# Patient Record
Sex: MALE | Race: WHITE | Employment: OTHER | URBAN - METROPOLITAN AREA
[De-identification: names, ages, dates, MRNs, and addresses within clinical notes are randomized per-mention and may not be internally consistent; named-entity substitution may affect disease eponyms.]

---

## 2017-01-03 ENCOUNTER — TELEPHONE (OUTPATIENT)
Dept: FAMILY MEDICINE CLINIC | Age: 77
End: 2017-01-03

## 2017-01-04 ENCOUNTER — OFFICE VISIT (OUTPATIENT)
Dept: FAMILY MEDICINE CLINIC | Age: 77
End: 2017-01-04

## 2017-01-04 ENCOUNTER — TELEPHONE (OUTPATIENT)
Dept: FAMILY MEDICINE CLINIC | Age: 77
End: 2017-01-04

## 2017-01-04 VITALS
HEART RATE: 58 BPM | HEIGHT: 68 IN | WEIGHT: 266 LBS | SYSTOLIC BLOOD PRESSURE: 124 MMHG | RESPIRATION RATE: 20 BRPM | BODY MASS INDEX: 40.32 KG/M2 | DIASTOLIC BLOOD PRESSURE: 74 MMHG | TEMPERATURE: 97.8 F | OXYGEN SATURATION: 96 %

## 2017-01-04 DIAGNOSIS — E11.21 TYPE 2 DIABETES MELLITUS WITH DIABETIC NEPHROPATHY, WITH LONG-TERM CURRENT USE OF INSULIN (HCC): Primary | ICD-10-CM

## 2017-01-04 DIAGNOSIS — J44.1 COPD WITH EXACERBATION (HCC): ICD-10-CM

## 2017-01-04 DIAGNOSIS — J44.9 CHRONIC OBSTRUCTIVE PULMONARY DISEASE, UNSPECIFIED COPD TYPE (HCC): ICD-10-CM

## 2017-01-04 DIAGNOSIS — Z79.4 TYPE 2 DIABETES MELLITUS WITH DIABETIC NEPHROPATHY, WITH LONG-TERM CURRENT USE OF INSULIN (HCC): Primary | ICD-10-CM

## 2017-01-04 PROCEDURE — 99214 OFFICE O/P EST MOD 30 MIN: CPT | Performed by: FAMILY MEDICINE

## 2017-01-04 RX ORDER — PREDNISONE 20 MG/1
20 TABLET ORAL 2 TIMES DAILY
Qty: 10 TABLET | Refills: 0 | Status: SHIPPED | OUTPATIENT
Start: 2017-01-04 | End: 2017-01-09

## 2017-01-04 RX ORDER — DEXTROMETHORPHAN HYDROBROMIDE AND PROMETHAZINE HYDROCHLORIDE 15; 6.25 MG/5ML; MG/5ML
5 SYRUP ORAL 4 TIMES DAILY PRN
Qty: 240 ML | Refills: 0 | Status: SHIPPED | OUTPATIENT
Start: 2017-01-04 | End: 2017-01-11

## 2017-01-04 RX ORDER — CEFUROXIME AXETIL 250 MG/1
250 TABLET ORAL 2 TIMES DAILY
Qty: 20 TABLET | Refills: 0 | Status: SHIPPED | OUTPATIENT
Start: 2017-01-04 | End: 2017-01-14

## 2017-01-16 ENCOUNTER — OFFICE VISIT (OUTPATIENT)
Dept: FAMILY MEDICINE CLINIC | Age: 77
End: 2017-01-16

## 2017-01-16 VITALS
TEMPERATURE: 98 F | RESPIRATION RATE: 18 BRPM | HEIGHT: 68 IN | BODY MASS INDEX: 40.32 KG/M2 | SYSTOLIC BLOOD PRESSURE: 120 MMHG | WEIGHT: 266 LBS | DIASTOLIC BLOOD PRESSURE: 70 MMHG | HEART RATE: 64 BPM

## 2017-01-16 DIAGNOSIS — E11.21 TYPE 2 DIABETES MELLITUS WITH DIABETIC NEPHROPATHY, WITH LONG-TERM CURRENT USE OF INSULIN (HCC): ICD-10-CM

## 2017-01-16 DIAGNOSIS — I10 ESSENTIAL HYPERTENSION: ICD-10-CM

## 2017-01-16 DIAGNOSIS — Z79.4 TYPE 2 DIABETES MELLITUS WITH DIABETIC NEPHROPATHY, WITH LONG-TERM CURRENT USE OF INSULIN (HCC): ICD-10-CM

## 2017-01-16 DIAGNOSIS — I25.119 CORONARY ARTERY DISEASE INVOLVING NATIVE CORONARY ARTERY OF NATIVE HEART WITH ANGINA PECTORIS (HCC): ICD-10-CM

## 2017-01-16 DIAGNOSIS — E11.42 DIABETIC POLYNEUROPATHY ASSOCIATED WITH TYPE 2 DIABETES MELLITUS (HCC): ICD-10-CM

## 2017-01-16 DIAGNOSIS — E11.21 TYPE 2 DIABETES MELLITUS WITH DIABETIC NEPHROPATHY, WITH LONG-TERM CURRENT USE OF INSULIN (HCC): Primary | ICD-10-CM

## 2017-01-16 DIAGNOSIS — J44.9 CHRONIC OBSTRUCTIVE PULMONARY DISEASE, UNSPECIFIED COPD TYPE (HCC): ICD-10-CM

## 2017-01-16 DIAGNOSIS — Z86.79 PERSONAL HISTORY OF ATRIAL FLUTTER: ICD-10-CM

## 2017-01-16 DIAGNOSIS — E78.2 MIXED HYPERLIPIDEMIA: ICD-10-CM

## 2017-01-16 DIAGNOSIS — E66.01 OBESITY, CLASS III, BMI 40-49.9 (MORBID OBESITY) (HCC): ICD-10-CM

## 2017-01-16 DIAGNOSIS — E11.319 DIABETIC RETINOPATHY ASSOCIATED WITH TYPE 2 DIABETES MELLITUS, MACULAR EDEMA PRESENCE UNSPECIFIED, UNSPECIFIED RETINOPATHY SEVERITY: ICD-10-CM

## 2017-01-16 DIAGNOSIS — Z79.4 TYPE 2 DIABETES MELLITUS WITH DIABETIC NEPHROPATHY, WITH LONG-TERM CURRENT USE OF INSULIN (HCC): Primary | ICD-10-CM

## 2017-01-16 LAB
A/G RATIO: 1.6 (ref 1.1–2.2)
ALBUMIN SERPL-MCNC: 3.6 G/DL (ref 3.4–5)
ALP BLD-CCNC: 126 U/L (ref 40–129)
ALT SERPL-CCNC: 16 U/L (ref 10–40)
ANION GAP SERPL CALCULATED.3IONS-SCNC: 12 MMOL/L (ref 3–16)
AST SERPL-CCNC: 16 U/L (ref 15–37)
BILIRUB SERPL-MCNC: 0.9 MG/DL (ref 0–1)
BUN BLDV-MCNC: 20 MG/DL (ref 7–20)
CALCIUM SERPL-MCNC: 9.1 MG/DL (ref 8.3–10.6)
CHLORIDE BLD-SCNC: 100 MMOL/L (ref 99–110)
CHOLESTEROL, TOTAL: 157 MG/DL (ref 0–199)
CO2: 32 MMOL/L (ref 21–32)
CREAT SERPL-MCNC: 1.3 MG/DL (ref 0.8–1.3)
GFR AFRICAN AMERICAN: >60
GFR NON-AFRICAN AMERICAN: 54
GLOBULIN: 2.3 G/DL
GLUCOSE BLD-MCNC: 225 MG/DL (ref 70–99)
HDLC SERPL-MCNC: 55 MG/DL (ref 40–60)
LDL CHOLESTEROL CALCULATED: 73 MG/DL
POTASSIUM SERPL-SCNC: 4.7 MMOL/L (ref 3.5–5.1)
SODIUM BLD-SCNC: 144 MMOL/L (ref 136–145)
TOTAL PROTEIN: 5.9 G/DL (ref 6.4–8.2)
TRIGL SERPL-MCNC: 147 MG/DL (ref 0–150)
VLDLC SERPL CALC-MCNC: 29 MG/DL

## 2017-01-16 PROCEDURE — 3023F SPIROM DOC REV: CPT | Performed by: FAMILY MEDICINE

## 2017-01-16 PROCEDURE — 1036F TOBACCO NON-USER: CPT | Performed by: FAMILY MEDICINE

## 2017-01-16 PROCEDURE — G8598 ASA/ANTIPLAT THER USED: HCPCS | Performed by: FAMILY MEDICINE

## 2017-01-16 PROCEDURE — 1123F ACP DISCUSS/DSCN MKR DOCD: CPT | Performed by: FAMILY MEDICINE

## 2017-01-16 PROCEDURE — 4040F PNEUMOC VAC/ADMIN/RCVD: CPT | Performed by: FAMILY MEDICINE

## 2017-01-16 PROCEDURE — G8484 FLU IMMUNIZE NO ADMIN: HCPCS | Performed by: FAMILY MEDICINE

## 2017-01-16 PROCEDURE — G8419 CALC BMI OUT NRM PARAM NOF/U: HCPCS | Performed by: FAMILY MEDICINE

## 2017-01-16 PROCEDURE — G8926 SPIRO NO PERF OR DOC: HCPCS | Performed by: FAMILY MEDICINE

## 2017-01-16 PROCEDURE — 99214 OFFICE O/P EST MOD 30 MIN: CPT | Performed by: FAMILY MEDICINE

## 2017-01-16 PROCEDURE — G8427 DOCREV CUR MEDS BY ELIG CLIN: HCPCS | Performed by: FAMILY MEDICINE

## 2017-01-17 LAB
ESTIMATED AVERAGE GLUCOSE: 217.3 MG/DL
HBA1C MFR BLD: 9.2 %

## 2017-03-14 RX ORDER — IBUPROFEN 200 MG
TABLET ORAL
Qty: 400 EACH | Refills: 5 | Status: SHIPPED | OUTPATIENT
Start: 2017-03-14 | End: 2018-05-11 | Stop reason: SDUPTHER

## 2017-03-19 DIAGNOSIS — E78.5 HYPERLIPIDEMIA: ICD-10-CM

## 2017-03-20 RX ORDER — RANITIDINE 150 MG/1
TABLET ORAL
Qty: 180 TABLET | Refills: 3 | Status: SHIPPED | OUTPATIENT
Start: 2017-03-20 | End: 2018-04-16 | Stop reason: SDUPTHER

## 2017-03-20 RX ORDER — ATORVASTATIN CALCIUM 40 MG/1
TABLET, FILM COATED ORAL
Qty: 90 TABLET | Refills: 1 | Status: SHIPPED | OUTPATIENT
Start: 2017-03-20 | End: 2017-10-30 | Stop reason: SDUPTHER

## 2017-04-04 RX ORDER — POTASSIUM CHLORIDE 750 MG/1
TABLET, FILM COATED, EXTENDED RELEASE ORAL
Qty: 90 TABLET | Refills: 1 | Status: SHIPPED | OUTPATIENT
Start: 2017-04-04 | End: 2017-09-26 | Stop reason: SDUPTHER

## 2017-04-04 RX ORDER — AMIODARONE HYDROCHLORIDE 200 MG/1
TABLET ORAL
Qty: 90 TABLET | Refills: 1 | Status: SHIPPED | OUTPATIENT
Start: 2017-04-04 | End: 2017-09-26 | Stop reason: SDUPTHER

## 2017-04-10 RX ORDER — OMEPRAZOLE 20 MG/1
CAPSULE, DELAYED RELEASE ORAL
Qty: 180 CAPSULE | Refills: 0 | Status: SHIPPED | OUTPATIENT
Start: 2017-04-10 | End: 2017-07-08 | Stop reason: SDUPTHER

## 2017-04-21 ENCOUNTER — OFFICE VISIT (OUTPATIENT)
Dept: FAMILY MEDICINE CLINIC | Age: 77
End: 2017-04-21

## 2017-04-21 ENCOUNTER — TELEPHONE (OUTPATIENT)
Dept: FAMILY MEDICINE CLINIC | Age: 77
End: 2017-04-21

## 2017-04-21 VITALS
DIASTOLIC BLOOD PRESSURE: 80 MMHG | HEIGHT: 68 IN | WEIGHT: 263 LBS | SYSTOLIC BLOOD PRESSURE: 126 MMHG | TEMPERATURE: 98.4 F | BODY MASS INDEX: 39.86 KG/M2 | HEART RATE: 58 BPM | RESPIRATION RATE: 18 BRPM

## 2017-04-21 DIAGNOSIS — E11.21 TYPE 2 DIABETES MELLITUS WITH DIABETIC NEPHROPATHY, WITH LONG-TERM CURRENT USE OF INSULIN (HCC): ICD-10-CM

## 2017-04-21 DIAGNOSIS — Z79.4 TYPE 2 DIABETES MELLITUS WITH DIABETIC NEPHROPATHY, WITH LONG-TERM CURRENT USE OF INSULIN (HCC): ICD-10-CM

## 2017-04-21 DIAGNOSIS — I10 ESSENTIAL HYPERTENSION: ICD-10-CM

## 2017-04-21 DIAGNOSIS — Z79.4 TYPE 2 DIABETES MELLITUS WITH DIABETIC NEPHROPATHY, WITH LONG-TERM CURRENT USE OF INSULIN (HCC): Primary | ICD-10-CM

## 2017-04-21 DIAGNOSIS — E11.21 DIABETIC NEPHROPATHY ASSOCIATED WITH TYPE 2 DIABETES MELLITUS (HCC): ICD-10-CM

## 2017-04-21 DIAGNOSIS — E11.42 DIABETIC POLYNEUROPATHY ASSOCIATED WITH TYPE 2 DIABETES MELLITUS (HCC): ICD-10-CM

## 2017-04-21 DIAGNOSIS — I25.119 CORONARY ARTERY DISEASE INVOLVING NATIVE CORONARY ARTERY OF NATIVE HEART WITH ANGINA PECTORIS (HCC): ICD-10-CM

## 2017-04-21 DIAGNOSIS — J44.9 CHRONIC OBSTRUCTIVE PULMONARY DISEASE, UNSPECIFIED COPD TYPE (HCC): ICD-10-CM

## 2017-04-21 DIAGNOSIS — E11.21 TYPE 2 DIABETES MELLITUS WITH DIABETIC NEPHROPATHY, WITH LONG-TERM CURRENT USE OF INSULIN (HCC): Primary | ICD-10-CM

## 2017-04-21 LAB
ANION GAP SERPL CALCULATED.3IONS-SCNC: 13 MMOL/L (ref 3–16)
BUN BLDV-MCNC: 26 MG/DL (ref 7–20)
CALCIUM SERPL-MCNC: 8.9 MG/DL (ref 8.3–10.6)
CHLORIDE BLD-SCNC: 103 MMOL/L (ref 99–110)
CO2: 28 MMOL/L (ref 21–32)
CREAT SERPL-MCNC: 1.4 MG/DL (ref 0.8–1.3)
ESTIMATED AVERAGE GLUCOSE: 197.3 MG/DL
GFR AFRICAN AMERICAN: 60
GFR NON-AFRICAN AMERICAN: 49
GLUCOSE BLD-MCNC: 245 MG/DL (ref 70–99)
HBA1C MFR BLD: 8.5 %
POTASSIUM SERPL-SCNC: 4.4 MMOL/L (ref 3.5–5.1)
SODIUM BLD-SCNC: 144 MMOL/L (ref 136–145)

## 2017-04-21 PROCEDURE — G8926 SPIRO NO PERF OR DOC: HCPCS | Performed by: FAMILY MEDICINE

## 2017-04-21 PROCEDURE — G8417 CALC BMI ABV UP PARAM F/U: HCPCS | Performed by: FAMILY MEDICINE

## 2017-04-21 PROCEDURE — 99214 OFFICE O/P EST MOD 30 MIN: CPT | Performed by: FAMILY MEDICINE

## 2017-04-21 PROCEDURE — G8427 DOCREV CUR MEDS BY ELIG CLIN: HCPCS | Performed by: FAMILY MEDICINE

## 2017-04-21 PROCEDURE — 1123F ACP DISCUSS/DSCN MKR DOCD: CPT | Performed by: FAMILY MEDICINE

## 2017-04-21 PROCEDURE — 4040F PNEUMOC VAC/ADMIN/RCVD: CPT | Performed by: FAMILY MEDICINE

## 2017-04-21 PROCEDURE — 3023F SPIROM DOC REV: CPT | Performed by: FAMILY MEDICINE

## 2017-04-21 PROCEDURE — 1036F TOBACCO NON-USER: CPT | Performed by: FAMILY MEDICINE

## 2017-04-21 PROCEDURE — G8598 ASA/ANTIPLAT THER USED: HCPCS | Performed by: FAMILY MEDICINE

## 2017-05-03 RX ORDER — FUROSEMIDE 40 MG/1
TABLET ORAL
Qty: 180 TABLET | Refills: 0 | Status: SHIPPED | OUTPATIENT
Start: 2017-05-03 | End: 2017-05-08

## 2017-05-08 RX ORDER — FUROSEMIDE 80 MG
80 TABLET ORAL DAILY
Qty: 90 TABLET | Refills: 0 | Status: SHIPPED | OUTPATIENT
Start: 2017-05-08 | End: 2017-08-07 | Stop reason: SDUPTHER

## 2017-05-08 RX ORDER — FUROSEMIDE 80 MG
80 TABLET ORAL 2 TIMES DAILY
Qty: 90 TABLET | Refills: 0 | Status: SHIPPED | OUTPATIENT
Start: 2017-05-08 | End: 2017-05-08 | Stop reason: SDUPTHER

## 2017-06-13 ENCOUNTER — OFFICE VISIT (OUTPATIENT)
Dept: ORTHOPEDIC SURGERY | Age: 77
End: 2017-06-13

## 2017-06-13 VITALS
SYSTOLIC BLOOD PRESSURE: 143 MMHG | WEIGHT: 263 LBS | DIASTOLIC BLOOD PRESSURE: 62 MMHG | TEMPERATURE: 97.6 F | HEIGHT: 68 IN | BODY MASS INDEX: 39.86 KG/M2 | HEART RATE: 54 BPM

## 2017-06-13 DIAGNOSIS — Z96.653 HISTORY OF TOTAL BILATERAL KNEE REPLACEMENT: Primary | ICD-10-CM

## 2017-06-13 PROCEDURE — 1123F ACP DISCUSS/DSCN MKR DOCD: CPT | Performed by: PHYSICIAN ASSISTANT

## 2017-06-13 PROCEDURE — 99213 OFFICE O/P EST LOW 20 MIN: CPT | Performed by: PHYSICIAN ASSISTANT

## 2017-06-13 PROCEDURE — G8598 ASA/ANTIPLAT THER USED: HCPCS | Performed by: PHYSICIAN ASSISTANT

## 2017-06-13 PROCEDURE — 4040F PNEUMOC VAC/ADMIN/RCVD: CPT | Performed by: PHYSICIAN ASSISTANT

## 2017-06-13 PROCEDURE — G8427 DOCREV CUR MEDS BY ELIG CLIN: HCPCS | Performed by: PHYSICIAN ASSISTANT

## 2017-06-13 PROCEDURE — 1036F TOBACCO NON-USER: CPT | Performed by: PHYSICIAN ASSISTANT

## 2017-06-13 PROCEDURE — G8417 CALC BMI ABV UP PARAM F/U: HCPCS | Performed by: PHYSICIAN ASSISTANT

## 2017-07-10 RX ORDER — OMEPRAZOLE 20 MG/1
CAPSULE, DELAYED RELEASE ORAL
Qty: 180 CAPSULE | Refills: 3 | Status: SHIPPED | OUTPATIENT
Start: 2017-07-10 | End: 2018-08-21 | Stop reason: SDUPTHER

## 2017-08-08 RX ORDER — FUROSEMIDE 80 MG
TABLET ORAL
Qty: 90 TABLET | Refills: 1 | Status: SHIPPED | OUTPATIENT
Start: 2017-08-08 | End: 2018-02-01 | Stop reason: SDUPTHER

## 2017-08-10 ENCOUNTER — TELEPHONE (OUTPATIENT)
Dept: FAMILY MEDICINE CLINIC | Age: 77
End: 2017-08-10

## 2017-08-10 DIAGNOSIS — I10 ESSENTIAL HYPERTENSION: ICD-10-CM

## 2017-08-10 DIAGNOSIS — E11.21 TYPE 2 DIABETES MELLITUS WITH DIABETIC NEPHROPATHY, WITH LONG-TERM CURRENT USE OF INSULIN (HCC): Primary | ICD-10-CM

## 2017-08-10 DIAGNOSIS — Z79.4 TYPE 2 DIABETES MELLITUS WITH DIABETIC NEPHROPATHY, WITH LONG-TERM CURRENT USE OF INSULIN (HCC): Primary | ICD-10-CM

## 2017-08-11 ENCOUNTER — OFFICE VISIT (OUTPATIENT)
Dept: FAMILY MEDICINE CLINIC | Age: 77
End: 2017-08-11

## 2017-08-11 VITALS
DIASTOLIC BLOOD PRESSURE: 70 MMHG | RESPIRATION RATE: 18 BRPM | BODY MASS INDEX: 43.19 KG/M2 | HEIGHT: 68 IN | WEIGHT: 285 LBS | TEMPERATURE: 98.7 F | SYSTOLIC BLOOD PRESSURE: 116 MMHG | HEART RATE: 56 BPM

## 2017-08-11 DIAGNOSIS — E53.8 B12 DEFICIENCY: ICD-10-CM

## 2017-08-11 DIAGNOSIS — I25.119 CORONARY ARTERY DISEASE INVOLVING NATIVE CORONARY ARTERY OF NATIVE HEART WITH ANGINA PECTORIS (HCC): ICD-10-CM

## 2017-08-11 DIAGNOSIS — I10 ESSENTIAL HYPERTENSION: ICD-10-CM

## 2017-08-11 DIAGNOSIS — E11.21 TYPE 2 DIABETES MELLITUS WITH DIABETIC NEPHROPATHY, WITH LONG-TERM CURRENT USE OF INSULIN (HCC): Primary | ICD-10-CM

## 2017-08-11 DIAGNOSIS — E11.21 TYPE 2 DIABETES MELLITUS WITH DIABETIC NEPHROPATHY, WITH LONG-TERM CURRENT USE OF INSULIN (HCC): ICD-10-CM

## 2017-08-11 DIAGNOSIS — Z79.4 TYPE 2 DIABETES MELLITUS WITH DIABETIC NEPHROPATHY, WITH LONG-TERM CURRENT USE OF INSULIN (HCC): ICD-10-CM

## 2017-08-11 DIAGNOSIS — Z79.4 TYPE 2 DIABETES MELLITUS WITH DIABETIC NEPHROPATHY, WITH LONG-TERM CURRENT USE OF INSULIN (HCC): Primary | ICD-10-CM

## 2017-08-11 DIAGNOSIS — D50.9 IRON DEFICIENCY ANEMIA, UNSPECIFIED IRON DEFICIENCY ANEMIA TYPE: ICD-10-CM

## 2017-08-11 DIAGNOSIS — J44.9 CHRONIC OBSTRUCTIVE PULMONARY DISEASE, UNSPECIFIED COPD TYPE (HCC): ICD-10-CM

## 2017-08-11 DIAGNOSIS — E78.2 MIXED HYPERLIPIDEMIA: ICD-10-CM

## 2017-08-11 LAB
ANION GAP SERPL CALCULATED.3IONS-SCNC: 12 MMOL/L (ref 3–16)
BASOPHILS ABSOLUTE: 0.1 K/UL (ref 0–0.2)
BASOPHILS RELATIVE PERCENT: 1.2 %
BUN BLDV-MCNC: 20 MG/DL (ref 7–20)
CALCIUM SERPL-MCNC: 9.2 MG/DL (ref 8.3–10.6)
CHLORIDE BLD-SCNC: 104 MMOL/L (ref 99–110)
CO2: 29 MMOL/L (ref 21–32)
CREAT SERPL-MCNC: 1.2 MG/DL (ref 0.8–1.3)
EOSINOPHILS ABSOLUTE: 0.1 K/UL (ref 0–0.6)
EOSINOPHILS RELATIVE PERCENT: 2.7 %
GFR AFRICAN AMERICAN: >60
GFR NON-AFRICAN AMERICAN: 59
GLUCOSE BLD-MCNC: 173 MG/DL (ref 70–99)
HCT VFR BLD CALC: 41.4 % (ref 40.5–52.5)
HEMOGLOBIN: 13.4 G/DL (ref 13.5–17.5)
LYMPHOCYTES ABSOLUTE: 1 K/UL (ref 1–5.1)
LYMPHOCYTES RELATIVE PERCENT: 20.3 %
MCH RBC QN AUTO: 29.8 PG (ref 26–34)
MCHC RBC AUTO-ENTMCNC: 32.4 G/DL (ref 31–36)
MCV RBC AUTO: 92 FL (ref 80–100)
MONOCYTES ABSOLUTE: 0.6 K/UL (ref 0–1.3)
MONOCYTES RELATIVE PERCENT: 12.5 %
NEUTROPHILS ABSOLUTE: 3 K/UL (ref 1.7–7.7)
NEUTROPHILS RELATIVE PERCENT: 63.3 %
PDW BLD-RTO: 15.7 % (ref 12.4–15.4)
PLATELET # BLD: 121 K/UL (ref 135–450)
PMV BLD AUTO: 9.6 FL (ref 5–10.5)
POTASSIUM SERPL-SCNC: 4.9 MMOL/L (ref 3.5–5.1)
RBC # BLD: 4.5 M/UL (ref 4.2–5.9)
SODIUM BLD-SCNC: 145 MMOL/L (ref 136–145)
VITAMIN B-12: 573 PG/ML (ref 211–911)
WBC # BLD: 4.7 K/UL (ref 4–11)

## 2017-08-11 PROCEDURE — 1123F ACP DISCUSS/DSCN MKR DOCD: CPT | Performed by: FAMILY MEDICINE

## 2017-08-11 PROCEDURE — G8427 DOCREV CUR MEDS BY ELIG CLIN: HCPCS | Performed by: FAMILY MEDICINE

## 2017-08-11 PROCEDURE — 3023F SPIROM DOC REV: CPT | Performed by: FAMILY MEDICINE

## 2017-08-11 PROCEDURE — G8926 SPIRO NO PERF OR DOC: HCPCS | Performed by: FAMILY MEDICINE

## 2017-08-11 PROCEDURE — G8598 ASA/ANTIPLAT THER USED: HCPCS | Performed by: FAMILY MEDICINE

## 2017-08-11 PROCEDURE — G8417 CALC BMI ABV UP PARAM F/U: HCPCS | Performed by: FAMILY MEDICINE

## 2017-08-11 PROCEDURE — 1036F TOBACCO NON-USER: CPT | Performed by: FAMILY MEDICINE

## 2017-08-11 PROCEDURE — 4040F PNEUMOC VAC/ADMIN/RCVD: CPT | Performed by: FAMILY MEDICINE

## 2017-08-11 PROCEDURE — 99214 OFFICE O/P EST MOD 30 MIN: CPT | Performed by: FAMILY MEDICINE

## 2017-08-11 ASSESSMENT — PATIENT HEALTH QUESTIONNAIRE - PHQ9
2. FEELING DOWN, DEPRESSED OR HOPELESS: 0
SUM OF ALL RESPONSES TO PHQ QUESTIONS 1-9: 0
SUM OF ALL RESPONSES TO PHQ9 QUESTIONS 1 & 2: 0
1. LITTLE INTEREST OR PLEASURE IN DOING THINGS: 0

## 2017-08-12 LAB
ESTIMATED AVERAGE GLUCOSE: 165.7 MG/DL
HBA1C MFR BLD: 7.4 %

## 2017-09-20 ENCOUNTER — OFFICE VISIT (OUTPATIENT)
Dept: FAMILY MEDICINE CLINIC | Age: 77
End: 2017-09-20

## 2017-09-20 VITALS
BODY MASS INDEX: 42.74 KG/M2 | SYSTOLIC BLOOD PRESSURE: 122 MMHG | TEMPERATURE: 98.2 F | WEIGHT: 282 LBS | RESPIRATION RATE: 20 BRPM | HEART RATE: 58 BPM | DIASTOLIC BLOOD PRESSURE: 60 MMHG | HEIGHT: 68 IN

## 2017-09-20 DIAGNOSIS — Z23 NEEDS FLU SHOT: ICD-10-CM

## 2017-09-20 DIAGNOSIS — M70.21 OLECRANON BURSITIS OF RIGHT ELBOW: Primary | ICD-10-CM

## 2017-09-20 PROCEDURE — G8417 CALC BMI ABV UP PARAM F/U: HCPCS | Performed by: FAMILY MEDICINE

## 2017-09-20 PROCEDURE — G8427 DOCREV CUR MEDS BY ELIG CLIN: HCPCS | Performed by: FAMILY MEDICINE

## 2017-09-20 PROCEDURE — 99213 OFFICE O/P EST LOW 20 MIN: CPT | Performed by: FAMILY MEDICINE

## 2017-09-20 PROCEDURE — G8598 ASA/ANTIPLAT THER USED: HCPCS | Performed by: FAMILY MEDICINE

## 2017-09-20 PROCEDURE — 90662 IIV NO PRSV INCREASED AG IM: CPT | Performed by: FAMILY MEDICINE

## 2017-09-20 PROCEDURE — G0008 ADMIN INFLUENZA VIRUS VAC: HCPCS | Performed by: FAMILY MEDICINE

## 2017-09-20 PROCEDURE — 4040F PNEUMOC VAC/ADMIN/RCVD: CPT | Performed by: FAMILY MEDICINE

## 2017-09-20 PROCEDURE — 1036F TOBACCO NON-USER: CPT | Performed by: FAMILY MEDICINE

## 2017-09-20 PROCEDURE — 1123F ACP DISCUSS/DSCN MKR DOCD: CPT | Performed by: FAMILY MEDICINE

## 2017-09-26 RX ORDER — POTASSIUM CHLORIDE 750 MG/1
TABLET, FILM COATED, EXTENDED RELEASE ORAL
Qty: 90 TABLET | Refills: 0 | Status: SHIPPED | OUTPATIENT
Start: 2017-09-26 | End: 2017-12-27 | Stop reason: SDUPTHER

## 2017-09-26 RX ORDER — AMIODARONE HYDROCHLORIDE 200 MG/1
TABLET ORAL
Qty: 90 TABLET | Refills: 0 | Status: SHIPPED | OUTPATIENT
Start: 2017-09-26 | End: 2017-10-06 | Stop reason: SDUPTHER

## 2017-10-03 DIAGNOSIS — M17.0 PRIMARY OSTEOARTHRITIS OF BOTH KNEES: Primary | ICD-10-CM

## 2017-10-03 ASSESSMENT — PROMIS GLOBAL HEALTH SCALE
IN GENERAL, PLEASE RATE HOW WELL YOU CARRY OUT YOUR USUAL SOCIAL ACTIVITIES (INCLUDES ACTIVITIES AT HOME, AT WORK, AND IN YOUR COMMUNITY, AND RESPONSIBILITIES AS A PARENT, CHILD, SPOUSE, EMPLOYEE, FRIEND, ETC) [ON A SCALE OF 1 (POOR) TO 5 (EXCELLENT)]?: 3
IN THE PAST 7 DAYS, HOW WOULD YOU RATE YOUR FATIGUE ON AVERAGE [ON A SCALE FROM 1 (NONE) TO 5 (VERY SEVERE)]?: 3
IN GENERAL, HOW WOULD YOU RATE YOUR PHYSICAL HEALTH [ON A SCALE OF 1 (POOR) TO 5 (EXCELLENT)]?: 2
TO WHAT EXTENT ARE YOU ABLE TO CARRY OUT YOUR EVERYDAY PHYSICAL ACTIVITIES SUCH AS WALKING, CLIMBING STAIRS, CARRYING GROCERIES, OR MOVING A CHAIR [ON A SCALE OF 1 (NOT AT ALL) TO 5 (COMPLETELY)]?: 2
IN GENERAL, WOULD YOU SAY YOUR QUALITY OF LIFE IS...[ON A SCALE OF 1 (POOR) TO 5 (EXCELLENT)]: 4
IN GENERAL, HOW WOULD YOU RATE YOUR SATISFACTION WITH YOUR SOCIAL ACTIVITIES AND RELATIONSHIPS [ON A SCALE OF 1 (POOR) TO 5 (EXCELLENT)]?: 4
IN THE PAST 7 DAYS, HOW WOULD YOU RATE YOUR PAIN ON AVERAGE [ON A SCALE FROM 0 (NO PAIN) TO 10 (WORST IMAGINABLE PAIN)]?: 7
IN THE PAST 7 DAYS, HOW OFTEN HAVE YOU BEEN BOTHERED BY EMOTIONAL PROBLEMS, SUCH AS FEELING ANXIOUS, DEPRESSED, OR IRRITABLE [ON A SCALE FROM 1 (NEVER) TO 5 (ALWAYS)]?: 2
SUM OF RESPONSES TO QUESTIONS 2, 4, 5, & 10: 14
HOW IS THE PROMIS V1.1 BEING ADMINISTERED?: 0
WHO IS THE PERSON COMPLETING THE PROMIS V1.1 SURVEY?: 0
IN GENERAL, HOW WOULD YOU RATE YOUR MENTAL HEALTH, INCLUDING YOUR MOOD AND YOUR ABILITY TO THINK [ON A SCALE OF 1 (POOR) TO 5 (EXCELLENT)]?: 4
IN GENERAL, WOULD YOU SAY YOUR HEALTH IS...[ON A SCALE OF 1 (POOR) TO 5 (EXCELLENT)]: 2
SUM OF RESPONSES TO QUESTIONS 3, 6, 7, & 8: 14

## 2017-10-03 ASSESSMENT — KOOS JR
BENDING TO THE FLOOR TO PICK UP OBJECT: 3
STANDING UPRIGHT: 2
HOW SEVERE IS YOUR KNEE STIFFNESS AFTER FIRST WAKING IN MORNING: 3
STRAIGHTENING KNEE FULLY: 2
TWISING OR PIVOTING ON KNEE: 3
GOING UP OR DOWN STAIRS: 3
RISING FROM SITTING: 3

## 2017-10-07 RX ORDER — AMIODARONE HYDROCHLORIDE 200 MG/1
TABLET ORAL
Qty: 90 TABLET | Refills: 3 | Status: SHIPPED | OUTPATIENT
Start: 2017-10-07 | End: 2018-10-29 | Stop reason: SDUPTHER

## 2017-10-16 ENCOUNTER — TELEPHONE (OUTPATIENT)
Dept: FAMILY MEDICINE CLINIC | Age: 77
End: 2017-10-16

## 2017-10-16 NOTE — TELEPHONE ENCOUNTER
Pt called and said he was taking pantoprazole. He said he can't find his bottle to do the refill and he can't remember if he is to keep taking this or not. If so he needs a script sent to the pharmacy.

## 2017-10-19 DIAGNOSIS — I10 ESSENTIAL HYPERTENSION: ICD-10-CM

## 2017-10-19 DIAGNOSIS — E11.21 TYPE 2 DIABETES MELLITUS WITH DIABETIC NEPHROPATHY, WITH LONG-TERM CURRENT USE OF INSULIN (HCC): ICD-10-CM

## 2017-10-19 DIAGNOSIS — Z79.4 TYPE 2 DIABETES MELLITUS WITH DIABETIC NEPHROPATHY, WITH LONG-TERM CURRENT USE OF INSULIN (HCC): ICD-10-CM

## 2017-10-19 LAB
ANION GAP SERPL CALCULATED.3IONS-SCNC: 13 MMOL/L (ref 3–16)
BUN BLDV-MCNC: 20 MG/DL (ref 7–20)
CALCIUM SERPL-MCNC: 9.2 MG/DL (ref 8.3–10.6)
CHLORIDE BLD-SCNC: 102 MMOL/L (ref 99–110)
CO2: 27 MMOL/L (ref 21–32)
CREAT SERPL-MCNC: 1.1 MG/DL (ref 0.8–1.3)
GFR AFRICAN AMERICAN: >60
GFR NON-AFRICAN AMERICAN: >60
GLUCOSE BLD-MCNC: 211 MG/DL (ref 70–99)
POTASSIUM SERPL-SCNC: 4.4 MMOL/L (ref 3.5–5.1)
SODIUM BLD-SCNC: 142 MMOL/L (ref 136–145)

## 2017-10-20 LAB
ESTIMATED AVERAGE GLUCOSE: 185.8 MG/DL
HBA1C MFR BLD: 8.1 %

## 2017-10-24 DIAGNOSIS — E11.21 TYPE 2 DIABETES MELLITUS WITH DIABETIC NEPHROPATHY, WITH LONG-TERM CURRENT USE OF INSULIN (HCC): ICD-10-CM

## 2017-10-24 DIAGNOSIS — Z79.4 TYPE 2 DIABETES MELLITUS WITH DIABETIC NEPHROPATHY, WITH LONG-TERM CURRENT USE OF INSULIN (HCC): ICD-10-CM

## 2017-10-30 DIAGNOSIS — E78.5 HYPERLIPIDEMIA: ICD-10-CM

## 2017-10-31 RX ORDER — ATORVASTATIN CALCIUM 40 MG/1
TABLET, FILM COATED ORAL
Qty: 90 TABLET | Refills: 0 | Status: SHIPPED | OUTPATIENT
Start: 2017-10-31 | End: 2018-01-28 | Stop reason: SDUPTHER

## 2017-11-20 ENCOUNTER — OFFICE VISIT (OUTPATIENT)
Dept: FAMILY MEDICINE CLINIC | Age: 77
End: 2017-11-20

## 2017-11-20 VITALS
SYSTOLIC BLOOD PRESSURE: 130 MMHG | HEART RATE: 60 BPM | RESPIRATION RATE: 18 BRPM | TEMPERATURE: 97.5 F | DIASTOLIC BLOOD PRESSURE: 80 MMHG | BODY MASS INDEX: 42.74 KG/M2 | WEIGHT: 282 LBS | HEIGHT: 68 IN

## 2017-11-20 DIAGNOSIS — I25.119 CORONARY ARTERY DISEASE INVOLVING NATIVE CORONARY ARTERY OF NATIVE HEART WITH ANGINA PECTORIS (HCC): ICD-10-CM

## 2017-11-20 DIAGNOSIS — E11.21 TYPE 2 DIABETES MELLITUS WITH DIABETIC NEPHROPATHY, WITH LONG-TERM CURRENT USE OF INSULIN (HCC): Primary | ICD-10-CM

## 2017-11-20 DIAGNOSIS — I10 ESSENTIAL HYPERTENSION: ICD-10-CM

## 2017-11-20 DIAGNOSIS — E78.2 MIXED HYPERLIPIDEMIA: ICD-10-CM

## 2017-11-20 DIAGNOSIS — Z79.4 TYPE 2 DIABETES MELLITUS WITH DIABETIC NEPHROPATHY, WITH LONG-TERM CURRENT USE OF INSULIN (HCC): Primary | ICD-10-CM

## 2017-11-20 DIAGNOSIS — J44.9 CHRONIC OBSTRUCTIVE PULMONARY DISEASE, UNSPECIFIED COPD TYPE (HCC): ICD-10-CM

## 2017-11-20 LAB — HBA1C MFR BLD: 8.4 %

## 2017-11-20 PROCEDURE — 4040F PNEUMOC VAC/ADMIN/RCVD: CPT | Performed by: FAMILY MEDICINE

## 2017-11-20 PROCEDURE — 1123F ACP DISCUSS/DSCN MKR DOCD: CPT | Performed by: FAMILY MEDICINE

## 2017-11-20 PROCEDURE — G8417 CALC BMI ABV UP PARAM F/U: HCPCS | Performed by: FAMILY MEDICINE

## 2017-11-20 PROCEDURE — G8926 SPIRO NO PERF OR DOC: HCPCS | Performed by: FAMILY MEDICINE

## 2017-11-20 PROCEDURE — G8484 FLU IMMUNIZE NO ADMIN: HCPCS | Performed by: FAMILY MEDICINE

## 2017-11-20 PROCEDURE — G8427 DOCREV CUR MEDS BY ELIG CLIN: HCPCS | Performed by: FAMILY MEDICINE

## 2017-11-20 PROCEDURE — 99214 OFFICE O/P EST MOD 30 MIN: CPT | Performed by: FAMILY MEDICINE

## 2017-11-20 PROCEDURE — 3023F SPIROM DOC REV: CPT | Performed by: FAMILY MEDICINE

## 2017-11-20 PROCEDURE — G8598 ASA/ANTIPLAT THER USED: HCPCS | Performed by: FAMILY MEDICINE

## 2017-11-20 PROCEDURE — 1036F TOBACCO NON-USER: CPT | Performed by: FAMILY MEDICINE

## 2017-11-20 PROCEDURE — 83036 HEMOGLOBIN GLYCOSYLATED A1C: CPT | Performed by: FAMILY MEDICINE

## 2017-11-20 RX ORDER — ASPIRIN 81 MG/1
81 TABLET ORAL DAILY
COMMUNITY

## 2017-11-20 NOTE — PROGRESS NOTES
CHART REVIEW  Health Maintenance   Topic Date Due    DTaP/Tdap/Td vaccine (3 - Td) 11/28/2021    Lipid screen  01/16/2022    Zostavax vaccine  Completed    Flu vaccine  Completed    Pneumococcal low/med risk  Completed      Patient Active Problem List   Diagnosis    Tinnitus    Special screening for malignant neoplasms, colon    Leg edema    Elevated PSA- nl 8/12/11    Hyperlipidemia    Osteoarthritis    Di[facility] retinopathy ([facility]    BPH (benign prostatic hyperplasia)    COPD (chronic obstructive pulmonary disease) ([facility]    Rosacea    Erectile dysfunction    Hypertension    CAD (coronary artery disease)    Diabetes mellitus, type II ([facility]    Diabetic nephropathy- pos microalb 8/11    Diabetic neuropathy (HCC)    GERD (gastroesophageal reflux disease)    Screening for prostate cancer    Iron deficiency anemia    B12 deficiency    Impingement syndrome of left shoulder    Obesity, Class III, BMI 40-49.9 (morbid obesity) ([facility]    Personal history of atrial flutter    Peptic ulcer disease    Medicare annual wellness visit, subsequent    History of total bilateral knee replacement     Cholesterol, Total   Date Value Ref Range Status   01/16/2017 157 0 - 199 mg/dL Final     LDL Calculated   Date Value Ref Range Status   01/16/2017 73 <100 mg/dL Final     HDL   Date Value Ref Range Status   01/16/2017 55 40 - 60 mg/dL Final   03/08/2012 37 (L) 40 - 60 mg/dl Final     Triglycerides   Date Value Ref Range Status   01/16/2017 147 0 - 150 mg/dL Final     Lab Results   Component Value Date    GLUCOSE 211 (H) 10/19/2017     Lab Results   Component Value Date     10/19/2017    K 4.4 10/19/2017    CREATININE 1.1 10/19/2017     Lab Results   Component Value Date    WBC 4.7 08/11/2017    HGB 13.4 (L) 08/11/2017    HCT 41.4 08/11/2017    MCV 92.0 08/11/2017     (L) 08/11/2017     Lab Results   Component Value Date    ALT 16 01/16/2017    AST 16 01/16/2017    ALKPHOS 126 01/16/2017    BILITOT 0.9 01/16/2017     TSH (uIU/mL)   Date Value   07/17/2014 1.97     Lab Results   Component Value Date    LABA1C 8.1 10/19/2017     The 10-year ASCVD risk score (Chi Osorio., et al., 2013) is: 47.1%    Values used to calculate the score:      Age: 69 years      Sex: Male      Is Non- : No      Diabetic: Yes      Tobacco smoker: No      Systolic Blood Pressure: 362 mmHg      Is BP treated: Yes      HDL Cholesterol: 55 mg/dL      Total Cholesterol: 157 mg/dL  VISIT NOTE   Subjective:   HPI CHRONIC:   Chief Complaint   Patient presents with    Diabetes    Patient here for follow-up of multiple chronic conditions including:   Type 2 diabetes mellitus with diabetic nephropathy, with long-term current use of insulin ([facility]     Coronary artery disease involving native coronary artery of native heart with angina pectoris ([address]     Mixed hyperlipidemia     Essential hypertension     Chronic obstructive pulmonary disease, unspecified COPD type ([facility]      A1c 6 weeks ago over 8  Rationing insulin due to donut hole   Usually 60 unit novolog/d, NPH 70/d BUT currently taking only 30 of NPH and 45 of novolog    · Following appropriate diet? Yes  · Exercise: walking three times a week  · Taking medicines daily as directed? No  · Any side effects of medications? No    ROS:  General ROS: negative for - chills, fatigue or fever  Hematological and Lymphatic ROS: negative for - blood clots or weight loss  Respiratory ROS: no cough, shortness of breath, or wheezing  Cardiovascular ROS: no chest pain or dyspnea on exertion  Gastrointestinal ROS: no abdominal pain, change in bowel habits, or black or bloody stools  Genito-Urinary ROS: no dysuria, trouble voiding, or hematuria  Neurological ROS: no TIA or stroke symptoms    HISTORY:  Patient's medications, allergies, past medical, surgical, social and family histories were reviewed and updated as appropriate (See above).        Objective:   PHYSICAL EXAM   /80

## 2017-12-28 RX ORDER — POTASSIUM CHLORIDE 750 MG/1
TABLET, FILM COATED, EXTENDED RELEASE ORAL
Qty: 90 TABLET | Refills: 0 | Status: SHIPPED | OUTPATIENT
Start: 2017-12-28 | End: 2018-03-25 | Stop reason: SDUPTHER

## 2018-01-02 RX ORDER — CHOLECALCIFEROL (VITAMIN D3) 125 MCG
CAPSULE ORAL
Qty: 90 TABLET | Refills: 0 | Status: SHIPPED | OUTPATIENT
Start: 2018-01-02 | End: 2018-04-10

## 2018-01-08 RX ORDER — FERROUS SULFATE 325(65) MG
TABLET ORAL
Qty: 180 TABLET | Refills: 0 | Status: SHIPPED | OUTPATIENT
Start: 2018-01-08 | End: 2018-04-16 | Stop reason: SDUPTHER

## 2018-01-22 ENCOUNTER — HOSPITAL ENCOUNTER (OUTPATIENT)
Dept: SURGERY | Age: 78
Discharge: HOME | End: 2018-01-22
Attending: OPHTHALMOLOGY | Admitting: OPHTHALMOLOGY

## 2018-01-22 VITALS — SYSTOLIC BLOOD PRESSURE: 101 MMHG | DIASTOLIC BLOOD PRESSURE: 77 MMHG

## 2018-01-22 RX ORDER — PROPARACAINE HYDROCHLORIDE 5 MG/ML
1 SOLUTION/ DROPS OPHTHALMIC ONCE
Status: COMPLETED | OUTPATIENT
Start: 2018-01-22 | End: 2018-01-22

## 2018-01-22 RX ORDER — TROPICAMIDE 10 MG/ML
1 SOLUTION/ DROPS OPHTHALMIC ONCE
Status: COMPLETED | OUTPATIENT
Start: 2018-01-22 | End: 2018-01-22

## 2018-01-22 RX ADMIN — TROPICAMIDE 1 DROP: 10 SOLUTION/ DROPS OPHTHALMIC at 11:54

## 2018-01-22 RX ADMIN — PROPARACAINE HYDROCHLORIDE 1 DROP: 5 SOLUTION/ DROPS OPHTHALMIC at 11:54

## 2018-01-22 ASSESSMENT — PAIN SCALES - GENERAL: PAINLEVEL_OUTOF10: 0

## 2018-01-28 DIAGNOSIS — E78.5 HYPERLIPIDEMIA: ICD-10-CM

## 2018-01-29 RX ORDER — ATORVASTATIN CALCIUM 40 MG/1
TABLET, FILM COATED ORAL
Qty: 90 TABLET | Refills: 1 | Status: SHIPPED | OUTPATIENT
Start: 2018-01-29 | End: 2018-07-31 | Stop reason: SDUPTHER

## 2018-02-01 ENCOUNTER — CARE COORDINATION (OUTPATIENT)
Dept: CARE COORDINATION | Age: 78
End: 2018-02-01

## 2018-02-02 RX ORDER — FUROSEMIDE 80 MG
TABLET ORAL
Qty: 90 TABLET | Refills: 3 | Status: SHIPPED | OUTPATIENT
Start: 2018-02-02 | End: 2019-04-27 | Stop reason: SDUPTHER

## 2018-02-05 ENCOUNTER — OFFICE VISIT (OUTPATIENT)
Dept: FAMILY MEDICINE CLINIC | Age: 78
End: 2018-02-05

## 2018-02-05 ENCOUNTER — HOSPITAL ENCOUNTER (OUTPATIENT)
Dept: OTHER | Age: 78
Discharge: HOME | End: 2018-02-05
Attending: INTERNAL MEDICINE | Admitting: INTERNAL MEDICINE

## 2018-02-05 ENCOUNTER — TELEPHONE (OUTPATIENT)
Dept: FAMILY MEDICINE CLINIC | Age: 78
End: 2018-02-05

## 2018-02-05 VITALS
TEMPERATURE: 98.8 F | SYSTOLIC BLOOD PRESSURE: 130 MMHG | RESPIRATION RATE: 18 BRPM | OXYGEN SATURATION: 90 % | HEART RATE: 68 BPM | BODY MASS INDEX: 41.68 KG/M2 | HEIGHT: 68 IN | DIASTOLIC BLOOD PRESSURE: 80 MMHG | WEIGHT: 275 LBS

## 2018-02-05 DIAGNOSIS — J18.9 PNEUMONIA OF RIGHT LOWER LOBE DUE TO INFECTIOUS ORGANISM: ICD-10-CM

## 2018-02-05 DIAGNOSIS — J44.1 CHRONIC OBSTRUCTIVE PULMONARY DISEASE WITH ACUTE EXACERBATION (HCC): ICD-10-CM

## 2018-02-05 DIAGNOSIS — J43.9 PULMONARY EMPHYSEMA, UNSPECIFIED EMPHYSEMA TYPE (HCC): ICD-10-CM

## 2018-02-05 DIAGNOSIS — J18.9 PNEUMONIA OF RIGHT LOWER LOBE DUE TO INFECTIOUS ORGANISM: Primary | ICD-10-CM

## 2018-02-05 DIAGNOSIS — I25.119 CORONARY ARTERY DISEASE INVOLVING NATIVE CORONARY ARTERY OF NATIVE HEART WITH ANGINA PECTORIS (HCC): ICD-10-CM

## 2018-02-05 DIAGNOSIS — Z79.4 TYPE 2 DIABETES MELLITUS WITH DIABETIC NEPHROPATHY, WITH LONG-TERM CURRENT USE OF INSULIN (HCC): ICD-10-CM

## 2018-02-05 DIAGNOSIS — R50.9 FEBRILE ILLNESS: ICD-10-CM

## 2018-02-05 DIAGNOSIS — E11.21 TYPE 2 DIABETES MELLITUS WITH DIABETIC NEPHROPATHY, WITH LONG-TERM CURRENT USE OF INSULIN (HCC): ICD-10-CM

## 2018-02-05 LAB
INFLUENZA A ANTIBODY: NEGATIVE
INFLUENZA B ANTIBODY: NEGATIVE

## 2018-02-05 PROCEDURE — 1036F TOBACCO NON-USER: CPT | Performed by: FAMILY MEDICINE

## 2018-02-05 PROCEDURE — G8417 CALC BMI ABV UP PARAM F/U: HCPCS | Performed by: FAMILY MEDICINE

## 2018-02-05 PROCEDURE — 99214 OFFICE O/P EST MOD 30 MIN: CPT | Performed by: FAMILY MEDICINE

## 2018-02-05 PROCEDURE — G8598 ASA/ANTIPLAT THER USED: HCPCS | Performed by: FAMILY MEDICINE

## 2018-02-05 PROCEDURE — 3023F SPIROM DOC REV: CPT | Performed by: FAMILY MEDICINE

## 2018-02-05 PROCEDURE — G8484 FLU IMMUNIZE NO ADMIN: HCPCS | Performed by: FAMILY MEDICINE

## 2018-02-05 PROCEDURE — G8427 DOCREV CUR MEDS BY ELIG CLIN: HCPCS | Performed by: FAMILY MEDICINE

## 2018-02-05 PROCEDURE — G8926 SPIRO NO PERF OR DOC: HCPCS | Performed by: FAMILY MEDICINE

## 2018-02-05 PROCEDURE — 1123F ACP DISCUSS/DSCN MKR DOCD: CPT | Performed by: FAMILY MEDICINE

## 2018-02-05 PROCEDURE — 4040F PNEUMOC VAC/ADMIN/RCVD: CPT | Performed by: FAMILY MEDICINE

## 2018-02-05 PROCEDURE — 87804 INFLUENZA ASSAY W/OPTIC: CPT | Performed by: FAMILY MEDICINE

## 2018-02-05 RX ORDER — LEVOFLOXACIN 500 MG/1
500 TABLET, FILM COATED ORAL DAILY
Qty: 5 TABLET | Refills: 0 | Status: CANCELLED | OUTPATIENT
Start: 2018-02-05 | End: 2018-02-10

## 2018-02-05 RX ORDER — PREDNISONE 20 MG/1
20 TABLET ORAL DAILY
Qty: 10 TABLET | Refills: 0 | Status: SHIPPED | OUTPATIENT
Start: 2018-02-05 | End: 2018-02-15

## 2018-02-05 RX ORDER — AMOXICILLIN AND CLAVULANATE POTASSIUM 875; 125 MG/1; MG/1
1 TABLET, FILM COATED ORAL 2 TIMES DAILY
Qty: 20 TABLET | Refills: 0 | Status: SHIPPED | OUTPATIENT
Start: 2018-02-05 | End: 2018-02-15

## 2018-02-05 RX ORDER — AZITHROMYCIN 250 MG/1
TABLET, FILM COATED ORAL
Qty: 6 TABLET | Refills: 0 | Status: SHIPPED | OUTPATIENT
Start: 2018-02-05 | End: 2018-02-14 | Stop reason: ALTCHOICE

## 2018-02-05 RX ORDER — DEXTROMETHORPHAN HYDROBROMIDE AND PROMETHAZINE HYDROCHLORIDE 15; 6.25 MG/5ML; MG/5ML
5 SYRUP ORAL 4 TIMES DAILY PRN
Qty: 240 ML | Refills: 0 | Status: SHIPPED | OUTPATIENT
Start: 2018-02-05 | End: 2018-02-12

## 2018-02-05 NOTE — PROGRESS NOTES
3    apixaban (ELIQUIS) 2.5 MG TABS tablet Take 1 tablet by mouth 2 times daily 60 tablet 5    albuterol sulfate HFA (VENTOLIN HFA) 108 (90 BASE) MCG/ACT inhaler Inhale 2 puffs into the lungs every 6 hours as needed for Wheezing 1 Inhaler 3    Fluticasone Furoate-Vilanterol 200-25 MCG/INH AEPB Inhale 1 puff into the lungs daily 1 each 0    metoprolol (LOPRESSOR) 25 MG tablet Take 25 mg by mouth daily       isosorbide mononitrate (IMDUR) 30 MG CR tablet Take 1 tablet by mouth every morning Dr. Darya Houston - Cardio 30 tablet 6    nitroGLYCERIN (NITROSTAT) 0.3 MG SL tablet Take one sublingual for chest pain. May repeat twice at 5 min intervals. If not getting relief call 911. 25 tablet 3     No current facility-administered medications on file prior to visit. No Known Allergies  Past Medical History:   Diagnosis Date    Acid reflux     Atrial flutter ([facility] 2013    converted    Bleeding stomach ulcer oct 2015    BPH (benign prostatic hyperplasia)     CAD (coronary artery disease)     11/12 angio with 2 blocked bypass and 2 patent    COPD (chronic obstructive pulmonary disease) ([facility]     Diabetes mellitus type II     Edema     chronic left leg    Elevated PSA- nl 8/12/11     Hyperlipidemia     Hypertension     Ischemic cardiomyopathy     Lipoma of skin-RIGHT CHEST 5/5/2011    Obesity     Osteoarthritis      Past Surgical History:   Procedure Laterality Date    CATARACT REMOVAL  2010, 2011    bilat    COLONOSCOPY      CORONARY ARTERY BYPASS GRAFT  1993    x 4    JOINT REPLACEMENT Right total knee replacement    JOINT REPLACEMENT Left 09/14/2016     Social History     Social History    Marital status:      Spouse name: N/A    Number of children: N/A    Years of education: N/A     Occupational History    Not on file.      Social History Main Topics    Smoking status: Former Smoker     Quit date: 1/1/1985    Smokeless tobacco: Never Used      Comment: advised not to resume    Alcohol

## 2018-02-07 ENCOUNTER — CARE COORDINATION (OUTPATIENT)
Dept: CARE COORDINATION | Age: 78
End: 2018-02-07

## 2018-02-07 NOTE — CARE COORDINATION
Patient will f/u with Tristian Padilla   He declines assistance with Care Coordination    Patient Excluded from Care Coordination?  Yes     The patient will be excluded from Care Coordination for the following reason: Patient declined care coordination

## 2018-02-14 ENCOUNTER — SCHEDULED TELEPHONE ENCOUNTER (OUTPATIENT)
Dept: PHARMACY | Facility: CLINIC | Age: 78
End: 2018-02-14

## 2018-02-14 NOTE — TELEPHONE ENCOUNTER
Should be using anoro samples in place of Spiriva. Should stay on whichever one covered better.
lungs daily Meant to replace Spiriva, but PA has not gone through pharmacy yet. Called [facility] pharmacy - they did not have Anoro Ellipta on his profile, but they tried to run the Spiriva - however that required a PA  Allergies:   No Known Allergies    Pertinent Labs/Vitals:  BP Readings from Last 3 Encounters:   02/05/18 130/80   01/22/18 101/77   11/20/17 130/80     The 10-year ASCVD risk score (Ty Caputo et al., 2013) is: 49.8%    Values used to calculate the score:      Age: 69 years      Sex: Male      Is Non- : No      Diabetic: Yes      Tobacco smoker: No      Systolic Blood Pressure: 995 mmHg      Is BP treated: Yes      HDL Cholesterol: 55 mg/dL      Total Cholesterol: 157 mg/dL    CrCl: 99 mL/min based on SCr of 1.1    Tobacco History:   History   Smoking Status    Former Smoker    Quit date: 1/1/1985   Smokeless Tobacco    Never Used     Comment: advised not to resume     Immunizations:   Most Recent Immunizations   Administered Date(s) Administered    Influenza Virus Vaccine 10/04/2014    Influenza Whole 09/18/2013    Influenza, High Dose 09/20/2017    Pneumococcal 13-valent Conjugate ([id number]) 03/11/2016    Pneumococcal Conjugate 7-valent 03/30/2010    Pneumococcal Polysaccharide ([id number]) 03/30/2010    Tdap (Boostrix, Adacel) 01/01/2001    Tetanus 11/28/2011    Zoster 10/03/2012       Spirometry/PFT results (7/24/14)   Measurement Ref Pre % Ref Post % Ref % Change   FVC Liters 4 3.11 78 3.05 76 -2   FEV1 Liters 2.91 2.17 75 2.2 76 1   FEV1/FVC % 73 70  72       ASSESSMENT/PLAN:   - Hypertension: Is at BP target of < 140/90.  - Lipids: Patient has a 10-yr ASCVD risk of >7.5% with DM and is therefore a candidate for high-intensity statin therapy based on updated guidelines. Patient is prescribed high-intensity statin therapy.   Patient's most recent ALT is 16.  - Renal Dosing: No renal adjustments necessary.  - Medication Interactions: No clinically

## 2018-02-16 ENCOUNTER — TELEPHONE (OUTPATIENT)
Dept: FAMILY MEDICINE CLINIC | Age: 78
End: 2018-02-16

## 2018-02-16 DIAGNOSIS — J43.9 PULMONARY EMPHYSEMA, UNSPECIFIED EMPHYSEMA TYPE (HCC): ICD-10-CM

## 2018-02-21 ENCOUNTER — OFFICE VISIT (OUTPATIENT)
Dept: FAMILY MEDICINE CLINIC | Age: 78
End: 2018-02-21

## 2018-02-21 VITALS
BODY MASS INDEX: 42.59 KG/M2 | RESPIRATION RATE: 18 BRPM | HEART RATE: 50 BPM | DIASTOLIC BLOOD PRESSURE: 80 MMHG | HEIGHT: 68 IN | TEMPERATURE: 98 F | WEIGHT: 281 LBS | SYSTOLIC BLOOD PRESSURE: 130 MMHG

## 2018-02-21 DIAGNOSIS — E66.01 OBESITY, CLASS III, BMI 40-49.9 (MORBID OBESITY) (HCC): ICD-10-CM

## 2018-02-21 DIAGNOSIS — E11.21 TYPE 2 DIABETES MELLITUS WITH DIABETIC NEPHROPATHY, WITH LONG-TERM CURRENT USE OF INSULIN (HCC): Primary | ICD-10-CM

## 2018-02-21 DIAGNOSIS — E11.42 DIABETIC POLYNEUROPATHY ASSOCIATED WITH TYPE 2 DIABETES MELLITUS (HCC): ICD-10-CM

## 2018-02-21 DIAGNOSIS — I10 ESSENTIAL HYPERTENSION: ICD-10-CM

## 2018-02-21 DIAGNOSIS — I25.119 CORONARY ARTERY DISEASE INVOLVING NATIVE CORONARY ARTERY OF NATIVE HEART WITH ANGINA PECTORIS (HCC): ICD-10-CM

## 2018-02-21 DIAGNOSIS — E55.9 VITAMIN D DEFICIENCY: ICD-10-CM

## 2018-02-21 DIAGNOSIS — Z79.4 TYPE 2 DIABETES MELLITUS WITH DIABETIC NEPHROPATHY, WITH LONG-TERM CURRENT USE OF INSULIN (HCC): Primary | ICD-10-CM

## 2018-02-21 DIAGNOSIS — J44.1 CHRONIC OBSTRUCTIVE PULMONARY DISEASE WITH ACUTE EXACERBATION (HCC): ICD-10-CM

## 2018-02-21 DIAGNOSIS — E11.21 TYPE 2 DIABETES MELLITUS WITH DIABETIC NEPHROPATHY, WITH LONG-TERM CURRENT USE OF INSULIN (HCC): ICD-10-CM

## 2018-02-21 DIAGNOSIS — Z79.4 TYPE 2 DIABETES MELLITUS WITH DIABETIC NEPHROPATHY, WITH LONG-TERM CURRENT USE OF INSULIN (HCC): ICD-10-CM

## 2018-02-21 DIAGNOSIS — E11.319 DIABETIC RETINOPATHY OF BOTH EYES ASSOCIATED WITH TYPE 2 DIABETES MELLITUS, MACULAR EDEMA PRESENCE UNSPECIFIED, UNSPECIFIED RETINOPATHY SEVERITY (HCC): ICD-10-CM

## 2018-02-21 LAB
A/G RATIO: 1.9 (ref 1.1–2.2)
ALBUMIN SERPL-MCNC: 3.8 G/DL (ref 3.4–5)
ALP BLD-CCNC: 96 U/L (ref 40–129)
ALT SERPL-CCNC: 16 U/L (ref 10–40)
ANION GAP SERPL CALCULATED.3IONS-SCNC: 10 MMOL/L (ref 3–16)
AST SERPL-CCNC: 19 U/L (ref 15–37)
BILIRUB SERPL-MCNC: 0.9 MG/DL (ref 0–1)
BUN BLDV-MCNC: 12 MG/DL (ref 7–20)
CALCIUM SERPL-MCNC: 9 MG/DL (ref 8.3–10.6)
CHLORIDE BLD-SCNC: 104 MMOL/L (ref 99–110)
CHOLESTEROL, TOTAL: 149 MG/DL (ref 0–199)
CO2: 32 MMOL/L (ref 21–32)
CREAT SERPL-MCNC: 1.4 MG/DL (ref 0.8–1.3)
ESTIMATED AVERAGE GLUCOSE: 205.9 MG/DL
GFR AFRICAN AMERICAN: 59
GFR NON-AFRICAN AMERICAN: 49
GLOBULIN: 2 G/DL
GLUCOSE BLD-MCNC: 126 MG/DL (ref 70–99)
HBA1C MFR BLD: 8.8 %
HDLC SERPL-MCNC: 57 MG/DL (ref 40–60)
LDL CHOLESTEROL CALCULATED: 67 MG/DL
POTASSIUM SERPL-SCNC: 3.9 MMOL/L (ref 3.5–5.1)
SODIUM BLD-SCNC: 146 MMOL/L (ref 136–145)
TOTAL PROTEIN: 5.8 G/DL (ref 6.4–8.2)
TRIGL SERPL-MCNC: 124 MG/DL (ref 0–150)
TSH SERPL DL<=0.05 MIU/L-ACNC: 2.29 UIU/ML (ref 0.27–4.2)
VITAMIN D 25-HYDROXY: 28.1 NG/ML
VLDLC SERPL CALC-MCNC: 25 MG/DL

## 2018-02-21 PROCEDURE — G8427 DOCREV CUR MEDS BY ELIG CLIN: HCPCS | Performed by: FAMILY MEDICINE

## 2018-02-21 PROCEDURE — 4040F PNEUMOC VAC/ADMIN/RCVD: CPT | Performed by: FAMILY MEDICINE

## 2018-02-21 PROCEDURE — 1123F ACP DISCUSS/DSCN MKR DOCD: CPT | Performed by: FAMILY MEDICINE

## 2018-02-21 PROCEDURE — 3023F SPIROM DOC REV: CPT | Performed by: FAMILY MEDICINE

## 2018-02-21 PROCEDURE — G8926 SPIRO NO PERF OR DOC: HCPCS | Performed by: FAMILY MEDICINE

## 2018-02-21 PROCEDURE — G8484 FLU IMMUNIZE NO ADMIN: HCPCS | Performed by: FAMILY MEDICINE

## 2018-02-21 PROCEDURE — 99214 OFFICE O/P EST MOD 30 MIN: CPT | Performed by: FAMILY MEDICINE

## 2018-02-21 PROCEDURE — G8417 CALC BMI ABV UP PARAM F/U: HCPCS | Performed by: FAMILY MEDICINE

## 2018-02-21 PROCEDURE — 1036F TOBACCO NON-USER: CPT | Performed by: FAMILY MEDICINE

## 2018-02-21 PROCEDURE — G8598 ASA/ANTIPLAT THER USED: HCPCS | Performed by: FAMILY MEDICINE

## 2018-02-21 RX ORDER — GABAPENTIN 600 MG/1
TABLET ORAL
Qty: 150 TABLET | Refills: 3 | Status: SHIPPED | OUTPATIENT
Start: 2018-02-21 | End: 2018-08-23 | Stop reason: SDUPTHER

## 2018-02-21 NOTE — PATIENT INSTRUCTIONS
INSTRUCTIONS  NEXT APPOINTMENT: Please schedule annual complete physical (30 minutes) in 3 months. · PLEASE TAKE THIS FORM TO CHECK-OUT WINDOW TO SCHEDULE NEXT VISIT. · PLEASE GET BLOODWORK DRAWN TODAY ON FIRST FLOOR in 170. Take orders with you. RESULTS- most blood tests back in couple days. We will call you if any problems. If bloodwork good, you will get letter in mail or notified thru [address] (if signed up) within 2 weeks. If you do not, please call office.

## 2018-02-21 NOTE — PROGRESS NOTES
Date Value Ref Range Status   01/16/2017 157 0 - 199 mg/dL Final     LDL Calculated   Date Value Ref Range Status   01/16/2017 73 <100 mg/dL Final     HDL   Date Value Ref Range Status   01/16/2017 55 40 - 60 mg/dL Final   03/08/2012 37 (L) 40 - 60 mg/dl Final     Triglycerides   Date Value Ref Range Status   01/16/2017 147 0 - 150 mg/dL Final     Lab Results   Component Value Date    GLUCOSE 211 (H) 10/19/2017     Lab Results   Component Value Date     10/19/2017    K 4.4 10/19/2017    CREATININE 1.1 10/19/2017     Lab Results   Component Value Date    WBC 4.7 08/11/2017    HGB 13.4 (L) 08/11/2017    HCT 41.4 08/11/2017    MCV 92.0 08/11/2017     (L) 08/11/2017     Lab Results   Component Value Date    ALT 16 01/16/2017    AST 16 01/16/2017    ALKPHOS 126 01/16/2017    BILITOT 0.9 01/16/2017     TSH (uIU/mL)   Date Value   07/17/2014 1.97     Lab Results   Component Value Date    LABA1C 8.4 11/20/2017     The 10-year ASCVD risk score (Ty Caputo et al., 2013) is: 49.8%    Values used to calculate the score:      Age: 69 years      Sex: Male      Is Non- : No      Diabetic: Yes      Tobacco smoker: No      Systolic Blood Pressure: 812 mmHg      Is BP treated: Yes      HDL Cholesterol: 55 mg/dL      Total Cholesterol: 157 mg/dL    Subjective:      Chief Complaint   Patient presents with    Diabetes     3 month check up      Ramakrishna Sanches is an 69 y.o. male who presents for follow up of following chronic problems:  1. Type 2 diabetes mellitus with diabetic nephropathy, with long-term current use of insulin ([address]    2. Coronary artery disease involving native coronary artery of native heart with angina pectoris ([address]    3. Essential hypertension    4. Chronic obstructive pulmonary disease with acute exacerbation ([address]    5. Diabetic polyneuropathy associated with type 2 diabetes mellitus (HCC)    6. Obesity, Class III, BMI 40-49.9 (morbid obesity) ([address]    7.  Diabetic

## 2018-02-23 DIAGNOSIS — Z79.4 TYPE 2 DIABETES MELLITUS WITH DIABETIC NEPHROPATHY, WITH LONG-TERM CURRENT USE OF INSULIN (HCC): ICD-10-CM

## 2018-02-23 DIAGNOSIS — E11.21 TYPE 2 DIABETES MELLITUS WITH DIABETIC NEPHROPATHY, WITH LONG-TERM CURRENT USE OF INSULIN (HCC): ICD-10-CM

## 2018-03-26 RX ORDER — POTASSIUM CHLORIDE 750 MG/1
TABLET, FILM COATED, EXTENDED RELEASE ORAL
Qty: 90 TABLET | Refills: 0 | Status: SHIPPED | OUTPATIENT
Start: 2018-03-26 | End: 2018-07-23 | Stop reason: SDUPTHER

## 2018-04-10 RX ORDER — CHOLECALCIFEROL (VITAMIN D3) 125 MCG
CAPSULE ORAL
Qty: 90 TABLET | Refills: 3 | Status: SHIPPED | OUTPATIENT
Start: 2018-04-10 | End: 2019-02-28 | Stop reason: SDUPTHER

## 2018-04-17 RX ORDER — FERROUS SULFATE 325(65) MG
TABLET ORAL
Qty: 180 TABLET | Refills: 0 | Status: SHIPPED | OUTPATIENT
Start: 2018-04-17 | End: 2018-07-17 | Stop reason: SDUPTHER

## 2018-04-17 RX ORDER — RANITIDINE 150 MG/1
TABLET ORAL
Qty: 180 TABLET | Refills: 3 | Status: SHIPPED | OUTPATIENT
Start: 2018-04-17 | End: 2019-05-20 | Stop reason: SDUPTHER

## 2018-05-04 ENCOUNTER — CARE COORDINATION (OUTPATIENT)
Dept: CARE COORDINATION | Age: 78
End: 2018-05-04

## 2018-05-12 RX ORDER — IBUPROFEN 200 MG
TABLET ORAL
Qty: 400 EACH | Refills: 5 | Status: SHIPPED | OUTPATIENT
Start: 2018-05-12 | End: 2019-06-05 | Stop reason: SDUPTHER

## 2018-05-25 ENCOUNTER — OFFICE VISIT (OUTPATIENT)
Dept: FAMILY MEDICINE CLINIC | Age: 78
End: 2018-05-25

## 2018-05-25 VITALS
HEIGHT: 68 IN | RESPIRATION RATE: 16 BRPM | DIASTOLIC BLOOD PRESSURE: 70 MMHG | WEIGHT: 287 LBS | BODY MASS INDEX: 43.5 KG/M2 | TEMPERATURE: 98.7 F | SYSTOLIC BLOOD PRESSURE: 126 MMHG | HEART RATE: 54 BPM

## 2018-05-25 DIAGNOSIS — I25.119 CORONARY ARTERY DISEASE INVOLVING NATIVE CORONARY ARTERY OF NATIVE HEART WITH ANGINA PECTORIS (HCC): ICD-10-CM

## 2018-05-25 DIAGNOSIS — I10 ESSENTIAL HYPERTENSION: ICD-10-CM

## 2018-05-25 DIAGNOSIS — L98.8 POLYP OF SKIN: ICD-10-CM

## 2018-05-25 DIAGNOSIS — J44.1 CHRONIC OBSTRUCTIVE PULMONARY DISEASE WITH ACUTE EXACERBATION (HCC): ICD-10-CM

## 2018-05-25 DIAGNOSIS — E78.2 MIXED HYPERLIPIDEMIA: ICD-10-CM

## 2018-05-25 DIAGNOSIS — Z79.4 TYPE 2 DIABETES MELLITUS WITH DIABETIC NEPHROPATHY, WITH LONG-TERM CURRENT USE OF INSULIN (HCC): Primary | ICD-10-CM

## 2018-05-25 DIAGNOSIS — E11.21 TYPE 2 DIABETES MELLITUS WITH DIABETIC NEPHROPATHY, WITH LONG-TERM CURRENT USE OF INSULIN (HCC): Primary | ICD-10-CM

## 2018-05-25 LAB — HBA1C MFR BLD: 8.2 %

## 2018-05-25 PROCEDURE — G8926 SPIRO NO PERF OR DOC: HCPCS | Performed by: FAMILY MEDICINE

## 2018-05-25 PROCEDURE — 4040F PNEUMOC VAC/ADMIN/RCVD: CPT | Performed by: FAMILY MEDICINE

## 2018-05-25 PROCEDURE — G8427 DOCREV CUR MEDS BY ELIG CLIN: HCPCS | Performed by: FAMILY MEDICINE

## 2018-05-25 PROCEDURE — 1036F TOBACCO NON-USER: CPT | Performed by: FAMILY MEDICINE

## 2018-05-25 PROCEDURE — G8417 CALC BMI ABV UP PARAM F/U: HCPCS | Performed by: FAMILY MEDICINE

## 2018-05-25 PROCEDURE — G8598 ASA/ANTIPLAT THER USED: HCPCS | Performed by: FAMILY MEDICINE

## 2018-05-25 PROCEDURE — 83036 HEMOGLOBIN GLYCOSYLATED A1C: CPT | Performed by: FAMILY MEDICINE

## 2018-05-25 PROCEDURE — 3023F SPIROM DOC REV: CPT | Performed by: FAMILY MEDICINE

## 2018-05-25 PROCEDURE — 1123F ACP DISCUSS/DSCN MKR DOCD: CPT | Performed by: FAMILY MEDICINE

## 2018-05-25 PROCEDURE — 99214 OFFICE O/P EST MOD 30 MIN: CPT | Performed by: FAMILY MEDICINE

## 2018-06-05 ENCOUNTER — OFFICE VISIT (OUTPATIENT)
Dept: ORTHOPEDIC SURGERY | Age: 78
End: 2018-06-05

## 2018-06-05 VITALS
HEIGHT: 68 IN | WEIGHT: 287 LBS | HEART RATE: 59 BPM | TEMPERATURE: 98.2 F | BODY MASS INDEX: 43.5 KG/M2 | DIASTOLIC BLOOD PRESSURE: 69 MMHG | SYSTOLIC BLOOD PRESSURE: 139 MMHG

## 2018-06-05 DIAGNOSIS — Z96.652 STATUS POST TOTAL LEFT KNEE REPLACEMENT: Primary | ICD-10-CM

## 2018-06-05 DIAGNOSIS — Z96.651 STATUS POST TOTAL RIGHT KNEE REPLACEMENT: ICD-10-CM

## 2018-06-05 PROCEDURE — 99212 OFFICE O/P EST SF 10 MIN: CPT | Performed by: PHYSICIAN ASSISTANT

## 2018-06-05 PROCEDURE — G8417 CALC BMI ABV UP PARAM F/U: HCPCS | Performed by: PHYSICIAN ASSISTANT

## 2018-06-05 PROCEDURE — G8427 DOCREV CUR MEDS BY ELIG CLIN: HCPCS | Performed by: PHYSICIAN ASSISTANT

## 2018-06-05 PROCEDURE — 4040F PNEUMOC VAC/ADMIN/RCVD: CPT | Performed by: PHYSICIAN ASSISTANT

## 2018-06-05 PROCEDURE — 1123F ACP DISCUSS/DSCN MKR DOCD: CPT | Performed by: PHYSICIAN ASSISTANT

## 2018-06-05 PROCEDURE — 1036F TOBACCO NON-USER: CPT | Performed by: PHYSICIAN ASSISTANT

## 2018-06-05 PROCEDURE — G8598 ASA/ANTIPLAT THER USED: HCPCS | Performed by: PHYSICIAN ASSISTANT

## 2018-06-06 ENCOUNTER — INITIAL CONSULT (OUTPATIENT)
Dept: SURGERY | Age: 78
End: 2018-06-06

## 2018-06-06 VITALS
HEART RATE: 54 BPM | HEIGHT: 68 IN | WEIGHT: 288 LBS | BODY MASS INDEX: 43.65 KG/M2 | DIASTOLIC BLOOD PRESSURE: 65 MMHG | SYSTOLIC BLOOD PRESSURE: 148 MMHG

## 2018-06-06 DIAGNOSIS — Z79.01 ANTICOAGULATED: ICD-10-CM

## 2018-06-06 DIAGNOSIS — L91.8 CUTANEOUS SKIN TAGS: Primary | ICD-10-CM

## 2018-06-06 PROCEDURE — G8427 DOCREV CUR MEDS BY ELIG CLIN: HCPCS | Performed by: SURGERY

## 2018-06-06 PROCEDURE — G8417 CALC BMI ABV UP PARAM F/U: HCPCS | Performed by: SURGERY

## 2018-06-06 PROCEDURE — 99999 PR OFFICE/OUTPT VISIT,PROCEDURE ONLY: CPT | Performed by: SURGERY

## 2018-06-06 PROCEDURE — 11200 RMVL SKIN TAGS UP TO&INC 15: CPT | Performed by: SURGERY

## 2018-06-06 ASSESSMENT — ENCOUNTER SYMPTOMS: RESPIRATORY NEGATIVE: 1

## 2018-06-08 ENCOUNTER — TELEPHONE (OUTPATIENT)
Dept: FAMILY MEDICINE CLINIC | Age: 78
End: 2018-06-08

## 2018-06-08 DIAGNOSIS — Z79.4 TYPE 2 DIABETES MELLITUS WITH DIABETIC NEPHROPATHY, WITH LONG-TERM CURRENT USE OF INSULIN (HCC): Primary | ICD-10-CM

## 2018-06-08 DIAGNOSIS — E11.21 TYPE 2 DIABETES MELLITUS WITH DIABETIC NEPHROPATHY, WITH LONG-TERM CURRENT USE OF INSULIN (HCC): Primary | ICD-10-CM

## 2018-06-20 ENCOUNTER — OFFICE VISIT (OUTPATIENT)
Dept: SURGERY | Age: 78
End: 2018-06-20

## 2018-06-20 VITALS
DIASTOLIC BLOOD PRESSURE: 72 MMHG | HEART RATE: 61 BPM | WEIGHT: 287 LBS | SYSTOLIC BLOOD PRESSURE: 151 MMHG | BODY MASS INDEX: 43.5 KG/M2 | HEIGHT: 68 IN

## 2018-06-20 DIAGNOSIS — L91.8 CUTANEOUS SKIN TAGS: Primary | ICD-10-CM

## 2018-06-20 PROCEDURE — 99024 POSTOP FOLLOW-UP VISIT: CPT | Performed by: SURGERY

## 2018-07-06 ENCOUNTER — OFFICE VISIT (OUTPATIENT)
Dept: FAMILY MEDICINE CLINIC | Age: 78
End: 2018-07-06

## 2018-07-06 VITALS
BODY MASS INDEX: 43.65 KG/M2 | TEMPERATURE: 97.6 F | RESPIRATION RATE: 18 BRPM | WEIGHT: 288 LBS | OXYGEN SATURATION: 95 % | HEIGHT: 68 IN | SYSTOLIC BLOOD PRESSURE: 112 MMHG | HEART RATE: 56 BPM | DIASTOLIC BLOOD PRESSURE: 60 MMHG

## 2018-07-06 DIAGNOSIS — M48.062 SPINAL STENOSIS OF LUMBAR REGION WITH NEUROGENIC CLAUDICATION: ICD-10-CM

## 2018-07-06 DIAGNOSIS — Z23 NEED FOR SHINGLES VACCINE: ICD-10-CM

## 2018-07-06 DIAGNOSIS — E55.9 VITAMIN D DEFICIENCY: ICD-10-CM

## 2018-07-06 DIAGNOSIS — E11.21 TYPE 2 DIABETES MELLITUS WITH DIABETIC NEPHROPATHY, WITH LONG-TERM CURRENT USE OF INSULIN (HCC): ICD-10-CM

## 2018-07-06 DIAGNOSIS — Z79.4 TYPE 2 DIABETES MELLITUS WITH DIABETIC NEPHROPATHY, WITH LONG-TERM CURRENT USE OF INSULIN (HCC): ICD-10-CM

## 2018-07-06 DIAGNOSIS — E53.8 B12 DEFICIENCY: ICD-10-CM

## 2018-07-06 DIAGNOSIS — I25.119 CORONARY ARTERY DISEASE INVOLVING NATIVE CORONARY ARTERY OF NATIVE HEART WITH ANGINA PECTORIS (HCC): ICD-10-CM

## 2018-07-06 DIAGNOSIS — Z00.00 MEDICARE ANNUAL WELLNESS VISIT, SUBSEQUENT: Primary | ICD-10-CM

## 2018-07-06 DIAGNOSIS — E78.2 MIXED HYPERLIPIDEMIA: ICD-10-CM

## 2018-07-06 DIAGNOSIS — J44.1 CHRONIC OBSTRUCTIVE PULMONARY DISEASE WITH ACUTE EXACERBATION (HCC): ICD-10-CM

## 2018-07-06 DIAGNOSIS — I10 ESSENTIAL HYPERTENSION: ICD-10-CM

## 2018-07-06 PROBLEM — L98.8 POLYP OF SKIN: Status: RESOLVED | Noted: 2018-05-25 | Resolved: 2018-07-06

## 2018-07-06 LAB
ANION GAP SERPL CALCULATED.3IONS-SCNC: 15 MMOL/L (ref 3–16)
BASOPHILS ABSOLUTE: 0 K/UL (ref 0–0.2)
BASOPHILS RELATIVE PERCENT: 0.6 %
BUN BLDV-MCNC: 23 MG/DL (ref 7–20)
CALCIUM SERPL-MCNC: 8.8 MG/DL (ref 8.3–10.6)
CHLORIDE BLD-SCNC: 102 MMOL/L (ref 99–110)
CO2: 26 MMOL/L (ref 21–32)
CREAT SERPL-MCNC: 1.4 MG/DL (ref 0.8–1.3)
EOSINOPHILS ABSOLUTE: 0.2 K/UL (ref 0–0.6)
EOSINOPHILS RELATIVE PERCENT: 3.2 %
GFR AFRICAN AMERICAN: 59
GFR NON-AFRICAN AMERICAN: 49
GLUCOSE BLD-MCNC: 224 MG/DL (ref 70–99)
HCT VFR BLD CALC: 41.1 % (ref 40.5–52.5)
HEMOGLOBIN: 13.7 G/DL (ref 13.5–17.5)
LYMPHOCYTES ABSOLUTE: 0.9 K/UL (ref 1–5.1)
LYMPHOCYTES RELATIVE PERCENT: 18.6 %
MCH RBC QN AUTO: 30.3 PG (ref 26–34)
MCHC RBC AUTO-ENTMCNC: 33.4 G/DL (ref 31–36)
MCV RBC AUTO: 90.8 FL (ref 80–100)
MONOCYTES ABSOLUTE: 0.6 K/UL (ref 0–1.3)
MONOCYTES RELATIVE PERCENT: 13.5 %
NEUTROPHILS ABSOLUTE: 3.1 K/UL (ref 1.7–7.7)
NEUTROPHILS RELATIVE PERCENT: 64.1 %
PDW BLD-RTO: 15.4 % (ref 12.4–15.4)
PLATELET # BLD: 125 K/UL (ref 135–450)
PMV BLD AUTO: 9.6 FL (ref 5–10.5)
POTASSIUM SERPL-SCNC: 4.5 MMOL/L (ref 3.5–5.1)
RBC # BLD: 4.53 M/UL (ref 4.2–5.9)
SODIUM BLD-SCNC: 143 MMOL/L (ref 136–145)
VITAMIN B-12: 639 PG/ML (ref 211–911)
VITAMIN D 25-HYDROXY: 39 NG/ML
WBC # BLD: 4.8 K/UL (ref 4–11)

## 2018-07-06 PROCEDURE — G8598 ASA/ANTIPLAT THER USED: HCPCS | Performed by: FAMILY MEDICINE

## 2018-07-06 PROCEDURE — G8427 DOCREV CUR MEDS BY ELIG CLIN: HCPCS | Performed by: FAMILY MEDICINE

## 2018-07-06 PROCEDURE — 1036F TOBACCO NON-USER: CPT | Performed by: FAMILY MEDICINE

## 2018-07-06 PROCEDURE — G0439 PPPS, SUBSEQ VISIT: HCPCS | Performed by: FAMILY MEDICINE

## 2018-07-06 PROCEDURE — 3023F SPIROM DOC REV: CPT | Performed by: FAMILY MEDICINE

## 2018-07-06 PROCEDURE — 1123F ACP DISCUSS/DSCN MKR DOCD: CPT | Performed by: FAMILY MEDICINE

## 2018-07-06 PROCEDURE — G8926 SPIRO NO PERF OR DOC: HCPCS | Performed by: FAMILY MEDICINE

## 2018-07-06 PROCEDURE — 4040F PNEUMOC VAC/ADMIN/RCVD: CPT | Performed by: FAMILY MEDICINE

## 2018-07-06 PROCEDURE — G8417 CALC BMI ABV UP PARAM F/U: HCPCS | Performed by: FAMILY MEDICINE

## 2018-07-06 PROCEDURE — 99214 OFFICE O/P EST MOD 30 MIN: CPT | Performed by: FAMILY MEDICINE

## 2018-07-06 ASSESSMENT — LIFESTYLE VARIABLES
HOW OFTEN DURING THE LAST YEAR HAVE YOU NEEDED AN ALCOHOLIC DRINK FIRST THING IN THE MORNING TO GET YOURSELF GOING AFTER A NIGHT OF HEAVY DRINKING: 0
HOW OFTEN DO YOU HAVE A DRINK CONTAINING ALCOHOL: 1
HOW OFTEN DO YOU HAVE SIX OR MORE DRINKS ON ONE OCCASION: 0
HOW MANY STANDARD DRINKS CONTAINING ALCOHOL DO YOU HAVE ON A TYPICAL DAY: 0
HOW OFTEN DURING THE LAST YEAR HAVE YOU FAILED TO DO WHAT WAS NORMALLY EXPECTED FROM YOU BECAUSE OF DRINKING: 0
AUDIT TOTAL SCORE: 1
HOW OFTEN DURING THE LAST YEAR HAVE YOU BEEN UNABLE TO REMEMBER WHAT HAPPENED THE NIGHT BEFORE BECAUSE YOU HAD BEEN DRINKING: 0
HOW OFTEN DURING THE LAST YEAR HAVE YOU HAD A FEELING OF GUILT OR REMORSE AFTER DRINKING: 0
HAS A RELATIVE, FRIEND, DOCTOR, OR ANOTHER HEALTH PROFESSIONAL EXPRESSED CONCERN ABOUT YOUR DRINKING OR SUGGESTED YOU CUT DOWN: 0
HAVE YOU OR SOMEONE ELSE BEEN INJURED AS A RESULT OF YOUR DRINKING: 0
HOW OFTEN DURING THE LAST YEAR HAVE YOU FOUND THAT YOU WERE NOT ABLE TO STOP DRINKING ONCE YOU HAD STARTED: 0
AUDIT-C TOTAL SCORE: 1

## 2018-07-06 ASSESSMENT — ANXIETY QUESTIONNAIRES: GAD7 TOTAL SCORE: 0

## 2018-07-06 ASSESSMENT — PATIENT HEALTH QUESTIONNAIRE - PHQ9: SUM OF ALL RESPONSES TO PHQ QUESTIONS 1-9: 0

## 2018-07-06 NOTE — PROGRESS NOTES
Medicare Annual Wellness Visit  Name: Lurdes Schmidt  YOB: 1940  Age: 69 y.o. Sex: male  MRN: [id number]     Date of Service:  7/6/2018    Scribe documentation   IAmna , am scribing for Aman Houston MD. Electronically signed by Amna Mondragon  on 7/6/2018 at 8:41 AM  I, Alicia Terrazas,  personally performed the services described in this documentation, as scribed by the user listed above, and it is both accurate and complete. I agree with the Chief Complaint, ROS, and Past Histories independently gathered by the clinical support staff.      LAST LABS   Cholesterol, Total   Date Value Ref Range Status   02/21/2018 149 0 - 199 mg/dL Final     LDL Calculated   Date Value Ref Range Status   02/21/2018 67 <100 mg/dL Final     HDL   Date Value Ref Range Status   02/21/2018 57 40 - 60 mg/dL Final   03/08/2012 37 (L) 40 - 60 mg/dl Final     Triglycerides   Date Value Ref Range Status   02/21/2018 124 0 - 150 mg/dL Final     Lab Results   Component Value Date    GLUCOSE 126 (H) 02/21/2018     Lab Results   Component Value Date     (H) 02/21/2018    K 3.9 02/21/2018    CREATININE 1.4 (H) 02/21/2018     Lab Results   Component Value Date    ALT 16 02/21/2018    AST 19 02/21/2018    ALKPHOS 96 02/21/2018    BILITOT 0.9 02/21/2018      Lab Results   Component Value Date    WBC 4.7 08/11/2017    HGB 13.4 (L) 08/11/2017    HCT 41.4 08/11/2017    MCV 92.0 08/11/2017     (L) 08/11/2017     TSH (uIU/mL)   Date Value   02/21/2018 2.29     Lab Results   Component Value Date    LABA1C 8.2 05/25/2018     CHART REVIEW  Patient Active Problem List   Diagnosis    Tinnitus    Special screening for malignant neoplasms, colon    Leg edema    Elevated PSA- nl 8/12/11    Hyperlipidemia    Osteoarthritis    Di[facility] retinopathy ([facility]    BPH (benign prostatic hyperplasia)    COPD (chronic obstructive pulmonary disease) (HCC)    Rosacea    Erectile dysfunction    Hypertension    CAD (coronary artery disease)    Diabetes mellitus, type II ([facility]    Diabetic nephropathy- pos microalb 8/11    Diabetic neuropathy (HCC)    GERD (gastroesophageal reflux disease)    Screening for prostate cancer    B12 deficiency    Impingement syndrome of left shoulder    Obesity, Class III, BMI 40-49.9 (morbid obesity) [facility]    Personal history of atrial flutter    Peptic ulcer disease    Medicare annual wellness visit, subsequent    Status post total right knee replacement    Vitamin D deficiency    Spinal stenosis of lumbar region with neurogenic claudication- clinically     Health Maintenance   Topic Date Due    Shingles Vaccine (1 of 2 - 2 Dose Series) 12/03/2012    Flu vaccine (1) 09/01/2018    TSH testing  02/21/2019    Potassium monitoring  02/21/2019    Creatinine monitoring  02/21/2019    DTaP/Tdap/Td vaccine (3 - Td) 11/28/2021    Pneumococcal low/med risk  Completed     Social History   Substance Use Topics    Smoking status: Former Smoker     Quit date: 1/1/1985    Smokeless tobacco: Never Used      Comment: advised not to resume    Alcohol use No     The 10-year ASCVD risk score (Lizett Welch, et al., 2013) is: 40.2%    Values used to calculate the score:      Age: 69 years      Sex: Male      Is Non- : No      Diabetic: Yes      Tobacco smoker: No      Systolic Blood Pressure: 067 mmHg      Is BP treated: Yes      HDL Cholesterol: 57 mg/dL      Total Cholesterol: 149 mg/dL  Prior to Visit Medications    Medication Sig Taking? Authorizing Provider   zoster recombinant adjuvanted vaccine (SHINGRIX) 50 MCG SUSR injection Inject 0.5 mLs into the muscle once for 1 dose Repeat in 2-6 months. Take Rx for Zostavax to pharmacy (such as [facility] or [facility]) to get shingles vaccine.  Yes Ngoc Castro MD   insulin regular (NOVOLIN R) 100 UNIT/ML injection INJECT 15 TO 25 UNITS THREE TIMES DAILY BEFORE MEALS Yes Ngoc Castro MD   VENTOLIN  (90 Base) MCG/ACT inhaler INHALE 2 PUFFS INTO THE LUNGS EVERY 6 HOURS AS NEEDED FOR WHEEZING Yes Akbar Decker MD   insulin NPH (NOVOLIN N) 100 UNIT/ML injection vial Inject 15-25 Units into the skin 2 times daily (before meals) Yes Akbar Decker MD   INSULIN SYRINGE .5CC/29G 29G X 1/2\" 0.5 ML MISC USE TO INJECT FOUR TIMES DAILY AS NEEDED Yes Akbar Decker MD   ferrous sulfate 325 (65 Fe) MG tablet TAKE 1 TABLET BY MOUTH TWICE DAILY Yes Akbar Decker MD   ranitidine (ZANTAC) 150 MG tablet TAKE 1 TABLET BY MOUTH TWICE DAILY Yes Akbar Decker MD   vitamin B-12 (CYANOCOBALAMIN) 500 MCG tablet TAKE 1 TABLET BY MOUTH DAILY Yes Akbar Decker MD   potassium chloride (KLOR-CON) 10 MEQ extended release tablet TAKE 1 TABLET BY MOUTH DAILY Yes Akbar Decker MD   gabapentin (NEURONTIN) 600 MG tablet Take 1 HS x 3d; then 2 has x 3d; then 3 hs x 3 d; then 1 AM and 3 HS x 7 d; the 1 AM, 1 PM and 3 HS. Yes Akbar Decker MD   umeclidinium-vilanterol (ANORO ELLIPTA) 62.5-25 MCG/INH AEPB inhaler Inhale 1 puff into the lungs daily Yes Akbar Decker MD   furosemide (LASIX) 80 MG tablet TAKE 1 TABLET BY MOUTH DAILY Yes Akbar Decker MD   atorvastatin (LIPITOR) 40 MG tablet TAKE 1 TABLET BY MOUTH EVERY EVENING Yes Akbar Decker MD   aspirin 81 MG EC tablet Take 81 mg by mouth daily Yes Historical Provider, MD   amiodarone (CORDARONE) 200 MG tablet TAKE 1 TABLET BY MOUTH DAILY Yes Akbar Decker MD   omeprazole (PRILOSEC) 20 MG delayed release capsule TAKE ONE CAPSULE BY MOUTH TWICE DAILY Yes Akbar Decker MD   apixaban (ELIQUIS) 2.5 MG TABS tablet Take 1 tablet by mouth 2 times daily Yes Akbar Decker MD   metoprolol (LOPRESSOR) 25 MG tablet Take 25 mg by mouth daily  Yes Historical Provider, MD   isosorbide mononitrate (IMDUR) 30 MG CR tablet Take 1 tablet by mouth every morning Dr. Carson Palacios Yes Akbar Decker MD   nitroGLYCERIN (NITROSTAT) 0.3 MG SL tablet Take one sublingual for chest pain. May repeat twice at 5 min intervals. If not getting relief call 911.  Yes Neymar Jarvis MD      Chief Complaint:   Emma Vigil is a 69 y.o. male who presents for Medicare Annual Wellness Visit and check-up for:  1. Medicare annual wellness visit, subsequent    2. Vitamin D deficiency    3. B12 deficiency    4. Chronic obstructive pulmonary disease with acute exacerbation ([facility]    5. Type 2 diabetes mellitus with diabetic nephropathy, with long-term current use of insulin ([facility]    6. Mixed hyperlipidemia    7. Essential hypertension    8. Coronary artery disease involving native coronary artery of native heart with angina pectoris ([facility]    9. Need for shingles vaccine    10. Spinal stenosis of lumbar region with neurogenic claudication- clinically      HPI  Complaints: none  Anoro very expensive. Already into donut hole. Insulin also very expensive but [facility] Review of Systems   General ROS: fever? No,    Night sweats? No  Ophthalmic ROS:blurry vision or decreased vision? No  Endocrine ROS:malaise/lethargy? No   Unexpected weight changes? No  Hematological and Lymphatic ROS:easy bruising? Yes, symptoms are at baseline. Swollen lymph nodes? No  ENT ROS: headaches? No   Sore throat? No  Respiratory ROS: cough? No   Shortness of breath? Yes, symptoms are at baseline. Cardiovascular ROS:chest pain? No   Shortness of breath with exertion? Yes, symptoms are at baseline. Gastrointestinal ROS: abdominal pain? No   Change in stools? No  Genito-Urinary ROS: painful urination? No   Trouble voiding? No  Musculoskeletal ROS: trouble walking? Yes, hips ache some (indicates upper/lateral buttox   Joint pain? Yes, hips  Neurological ROS: TIA or stroke symptoms? No   Numbness/tingling in feet? No  Dermatological ROS: rash? No   Changes in skin spots?  No    No Known Allergies   Past Medical History:   Diagnosis Date    Acid reflux     Atrial flutter ([address] 2013    converted    Bleeding stomach ulcer oct 2015    BPH (benign prostatic hyperplasia)     CAD (coronary artery disease)     11/12 angio with 2 blocked bypass and 2 patent    COPD [facility] obstructive [facility] disease) [facility]     Diabetes mellitus type II     Edema     chronic left leg    Elevated PSA- nl 8/12/11     Hyperlipidemia     Hypertension     Ischemic cardiomyopathy     Lipoma of skin-RIGHT CHEST 5/5/2011    Obesity     Osteoarthritis     Spinal stenosis of lumbar region with neurogenic claudication- clinically 7/6/2018     Past Surgical History:   Procedure Laterality Date    CATARACT REMOVAL  2010, 2011    bilat    COLONOSCOPY      CORONARY ARTERY BYPASS GRAFT  1993    x 4    JOINT REPLACEMENT Right total knee replacement    JOINT REPLACEMENT Left 09/14/2016     Family History   Problem Relation Age of Onset    Cancer Mother         breast    Cancer Father         throat     Patient's medications, allergies, past medical, surgical, social and family histories were reviewed and updated as appropriate. CareTeam (Including outside providers/suppliers regularly involved in providing care):   Patient Care Team:  Flaquita Eller MD as PCP - General (Family Medicine)  Leena Pérez MD as PCP - Cardiology (Cardiology)  Molly Muse DPM as PCP - Orthopaedics (Podiatry)  Flaquita Eller MD as PCP - S Attributed Provider    The following problems were reviewed today and where indicated follow up appointments were made and/or referrals ordered. Positive Risk Factor Screenings with Interventions:       Health Habits/Nutrition:  Health Habits/Nutrition  Do you exercise for at least 20 minutes 2-3 times per week?: Yes  Have you lost any weight without trying in the past 3 months?: No  Do you eat fewer than 2 meals per day?: No  Have you seen a dentist within the past year?: (!) No  Body mass index is 43.79 kg/m².   Health Habits/Nutrition Interventions:  · Dental exam overdue:  dentures    Current Health Maintenance Status  Immunization History   Administered Date(s) Administered    Influenza Virus Good control. Current treatment plan is effective, continue same. Basic Metabolic Panel   8. Coronary artery disease involving native coronary artery of native heart with angina pectoris ([address]  Stable with current medications. 9. Need for shingles vaccine  zoster recombinant adjuvanted vaccine (SHINGRIX) 50 MCG SUSR injection   10. Spinal stenosis of lumbar region with neurogenic claudication- clinically  Stable, manageable for now. Diagnosis Risk: Moderate Risk    FYI: While Medicare provides you with a FREE ANNUAL PREVENTIVE PHYSICAL, this visit does NOT include management of chronic medical problems or physical examination. Dr. Mitzi Vargas usually does a combination visit if you have other medical problems so you don't have to come back for another visit. However, this means that there will be a co-pay. MA- samples of Anoro inhaler    INSTRUCTIONS  NEXT APPOINTMENT: Please schedule check-up in 3 months. · PLEASE GET BLOODWORK DRAWN TODAY ON FIRST FLOOR in 170. Take orders with you. RESULTS- most blood tests back in couple days. We will call you if any problems. If bloodwork good, you will get letter in mail or notified thru [address] (if signed up) within 2 weeks. If you do not, please call office. · Please get flu vaccine when available in fall. Can get either at this office or at stores such as [facility] and "[employer]. · Medicare part D patients:  Please take Rx for Shingrix to pharmacy (such as [facility] or [facility]) to get shingles vaccine. Need second dose in 2-6 months. · Switch short acting insulin to Novolin R at [employer] [facility]  · Albuterol should be at "[employer].

## 2018-07-06 NOTE — PATIENT INSTRUCTIONS
Pain in the legs may become so severe that walking even short distances is unbearable. Frequently, sufferers must sit or lean forward over a grocery cart, countertop or walker to temporarily ease pain. Fast facts  Spinal stenosis is usually the result of osteoarthritis, which can cause a pinching of the spinal cord or nerve roots. There is no cure for this disease but there are steps you can take to reduce pain and improve your flexibility (your ability to bend and move about). Exercise is very important in the treatment of this disease. What is spinal stenosis? Spinal stenosis is a narrowing of one or more areas of the spine. This narrowing, which occurs most often in the lower back or neck, can put pressure on the spinal cord or nerves that branch out from the squeezed areas. Typically, a person with this condition complains of severe pain in the legs, calves or lower back when standing or walking. Pain may come on more quickly when walking up a hill, a ramp or steps. Usually, it is relieved by sitting down or leaning over. However, not all patients with spinal narrowing develop symptomsand we still don't understand why. Because of this, the term \"spinal stenosis\" actually refers to the symptoms of pain and not to the narrowing itself. What causes spinal stenosis? Some people are born with a small spinal canal. This is called \"congenital stenosis\". However, spinal narrowing is most often due to age-related changes that take place over time. This is called \"acquired spinal stenosis. \"     Who gets spinal stenosis? The risk of developing spinal stenosis increases if:   You were born with a narrow spinal canal   You are female   You are 48years old or older   You've had a previous injury or surgery of the spine   Some medical conditions can cause spinal stenosis.  These include:  Osteoarthritis and bony spurs that form as we age   Inflammatory spondyloarthritis (e.g., ankylosing spondylitis) you.    Medications. Over-the-counter medications such as acetaminophen (Tylenol), or nonsteroidal anti-inflammatories (commonly called NSAIDS) such as ibuprofen (Advil, Motrin) or naproxen (Aleve, Anaprox), may also relieve pain. In addition, a rheumatologist may prescribe other medications to help with pain and/or muscle spasm. Cortisone injections. Injections directly into the area around the spinal cord may provide a great deal of temporary, sometimes permanent, relief. These injections are usually given on an outpatient basis in a hospital or clinic setting. Surgery. Some patients with severe or worsening symptoms (but who are otherwise healthy) may be candidates for a \"decompression laminectomy. \" This surgery removes the bony spurs and buildup of bone in the spinal canal, freeing space for the nerves and spinal cord. Afterwards, doctors often perform a spinal fusion to connect two or more vertebrae and better support for the spine. Several recent studies have found that surgery produces better results than non-surgical treatment in the short term. However, results vary and, like all surgeries, this one also carries risks. These risks include blood clots in the brain and/or the legs; tears in the tissue around the spinal cord; infection; and injury to the nerve root. While surgery may bring some relief, it will not cure spinal stenosis or osteoarthritis and symptoms may recur. Broader health impacts  Spinal stenosis can lead to the slow but steady loss of strength in the legs. The severe pain caused by this condition can be quite disabling, even if you have no muscle weakness, since it greatly affects your ability to work and enjoy life. Living with spinal stenosis  There is no cure for this condition but there are steps you can take to feel better. For example:  Get moving. Regular exercise is very important, so do it often  at least three times a week for about 30 minutes.  Start slowly with flexion-based (forward-bending) exercises. As you begin to feel stronger, add walking or swimming to your plan. Modify activity. Don't do anything that can trigger or worsen pain and disability such as lifting heavy objects or walking long distances. Talk to your physician about pain medications, as well as alternative therapies such as acupuncture or massage that can ease pain. Explore non-surgical options first except in rare cases when pain, weakness and numbness comes on quickly. Points to remember  Medical history is rod in making a diagnosis. Anyone over the age of 48 is at risk. The impact of this disease varies widely from patient to patient. Exercising regularly to keep muscles strong and improve flexibility boosts strength, reduces pain and improves general well-being. Your choice of treatment depends on how severely spinal stenosis affects your quality of life. Personalized Preventive Plan for Kirstin Bello - 7/6/2018  Medicare offers a range of preventive health benefits. Some of the tests and screenings are paid in full while other may be subject to a deductible, co-insurance, and/or copay. Some of these benefits include a comprehensive review of your medical history including lifestyle, illnesses that may run in your family, and various assessments and screenings as appropriate. After reviewing your medical record and screening and assessments performed today your provider may have ordered immunizations, labs, imaging, and/or referrals for you. A list of these orders (if applicable) as well as your Preventive Care list are included within your After Visit Summary for your review. Other Preventive Recommendations:    · A preventive eye exam performed by an eye specialist is recommended every 1-2 years to screen for glaucoma; cataracts, macular degeneration, and other eye disorders. · A preventive dental visit is recommended every 6 months.   · Try to get

## 2018-07-07 LAB
ESTIMATED AVERAGE GLUCOSE: 220.2 MG/DL
HBA1C MFR BLD: 9.3 %

## 2018-07-17 RX ORDER — FERROUS SULFATE 325(65) MG
TABLET ORAL
Qty: 180 TABLET | Refills: 3 | Status: SHIPPED | OUTPATIENT
Start: 2018-07-17 | End: 2018-08-27

## 2018-07-23 RX ORDER — POTASSIUM CHLORIDE 750 MG/1
TABLET, FILM COATED, EXTENDED RELEASE ORAL
Qty: 90 TABLET | Refills: 0 | Status: SHIPPED | OUTPATIENT
Start: 2018-07-23 | End: 2018-10-29 | Stop reason: SDUPTHER

## 2018-07-31 DIAGNOSIS — E78.5 HYPERLIPIDEMIA: ICD-10-CM

## 2018-08-01 RX ORDER — ATORVASTATIN CALCIUM 40 MG/1
TABLET, FILM COATED ORAL
Qty: 90 TABLET | Refills: 0 | Status: SHIPPED | OUTPATIENT
Start: 2018-08-01 | End: 2018-12-03 | Stop reason: SDUPTHER

## 2018-08-21 RX ORDER — OMEPRAZOLE 20 MG/1
CAPSULE, DELAYED RELEASE ORAL
Qty: 180 CAPSULE | Refills: 3 | Status: SHIPPED | OUTPATIENT
Start: 2018-08-21 | End: 2019-01-25 | Stop reason: SDUPTHER

## 2018-08-23 DIAGNOSIS — E11.42 DIABETIC POLYNEUROPATHY ASSOCIATED WITH TYPE 2 DIABETES MELLITUS (HCC): ICD-10-CM

## 2018-08-24 RX ORDER — GABAPENTIN 600 MG/1
TABLET ORAL
Qty: 150 TABLET | Refills: 1 | Status: SHIPPED | OUTPATIENT
Start: 2018-08-24 | End: 2018-08-27

## 2018-08-27 ENCOUNTER — TELEPHONE (OUTPATIENT)
Dept: FAMILY MEDICINE CLINIC | Age: 78
End: 2018-08-27

## 2018-08-27 RX ORDER — GABAPENTIN 600 MG/1
1800 TABLET ORAL NIGHTLY
Qty: 270 TABLET | Refills: 0 | Status: SHIPPED | OUTPATIENT
Start: 2018-08-27 | End: 2019-04-10

## 2018-08-27 RX ORDER — GABAPENTIN 600 MG/1
600 TABLET ORAL 3 TIMES DAILY
COMMUNITY
End: 2018-08-27 | Stop reason: SDUPTHER

## 2018-08-27 NOTE — TELEPHONE ENCOUNTER
Spoke with patient and he states that he takes Gabapentin 600 mg 3 pills night. Patient needs a new script for a 90 day supply. Please send updated script.

## 2018-09-12 ENCOUNTER — CARE COORDINATION (OUTPATIENT)
Dept: CARE COORDINATION | Age: 78
End: 2018-09-12

## 2018-09-12 NOTE — LETTER
9/12/2018     [address]  [facility] [id number]    Dear Elyssa Mittal recently attempted to contact you to discuss your healthcare needs. My name is Lindsey Maria and I am a registered nurse who partners with Monty Gant MD to improve patients health. As a member of your health care team, I would work with other providers involved in your care, offer education for your specific health conditions, and connect you with additional resources as needed. I will collaborate with Westley García MD to support you in following your treatment plan. The additional support I provide is no additional cost to you. My primary focus is to help you achieve specific goals and improve your health. I look forward to working with you. Please contact me at your earliest convenience at [phone].     In good health,        Lindsey Maria BSN, RN  Nurse Care Coordinator

## 2018-10-08 ENCOUNTER — OFFICE VISIT (OUTPATIENT)
Dept: FAMILY MEDICINE CLINIC | Age: 78
End: 2018-10-08
Payer: MEDICARE

## 2018-10-08 VITALS
HEIGHT: 68 IN | TEMPERATURE: 98.7 F | WEIGHT: 288 LBS | HEART RATE: 50 BPM | RESPIRATION RATE: 18 BRPM | SYSTOLIC BLOOD PRESSURE: 118 MMHG | DIASTOLIC BLOOD PRESSURE: 78 MMHG | BODY MASS INDEX: 43.65 KG/M2

## 2018-10-08 DIAGNOSIS — I25.119 CORONARY ARTERY DISEASE INVOLVING NATIVE CORONARY ARTERY OF NATIVE HEART WITH ANGINA PECTORIS (HCC): ICD-10-CM

## 2018-10-08 DIAGNOSIS — Z23 NEEDS FLU SHOT: ICD-10-CM

## 2018-10-08 DIAGNOSIS — Z23 NEED FOR VACCINATION FOR ZOSTER: ICD-10-CM

## 2018-10-08 DIAGNOSIS — J44.9 CHRONIC OBSTRUCTIVE PULMONARY DISEASE, UNSPECIFIED COPD TYPE (HCC): ICD-10-CM

## 2018-10-08 DIAGNOSIS — R55 SYNCOPE, UNSPECIFIED SYNCOPE TYPE: ICD-10-CM

## 2018-10-08 DIAGNOSIS — I10 ESSENTIAL HYPERTENSION: ICD-10-CM

## 2018-10-08 DIAGNOSIS — E11.21 TYPE 2 DIABETES MELLITUS WITH DIABETIC NEPHROPATHY, WITH LONG-TERM CURRENT USE OF INSULIN (HCC): Primary | ICD-10-CM

## 2018-10-08 DIAGNOSIS — Z79.4 TYPE 2 DIABETES MELLITUS WITH DIABETIC NEPHROPATHY, WITH LONG-TERM CURRENT USE OF INSULIN (HCC): Primary | ICD-10-CM

## 2018-10-08 LAB — HBA1C MFR BLD: 7.8 %

## 2018-10-08 PROCEDURE — 99214 OFFICE O/P EST MOD 30 MIN: CPT | Performed by: FAMILY MEDICINE

## 2018-10-08 PROCEDURE — G8482 FLU IMMUNIZE ORDER/ADMIN: HCPCS | Performed by: FAMILY MEDICINE

## 2018-10-08 PROCEDURE — 1123F ACP DISCUSS/DSCN MKR DOCD: CPT | Performed by: FAMILY MEDICINE

## 2018-10-08 PROCEDURE — 3023F SPIROM DOC REV: CPT | Performed by: FAMILY MEDICINE

## 2018-10-08 PROCEDURE — 1036F TOBACCO NON-USER: CPT | Performed by: FAMILY MEDICINE

## 2018-10-08 PROCEDURE — G8427 DOCREV CUR MEDS BY ELIG CLIN: HCPCS | Performed by: FAMILY MEDICINE

## 2018-10-08 PROCEDURE — G8598 ASA/ANTIPLAT THER USED: HCPCS | Performed by: FAMILY MEDICINE

## 2018-10-08 PROCEDURE — 4040F PNEUMOC VAC/ADMIN/RCVD: CPT | Performed by: FAMILY MEDICINE

## 2018-10-08 PROCEDURE — 83036 HEMOGLOBIN GLYCOSYLATED A1C: CPT | Performed by: FAMILY MEDICINE

## 2018-10-08 PROCEDURE — G8417 CALC BMI ABV UP PARAM F/U: HCPCS | Performed by: FAMILY MEDICINE

## 2018-10-08 PROCEDURE — 1101F PT FALLS ASSESS-DOCD LE1/YR: CPT | Performed by: FAMILY MEDICINE

## 2018-10-08 PROCEDURE — G8926 SPIRO NO PERF OR DOC: HCPCS | Performed by: FAMILY MEDICINE

## 2018-10-08 PROCEDURE — 90662 IIV NO PRSV INCREASED AG IM: CPT | Performed by: FAMILY MEDICINE

## 2018-10-08 PROCEDURE — G0008 ADMIN INFLUENZA VIRUS VAC: HCPCS | Performed by: FAMILY MEDICINE

## 2018-10-08 NOTE — PATIENT INSTRUCTIONS
fists.  The next step is to choose a way to deal with your stress. One way is to avoid the event or thing that leads to your stress--but often this is not possible. A second way is to change how you react to stress. This is often the more practical way. Tips for dealing with stress  Don't worry about things you can't control, such as the weather. Solve the little problems. This can help you gain a feeling of control. Prepare to the best of your ability for events you know may be stressful, such as a job interview. Try to look at change as a positive challenge, not as a threat. Work to resolve conflicts with other people. Talk with a trusted friend, family member or counselor. Set realistic goals at home and at work. Avoid overscheduling. Exercise on a regular basis. Eat regular, well-balanced meals and get enough sleep. Meditate. Participate in something you don't find stressful, such as sports, social events or hobbies. Why is exercise useful? Exercise is a good way to deal with stress because it's a healthy way to relieve your pent-up energy and tension. Exercise is known to release feel-good brain chemicals. It also helps you get in better shape, which makes you feel better overall. Steps to deep breathing  Lie down on a flat surface. Place a hand on your stomach, just above your navel. Place the other hand on your chest.   Breathe in slowly and try to make your stomach rise a little. Hold your breath for a second. Breathe out slowly and let your stomach go back down. What is meditation? Meditation is a form of guided thought. It can take many forms. You can do it with exercise that uses the same motions over and over, like walking or swimming. You can meditate by practicing relaxation training, by stretching or by breathing deeply. Relaxation training is simple. Start with one muscle. Hold it tight for a few seconds then relax the muscle.  Do this with each of your your blood sugar may also cause you to faint. This can happen if you have diabetes, but it may also happen if you don't eat for a long time. Some prescription medicines can cause fainting. Be sure to talk to your doctor if you think your fainting may be related to a medicine you're taking. Alcohol, cocaine and marijuana can also cause fainting. More serious causes of fainting include seizures and problems with the heart or with the blood vessels leading to the brain. Who is at risk for fainting? People who have certain medical conditions are more likely to faint. These conditions include:  Heart problems such as an irregular heartbeat or blockages in or near the heart that block the blood from getting to the brain   Diabetes   Anxiety or panic disorders   Dehydration   Low blood sugar     Diagnosis & Tests   How will the cause of my fainting be found? Your doctor will probably ask you about what was happening or what you were doing when you fainted. He or she may ask you for details about how you felt right before and right after you fainted. Your doctor will probably also want to examine you and may perform some tests to find out why you fainted. Prevention   What should I do if I think I'm going to faint? Before fainting, you may feel lightheaded, dizzy, like the room is spinning, sick to your stomach. You may also have blurry vision or a hard time hearing. If you feel like you're going to faint, lie down. If you can't lie down, sit and bend forward with your head between your knees. This helps get the blood flowing to your brain. Wait until you feel better before trying to stand up. When you stand up, do so slowly. Should I see my doctor if I faint? You probably don't need to go to your doctor if you have only fainted one time and you are in otherwise good health. Fainting is common and usually not serious.  However, if you have serious health problems, especially heart-related problems, high blood

## 2018-10-08 NOTE — PROGRESS NOTES
DIABETES MELLITUS FOLOW-UP  Scribe documentation   I, Eliza Ramírez am scribing for Danielle Koenig MD. Electronically signed by Eliza Ramírez  on 10/8/2018 at 8:32 AM  MD Attestation: Thomasenia Kocher,  personally performed the services described in this documentation, as scribed by the user listed above, and it is both accurate and complete. CHART REVIEW  Health Maintenance   Topic Date Due    Shingles Vaccine (1 of 2 - 2 Dose Series) 12/03/2012    Flu vaccine (1) 09/01/2018    Potassium monitoring  07/06/2019    Creatinine monitoring  07/06/2019    DTaP/Tdap/Td vaccine (3 - Td) 11/28/2021    Pneumococcal low/med risk  Completed     Social History     Social History    Marital status:      Spouse name: N/A    Number of children: N/A    Years of education: N/A     Social History Main Topics    Smoking status: Former Smoker     Packs/day: 3.50     Years: 32.00     Types: Cigarettes     Quit date: 1/1/1985    Smokeless tobacco: Never Used      Comment: advised not to resume    Alcohol use No    Drug use: No    Sexual activity: Not Currently     Other Topics Concern    None     Social History Narrative    Lives with spouse. Exercise: walking sometimes. Seatbelt use: Always. Living will: no, additional information provided. Self-testicular exams: Yes      The 10-year ASCVD risk score (Quang Dumont, et al., 2013) is: 43.1%    Values used to calculate the score:      Age: 69 years      Sex: Male      Is Non- : No      Diabetic: Yes      Tobacco smoker: No      Systolic Blood Pressure: 467 mmHg      Is BP treated: Yes      HDL Cholesterol: 57 mg/dL      Total Cholesterol: 149 mg/dL  Prior to Visit Medications    Medication Sig Taking?  Authorizing Provider   zoster recombinant adjuvanted vaccine (SHINGRIX) 50 MCG SUSR injection Inject 0.5 mLs into the muscle once for 1 dose Yes Tess Wong MD   gabapentin (NEURONTIN) 600 MG tablet Take 3 tablets by mouth nightly for 90 Diagnosis    Tinnitus    Special screening for malignant neoplasms, colon    Leg edema    Elevated PSA- nl 8/12/11    Hyperlipidemia    Osteoarthritis    Diabetic retinopathy ([address]    BPH (benign prostatic hyperplasia)    COPD (chronic obstructive pulmonary disease) (HCC)    Rosacea    Erectile dysfunction    Hypertension    CAD (coronary artery disease)    Diabetes mellitus, type II (HCC)    Diabetic nephropathy- pos microalb 8/11    Diabetic neuropathy (HCC)    GERD (gastroesophageal reflux disease)    B12 deficiency    Impingement syndrome of left shoulder    Obesity, Class III, BMI 40-49.9 (morbid obesity) ([address]    Personal history of atrial flutter    Peptic ulcer disease    Status post total right knee replacement    Vitamin D deficiency    Spinal stenosis of lumbar region with neurogenic claudication- clinically    Syncope     No Known Allergies   Past Medical History:   Diagnosis Date    Acid reflux     Atrial flutter ([address] 2013    converted    Bleeding stomach ulcer oct 2015    BPH (benign prostatic hyperplasia)     CAD (coronary artery disease)     11/12 angio with 2 blocked bypass and 2 patent    COPD (chronic obstructive pulmonary disease) ([address]     Diabetes mellitus type II     Edema     chronic left leg    Elevated PSA- nl 8/12/11     Hyperlipidemia     Hypertension     Ischemic cardiomyopathy     Lipoma of skin-RIGHT CHEST 5/5/2011    Obesity     Osteoarthritis     Spinal stenosis of lumbar region with neurogenic claudication- clinically 7/6/2018     Social History   Substance Use Topics    Smoking status: Former Smoker     Packs/day: 3.50     Years: 32.00     Types: Cigarettes     Quit date: 1/1/1985    Smokeless tobacco: Never Used      Comment: advised not to resume    Alcohol use No      Cholesterol, Total   Date Value Ref Range Status   02/21/2018 149 0 - 199 mg/dL Final     LDL Calculated   Date Value Ref Range Status   02/21/2018 67 <100 mg/dL Final     HDL   Date Value Ref Range Status   02/21/2018 57 40 - 60 mg/dL Final   03/08/2012 37 (L) 40 - 60 mg/dl Final     Triglycerides   Date Value Ref Range Status   02/21/2018 124 0 - 150 mg/dL Final     Lab Results   Component Value Date    GLUCOSE 224 (H) 07/06/2018     Lab Results   Component Value Date     07/06/2018    K 4.5 07/06/2018    CREATININE 1.4 (H) 07/06/2018     Lab Results   Component Value Date    WBC 4.8 07/06/2018    HGB 13.7 07/06/2018    HCT 41.1 07/06/2018    MCV 90.8 07/06/2018     (L) 07/06/2018     Lab Results   Component Value Date    ALT 16 02/21/2018    AST 19 02/21/2018    ALKPHOS 96 02/21/2018    BILITOT 0.9 02/21/2018     TSH (uIU/mL)   Date Value   02/21/2018 2.29     Lab Results   Component Value Date    LABA1C 9.3 07/06/2018     Lab Results   Component Value Date    LABA1C 9.3 07/06/2018    LABA1C 8.2 05/25/2018    LABA1C 8.8 02/21/2018     Lab Results   Component Value Date    LABMICR YES 09/06/2016     Subjective:      Chief Complaint   Patient presents with    Diabetes     3 month check up      Gilmer Garcia is an 69 y.o. male who presents for follow up of following chronic problems:  1. Type 2 diabetes mellitus with diabetic nephropathy, with long-term current use of insulin- no issues or hypoglycemia   2. Needs flu shot    3. Need for vaccination for zoster    4. Chronic obstructive pulmonary disease - uses anoro sparingly due to cost. Uses albuterol daily. 5. Essential hypertension    6. Coronary artery disease involving native coronary artery of native heart with angina pectoris- npo symptoms    7. Syncope- NEW see HPI     Complaints: some stress, wife having surgery  Past 6 months had 5-6 episodes of syncope, can feel it coming on by roaring I ears and sits down. Unconscious < 1 min. Not clustered together. Wore heart monitor for a week with no issues per cardiology just recently. No incontinence or confusion.  No related to sit/stand or head position. · Patient checks sugars 4  time(s) daily. Average: 160. Range: . · Patent follows diabetic diet? Sometimes  · Exercise: walking intermittently  · Taking medicines daily as directed? Yes  · Is the patient reporting any side effects of medications? No  · Patient checks feet daily? Yes  · Is aspirin on med list? Yes  · Tobacco history updated:  reports that he quit smoking about 33 years ago. His smoking use included Cigarettes. He has a 112.00 pack-year smoking history. He has never used smokeless tobacco.    ROS:  General ROS: fever? No   night sweats? No  Ophthalmic ROS:blurry vision or decreased vision? No  Endocrine ROS: fatigue? No   unexpected weight changes? No  Respiratory ROS: cough? No   shortness of breath? No  Cardiovascular ROS:chest pain? No   shortness of breath with exertion? No  Gastrointestinal ROS: abdominal pain? No   change in stools? No  Genito-Urinary ROS: painful urination? No   trouble voiding? No  Neurological ROS: TIA or stroke symptoms? No   numbness/tingling in feet? No  Dermatological ROS: rash or sores on feet? No     HISTORY:  Patient's medications, allergies, past medical, and social histories were reviewed and updated as appropriate (See above). Objective:   PHYSICAL EXAM   /78 (Site: Right Upper Arm, Position: Sitting, Cuff Size: Large Adult)   Pulse 50   Temp 98.7 °F (37.1 °C) (Oral)   Resp 18   Ht 5' 8\" (1.727 m)   Wt 288 lb (130.6 kg)   BMI 43.79 kg/m²   Blood pressure is good. BP Readings from Last 5 Encounters:   10/08/18 118/78   07/06/18 112/60   06/20/18 (!) 151/72   06/06/18 (!) 148/65   06/05/18 139/69     Weight is unchanged. Wt Readings from Last 5 Encounters:   10/08/18 288 lb (130.6 kg)   07/06/18 288 lb (130.6 kg)   06/20/18 287 lb (130.2 kg)   06/06/18 288 lb (130.6 kg)   06/05/18 287 lb (130.2 kg)      GENERAL:   · well-developed, well-nourished, alert, no distress.      EYES:   · External findings: lids and lashes normal

## 2018-10-10 ENCOUNTER — CARE COORDINATION (OUTPATIENT)
Dept: CARE COORDINATION | Age: 78
End: 2018-10-10

## 2018-10-30 RX ORDER — AMIODARONE HYDROCHLORIDE 200 MG/1
TABLET ORAL
Qty: 90 TABLET | Refills: 3 | Status: SHIPPED | OUTPATIENT
Start: 2018-10-30 | End: 2019-11-14 | Stop reason: SDUPTHER

## 2018-10-30 RX ORDER — POTASSIUM CHLORIDE 750 MG/1
TABLET, FILM COATED, EXTENDED RELEASE ORAL
Qty: 90 TABLET | Refills: 0 | Status: SHIPPED | OUTPATIENT
Start: 2018-10-30 | End: 2019-02-17 | Stop reason: SDUPTHER

## 2018-12-03 DIAGNOSIS — E78.5 HYPERLIPIDEMIA: ICD-10-CM

## 2018-12-04 RX ORDER — ATORVASTATIN CALCIUM 40 MG/1
TABLET, FILM COATED ORAL
Qty: 90 TABLET | Refills: 0 | Status: SHIPPED | OUTPATIENT
Start: 2018-12-04 | End: 2019-03-12 | Stop reason: SDUPTHER

## 2019-01-15 ENCOUNTER — OFFICE VISIT (OUTPATIENT)
Dept: FAMILY MEDICINE CLINIC | Age: 79
End: 2019-01-15
Payer: MEDICARE

## 2019-01-15 VITALS
DIASTOLIC BLOOD PRESSURE: 72 MMHG | HEART RATE: 52 BPM | BODY MASS INDEX: 41.07 KG/M2 | SYSTOLIC BLOOD PRESSURE: 124 MMHG | WEIGHT: 271 LBS | RESPIRATION RATE: 18 BRPM | HEIGHT: 68 IN | TEMPERATURE: 97.5 F

## 2019-01-15 DIAGNOSIS — E11.21 TYPE 2 DIABETES MELLITUS WITH DIABETIC NEPHROPATHY, WITH LONG-TERM CURRENT USE OF INSULIN (HCC): Primary | ICD-10-CM

## 2019-01-15 DIAGNOSIS — I10 ESSENTIAL HYPERTENSION: ICD-10-CM

## 2019-01-15 DIAGNOSIS — Z23 NEED FOR ZOSTER VACCINATION: ICD-10-CM

## 2019-01-15 DIAGNOSIS — E78.2 MIXED HYPERLIPIDEMIA: ICD-10-CM

## 2019-01-15 DIAGNOSIS — Z23 NEED FOR PNEUMOCOCCAL VACCINATION: ICD-10-CM

## 2019-01-15 DIAGNOSIS — J44.9 CHRONIC OBSTRUCTIVE PULMONARY DISEASE, UNSPECIFIED COPD TYPE (HCC): ICD-10-CM

## 2019-01-15 DIAGNOSIS — Z79.4 TYPE 2 DIABETES MELLITUS WITH DIABETIC NEPHROPATHY, WITH LONG-TERM CURRENT USE OF INSULIN (HCC): Primary | ICD-10-CM

## 2019-01-15 DIAGNOSIS — I25.119 CORONARY ARTERY DISEASE INVOLVING NATIVE CORONARY ARTERY OF NATIVE HEART WITH ANGINA PECTORIS (HCC): ICD-10-CM

## 2019-01-15 DIAGNOSIS — Z79.4 TYPE 2 DIABETES MELLITUS WITH DIABETIC NEPHROPATHY, WITH LONG-TERM CURRENT USE OF INSULIN (HCC): ICD-10-CM

## 2019-01-15 DIAGNOSIS — E11.21 TYPE 2 DIABETES MELLITUS WITH DIABETIC NEPHROPATHY, WITH LONG-TERM CURRENT USE OF INSULIN (HCC): ICD-10-CM

## 2019-01-15 PROBLEM — R55 SYNCOPE: Status: RESOLVED | Noted: 2018-10-08 | Resolved: 2019-01-15

## 2019-01-15 LAB
A/G RATIO: 1.5 (ref 1.1–2.2)
ALBUMIN SERPL-MCNC: 3.8 G/DL (ref 3.4–5)
ALP BLD-CCNC: 91 U/L (ref 40–129)
ALT SERPL-CCNC: 15 U/L (ref 10–40)
ANION GAP SERPL CALCULATED.3IONS-SCNC: 15 MMOL/L (ref 3–16)
AST SERPL-CCNC: 18 U/L (ref 15–37)
BILIRUB SERPL-MCNC: 0.8 MG/DL (ref 0–1)
BUN BLDV-MCNC: 36 MG/DL (ref 7–20)
CALCIUM SERPL-MCNC: 9.3 MG/DL (ref 8.3–10.6)
CHLORIDE BLD-SCNC: 103 MMOL/L (ref 99–110)
CHOLESTEROL, TOTAL: 134 MG/DL (ref 0–199)
CO2: 28 MMOL/L (ref 21–32)
CREAT SERPL-MCNC: 1.7 MG/DL (ref 0.8–1.3)
GFR AFRICAN AMERICAN: 47
GFR NON-AFRICAN AMERICAN: 39
GLOBULIN: 2.6 G/DL
GLUCOSE BLD-MCNC: 117 MG/DL (ref 70–99)
HBA1C MFR BLD: 8.8 %
HDLC SERPL-MCNC: 50 MG/DL (ref 40–60)
LDL CHOLESTEROL CALCULATED: 58 MG/DL
POTASSIUM SERPL-SCNC: 4.3 MMOL/L (ref 3.5–5.1)
SODIUM BLD-SCNC: 146 MMOL/L (ref 136–145)
TOTAL PROTEIN: 6.4 G/DL (ref 6.4–8.2)
TRIGL SERPL-MCNC: 129 MG/DL (ref 0–150)
TSH SERPL DL<=0.05 MIU/L-ACNC: 3.14 UIU/ML (ref 0.27–4.2)
VLDLC SERPL CALC-MCNC: 26 MG/DL

## 2019-01-15 PROCEDURE — 1101F PT FALLS ASSESS-DOCD LE1/YR: CPT | Performed by: FAMILY MEDICINE

## 2019-01-15 PROCEDURE — 99214 OFFICE O/P EST MOD 30 MIN: CPT | Performed by: FAMILY MEDICINE

## 2019-01-15 PROCEDURE — G8926 SPIRO NO PERF OR DOC: HCPCS | Performed by: FAMILY MEDICINE

## 2019-01-15 PROCEDURE — 1123F ACP DISCUSS/DSCN MKR DOCD: CPT | Performed by: FAMILY MEDICINE

## 2019-01-15 PROCEDURE — G8598 ASA/ANTIPLAT THER USED: HCPCS | Performed by: FAMILY MEDICINE

## 2019-01-15 PROCEDURE — G8482 FLU IMMUNIZE ORDER/ADMIN: HCPCS | Performed by: FAMILY MEDICINE

## 2019-01-15 PROCEDURE — 3023F SPIROM DOC REV: CPT | Performed by: FAMILY MEDICINE

## 2019-01-15 PROCEDURE — 4040F PNEUMOC VAC/ADMIN/RCVD: CPT | Performed by: FAMILY MEDICINE

## 2019-01-15 PROCEDURE — 83036 HEMOGLOBIN GLYCOSYLATED A1C: CPT | Performed by: FAMILY MEDICINE

## 2019-01-15 PROCEDURE — G8417 CALC BMI ABV UP PARAM F/U: HCPCS | Performed by: FAMILY MEDICINE

## 2019-01-15 PROCEDURE — G8427 DOCREV CUR MEDS BY ELIG CLIN: HCPCS | Performed by: FAMILY MEDICINE

## 2019-01-15 PROCEDURE — 1036F TOBACCO NON-USER: CPT | Performed by: FAMILY MEDICINE

## 2019-01-17 DIAGNOSIS — E11.21 DIABETIC NEPHROPATHY ASSOCIATED WITH TYPE 2 DIABETES MELLITUS (HCC): Primary | ICD-10-CM

## 2019-01-25 DIAGNOSIS — E11.21 DIABETIC NEPHROPATHY ASSOCIATED WITH TYPE 2 DIABETES MELLITUS (HCC): ICD-10-CM

## 2019-01-25 LAB
ANION GAP SERPL CALCULATED.3IONS-SCNC: 14 MMOL/L (ref 3–16)
BUN BLDV-MCNC: 25 MG/DL (ref 7–20)
CALCIUM SERPL-MCNC: 9.6 MG/DL (ref 8.3–10.6)
CHLORIDE BLD-SCNC: 101 MMOL/L (ref 99–110)
CO2: 29 MMOL/L (ref 21–32)
CREAT SERPL-MCNC: 1.5 MG/DL (ref 0.8–1.3)
GFR AFRICAN AMERICAN: 55
GFR NON-AFRICAN AMERICAN: 45
GLUCOSE BLD-MCNC: 194 MG/DL (ref 70–99)
POTASSIUM SERPL-SCNC: 5 MMOL/L (ref 3.5–5.1)
SODIUM BLD-SCNC: 144 MMOL/L (ref 136–145)

## 2019-01-28 RX ORDER — OMEPRAZOLE 20 MG/1
CAPSULE, DELAYED RELEASE ORAL
Qty: 180 CAPSULE | Refills: 3 | Status: SHIPPED | OUTPATIENT
Start: 2019-01-28 | End: 2019-08-14

## 2019-02-18 DIAGNOSIS — J44.9 CHRONIC OBSTRUCTIVE PULMONARY DISEASE, UNSPECIFIED COPD TYPE (HCC): Primary | ICD-10-CM

## 2019-02-18 RX ORDER — POTASSIUM CHLORIDE 750 MG/1
TABLET, FILM COATED, EXTENDED RELEASE ORAL
Qty: 90 TABLET | Refills: 3 | Status: SHIPPED | OUTPATIENT
Start: 2019-02-18 | End: 2019-12-11 | Stop reason: SDUPTHER

## 2019-02-18 RX ORDER — LEVALBUTEROL TARTRATE 45 UG/1
1-2 AEROSOL, METERED ORAL EVERY 4 HOURS PRN
Qty: 1 INHALER | Refills: 3 | Status: SHIPPED | OUTPATIENT
Start: 2019-02-18

## 2019-02-28 RX ORDER — CHOLECALCIFEROL (VITAMIN D3) 125 MCG
CAPSULE ORAL
Qty: 90 TABLET | Refills: 3 | Status: SHIPPED | OUTPATIENT
Start: 2019-02-28 | End: 2020-03-23

## 2019-03-12 DIAGNOSIS — E78.5 HYPERLIPIDEMIA: ICD-10-CM

## 2019-03-13 RX ORDER — ATORVASTATIN CALCIUM 40 MG/1
TABLET, FILM COATED ORAL
Qty: 90 TABLET | Refills: 0 | Status: SHIPPED | OUTPATIENT
Start: 2019-03-13 | End: 2019-07-16 | Stop reason: SDUPTHER

## 2019-03-25 DIAGNOSIS — J43.9 PULMONARY EMPHYSEMA, UNSPECIFIED EMPHYSEMA TYPE (HCC): ICD-10-CM

## 2019-03-26 RX ORDER — UMECLIDINIUM BROMIDE AND VILANTEROL TRIFENATATE 62.5; 25 UG/1; UG/1
POWDER RESPIRATORY (INHALATION)
Qty: 1 EACH | Refills: 0 | Status: SHIPPED | OUTPATIENT
Start: 2019-03-26 | End: 2020-01-27 | Stop reason: SDUPTHER

## 2019-04-09 DIAGNOSIS — E11.42 DIABETIC POLYNEUROPATHY ASSOCIATED WITH TYPE 2 DIABETES MELLITUS (HCC): ICD-10-CM

## 2019-04-10 RX ORDER — GABAPENTIN 600 MG/1
TABLET ORAL
Qty: 150 TABLET | Refills: 0 | Status: SHIPPED | OUTPATIENT
Start: 2019-04-10 | End: 2019-07-06 | Stop reason: SDUPTHER

## 2019-04-15 ENCOUNTER — OFFICE VISIT (OUTPATIENT)
Dept: FAMILY MEDICINE CLINIC | Age: 79
End: 2019-04-15
Payer: MEDICARE

## 2019-04-15 VITALS
BODY MASS INDEX: 41.68 KG/M2 | DIASTOLIC BLOOD PRESSURE: 74 MMHG | HEIGHT: 68 IN | RESPIRATION RATE: 16 BRPM | HEART RATE: 52 BPM | SYSTOLIC BLOOD PRESSURE: 134 MMHG | TEMPERATURE: 98.4 F | WEIGHT: 275 LBS

## 2019-04-15 DIAGNOSIS — E78.5 HYPERLIPIDEMIA LDL GOAL <70: ICD-10-CM

## 2019-04-15 DIAGNOSIS — J43.9 PULMONARY EMPHYSEMA, UNSPECIFIED EMPHYSEMA TYPE (HCC): ICD-10-CM

## 2019-04-15 DIAGNOSIS — E66.01 OBESITY, CLASS III, BMI 40-49.9 (MORBID OBESITY) (HCC): ICD-10-CM

## 2019-04-15 DIAGNOSIS — Z79.4 TYPE 2 DIABETES MELLITUS WITH DIABETIC NEPHROPATHY, WITH LONG-TERM CURRENT USE OF INSULIN (HCC): Primary | ICD-10-CM

## 2019-04-15 DIAGNOSIS — I25.10 CORONARY ARTERY DISEASE INVOLVING NATIVE CORONARY ARTERY OF NATIVE HEART WITHOUT ANGINA PECTORIS: ICD-10-CM

## 2019-04-15 DIAGNOSIS — I10 ESSENTIAL HYPERTENSION: ICD-10-CM

## 2019-04-15 DIAGNOSIS — E11.21 TYPE 2 DIABETES MELLITUS WITH DIABETIC NEPHROPATHY, WITH LONG-TERM CURRENT USE OF INSULIN (HCC): Primary | ICD-10-CM

## 2019-04-15 LAB — HBA1C MFR BLD: 7.9 %

## 2019-04-15 PROCEDURE — G8598 ASA/ANTIPLAT THER USED: HCPCS | Performed by: FAMILY MEDICINE

## 2019-04-15 PROCEDURE — 4040F PNEUMOC VAC/ADMIN/RCVD: CPT | Performed by: FAMILY MEDICINE

## 2019-04-15 PROCEDURE — G8417 CALC BMI ABV UP PARAM F/U: HCPCS | Performed by: FAMILY MEDICINE

## 2019-04-15 PROCEDURE — 99214 OFFICE O/P EST MOD 30 MIN: CPT | Performed by: FAMILY MEDICINE

## 2019-04-15 PROCEDURE — G8427 DOCREV CUR MEDS BY ELIG CLIN: HCPCS | Performed by: FAMILY MEDICINE

## 2019-04-15 PROCEDURE — 3023F SPIROM DOC REV: CPT | Performed by: FAMILY MEDICINE

## 2019-04-15 PROCEDURE — 1123F ACP DISCUSS/DSCN MKR DOCD: CPT | Performed by: FAMILY MEDICINE

## 2019-04-15 PROCEDURE — 1036F TOBACCO NON-USER: CPT | Performed by: FAMILY MEDICINE

## 2019-04-15 PROCEDURE — 83036 HEMOGLOBIN GLYCOSYLATED A1C: CPT | Performed by: FAMILY MEDICINE

## 2019-04-15 PROCEDURE — G8926 SPIRO NO PERF OR DOC: HCPCS | Performed by: FAMILY MEDICINE

## 2019-04-15 ASSESSMENT — PATIENT HEALTH QUESTIONNAIRE - PHQ9
2. FEELING DOWN, DEPRESSED OR HOPELESS: 0
SUM OF ALL RESPONSES TO PHQ QUESTIONS 1-9: 0
SUM OF ALL RESPONSES TO PHQ9 QUESTIONS 1 & 2: 0
SUM OF ALL RESPONSES TO PHQ QUESTIONS 1-9: 0
1. LITTLE INTEREST OR PLEASURE IN DOING THINGS: 0

## 2019-04-15 NOTE — PATIENT INSTRUCTIONS
MA- sample Anoro inhaler if we have  INSTRUCTIONS  NEXT APPOINTMENT: Please schedule annual complete physical (30 minutes) in 3 months. · PLEASE TAKE THIS FORM TO CHECK-OUT WINDOW TO SCHEDULE NEXT VISIT.    · Watch out for the weight gain

## 2019-04-29 ENCOUNTER — TELEPHONE (OUTPATIENT)
Dept: FAMILY MEDICINE CLINIC | Age: 79
End: 2019-04-29

## 2019-04-29 RX ORDER — FUROSEMIDE 80 MG
TABLET ORAL
Qty: 90 TABLET | Refills: 3 | Status: SHIPPED | OUTPATIENT
Start: 2019-04-29 | End: 2019-04-29 | Stop reason: SDUPTHER

## 2019-04-29 RX ORDER — FUROSEMIDE 80 MG
TABLET ORAL
Qty: 90 TABLET | Refills: 3 | Status: SHIPPED | OUTPATIENT
Start: 2019-04-29 | End: 2020-06-05

## 2019-05-21 RX ORDER — RANITIDINE 150 MG/1
TABLET ORAL
Qty: 180 TABLET | Refills: 0 | Status: SHIPPED | OUTPATIENT
Start: 2019-05-21 | End: 2019-07-16 | Stop reason: SDUPTHER

## 2019-06-06 RX ORDER — IBUPROFEN 200 MG
TABLET ORAL
Qty: 400 EACH | Refills: 5 | Status: SHIPPED | OUTPATIENT
Start: 2019-06-06 | End: 2020-07-20

## 2019-07-01 ENCOUNTER — OFFICE VISIT (OUTPATIENT)
Dept: FAMILY MEDICINE CLINIC | Age: 79
End: 2019-07-01
Payer: MEDICARE

## 2019-07-01 VITALS
BODY MASS INDEX: 41.83 KG/M2 | HEIGHT: 68 IN | RESPIRATION RATE: 18 BRPM | WEIGHT: 276 LBS | TEMPERATURE: 97.7 F | DIASTOLIC BLOOD PRESSURE: 70 MMHG | HEART RATE: 68 BPM | SYSTOLIC BLOOD PRESSURE: 130 MMHG

## 2019-07-01 DIAGNOSIS — R60.0 LOCALIZED EDEMA: ICD-10-CM

## 2019-07-01 DIAGNOSIS — L03.116 CELLULITIS OF LEFT LOWER EXTREMITY: Primary | ICD-10-CM

## 2019-07-01 PROCEDURE — 1036F TOBACCO NON-USER: CPT | Performed by: FAMILY MEDICINE

## 2019-07-01 PROCEDURE — 4040F PNEUMOC VAC/ADMIN/RCVD: CPT | Performed by: FAMILY MEDICINE

## 2019-07-01 PROCEDURE — G8427 DOCREV CUR MEDS BY ELIG CLIN: HCPCS | Performed by: FAMILY MEDICINE

## 2019-07-01 PROCEDURE — G8417 CALC BMI ABV UP PARAM F/U: HCPCS | Performed by: FAMILY MEDICINE

## 2019-07-01 PROCEDURE — 99213 OFFICE O/P EST LOW 20 MIN: CPT | Performed by: FAMILY MEDICINE

## 2019-07-01 PROCEDURE — G8598 ASA/ANTIPLAT THER USED: HCPCS | Performed by: FAMILY MEDICINE

## 2019-07-01 PROCEDURE — 1123F ACP DISCUSS/DSCN MKR DOCD: CPT | Performed by: FAMILY MEDICINE

## 2019-07-01 RX ORDER — SULFAMETHOXAZOLE AND TRIMETHOPRIM 800; 160 MG/1; MG/1
1 TABLET ORAL 2 TIMES DAILY
Qty: 20 TABLET | Refills: 0 | Status: SHIPPED | OUTPATIENT
Start: 2019-07-01 | End: 2019-07-11

## 2019-07-01 NOTE — PROGRESS NOTES
is: 52%    Values used to calculate the score:      Age: 67 years      Sex: Male      Is Non- : No      Diabetic: Yes      Tobacco smoker: No      Systolic Blood Pressure: 483 mmHg      Is BP treated: Yes      HDL Cholesterol: 50 mg/dL      Total Cholesterol: 134 mg/dL  Prior to Visit Medications    Medication Sig Taking?  Authorizing Provider   sulfamethoxazole-trimethoprim (BACTRIM DS) 800-160 MG per tablet Take 1 tablet by mouth 2 times daily for 10 days Yes Alicja Posey MD   mupirocin (BACTROBAN) 2 % ointment Apply 3 times daily as needed Yes Alicja Posey MD   INSULIN SYRINGE .5CC/29G 29G X 1/2\" 0.5 ML MISC USE TO INJECT FOUR TIMES DAILY AS NEEDED Yes Alicja Posey MD   ranitidine (ZANTAC) 150 MG tablet TAKE 1 TABLET BY MOUTH TWICE DAILY Yes Alicja Posey MD   furosemide (LASIX) 80 MG tablet TAKE 1 TABLET BY MOUTH DAILY Yes Alicja Posey MD   gabapentin (NEURONTIN) 600 MG tablet TAKE UP TO 5 TABLETS BY MOUTH SPREAD OVER 24 HOURS AS DIRECTED Yes Alicja Posey MD   ANORO ELLIPTA 62.5-25 MCG/INH AEPB inhaler INHALE 1 PUFF INTO THE LUNGS DAILY Yes Alicja Posey MD   atorvastatin (LIPITOR) 40 MG tablet TAKE 1 TABLET BY MOUTH EVERY EVENING Yes Alicja Posey MD   vitamin B-12 (CYANOCOBALAMIN) 500 MCG tablet TAKE 1 TABLET BY MOUTH DAILY Yes Alicja Posey MD   potassium chloride (KLOR-CON) 10 MEQ extended release tablet TAKE 1 TABLET BY MOUTH DAILY Yes Alicja Posey MD   levalbuterol (XOPENEX HFA) 45 MCG/ACT inhaler Inhale 1-2 puffs into the lungs every 4 hours as needed for Wheezing Yes Alicja Posey MD   omeprazole (PRILOSEC) 20 MG delayed release capsule TAKE ONE CAPSULE BY MOUTH TWICE DAILY Yes Alicja Posey MD   insulin NPH (NOVOLIN N) 100 UNIT/ML injection vial Inject 25-35 Units into the skin 2 times daily (before meals) Yes Alicja Posey MD   insulin regular (NOVOLIN R) 100 UNIT/ML injection INJECT 25 TO 35 UNITS THREE TIMES DAILY BEFORE MEALS Yes Alicja Posey MD   amiodarone (CORDARONE)

## 2019-07-01 NOTE — PATIENT INSTRUCTIONS
INSTRUCTIONS  · MA- dress with bacitracin, adaptic and kerlex gauze  · Please finish taking ALL of the antibiotics. · At home, wash with mild soap and water daily. Apply bactroban and non-stick dressing. · Elevate leg. · Take extra half tab furosemide around lunch for about week.

## 2019-07-06 DIAGNOSIS — E11.42 DIABETIC POLYNEUROPATHY ASSOCIATED WITH TYPE 2 DIABETES MELLITUS (HCC): ICD-10-CM

## 2019-07-08 ENCOUNTER — HOSPITAL ENCOUNTER (OUTPATIENT)
Dept: SURGERY | Age: 79
Setting detail: OUTPATIENT SURGERY
Discharge: HOME | End: 2019-07-08
Payer: MEDICARE

## 2019-07-08 VITALS — DIASTOLIC BLOOD PRESSURE: 70 MMHG | SYSTOLIC BLOOD PRESSURE: 164 MMHG

## 2019-07-08 PROCEDURE — 66821 AFTER CATARACT LASER SURGERY: CPT

## 2019-07-08 PROCEDURE — 6370000000 HC RX 637 (ALT 250 FOR IP): Performed by: OPHTHALMOLOGY

## 2019-07-08 RX ORDER — PHENYLEPHRINE HCL 2.5 %
1 DROPS OPHTHALMIC (EYE) ONCE
Status: COMPLETED | OUTPATIENT
Start: 2019-07-08 | End: 2019-07-08

## 2019-07-08 RX ORDER — SODIUM CHLORIDE 0.9 % (FLUSH) 0.9 %
10 SYRINGE (ML) INJECTION PRN
Status: DISCONTINUED | OUTPATIENT
Start: 2019-07-08 | End: 2019-07-09 | Stop reason: HOSPADM

## 2019-07-08 RX ORDER — SODIUM CHLORIDE 0.9 % (FLUSH) 0.9 %
10 SYRINGE (ML) INJECTION EVERY 12 HOURS SCHEDULED
Status: DISCONTINUED | OUTPATIENT
Start: 2019-07-08 | End: 2019-07-09 | Stop reason: HOSPADM

## 2019-07-08 RX ORDER — TROPICAMIDE 10 MG/ML
1 SOLUTION/ DROPS OPHTHALMIC ONCE
Status: COMPLETED | OUTPATIENT
Start: 2019-07-08 | End: 2019-07-08

## 2019-07-08 RX ORDER — GABAPENTIN 600 MG/1
TABLET ORAL
Qty: 150 TABLET | Refills: 1 | Status: SHIPPED | OUTPATIENT
Start: 2019-07-08 | End: 2019-12-17 | Stop reason: SDUPTHER

## 2019-07-08 RX ADMIN — TROPICAMIDE 1 DROP: 10 SOLUTION/ DROPS OPHTHALMIC at 09:57

## 2019-07-08 RX ADMIN — PHENYLEPHRINE HYDROCHLORIDE 1 DROP: 25 SOLUTION/ DROPS OPHTHALMIC at 09:57

## 2019-07-08 NOTE — OP NOTE
[facility]  [address] [facility] [address] [address]  [phone]      7/8/2019    Patient name: Porsche Garcias  YOB: 1940  MRN: [id number]    [place]ig[place]: No Known Allergies    Pre-operative diagnosis:  After cataract obscuring vision of the left eye. Post-operative diagnosis:  Same    Procedure Performed:  YAG Capsulotomy of the left eye    Anesthesia:  None    Estimated Blood Loss: None    Specimens removed: None    Complications:  None    Description of Procedure: A Yag Capsulotomy was performed today on the left eye. Pt appears to be oriented to time, place, and person. Pre-op medications instilled in the left eye: Proparacaine 1 drop, Cas-Synephrine 2.5% 1 drop topical and Mydriacyl 0.5% 1 drop topical. Patient is comfortable and will be released to self.      Electronically signed by: Andry Garza MD,7/8/2019,10:54 AM

## 2019-07-16 ENCOUNTER — OFFICE VISIT (OUTPATIENT)
Dept: FAMILY MEDICINE CLINIC | Age: 79
End: 2019-07-16
Payer: MEDICARE

## 2019-07-16 ENCOUNTER — TELEPHONE (OUTPATIENT)
Dept: FAMILY MEDICINE CLINIC | Age: 79
End: 2019-07-16

## 2019-07-16 VITALS
DIASTOLIC BLOOD PRESSURE: 80 MMHG | RESPIRATION RATE: 16 BRPM | HEART RATE: 52 BPM | HEIGHT: 68 IN | WEIGHT: 280 LBS | BODY MASS INDEX: 42.44 KG/M2 | SYSTOLIC BLOOD PRESSURE: 132 MMHG | TEMPERATURE: 97.7 F

## 2019-07-16 DIAGNOSIS — I10 ESSENTIAL HYPERTENSION: ICD-10-CM

## 2019-07-16 DIAGNOSIS — E11.21 DIABETIC NEPHROPATHY ASSOCIATED WITH TYPE 2 DIABETES MELLITUS (HCC): ICD-10-CM

## 2019-07-16 DIAGNOSIS — K27.9 PEPTIC ULCER DISEASE: ICD-10-CM

## 2019-07-16 DIAGNOSIS — E11.65 UNCONTROLLED TYPE 2 DIABETES MELLITUS WITH HYPERGLYCEMIA (HCC): ICD-10-CM

## 2019-07-16 DIAGNOSIS — Z00.00 ROUTINE GENERAL MEDICAL EXAMINATION AT A HEALTH CARE FACILITY: Primary | ICD-10-CM

## 2019-07-16 DIAGNOSIS — E11.319 DIABETIC RETINOPATHY OF BOTH EYES ASSOCIATED WITH TYPE 2 DIABETES MELLITUS, MACULAR EDEMA PRESENCE UNSPECIFIED, UNSPECIFIED RETINOPATHY SEVERITY (HCC): ICD-10-CM

## 2019-07-16 DIAGNOSIS — J43.9 PULMONARY EMPHYSEMA, UNSPECIFIED EMPHYSEMA TYPE (HCC): ICD-10-CM

## 2019-07-16 DIAGNOSIS — E78.2 MIXED HYPERLIPIDEMIA: ICD-10-CM

## 2019-07-16 DIAGNOSIS — E11.42 DIABETIC POLYNEUROPATHY ASSOCIATED WITH TYPE 2 DIABETES MELLITUS (HCC): ICD-10-CM

## 2019-07-16 DIAGNOSIS — I25.10 CORONARY ARTERY DISEASE INVOLVING NATIVE CORONARY ARTERY OF NATIVE HEART WITHOUT ANGINA PECTORIS: ICD-10-CM

## 2019-07-16 LAB — HBA1C MFR BLD: 8.7 %

## 2019-07-16 PROCEDURE — 1123F ACP DISCUSS/DSCN MKR DOCD: CPT | Performed by: FAMILY MEDICINE

## 2019-07-16 PROCEDURE — 99214 OFFICE O/P EST MOD 30 MIN: CPT | Performed by: FAMILY MEDICINE

## 2019-07-16 PROCEDURE — G8417 CALC BMI ABV UP PARAM F/U: HCPCS | Performed by: FAMILY MEDICINE

## 2019-07-16 PROCEDURE — G8598 ASA/ANTIPLAT THER USED: HCPCS | Performed by: FAMILY MEDICINE

## 2019-07-16 PROCEDURE — G0439 PPPS, SUBSEQ VISIT: HCPCS | Performed by: FAMILY MEDICINE

## 2019-07-16 PROCEDURE — 3023F SPIROM DOC REV: CPT | Performed by: FAMILY MEDICINE

## 2019-07-16 PROCEDURE — G8926 SPIRO NO PERF OR DOC: HCPCS | Performed by: FAMILY MEDICINE

## 2019-07-16 PROCEDURE — 4040F PNEUMOC VAC/ADMIN/RCVD: CPT | Performed by: FAMILY MEDICINE

## 2019-07-16 PROCEDURE — 1036F TOBACCO NON-USER: CPT | Performed by: FAMILY MEDICINE

## 2019-07-16 PROCEDURE — 83036 HEMOGLOBIN GLYCOSYLATED A1C: CPT | Performed by: FAMILY MEDICINE

## 2019-07-16 PROCEDURE — G8427 DOCREV CUR MEDS BY ELIG CLIN: HCPCS | Performed by: FAMILY MEDICINE

## 2019-07-16 RX ORDER — RANITIDINE 150 MG/1
TABLET ORAL
Qty: 180 TABLET | Refills: 3 | Status: SHIPPED | OUTPATIENT
Start: 2019-07-16 | End: 2021-02-25 | Stop reason: ALTCHOICE

## 2019-07-16 RX ORDER — ATORVASTATIN CALCIUM 40 MG/1
TABLET, FILM COATED ORAL
Qty: 90 TABLET | Refills: 1 | Status: SHIPPED | OUTPATIENT
Start: 2019-07-16 | End: 2019-07-30

## 2019-07-16 ASSESSMENT — LIFESTYLE VARIABLES
HOW OFTEN DO YOU HAVE SIX OR MORE DRINKS ON ONE OCCASION: 0
HOW OFTEN DURING THE LAST YEAR HAVE YOU BEEN UNABLE TO REMEMBER WHAT HAPPENED THE NIGHT BEFORE BECAUSE YOU HAD BEEN DRINKING: 0
HOW MANY STANDARD DRINKS CONTAINING ALCOHOL DO YOU HAVE ON A TYPICAL DAY: 0
HAS A RELATIVE, FRIEND, DOCTOR, OR ANOTHER HEALTH PROFESSIONAL EXPRESSED CONCERN ABOUT YOUR DRINKING OR SUGGESTED YOU CUT DOWN: 0
AUDIT TOTAL SCORE: 2
HOW OFTEN DURING THE LAST YEAR HAVE YOU HAD A FEELING OF GUILT OR REMORSE AFTER DRINKING: 0
HOW OFTEN DURING THE LAST YEAR HAVE YOU NEEDED AN ALCOHOLIC DRINK FIRST THING IN THE MORNING TO GET YOURSELF GOING AFTER A NIGHT OF HEAVY DRINKING: 0
AUDIT-C TOTAL SCORE: 2
HOW OFTEN DO YOU HAVE A DRINK CONTAINING ALCOHOL: 2
HAVE YOU OR SOMEONE ELSE BEEN INJURED AS A RESULT OF YOUR DRINKING: 0
HOW OFTEN DURING THE LAST YEAR HAVE YOU FOUND THAT YOU WERE NOT ABLE TO STOP DRINKING ONCE YOU HAD STARTED: 0
HOW OFTEN DURING THE LAST YEAR HAVE YOU FAILED TO DO WHAT WAS NORMALLY EXPECTED FROM YOU BECAUSE OF DRINKING: 0

## 2019-07-16 ASSESSMENT — PATIENT HEALTH QUESTIONNAIRE - PHQ9
SUM OF ALL RESPONSES TO PHQ QUESTIONS 1-9: 0
SUM OF ALL RESPONSES TO PHQ QUESTIONS 1-9: 0

## 2019-07-16 NOTE — PATIENT INSTRUCTIONS
MA- sample Anoro if we have  FYI: While Medicare provides you with a FREE ANNUAL PREVENTIVE PHYSICAL, this visit does NOT include management of chronic medical problems or physical examination. Dr. Otilia Kelly usually does a combination visit if you have other medical problems so you don't have to come back for another visit. However, this means that there will be a co-pay. INSTRUCTIONS  NEXT APPOINTMENT: Please schedule check-up in 3 months. · PLEASE TAKE THIS FORM TO CHECK-OUT WINDOW TO SCHEDULE NEXT VISIT. · Please get flu vaccine when available in fall. Can get either at this office or at stores such as [employer] and [address]. · Eat less, move more! You can do it! · Smaller portions! Better to throiw food away than to have extra calories. · OK to use Eucerin on skin. Patient Education   TIPS ON WEIGHT LOSS    Weight loss maintenance is considered successful if you lose at least 10 percent of your body weight and keep that weight off for at least one year. Ideas for better weight control. Try implementing one a week. · Drink only sugar free beverages. · Drink at least 8 cups per day. · Do not eat after 7 PM.  · Snack every 2 hours during the day on 100 calorie snacks (apple, strawberry, almonds, pistachio, walnuts, cheese cubes, raw veggies like bell peppers, tomato, celery, zucchini, snow peas, broccoli). · At meals, limit portion sizes to what you could hold in your hand of a meat. · Eat all of the raw vegetables and salads that you want with vinegar or low diane dressing. · Sleep 8 hours at night. · Minimize white starches- bread, pasta, rice potatoes. Try high fiber cereal, breads, granola. · Move more- try walking 15 minutes per day. · Use small plates and bowls to make serving look bigger. · Keeping track of calories and fat grams. Try cell phone jenelle called \"Lose-it\"  · Planning your meals ahead of time  · Eating breakfast every day  · Keeping your diet steady.   Eating the same on weekends,vacations and special occasions. · Watching less than 10 hours of television per week    Should I take weight loss medicines? Taking medicines may work better than diet and exercise alone for some people. OTC orlistat (brand: Roebrto Hoffman) can help weight loss. A multivitamin must be taken every day to prevent vitamin deficiencies. Many people regain weight after they stop taking these medicines. Should I have weight loss surgery? Surgery can help with long-term weight loss maintenance, BUT people who make major lifestyle changes can get the same results. FALLS:  HOW TO LOWER YOUR RISK     Who is at high risk of falling? Anyone can fall, although the risk is higher in older people. This increased risk of falling may be the result of changes that come with aging, and certain medical conditions, such as arthritis, cataracts or hip problems. What can I do to lower my risk of falling? Most falls happen in the home. Consider the following tips to make your home safe: Make sure that you have good lighting in your home. A well lit home will help you avoid tripping over objects that are not easy to see. Put night lights in your bedroom, hallways, stairs and bathrooms. Rugs should be firmly fastened to the floor or have nonskid backing. Loose ends should be tacked down. Electrical cords should not be lying on the floor in walking areas. Put hand rails in your bathroom for bath, shower and toilet use. Have rails on both sides of your stairs for support. In the kitchen, make sure items are within easy reach. Don't store things too high or too low. Then you won't have to use a stepladder or a stool to reach them. It's also a good idea to avoid storing things too low, so you won't have to bend down to get them. Wear shoes with firm nonskid soles. Avoid wearing loose-fitting slippers that could cause you to trip. What else can I do? Take good care of your body.  Try to stay healthy by [place]    Is there always space to park? Is it convenient to the entrance? Does the garage door open automatically? Windows & Doors [place]    Are windows and doors easy to open and close? Are locks sturdy and easy to operate? Do doorways accommodate a walker or wheelchair? Can you walk through the doorways easily? Is there space to maneuver while opening and closing doors? Does the front door have a view panel or peephole at the right height? Appliances/Kitchen/Bath [place]    Is the room arranged safely and conveniently? Do the oven and fridge open easily? Are stove controls clearly marked and easy to use? Is the counter the right height and depth? Can you work sitting down? Are cabinet doorknobs easy to use? Are faucets easy to use? Do you have a hand-held shower head? Are the items you use often on high shelves? Do you have a step stool with handles? Can you easily get in and out of the tub or shower? Do you have a bath or shower seat? Are there grab bars where needed? Is the hot water heater regulated to prevent scalding or burning? Lighting/Ventilation YesNo    Are there enough lights, and are they bright enough? Do you have night lights where needed? Is area well ventilated? Electrical Outlets/Switches/Alarms YesNo    Can you turn switches easily on and off? Are outlets properly grounded to prevent a shock? Are extension cords in good shape? Do you have smoke detectors in all key areas? Do you have an alarm system? Is the telephone readily available for emergencies? Does the telephone have volume control? Can you hear the doorbell ring all throughout the house? ADVANCE DIRECTIVES AND DO NOT RESUSCITATE ORDERS     What is an advance directive?   An advance directive tells your doctor what kind of care you would like to have if you become unable to make medical decisions (if you are in a coma, for example). If you are admitted to the hospital, the hospital staff will probably talk to you about advance directives. A good advance directive describes the kind of treatment you would want depending on how sick you are. For example, the directives would describe what kind of care you want if you have an illness that you are unlikely to recover from, or if you are permanently unconscious. Advance directives usually tell your doctor that you don't want certain kinds of treatment. However, they can also say that you want a certain treatment no matter how ill you are. Advance directives can take many forms. Laws about advance directives are different in each state. You should be aware of the laws in your state. What is a living will? A living will is one type of advance directive. It is a written, legal document that describes the kind of medical treatments or life-sustaining treatments you would want if you were seriously or terminally ill. A living will doesn't let you select someone to make decisions for you. What is a durable power of  for health care? A durable power of  (DPA) for health care is another kind of advance directive. A DPA states whom you have chosen to make health care decisions for you. It becomes active any time you are unconscious or unable to make medical decisions. A DPA is generally more useful than a living will. But a DPA may not be a good choice if you don't have another person you trust to make these decisions for you. Living castaneda and DPAs are legal in most states. Even if they aren't officially recognized by the law in your state, they can still guide your loved ones and doctor if you are unable to make decisions about your medical care. Ask your doctor,  or state representative about the law in your state. What is a do not resuscitate order? A do not resuscitate (DNR) order is another kind of advance directive.  A DNR is a request not to have

## 2019-07-29 DIAGNOSIS — E78.5 HYPERLIPIDEMIA: ICD-10-CM

## 2019-07-30 RX ORDER — ATORVASTATIN CALCIUM 40 MG/1
TABLET, FILM COATED ORAL
Qty: 90 TABLET | Refills: 1 | Status: SHIPPED | OUTPATIENT
Start: 2019-07-30 | End: 2020-02-24

## 2019-08-14 ENCOUNTER — OFFICE VISIT (OUTPATIENT)
Dept: ENT CLINIC | Age: 79
End: 2019-08-14
Payer: MEDICARE

## 2019-08-14 VITALS — DIASTOLIC BLOOD PRESSURE: 78 MMHG | OXYGEN SATURATION: 96 % | HEART RATE: 87 BPM | SYSTOLIC BLOOD PRESSURE: 134 MMHG

## 2019-08-14 DIAGNOSIS — H61.23 BILATERAL IMPACTED CERUMEN: Primary | ICD-10-CM

## 2019-08-14 PROCEDURE — 69210 REMOVE IMPACTED EAR WAX UNI: CPT | Performed by: OTOLARYNGOLOGY

## 2019-08-14 PROCEDURE — 1036F TOBACCO NON-USER: CPT | Performed by: OTOLARYNGOLOGY

## 2019-08-14 PROCEDURE — 1123F ACP DISCUSS/DSCN MKR DOCD: CPT | Performed by: OTOLARYNGOLOGY

## 2019-08-14 PROCEDURE — 99202 OFFICE O/P NEW SF 15 MIN: CPT | Performed by: OTOLARYNGOLOGY

## 2019-08-14 PROCEDURE — G8598 ASA/ANTIPLAT THER USED: HCPCS | Performed by: OTOLARYNGOLOGY

## 2019-08-14 PROCEDURE — G8417 CALC BMI ABV UP PARAM F/U: HCPCS | Performed by: OTOLARYNGOLOGY

## 2019-08-14 PROCEDURE — G8428 CUR MEDS NOT DOCUMENT: HCPCS | Performed by: OTOLARYNGOLOGY

## 2019-08-14 PROCEDURE — 4040F PNEUMOC VAC/ADMIN/RCVD: CPT | Performed by: OTOLARYNGOLOGY

## 2019-08-14 RX ORDER — ERGOCALCIFEROL 1.25 MG/1
CAPSULE ORAL
Refills: 2 | COMMUNITY
Start: 2019-07-29

## 2019-08-14 ASSESSMENT — ENCOUNTER SYMPTOMS
ALLERGIC/IMMUNOLOGIC NEGATIVE: 1
VOICE CHANGE: 0
SINUS PAIN: 0
RESPIRATORY NEGATIVE: 1
FACIAL SWELLING: 0
TROUBLE SWALLOWING: 0
SINUS PRESSURE: 0
SORE THROAT: 0
EYES NEGATIVE: 1
RHINORRHEA: 0

## 2019-08-16 ENCOUNTER — TELEPHONE (OUTPATIENT)
Dept: ENT CLINIC | Age: 79
End: 2019-08-16

## 2019-08-16 DIAGNOSIS — H60.8X1 ACTINIC OTITIS EXTERNA OF RIGHT EAR, UNSPECIFIED CHRONICITY: Primary | ICD-10-CM

## 2019-08-16 RX ORDER — CIPROFLOXACIN 500 MG/1
500 TABLET, FILM COATED ORAL 2 TIMES DAILY
Qty: 14 TABLET | Refills: 0 | Status: SHIPPED | OUTPATIENT
Start: 2019-08-16 | End: 2019-10-16

## 2019-08-31 RX ORDER — FERROUS SULFATE 325(65) MG
TABLET ORAL
Qty: 180 TABLET | Refills: 3 | Status: SHIPPED | OUTPATIENT
Start: 2019-08-31 | End: 2020-10-01

## 2019-10-16 ENCOUNTER — OFFICE VISIT (OUTPATIENT)
Dept: FAMILY MEDICINE CLINIC | Age: 79
End: 2019-10-16
Payer: MEDICARE

## 2019-10-16 VITALS
HEART RATE: 53 BPM | DIASTOLIC BLOOD PRESSURE: 78 MMHG | RESPIRATION RATE: 16 BRPM | WEIGHT: 289 LBS | HEIGHT: 68 IN | BODY MASS INDEX: 43.8 KG/M2 | SYSTOLIC BLOOD PRESSURE: 132 MMHG

## 2019-10-16 DIAGNOSIS — I25.10 CORONARY ARTERY DISEASE INVOLVING NATIVE CORONARY ARTERY OF NATIVE HEART WITHOUT ANGINA PECTORIS: ICD-10-CM

## 2019-10-16 DIAGNOSIS — I44.4 LEFT ANTERIOR FASCICULAR HEMIBLOCK: ICD-10-CM

## 2019-10-16 DIAGNOSIS — E11.21 TYPE 2 DIABETES MELLITUS WITH DIABETIC NEPHROPATHY, WITH LONG-TERM CURRENT USE OF INSULIN (HCC): Primary | ICD-10-CM

## 2019-10-16 DIAGNOSIS — E11.42 DIABETIC POLYNEUROPATHY ASSOCIATED WITH TYPE 2 DIABETES MELLITUS (HCC): ICD-10-CM

## 2019-10-16 DIAGNOSIS — J43.9 PULMONARY EMPHYSEMA, UNSPECIFIED EMPHYSEMA TYPE (HCC): ICD-10-CM

## 2019-10-16 DIAGNOSIS — I10 ESSENTIAL HYPERTENSION: ICD-10-CM

## 2019-10-16 DIAGNOSIS — Z79.4 TYPE 2 DIABETES MELLITUS WITH DIABETIC NEPHROPATHY, WITH LONG-TERM CURRENT USE OF INSULIN (HCC): Primary | ICD-10-CM

## 2019-10-16 DIAGNOSIS — I45.10 RBBB: ICD-10-CM

## 2019-10-16 DIAGNOSIS — Z23 NEEDS FLU SHOT: ICD-10-CM

## 2019-10-16 LAB — HBA1C MFR BLD: 8.1 %

## 2019-10-16 PROCEDURE — 1036F TOBACCO NON-USER: CPT | Performed by: FAMILY MEDICINE

## 2019-10-16 PROCEDURE — 90653 IIV ADJUVANT VACCINE IM: CPT | Performed by: FAMILY MEDICINE

## 2019-10-16 PROCEDURE — G8482 FLU IMMUNIZE ORDER/ADMIN: HCPCS | Performed by: FAMILY MEDICINE

## 2019-10-16 PROCEDURE — G0008 ADMIN INFLUENZA VIRUS VAC: HCPCS | Performed by: FAMILY MEDICINE

## 2019-10-16 PROCEDURE — G8427 DOCREV CUR MEDS BY ELIG CLIN: HCPCS | Performed by: FAMILY MEDICINE

## 2019-10-16 PROCEDURE — G8926 SPIRO NO PERF OR DOC: HCPCS | Performed by: FAMILY MEDICINE

## 2019-10-16 PROCEDURE — 1123F ACP DISCUSS/DSCN MKR DOCD: CPT | Performed by: FAMILY MEDICINE

## 2019-10-16 PROCEDURE — 99214 OFFICE O/P EST MOD 30 MIN: CPT | Performed by: FAMILY MEDICINE

## 2019-10-16 PROCEDURE — 83036 HEMOGLOBIN GLYCOSYLATED A1C: CPT | Performed by: FAMILY MEDICINE

## 2019-10-16 PROCEDURE — G8417 CALC BMI ABV UP PARAM F/U: HCPCS | Performed by: FAMILY MEDICINE

## 2019-10-16 PROCEDURE — 4040F PNEUMOC VAC/ADMIN/RCVD: CPT | Performed by: FAMILY MEDICINE

## 2019-10-16 PROCEDURE — G8598 ASA/ANTIPLAT THER USED: HCPCS | Performed by: FAMILY MEDICINE

## 2019-10-16 PROCEDURE — 3023F SPIROM DOC REV: CPT | Performed by: FAMILY MEDICINE

## 2019-10-18 RX ORDER — OMEPRAZOLE 20 MG/1
CAPSULE, DELAYED RELEASE ORAL
Qty: 180 CAPSULE | Refills: 0 | Status: SHIPPED | OUTPATIENT
Start: 2019-10-18 | End: 2020-01-07

## 2019-11-14 RX ORDER — AMIODARONE HYDROCHLORIDE 200 MG/1
TABLET ORAL
Qty: 90 TABLET | Refills: 0 | Status: SHIPPED | OUTPATIENT
Start: 2019-11-14 | End: 2020-02-14

## 2019-12-18 ENCOUNTER — OFFICE VISIT (OUTPATIENT)
Dept: ORTHOPEDIC SURGERY | Age: 79
End: 2019-12-18
Payer: MEDICARE

## 2019-12-18 VITALS
WEIGHT: 289 LBS | HEIGHT: 68 IN | RESPIRATION RATE: 16 BRPM | BODY MASS INDEX: 43.8 KG/M2 | HEART RATE: 69 BPM | DIASTOLIC BLOOD PRESSURE: 78 MMHG | SYSTOLIC BLOOD PRESSURE: 151 MMHG | TEMPERATURE: 98.2 F

## 2019-12-18 DIAGNOSIS — Z96.652 S/P TOTAL KNEE ARTHROPLASTY, LEFT: ICD-10-CM

## 2019-12-18 DIAGNOSIS — Z96.652 S/P TOTAL KNEE ARTHROPLASTY, LEFT: Primary | ICD-10-CM

## 2019-12-18 DIAGNOSIS — Z96.651 S/P TOTAL KNEE ARTHROPLASTY, RIGHT: ICD-10-CM

## 2019-12-18 PROCEDURE — G8427 DOCREV CUR MEDS BY ELIG CLIN: HCPCS | Performed by: PHYSICIAN ASSISTANT

## 2019-12-18 PROCEDURE — G8417 CALC BMI ABV UP PARAM F/U: HCPCS | Performed by: PHYSICIAN ASSISTANT

## 2019-12-18 PROCEDURE — G8598 ASA/ANTIPLAT THER USED: HCPCS | Performed by: PHYSICIAN ASSISTANT

## 2019-12-18 PROCEDURE — G8482 FLU IMMUNIZE ORDER/ADMIN: HCPCS | Performed by: PHYSICIAN ASSISTANT

## 2019-12-18 PROCEDURE — 1036F TOBACCO NON-USER: CPT | Performed by: PHYSICIAN ASSISTANT

## 2019-12-18 PROCEDURE — 1123F ACP DISCUSS/DSCN MKR DOCD: CPT | Performed by: PHYSICIAN ASSISTANT

## 2019-12-18 PROCEDURE — 4040F PNEUMOC VAC/ADMIN/RCVD: CPT | Performed by: PHYSICIAN ASSISTANT

## 2019-12-18 PROCEDURE — 99212 OFFICE O/P EST SF 10 MIN: CPT | Performed by: PHYSICIAN ASSISTANT

## 2020-01-07 RX ORDER — OMEPRAZOLE 20 MG/1
CAPSULE, DELAYED RELEASE ORAL
Qty: 180 CAPSULE | Refills: 3 | Status: SHIPPED | OUTPATIENT
Start: 2020-01-07 | End: 2021-03-01

## 2020-01-27 ENCOUNTER — OFFICE VISIT (OUTPATIENT)
Dept: FAMILY MEDICINE CLINIC | Age: 80
End: 2020-01-27
Payer: MEDICARE

## 2020-01-27 VITALS
HEIGHT: 68 IN | BODY MASS INDEX: 41.37 KG/M2 | SYSTOLIC BLOOD PRESSURE: 120 MMHG | WEIGHT: 273 LBS | DIASTOLIC BLOOD PRESSURE: 82 MMHG | OXYGEN SATURATION: 98 % | RESPIRATION RATE: 16 BRPM | HEART RATE: 82 BPM

## 2020-01-27 LAB — HBA1C MFR BLD: 8.8 %

## 2020-01-27 PROCEDURE — 99214 OFFICE O/P EST MOD 30 MIN: CPT | Performed by: FAMILY MEDICINE

## 2020-01-27 PROCEDURE — G8427 DOCREV CUR MEDS BY ELIG CLIN: HCPCS | Performed by: FAMILY MEDICINE

## 2020-01-27 PROCEDURE — 4040F PNEUMOC VAC/ADMIN/RCVD: CPT | Performed by: FAMILY MEDICINE

## 2020-01-27 PROCEDURE — 1036F TOBACCO NON-USER: CPT | Performed by: FAMILY MEDICINE

## 2020-01-27 PROCEDURE — 3023F SPIROM DOC REV: CPT | Performed by: FAMILY MEDICINE

## 2020-01-27 PROCEDURE — 1123F ACP DISCUSS/DSCN MKR DOCD: CPT | Performed by: FAMILY MEDICINE

## 2020-01-27 PROCEDURE — 83036 HEMOGLOBIN GLYCOSYLATED A1C: CPT | Performed by: FAMILY MEDICINE

## 2020-01-27 PROCEDURE — G8482 FLU IMMUNIZE ORDER/ADMIN: HCPCS | Performed by: FAMILY MEDICINE

## 2020-01-27 PROCEDURE — G8926 SPIRO NO PERF OR DOC: HCPCS | Performed by: FAMILY MEDICINE

## 2020-01-27 PROCEDURE — G8417 CALC BMI ABV UP PARAM F/U: HCPCS | Performed by: FAMILY MEDICINE

## 2020-01-27 ASSESSMENT — PATIENT HEALTH QUESTIONNAIRE - PHQ9
SUM OF ALL RESPONSES TO PHQ QUESTIONS 1-9: 0
2. FEELING DOWN, DEPRESSED OR HOPELESS: 0
SUM OF ALL RESPONSES TO PHQ QUESTIONS 1-9: 0
1. LITTLE INTEREST OR PLEASURE IN DOING THINGS: 0
SUM OF ALL RESPONSES TO PHQ9 QUESTIONS 1 & 2: 0

## 2020-01-27 NOTE — PROGRESS NOTES
01/15/2019 58 <100 mg/dL Final     Lab Results   Component Value Date    HDL 50 01/15/2019     Lab Results   Component Value Date    TRIG 129 01/15/2019     Lab Results   Component Value Date    GLUCOSE 273 (H) 11/25/2019     Lab Results   Component Value Date     11/25/2019    K 4.4 11/25/2019    CREATININE 1.4 (H) 11/25/2019     Lab Results   Component Value Date    WBC 3.6 (L) 11/25/2019    HGB 13.3 (L) 11/25/2019     (L) 11/25/2019     Lab Results   Component Value Date    ALT 15 01/15/2019    AST 18 01/15/2019    ALKPHOS 91 01/15/2019    BILITOT 0.8 01/15/2019     TSH (uIU/mL)   Date Value   01/15/2019 3.14     Lab Results   Component Value Date    LABA1C 8.1 10/16/2019    LABA1C 8.7 07/16/2019    LABA1C 7.9 04/15/2019     *Chief complaint, HPI, History and ROS provided by the medical assistant has been reviewed and verified by provider- Rosendo Chris MD    LAST LABS  LDL Calculated   Date Value Ref Range Status   01/15/2019 58 <100 mg/dL Final     Lab Results   Component Value Date    HDL 50 01/15/2019     Lab Results   Component Value Date    TRIG 129 01/15/2019     Lab Results   Component Value Date    GLUCOSE 273 (H) 11/25/2019     Lab Results   Component Value Date     11/25/2019    K 4.4 11/25/2019    CREATININE 1.4 (H) 11/25/2019     Lab Results   Component Value Date    WBC 3.6 (L) 11/25/2019    HGB 13.3 (L) 11/25/2019     (L) 11/25/2019     Lab Results   Component Value Date    ALT 15 01/15/2019    AST 18 01/15/2019    ALKPHOS 91 01/15/2019    BILITOT 0.8 01/15/2019     TSH (uIU/mL)   Date Value   01/15/2019 3.14     Lab Results   Component Value Date    LABA1C 8.1 10/16/2019    LABA1C 8.7 07/16/2019    LABA1C 7.9 04/15/2019     HISTORY:  Patient's medications, allergies, past medical, and social histories were reviewed and updated as appropriate.      CHART REVIEW  Health Maintenance Due   Topic Date Due    TSH testing  01/15/2020    Lipid screen  01/15/2020     Social History     Tobacco Use    Smoking status: Former Smoker     Packs/day: 3.50     Years: 32.00     Pack years: 112.00     Types: Cigarettes     Last attempt to quit: 1985     Years since quittin.0    Smokeless tobacco: Never Used    Tobacco comment: advised not to resume   Substance Use Topics    Alcohol use: No     Alcohol/week: 0.0 standard drinks    Drug use: No      The 10-year ASCVD risk score (Kamilah Mack, et al., 2013) is: 49.8%    Values used to calculate the score:      Age: 79 years      Sex: Male      Is Non- : No      Diabetic: Yes      Tobacco smoker: No      Systolic Blood Pressure: 069 mmHg      Is BP treated: Yes      HDL Cholesterol: 50 mg/dL      Total Cholesterol: 134 mg/dL  Prior to Visit Medications    Medication Sig Taking?  Authorizing Provider   insulin NPH (NOVOLIN N) 100 UNIT/ML injection vial Inject 35-37 Units into the skin 2 times daily (before meals) Yes Markus Sanchez MD   umeclidinium-vilanterol (ANORO ELLIPTA) 62.5-25 MCG/INH AEPB inhaler INHALE 1 PUFF INTO THE LUNGS DAILY Yes Markus Sanchez MD   omeprazole (PRILOSEC) 20 MG delayed release capsule TAKE ONE CAPSULE BY MOUTH TWICE DAILY Yes Markus Sanchez MD   gabapentin (NEURONTIN) 600 MG tablet TAKE UP TO 5 TABLETS BY MOUTH SPREAD OVER 24 HOURS AS DIRECTED Yes Markus Sanchez MD   potassium chloride (KLOR-CON) 10 MEQ extended release tablet TAKE 1 TABLET BY MOUTH DAILY Yes Markus Sanchez MD   amiodarone (CORDARONE) 200 MG tablet TAKE 1 TABLET BY MOUTH DAILY Yes Markus Sanchez MD   FEROSUL 325 (65 Fe) MG tablet TAKE 1 TABLET BY MOUTH TWICE DAILY Yes Markus Sanchez MD   vitamin D (ERGOCALCIFEROL) 55980 units CAPS capsule TK 1 C PO WEEKLY Yes Historical Provider, MD   atorvastatin (LIPITOR) 40 MG tablet TAKE 1 TABLET BY MOUTH EVERY EVENING Yes Markus Sanchez MD   ranitidine (ZANTAC) 150 MG tablet TAKE 1 TABLET BY MOUTH TWICE DAILY Yes Markus Sanchez MD   mupirocin (BACTROBAN) 2 % ointment Apply 3 times daily as needed Yes Carol Nick MD   INSULIN SYRINGE .5CC/29G 29G X 1/2\" 0.5 ML MISC USE TO INJECT FOUR TIMES DAILY AS NEEDED Yes Carol Nick MD   furosemide (LASIX) 80 MG tablet TAKE 1 TABLET BY MOUTH DAILY Yes Carol Nick MD   vitamin B-12 (CYANOCOBALAMIN) 500 MCG tablet TAKE 1 TABLET BY MOUTH DAILY Yes Carol Nick MD   levalbuterol (XOPENEX HFA) 45 MCG/ACT inhaler Inhale 1-2 puffs into the lungs every 4 hours as needed for Wheezing Yes Carol Nick MD   insulin regular (NOVOLIN R) 100 UNIT/ML injection INJECT 25 TO 35 UNITS THREE TIMES DAILY BEFORE MEALS Yes Carol Nick MD   aspirin 81 MG EC tablet Take 81 mg by mouth daily Yes Historical Provider, MD   apixaban (ELIQUIS) 2.5 MG TABS tablet Take 1 tablet by mouth 2 times daily Yes Carol Nick MD   metoprolol (LOPRESSOR) 25 MG tablet Take 25 mg by mouth daily  Yes Historical Provider, MD   isosorbide mononitrate (IMDUR) 30 MG CR tablet Take 1 tablet by mouth every morning Dr. Elisabeth Evans Yes Carol Nick MD   nitroGLYCERIN (NITROSTAT) 0.3 MG SL tablet Take one sublingual for chest pain. May repeat twice at 5 min intervals. If not getting relief call 911. Yes Carol Nick MD      Family History   Problem Relation Age of Onset    Cancer Mother         breast    Cancer Father         throat     Objective:   PHYSICAL EXAM   /82 (Site: Left Upper Arm, Position: Sitting, Cuff Size: Large Adult)   Pulse 82   Resp 16   Ht 5' 8\" (1.727 m)   Wt 273 lb (123.8 kg)   SpO2 98%   BMI 41.51 kg/m²   BP Readings from Last 5 Encounters:   01/27/20 120/82   12/18/19 (!) 151/78   10/16/19 132/78   08/14/19 134/78   07/16/19 132/80     Wt Readings from Last 5 Encounters:   01/27/20 273 lb (123.8 kg)   12/18/19 289 lb (131.1 kg)   10/16/19 289 lb (131.1 kg)   07/16/19 280 lb (127 kg)   07/01/19 276 lb (125.2 kg)      GENERAL:   · well-developed, well-nourished, alert, no distress.      EYES:   · External findings: lids and lashes normal and conjunctivae and sclerae normal  LUNGS:    · Breathing unlabored  · clear to auscultation bilaterally and good air movement  CARDIOVASC:   · regular rate and rhythm  · LEGS:  Lower extremity edema: none    SKIN: warm and dry  PSYCH:    · Alert and oriented  · Normal reasoning, insight good  · Facial expressions full, mood appropriate  · No memory disturbance noted  MUSCULOSKEL:    · No significant finger or nail findings  NEURO:   · CN 2-12 intact     Assessment and Plan:      Diagnosis Orders   1. Type 2 diabetes mellitus with diabetic nephropathy, with long-term current use of insulin ([facility])  POCT glycosylated hemoglobin (Hb A1C)    insulin NPH (NOVOLIN N) 100 UNIT/ML injection vial    DISCONTINUED: insulin NPH (NOVOLIN N) 100 UNIT/ML injection vial   2. Essential hypertension  TSH without Reflex    Comprehensive Metabolic Panel   3. Hyperlipidemia LDL goal <70  LIPID PANEL    Comprehensive Metabolic Panel   4. Pulmonary emphysema, unspecified emphysema type ([facility] [address]  umeclidinium-vilanterol (ANORO ELLIPTA) 62.5-25 MCG/INH AEPB inhaler   5. 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 Continue current Tx plan. Any changes marked below. Sample Anora  INSTRUCTIONS  NEXT APPOINTMENT: Please schedule check-up in 3 months. · PLEASE TAKE THIS FORM TO CHECK-OUT WINDOW TO SCHEDULE NEXT VISIT. · PLEASE GET FASTING BLOODWORK DRAWN SOON. Lab is on first floor in [address]. Hours Monday to Friday 7 AM to 5 PM.  Take orders with you. RESULTS- most blood tests back in couple days. We will call you if any problems. If bloodwork good, you will get letter in mail or notified thru [address] (if signed up) within 2 weeks. If you do not, please call office. · Average blood sugar has been 206. · Add 2 units to AM NPH. · Add 2 units to breakfast.   · Find out who Medicare covers for test strips. Should be testing four times daily.

## 2020-01-27 NOTE — PATIENT INSTRUCTIONS
INSTRUCTIONS  NEXT APPOINTMENT: Please schedule check-up in 3 months. · PLEASE TAKE THIS FORM TO CHECK-OUT WINDOW TO SCHEDULE NEXT VISIT. · PLEASE GET FASTING BLOODWORK DRAWN SOON. Lab is on first floor in [address]. Hours Monday to Friday 7 AM to 5 PM.  Take orders with you. RESULTS- most blood tests back in couple days. We will call you if any problems. If bloodwork good, you will get letter in mail or notified thru [address] (if signed up) within 2 weeks. If you do not, please call office. · Average blood sugar has been 206. · Add 2 units to AM NPH. · Add 2 units to breakfast.   Find out who Medicare covers for test strips. Should be testing four times daily.    Patient Education

## 2020-01-30 DIAGNOSIS — I10 ESSENTIAL HYPERTENSION: ICD-10-CM

## 2020-01-30 DIAGNOSIS — E78.5 HYPERLIPIDEMIA LDL GOAL <70: ICD-10-CM

## 2020-01-30 LAB
A/G RATIO: 1.6 (ref 1.1–2.2)
ALBUMIN SERPL-MCNC: 3.8 G/DL (ref 3.4–5)
ALP BLD-CCNC: 115 U/L (ref 40–129)
ALT SERPL-CCNC: 13 U/L (ref 10–40)
ANION GAP SERPL CALCULATED.3IONS-SCNC: 13 MMOL/L (ref 3–16)
AST SERPL-CCNC: 16 U/L (ref 15–37)
BILIRUB SERPL-MCNC: 0.9 MG/DL (ref 0–1)
BUN BLDV-MCNC: 18 MG/DL (ref 7–20)
CALCIUM SERPL-MCNC: 9.1 MG/DL (ref 8.3–10.6)
CHLORIDE BLD-SCNC: 101 MMOL/L (ref 99–110)
CHOLESTEROL, TOTAL: 109 MG/DL (ref 0–199)
CO2: 29 MMOL/L (ref 21–32)
CREAT SERPL-MCNC: 1.4 MG/DL (ref 0.8–1.3)
GFR AFRICAN AMERICAN: 59
GFR NON-AFRICAN AMERICAN: 49
GLOBULIN: 2.4 G/DL
GLUCOSE BLD-MCNC: 223 MG/DL (ref 70–99)
HDLC SERPL-MCNC: 51 MG/DL (ref 40–60)
LDL CHOLESTEROL CALCULATED: 39 MG/DL
POTASSIUM SERPL-SCNC: 3.9 MMOL/L (ref 3.5–5.1)
SODIUM BLD-SCNC: 143 MMOL/L (ref 136–145)
TOTAL PROTEIN: 6.2 G/DL (ref 6.4–8.2)
TRIGL SERPL-MCNC: 94 MG/DL (ref 0–150)
TSH SERPL DL<=0.05 MIU/L-ACNC: 2.51 UIU/ML (ref 0.27–4.2)
VLDLC SERPL CALC-MCNC: 19 MG/DL

## 2020-02-14 RX ORDER — AMIODARONE HYDROCHLORIDE 200 MG/1
TABLET ORAL
Qty: 90 TABLET | Refills: 0 | Status: SHIPPED | OUTPATIENT
Start: 2020-02-14 | End: 2020-05-22

## 2020-02-24 RX ORDER — ATORVASTATIN CALCIUM 40 MG/1
TABLET, FILM COATED ORAL
Qty: 90 TABLET | Refills: 1 | Status: SHIPPED | OUTPATIENT
Start: 2020-02-24 | End: 2020-09-15

## 2020-02-27 RX ORDER — HUMAN INSULIN 100 [IU]/ML
INJECTION, SOLUTION SUBCUTANEOUS
Qty: 30 ML | Refills: 0 | Status: SHIPPED | OUTPATIENT
Start: 2020-02-27 | End: 2020-05-26

## 2020-03-05 ENCOUNTER — APPOINTMENT (OUTPATIENT)
Dept: GENERAL RADIOLOGY | Age: 80
End: 2020-03-05
Payer: MEDICARE

## 2020-03-05 ENCOUNTER — HOSPITAL ENCOUNTER (EMERGENCY)
Age: 80
Discharge: HOME | End: 2020-03-05
Payer: MEDICARE

## 2020-03-05 ENCOUNTER — APPOINTMENT (OUTPATIENT)
Dept: CT IMAGING | Age: 80
End: 2020-03-05
Payer: MEDICARE

## 2020-03-05 VITALS
RESPIRATION RATE: 17 BRPM | OXYGEN SATURATION: 93 % | HEART RATE: 81 BPM | TEMPERATURE: 97.6 F | DIASTOLIC BLOOD PRESSURE: 90 MMHG | SYSTOLIC BLOOD PRESSURE: 187 MMHG

## 2020-03-05 PROCEDURE — 73060 X-RAY EXAM OF HUMERUS: CPT

## 2020-03-05 PROCEDURE — 72125 CT NECK SPINE W/O DYE: CPT

## 2020-03-05 PROCEDURE — 70450 CT HEAD/BRAIN W/O DYE: CPT

## 2020-03-05 PROCEDURE — 73130 X-RAY EXAM OF HAND: CPT

## 2020-03-05 PROCEDURE — 73030 X-RAY EXAM OF SHOULDER: CPT

## 2020-03-05 PROCEDURE — 73080 X-RAY EXAM OF ELBOW: CPT

## 2020-03-05 PROCEDURE — 73090 X-RAY EXAM OF FOREARM: CPT

## 2020-03-05 PROCEDURE — 99283 EMERGENCY DEPT VISIT LOW MDM: CPT

## 2020-03-05 RX ORDER — CEPHALEXIN 500 MG/1
500 CAPSULE ORAL 3 TIMES DAILY
Qty: 21 CAPSULE | Refills: 0 | Status: SHIPPED | OUTPATIENT
Start: 2020-03-05 | End: 2020-03-12

## 2020-03-05 ASSESSMENT — PAIN SCALES - GENERAL: PAINLEVEL_OUTOF10: 4

## 2020-03-05 ASSESSMENT — ENCOUNTER SYMPTOMS
ABDOMINAL DISTENTION: 0
NAUSEA: 0
STRIDOR: 0
BACK PAIN: 0
SHORTNESS OF BREATH: 0
WHEEZING: 0
COUGH: 0
CONSTIPATION: 0
ABDOMINAL PAIN: 0
DIARRHEA: 0
COLOR CHANGE: 0
VOMITING: 0

## 2020-03-05 NOTE — ED NOTES
Wound care done at this time. Appropriate bandages applied. Pt tolerated well.         Meliton Avila RN  03/05/20 [id number]

## 2020-03-05 NOTE — ED PROVIDER NOTES
**EVALUATED BY ADVANCED PRACTICE PROVIDER**        [address] ENCOUNTER      Pt Name: Tim RUIZ:[id number]  Jeanmarie 1940  Date of evaluation: 3/5/2020  Provider: Bria Knowles PA-C      Chief Complaint:    Chief Complaint   Patient presents with    Laceration     pt states he was backing out of door and tripped and fell backward. pt with pain to left shoulder and lacerations to left and right arm. denies loc.  Shoulder Injury       Nursing Notes, Past Medical Hx, Past Surgical Hx, Social Hx, Allergies, and Family Hx were all reviewed and agreed with or any disagreements were addressed in the HPI.    HPI:  (Location, Duration, Timing, Severity, Quality, Assoc Sx, Context, Modifying factors)  This is a  79 y.o. male with history of atrial flutter and coronary artery disease who is anticoagulated on Eliquis who presents to the emergency department with a large skin tear to the left forearm and multiple skin tears to the right hand and wrist status post mechanical fall which occurred this morning. The patient states that he was backing out of his front door at home and tripped and fell backwards, primarily landing on his left side. He is complaining of left shoulder pain and these skin tears ever since. He did mildly hit his head but denies loss of consciousness or headache. Denies neck or back pain. Denies chest pain or shortness of breath or abdominal discomfort. His tetanus is up-to-date within the past 5 years according to the patient. He denies numbness, tingling or weakness. Is limiting range of motion of his left arm due to left shoulder pain.     PastMedical/Surgical History:      Diagnosis Date    Acid reflux     Atrial flutter ([address] 2013    converted    Bleeding stomach ulcer oct 2015    BPH (benign prostatic hyperplasia)     CAD (coronary artery disease)     11/12 angio with 2 blocked bypass and 2 patent    relief call 911. NOVOLIN R 100 UNIT/ML INJECTION    INJECT 25 TO 35 UNITS SUBCUTANEOUSLY THREE TIMES DAILY BEFORE MEAL(S)    OMEPRAZOLE (PRILOSEC) 20 MG DELAYED RELEASE CAPSULE    TAKE ONE CAPSULE BY MOUTH TWICE DAILY    POTASSIUM CHLORIDE (KLOR-CON) 10 MEQ EXTENDED RELEASE TABLET    TAKE 1 TABLET BY MOUTH DAILY    RANITIDINE (ZANTAC) 150 MG TABLET    TAKE 1 TABLET BY MOUTH TWICE DAILY    UMECLIDINIUM-VILANTEROL (ANORO ELLIPTA) 62.5-25 MCG/INH AEPB INHALER    INHALE 1 PUFF INTO THE LUNGS DAILY    VITAMIN B-12 (CYANOCOBALAMIN) 500 MCG TABLET    TAKE 1 TABLET BY MOUTH DAILY    VITAMIN D (ERGOCALCIFEROL) 39335 UNITS CAPS CAPSULE    TK 1 C PO WEEKLY         Review of Systems:  Review of Systems   Constitutional: Negative for chills and fever. Eyes: Negative for visual disturbance. Respiratory: Negative for cough, shortness of breath, wheezing and stridor. Cardiovascular: Negative for chest pain, palpitations and leg swelling. Gastrointestinal: Negative for abdominal distention, abdominal pain, constipation, diarrhea, nausea and vomiting. Musculoskeletal: Positive for myalgias. Negative for back pain, neck pain and neck stiffness. Skin: Positive for wound. Negative for color change, pallor and rash. Neurological: Negative for dizziness, tremors, seizures, syncope, facial asymmetry, speech difficulty, weakness, light-headedness, numbness and headaches. Psychiatric/Behavioral: Negative for confusion. All other systems reviewed and are negative. Positives and Pertinent negatives as per HPI. Except as noted above in the ROS, problem specific ROS was completed and is negative. Physical Exam:  Physical Exam  Vitals signs and nursing note reviewed. Constitutional:       Appearance: Normal appearance. He is well-developed. He is not toxic-appearing or diaphoretic. HENT:      Head: Normocephalic and atraumatic. Jaw: There is normal jaw occlusion.       Salivary Glands: Right salivary gland is intact). Sensory: No sensory deficit. Motor: No weakness. Gait: Gait normal.   Psychiatric:         Mood and Affect: Mood normal.         Behavior: Behavior normal.         MEDICAL DECISION MAKING    Vitals:    Vitals:    03/05/20 1035 03/05/20 1037   BP:  (!) 187/90   Pulse: 81    Resp: 17    Temp: 97.6 °F (36.4 °C)    TempSrc: Temporal    SpO2: 93%        LABS:Labs Reviewed - No data to display     Remainder of labs reviewed and werenegative at this time or not returned at the time of this note. RADIOLOGY:   Non-plain film images such as CT, Ultrasound and MRI are read by the radiologist. Yousif Connor PA-C have directly visualized the radiologic plain film image(s) with the below findings:        Interpretation per the Radiologist below, if available at the time of this note:    XR SHOULDER LEFT (MIN 2 VIEWS)   Final Result   Left shoulder:      No acute fracture or dislocation. Left humerus:      No acute osseous abnormality. Left elbow:      1. Posttraumatic soft tissue gas and probable soft tissue debris of the left   arm. 2. Degenerative changes. No acute fracture evident. Left forearm:      1. No acute osseous abnormality. 2. Posttraumatic soft tissue gas and soft tissue debris along the volar and   radial aspect of the forearm. 3. CPPD. Right forearm:      1. Diffuse osteopenia and degenerative changes as above. Diffuse osteopenia. 2. No acute osseous abnormality. Right hand:      1. Osteoarthrosis. Diffuse osteopenia. 2. No acute fracture or dislocation. XR HUMERUS LEFT (MIN 2 VIEWS)   Final Result   Left shoulder:      No acute fracture or dislocation. Left humerus:      No acute osseous abnormality. Left elbow:      1. Posttraumatic soft tissue gas and probable soft tissue debris of the left   arm. 2. Degenerative changes. No acute fracture evident. Left forearm:      1. No acute osseous abnormality.    2. Posttraumatic soft tissue gas and soft tissue debris along the volar and   radial aspect of the forearm. 3. CPPD. Right forearm:      1. Diffuse osteopenia and degenerative changes as above. Diffuse osteopenia. 2. No acute osseous abnormality. Right hand:      1. Osteoarthrosis. Diffuse osteopenia. 2. No acute fracture or dislocation. XR ELBOW LEFT (MIN 3 VIEWS)   Final Result   Left shoulder:      No acute fracture or dislocation. Left humerus:      No acute osseous abnormality. Left elbow:      1. Posttraumatic soft tissue gas and probable soft tissue debris of the left   arm. 2. Degenerative changes. No acute fracture evident. Left forearm:      1. No acute osseous abnormality. 2. Posttraumatic soft tissue gas and soft tissue debris along the volar and   radial aspect of the forearm. 3. CPPD. Right forearm:      1. Diffuse osteopenia and degenerative changes as above. Diffuse osteopenia. 2. No acute osseous abnormality. Right hand:      1. Osteoarthrosis. Diffuse osteopenia. 2. No acute fracture or dislocation. XR RADIUS ULNA LEFT (2 VIEWS)   Final Result   Left shoulder:      No acute fracture or dislocation. Left humerus:      No acute osseous abnormality. Left elbow:      1. Posttraumatic soft tissue gas and probable soft tissue debris of the left   arm. 2. Degenerative changes. No acute fracture evident. Left forearm:      1. No acute osseous abnormality. 2. Posttraumatic soft tissue gas and soft tissue debris along the volar and   radial aspect of the forearm. 3. CPPD. Right forearm:      1. Diffuse osteopenia and degenerative changes as above. Diffuse osteopenia. 2. No acute osseous abnormality. Right hand:      1. Osteoarthrosis. Diffuse osteopenia. 2. No acute fracture or dislocation. XR RADIUS ULNA RIGHT (2 VIEWS)   Final Result   Left shoulder:      No acute fracture or dislocation.       Left humerus:      No acute osseous applied. Xray still shows some superficial debris on the wound despite the extensively cleaning. Patient is therefore advised to soak the wound further at home to help get the extra debris off. Advised this patient to follow-up with wound care regarding the left forearm skin tear to make sure it is healing properly. Will be sent home with antibiotics. He did hit his head but denies loss of consciousness and has no visible signs of trauma to the scalp. CT head and cervical spine are stable. X-rays are negative for any acute osseous abnormality. Motor and sensory function are preserved.   My suspicion is low for skull or facial fracture, postconcussive syndrome, cauda equina, central cord syndrome, pneumonia, pneumothorax, acute intrathoracic/retroperitoneal/intra-abdominal injury/hematoma/laceration,ACS, PE, myocarditis, pericarditis, endocarditis, acute pulmonary edema, pleural effusion, pericardial effusion, cardiac tamponade, , CHF exacerbation, thoracic aortic dissection, esophageal rupture, other life-threatening arrhythmia, hypertensive urgency or emergency, hemothorax, pulmonary contusion, subcutaneous emphysema, flail chest, pneumo mediastinum, rib fracture, carotid dissection, sinus abscess, acute fracture, acute CVA, ICH, SAH, TIA, meningitis, encephalitis, pseudotumor cerebri, temporal arteritis, sentinel bleed from ruptured aneurysm, hypertensive urgency or emergency, subdural hematoma, epidural hematoma, acute spine fracture or dislocation, epidural abscess or hematoma, discitis, meningitis, encephalitis, transverse myelitis, pyelonephritis, perinephric abscess, urosepsis, kidney stone, AAA, dissection, shingles,subungual hematoma, paronychia, eponychia, felon, flexor tenosynovitis,  thoracic outlet obstruction, SVC syndrome,tendon rupture, compartment syndrome, acute fracture, dislocation, DVT, arterial compromise or occlusion, limb ischemia, gout, septic joint, abscess, cellulitis, osteomyelitis,

## 2020-03-06 ENCOUNTER — HOSPITAL ENCOUNTER (EMERGENCY)
Age: 80
Discharge: HOME | End: 2020-03-06
Attending: EMERGENCY MEDICINE
Payer: MEDICARE

## 2020-03-06 VITALS
WEIGHT: 290 LBS | RESPIRATION RATE: 18 BRPM | TEMPERATURE: 97.6 F | HEART RATE: 62 BPM | DIASTOLIC BLOOD PRESSURE: 59 MMHG | SYSTOLIC BLOOD PRESSURE: 154 MMHG | OXYGEN SATURATION: 94 % | HEIGHT: 69 IN | BODY MASS INDEX: 42.95 KG/M2

## 2020-03-06 PROCEDURE — 99282 EMERGENCY DEPT VISIT SF MDM: CPT

## 2020-03-06 RX ORDER — BACITRACIN ZINC AND POLYMYXIN B SULFATE 500; 1000 [USP'U]/G; [USP'U]/G
OINTMENT TOPICAL
Status: DISCONTINUED
Start: 2020-03-06 | End: 2020-03-06 | Stop reason: HOSPADM

## 2020-03-06 ASSESSMENT — ENCOUNTER SYMPTOMS
NAUSEA: 0
VOMITING: 0

## 2020-03-06 ASSESSMENT — PAIN DESCRIPTION - FREQUENCY: FREQUENCY: CONTINUOUS

## 2020-03-06 ASSESSMENT — PAIN SCALES - GENERAL: PAINLEVEL_OUTOF10: 8

## 2020-03-06 ASSESSMENT — PAIN DESCRIPTION - ORIENTATION: ORIENTATION: RIGHT;LEFT

## 2020-03-06 ASSESSMENT — PAIN DESCRIPTION - PROGRESSION: CLINICAL_PROGRESSION: NOT CHANGED

## 2020-03-06 ASSESSMENT — PAIN DESCRIPTION - ONSET: ONSET: GRADUAL

## 2020-03-06 ASSESSMENT — PAIN DESCRIPTION - DESCRIPTORS: DESCRIPTORS: BURNING;ACHING

## 2020-03-06 ASSESSMENT — PAIN DESCRIPTION - LOCATION: LOCATION: ARM

## 2020-03-06 NOTE — ED PROVIDER NOTES
I independently performed a history and physical on Jimena Holt. All diagnostic, treatment, and disposition decisions were made by myself in conjunction with the advanced practice provider. Briefly, this is a 79 y.o. male here for bleeding through his dressings. The patient was seen yesterday for treatment due to skin tears in his bilateral forearms. He was placed on antibiotics and given dressings. He is supposed to see wound care, but they cannot see him today. Because the dressings have worsened, he was brought to the emergency department for evaluation. He is on Eliquis. .    On exam, the patient has skin tears to his bilateral forearms, bleeding through his dressings. The dressings were removed, the active bleeding was slow oozing. He had no arterial pulsatile bleeding. The patient is otherwise well-appearing, nontoxic, and in no acute distress. He has no pallor. FINAL IMPRESSION  1. Skin tear of left upper extremity    2. Skin tear of right upper extremity    3. Bleeding from wound        Blood pressure (!) 154/59, pulse 62, temperature 97.6 °F (36.4 °C), temperature source Tympanic, resp. rate 18, height 5' 9\" (1.753 m), weight 290 lb (131.5 kg), SpO2 94 %.      For further details of [id number] [facility] emergency department encounter, please see documentation by advanced practice provider, MARYLU Shields.        Naeem Werner MD  03/27/20 [id number]

## 2020-03-06 NOTE — ED PROVIDER NOTES
mouth 3 times daily for 7 days    FEROSUL 325 (65 FE) MG TABLET    TAKE 1 TABLET BY MOUTH TWICE DAILY    FUROSEMIDE (LASIX) 80 MG TABLET    TAKE 1 TABLET BY MOUTH DAILY    GABAPENTIN (NEURONTIN) 600 MG TABLET    TAKE UP TO 5 TABLETS BY MOUTH SPREAD OVER 24 HOURS AS DIRECTED    INSULIN NPH (NOVOLIN N) 100 UNIT/ML INJECTION VIAL    Inject 35-37 Units into the skin 2 times daily (before meals)    INSULIN SYRINGE .5CC/29G 29G X 1/2\" 0.5 ML MISC    USE TO INJECT FOUR TIMES DAILY AS NEEDED    ISOSORBIDE MONONITRATE (IMDUR) 30 MG CR TABLET    Take 1 tablet by mouth every morning Dr. Elier Olivarez - Cardio    [facility]) 45 MCG/ACT INHALER    Inhale 1-2 puffs into the lungs every 4 hours as needed for Wheezing    METOPROLOL (LOPRESSOR) 25 MG TABLET    Take 25 mg by mouth daily     MUPIROCIN (BACTROBAN) 2 % OINTMENT    Apply 3 times daily as needed    NITROGLYCERIN (NITROSTAT) 0.3 MG SL TABLET    Take one sublingual for chest pain. May repeat twice at 5 min intervals. If not getting relief call 911. NOVOLIN R 100 UNIT/ML INJECTION    INJECT 25 TO 35 UNITS SUBCUTANEOUSLY THREE TIMES DAILY BEFORE MEAL(S)    OMEPRAZOLE (PRILOSEC) 20 MG DELAYED RELEASE CAPSULE    TAKE ONE CAPSULE BY MOUTH TWICE DAILY    POTASSIUM CHLORIDE (KLOR-CON) 10 MEQ EXTENDED RELEASE TABLET    TAKE 1 TABLET BY MOUTH DAILY    RANITIDINE (ZANTAC) 150 MG TABLET    TAKE 1 TABLET BY MOUTH TWICE DAILY    UMECLIDINIUM-VILANTEROL (ANORO ELLIPTA) 62.5-25 MCG/INH AEPB INHALER    INHALE 1 PUFF INTO THE LUNGS DAILY    VITAMIN B-12 (CYANOCOBALAMIN) 500 MCG TABLET    TAKE 1 TABLET BY MOUTH DAILY    VITAMIN D (ERGOCALCIFEROL) 51389 UNITS CAPS CAPSULE    TK 1 C PO WEEKLY         ALLERGIES     Patient has no known allergies.     FAMILYHISTORY       Family History   Problem Relation Age of Onset    Cancer Mother         breast    Cancer Father         throat          SOCIAL HISTORY       Social History     Tobacco Use    Smoking status: Former Smoker Packs/day: 3.50     Years: 32.00     Pack years: 112.00     Types: Cigarettes     Last attempt to quit: 1985     Years since quittin.2    Smokeless tobacco: Never Used    Tobacco comment: advised not to resume   Substance Use Topics    Alcohol use: No     Alcohol/week: 0.0 standard drinks    Drug use: No       SCREENINGS             PHYSICAL EXAM    (up to 7 for level 4, 8 or more for level 5)     ED Triage Vitals [20 0928]   BP Temp Temp Source Pulse Resp SpO2 Height Weight   (!) 154/59 97.6 °F (36.4 °C) Tympanic 62 18 94 % 5' 9\" (1.753 m) 290 lb (131.5 kg)       Physical Exam  Vitals signs and nursing note reviewed. Constitutional:       Appearance: He is well-developed. He is not diaphoretic. HENT:      Head: Normocephalic and atraumatic. Right Ear: External ear normal.      Left Ear: External ear normal.      Nose: Nose normal.   Eyes:      General:         Right eye: No discharge. Left eye: No discharge. Neck:      Musculoskeletal: Normal range of motion and neck supple. Trachea: No tracheal deviation. Pulmonary:      Effort: Pulmonary effort is normal. No respiratory distress. Musculoskeletal: Normal range of motion. Skin:     General: Skin is warm and dry. Comments: To the left forearm, there is a 5 cm x 4 cm skin tear, with minimal active bleeding. There is no surrounding erythema, warmth or edema. Forage motion of all joints. Radial pulses 2+. Normal sensation light touch for neurovascularly intact    To the right hand, in between the first and second digits, there is a small skin tear, there is no active bleeding, skin overlying appears to be healing well. No sign of any secondary infection./All joints. Radial pulses 2+. Normal sensation light touch. Neurovascularly intact   Neurological:      Mental Status: He is alert and oriented to person, place, and time.    Psychiatric:         Behavior: Behavior normal.         DIAGNOSTIC RESULTS LABS:    Labs Reviewed - No data to display    All other labs were within normal range or not returned as of this dictation. EKG: All EKG's are interpreted by the Emergency Department Physician in the absence of a cardiologist.  Please see their note for interpretation of EKG. RADIOLOGY:   Non-plain film images such as CT, Ultrasound and MRI are read by the radiologist. Plain radiographic images are visualized and preliminarily interpreted by the ED Provider with the below findings:        Interpretation per the Radiologist below, if available at the time of this note:    No orders to display     Xr Humerus Left (min 2 Views)    Result Date: 3/5/2020  EXAMINATION: THREE XRAY VIEWS OF THE LEFT SHOULDER; TWO XRAY VIEWS OF THE LEFT HUMERUS; THREE XRAY VIEWS OF THE LEFT ELBOW; TWO XRAY VIEWS OF THE LEFT FOREARM; THREE XRAY VIEWS OF THE RIGHT HAND; TWO XRAY VIEWS OF THE RIGHT FOREARM 3/5/2020 12:19 pm COMPARISON: None. HISTORY: ORDERING SYSTEM PROVIDED HISTORY: Injury TECHNOLOGIST PROVIDED HISTORY: Reason for exam:->Injury Reason for Exam: Laceration (pt states he was backing out of door and tripped and fell backward. pt with pain to left shoulder and lacerations to left and right arm. denies loc Acuity: Acute Type of Exam: Initial 67-year-old male with left shoulder pain and laceration to both arms after a fall FINDINGS: Left shoulder: Left AC and glenohumeral joints grossly unremarkable. Visualized left-sided ribs appear intact. No acute fracture or dislocation. Left-sided subclavian approach cardiac pacemaker device. Surgical clips projecting over the mediastinum. Left humerus: Left AC and glenohumeral joints grossly unremarkable. Left humerus appears intact. Chondrocalcinosis of the radiohumeral joint. Mild to moderate degenerative changes of the radiohumeral and ulnohumeral joints. Left-sided subclavian approach cardiac pacemaker device is present. No acute fracture evident.  Left elbow: Multifocal soft tissue gas and hyperdensities along the radial aspect of the left arm and elbow likely representing soft tissue debris and lacerations/posttraumatic soft tissue gas. Mild to moderate degenerative changes of the radiohumeral and ulnohumeral joints. Mild distal triceps enthesopathy. No obvious joint effusion or posterior sail sign evident. No acute fracture evident. Osseous alignment is normal. Left forearm: Soft tissue debris and soft tissue gas which is posttraumatic along the dorsal and aspect of the left forearm. Radius and ulna appear intact. Degenerative changes at the wrist, radiohumeral and ulnohumeral joints. Mild distal triceps enthesopathy. No obvious sail sign or joint effusion at the left elbow. Chondrocalcinosis of the radiohumeral joint. Right forearm: Diffuse osteopenia. Degenerative changes at the wrist, ulnohumeral and radiohumeral joints. Radius and ulna appear intact. No acute fracture or dislocation. Degenerative changes at the MCP joints. No marginal erosions. No sail sign or joint effusion at the right elbow. Right hand: Moderate to severe degenerative changes of the 3rd MCP joint. Mild degenerative changes of the remaining MCP joints. Moderate degenerative change of the 1st MCP joint. Mild degenerative change of the triscaphe joint and IP joints. No marginal erosions. No chondrocalcinosis. Scaphoid appears intact. Atherosclerotic calcification of the vasculature. Diffuse osteopenia. No acute fracture or dislocation. Left shoulder: No acute fracture or dislocation. Left humerus: No acute osseous abnormality. Left elbow: 1. Posttraumatic soft tissue gas and probable soft tissue debris of the left arm. 2. Degenerative changes. No acute fracture evident. Left forearm: 1. No acute osseous abnormality. 2. Posttraumatic soft tissue gas and soft tissue debris along the volar and radial aspect of the forearm. 3. CPPD. Right forearm: 1.  Diffuse osteopenia and degenerative changes as above. Diffuse osteopenia. 2. No acute osseous abnormality. Right hand: 1. Osteoarthrosis. Diffuse osteopenia. 2. No acute fracture or dislocation. Xr Elbow Left (min 3 Views)    Result Date: 3/5/2020  EXAMINATION: THREE XRAY VIEWS OF THE LEFT SHOULDER; TWO XRAY VIEWS OF THE LEFT HUMERUS; THREE XRAY VIEWS OF THE LEFT ELBOW; TWO XRAY VIEWS OF THE LEFT FOREARM; THREE XRAY VIEWS OF THE RIGHT HAND; TWO XRAY VIEWS OF THE RIGHT FOREARM 3/5/2020 12:19 pm COMPARISON: None. HISTORY: ORDERING SYSTEM PROVIDED HISTORY: Injury TECHNOLOGIST PROVIDED HISTORY: Reason for exam:->Injury Reason for Exam: Laceration (pt states he was backing out of door and tripped and fell backward. pt with pain to left shoulder and lacerations to left and right arm. denies loc Acuity: Acute Type of Exam: Initial 64-year-old male with left shoulder pain and laceration to both arms after a fall FINDINGS: Left shoulder: Left AC and glenohumeral joints grossly unremarkable. Visualized left-sided ribs appear intact. No acute fracture or dislocation. Left-sided subclavian approach cardiac pacemaker device. Surgical clips projecting over the mediastinum. Left humerus: Left AC and glenohumeral joints grossly unremarkable. Left humerus appears intact. Chondrocalcinosis of the radiohumeral joint. Mild to moderate degenerative changes of the radiohumeral and ulnohumeral joints. Left-sided subclavian approach cardiac pacemaker device is present. No acute fracture evident. Left elbow: Multifocal soft tissue gas and hyperdensities along the radial aspect of the left arm and elbow likely representing soft tissue debris and lacerations/posttraumatic soft tissue gas. Mild to moderate degenerative changes of the radiohumeral and ulnohumeral joints. Mild distal triceps enthesopathy. No obvious joint effusion or posterior sail sign evident. No acute fracture evident.   Osseous alignment is normal. Left forearm: Soft tissue debris and soft FOREARM 3/5/2020 12:19 pm COMPARISON: None. HISTORY: ORDERING SYSTEM PROVIDED HISTORY: Injury TECHNOLOGIST PROVIDED HISTORY: Reason for exam:->Injury Reason for Exam: Laceration (pt states he was backing out of door and tripped and fell backward. pt with pain to left shoulder and lacerations to left and right arm. denies loc Acuity: Acute Type of Exam: Initial 58-year-old male with left shoulder pain and laceration to both arms after a fall FINDINGS: Left shoulder: Left AC and glenohumeral joints grossly unremarkable. Visualized left-sided ribs appear intact. No acute fracture or dislocation. Left-sided subclavian approach cardiac pacemaker device. Surgical clips projecting over the mediastinum. Left humerus: Left AC and glenohumeral joints grossly unremarkable. Left humerus appears intact. Chondrocalcinosis of the radiohumeral joint. Mild to moderate degenerative changes of the radiohumeral and ulnohumeral joints. Left-sided subclavian approach cardiac pacemaker device is present. No acute fracture evident. Left elbow: Multifocal soft tissue gas and hyperdensities along the radial aspect of the left arm and elbow likely representing soft tissue debris and lacerations/posttraumatic soft tissue gas. Mild to moderate degenerative changes of the radiohumeral and ulnohumeral joints. Mild distal triceps enthesopathy. No obvious joint effusion or posterior sail sign evident. No acute fracture evident. Osseous alignment is normal. Left forearm: Soft tissue debris and soft tissue gas which is posttraumatic along the dorsal and aspect of the left forearm. Radius and ulna appear intact. Degenerative changes at the wrist, radiohumeral and ulnohumeral joints. Mild distal triceps enthesopathy. No obvious sail sign or joint effusion at the left elbow. Chondrocalcinosis of the radiohumeral joint. Right forearm: Diffuse osteopenia. Degenerative changes at the wrist, ulnohumeral and radiohumeral joints.   Radius and FINDINGS: Left shoulder: Left AC and glenohumeral joints grossly unremarkable. Visualized left-sided ribs appear intact. No acute fracture or dislocation. Left-sided subclavian approach cardiac pacemaker device. Surgical clips projecting over the mediastinum. Left humerus: Left AC and glenohumeral joints grossly unremarkable. Left humerus appears intact. Chondrocalcinosis of the radiohumeral joint. Mild to moderate degenerative changes of the radiohumeral and ulnohumeral joints. Left-sided subclavian approach cardiac pacemaker device is present. No acute fracture evident. Left elbow: Multifocal soft tissue gas and hyperdensities along the radial aspect of the left arm and elbow likely representing soft tissue debris and lacerations/posttraumatic soft tissue gas. Mild to moderate degenerative changes of the radiohumeral and ulnohumeral joints. Mild distal triceps enthesopathy. No obvious joint effusion or posterior sail sign evident. No acute fracture evident. Osseous alignment is normal. Left forearm: Soft tissue debris and soft tissue gas which is posttraumatic along the dorsal and aspect of the left forearm. Radius and ulna appear intact. Degenerative changes at the wrist, radiohumeral and ulnohumeral joints. Mild distal triceps enthesopathy. No obvious sail sign or joint effusion at the left elbow. Chondrocalcinosis of the radiohumeral joint. Right forearm: Diffuse osteopenia. Degenerative changes at the wrist, ulnohumeral and radiohumeral joints. Radius and ulna appear intact. No acute fracture or dislocation. Degenerative changes at the MCP joints. No marginal erosions. No sail sign or joint effusion at the right elbow. Right hand: Moderate to severe degenerative changes of the 3rd MCP joint. Mild degenerative changes of the remaining MCP joints. Moderate degenerative change of the 1st MCP joint. Mild degenerative change of the triscaphe joint and IP joints. No marginal erosions.   No chondrocalcinosis. Scaphoid appears intact. Atherosclerotic calcification of the vasculature. Diffuse osteopenia. No acute fracture or dislocation. Left shoulder: No acute fracture or dislocation. Left humerus: No acute osseous abnormality. Left elbow: 1. Posttraumatic soft tissue gas and probable soft tissue debris of the left arm. 2. Degenerative changes. No acute fracture evident. Left forearm: 1. No acute osseous abnormality. 2. Posttraumatic soft tissue gas and soft tissue debris along the volar and radial aspect of the forearm. 3. CPPD. Right forearm: 1. Diffuse osteopenia and degenerative changes as above. Diffuse osteopenia. 2. No acute osseous abnormality. Right hand: 1. Osteoarthrosis. Diffuse osteopenia. 2. No acute fracture or dislocation. Xr Hand Right (min 3 Views)    Result Date: 3/5/2020  EXAMINATION: THREE XRAY VIEWS OF THE LEFT SHOULDER; TWO XRAY VIEWS OF THE LEFT HUMERUS; THREE XRAY VIEWS OF THE LEFT ELBOW; TWO XRAY VIEWS OF THE LEFT FOREARM; THREE XRAY VIEWS OF THE RIGHT HAND; TWO XRAY VIEWS OF THE RIGHT FOREARM 3/5/2020 12:19 pm COMPARISON: None. HISTORY: ORDERING SYSTEM PROVIDED HISTORY: Injury TECHNOLOGIST PROVIDED HISTORY: Reason for exam:->Injury Reason for Exam: Laceration (pt states he was backing out of door and tripped and fell backward. pt with pain to left shoulder and lacerations to left and right arm. denies loc Acuity: Acute Type of Exam: Initial 62-year-old male with left shoulder pain and laceration to both arms after a fall FINDINGS: Left shoulder: Left AC and glenohumeral joints grossly unremarkable. Visualized left-sided ribs appear intact. No acute fracture or dislocation. Left-sided subclavian approach cardiac pacemaker device. Surgical clips projecting over the mediastinum. Left humerus: Left AC and glenohumeral joints grossly unremarkable. Left humerus appears intact. Chondrocalcinosis of the radiohumeral joint.   Mild to moderate degenerative changes of the radiohumeral and ulnohumeral joints. Left-sided subclavian approach cardiac pacemaker device is present. No acute fracture evident. Left elbow: Multifocal soft tissue gas and hyperdensities along the radial aspect of the left arm and elbow likely representing soft tissue debris and lacerations/posttraumatic soft tissue gas. Mild to moderate degenerative changes of the radiohumeral and ulnohumeral joints. Mild distal triceps enthesopathy. No obvious joint effusion or posterior sail sign evident. No acute fracture evident. Osseous alignment is normal. Left forearm: Soft tissue debris and soft tissue gas which is posttraumatic along the dorsal and aspect of the left forearm. Radius and ulna appear intact. Degenerative changes at the wrist, radiohumeral and ulnohumeral joints. Mild distal triceps enthesopathy. No obvious sail sign or joint effusion at the left elbow. Chondrocalcinosis of the radiohumeral joint. Right forearm: Diffuse osteopenia. Degenerative changes at the wrist, ulnohumeral and radiohumeral joints. Radius and ulna appear intact. No acute fracture or dislocation. Degenerative changes at the MCP joints. No marginal erosions. No sail sign or joint effusion at the right elbow. Right hand: Moderate to severe degenerative changes of the 3rd MCP joint. Mild degenerative changes of the remaining MCP joints. Moderate degenerative change of the 1st MCP joint. Mild degenerative change of the triscaphe joint and IP joints. No marginal erosions. No chondrocalcinosis. Scaphoid appears intact. Atherosclerotic calcification of the vasculature. Diffuse osteopenia. No acute fracture or dislocation. Left shoulder: No acute fracture or dislocation. Left humerus: No acute osseous abnormality. Left elbow: 1. Posttraumatic soft tissue gas and probable soft tissue debris of the left arm. 2. Degenerative changes. No acute fracture evident. Left forearm: 1. No acute osseous abnormality.  2. Posttraumatic soft tissue gas and soft tissue debris along the volar and radial aspect of the forearm. 3. CPPD. Right forearm: 1. Diffuse osteopenia and degenerative changes as above. Diffuse osteopenia. 2. No acute osseous abnormality. Right hand: 1. Osteoarthrosis. Diffuse osteopenia. 2. No acute fracture or dislocation. Ct Head Wo Contrast    Result Date: 3/5/2020  EXAMINATION: CT OF THE HEAD WITHOUT CONTRAST  3/5/2020 11:40 am TECHNIQUE: CT of the head was performed without the administration of intravenous contrast. Dose modulation, iterative reconstruction, and/or weight based adjustment of the mA/kV was utilized to reduce the radiation dose to as low as reasonably achievable. COMPARISON: None. HISTORY: ORDERING SYSTEM PROVIDED HISTORY: head trauma TECHNOLOGIST PROVIDED HISTORY: Reason for exam:->head trauma Has a \"code stroke\" or \"stroke alert\" been called? ->No Reason for Exam: head trauma Acuity: Acute Type of Exam: Initial FINDINGS: BRAIN/VENTRICLES: There is no acute intracranial hemorrhage, mass effect or midline shift. No abnormal extra-axial fluid collection. The gray-white differentiation is maintained without evidence of an acute infarct. There is no evidence of hydrocephalus. There is moderate generalized volume loss. Mild degree of low-attenuation is noted in the periventricular white matter. This is nonspecific, possibly due to small vessel disease. ORBITS: The visualized portion of the orbits demonstrate no acute abnormality. SINUSES: The visualized paranasal sinuses and mastoid air cells demonstrate no acute abnormality. SOFT TISSUES/SKULL:  No acute abnormality of the visualized skull or soft tissues. No acute intracranial abnormality.      Ct Cervical Spine Wo Contrast    Result Date: 3/5/2020  EXAMINATION: CT OF THE CERVICAL SPINE WITHOUT CONTRAST 3/5/2020 11:40 am TECHNIQUE: CT of the cervical spine was performed without the administration of intravenous contrast. Multiplanar reformatted images are provided for review. Dose modulation, iterative reconstruction, and/or weight based adjustment of the mA/kV was utilized to reduce the radiation dose to as low as reasonably achievable. COMPARISON: None. HISTORY: ORDERING SYSTEM PROVIDED HISTORY: fall TECHNOLOGIST PROVIDED HISTORY: Reason for exam:->fall Reason for Exam: head trauma Acuity: Acute Type of Exam: Initial FINDINGS: BONES/ALIGNMENT: There is slight straightening of the cervical spine. No subluxation or fracture is identified. DEGENERATIVE CHANGES: At the anterior atlantodental joint, the articulation is narrowed with osseous hypertrophy. At C3-4 the disc space is diffusely narrowed with anterior and posterior osteophyte formation. This results and mild right foraminal narrowing with components of uncinate hypertrophy. At C5-6 and C6-7, there are small posterior disc protrusions resulting in mild narrowing of the central canal. There is multilevel facet arthropathy, disproportionally more severe on the left at C2-3 and C3-4. SOFT TISSUES: There is no prevertebral soft tissue swelling. No acute fracture or subluxation. Multilevel spondylosis and facet hypertrophy. Xr Shoulder Left (min 2 Views)    Result Date: 3/5/2020  EXAMINATION: THREE XRAY VIEWS OF THE LEFT SHOULDER; TWO XRAY VIEWS OF THE LEFT HUMERUS; THREE XRAY VIEWS OF THE LEFT ELBOW; TWO XRAY VIEWS OF THE LEFT FOREARM; THREE XRAY VIEWS OF THE RIGHT HAND; TWO XRAY VIEWS OF THE RIGHT FOREARM 3/5/2020 12:19 pm COMPARISON: None. HISTORY: ORDERING SYSTEM PROVIDED HISTORY: Injury TECHNOLOGIST PROVIDED HISTORY: Reason for exam:->Injury Reason for Exam: Laceration (pt states he was backing out of door and tripped and fell backward.  pt with pain to left shoulder and lacerations to left and right arm. denies loc Acuity: Acute Type of Exam: Initial 71-year-old male with left shoulder pain and laceration to both arms after a fall FINDINGS: Left shoulder: Left AC and since yesterday. He states that he has been bleeding since last night. He is on Eliquis due to history of coronary artery disease. The patient states that he is having some pain, but attributes this to the fall. He is no numbness, Giovanni or weakness. No fever chills per no nausea or vomiting. He has no dizziness or lightheadedness. Pb Duran has no complaints at this time      He does have a large skin tear noted to the left forearm, this was dressed in the ED with Mepilex dressing. Tj on antibiotics, and he tells me that he has an appointment with wound care on Monday. He is routine follow-up at that time. He is to return to the ED for any new or worsening symptoms. Patient voiced understanding is agreeable with plan. He is stable for discharge. My suspicion is low at this time for cellulitis, abscess, necrotizing fasciitis, fracture or other emergent etiology. FINAL IMPRESSION      1.  Skin tear of left upper extremity          DISPOSITION/PLAN   DISPOSITION Discharge - Pending Orders Complete 03/06/2020 11:26:01 AM      PATIENT REFERREDTO:  [facility]  [address]  [phone]  Schedule an appointment as soon as possible for a visit in 3 days  as scheduled    [facility] Emergency Department  [address]  [phone]    As needed, If symptoms worsen      DISCHARGE MEDICATIONS:  New Prescriptions    No medications on file       DISCONTINUED MEDICATIONS:  Discontinued Medications    No medications on file              (Please note that portions of this note were completed with a voice recognition program.  Efforts were made to edit the dictations but occasionally words are mis-transcribed.)    Nimo Saleh PA-C (electronically signed)            Nimo Saleh PA-C  03/06/20 [id number]

## 2020-03-09 ENCOUNTER — HOSPITAL ENCOUNTER (OUTPATIENT)
Dept: WOUND CARE | Age: 80
Discharge: HOME | End: 2020-03-09
Payer: MEDICARE

## 2020-03-09 VITALS — RESPIRATION RATE: 16 BRPM | HEART RATE: 81 BPM | DIASTOLIC BLOOD PRESSURE: 77 MMHG | SYSTOLIC BLOOD PRESSURE: 121 MMHG

## 2020-03-09 PROCEDURE — 11042 DBRDMT SUBQ TIS 1ST 20SQCM/<: CPT | Performed by: NURSE PRACTITIONER

## 2020-03-09 PROCEDURE — 11045 DBRDMT SUBQ TISS EACH ADDL: CPT | Performed by: NURSE PRACTITIONER

## 2020-03-09 PROCEDURE — 99202 OFFICE O/P NEW SF 15 MIN: CPT | Performed by: NURSE PRACTITIONER

## 2020-03-09 RX ORDER — LIDOCAINE HYDROCHLORIDE 40 MG/ML
SOLUTION TOPICAL ONCE
Status: DISCONTINUED | OUTPATIENT
Start: 2020-03-09 | End: 2020-03-10 | Stop reason: HOSPADM

## 2020-03-09 NOTE — PLAN OF CARE
Discharge instructions given. Patient verbalized understanding. Return to [facility] in 1 week.   Called/faxed orders to  [facility]

## 2020-03-10 PROBLEM — S51.812A SKIN TEAR OF LEFT FOREARM WITHOUT COMPLICATION: Status: ACTIVE | Noted: 2020-03-10

## 2020-03-10 PROBLEM — S61.411A: Status: ACTIVE | Noted: 2020-03-10

## 2020-03-10 PROBLEM — S51.811A SKIN TEAR OF FOREARM WITHOUT COMPLICATION, RIGHT, INITIAL ENCOUNTER: Status: ACTIVE | Noted: 2020-03-10

## 2020-03-10 NOTE — PROGRESS NOTES
B-12 (CYANOCOBALAMIN) 500 MCG tablet TAKE 1 TABLET BY MOUTH DAILY 90 tablet 3    levalbuterol (XOPENEX HFA) 45 MCG/ACT inhaler Inhale 1-2 puffs into the lungs every 4 hours as needed for Wheezing 1 Inhaler 3    aspirin 81 MG EC tablet Take 81 mg by mouth daily      apixaban (ELIQUIS) 2.5 MG TABS tablet Take 1 tablet by mouth 2 times daily 60 tablet 5    metoprolol (LOPRESSOR) 25 MG tablet Take 25 mg by mouth daily       isosorbide mononitrate (IMDUR) 30 MG CR tablet Take 1 tablet by mouth every morning Dr. Saray Gallardo - Cardio 30 tablet 6    nitroGLYCERIN (NITROSTAT) 0.3 MG SL tablet Take one sublingual for chest pain. May repeat twice at 5 min intervals. If not getting relief call 911. 25 tablet 3     No current facility-administered medications on file prior to encounter. REVIEW OF SYSTEMS    Pertinent items are noted in HPI. Objective:      /77   Pulse 81   Resp 16     PHYSICAL EXAM    General Appearance: alert and oriented to person, place and time, well-developed and well-nourished, in no acute distress and obese  Skin: warm and dry  Head: normocephalic and atraumatic  Eyes: pupils equal, round, and reactive to light  Pulmonary/Chest: clear to auscultation bilaterally- no wheezes, rales or rhonchi, normal air movement, no respiratory distress  Cardiovascular: normal rate, normal S1 and S2, no gallops and no carotid bruits  Extremities: no cyanosis, no clubbing and left lower and upper arm with significant bruising and full skin tears + edema-  Right hand and arm much less severe with smaller skin tears.         Assessment:     Patient Active Problem List   Diagnosis    Diabetes mellitus type II, uncontrolled ([address]    Tinnitus    Leg edema    Elevated PSA- nl 8/12/11    Hyperlipidemia LDL goal <70    Osteoarthritis    Diabetic retinopathy ([address]    BPH (benign prostatic hyperplasia)    COPD (chronic obstructive pulmonary disease) (HCC)    Rosacea    Erectile dysfunction    Essential hypertension    Coronary artery disease involving native coronary artery of native heart without angina pectoris    Diabetes mellitus, type II ([facility]    Diabetic nephropathy- pos microalb 8/11    Diabetic neuropathy (HCC)    GERD (gastroesophageal reflux disease)    B12 deficiency    Impingement syndrome of left shoulder    Obesity, Class III, BMI 40-49.9 (morbid obesity) ([facility]    Personal history of atrial flutter    Peptic ulcer disease    S/P total knee arthroplasty, left    Vitamin D deficiency    Spinal stenosis of lumbar region with neurogenic claudication- clinically    BMI 40.0-44.9, adult ([facility]    RBBB    Left anterior fascicular hemiblock    Skin tear of left forearm without complication    Skin tear of hand without complication, right, initial encounter       Procedure Note    Performed by: Lorenso Councilman, APRN - CNP    Consent obtained: Yes    Time out taken:  Yes    Pain Control: Anesthetic  Anesthetic: 4% Lidocaine Cream     Debridement:Excisional Debridement    Using curette, #15 blade scalpel, scissors and forceps the wound was sharply debrided    down through and including the removal of epidermis, dermis and subcutaneous tissue. Devitalized Tissue Debrided:  fibrin, biofilm and slough    Pre Debridement Measurements:  Are located in the Wound Documentation Flow Sheet    Wound #: 1, 2, 3 and 4     Post  Debridement Measurements:  Wound 03/09/20 Arm Right; Anterior #1 (Active)   Wound Image   3/9/2020 10:08 AM   Wound Traumatic 3/9/2020 10:08 AM   Wound Cleansed Rinsed/Irrigated with saline 3/9/2020 10:08 AM   Wound Length (cm) 0.4 cm 3/9/2020 10:08 AM   Wound Width (cm) 0.8 cm 3/9/2020 10:08 AM   Wound Depth (cm) 0.1 cm 3/9/2020 10:08 AM   Wound Surface Area (cm^2) 0.32 cm^2 3/9/2020 10:08 AM   Wound Volume (cm^3) 0.03 cm^3 3/9/2020 10:08 AM   Post-Procedure Length (cm) 0.5 cm 3/9/2020 11:17 AM   Post-Procedure Width (cm) 0.9 cm 3/9/2020 11:17 AM   Post-Procedure Depth (cm) 0.1 cm 3/9/2020 11:17 AM   Post-Procedure Surface Area (cm^2) 0.45 cm^2 3/9/2020 11:17 AM   Post-Procedure Volume (cm^3) 0.04 cm^3 3/9/2020 11:17 AM   Wound Assessment Bleeding 3/9/2020 11:17 AM   Drainage Amount Moderate 3/9/2020 11:17 AM   Drainage Description Serosanguinous 3/9/2020 11:17 AM   Odor None 3/9/2020 10:08 AM   Jaylyn-wound Assessment Fragile;Ecchymosis 3/9/2020 10:08 AM   [place]%Wound Bed 100 3/9/2020 10:08 AM   Red%Wound Bed 0 3/9/2020 10:08 AM   Yellow%Wound Bed 0 3/9/2020 10:08 AM   Black%Wound Bed 0 3/9/2020 10:08 AM   Purple%Wound Bed 0 3/9/2020 10:08 AM   Number of days: 1       Wound 03/09/20 Arm Left #2 (Active)   Wound Image   3/9/2020 10:00 AM   Wound Traumatic 3/9/2020 10:00 AM   Wound Cleansed Rinsed/Irrigated with saline 3/9/2020 10:00 AM   Wound Length (cm) 16 cm 3/9/2020 10:00 AM   Wound Width (cm) 10.5 cm 3/9/2020 10:00 AM   Wound Depth (cm) 0.1 cm 3/9/2020 10:00 AM   Wound Surface Area (cm^2) 168 cm^2 3/9/2020 10:00 AM   Wound Volume (cm^3) 16.8 cm^3 3/9/2020 10:00 AM   Post-Procedure Length (cm) 16.1 cm 3/9/2020 10:08 AM   Post-Procedure Width (cm) 10.6 cm 3/9/2020 10:08 AM   Post-Procedure Depth (cm) 0.1 cm 3/9/2020 10:08 AM   Post-Procedure Surface Area (cm^2) 170.66 cm^2 3/9/2020 10:08 AM   Post-Procedure Volume (cm^3) 17.07 cm^3 3/9/2020 10:08 AM   Wound Assessment Bleeding;Red;Granulation tissue 3/9/2020 10:00 AM   Drainage Amount Large 3/9/2020 10:00 AM   Drainage Description Serosanguinous 3/9/2020 10:00 AM   Odor None 3/9/2020 10:00 AM   Jaylyn-wound Assessment Edema;Ecchymosis;Fragile 3/9/2020 10:00 AM   [place]%Wound Bed 0 3/9/2020 10:00 AM   Red%Wound Bed 100 3/9/2020 10:00 AM   Yellow%Wound Bed 0 3/9/2020 10:00 AM   Black%Wound Bed 0 3/9/2020 10:00 AM   Purple%Wound Bed 0 3/9/2020 10:00 AM   Number of days: 1       Wound 03/09/20 Wrist Right;Medial #3 (Active)   Wound Image   3/9/2020 10:08 AM   Wound Traumatic 3/9/2020 10:08 AM   Wound Cleansed Rinsed/Irrigated with saline 3/9/2020 10:08 AM   Wound Length (cm) 1.5 cm 3/9/2020 10:08 AM   Wound Width (cm) 1 cm 3/9/2020 10:08 AM   Wound Depth (cm) 0.1 cm 3/9/2020 10:08 AM   Wound Surface Area (cm^2) 1.5 cm^2 3/9/2020 10:08 AM   Wound Volume (cm^3) 0.15 cm^3 3/9/2020 10:08 AM   Post-Procedure Length (cm) 1.6 cm 3/9/2020 11:17 AM   Post-Procedure Width (cm) 1.1 cm 3/9/2020 11:17 AM   Post-Procedure Depth (cm) 0.1 cm 3/9/2020 11:17 AM   Post-Procedure Surface Area (cm^2) 1.76 cm^2 3/9/2020 11:17 AM   Post-Procedure Volume (cm^3) 0.18 cm^3 3/9/2020 11:17 AM   Wound Assessment Bleeding 3/9/2020 11:17 AM   Drainage Amount Moderate 3/9/2020 11:17 AM   Drainage Description Serosanguinous 3/9/2020 11:17 AM   Jaylyn-wound Assessment Ecchymosis;Fragile 3/9/2020 10:08 AM   [place]%Wound Bed 0 3/9/2020 10:08 AM   Red%Wound Bed 100 3/9/2020 10:08 AM   Yellow%Wound Bed 0 3/9/2020 10:08 AM   Black%Wound Bed 0 3/9/2020 10:08 AM   Purple%Wound Bed 0 3/9/2020 10:08 AM   Number of days: 1       Wound 03/09/20 Hand Right #4 (Active)   Wound Image   3/9/2020 10:08 AM   Wound Cleansed Rinsed/Irrigated with saline 3/9/2020 10:08 AM   Wound Length (cm) 5 cm 3/9/2020 10:08 AM   Wound Width (cm) 0.8 cm 3/9/2020 10:08 AM   Wound Depth (cm) 0.1 cm 3/9/2020 10:08 AM   Wound Surface Area (cm^2) 4 cm^2 3/9/2020 10:08 AM   Wound Volume (cm^3) 0.4 cm^3 3/9/2020 10:08 AM   Post-Procedure Length (cm) 5.1 cm 3/9/2020 11:17 AM   Post-Procedure Width (cm) 0.9 cm 3/9/2020 11:17 AM   Post-Procedure Depth (cm) 0.1 cm 3/9/2020 11:17 AM   Post-Procedure Surface Area (cm^2) 4.59 cm^2 3/9/2020 11:17 AM   Post-Procedure Volume (cm^3) 0.46 cm^3 3/9/2020 11:17 AM   Wound Assessment Bleeding 3/9/2020 11:17 AM   Drainage Amount Moderate 3/9/2020 11:17 AM   Drainage Description Serosanguinous 3/9/2020 11:17 AM   Odor None 3/9/2020 10:08 AM   [place]%Wound Bed 20 3/9/2020 10:08 AM   Red%Wound Bed 0 3/9/2020 10:08 AM   Yellow%Wound Bed 80 3/9/2020 10:08 AM   Black%Wound Bed 0 3/9/2020 10:08 AM   Purple%Wound Bed 0 3/9/2020 10:08 AM   Number of days: 1           Total Surface Area Debrided:  44.36sq cm     Percentage of wound debrided 25%    Bleeding:  Estimated amount of blood loss is 1 ml. Hemostasis Achieved:  by pressure    Procedural Pain:  4  / 10     Post Procedural Pain:  3 / 10     Response to treatment:  Well tolerated by patient., With complaints of pain. Plan:     The nature of the patient's condition was explained in depth. The patient was informed that their compliance to the treatment plan is paramount to successful healing and prevention of further ulceration and/or infection     Discharge Treatment   Dressing care:Left Arm and right arm- Adaptic and Hydrogel (or Safe Gel), dry dressing and 1 Tubi  ( if can tolerate). Change daily. I f  don't have Hydrogel here use polysporin. Do Not apply cover dressing above Left elbow.       Written Patient Discharge Instructions Given            Electronically signed by RAYMUNDO Carson CNP on 3/10/2020 at 3:07 PM

## 2020-03-16 ENCOUNTER — HOSPITAL ENCOUNTER (OUTPATIENT)
Dept: WOUND CARE | Age: 80
Discharge: HOME | End: 2020-03-16
Payer: MEDICARE

## 2020-03-16 VITALS
SYSTOLIC BLOOD PRESSURE: 116 MMHG | RESPIRATION RATE: 16 BRPM | TEMPERATURE: 97.3 F | HEART RATE: 84 BPM | DIASTOLIC BLOOD PRESSURE: 73 MMHG

## 2020-03-16 PROCEDURE — 11042 DBRDMT SUBQ TIS 1ST 20SQCM/<: CPT | Performed by: NURSE PRACTITIONER

## 2020-03-16 PROCEDURE — 11045 DBRDMT SUBQ TISS EACH ADDL: CPT | Performed by: NURSE PRACTITIONER

## 2020-03-16 PROCEDURE — 11042 DBRDMT SUBQ TIS 1ST 20SQCM/<: CPT

## 2020-03-16 PROCEDURE — 11045 DBRDMT SUBQ TISS EACH ADDL: CPT

## 2020-03-16 RX ORDER — LIDOCAINE HYDROCHLORIDE 40 MG/ML
SOLUTION TOPICAL ONCE
Status: DISCONTINUED | OUTPATIENT
Start: 2020-03-16 | End: 2020-03-17 | Stop reason: HOSPADM

## 2020-03-16 NOTE — PROGRESS NOTES
SURGERY Left 2019    cataract coming back    JOINT REPLACEMENT Right total knee replacement    JOINT REPLACEMENT Left 2016       FAMILY HISTORY    Family History   Problem Relation Age of Onset    Cancer Mother         breast    Cancer Father         throat       SOCIAL HISTORY    Social History     Tobacco Use    Smoking status: Former Smoker     Packs/day: 3.50     Years: 32.00     Pack years: 112.00     Types: Cigarettes     Last attempt to quit: 1985     Years since quittin.2    Smokeless tobacco: Never Used    Tobacco comment: advised not to resume   Substance Use Topics    Alcohol use: No     Alcohol/week: 0.0 standard drinks    Drug use: No       ALLERGIES    No Known Allergies    MEDICATIONS    Current Outpatient Medications on File Prior to Encounter   Medication Sig Dispense Refill    NOVOLIN R 100 UNIT/ML injection INJECT 25 TO 35 UNITS SUBCUTANEOUSLY THREE TIMES DAILY BEFORE MEAL(S) 30 mL 0    atorvastatin (LIPITOR) 40 MG tablet TAKE 1 TABLET BY MOUTH EVERY EVENING 90 tablet 1    amiodarone (CORDARONE) 200 MG tablet TAKE 1 TABLET BY MOUTH DAILY 90 tablet 0    insulin NPH (NOVOLIN N) 100 UNIT/ML injection vial Inject 35-37 Units into the skin 2 times daily (before meals) 30 mL 3    umeclidinium-vilanterol (ANORO ELLIPTA) 62.5-25 MCG/INH AEPB inhaler INHALE 1 PUFF INTO THE LUNGS DAILY 1 each 5    omeprazole (PRILOSEC) 20 MG delayed release capsule TAKE ONE CAPSULE BY MOUTH TWICE DAILY 180 capsule 3    gabapentin (NEURONTIN) 600 MG tablet TAKE UP TO 5 TABLETS BY MOUTH SPREAD OVER 24 HOURS AS DIRECTED 150 tablet 5    potassium chloride (KLOR-CON) 10 MEQ extended release tablet TAKE 1 TABLET BY MOUTH DAILY 90 tablet 1    FEROSUL 325 (65 Fe) MG tablet TAKE 1 TABLET BY MOUTH TWICE DAILY 180 tablet 3    vitamin D (ERGOCALCIFEROL) 98273 units CAPS capsule TK 1 C PO WEEKLY  2    ranitidine (ZANTAC) 150 MG tablet TAKE 1 TABLET BY MOUTH TWICE DAILY 180 tablet 3    mupirocin (BACTROBAN) 2 % ointment Apply 3 times daily as needed 22 g 1    INSULIN SYRINGE .5CC/29G 29G X 1/2\" 0.5 ML MISC USE TO INJECT FOUR TIMES DAILY AS NEEDED 400 each 5    furosemide (LASIX) 80 MG tablet TAKE 1 TABLET BY MOUTH DAILY 90 tablet 3    vitamin B-12 (CYANOCOBALAMIN) 500 MCG tablet TAKE 1 TABLET BY MOUTH DAILY 90 tablet 3    levalbuterol (XOPENEX HFA) 45 MCG/ACT inhaler Inhale 1-2 puffs into the lungs every 4 hours as needed for Wheezing 1 Inhaler 3    aspirin 81 MG EC tablet Take 81 mg by mouth daily      apixaban (ELIQUIS) 2.5 MG TABS tablet Take 1 tablet by mouth 2 times daily 60 tablet 5    metoprolol (LOPRESSOR) 25 MG tablet Take 25 mg by mouth daily       isosorbide mononitrate (IMDUR) 30 MG CR tablet Take 1 tablet by mouth every morning Dr. Aj Buenrostro - Cardio 30 tablet 6    nitroGLYCERIN (NITROSTAT) 0.3 MG SL tablet Take one sublingual for chest pain. May repeat twice at 5 min intervals. If not getting relief call 911. 25 tablet 3     No current facility-administered medications on file prior to encounter. REVIEW OF SYSTEMS    Pertinent items are noted in HPI. Objective:      /73   Pulse 84   Temp 97.3 °F (36.3 °C) (Oral)   Resp 16     PHYSICAL EXAM    General Appearance: alert and oriented to person, place and time, well-developed and well-nourished, in no acute distress and obese  Skin: warm and dry  Head: normocephalic and atraumatic  Eyes: pupils equal, round, and reactive to light  Pulmonary/Chest: clear to auscultation bilaterally- no wheezes, rales or rhonchi, normal air movement, no respiratory distress  Cardiovascular: normal rate and regular rhythm  Extremities: no cyanosis and no clubbing  Wounds right arm and hand covered with scabs. Left arm has more slough in wound bed this week. Adaptic sticking to wounds.         Assessment:     Patient Active Problem List   Diagnosis    Diabetes mellitus type II, uncontrolled (HCC)    Tinnitus    Leg edema    Elevated PSA- Size % (l*w) 100 3/16/2020  8:31 AM   Wound Volume (cm^3) 0 cm^3 3/16/2020  8:31 AM   Wound Healing % 100 3/16/2020  8:31 AM   Post-Procedure Length (cm) 0.5 cm 3/9/2020 11:17 AM   Post-Procedure Width (cm) 0.9 cm 3/9/2020 11:17 AM   Post-Procedure Depth (cm) 0.1 cm 3/9/2020 11:17 AM   Post-Procedure Surface Area (cm^2) 0.45 cm^2 3/9/2020 11:17 AM   Post-Procedure Volume (cm^3) 0.04 cm^3 3/9/2020 11:17 AM   Wound Assessment Dry 3/16/2020  8:31 AM   Drainage Amount Moderate 3/9/2020 11:17 AM   Drainage Description Serosanguinous 3/9/2020 11:17 AM   Odor None 3/9/2020 10:08 AM   Jaylyn-wound Assessment Fragile;Ecchymosis 3/9/2020 10:08 AM   [place]%Wound Bed 100 3/9/2020 10:08 AM   Red%Wound Bed 0 3/9/2020 10:08 AM   Yellow%Wound Bed 0 3/9/2020 10:08 AM   Black%Wound Bed 0 3/9/2020 10:08 AM   Purple%Wound Bed 0 3/9/2020 10:08 AM   Number of days: 7       Wound 03/09/20 Arm Left #2 (Active)   Wound Image   3/9/2020 10:00 AM   Wound Skin Tear 3/16/2020  8:31 AM   Wound Cleansed Rinsed/Irrigated with saline 3/16/2020  8:31 AM   Wound Length (cm) 15 cm 3/16/2020  8:31 AM   Wound Width (cm) 8 cm 3/16/2020  8:31 AM   Wound Depth (cm) 0.1 cm 3/16/2020  8:31 AM   Wound Surface Area (cm^2) 120 cm^2 3/16/2020  8:31 AM   Change in Wound Size % (l*w) 28.57 3/16/2020  8:31 AM   Wound Volume (cm^3) 12 cm^3 3/16/2020  8:31 AM   Wound Healing % 29 3/16/2020  8:31 AM   Post-Procedure Length (cm) 15.1 cm 3/16/2020  8:42 AM   Post-Procedure Width (cm) 8.1 cm 3/16/2020  8:42 AM   Post-Procedure Depth (cm) 0.1 cm 3/16/2020  8:42 AM   Post-Procedure Surface Area (cm^2) 122.31 cm^2 3/16/2020  8:42 AM   Post-Procedure Volume (cm^3) 12.23 cm^3 3/16/2020  8:42 AM   Wound Assessment Bleeding 3/16/2020  8:42 AM   Drainage Amount Moderate 3/16/2020  8:42 AM   Drainage Description Serosanguinous 3/16/2020  8:42 AM   Odor None 3/9/2020 10:00 AM   Jaylyn-wound Assessment Dry 3/16/2020  8:31 AM   [place]%Wound Bed 10 3/16/2020  8:31 AM   [place]%Wound Bed 0

## 2020-03-23 RX ORDER — CYANOCOBALAMIN (VITAMIN B-12) 500 MCG
TABLET ORAL
Qty: 90 TABLET | Refills: 3 | Status: SHIPPED | OUTPATIENT
Start: 2020-03-23 | End: 2021-04-16

## 2020-03-24 ENCOUNTER — HOSPITAL ENCOUNTER (OUTPATIENT)
Dept: WOUND CARE | Age: 80
Discharge: HOME | End: 2020-03-24
Payer: MEDICARE

## 2020-03-24 VITALS
SYSTOLIC BLOOD PRESSURE: 132 MMHG | DIASTOLIC BLOOD PRESSURE: 84 MMHG | HEART RATE: 86 BPM | RESPIRATION RATE: 16 BRPM | TEMPERATURE: 96.8 F

## 2020-03-24 PROCEDURE — 11042 DBRDMT SUBQ TIS 1ST 20SQCM/<: CPT

## 2020-03-24 PROCEDURE — 11042 DBRDMT SUBQ TIS 1ST 20SQCM/<: CPT | Performed by: SURGERY

## 2020-03-24 RX ORDER — LIDOCAINE 40 MG/G
CREAM TOPICAL ONCE
Status: DISCONTINUED | OUTPATIENT
Start: 2020-03-24 | End: 2020-03-25 | Stop reason: HOSPADM

## 2020-03-24 NOTE — PLAN OF CARE
Discharge instructions given. Patient verbalized understanding. Return to [address] [place] [address],[address] in 2 weeks.

## 2020-03-24 NOTE — PROGRESS NOTES
Yomaira Sharp  AGE: 79 y.o. GENDER: male  : 1940  TODAY'S DATE:  3/24/2020    Chief Complaint   Patient presents with    Wound Check     left arm        HISTORY of PRESENT ILLNESS HPI     Yomaira Sharp is a 79 y.o. male who presents today for wound evaluation. History of Wound: Left forearm wounds  Wound Pain:  mild  Severity:  2 / 10   Wound Type:  traumatic  Modifying Factors:  none  Associated Signs/Symptoms:  none    Procedure Note    Performed by: Freedom Lopez MD    Consent obtained: Yes    Time out taken:  Yes    Pain Control: Anesthetic  Anesthetic: 4% Lidocaine Cream     Debridement:Excisional Debridement    Using curette the wound was sharply debrided    down through and including the removal of subcutaneous tissue.         Devitalized Tissue Debrided:  necrotic/eschar    Pre Debridement Measurements:  Are located in the Wound Documentation Flow Sheet    Wound #: 2     Post  Debridement Measurements:  Wound 20 Arm Left #2 (Active)   Wound Image   3/9/2020 10:00 AM   Wound Skin Tear 3/24/2020  9:16 AM   Wound Cleansed Rinsed/Irrigated with saline 3/24/2020  9:36 AM   Wound Length (cm) 6.3 cm 3/24/2020  9:16 AM   Wound Width (cm) 3 cm 3/24/2020  9:16 AM   Wound Depth (cm) 0.1 cm 3/24/2020  9:16 AM   Wound Surface Area (cm^2) 18.9 cm^2 3/24/2020  9:16 AM   Change in Wound Size % (l*w) 88.75 3/24/2020  9:16 AM   Wound Volume (cm^3) 1.89 cm^3 3/24/2020  9:16 AM   Wound Healing % 89 3/24/2020  9:16 AM   Post-Procedure Length (cm) 6.3 cm 3/24/2020  9:36 AM   Post-Procedure Width (cm) 3 cm 3/24/2020  9:36 AM   Post-Procedure Depth (cm) 0.1 cm 3/24/2020  9:36 AM   Post-Procedure Surface Area (cm^2) 18.9 cm^2 3/24/2020  9:36 AM   Post-Procedure Volume (cm^3) 1.89 cm^3 3/24/2020  9:36 AM   Wound Assessment Bleeding 3/24/2020  9:36 AM   Drainage Amount Moderate 3/24/2020  9:36 AM   Drainage Description Serosanguinous 3/24/2020  9:16 AM   Odor None 3/9/2020 10:00 AM   Jaylyn-wound Assessment Dry 3/24/2020  9:16 AM   [place]%Wound Bed 100 3/24/2020  9:16 AM   Red%Wound Bed 0 3/24/2020  9:16 AM   [place]ellow%Wound Bed 0 3/24/2020  9:16 AM   Black%Wound Bed 0 3/24/2020  9:16 AM   Purple%Wound Bed 0 3/24/2020  9:16 AM   Other%Wound Bed 0 3/24/2020  9:16 AM   Number of days: 14           Total Surface Area Debrided:  18.9 sq cm     Bleeding:  Minimal    Hemostasis Achieved:  by pressure    Procedural Pain:  2  / 10     Post Procedural Pain:  0 / 10     Response to treatment:  Well tolerated by patient. The nature of the patient's condition was explained in depth. The patient was informed that their compliance to the treatment plan is paramount to successful healing and prevention of further ulceration and/or infection     Treatment Plan:   67-year-old male with traumatic wounds to the dorsum of the left forearm. The proximal wounds have all formed dry eschars. There are 3 open wounds more distally. These wounds were debrided. He will apply antibiotic ointment and Adaptic to the open areas. The dry eschars will be left open. Follow-up in 2 weeks.     Jessica Dinero M.D.  3/24/2020

## 2020-04-02 ENCOUNTER — TELEPHONE (OUTPATIENT)
Dept: FAMILY MEDICINE CLINIC | Age: 80
End: 2020-04-02

## 2020-04-02 RX ORDER — ALBUTEROL SULFATE 90 UG/1
2 AEROSOL, METERED RESPIRATORY (INHALATION) EVERY 6 HOURS PRN
Qty: 1 INHALER | Refills: 11 | Status: CANCELLED | OUTPATIENT
Start: 2020-04-02 | End: 2021-04-02

## 2020-04-03 RX ORDER — ALBUTEROL SULFATE 2.5 MG/3ML
2.5 SOLUTION RESPIRATORY (INHALATION) EVERY 6 HOURS PRN
Qty: 50 VIAL | Refills: 3 | Status: SHIPPED | OUTPATIENT
Start: 2020-04-03 | End: 2022-05-05

## 2020-04-06 ENCOUNTER — HOSPITAL ENCOUNTER (OUTPATIENT)
Dept: WOUND CARE | Age: 80
Discharge: HOME | End: 2020-04-06
Payer: MEDICARE

## 2020-04-06 VITALS
HEART RATE: 68 BPM | TEMPERATURE: 97.3 F | DIASTOLIC BLOOD PRESSURE: 73 MMHG | SYSTOLIC BLOOD PRESSURE: 141 MMHG | RESPIRATION RATE: 17 BRPM

## 2020-04-06 PROCEDURE — 11042 DBRDMT SUBQ TIS 1ST 20SQCM/<: CPT | Performed by: NURSE PRACTITIONER

## 2020-04-06 PROCEDURE — 11042 DBRDMT SUBQ TIS 1ST 20SQCM/<: CPT

## 2020-04-06 RX ORDER — LIDOCAINE HYDROCHLORIDE 40 MG/ML
SOLUTION TOPICAL ONCE
Status: DISCONTINUED | OUTPATIENT
Start: 2020-04-06 | End: 2020-04-07 | Stop reason: HOSPADM

## 2020-04-06 NOTE — PROGRESS NOTES
Spoke to patient to clarify dressing order. Order said to change 1 x through week but next week he is not coming here to [address] [place] [address],[address] so he needs to change wound twice through week.
MOUTH TWICE DAILY 180 tablet 3    vitamin D (ERGOCALCIFEROL) 23887 units CAPS capsule TK 1 C PO WEEKLY  2    ranitidine (ZANTAC) 150 MG tablet TAKE 1 TABLET BY MOUTH TWICE DAILY 180 tablet 3    mupirocin (BACTROBAN) 2 % ointment Apply 3 times daily as needed 22 g 1    INSULIN SYRINGE .5CC/29G 29G X 1/2\" 0.5 ML MISC USE TO INJECT FOUR TIMES DAILY AS NEEDED 400 each 5    furosemide (LASIX) 80 MG tablet TAKE 1 TABLET BY MOUTH DAILY 90 tablet 3    levalbuterol (XOPENEX HFA) 45 MCG/ACT inhaler Inhale 1-2 puffs into the lungs every 4 hours as needed for Wheezing 1 Inhaler 3    aspirin 81 MG EC tablet Take 81 mg by mouth daily      apixaban (ELIQUIS) 2.5 MG TABS tablet Take 1 tablet by mouth 2 times daily 60 tablet 5    metoprolol (LOPRESSOR) 25 MG tablet Take 25 mg by mouth daily       isosorbide mononitrate (IMDUR) 30 MG CR tablet Take 1 tablet by mouth every morning Dr. Peewee Moya - Cardio 30 tablet 6    nitroGLYCERIN (NITROSTAT) 0.3 MG SL tablet Take one sublingual for chest pain. May repeat twice at 5 min intervals. If not getting relief call 911. 25 tablet 3     No current facility-administered medications on file prior to encounter. REVIEW OF SYSTEMS    Pertinent items are noted in HPI. Objective:      BP (!) 141/73   Pulse 68   Temp 97.3 °F (36.3 °C) (Oral)   Resp 17     PHYSICAL EXAM    General Appearance: alert and oriented to person, place and time  Skin: warm and dry  Head: normocephalic and atraumatic  Eyes: pupils equal, round, and reactive to light  Pulmonary/Chest: normal air movement, no respiratory distress  Cardiovascular: normal rate and regular rhythm  Wound:  3 open areas still open with some depth noted. Areas debrided to granular base. No overt signs of infection.        Assessment:     Patient Active Problem List   Diagnosis    Diabetes mellitus type II, uncontrolled ([address]    Tinnitus    Leg edema    Elevated PSA- nl 8/12/11    Hyperlipidemia LDL goal <70   

## 2020-04-06 NOTE — PLAN OF CARE
Discharge instructions given. Patient verbalized understanding. Return to [facility] in 2 week.   Called/faxed to [employer]

## 2020-04-07 ENCOUNTER — TELEPHONE (OUTPATIENT)
Dept: FAMILY MEDICINE CLINIC | Age: 80
End: 2020-04-07

## 2020-04-07 RX ORDER — ALBUTEROL SULFATE 90 UG/1
AEROSOL, METERED RESPIRATORY (INHALATION)
Qty: 54 G | OUTPATIENT
Start: 2020-04-07

## 2020-04-07 RX ORDER — ALBUTEROL SULFATE 90 UG/1
AEROSOL, METERED RESPIRATORY (INHALATION)
Qty: 18 G | Refills: 2 | Status: ON HOLD | OUTPATIENT
Start: 2020-04-07 | End: 2021-07-01 | Stop reason: HOSPADM

## 2020-04-07 NOTE — TELEPHONE ENCOUNTER
Pt needs the machine that dispenses the albuterol sent to [facility]  Pt has to pay out of pocket for this     Phone is [phone]

## 2020-04-08 RX ORDER — NEBULIZER ACCESSORIES
1 KIT MISCELLANEOUS EVERY 6 HOURS PRN
Qty: 1 KIT | Refills: 1 | Status: SHIPPED | OUTPATIENT
Start: 2020-04-08

## 2020-04-20 ENCOUNTER — HOSPITAL ENCOUNTER (OUTPATIENT)
Dept: WOUND CARE | Age: 80
Discharge: HOME | End: 2020-04-20
Payer: MEDICARE

## 2020-04-20 VITALS
HEART RATE: 73 BPM | DIASTOLIC BLOOD PRESSURE: 72 MMHG | SYSTOLIC BLOOD PRESSURE: 136 MMHG | TEMPERATURE: 97.2 F | RESPIRATION RATE: 16 BRPM

## 2020-04-20 PROCEDURE — 99213 OFFICE O/P EST LOW 20 MIN: CPT

## 2020-04-20 PROCEDURE — 99212 OFFICE O/P EST SF 10 MIN: CPT | Performed by: NURSE PRACTITIONER

## 2020-04-20 RX ORDER — LIDOCAINE HYDROCHLORIDE 40 MG/ML
SOLUTION TOPICAL ONCE
Status: DISCONTINUED | OUTPATIENT
Start: 2020-04-20 | End: 2020-04-21 | Stop reason: HOSPADM

## 2020-04-20 NOTE — PROGRESS NOTES
REPLACEMENT Left 2016       FAMILY HISTORY    Family History   Problem Relation Age of Onset    Cancer Mother         breast    Cancer Father         throat       SOCIAL HISTORY    Social History     Tobacco Use    Smoking status: Former Smoker     Packs/day: 3.50     Years: 32.00     Pack years: 112.00     Types: Cigarettes     Last attempt to quit: 1985     Years since quittin.3    Smokeless tobacco: Never Used    Tobacco comment: advised not to resume   Substance Use Topics    Alcohol use: No     Alcohol/week: 0.0 standard drinks    Drug use: No       ALLERGIES    No Known Allergies    MEDICATIONS    Current Outpatient Medications on File Prior to Encounter   Medication Sig Dispense Refill    Nebulizers (AIRIAL COMPACT MINI NEBULIZER) MISC 1 each by Does not apply route every 6 hours as needed (wheezing or shortness of breath) 1 each 0    Respiratory Therapy Supplies (NEBULIZER/TUBING/MOUTHPIECE) KIT 1 kit by Does not apply route every 6 hours as needed (for wheezing or shortness of breath) 1 kit 1    albuterol sulfate  (90 Base) MCG/ACT inhaler INHALE 2 PUFFS INTO THE LUNGS EVERY 6 HOURS AS NEEDED FOR WHEEZING 18 g 2    albuterol (PROVENTIL) (2.5 MG/3ML) 0.083% nebulizer solution Take 3 mLs by nebulization every 6 hours as needed for Wheezing 50 vial 3    Cyanocobalamin (B-12) 500 MCG TABS TAKE 1 TABLET BY MOUTH DAILY 90 tablet 3    NOVOLIN R 100 UNIT/ML injection INJECT 25 TO 35 UNITS SUBCUTANEOUSLY THREE TIMES DAILY BEFORE MEAL(S) 30 mL 0    atorvastatin (LIPITOR) 40 MG tablet TAKE 1 TABLET BY MOUTH EVERY EVENING 90 tablet 1    amiodarone (CORDARONE) 200 MG tablet TAKE 1 TABLET BY MOUTH DAILY 90 tablet 0    insulin NPH (NOVOLIN N) 100 UNIT/ML injection vial Inject 35-37 Units into the skin 2 times daily (before meals) 30 mL 3    umeclidinium-vilanterol (ANORO ELLIPTA) 62.5-25 MCG/INH AEPB inhaler INHALE 1 PUFF INTO THE LUNGS DAILY 1 each 5    omeprazole (PRILOSEC) 20 MG

## 2020-04-27 ENCOUNTER — TELEPHONE (OUTPATIENT)
Dept: PRIMARY CARE CLINIC | Age: 80
End: 2020-04-27

## 2020-04-27 ENCOUNTER — VIRTUAL VISIT (OUTPATIENT)
Dept: FAMILY MEDICINE CLINIC | Age: 80
End: 2020-04-27
Payer: MEDICARE

## 2020-04-27 PROCEDURE — 99442 PR PHYS/QHP TELEPHONE EVALUATION 11-20 MIN: CPT | Performed by: FAMILY MEDICINE

## 2020-04-27 RX ORDER — SACUBITRIL AND VALSARTAN 24; 26 MG/1; MG/1
1 TABLET, FILM COATED ORAL 2 TIMES DAILY
Qty: 60 TABLET | Refills: 0 | COMMUNITY
Start: 2020-04-27 | End: 2021-02-25 | Stop reason: ALTCHOICE

## 2020-05-04 ENCOUNTER — HOSPITAL ENCOUNTER (OUTPATIENT)
Dept: WOUND CARE | Age: 80
Discharge: HOME | End: 2020-05-04

## 2020-05-22 RX ORDER — AMIODARONE HYDROCHLORIDE 200 MG/1
TABLET ORAL
Qty: 90 TABLET | Refills: 0 | Status: SHIPPED | OUTPATIENT
Start: 2020-05-22 | End: 2020-08-25

## 2020-06-05 RX ORDER — FUROSEMIDE 80 MG
TABLET ORAL
Qty: 90 TABLET | Refills: 1 | Status: SHIPPED | OUTPATIENT
Start: 2020-06-05 | End: 2020-12-29

## 2020-06-22 ENCOUNTER — TELEPHONE (OUTPATIENT)
Dept: FAMILY MEDICINE CLINIC | Age: 80
End: 2020-06-22

## 2020-06-24 RX ORDER — FAMOTIDINE 20 MG/1
20 TABLET, FILM COATED ORAL 2 TIMES DAILY
Qty: 60 TABLET | Refills: 3 | Status: SHIPPED | OUTPATIENT
Start: 2020-06-24 | End: 2020-06-24

## 2020-06-24 RX ORDER — FAMOTIDINE 20 MG/1
TABLET, FILM COATED ORAL
Qty: 180 TABLET | Refills: 0 | Status: SHIPPED | OUTPATIENT
Start: 2020-06-24 | End: 2020-10-01

## 2020-06-24 RX ORDER — POTASSIUM CHLORIDE 750 MG/1
TABLET, FILM COATED, EXTENDED RELEASE ORAL
Qty: 90 TABLET | Refills: 1 | Status: SHIPPED | OUTPATIENT
Start: 2020-06-24 | End: 2020-12-29

## 2020-06-24 NOTE — TELEPHONE ENCOUNTER
Lvm informing pt Mom reports that patient swallowed a pair of earrings, possibly on Thursday and another pair on today. No complaints of drooling or difficulty breathing. No complaints of pain.

## 2020-07-20 RX ORDER — IBUPROFEN 200 MG
TABLET ORAL
Qty: 400 EACH | Refills: 3 | Status: SHIPPED | OUTPATIENT
Start: 2020-07-20 | End: 2021-10-21

## 2020-08-10 ENCOUNTER — OFFICE VISIT (OUTPATIENT)
Dept: FAMILY MEDICINE CLINIC | Age: 80
End: 2020-08-10
Payer: MEDICARE

## 2020-08-10 VITALS
DIASTOLIC BLOOD PRESSURE: 76 MMHG | HEIGHT: 69 IN | HEART RATE: 69 BPM | RESPIRATION RATE: 16 BRPM | BODY MASS INDEX: 41.62 KG/M2 | SYSTOLIC BLOOD PRESSURE: 130 MMHG | WEIGHT: 281 LBS | OXYGEN SATURATION: 97 % | TEMPERATURE: 97.5 F

## 2020-08-10 PROBLEM — S61.411A: Status: RESOLVED | Noted: 2020-03-10 | Resolved: 2020-08-10

## 2020-08-10 PROBLEM — I48.0 PAROXYSMAL ATRIAL FIBRILLATION (HCC): Status: ACTIVE | Noted: 2020-08-10

## 2020-08-10 PROBLEM — S51.812A SKIN TEAR OF LEFT FOREARM WITHOUT COMPLICATION: Status: RESOLVED | Noted: 2020-03-10 | Resolved: 2020-08-10

## 2020-08-10 LAB — HBA1C MFR BLD: 8.6 %

## 2020-08-10 PROCEDURE — 1123F ACP DISCUSS/DSCN MKR DOCD: CPT | Performed by: FAMILY MEDICINE

## 2020-08-10 PROCEDURE — G8427 DOCREV CUR MEDS BY ELIG CLIN: HCPCS | Performed by: FAMILY MEDICINE

## 2020-08-10 PROCEDURE — G8926 SPIRO NO PERF OR DOC: HCPCS | Performed by: FAMILY MEDICINE

## 2020-08-10 PROCEDURE — 3023F SPIROM DOC REV: CPT | Performed by: FAMILY MEDICINE

## 2020-08-10 PROCEDURE — 1036F TOBACCO NON-USER: CPT | Performed by: FAMILY MEDICINE

## 2020-08-10 PROCEDURE — G8417 CALC BMI ABV UP PARAM F/U: HCPCS | Performed by: FAMILY MEDICINE

## 2020-08-10 PROCEDURE — 4040F PNEUMOC VAC/ADMIN/RCVD: CPT | Performed by: FAMILY MEDICINE

## 2020-08-10 PROCEDURE — 99214 OFFICE O/P EST MOD 30 MIN: CPT | Performed by: FAMILY MEDICINE

## 2020-08-10 PROCEDURE — 3052F HG A1C>EQUAL 8.0%<EQUAL 9.0%: CPT | Performed by: FAMILY MEDICINE

## 2020-08-10 PROCEDURE — 83036 HEMOGLOBIN GLYCOSYLATED A1C: CPT | Performed by: FAMILY MEDICINE

## 2020-08-10 RX ORDER — TIOTROPIUM BROMIDE 18 UG/1
18 CAPSULE ORAL; RESPIRATORY (INHALATION) DAILY
Qty: 30 CAPSULE | Refills: 5 | Status: SHIPPED | OUTPATIENT
Start: 2020-08-10 | End: 2022-05-05

## 2020-08-10 NOTE — PATIENT INSTRUCTIONS
INSTRUCTIONS  NEXT APPOINTMENT: Please schedule annual complete physical (30 minutes) in 3 months. · PLEASE TAKE THIS FORM TO CHECK-OUT WINDOW TO SCHEDULE NEXT VISIT. · Please get flu vaccine when available in fall. Can get either at this office or at stores such as [employer] and [employer]. · A1c is 8.6  · INCREASE NPH to 40 AM and 30 PM. Continue regular. Patient Education     FALLS:  HOW TO LOWER YOUR RISK     Who is at high risk of falling? Anyone can fall, although the risk is higher in older people. This increased risk of falling may be the result of changes that come with aging, and certain medical conditions, such as arthritis, cataracts or hip problems. What can I do to lower my risk of falling? Most falls happen in the home. Consider the following tips to make your home safe: Make sure that you have good lighting in your home. A well lit home will help you avoid tripping over objects that are not easy to see. Put night lights in your bedroom, hallways, stairs and bathrooms. Rugs should be firmly fastened to the floor or have nonskid backing. Loose ends should be tacked down. Electrical cords should not be lying on the floor in walking areas. Put hand rails in your bathroom for bath, shower and toilet use. Have rails on both sides of your stairs for support. In the kitchen, make sure items are within easy reach. Don't store things too high or too low. Then you won't have to use a stepladder or a stool to reach them. It's also a good idea to avoid storing things too low, so you won't have to bend down to get them. Wear shoes with firm nonskid soles. Avoid wearing loose-fitting slippers that could cause you to trip. What else can I do? Take good care of your body. Try to stay healthy by following these tips:  See your eye doctor once a year. Cataracts and other eye diseases that cause you not to see well, can lead to falls.    Get regular physical activity to keep your bones and muscles strong. Take good care of your feet. If you have pain in your feet or if you have large, thick nails and corns, have your doctor look at your feet. Talk to your doctor about any side effects you may have from your medicines. Problems caused by side effects from medicine are a common cause of falls. The more medicines you take, the greater your risk of falling. Talk to your doctor if you have dizzy spells. If your doctor suggests that you use a cane or a walker to help you walk, be sure to use it. This will give you extra stability when walking and will help you avoid falls. Don't smoke. Limit alcohol to no more than 2 drinks per day. When you get out of bed in the morning or at night to use the bathroom, sit on the side of the bed for a few minutes before standing up. Your blood pressure takes some time to adjust when you sit up. It may be too low if you get up quickly. This can make you dizzy, and you might lose your balance and fall. Home Safety: How Well Does Your Home Meet Your Needs? Steps/Stairways/Walkways YesNo    Are they in good shape? Do they have a smooth, safe surface? Are there handrails on both sides of the stairway? How about light switches at the top and bottom of the stairs? Is there grasping space for both knuckles and fingers on railings? Are the stair treads deep enough for your whole foot? Would a ramp be feasible in any of these areas if it became necessary? Floor Surfaces YesNo    Is the surface safe? [place]? Any throw rugs or doormats that might slip underfoot? Is carpeting loose or torn? Are there changes in floor levels? If so, are they obvious or well marked? Do you have to step over any electric, telephone, or extension cords? Driveway and [employer] Agent    Is there always space to park? Is it convenient to the entrance? Does the garage door open automatically?    [employer] and doors easy to open and close? Are locks sturdy and easy to operate? Do doorways accommodate a walker or wheelchair? Can you walk through the doorways easily? Is there space to maneuver while opening and closing doors? Does the front door have a view panel or peephole at the right height? Appliances/Kitchen/Bath Sharolyn Modest    Is the room arranged safely and conveniently? Do the oven and fridge open easily? Are stove controls clearly marked and easy to use? Is the counter the right height and depth? Can you work sitting down? Are cabinet doorknobs easy to use? Are faucets easy to use? Do you have a hand-held shower head? Are the items you use often on high shelves? Do you have a step stool with handles? Can you easily get in and out of the tub or shower? Do you have a bath or shower seat? Are there grab bars where needed? Is the hot water heater regulated to prevent scalding or burning? Lighting/Ventilation YesNo    Are there enough lights, and are they bright enough? Do you have night lights where needed? Is area well ventilated? Electrical Outlets/Switches/Alarms YesNo    Can you turn switches easily on and off? Are outlets properly grounded to prevent a shock? Are extension cords in good shape? Do you have smoke detectors in all key areas? Do you have an alarm system? Is the telephone readily available for emergencies? Does the telephone have volume control? Can you hear the doorbell ring all throughout the house?

## 2020-08-10 NOTE — PROGRESS NOTES
DIABETES MELLITUS FOLOW-UP  Subjective:      Chief Complaint   Patient presents with    Diabetes     Genevieve Mello is an 79 y.o. male who presents for follow up of following chronic problems:  1. Type 2 diabetes mellitus with diabetic nephropathy, with long-term current use of insulin ([facility]    2. Hyperlipidemia LDL goal <70    3. Paroxysmal atrial fibrillation (HCC)    4. Coronary artery disease involving native coronary artery of native heart without angina pectoris    5. Pulmonary emphysema, unspecified emphysema type ([facility]    6. Obesity, Class III, BMI 40-49.9 (morbid obesity) ([facility]      Complaints: pt has had a couple of falls didn't really hurt himself other than that he is doing ok. Trips over things. · Patient checks sugars 4  time(s) daily. Average: up and down . · Any low sugars? No  · Patent follows diabetic diet? Sometimes  · Exercise: not very often  rarely  · Taking medicines daily as directed? Yes  · Is the patient reporting any side effects of medications? No  · Patient checks bottom of feet daily? Yes  · Tobacco history updated:  reports that he quit smoking about 35 years ago. His smoking use included cigarettes. He has a 112.00 pack-year smoking history. He has never used smokeless tobacco.    A1c is 8.6 today. Review of Systems   General ROS: fever? No,   Night sweats? No  Ophthalmic ROS: change in vision? No  Endocrine ROS: fatigue? No  Unexpected weight changes? No  Respiratory ROS: Shortness of breath? No  Cardiovascular ROS: chest pain? No  Gastrointestinal ROS: abdominal pain? No  Change in stools? No  Genito-Urinary ROS: painful urination? No  Trouble urinating? No  Neurological ROS: TIA or stroke symptoms? No  Numbness/tingling? No  Dermatological ROS: rash or sores on feet? No  Changes in skin spots?     No    Health Maintenance Due   Topic Date Due    Shingles Vaccine (2 of 3) 11/28/2012    Annual Wellness Visit (AWV)  05/29/2019 LAST LABS  LDL Calculated   Date Value Ref Range Status   01/30/2020 39 <100 mg/dL Final     Lab Results   Component Value Date    HDL 51 01/30/2020     Lab Results   Component Value Date    TRIG 94 01/30/2020     Lab Results   Component Value Date    GLUCOSE 231 (H) 05/29/2020     Lab Results   Component Value Date     05/29/2020    K 4.5 05/29/2020    CREATININE 1.5 (H) 05/29/2020     Lab Results   Component Value Date    WBC 3.9 (L) 05/29/2020    HGB 13.1 (L) 05/29/2020     (L) 05/29/2020     Lab Results   Component Value Date    ALT 13 01/30/2020    AST 16 01/30/2020    ALKPHOS 115 01/30/2020    BILITOT 0.9 01/30/2020     TSH (uIU/mL)   Date Value   01/30/2020 2.51     Lab Results   Component Value Date    LABA1C 8.8 01/27/2020    LABA1C 8.1 10/16/2019    LABA1C 8.7 07/16/2019     *Chief complaint, HPI, History and ROS provided by the medical assistant has been reviewed and verified by provider- Yonny Cardenas MD    HISTORY:  Patient's medications, allergies, past medical, and social histories were reviewed and updated as appropriate. CHART REVIEW  Health Maintenance   Topic Date Due    Shingles Vaccine (2 of 3) 11/28/2012    Annual Wellness Visit (AWV)  05/29/2019    Flu vaccine (1) 09/01/2020    Lipid screen  01/30/2021    TSH testing  01/30/2021    Potassium monitoring  05/29/2021    Creatinine monitoring  05/29/2021    DTaP/Tdap/Td vaccine (3 - Td) 11/28/2021    Pneumococcal 65+ years Vaccine  Completed    Hepatitis A vaccine  Aged Out    Hib vaccine  Aged Out    Meningococcal (ACWY) vaccine  Aged Out     The ASCVD Risk score (Van Trivedi., et al., 2013) failed to calculate for the following reasons: The valid total cholesterol range is 130 to 320 mg/dL  Prior to Visit Medications    Medication Sig Taking?  Authorizing Provider   insulin regular (NOVOLIN R RELION) 100 UNIT/ML injection INJECT 25 TO 35 UNITS SUBCUTANEOUSLY THREE TIMES DAILY BEFORE MEAL(S) Yes Yonny Cardenas MD INSULIN SYRINGE .5CC/29G 29G X 1/2\" 0.5 ML MISC INJECT 4 TIMES DAILY AS NEEDED Yes Uriah Martines MD   potassium chloride (KLOR-CON) 10 MEQ extended release tablet TAKE 1 TABLET BY MOUTH DAILY Yes Uriah Martines MD   famotidine (PEPCID) 20 MG tablet TAKE 1 TABLET BY MOUTH TWICE DAILY Yes Uriah Martines MD   furosemide (LASIX) 80 MG tablet TAKE 1 TABLET BY MOUTH DAILY Yes Uriah Martines MD   amiodarone (CORDARONE) 200 MG tablet TAKE 1 TABLET BY MOUTH DAILY Yes Uriah Martines MD   sacubitril-valsartan (ENTRESTO) 24-26 MG per tablet Take 1 tablet by mouth 2 times daily Yes Uriah Martines MD   Nebulizers ([employer] COMPACT MINI NEBULIZER) MISC 1 each by Does not apply route every 6 hours as needed (wheezing or shortness of breath) Yes Uriah Martines MD   Respiratory Therapy Supplies (NEBULIZER/TUBING/MOUTHPIECE) KIT 1 kit by Does not apply route every 6 hours as needed (for wheezing or shortness of breath) Yes Uriah Martines MD   albuterol sulfate  (90 Base) MCG/ACT inhaler INHALE 2 PUFFS INTO THE LUNGS EVERY 6 HOURS AS NEEDED FOR WHEEZING Yes Uriah Martines MD   albuterol (PROVENTIL) (2.5 MG/3ML) 0.083% nebulizer solution Take 3 mLs by nebulization every 6 hours as needed for Wheezing Yes Uriah Martines MD   Cyanocobalamin (B-12) 500 MCG TABS TAKE 1 TABLET BY MOUTH DAILY Yes Uriah Martines MD   atorvastatin (LIPITOR) 40 MG tablet TAKE 1 TABLET BY MOUTH EVERY EVENING Yes Uriah Martines MD   insulin NPH (NOVOLIN N) 100 UNIT/ML injection vial Inject 35-37 Units into the skin 2 times daily (before meals) Yes Uriah Martines MD   umeclidinium-vilanterol (ANORO ELLIPTA) 62.5-25 MCG/INH AEPB inhaler INHALE 1 PUFF INTO THE LUNGS DAILY Yes Uriah Martines MD   omeprazole (PRILOSEC) 20 MG delayed release capsule TAKE ONE CAPSULE BY MOUTH TWICE DAILY Yes Uriah Martines MD   gabapentin (NEURONTIN) 600 MG tablet TAKE UP TO 5 TABLETS BY MOUTH SPREAD OVER 24 HOURS AS DIRECTED Yes Uriah Martines MD   FEROSUL 325 (65 Fe) MG tablet TAKE 1 TABLET BY MOUTH TWICE DAILY Yes Kevin Houston MD   vitamin D (ERGOCALCIFEROL) 25751 units CAPS capsule TK 1 C PO WEEKLY Yes Historical Provider, MD   ranitidine (ZANTAC) 150 MG tablet TAKE 1 TABLET BY MOUTH TWICE DAILY Yes Kevin Houston MD   mupirocin (BACTROBAN) 2 % ointment Apply 3 times daily as needed Yes Kevin Houston MD   levalbuterol (XOPENEX HFA) 45 MCG/ACT inhaler Inhale 1-2 puffs into the lungs every 4 hours as needed for Wheezing Yes Kevin Houston MD   aspirin 81 MG EC tablet Take 81 mg by mouth daily Yes Historical Provider, MD   apixaban (ELIQUIS) 2.5 MG TABS tablet Take 1 tablet by mouth 2 times daily Yes Kevin Houston MD   metoprolol (LOPRESSOR) 25 MG tablet Take 25 mg by mouth daily  Yes Historical Provider, MD   isosorbide mononitrate (IMDUR) 30 MG CR tablet Take 1 tablet by mouth every morning Dr. Bronson Buys Yes Kevin Houston MD   nitroGLYCERIN (NITROSTAT) 0.3 MG SL tablet Take one sublingual for chest pain. May repeat twice at 5 min intervals. If not getting relief call 911.  Yes Kevin Houston MD      Family History   Problem Relation Age of Onset    Cancer Mother         breast    Cancer Father         throat     Social History     Tobacco Use    Smoking status: Former Smoker     Packs/day: 3.50     Years: 32.00     Pack years: 112.00     Types: Cigarettes     Last attempt to quit: 1985     Years since quittin.6    Smokeless tobacco: Never Used    Tobacco comment: advised not to resume   Substance Use Topics    Alcohol use: No     Alcohol/week: 0.0 standard drinks    Drug use: No      LAST LABS  Cholesterol, Total   Date Value Ref Range Status   2020 109 0 - 199 mg/dL Final     LDL Calculated   Date Value Ref Range Status   2020 39 <100 mg/dL Final     HDL   Date Value Ref Range Status   2020 51 40 - 60 mg/dL Final   2012 37 (L) 40 - 60 mg/dl Final     Triglycerides   Date Value Ref Range Status   2020 94 0 - 150 mg/dL Final     Lab Results   Component Coronary artery disease involving native coronary artery of native heart without angina pectoris     5. Pulmonary emphysema, unspecified emphysema type (HCC)     6. Obesity, Class III, BMI 40-49.9 (morbid obesity) ([facility]     Stable. Continue current Tx plan except for changes marked below. Sample [employer]    INSTRUCTIONS  NEXT APPOINTMENT: Please schedule annual complete physical (30 minutes) in 3 months. · PLEASE TAKE THIS FORM TO CHECK-OUT WINDOW TO SCHEDULE NEXT VISIT. · Please get flu vaccine when available in fall. Can get either at this office or at stores such as [employer]. · A1c is 8.6  · INCREASE NPH to 40 AM and 30 PM. Continue regular.

## 2020-08-25 RX ORDER — AMIODARONE HYDROCHLORIDE 200 MG/1
TABLET ORAL
Qty: 90 TABLET | Refills: 0 | Status: SHIPPED | OUTPATIENT
Start: 2020-08-25 | End: 2020-12-01

## 2020-09-15 RX ORDER — ATORVASTATIN CALCIUM 40 MG/1
TABLET, FILM COATED ORAL
Qty: 90 TABLET | Refills: 1 | Status: SHIPPED | OUTPATIENT
Start: 2020-09-15 | End: 2021-03-29

## 2020-10-01 RX ORDER — FERROUS SULFATE 325(65) MG
TABLET ORAL
Qty: 180 TABLET | Refills: 3 | Status: SHIPPED | OUTPATIENT
Start: 2020-10-01 | End: 2021-12-10

## 2020-10-01 RX ORDER — FAMOTIDINE 20 MG/1
TABLET, FILM COATED ORAL
Qty: 180 TABLET | Refills: 0 | Status: SHIPPED | OUTPATIENT
Start: 2020-10-01 | End: 2021-01-15 | Stop reason: SDUPTHER

## 2020-10-29 ENCOUNTER — NURSE ONLY (OUTPATIENT)
Dept: FAMILY MEDICINE CLINIC | Age: 80
End: 2020-10-29
Payer: MEDICARE

## 2020-10-29 PROCEDURE — G0008 ADMIN INFLUENZA VIRUS VAC: HCPCS | Performed by: FAMILY MEDICINE

## 2020-10-29 PROCEDURE — 90694 VACC AIIV4 NO PRSRV 0.5ML IM: CPT | Performed by: FAMILY MEDICINE

## 2020-11-09 ENCOUNTER — VIRTUAL VISIT (OUTPATIENT)
Dept: FAMILY MEDICINE CLINIC | Age: 80
End: 2020-11-09
Payer: MEDICARE

## 2020-11-09 PROCEDURE — G8484 FLU IMMUNIZE NO ADMIN: HCPCS | Performed by: FAMILY MEDICINE

## 2020-11-09 PROCEDURE — 1036F TOBACCO NON-USER: CPT | Performed by: FAMILY MEDICINE

## 2020-11-09 PROCEDURE — 99442 PR PHYS/QHP TELEPHONE EVALUATION 11-20 MIN: CPT | Performed by: FAMILY MEDICINE

## 2020-11-09 PROCEDURE — 4040F PNEUMOC VAC/ADMIN/RCVD: CPT | Performed by: FAMILY MEDICINE

## 2020-11-09 PROCEDURE — G8417 CALC BMI ABV UP PARAM F/U: HCPCS | Performed by: FAMILY MEDICINE

## 2020-11-09 PROCEDURE — 3052F HG A1C>EQUAL 8.0%<EQUAL 9.0%: CPT | Performed by: FAMILY MEDICINE

## 2020-11-09 PROCEDURE — 1123F ACP DISCUSS/DSCN MKR DOCD: CPT | Performed by: FAMILY MEDICINE

## 2020-11-09 PROCEDURE — G8427 DOCREV CUR MEDS BY ELIG CLIN: HCPCS | Performed by: FAMILY MEDICINE

## 2020-11-09 NOTE — PROGRESS NOTES
PHONE VISIT    Christina Davis is a 79 y.o. male evaluated via telephone on 11/9/2020. Consent:  He and/or health care decision maker is aware that that he may receive a bill for this telephone service, depending on his insurance coverage, and has provided verbal consent to proceed: Yes    I affirm this is a Patient Initiated Episode with an Established Patient who has not had a related appointment within my department in the past 7 days or scheduled within the next 24 hours. DIABETES MELLITUS FOLOW-UP  Subjective:      Chief Complaint   Patient presents with    Diabetes     Christina Davis is an 79 y.o. male who presents for follow up of following chronic problems:  1. Type 2 diabetes mellitus with diabetic nephropathy, with long-term current use of insulin ([facility]    2. Coronary artery disease involving native coronary artery of native heart without angina pectoris    3. Essential hypertension    4. Elevated lipoprotein(a)      Complaints: pt is doing well no new issues     Occasional high sugars, couple low in 70's overnight if at all  Mostly 20 U before meals, occasional 25    · Patient checks sugars 4  time(s) daily. Average: 160. · Any low sugars? Yes and No  · Patent follows diabetic diet? Sometimes  · Exercise: walking rarely  · Taking medicines daily as directed? Yes  · Is the patient reporting any side effects of medications? No  · Patient checks bottom of feet daily? Yes  · Tobacco history updated:  reports that he quit smoking about 35 years ago. His smoking use included cigarettes. He has a 112.00 pack-year smoking history. He has never used smokeless tobacco.    Review of Systems   General ROS: fever? No,   Night sweats? No  Ophthalmic ROS: change in vision? No  Endocrine ROS: fatigue? No  Unexpected weight changes? No  Respiratory ROS: Shortness of breath? No  Cardiovascular ROS: chest pain? No  Gastrointestinal ROS: abdominal pain? No  Change in stools? No  Genito-Urinary ROS: painful urination? No  Trouble urinating? No  Neurological ROS: TIA or stroke symptoms? No  Numbness/tingling? No  Dermatological ROS: rash or sores on feet? No  Changes in skin spots? No    Health Maintenance Due   Topic Date Due    Shingles Vaccine (2 of 3) 11/28/2012    Annual Wellness Visit (AWV)  05/29/2019     LAST LABS  LDL Calculated   Date Value Ref Range Status   01/30/2020 39 <100 mg/dL Final     Lab Results   Component Value Date    HDL 51 01/30/2020     Lab Results   Component Value Date    TRIG 94 01/30/2020     Lab Results   Component Value Date    GLUCOSE 231 (H) 05/29/2020     Lab Results   Component Value Date     05/29/2020    K 4.5 05/29/2020    CREATININE 1.5 (H) 05/29/2020     Lab Results   Component Value Date    WBC 3.9 (L) 05/29/2020    HGB 13.1 (L) 05/29/2020     (L) 05/29/2020     Lab Results   Component Value Date    ALT 13 01/30/2020    AST 16 01/30/2020    ALKPHOS 115 01/30/2020    BILITOT 0.9 01/30/2020     TSH (uIU/mL)   Date Value   01/30/2020 2.51     Lab Results   Component Value Date    LABA1C 8.6 08/10/2020    LABA1C 8.8 01/27/2020    LABA1C 8.1 10/16/2019     Documentation provided by medical assistant reviewed and updated by provider. HISTORY:  Patient's medications, allergies, past medical, and social histories were reviewed and updated as appropriate.      CHART REVIEW  Health Maintenance   Topic Date Due    Shingles Vaccine (2 of 3) 11/28/2012    Annual Wellness Visit (AWV)  05/29/2019    Lipid screen  01/30/2021    TSH testing  01/30/2021    Potassium monitoring  05/29/2021    Creatinine monitoring  05/29/2021    DTaP/Tdap/Td vaccine (3 - Td) 11/28/2021    Flu vaccine  Completed    Pneumococcal 65+ years Vaccine  Completed    Hepatitis A vaccine  Aged Out    Hib vaccine  Aged Out    Meningococcal (ACWY) vaccine  Aged Out     The ASCVD Risk score (Lobo Ortiz., et al., 2013) failed to calculate for the following reasons: The valid total cholesterol range is 130 to 320 mg/dL  Prior to Visit Medications    Medication Sig Taking? Authorizing Provider   insulin regular (NOVOLIN R RELION) 100 UNIT/ML injection INJECT 20 TO 30 UNITS SUBCUTANEOUSLY THREE TIMES DAILY BEFORE MEAL(S) Yes Itzel Camp MD   famotidine (PEPCID) 20 MG tablet TAKE 1 TABLET BY MOUTH TWICE DAILY Yes Itzel Camp MD   FEROSUL 325 (65 Fe) MG tablet TAKE 1 TABLET BY MOUTH TWICE DAILY Yes Itzel Camp MD   atorvastatin (LIPITOR) 40 MG tablet TAKE 1 TABLET BY MOUTH EVERY EVENING Yes Itzel Camp MD   amiodarone (CORDARONE) 200 MG tablet TAKE 1 TABLET BY MOUTH DAILY Yes Itzel Camp MD   insulin NPH (NOVOLIN N) 100 UNIT/ML injection vial Take 40 units with breakfast and 30 units with dinner.  Yes Itzel Camp MD   tiotropium (Roe Braswell) 18 MCG inhalation capsule Inhale 1 capsule into the lungs daily INCLUDE inhaler device Yes Itzel Camp MD   INSULIN SYRINGE .5CC/29G 29G X 1/2\" 0.5 ML MISC INJECT 4 TIMES DAILY AS NEEDED Yes Itzel Camp MD   potassium chloride (KLOR-CON) 10 MEQ extended release tablet TAKE 1 TABLET BY MOUTH DAILY Yes Itzel Camp MD   furosemide (LASIX) 80 MG tablet TAKE 1 TABLET BY MOUTH DAILY Yes Itzel Camp MD   sacubitril-valsartan (ENTRESTO) 24-26 MG per tablet Take 1 tablet by mouth 2 times daily Yes Itzel Camp MD   Nebulizers ([employer] COMPACT MINI NEBULIZER) MISC 1 each by Does not apply route every 6 hours as needed (wheezing or shortness of breath) Yes Itzel Camp MD   Respiratory Therapy Supplies (NEBULIZER/TUBING/MOUTHPIECE) KIT 1 kit by Does not apply route every 6 hours as needed (for wheezing or shortness of breath) Yes Itzel Camp MD   albuterol sulfate  (90 Base) MCG/ACT inhaler INHALE 2 PUFFS INTO THE LUNGS EVERY 6 HOURS AS NEEDED FOR WHEEZING Yes Itzel Camp MD   albuterol (PROVENTIL) (2.5 MG/3ML) 0.083% nebulizer solution Take 3 mLs by nebulization every 6 hours as needed for Wheezing Yes Itzel Camp MD   Cyanocobalamin (B-12) 500 MCG TABS TAKE 1 TABLET BY MOUTH DAILY Yes Itzel Camp MD   umeclidinium-vilanterol (ANORO ELLIPTA) 62.5-25 MCG/INH AEPB inhaler INHALE 1 PUFF INTO THE LUNGS DAILY Yes Itzel Camp MD   omeprazole (PRILOSEC) 20 MG delayed release capsule TAKE ONE CAPSULE BY MOUTH TWICE DAILY Yes Itzel Camp MD   vitamin D (ERGOCALCIFEROL) 15557 units CAPS capsule TK 1 C PO WEEKLY Yes Historical Provider, MD   ranitidine (ZANTAC) 150 MG tablet TAKE 1 TABLET BY MOUTH TWICE DAILY Yes Itzel Camp MD   mupirocin (BACTROBAN) 2 % ointment Apply 3 times daily as needed Yes Itzel Camp MD   levalbuterol (XOPENEX HFA) 45 MCG/ACT inhaler Inhale 1-2 puffs into the lungs every 4 hours as needed for Wheezing Yes Itzel Camp MD   aspirin 81 MG EC tablet Take 81 mg by mouth daily Yes Historical Provider, MD   apixaban (ELIQUIS) 2.5 MG TABS tablet Take 1 tablet by mouth 2 times daily Yes Itzel Camp MD   metoprolol (LOPRESSOR) 25 MG tablet Take 25 mg by mouth daily  Yes Historical Provider, MD   isosorbide mononitrate (IMDUR) 30 MG CR tablet Take 1 tablet by mouth every morning Dr. Arabella Garg Yes Itzel Camp MD   nitroGLYCERIN (NITROSTAT) 0.3 MG SL tablet Take one sublingual for chest pain. May repeat twice at 5 min intervals. If not getting relief call 911.  Yes Itzel Camp MD   gabapentin (NEURONTIN) 600 MG tablet TAKE UP TO 5 TABLETS BY MOUTH SPREAD OVER 24 HOURS AS DIRECTED  Itzel Camp MD      Family History   Problem Relation Age of Onset    Cancer Mother         breast    Cancer Father         throat     Social History     Tobacco Use    Smoking status: Former Smoker     Packs/day: 3.50     Years: 32.00     Pack years: 112.00     Types: Cigarettes     Last attempt to quit: 1985     Years since quittin.8    Smokeless tobacco: Never Used    Tobacco comment: advised not to resume   Substance Use Topics    Alcohol use: No     Alcohol/week: 0.0 standard drinks    Drug use: No      LAST LABS  Cholesterol, Total   Date Value Ref Range Status   01/30/2020 109 0 - 199 mg/dL Final     LDL Calculated   Date Value Ref Range Status   01/30/2020 39 <100 mg/dL Final     HDL   Date Value Ref Range Status   01/30/2020 51 40 - 60 mg/dL Final   03/08/2012 37 (L) 40 - 60 mg/dl Final     Triglycerides   Date Value Ref Range Status   01/30/2020 94 0 - 150 mg/dL Final     Lab Results   Component Value Date    GLUCOSE 231 (H) 05/29/2020     Lab Results   Component Value Date     05/29/2020    K 4.5 05/29/2020    CREATININE 1.5 (H) 05/29/2020     Lab Results   Component Value Date    WBC 3.9 (L) 05/29/2020    HGB 13.1 (L) 05/29/2020    HCT 40.7 05/29/2020    MCV 95.1 05/29/2020     (L) 05/29/2020     Lab Results   Component Value Date    ALT 13 01/30/2020    AST 16 01/30/2020    ALKPHOS 115 01/30/2020    BILITOT 0.9 01/30/2020     TSH (uIU/mL)   Date Value   01/30/2020 2.51     Lab Results   Component Value Date    LABA1C 8.6 08/10/2020      Assessment and Plan:      Diagnosis Orders   1. Type 2 diabetes mellitus with diabetic nephropathy, with long-term current use of insulin (HCC)  Hemoglobin A1C    insulin regular (NOVOLIN R RELION) 100 UNIT/ML injection   2. Coronary artery disease involving native coronary artery of native heart without angina pectoris     3. Essential hypertension     4. Elevated lipoprotein(a)       INSTRUCTIONS  NEXT APPOINTMENT: Please schedule fasting annual physical (30 minutes) in 3 months. OK to have water and medications (except for diabetes medicines). I affirm this is a Patient Initiated Episode with an Established Patient who has not had a related appointment within my department in the past 7 days or scheduled within the next 24 hours.     Total Time: minutes: 11-20 minutes    Note: not billable if this call serves to triage the patient into an appointment for the relevant concern    Pat Crimes

## 2020-11-10 ENCOUNTER — HOSPITAL ENCOUNTER (OUTPATIENT)
Age: 80
Discharge: HOME | End: 2020-11-10
Payer: MEDICARE

## 2020-11-10 LAB
ESTIMATED AVERAGE GLUCOSE: 182.9 MG/DL
HBA1C MFR BLD: 8 %

## 2020-11-10 PROCEDURE — 83036 HEMOGLOBIN GLYCOSYLATED A1C: CPT

## 2020-11-10 PROCEDURE — 36415 COLL VENOUS BLD VENIPUNCTURE: CPT

## 2020-11-18 ENCOUNTER — TELEPHONE (OUTPATIENT)
Dept: FAMILY MEDICINE CLINIC | Age: 80
End: 2020-11-18

## 2020-11-18 DIAGNOSIS — E11.21 TYPE 2 DIABETES MELLITUS WITH DIABETIC NEPHROPATHY, WITH LONG-TERM CURRENT USE OF INSULIN (HCC): ICD-10-CM

## 2020-11-18 DIAGNOSIS — Z79.4 TYPE 2 DIABETES MELLITUS WITH DIABETIC NEPHROPATHY, WITH LONG-TERM CURRENT USE OF INSULIN (HCC): ICD-10-CM

## 2020-11-18 NOTE — TELEPHONE ENCOUNTER
A1c was 8.0. Goal < 7.5. Better. Was 8.6 last time. Increase the NPH to 44 AM and 32 PM.   Let me know if low sugars.

## 2020-12-01 RX ORDER — AMIODARONE HYDROCHLORIDE 200 MG/1
TABLET ORAL
Qty: 90 TABLET | Refills: 0 | Status: SHIPPED | OUTPATIENT
Start: 2020-12-01 | End: 2021-03-15

## 2020-12-07 RX ORDER — AMIODARONE HYDROCHLORIDE 200 MG/1
TABLET ORAL
Qty: 90 TABLET | Refills: 0 | OUTPATIENT
Start: 2020-12-07

## 2020-12-14 ENCOUNTER — OFFICE VISIT (OUTPATIENT)
Dept: ORTHOPEDIC SURGERY | Age: 80
End: 2020-12-14
Payer: MEDICARE

## 2020-12-14 VITALS — WEIGHT: 281 LBS | BODY MASS INDEX: 41.62 KG/M2 | HEIGHT: 69 IN | TEMPERATURE: 97.2 F

## 2020-12-14 PROCEDURE — G8427 DOCREV CUR MEDS BY ELIG CLIN: HCPCS | Performed by: PHYSICIAN ASSISTANT

## 2020-12-14 PROCEDURE — G8484 FLU IMMUNIZE NO ADMIN: HCPCS | Performed by: PHYSICIAN ASSISTANT

## 2020-12-14 PROCEDURE — G8417 CALC BMI ABV UP PARAM F/U: HCPCS | Performed by: PHYSICIAN ASSISTANT

## 2020-12-14 PROCEDURE — 4040F PNEUMOC VAC/ADMIN/RCVD: CPT | Performed by: PHYSICIAN ASSISTANT

## 2020-12-14 PROCEDURE — 99213 OFFICE O/P EST LOW 20 MIN: CPT | Performed by: PHYSICIAN ASSISTANT

## 2020-12-14 PROCEDURE — 1123F ACP DISCUSS/DSCN MKR DOCD: CPT | Performed by: PHYSICIAN ASSISTANT

## 2020-12-14 PROCEDURE — 1036F TOBACCO NON-USER: CPT | Performed by: PHYSICIAN ASSISTANT

## 2020-12-14 NOTE — PROGRESS NOTES
Subjective:      Patient ID: James Mccabe is a 79 y.o. male. Chief Complaint   Patient presents with    Follow-up     bilateral total knee arthroplasty Leftt 9/14/2016, Right 3/21/2016        HPI: He is here for annual follow up on bilateral knee arthroplasty. The date of procedure(s) Left TKA 9/2016. Right TKA 3/2016. No new complaints or issues. There is minimal to no discomfort with ambulation. There is minimal to no discomfort at rest.   Steps can be performed in reciprocal fashion. Review of Systems:   A full list of the ROS have been reviewed. These are recorded and signed in the chart. Past Medical History:   Diagnosis Date    Acid reflux     Atrial flutter ([facility] 2013    converted    Bleeding stomach ulcer oct 2015    BPH (benign prostatic hyperplasia)     CAD (coronary artery disease)     11/12 angio with 2 blocked bypass and 2 patent    COPD (chronic obstructive pulmonary disease) ([facility] [address] [facility]     Diabetes mellitus type II     Edema     chronic left leg    Elevated PSA- nl 8/12/11     Hyperlipidemia     Hypertension     Ischemic cardiomyopathy     Lipoma of skin-RIGHT CHEST 5/5/2011    Obesity     Osteoarthritis     Skin tear of left forearm without complication 4/41/6847    Significant amount of epidermal loss with bleeding.     Spinal stenosis of lumbar region with neurogenic claudication- clinically 7/6/2018       Family History   Problem Relation Age of Onset    Cancer Mother         breast    Cancer Father         throat       Past Surgical History:   Procedure Laterality Date    CATARACT REMOVAL  2010, 2011    bilat    COLONOSCOPY      CORONARY ARTERY BYPASS GRAFT  1993    x 4    EYE SURGERY Left 2019    cataract coming back    JOINT REPLACEMENT Right total knee replacement    JOINT REPLACEMENT Left 09/14/2016       Social History     Occupational History    Not on file   Tobacco Use    Smoking status: Former Smoker     Packs/day: 3.50     Years: 32.00 Right 0                        Flexion-         Left 120   Right 120  There is no pain associated with ROM testing. Medial joint line is not tender to palpation. Lateral joint line is not tender to palpation. There is not crepitus along the joint line with ROM testing. There is no significant instability with varus or valgus stress testing. Anterior Drawer test is negative for instability. Extensor Mechanism is  intact. Examination of the lower extremities are intact with sensation to light touch, motor testing 4+ to 5/5 in all major motor groups including hip abductors, hip adductors, Quadriceps, hamstring, dorsi flexors and EHL testing. Normal heel to toe gait. Examination of the upper and lower extremities shows intact perfusion to all extremities, no cyanosis, digits are warm to touch, capillary refill is less than 2 seconds. No significant edema noted. Examination of the skin reveals the skin to be intact without lacerations, abrasions, significant erythema, rashes or skin lesions. X Rays: performed in the office today:   AP, Lateral and Sunrise of the left and right: Bilateral cemented total knee arthroplasties present. The alignment is satisfactory. There are no signs of significant failure or loosening. Assessment:       ICD-10-CM    1. S/P total knee arthroplasty, left 9.14.16  Z96.652 XR KNEE BILATERAL STANDING     XR KNEE LEFT (1-2 VIEWS)   2. S/P total knee arthroplasty, right 3.21.16  Z96.651 XR KNEE BILATERAL STANDING     XR KNEE RIGHT (1-2 VIEWS)        Plan:     Clinically and radiographically stable bilateral knee arthroplasties. The natural history of the patient's diagnosis as well as the treatment options were discussed in full and questions were answered. Risks and benefits of the treatment options also reviewed in detail. Continue with a HEP-  A home exercise program was re-instructed today including ROM exercises and strengthening exercises.  The patient verbalized understanding of these exercises as well as the importance of the exercise program to promote return of normal function. If pain intensifies or other problems arise you are to notify the office. Prophylactic antibiotics for any surgical or dental procedures. This is recommended for lifetime. Follow up yearly with x ray and clinical evaluations. Call or return to clinic prn if these symptoms worsen or fail to improve as anticipated.

## 2020-12-17 ENCOUNTER — HOSPITAL ENCOUNTER (OUTPATIENT)
Age: 80
Discharge: HOME | End: 2020-12-17
Payer: MEDICARE

## 2020-12-17 LAB
ALBUMIN SERPL-MCNC: 3.7 G/DL (ref 3.4–5)
ANION GAP SERPL CALCULATED.3IONS-SCNC: 10 MMOL/L (ref 3–16)
BUN BLDV-MCNC: 30 MG/DL (ref 7–20)
CALCIUM SERPL-MCNC: 9.3 MG/DL (ref 8.3–10.6)
CHLORIDE BLD-SCNC: 102 MMOL/L (ref 99–110)
CO2: 31 MMOL/L (ref 21–32)
CREAT SERPL-MCNC: 2.3 MG/DL (ref 0.8–1.3)
GFR AFRICAN AMERICAN: 33
GFR NON-AFRICAN AMERICAN: 28
GLUCOSE BLD-MCNC: 91 MG/DL (ref 70–99)
PHOSPHORUS: 4 MG/DL (ref 2.5–4.9)
POTASSIUM SERPL-SCNC: 4.7 MMOL/L (ref 3.5–5.1)
SODIUM BLD-SCNC: 143 MMOL/L (ref 136–145)

## 2020-12-17 PROCEDURE — 80069 RENAL FUNCTION PANEL: CPT

## 2020-12-17 PROCEDURE — 36415 COLL VENOUS BLD VENIPUNCTURE: CPT

## 2020-12-29 ENCOUNTER — HOSPITAL ENCOUNTER (OUTPATIENT)
Age: 80
Discharge: HOME | End: 2020-12-29
Payer: MEDICARE

## 2020-12-29 LAB
ALBUMIN SERPL-MCNC: 3.8 G/DL (ref 3.4–5)
ANION GAP SERPL CALCULATED.3IONS-SCNC: 12 MMOL/L (ref 3–16)
BUN BLDV-MCNC: 25 MG/DL (ref 7–20)
CALCIUM SERPL-MCNC: 9.5 MG/DL (ref 8.3–10.6)
CHLORIDE BLD-SCNC: 100 MMOL/L (ref 99–110)
CO2: 30 MMOL/L (ref 21–32)
CREAT SERPL-MCNC: 1.9 MG/DL (ref 0.8–1.3)
GFR AFRICAN AMERICAN: 41
GFR NON-AFRICAN AMERICAN: 34
GLUCOSE BLD-MCNC: 155 MG/DL (ref 70–99)
PHOSPHORUS: 4 MG/DL (ref 2.5–4.9)
POTASSIUM SERPL-SCNC: 4.3 MMOL/L (ref 3.5–5.1)
SODIUM BLD-SCNC: 142 MMOL/L (ref 136–145)

## 2020-12-29 PROCEDURE — 80069 RENAL FUNCTION PANEL: CPT

## 2020-12-29 PROCEDURE — 36415 COLL VENOUS BLD VENIPUNCTURE: CPT

## 2020-12-29 RX ORDER — FUROSEMIDE 80 MG
TABLET ORAL
Qty: 90 TABLET | Refills: 1 | Status: SHIPPED | OUTPATIENT
Start: 2020-12-29 | End: 2021-07-02

## 2020-12-29 RX ORDER — POTASSIUM CHLORIDE 750 MG/1
TABLET, FILM COATED, EXTENDED RELEASE ORAL
Qty: 90 TABLET | Refills: 1 | Status: SHIPPED | OUTPATIENT
Start: 2020-12-29 | End: 2021-07-02

## 2021-01-15 DIAGNOSIS — K27.9 PEPTIC ULCER DISEASE: ICD-10-CM

## 2021-01-15 RX ORDER — FAMOTIDINE 20 MG/1
TABLET, FILM COATED ORAL
Qty: 180 TABLET | Refills: 0 | Status: SHIPPED | OUTPATIENT
Start: 2021-01-15 | End: 2021-04-16

## 2021-01-22 ENCOUNTER — IMMUNIZATION (OUTPATIENT)
Dept: PRIMARY CARE CLINIC | Age: 81
End: 2021-01-22
Payer: MEDICARE

## 2021-01-22 PROCEDURE — 91300 COVID-19, PFIZER VACCINE 30MCG/0.3ML DOSE: CPT | Performed by: FAMILY MEDICINE

## 2021-01-22 PROCEDURE — 0001A PR IMM ADMN SARSCOV2 30MCG/0.3ML DIL RECON 1ST DOSE: CPT | Performed by: FAMILY MEDICINE

## 2021-01-26 ENCOUNTER — HOSPITAL ENCOUNTER (OUTPATIENT)
Age: 81
Discharge: HOME | End: 2021-01-26
Payer: MEDICARE

## 2021-01-26 LAB
ALBUMIN SERPL-MCNC: 4 G/DL (ref 3.4–5)
ANION GAP SERPL CALCULATED.3IONS-SCNC: 11 MMOL/L (ref 3–16)
BUN BLDV-MCNC: 28 MG/DL (ref 7–20)
CALCIUM SERPL-MCNC: 9.7 MG/DL (ref 8.3–10.6)
CHLORIDE BLD-SCNC: 102 MMOL/L (ref 99–110)
CO2: 30 MMOL/L (ref 21–32)
CREAT SERPL-MCNC: 2.1 MG/DL (ref 0.8–1.3)
GFR AFRICAN AMERICAN: 37
GFR NON-AFRICAN AMERICAN: 31
GLUCOSE BLD-MCNC: 257 MG/DL (ref 70–99)
PHOSPHORUS: 2.7 MG/DL (ref 2.5–4.9)
POTASSIUM SERPL-SCNC: 4.7 MMOL/L (ref 3.5–5.1)
SODIUM BLD-SCNC: 143 MMOL/L (ref 136–145)

## 2021-01-26 PROCEDURE — 80069 RENAL FUNCTION PANEL: CPT

## 2021-01-26 PROCEDURE — 36415 COLL VENOUS BLD VENIPUNCTURE: CPT

## 2021-02-11 DIAGNOSIS — E11.42 DIABETIC POLYNEUROPATHY ASSOCIATED WITH TYPE 2 DIABETES MELLITUS (HCC): ICD-10-CM

## 2021-02-12 ENCOUNTER — HOSPITAL ENCOUNTER (OUTPATIENT)
Age: 81
Discharge: HOME | End: 2021-02-12
Payer: MEDICARE

## 2021-02-12 ENCOUNTER — IMMUNIZATION (OUTPATIENT)
Dept: PRIMARY CARE CLINIC | Age: 81
End: 2021-02-12
Payer: MEDICARE

## 2021-02-12 LAB
ALBUMIN SERPL-MCNC: 3.6 G/DL (ref 3.4–5)
ANION GAP SERPL CALCULATED.3IONS-SCNC: 11 MMOL/L (ref 3–16)
BUN BLDV-MCNC: 24 MG/DL (ref 7–20)
CALCIUM SERPL-MCNC: 9.4 MG/DL (ref 8.3–10.6)
CHLORIDE BLD-SCNC: 99 MMOL/L (ref 99–110)
CO2: 30 MMOL/L (ref 21–32)
CREAT SERPL-MCNC: 1.9 MG/DL (ref 0.8–1.3)
GFR AFRICAN AMERICAN: 41
GFR NON-AFRICAN AMERICAN: 34
GLUCOSE BLD-MCNC: 144 MG/DL (ref 70–99)
PHOSPHORUS: 4.1 MG/DL (ref 2.5–4.9)
POTASSIUM SERPL-SCNC: 4.5 MMOL/L (ref 3.5–5.1)
SODIUM BLD-SCNC: 140 MMOL/L (ref 136–145)

## 2021-02-12 PROCEDURE — 0002A COVID-19, PFIZER VACCINE 30MCG/0.3ML DOSE: CPT | Performed by: FAMILY MEDICINE

## 2021-02-12 PROCEDURE — 36415 COLL VENOUS BLD VENIPUNCTURE: CPT

## 2021-02-12 PROCEDURE — 80069 RENAL FUNCTION PANEL: CPT

## 2021-02-12 PROCEDURE — 91300 COVID-19, PFIZER VACCINE 30MCG/0.3ML DOSE: CPT | Performed by: FAMILY MEDICINE

## 2021-02-15 RX ORDER — GABAPENTIN 600 MG/1
TABLET ORAL
Qty: 150 TABLET | Refills: 5 | Status: SHIPPED | OUTPATIENT
Start: 2021-02-15 | End: 2022-05-04

## 2021-02-25 ENCOUNTER — OFFICE VISIT (OUTPATIENT)
Dept: FAMILY MEDICINE CLINIC | Age: 81
End: 2021-02-25
Payer: MEDICARE

## 2021-02-25 VITALS
HEIGHT: 69 IN | DIASTOLIC BLOOD PRESSURE: 66 MMHG | WEIGHT: 285 LBS | TEMPERATURE: 97.5 F | BODY MASS INDEX: 42.21 KG/M2 | HEART RATE: 60 BPM | OXYGEN SATURATION: 94 % | SYSTOLIC BLOOD PRESSURE: 132 MMHG

## 2021-02-25 DIAGNOSIS — E11.641 UNCONTROLLED TYPE 2 DIABETES MELLITUS WITH HYPOGLYCEMIA AND COMA (HCC): Primary | ICD-10-CM

## 2021-02-25 DIAGNOSIS — I10 ESSENTIAL HYPERTENSION: ICD-10-CM

## 2021-02-25 DIAGNOSIS — E78.5 HYPERLIPIDEMIA LDL GOAL <70: ICD-10-CM

## 2021-02-25 DIAGNOSIS — Z91.81 AT HIGH RISK FOR FALLS: ICD-10-CM

## 2021-02-25 LAB
ALBUMIN SERPL-MCNC: 3.9 G/DL (ref 3.4–5)
ALP BLD-CCNC: 93 U/L (ref 40–129)
ALT SERPL-CCNC: 30 U/L (ref 10–40)
AST SERPL-CCNC: 37 U/L (ref 15–37)
BILIRUB SERPL-MCNC: 0.9 MG/DL (ref 0–1)
BILIRUBIN DIRECT: 0.3 MG/DL (ref 0–0.3)
BILIRUBIN, INDIRECT: 0.6 MG/DL (ref 0–1)
CHOLESTEROL, TOTAL: 168 MG/DL (ref 0–199)
HBA1C MFR BLD: 7.6 %
HDLC SERPL-MCNC: 52 MG/DL (ref 40–60)
LDL CHOLESTEROL CALCULATED: 92 MG/DL
TOTAL PROTEIN: 6.3 G/DL (ref 6.4–8.2)
TRIGL SERPL-MCNC: 121 MG/DL (ref 0–150)
TSH SERPL DL<=0.05 MIU/L-ACNC: 2.44 UIU/ML (ref 0.27–4.2)
VLDLC SERPL CALC-MCNC: 24 MG/DL

## 2021-02-25 PROCEDURE — G8484 FLU IMMUNIZE NO ADMIN: HCPCS | Performed by: FAMILY MEDICINE

## 2021-02-25 PROCEDURE — G8427 DOCREV CUR MEDS BY ELIG CLIN: HCPCS | Performed by: FAMILY MEDICINE

## 2021-02-25 PROCEDURE — G8417 CALC BMI ABV UP PARAM F/U: HCPCS | Performed by: FAMILY MEDICINE

## 2021-02-25 PROCEDURE — 1123F ACP DISCUSS/DSCN MKR DOCD: CPT | Performed by: FAMILY MEDICINE

## 2021-02-25 PROCEDURE — 83036 HEMOGLOBIN GLYCOSYLATED A1C: CPT | Performed by: FAMILY MEDICINE

## 2021-02-25 PROCEDURE — 3051F HG A1C>EQUAL 7.0%<8.0%: CPT | Performed by: FAMILY MEDICINE

## 2021-02-25 PROCEDURE — 99214 OFFICE O/P EST MOD 30 MIN: CPT | Performed by: FAMILY MEDICINE

## 2021-02-25 PROCEDURE — 4040F PNEUMOC VAC/ADMIN/RCVD: CPT | Performed by: FAMILY MEDICINE

## 2021-02-25 PROCEDURE — 1036F TOBACCO NON-USER: CPT | Performed by: FAMILY MEDICINE

## 2021-02-25 ASSESSMENT — PATIENT HEALTH QUESTIONNAIRE - PHQ9
SUM OF ALL RESPONSES TO PHQ QUESTIONS 1-9: 0

## 2021-02-25 NOTE — PATIENT INSTRUCTIONS
INSTRUCTIONS  NEXT APPOINTMENT: Has appt in June  · PLEASE TAKE THIS FORM TO CHECK-OUT WINDOW TO SCHEDULE NEXT VISIT. · PLEASE GET BLOODWORK DRAWN TODAY ON FIRST FLOOR in 170. Take orders with you. RESULTS- most blood tests back in couple days. We will call you if any problems. If bloodwork good, you will get letter in mail or notified thru [address] (if signed up) within 2 weeks. If you do not, please call office.

## 2021-02-25 NOTE — PROGRESS NOTES
DIABETES MELLITUS FOLOW-UP  Subjective:      Chief Complaint   Patient presents with    Diabetes     f/u dm check     Karuna Bragg is an [de-identified] y.o. male who presents for follow up of following chronic problems:  1. Uncontrolled type 2 diabetes mellitus with hypoglycemia and coma ([address]    2. Hyperlipidemia LDL goal <70    3. Essential hypertension    4. At high risk for falls      Complaints: saw nephro, renal failure improved off entresto  Scraped left hand on door 2 weeks ago. Scabbed now and healing. · Patient checks sugars- usually under 100  · Patent follows diabetic diet? Yes  · Exercise:  rarely  · Taking medicines daily as directed? Yes  · Is the patient reporting any side effects of medications? No  · Patient checks bottom of feet daily? Yes  · Tobacco history updated:  reports that he quit smoking about 36 years ago. His smoking use included cigarettes. He has a 112.00 pack-year smoking history. He has never used smokeless tobacco.    A1c is 7.6 today. Review of Systems   General ROS: fever? No,   Night sweats? No  Ophthalmic ROS: change in vision? No  Endocrine ROS: fatigue? No  Unexpected weight changes? No  Respiratory ROS: Shortness of breath? No  Cardiovascular ROS: chest pain? No  Gastrointestinal ROS: abdominal pain? No  Change in stools? No  Genito-Urinary ROS: painful urination? No  Trouble urinating? No  Neurological ROS: TIA or stroke symptoms? No  Numbness/tingling? No  Dermatological ROS: rash or sores on feet? No  Changes in skin spots?     No     LAST LABS  Lab Results   Component Value Date    LABA1C 8.0 11/10/2020    LABA1C 8.6 08/10/2020    LABA1C 8.8 01/27/2020     LDL Calculated   Date Value Ref Range Status   01/30/2020 39 <100 mg/dL Final     Lab Results   Component Value Date    HDL 51 01/30/2020     Lab Results   Component Value Date    TRIG 94 01/30/2020     Lab Results   Component Value Date    GLUCOSE 144 (H) 02/12/2021     Lab Results Component Value Date     2021    K 4.5 2021    CREATININE 1.9 (H) 2021     Lab Results   Component Value Date    WBC 3.5 (L) 2020    HGB 12.5 (L) 2020     (L) 2020     Lab Results   Component Value Date    ALT 13 2020    AST 16 2020    ALKPHOS 115 2020    BILITOT 0.9 2020     TSH (uIU/mL)   Date Value   2020 2.51     HISTORY:  Patient's medications, allergies, past medical, and social histories were reviewed and updated as appropriate. CHART REVIEW  Health Maintenance Due   Topic Date Due    Shingles Vaccine (2 of 3) 2012    Annual Wellness Visit (AWV)  2019    TSH testing  2021    Lipid screen  2021     Social History     Tobacco Use    Smoking status: Former Smoker     Packs/day: 3.50     Years: 32.00     Pack years: 112.00     Types: Cigarettes     Quit date: 1985     Years since quittin.1    Smokeless tobacco: Never Used    Tobacco comment: advised not to resume   Substance Use Topics    Alcohol use: No     Alcohol/week: 0.0 standard drinks    Drug use: No      The ASCVD Risk score (Mayte Gonzales et al., 2013) failed to calculate for the following reasons: The 2013 ASCVD risk score is only valid for ages 36 to 78  Prior to Visit Medications    Medication Sig Taking? Authorizing Provider   gabapentin (NEURONTIN) 600 MG tablet TAKE UP TO 5 TABLETS BY MOUTH OVER 24 HOURS AS DIRECTED Yes Lanny Gupta MD   famotidine (PEPCID) 20 MG tablet TAKE 1 TABLET BY MOUTH TWICE DAILY Yes Lanny Gupta MD   furosemide (LASIX) 80 MG tablet TAKE 1 TABLET BY MOUTH DAILY Yes Lanny Gupta MD   potassium chloride (KLOR-CON) 10 MEQ extended release tablet TAKE 1 TABLET BY MOUTH DAILY Yes Lanny Gupta MD   amiodarone (CORDARONE) 200 MG tablet TAKE 1 TABLET BY MOUTH DAILY Yes Lanny Gupta MD   insulin NPH (NOVOLIN N) 100 UNIT/ML injection vial Take 44 units with breakfast and 32 units with dinner.  Yes Jg Bañuelos MD   insulin regular (NOVOLIN R RELION) 100 UNIT/ML injection INJECT 20 TO 30 UNITS SUBCUTANEOUSLY THREE TIMES DAILY BEFORE MEAL(S) Yes Jg Bañuelos MD   FEROSUL 325 (65 Fe) MG tablet TAKE 1 TABLET BY MOUTH TWICE DAILY Yes Jg Bañuelos MD   atorvastatin (LIPITOR) 40 MG tablet TAKE 1 TABLET BY MOUTH EVERY EVENING Yes Jg Bañuelos MD   INSULIN SYRINGE .5CC/29G 29G X 1/2\" 0.5 ML MISC INJECT 4 TIMES DAILY AS NEEDED Yes Jg Bañuelos MD   Nebulizers (AIRIAL COMPACT MINI NEBULIZER) MISC 1 each by Does not apply route every 6 hours as needed (wheezing or shortness of breath) Yes Jg Bañuelos MD   Respiratory Therapy Supplies (NEBULIZER/TUBING/MOUTHPIECE) KIT 1 kit by Does not apply route every 6 hours as needed (for wheezing or shortness of breath) Yes Jg Bañuelos MD   albuterol sulfate  (90 Base) MCG/ACT inhaler INHALE 2 PUFFS INTO THE LUNGS EVERY 6 HOURS AS NEEDED FOR WHEEZING Yes Jg Bañuelos MD   albuterol (PROVENTIL) (2.5 MG/3ML) 0.083% nebulizer solution Take 3 mLs by nebulization every 6 hours as needed for Wheezing Yes Jg Bañuelos MD   Cyanocobalamin (B-12) 500 MCG TABS TAKE 1 TABLET BY MOUTH DAILY Yes Jg Bañuelos MD   umeclidinium-vilanterol (ANORO ELLIPTA) 62.5-25 MCG/INH AEPB inhaler INHALE 1 PUFF INTO THE LUNGS DAILY Yes Jg Bañuelos MD   omeprazole (PRILOSEC) 20 MG delayed release capsule TAKE ONE CAPSULE BY MOUTH TWICE DAILY Yes Jg Bañuelos MD   vitamin D (ERGOCALCIFEROL) 14639 units CAPS capsule TK 1 C PO WEEKLY Yes Historical Provider, MD   levalbuterol (XOPENEX HFA) 45 MCG/ACT inhaler Inhale 1-2 puffs into the lungs every 4 hours as needed for Wheezing Yes Jg Bañuelos MD   aspirin 81 MG EC tablet Take 81 mg by mouth daily Yes Historical Provider, MD   apixaban (ELIQUIS) 2.5 MG TABS tablet Take 1 tablet by mouth 2 times daily Yes Jg Bañuelos MD   metoprolol (LOPRESSOR) 25 MG tablet Take 25 mg by mouth daily  Yes Historical Provider, MD   isosorbide mononitrate (IMDUR) 30 MG CR tablet Take 1 tablet by mouth every morning Dr. Guerline Arzate Yes Jt Webster MD   nitroGLYCERIN (NITROSTAT) 0.3 MG SL tablet Take one sublingual for chest pain. May repeat twice at 5 min intervals. If not getting relief call 911. Yes Jt Webster MD   tiotropium (May Torres) 18 MCG inhalation capsule Inhale 1 capsule into the lungs daily INCLUDE inhaler device  Patient not taking: Reported on 2/25/2021  Jt Webster MD   mupirocin Ilona Dany) 2 % ointment Apply 3 times daily as needed  Patient not taking: Reported on 2/25/2021  Jt Webster MD      Family History   Problem Relation Age of Onset    Cancer Mother         breast    Cancer Father         throat     Objective:   PHYSICAL EXAM   /66 (Site: Left Upper Arm, Position: Sitting, Cuff Size: Large Adult)   Pulse 60   Temp 97.5 °F (36.4 °C) (Temporal)   Ht 5' 9\" (1.753 m)   Wt 285 lb (129.3 kg)   SpO2 94%   BMI 42.09 kg/m²   BP Readings from Last 5 Encounters:   02/25/21 132/66   08/10/20 130/76   04/20/20 136/72   04/06/20 (!) 141/73   03/24/20 132/84     Wt Readings from Last 5 Encounters:   02/25/21 285 lb (129.3 kg)   12/14/20 281 lb (127.5 kg)   08/10/20 281 lb (127.5 kg)   03/06/20 290 lb (131.5 kg)   01/27/20 273 lb (123.8 kg)      GENERAL:   · well-developed, well-nourished, alert, no distress. EYES:   · External findings: lids and lashes normal and conjunctivae and sclerae normal  LUNGS:    · Breathing unlabored  · clear to auscultation bilaterally and good air movement  CARDIOVASC:   · regular rate and rhythm  · LEGS:  Lower extremity edema: none    SKIN: warm and dry  PSYCH:    · Alert and oriented  · Normal reasoning, insight good  · Facial expressions full, mood appropriate  · No memory disturbance noted  MUSCULOSKEL:    · No significant finger or nail findings  NEURO:   · CN 2-12 intact     Assessment and Plan:      Diagnosis Orders   1.  Uncontrolled type 2 diabetes mellitus with hypoglycemia and coma ([facility] POCT glycosylated hemoglobin (Hb A1C)   2. Hyperlipidemia LDL goal <70  LIPID PANEL    Hepatic Function Panel   3. Essential hypertension  TSH without Reflex   4. At high risk for falls     Stable. Continue current Tx plan. Any changes marked below. INSTRUCTIONS  NEXT APPOINTMENT: Has appt in June  · PLEASE TAKE THIS FORM TO CHECK-OUT WINDOW TO SCHEDULE NEXT VISIT. · PLEASE GET BLOODWORK DRAWN TODAY ON FIRST FLOOR in 170. Take orders with you. RESULTS- most blood tests back in couple days. We will call you if any problems. If bloodwork good, you will get letter in mail or notified thru [address] (if signed up) within 2 weeks. If you do not, please call office. On the basis of positive falls risk screening, assessment and plan is as follows: patient declines any further evaluation/treatment for increased falls risk.

## 2021-03-11 DIAGNOSIS — J43.9 PULMONARY EMPHYSEMA, UNSPECIFIED EMPHYSEMA TYPE (HCC): ICD-10-CM

## 2021-03-11 RX ORDER — UMECLIDINIUM BROMIDE AND VILANTEROL TRIFENATATE 62.5; 25 UG/1; UG/1
POWDER RESPIRATORY (INHALATION)
Qty: 1 EACH | Refills: 5 | Status: SHIPPED | OUTPATIENT
Start: 2021-03-11

## 2021-03-15 RX ORDER — AMIODARONE HYDROCHLORIDE 200 MG/1
TABLET ORAL
Qty: 90 TABLET | Refills: 0 | Status: SHIPPED | OUTPATIENT
Start: 2021-03-15 | End: 2021-06-04

## 2021-04-14 RX ORDER — ALBUTEROL SULFATE 90 UG/1
2 AEROSOL, METERED RESPIRATORY (INHALATION) EVERY 6 HOURS PRN
Qty: 1 INHALER | Refills: 3 | Status: SHIPPED | OUTPATIENT
Start: 2021-04-14 | End: 2022-04-19

## 2021-05-24 ENCOUNTER — OFFICE VISIT (OUTPATIENT)
Dept: FAMILY MEDICINE CLINIC | Age: 81
End: 2021-05-24
Payer: MEDICARE

## 2021-05-24 VITALS
RESPIRATION RATE: 16 BRPM | OXYGEN SATURATION: 94 % | DIASTOLIC BLOOD PRESSURE: 74 MMHG | SYSTOLIC BLOOD PRESSURE: 118 MMHG | WEIGHT: 280 LBS | HEIGHT: 69 IN | HEART RATE: 61 BPM | BODY MASS INDEX: 41.47 KG/M2

## 2021-05-24 DIAGNOSIS — D64.9 ANEMIA, UNSPECIFIED TYPE: ICD-10-CM

## 2021-05-24 DIAGNOSIS — I48.0 PAROXYSMAL ATRIAL FIBRILLATION (HCC): ICD-10-CM

## 2021-05-24 DIAGNOSIS — I10 ESSENTIAL HYPERTENSION: ICD-10-CM

## 2021-05-24 DIAGNOSIS — E66.01 OBESITY, CLASS III, BMI 40-49.9 (MORBID OBESITY) (HCC): ICD-10-CM

## 2021-05-24 DIAGNOSIS — E78.5 HYPERLIPIDEMIA LDL GOAL <70: ICD-10-CM

## 2021-05-24 DIAGNOSIS — I25.10 CORONARY ARTERY DISEASE INVOLVING NATIVE CORONARY ARTERY OF NATIVE HEART WITHOUT ANGINA PECTORIS: ICD-10-CM

## 2021-05-24 DIAGNOSIS — E11.21 TYPE 2 DIABETES MELLITUS WITH DIABETIC NEPHROPATHY, WITH LONG-TERM CURRENT USE OF INSULIN (HCC): ICD-10-CM

## 2021-05-24 DIAGNOSIS — J43.9 PULMONARY EMPHYSEMA, UNSPECIFIED EMPHYSEMA TYPE (HCC): ICD-10-CM

## 2021-05-24 DIAGNOSIS — Z00.00 ROUTINE GENERAL MEDICAL EXAMINATION AT A HEALTH CARE FACILITY: Primary | ICD-10-CM

## 2021-05-24 DIAGNOSIS — Z79.4 TYPE 2 DIABETES MELLITUS WITH DIABETIC NEPHROPATHY, WITH LONG-TERM CURRENT USE OF INSULIN (HCC): ICD-10-CM

## 2021-05-24 PROCEDURE — G8427 DOCREV CUR MEDS BY ELIG CLIN: HCPCS | Performed by: FAMILY MEDICINE

## 2021-05-24 PROCEDURE — 1036F TOBACCO NON-USER: CPT | Performed by: FAMILY MEDICINE

## 2021-05-24 PROCEDURE — 3023F SPIROM DOC REV: CPT | Performed by: FAMILY MEDICINE

## 2021-05-24 PROCEDURE — G8417 CALC BMI ABV UP PARAM F/U: HCPCS | Performed by: FAMILY MEDICINE

## 2021-05-24 PROCEDURE — G8926 SPIRO NO PERF OR DOC: HCPCS | Performed by: FAMILY MEDICINE

## 2021-05-24 PROCEDURE — 1123F ACP DISCUSS/DSCN MKR DOCD: CPT | Performed by: FAMILY MEDICINE

## 2021-05-24 PROCEDURE — 4040F PNEUMOC VAC/ADMIN/RCVD: CPT | Performed by: FAMILY MEDICINE

## 2021-05-24 PROCEDURE — G0439 PPPS, SUBSEQ VISIT: HCPCS | Performed by: FAMILY MEDICINE

## 2021-05-24 PROCEDURE — 99214 OFFICE O/P EST MOD 30 MIN: CPT | Performed by: FAMILY MEDICINE

## 2021-05-24 RX ORDER — DAPAGLIFLOZIN 10 MG/1
TABLET, FILM COATED ORAL
COMMUNITY
Start: 2021-05-06 | End: 2021-12-10

## 2021-05-24 RX ORDER — HYDRALAZINE HYDROCHLORIDE 25 MG/1
TABLET, FILM COATED ORAL
Status: ON HOLD | COMMUNITY
Start: 2021-04-12 | End: 2022-09-05 | Stop reason: HOSPADM

## 2021-05-24 ASSESSMENT — PATIENT HEALTH QUESTIONNAIRE - PHQ9
2. FEELING DOWN, DEPRESSED OR HOPELESS: 0
SUM OF ALL RESPONSES TO PHQ9 QUESTIONS 1 & 2: 0
SUM OF ALL RESPONSES TO PHQ QUESTIONS 1-9: 0

## 2021-05-24 ASSESSMENT — LIFESTYLE VARIABLES
HOW OFTEN DURING THE LAST YEAR HAVE YOU HAD A FEELING OF GUILT OR REMORSE AFTER DRINKING: 0
AUDIT-C TOTAL SCORE: 2
HOW MANY STANDARD DRINKS CONTAINING ALCOHOL DO YOU HAVE ON A TYPICAL DAY: 0
HOW OFTEN DO YOU HAVE SIX OR MORE DRINKS ON ONE OCCASION: 0
HOW OFTEN DURING THE LAST YEAR HAVE YOU NEEDED AN ALCOHOLIC DRINK FIRST THING IN THE MORNING TO GET YOURSELF GOING AFTER A NIGHT OF HEAVY DRINKING: 0
HOW OFTEN DO YOU HAVE A DRINK CONTAINING ALCOHOL: 2

## 2021-05-24 NOTE — PROGRESS NOTES
Medicare Annual Wellness Visit  Name: Kyle Knott  YOB: 1940  Age: [de-identified] y.o. Sex: male  MRN: [id number]     Date of Service:  5/24/2021    Chief Complaint:   Kyle Knott is a [de-identified] y.o. male who presents for Medicare Annual Wellness Visit and check-up for:  1. Routine general medical examination at a health care facility    2. Coronary artery disease involving native coronary artery of native heart without angina pectoris    3. Pulmonary emphysema, unspecified emphysema type (HCC)    4. Paroxysmal atrial fibrillation (HCC)    5. Obesity, Class III, BMI 40-49.9 (morbid obesity) ([facility] [address]    6. Type 2 diabetes mellitus with diabetic nephropathy, with long-term current use of insulin ([facility] [address]    7. Essential hypertension    8. Hyperlipidemia LDL goal <70    9. Anemia, unspecified type      HPI    Chief Complaint   Patient presents with    Medicare AWV   Complaints: pt is doing well he has been getting lower blood sugar readings 120-150, rare over 200. Review of Systems   General ROS: fever? No,    Night sweats? No  Ophthalmic ROS: change in vision? No  Endocrine ROS: fatigue? No   Unexpected weight changes? No  Hematologic/Lymphatic: easy bruising? No   Swollen lymph nodes? No  ENT ROS: headaches? No   Sore throat? No  Respiratory ROS: cough? No   Wheezing? No  Cardiovascular ROS: chest pain? No   Shortness of breath? No  Gastrointestinal ROS: abdominal pain? No   Change in stools? No  Genito-Urinary ROS: painful urination? No   Trouble urinating? No  Musculoskeletal ROS: trouble walking? No   Joint pain? No  Neurological ROS: TIA or stroke symptoms? No   Numbness/tingling? No  Dermatological ROS: rash? No   Changes in skin spots?   No    Health Maintenance Due   Topic Date Due    Shingles Vaccine (2 of 3) 11/28/2012    Annual Wellness Visit (AWV)  Never done     *Chief complaint, HPI, History and ROS provided by the medical assistant has been reviewed and verified by provider- Batool Guido MD    HISTORY:  Patient's medications, allergies, past medical, and social histories were reviewed and updated as appropriate. CHART REVIEW  Health Maintenance   Topic Date Due    Shingles Vaccine (2 of 3) 11/28/2012    Annual Wellness Visit (AWV)  Never done    DTaP/Tdap/Td vaccine (3 - Td) 11/28/2021    Potassium monitoring  02/12/2022    Creatinine monitoring  02/12/2022    Lipid screen  02/25/2022    TSH testing  02/25/2022    Flu vaccine  Completed    Pneumococcal 65+ years Vaccine  Completed    COVID-19 Vaccine  Completed    Hepatitis A vaccine  Aged Out    Hib vaccine  Aged Out    Meningococcal (ACWY) vaccine  Aged Out     The ASCVD Risk score (Elkin Castrejon et al., 2013) failed to calculate for the following reasons: The 2013 ASCVD risk score is only valid for ages 36 to 78  Prior to Visit Medications    Medication Sig Taking?  Authorizing Provider   FARXIGA 10 MG tablet TAKE 1 TABLET BY MOUTH DAILY Yes Historical Provider, MD   hydrALAZINE (APRESOLINE) 25 MG tablet TAKE 1 TABLET BY MOUTH THREE TIMES DAILY Yes Historical Provider, MD   Cyanocobalamin (B-12) 500 MCG TABS TAKE 1 TABLET BY MOUTH DAILY Yes Batool Guido MD   famotidine (PEPCID) 20 MG tablet TAKE 1 TABLET BY MOUTH TWICE DAILY Yes Batool Guido MD   albuterol sulfate HFA (VENTOLIN HFA) 108 (90 Base) MCG/ACT inhaler Inhale 2 puffs into the lungs every 6 hours as needed for Wheezing Yes Batool Guido MD   atorvastatin (LIPITOR) 40 MG tablet TAKE 1 TABLET BY MOUTH EVERY EVENING Yes Batool Guiod MD   amiodarone (CORDARONE) 200 MG tablet TAKE 1 TABLET BY MOUTH DAILY Yes Batool Guido MD   umeclidinium-vilanterol (ANORO ELLIPTA) 62.5-25 MCG/INH AEPB inhaler INHALE 1 PUFF INTO THE LUNGS DAILY Yes Batool Guido MD   omeprazole (PRILOSEC) 20 MG delayed release capsule TAKE 1 CAPSULE BY MOUTH TWICE DAILY Yes Batool Guido MD   gabapentin (NEURONTIN) 600 MG tablet TAKE UP TO 5 TABLETS BY MOUTH OVER 24 HOURS AS DIRECTED Yes Mya Campbell MD   furosemide (LASIX) 80 MG tablet TAKE 1 TABLET BY MOUTH DAILY Yes Mya Campbell MD   potassium chloride (KLOR-CON) 10 MEQ extended release tablet TAKE 1 TABLET BY MOUTH DAILY Yes Mya Campbell MD   insulin NPH (NOVOLIN N) 100 UNIT/ML injection vial Take 44 units with breakfast and 32 units with dinner.  Yes Mya Campbell MD   insulin regular (NOVOLIN R RELION) 100 UNIT/ML injection INJECT 20 TO 30 UNITS SUBCUTANEOUSLY THREE TIMES DAILY BEFORE MEAL(S) Yes Mya Campbell MD   FEROSUL 325 (65 Fe) MG tablet TAKE 1 TABLET BY MOUTH TWICE DAILY Yes Mya Campbell MD   tiotropium (Deward Bibber) 18 MCG inhalation capsule Inhale 1 capsule into the lungs daily INCLUDE inhaler device Yes Mya Campbell MD   INSULIN SYRINGE .5CC/29G 29G X 1/2\" 0.5 ML MISC INJECT 4 TIMES DAILY AS NEEDED Yes Mya Campbell MD   Nebulizers (AIRIAL COMPACT MINI NEBULIZER) MISC 1 each by Does not apply route every 6 hours as needed (wheezing or shortness of breath) Yes Mya Campbell MD   Respiratory Therapy Supplies (NEBULIZER/TUBING/MOUTHPIECE) KIT 1 kit by Does not apply route every 6 hours as needed (for wheezing or shortness of breath) Yes Mya Campbell MD   albuterol sulfate  (90 Base) MCG/ACT inhaler INHALE 2 PUFFS INTO THE LUNGS EVERY 6 HOURS AS NEEDED FOR WHEEZING Yes Mya Campbell MD   vitamin D (ERGOCALCIFEROL) 53099 units CAPS capsule TK 1 C PO WEEKLY Yes Historical Provider, MD   mupirocin (BACTROBAN) 2 % ointment Apply 3 times daily as needed Yes Mya Campbell MD   levalbuterol (XOPENEX HFA) 45 MCG/ACT inhaler Inhale 1-2 puffs into the lungs every 4 hours as needed for Wheezing Yes Mya Campbell MD   aspirin 81 MG EC tablet Take 81 mg by mouth daily Yes Historical Provider, MD   apixaban (ELIQUIS) 2.5 MG TABS tablet Take 1 tablet by mouth 2 times daily Yes Mya Campbell MD   metoprolol (LOPRESSOR) 25 MG tablet Take 25 mg by mouth daily  Yes Historical Provider, MD   isosorbide mononitrate (IMDUR) 30 MG CR tablet Take 1 tablet by mouth every morning Dr. Mitul Mcghee Yes Alison Dan MD   nitroGLYCERIN (NITROSTAT) 0.3 MG SL tablet Take one sublingual for chest pain. May repeat twice at 5 min intervals. If not getting relief call 911.  Yes Alison Dan MD   albuterol (PROVENTIL) (2.5 MG/3ML) 0.083% nebulizer solution Take 3 mLs by nebulization every 6 hours as needed for Wheezing  Alison Dan MD      Family History   Problem Relation Age of Onset    Cancer Mother         breast    Cancer Father         throat     Social History     Tobacco Use    Smoking status: Former Smoker     Packs/day: 3.50     Years: 32.00     Pack years: 112.00     Types: Cigarettes     Quit date: 1985     Years since quittin.4    Smokeless tobacco: Never Used    Tobacco comment: advised not to resume   Substance Use Topics    Alcohol use: No     Alcohol/week: 0.0 standard drinks    Drug use: No      Immunization History   Administered Date(s) Administered    COVID-19, Pfizer, PF, 30mcg/0.3mL 2021, 2021    Influenza 10/04/2014    Influenza Virus Vaccine 10/09/2010, 2011, 10/10/2012    Influenza Whole 2013    Influenza, High Dose (Fluzone 65 yrs and older) 2012, 2015, 10/12/2016, 2017, 10/08/2018    Influenza, Quadv, adjuvanted, 65 yrs +, IM, PF (Fluad) 10/29/2020    Influenza, Triv, inactivated, subunit, adjuvanted, IM (Fluad 65 yrs and older) 10/16/2019    Pneumococcal Conjugate 13-valent (Sfxxrzz13) 2016    Pneumococcal Polysaccharide ([id number]) 06/15/2005, 2010    Tdap (Boostrix, Adacel) 2001    Tetanus 2011    Zoster Live (Zostavax) 10/03/2012     LAST LABS  Cholesterol, Total   Date Value Ref Range Status   2021 168 0 - 199 mg/dL Final     LDL Calculated   Date Value Ref Range Status   2021 92 <100 mg/dL Final     HDL   Date Value Ref Range Status   2021 52 40 - 60 mg/dL Final   2012 37 (L) 40 - 60 mg/dl Final weight without trying in the past 3 months?: No  Do you eat only one meal per day?: No  Have you seen the dentist within the past year?: N/A - wear dentures  Body mass index: (!) 41.34  Health Habits/Nutrition Interventions:  · Nutritional issues:  already losing weight     Current Health Maintenance Status  Recommendations for Preventive Services Due: see orders. Recommended screening schedule for the next 5-10 years is provided to the patient in written form: see Patient Instructions/AVS.    PHYSICAL EXAM:  VITALS:  /74 (Site: Right Upper Arm, Position: Sitting, Cuff Size: Large Adult)   Pulse 61   Resp 16   Ht 5' 9\" (1.753 m)   Wt 280 lb (127 kg)   SpO2 94%   BMI 41.35 kg/m²   BP Readings from Last 5 Encounters:   05/24/21 118/74   02/25/21 132/66   08/10/20 130/76   04/20/20 136/72   04/06/20 (!) 141/73     Wt Readings from Last 5 Encounters:   05/24/21 280 lb (127 kg)   02/25/21 285 lb (129.3 kg)   12/14/20 281 lb (127.5 kg)   08/10/20 281 lb (127.5 kg)   03/06/20 290 lb (131.5 kg)   Body mass index is 41.35 kg/m². GENERAL: well-developed, well-nourished, alert, no distress, calm   EYES: negative findings: lids and lashes normal and conjunctivae and sclerae normal  ENT: normal TM's and external ear canals both ears  · External nose and ears appear normal  · Pharynx: normal. Exudates: None  · Lips, mucosa, and tongue normal  · Hearing grossly decreased. NECK: No adenopathy, supple, symmetrical, trachea midline  · Thyroid not enlarged, symmetric, no tenderness/mass/nodules  · no cervical nodes, no supraclavicular nodes  LUNGS:  Breathing unlabored  · clear to auscultation bilaterally and good air movement  CARDIOVASC: regular rate and rhythm, S1, S2 normal   LEGS:  Lower extremity edema: none     No carotid bruits  ABDOMEN: Soft, non-tender, no masses  · No hepatosplenomegaly  · No hernias noted.   Exam limited by body habitus  SKIN: warm and dry  · No rashes or suspicious lesions  PSYCH: Alert and oriented  · Normal reasoning, insight good  · Facial expressions full, mood appropriate  · No memory disturbance noted  MUSCULOSKEL:  No significant finger or nail findings  · Spine symmetric, no deformities, no kyphosis   GAIT: UP and Go test: <30 seconds with gait: stiff. Speed Normal.  No significant balance checks. No extra steps on turn around. Assistive device: none        Assessment and Plan:      Diagnosis Orders   1. Routine general medical examination at a health care facility  Ambulatory Referral to [address] [place] [address] Specialist   2. Coronary artery disease involving native coronary artery of native heart without angina pectoris  Stable with current medications. No adjustments needed. Will continue to monitor. 3. Pulmonary emphysema, unspecified emphysema type (HCC)  Poor control, unable to afford inhaler. Giving samples  today   4. Paroxysmal atrial fibrillation (HCC)  Stable with current medications. No adjustments needed. Will continue to monitor. 5. Obesity, Class III, BMI 40-49.9 (morbid obesity) ([facility] [address]  Starting to lose   6. Type 2 diabetes mellitus with diabetic nephropathy, with long-term current use of insulin (HCC)  Stable with current medications. No adjustments needed. Will continue to monitor. Hemoglobin A1C   7. Essential hypertension  Stable with current medications. No adjustments needed. Will continue to monitor. 8. Hyperlipidemia LDL goal <70  Stable with current medications. No adjustments needed. Will continue to monitor. 9. Anemia, unspecified type  Iron recently OK but MCV increasing. Check:  CBC Auto Differential    Vitamin B12    Folate   Plan as above and below. FYI: While Medicare provides you with a FREE ANNUAL PREVENTIVE PHYSICAL, this visit does NOT include management of chronic medical problems or physical examination. Dr. Cristiano Chu usually does a combination visit if you have other medical problems so you don't have to come back for another visit. However, this means that there will be a co-pay. INSTRUCTIONS  NEXT APPOINTMENT: Please schedule check-up in 3 months PLUS 1 week  · PLEASE TAKE THIS FORM TO CHECK-OUT WINDOW TO SCHEDULE NEXT VISIT. · Get blood work drawn in 2-3 days. · Medicare part D patients:  Get Shingrix shingles vaccine at pharmacy (such as [facility] or [facility]). Need second dose in 2-6 months.

## 2021-05-24 NOTE — PATIENT INSTRUCTIONS
FYI: While Medicare provides you with a FREE ANNUAL PREVENTIVE PHYSICAL, this visit does NOT include management of chronic medical problems or physical examination. Dr. Sheri Harvey usually does a combination visit if you have other medical problems so you don't have to come back for another visit. However, this means that there will be a co-pay. INSTRUCTIONS  NEXT APPOINTMENT: Please schedule check-up in 3 months PLUS 1 week  · PLEASE TAKE THIS FORM TO CHECK-OUT WINDOW TO SCHEDULE NEXT VISIT. · Get blood work drawn in 2-3 days. · Medicare part D patients:  Get Shingrix shingles vaccine at pharmacy (such as [facility] or [facility]). Need second dose in 2-6 months. Patient Education         Personalized Preventive Plan for Chris Regalado - 5/24/2021  Medicare offers a range of preventive health benefits. Some of the tests and screenings are paid in full while other may be subject to a deductible, co-insurance, and/or copay. Some of these benefits include a comprehensive review of your medical history including lifestyle, illnesses that may run in your family, and various assessments and screenings as appropriate. After reviewing your medical record and screening and assessments performed today your provider may have ordered immunizations, labs, imaging, and/or referrals for you. A list of these orders (if applicable) as well as your Preventive Care list are included within your After Visit Summary for your review. Other Preventive Recommendations:    · A preventive eye exam performed by an eye specialist is recommended every 1-2 years to screen for glaucoma; cataracts, macular degeneration, and other eye disorders. · A preventive dental visit is recommended every 6 months. · Try to get at least 150 minutes of exercise per week or 10,000 steps per day on a pedometer . · Order or download the FREE \"Exercise & Physical Activity: Your Everyday Guide\" from [employer].  Call 1-659.797.4881 or search [employer] on [address] [place] [address]. · You need 5229-6628 mg of calcium and 7982-1390 IU of vitamin D per day. It is possible to meet your calcium requirement with diet alone, but a vitamin D supplement is usually necessary to meet this goal.  · When exposed to the sun, use a sunscreen that protects against both UVA and UVB radiation with an SPF of 30 or greater. Reapply every 2 to 3 hours or after sweating, drying off with a towel, or swimming. · Always wear a seat belt when traveling in a car. Always wear a helmet when riding a bicycle or motorcycle.

## 2021-05-27 ENCOUNTER — TELEPHONE (OUTPATIENT)
Dept: FAMILY MEDICINE CLINIC | Age: 81
End: 2021-05-27

## 2021-05-27 ENCOUNTER — HOSPITAL ENCOUNTER (OUTPATIENT)
Age: 81
Discharge: HOME | End: 2021-05-27
Payer: MEDICARE

## 2021-05-27 DIAGNOSIS — Z79.4 TYPE 2 DIABETES MELLITUS WITH DIABETIC NEPHROPATHY, WITH LONG-TERM CURRENT USE OF INSULIN (HCC): ICD-10-CM

## 2021-05-27 DIAGNOSIS — E11.21 TYPE 2 DIABETES MELLITUS WITH DIABETIC NEPHROPATHY, WITH LONG-TERM CURRENT USE OF INSULIN (HCC): ICD-10-CM

## 2021-05-27 DIAGNOSIS — D64.9 ANEMIA, UNSPECIFIED TYPE: ICD-10-CM

## 2021-05-27 LAB
BASOPHILS ABSOLUTE: 0 K/UL (ref 0–0.2)
BASOPHILS RELATIVE PERCENT: 1.1 %
EOSINOPHILS ABSOLUTE: 0.1 K/UL (ref 0–0.6)
EOSINOPHILS RELATIVE PERCENT: 3.5 %
ESTIMATED AVERAGE GLUCOSE: 185.8 MG/DL
FOLATE: 9.59 NG/ML (ref 4.78–24.2)
HBA1C MFR BLD: 8.1 %
HCT VFR BLD CALC: 43 % (ref 40.5–52.5)
HEMOGLOBIN: 14.3 G/DL (ref 13.5–17.5)
LYMPHOCYTES ABSOLUTE: 0.7 K/UL (ref 1–5.1)
LYMPHOCYTES RELATIVE PERCENT: 16.7 %
MCH RBC QN AUTO: 32.9 PG (ref 26–34)
MCHC RBC AUTO-ENTMCNC: 33.4 G/DL (ref 31–36)
MCV RBC AUTO: 98.6 FL (ref 80–100)
MONOCYTES ABSOLUTE: 0.5 K/UL (ref 0–1.3)
MONOCYTES RELATIVE PERCENT: 12.5 %
NEUTROPHILS ABSOLUTE: 2.8 K/UL (ref 1.7–7.7)
NEUTROPHILS RELATIVE PERCENT: 66.2 %
PDW BLD-RTO: 15.8 % (ref 12.4–15.4)
PLATELET # BLD: 127 K/UL (ref 135–450)
PMV BLD AUTO: 9.5 FL (ref 5–10.5)
RBC # BLD: 4.36 M/UL (ref 4.2–5.9)
VITAMIN B-12: 681 PG/ML (ref 211–911)
WBC # BLD: 4.2 K/UL (ref 4–11)

## 2021-05-27 PROCEDURE — 82746 ASSAY OF FOLIC ACID SERUM: CPT

## 2021-05-27 PROCEDURE — 85025 COMPLETE CBC W/AUTO DIFF WBC: CPT

## 2021-05-27 PROCEDURE — 83036 HEMOGLOBIN GLYCOSYLATED A1C: CPT

## 2021-05-27 PROCEDURE — 36415 COLL VENOUS BLD VENIPUNCTURE: CPT

## 2021-05-27 PROCEDURE — 82607 VITAMIN B-12: CPT

## 2021-05-27 NOTE — TELEPHONE ENCOUNTER
We do not have a number for them he would have to wait for them to call back or see if he has the missed called number in his cell phone

## 2021-05-27 NOTE — TELEPHONE ENCOUNTER
Pt called in to ask for a phone number to the clinical specialist that Dr. Chelsea Ramirez referred him to for his wife's living will because he thinks they called him and gave him a phone number but he was driving and couldn't write it down. He wants to know if they can reach out to him again.     Please Advise    He also wanted to let Bela Johns know that he got his blood work done    [employer]

## 2021-06-04 RX ORDER — AMIODARONE HYDROCHLORIDE 200 MG/1
TABLET ORAL
Qty: 90 TABLET | Refills: 0 | Status: SHIPPED | OUTPATIENT
Start: 2021-06-04 | End: 2021-09-01

## 2021-06-10 ENCOUNTER — HOSPITAL ENCOUNTER (OUTPATIENT)
Age: 81
Discharge: HOME | End: 2021-06-10
Payer: MEDICARE

## 2021-06-10 LAB
ALBUMIN SERPL-MCNC: 3.8 G/DL (ref 3.4–5)
ANION GAP SERPL CALCULATED.3IONS-SCNC: 19 MMOL/L (ref 3–16)
BUN BLDV-MCNC: 21 MG/DL (ref 7–20)
CALCIUM SERPL-MCNC: 9.2 MG/DL (ref 8.3–10.6)
CHLORIDE BLD-SCNC: 101 MMOL/L (ref 99–110)
CO2: 26 MMOL/L (ref 21–32)
CREAT SERPL-MCNC: 1.9 MG/DL (ref 0.8–1.3)
CREATININE URINE: 90.4 MG/DL (ref 39–259)
GFR AFRICAN AMERICAN: 41
GFR NON-AFRICAN AMERICAN: 34
GLUCOSE BLD-MCNC: 143 MG/DL (ref 70–99)
HCT VFR BLD CALC: 44.2 % (ref 40.5–52.5)
HEMOGLOBIN: 14.6 G/DL (ref 13.5–17.5)
IRON SATURATION: 28 % (ref 20–50)
IRON: 79 UG/DL (ref 59–158)
MCH RBC QN AUTO: 33.2 PG (ref 26–34)
MCHC RBC AUTO-ENTMCNC: 33.1 G/DL (ref 31–36)
MCV RBC AUTO: 100.3 FL (ref 80–100)
MICROALBUMIN UR-MCNC: 1.3 MG/DL
MICROALBUMIN/CREAT UR-RTO: 14.4 MG/G (ref 0–30)
PARATHYROID HORMONE INTACT: 102.2 PG/ML (ref 14–72)
PDW BLD-RTO: 15.8 % (ref 12.4–15.4)
PHOSPHORUS: 3.9 MG/DL (ref 2.5–4.9)
PLATELET # BLD: 118 K/UL (ref 135–450)
PMV BLD AUTO: 9.1 FL (ref 5–10.5)
POTASSIUM SERPL-SCNC: 3.9 MMOL/L (ref 3.5–5.1)
RBC # BLD: 4.41 M/UL (ref 4.2–5.9)
SODIUM BLD-SCNC: 146 MMOL/L (ref 136–145)
TOTAL IRON BINDING CAPACITY: 287 UG/DL (ref 260–445)
TRANSFERRIN: 243 MG/DL (ref 200–360)
VITAMIN D 25-HYDROXY: 49.1 NG/ML
WBC # BLD: 3.8 K/UL (ref 4–11)

## 2021-06-10 PROCEDURE — 83540 ASSAY OF IRON: CPT

## 2021-06-10 PROCEDURE — 82306 VITAMIN D 25 HYDROXY: CPT

## 2021-06-10 PROCEDURE — 82570 ASSAY OF URINE CREATININE: CPT

## 2021-06-10 PROCEDURE — 82043 UR ALBUMIN QUANTITATIVE: CPT

## 2021-06-10 PROCEDURE — 84466 ASSAY OF TRANSFERRIN: CPT

## 2021-06-10 PROCEDURE — 80069 RENAL FUNCTION PANEL: CPT

## 2021-06-10 PROCEDURE — 85027 COMPLETE CBC AUTOMATED: CPT

## 2021-06-10 PROCEDURE — 83970 ASSAY OF PARATHORMONE: CPT

## 2021-06-10 PROCEDURE — 36415 COLL VENOUS BLD VENIPUNCTURE: CPT

## 2021-06-29 ENCOUNTER — APPOINTMENT (OUTPATIENT)
Dept: GENERAL RADIOLOGY | Age: 81
DRG: 391 | End: 2021-06-29
Payer: MEDICARE

## 2021-06-29 ENCOUNTER — APPOINTMENT (OUTPATIENT)
Dept: CT IMAGING | Age: 81
DRG: 391 | End: 2021-06-29
Payer: MEDICARE

## 2021-06-29 ENCOUNTER — HOSPITAL ENCOUNTER (INPATIENT)
Age: 81
LOS: 2 days | Discharge: HOME | DRG: 391 | End: 2021-07-01
Attending: EMERGENCY MEDICINE | Admitting: INTERNAL MEDICINE
Payer: MEDICARE

## 2021-06-29 DIAGNOSIS — R10.10 UPPER ABDOMINAL PAIN: Primary | ICD-10-CM

## 2021-06-29 DIAGNOSIS — I50.9 ACUTE ON CHRONIC CONGESTIVE HEART FAILURE, UNSPECIFIED HEART FAILURE TYPE (HCC): ICD-10-CM

## 2021-06-29 PROBLEM — I50.43 CHF (CONGESTIVE HEART FAILURE), NYHA CLASS I, ACUTE ON CHRONIC, COMBINED (HCC): Status: ACTIVE | Noted: 2021-06-29

## 2021-06-29 LAB
A/G RATIO: 1.2 (ref 1.1–2.2)
ALBUMIN SERPL-MCNC: 3.5 G/DL (ref 3.4–5)
ALP BLD-CCNC: 109 U/L (ref 40–129)
ALT SERPL-CCNC: 61 U/L (ref 10–40)
ANION GAP SERPL CALCULATED.3IONS-SCNC: 11 MMOL/L (ref 3–16)
AST SERPL-CCNC: 79 U/L (ref 15–37)
BASOPHILS ABSOLUTE: 0 K/UL (ref 0–0.2)
BASOPHILS RELATIVE PERCENT: 0.6 %
BILIRUB SERPL-MCNC: 1.1 MG/DL (ref 0–1)
BILIRUBIN URINE: ABNORMAL
BLOOD, URINE: ABNORMAL
BUN BLDV-MCNC: 22 MG/DL (ref 7–20)
C DIFF TOXIN/ANTIGEN: NORMAL
CALCIUM SERPL-MCNC: 9.6 MG/DL (ref 8.3–10.6)
CHLORIDE BLD-SCNC: 97 MMOL/L (ref 99–110)
CLARITY: CLEAR
CO2: 30 MMOL/L (ref 21–32)
COLOR: ABNORMAL
CREAT SERPL-MCNC: 1.4 MG/DL (ref 0.8–1.3)
EKG ATRIAL RATE: 85 BPM
EKG DIAGNOSIS: NORMAL
EKG P AXIS: 87 DEGREES
EKG P-R INTERVAL: 224 MS
EKG Q-T INTERVAL: 456 MS
EKG QRS DURATION: 178 MS
EKG QTC CALCULATION (BAZETT): 542 MS
EKG R AXIS: -74 DEGREES
EKG T AXIS: 97 DEGREES
EKG VENTRICULAR RATE: 85 BPM
EOSINOPHILS ABSOLUTE: 0 K/UL (ref 0–0.6)
EOSINOPHILS RELATIVE PERCENT: 0.3 %
EPITHELIAL CELLS, UA: 1 /HPF (ref 0–5)
GFR AFRICAN AMERICAN: 59
GFR NON-AFRICAN AMERICAN: 49
GLOBULIN: 3 G/DL
GLUCOSE BLD-MCNC: 182 MG/DL (ref 70–99)
GLUCOSE BLD-MCNC: 232 MG/DL (ref 70–99)
GLUCOSE BLD-MCNC: 348 MG/DL (ref 70–99)
GLUCOSE URINE: 500 MG/DL
HCT VFR BLD CALC: 49.8 % (ref 40.5–52.5)
HEMOGLOBIN: 16.2 G/DL (ref 13.5–17.5)
HYALINE CASTS: 4 /LPF (ref 0–8)
KETONES, URINE: 15 MG/DL
LACTIC ACID, SEPSIS: 1.6 MMOL/L (ref 0.4–1.9)
LACTIC ACID, SEPSIS: 2 MMOL/L (ref 0.4–1.9)
LEUKOCYTE ESTERASE, URINE: NEGATIVE
LIPASE: 49 U/L (ref 13–60)
LYMPHOCYTES ABSOLUTE: 0.4 K/UL (ref 1–5.1)
LYMPHOCYTES RELATIVE PERCENT: 9.2 %
MCH RBC QN AUTO: 32.7 PG (ref 26–34)
MCHC RBC AUTO-ENTMCNC: 32.5 G/DL (ref 31–36)
MCV RBC AUTO: 100.5 FL (ref 80–100)
MICROSCOPIC EXAMINATION: YES
MONOCYTES ABSOLUTE: 0.5 K/UL (ref 0–1.3)
MONOCYTES RELATIVE PERCENT: 13.7 %
NEUTROPHILS ABSOLUTE: 3 K/UL (ref 1.7–7.7)
NEUTROPHILS RELATIVE PERCENT: 76.2 %
NITRITE, URINE: NEGATIVE
PDW BLD-RTO: 16 % (ref 12.4–15.4)
PERFORMED ON: ABNORMAL
PERFORMED ON: ABNORMAL
PH UA: 5.5 (ref 5–8)
PLATELET # BLD: 120 K/UL (ref 135–450)
PMV BLD AUTO: 9.1 FL (ref 5–10.5)
POTASSIUM SERPL-SCNC: 4.2 MMOL/L (ref 3.5–5.1)
PRO-BNP: ABNORMAL PG/ML (ref 0–449)
PROTEIN UA: 30 MG/DL
RBC # BLD: 4.95 M/UL (ref 4.2–5.9)
RBC UA: 10 /HPF (ref 0–4)
SODIUM BLD-SCNC: 138 MMOL/L (ref 136–145)
SPECIFIC GRAVITY UA: >1.03 (ref 1–1.03)
TOTAL PROTEIN: 6.5 G/DL (ref 6.4–8.2)
TROPONIN: 0.01 NG/ML
URINE REFLEX TO CULTURE: ABNORMAL
URINE TYPE: ABNORMAL
UROBILINOGEN, URINE: 0.2 E.U./DL
WBC # BLD: 3.9 K/UL (ref 4–11)
WBC UA: 2 /HPF (ref 0–5)

## 2021-06-29 PROCEDURE — APPNB30 APP NON BILLABLE TIME 0-30 MINS: Performed by: NURSE PRACTITIONER

## 2021-06-29 PROCEDURE — 85025 COMPLETE CBC W/AUTO DIFF WBC: CPT

## 2021-06-29 PROCEDURE — 99222 1ST HOSP IP/OBS MODERATE 55: CPT | Performed by: SURGERY

## 2021-06-29 PROCEDURE — 6370000000 HC RX 637 (ALT 250 FOR IP): Performed by: INTERNAL MEDICINE

## 2021-06-29 PROCEDURE — 83880 ASSAY OF NATRIURETIC PEPTIDE: CPT

## 2021-06-29 PROCEDURE — 36415 COLL VENOUS BLD VENIPUNCTURE: CPT

## 2021-06-29 PROCEDURE — 80053 COMPREHEN METABOLIC PANEL: CPT

## 2021-06-29 PROCEDURE — 2060000000 HC ICU INTERMEDIATE R&B

## 2021-06-29 PROCEDURE — 93005 ELECTROCARDIOGRAM TRACING: CPT | Performed by: PHYSICIAN ASSISTANT

## 2021-06-29 PROCEDURE — 81001 URINALYSIS AUTO W/SCOPE: CPT

## 2021-06-29 PROCEDURE — C9113 INJ PANTOPRAZOLE SODIUM, VIA: HCPCS | Performed by: INTERNAL MEDICINE

## 2021-06-29 PROCEDURE — 96375 TX/PRO/DX INJ NEW DRUG ADDON: CPT

## 2021-06-29 PROCEDURE — APPSS60 APP SPLIT SHARED TIME 46-60 MINUTES: Performed by: NURSE PRACTITIONER

## 2021-06-29 PROCEDURE — 74177 CT ABD & PELVIS W/CONTRAST: CPT

## 2021-06-29 PROCEDURE — 83690 ASSAY OF LIPASE: CPT

## 2021-06-29 PROCEDURE — 6360000002 HC RX W HCPCS: Performed by: INTERNAL MEDICINE

## 2021-06-29 PROCEDURE — 6360000002 HC RX W HCPCS: Performed by: PHYSICIAN ASSISTANT

## 2021-06-29 PROCEDURE — 87493 C DIFF AMPLIFIED PROBE: CPT

## 2021-06-29 PROCEDURE — 2580000003 HC RX 258: Performed by: INTERNAL MEDICINE

## 2021-06-29 PROCEDURE — 96374 THER/PROPH/DIAG INJ IV PUSH: CPT

## 2021-06-29 PROCEDURE — 93010 ELECTROCARDIOGRAM REPORT: CPT | Performed by: INTERNAL MEDICINE

## 2021-06-29 PROCEDURE — 87449 NOS EACH ORGANISM AG IA: CPT

## 2021-06-29 PROCEDURE — 71045 X-RAY EXAM CHEST 1 VIEW: CPT

## 2021-06-29 PROCEDURE — 6360000004 HC RX CONTRAST MEDICATION: Performed by: PHYSICIAN ASSISTANT

## 2021-06-29 PROCEDURE — 83605 ASSAY OF LACTIC ACID: CPT

## 2021-06-29 PROCEDURE — 99283 EMERGENCY DEPT VISIT LOW MDM: CPT

## 2021-06-29 PROCEDURE — 87505 NFCT AGENT DETECTION GI: CPT

## 2021-06-29 PROCEDURE — 87324 CLOSTRIDIUM AG IA: CPT

## 2021-06-29 PROCEDURE — 84484 ASSAY OF TROPONIN QUANT: CPT

## 2021-06-29 RX ORDER — NICOTINE POLACRILEX 4 MG
15 LOZENGE BUCCAL PRN
Status: DISCONTINUED | OUTPATIENT
Start: 2021-06-29 | End: 2021-07-01 | Stop reason: HOSPADM

## 2021-06-29 RX ORDER — ONDANSETRON 2 MG/ML
4 INJECTION INTRAMUSCULAR; INTRAVENOUS ONCE
Status: COMPLETED | OUTPATIENT
Start: 2021-06-29 | End: 2021-06-29

## 2021-06-29 RX ORDER — POLYETHYLENE GLYCOL 3350 17 G/17G
17 POWDER, FOR SOLUTION ORAL DAILY PRN
Status: DISCONTINUED | OUTPATIENT
Start: 2021-06-29 | End: 2021-07-01 | Stop reason: HOSPADM

## 2021-06-29 RX ORDER — DEXTROSE MONOHYDRATE 50 MG/ML
100 INJECTION, SOLUTION INTRAVENOUS PRN
Status: DISCONTINUED | OUTPATIENT
Start: 2021-06-29 | End: 2021-07-01 | Stop reason: HOSPADM

## 2021-06-29 RX ORDER — SODIUM CHLORIDE 9 MG/ML
25 INJECTION, SOLUTION INTRAVENOUS PRN
Status: DISCONTINUED | OUTPATIENT
Start: 2021-06-29 | End: 2021-07-01 | Stop reason: HOSPADM

## 2021-06-29 RX ORDER — ASPIRIN 81 MG/1
81 TABLET ORAL DAILY
Status: DISCONTINUED | OUTPATIENT
Start: 2021-06-30 | End: 2021-07-01 | Stop reason: HOSPADM

## 2021-06-29 RX ORDER — DEXTROSE MONOHYDRATE 25 G/50ML
12.5 INJECTION, SOLUTION INTRAVENOUS PRN
Status: DISCONTINUED | OUTPATIENT
Start: 2021-06-29 | End: 2021-07-01 | Stop reason: HOSPADM

## 2021-06-29 RX ORDER — ONDANSETRON 4 MG/1
4 TABLET, ORALLY DISINTEGRATING ORAL EVERY 8 HOURS PRN
Status: DISCONTINUED | OUTPATIENT
Start: 2021-06-29 | End: 2021-07-01 | Stop reason: HOSPADM

## 2021-06-29 RX ORDER — FUROSEMIDE 10 MG/ML
40 INJECTION INTRAMUSCULAR; INTRAVENOUS ONCE
Status: COMPLETED | OUTPATIENT
Start: 2021-06-29 | End: 2021-06-29

## 2021-06-29 RX ORDER — AMIODARONE HYDROCHLORIDE 200 MG/1
200 TABLET ORAL DAILY
Status: DISCONTINUED | OUTPATIENT
Start: 2021-06-30 | End: 2021-07-01 | Stop reason: HOSPADM

## 2021-06-29 RX ORDER — ONDANSETRON 2 MG/ML
4 INJECTION INTRAMUSCULAR; INTRAVENOUS EVERY 6 HOURS PRN
Status: DISCONTINUED | OUTPATIENT
Start: 2021-06-29 | End: 2021-07-01 | Stop reason: HOSPADM

## 2021-06-29 RX ORDER — SODIUM CHLORIDE 0.9 % (FLUSH) 0.9 %
5-40 SYRINGE (ML) INJECTION PRN
Status: DISCONTINUED | OUTPATIENT
Start: 2021-06-29 | End: 2021-07-01 | Stop reason: HOSPADM

## 2021-06-29 RX ORDER — ACETAMINOPHEN 325 MG/1
650 TABLET ORAL EVERY 6 HOURS PRN
Status: DISCONTINUED | OUTPATIENT
Start: 2021-06-29 | End: 2021-07-01 | Stop reason: HOSPADM

## 2021-06-29 RX ORDER — METOPROLOL SUCCINATE 25 MG/1
25 TABLET, EXTENDED RELEASE ORAL DAILY
Status: DISCONTINUED | OUTPATIENT
Start: 2021-06-30 | End: 2021-07-01 | Stop reason: HOSPADM

## 2021-06-29 RX ORDER — ISOSORBIDE MONONITRATE 30 MG/1
30 TABLET, EXTENDED RELEASE ORAL EVERY MORNING
Status: DISCONTINUED | OUTPATIENT
Start: 2021-06-30 | End: 2021-07-01 | Stop reason: HOSPADM

## 2021-06-29 RX ORDER — LEVALBUTEROL TARTRATE 45 UG/1
2 AEROSOL, METERED ORAL EVERY 4 HOURS PRN
Status: DISCONTINUED | OUTPATIENT
Start: 2021-06-29 | End: 2021-06-29

## 2021-06-29 RX ORDER — ALBUTEROL SULFATE 2.5 MG/3ML
2.5 SOLUTION RESPIRATORY (INHALATION) EVERY 6 HOURS PRN
Status: DISCONTINUED | OUTPATIENT
Start: 2021-06-29 | End: 2021-07-01 | Stop reason: HOSPADM

## 2021-06-29 RX ORDER — SODIUM CHLORIDE 0.9 % (FLUSH) 0.9 %
5-40 SYRINGE (ML) INJECTION EVERY 12 HOURS SCHEDULED
Status: DISCONTINUED | OUTPATIENT
Start: 2021-06-29 | End: 2021-07-01 | Stop reason: HOSPADM

## 2021-06-29 RX ORDER — INSULIN LISPRO 100 [IU]/ML
0-6 INJECTION, SOLUTION INTRAVENOUS; SUBCUTANEOUS
Status: DISCONTINUED | OUTPATIENT
Start: 2021-06-29 | End: 2021-07-01 | Stop reason: HOSPADM

## 2021-06-29 RX ORDER — SODIUM CHLORIDE, SODIUM LACTATE, POTASSIUM CHLORIDE, CALCIUM CHLORIDE 600; 310; 30; 20 MG/100ML; MG/100ML; MG/100ML; MG/100ML
1000 INJECTION, SOLUTION INTRAVENOUS ONCE
Status: DISCONTINUED | OUTPATIENT
Start: 2021-06-29 | End: 2021-06-29

## 2021-06-29 RX ORDER — ACETAMINOPHEN 650 MG/1
650 SUPPOSITORY RECTAL EVERY 6 HOURS PRN
Status: DISCONTINUED | OUTPATIENT
Start: 2021-06-29 | End: 2021-07-01 | Stop reason: HOSPADM

## 2021-06-29 RX ORDER — MORPHINE SULFATE 4 MG/ML
4 INJECTION, SOLUTION INTRAMUSCULAR; INTRAVENOUS EVERY 30 MIN PRN
Status: DISCONTINUED | OUTPATIENT
Start: 2021-06-29 | End: 2021-07-01 | Stop reason: HOSPADM

## 2021-06-29 RX ORDER — ALBUTEROL SULFATE 90 UG/1
2 AEROSOL, METERED RESPIRATORY (INHALATION) EVERY 6 HOURS PRN
Status: DISCONTINUED | OUTPATIENT
Start: 2021-06-29 | End: 2021-06-29 | Stop reason: CLARIF

## 2021-06-29 RX ORDER — ALBUTEROL SULFATE 2.5 MG/3ML
2.5 SOLUTION RESPIRATORY (INHALATION) EVERY 6 HOURS PRN
Status: DISCONTINUED | OUTPATIENT
Start: 2021-06-29 | End: 2021-06-29

## 2021-06-29 RX ORDER — PANTOPRAZOLE SODIUM 40 MG/10ML
40 INJECTION, POWDER, LYOPHILIZED, FOR SOLUTION INTRAVENOUS DAILY
Status: DISCONTINUED | OUTPATIENT
Start: 2021-06-29 | End: 2021-07-01 | Stop reason: HOSPADM

## 2021-06-29 RX ORDER — INSULIN LISPRO 100 [IU]/ML
0-3 INJECTION, SOLUTION INTRAVENOUS; SUBCUTANEOUS NIGHTLY
Status: DISCONTINUED | OUTPATIENT
Start: 2021-06-29 | End: 2021-07-01 | Stop reason: HOSPADM

## 2021-06-29 RX ADMIN — Medication 10 ML: at 21:18

## 2021-06-29 RX ADMIN — INSULIN LISPRO 1 UNITS: 100 INJECTION, SOLUTION INTRAVENOUS; SUBCUTANEOUS at 18:02

## 2021-06-29 RX ADMIN — PANTOPRAZOLE SODIUM 40 MG: 40 INJECTION, POWDER, FOR SOLUTION INTRAVENOUS at 18:04

## 2021-06-29 RX ADMIN — MORPHINE SULFATE 4 MG: 4 INJECTION, SOLUTION INTRAMUSCULAR; INTRAVENOUS at 09:54

## 2021-06-29 RX ADMIN — INSULIN LISPRO 2 UNITS: 100 INJECTION, SOLUTION INTRAVENOUS; SUBCUTANEOUS at 21:14

## 2021-06-29 RX ADMIN — ONDANSETRON 4 MG: 2 INJECTION INTRAMUSCULAR; INTRAVENOUS at 09:54

## 2021-06-29 RX ADMIN — FUROSEMIDE 40 MG: 10 INJECTION, SOLUTION INTRAMUSCULAR; INTRAVENOUS at 11:51

## 2021-06-29 RX ADMIN — IOPAMIDOL 75 ML: 755 INJECTION, SOLUTION INTRAVENOUS at 10:46

## 2021-06-29 RX ADMIN — INSULIN GLARGINE 20 UNITS: 100 INJECTION, SOLUTION SUBCUTANEOUS at 21:14

## 2021-06-29 RX ADMIN — ENOXAPARIN SODIUM 40 MG: 40 INJECTION SUBCUTANEOUS at 21:14

## 2021-06-29 ASSESSMENT — PAIN SCALES - GENERAL
PAINLEVEL_OUTOF10: 4
PAINLEVEL_OUTOF10: 0

## 2021-06-29 ASSESSMENT — ENCOUNTER SYMPTOMS
SHORTNESS OF BREATH: 0
NAUSEA: 1
COUGH: 0
ABDOMINAL PAIN: 0
DIARRHEA: 0
VOMITING: 1
RHINORRHEA: 0

## 2021-06-29 NOTE — ED PROVIDER NOTES
[address] [facility]        Pt Name: Cristino Najera  MRN: [id number]  [place] 1940  Date of evaluation: 6/29/2021  Provider: Poornima Hurley PA-C  PCP: Kobi Lynne MD  Note Started: 9:38 AM EDT        I have seen and evaluated this patient with my supervising physician Mesfin Saunders MD.    [address] [place] [address]       Chief Complaint   Patient presents with    Emesis     Pt reports vomiting since going out to eat since Friday. States that the vomit is now green in color, also having some diarrhea. Only thing he can keep down is water. HISTORY OF PRESENT ILLNESS   (Location, Timing/Onset, Context/Setting, Quality, Duration, Modifying Factors, Severity, Associated Signs and Symptoms)  Note limiting factors. Chief Complaint: Nausea and vomiting    Cristino Najera is a [de-identified] y.o. male who presents to the emergency department today for evaluation for nausea and vomiting. The patient states that he went out to eat on Friday, and he states that several after returning home, he started with multiple episodes of vomiting. The patient states that on Saturday and into Sunday morning he did notice that his emesis has turned bilious. The patient denies any coffee-ground emesis, hematemesis. Patient states that he is also having diarrhea for the past several days as well he states that his diarrhea is \"green in [employer]". The patient denies any blood in the stool or black tarry appearance of the stool. The patient does not have any abdominal pain. He denies any chest pain or shortness of breath. He has no fever chills. No cough or congestion. No urinary symptoms. The patient tells me that he had a similar episode 3 to 4 years ago and he states that Josefa Kayser thought that I had a blockage in one of my ducts of my gallbladder, but it got better on its own\".   The patient has a past medical history significant for atrial fibrillation, coronary artery disease, COPD, diabetes, hypertension, hyperlipidemia, ischemic cardiomyopathy. Patient states that he is not been able to keep down any of his medications because of the vomiting. Nursing Notes were all reviewed and agreed with or any disagreements were addressed in the HPI. REVIEW OF SYSTEMS    (2-9 systems for level 4, 10 or more for level 5)     Review of Systems   Constitutional: Negative for activity change, appetite change, chills and fever. HENT: Negative for congestion and rhinorrhea. Respiratory: Negative for cough and shortness of breath. Cardiovascular: Negative for chest pain. Gastrointestinal: Positive for nausea and vomiting. Negative for abdominal pain and diarrhea. Genitourinary: Negative for difficulty urinating, dysuria and hematuria. Positives and Pertinent negatives as per HPI. Except as noted above in the ROS, all other systems were reviewed and negative. PAST MEDICAL HISTORY     Past Medical History:   Diagnosis Date    Acid reflux     Atrial flutter ([facility] [facility] 2013    converted    Bleeding stomach ulcer oct 2015    BPH (benign prostatic hyperplasia)     CAD (coronary artery disease)     11/12 angio with 2 blocked bypass and 2 patent    COPD (chronic obstructive pulmonary disease) ([facility] [address] [facility]     Diabetes mellitus type II     Edema     chronic left leg    Elevated PSA- nl 8/12/11     Hyperlipidemia     Hypertension     Ischemic cardiomyopathy     Lipoma of skin-RIGHT CHEST 5/5/2011    Obesity     Osteoarthritis     Skin tear of left forearm without complication 5/11/4557    Significant amount of epidermal loss with bleeding.     Spinal stenosis of lumbar region with neurogenic claudication- clinically 7/6/2018         SURGICAL HISTORY     Past Surgical History:   Procedure Laterality Date    CATARACT REMOVAL  2010, 2011    bilat    COLONOSCOPY      CORONARY ARTERY BYPASS GRAFT  1993    x 4    EYE SURGERY Left 2019    cataract coming back    JOINT REPLACEMENT Right total knee replacement    JOINT REPLACEMENT Left 09/14/2016         CURRENTMEDICATIONS       Previous Medications    ALBUTEROL (PROVENTIL) (2.5 MG/3ML) 0.083% NEBULIZER SOLUTION    Take 3 mLs by nebulization every 6 hours as needed for Wheezing    ALBUTEROL SULFATE HFA (VENTOLIN HFA) 108 (90 BASE) MCG/ACT INHALER    Inhale 2 puffs into the lungs every 6 hours as needed for Wheezing    ALBUTEROL SULFATE  (90 BASE) MCG/ACT INHALER    INHALE 2 PUFFS INTO THE LUNGS EVERY 6 HOURS AS NEEDED FOR WHEEZING    AMIODARONE (CORDARONE) 200 MG TABLET    TAKE 1 TABLET BY MOUTH DAILY    APIXABAN (ELIQUIS) 2.5 MG TABS TABLET    Take 1 tablet by mouth 2 times daily    ASPIRIN 81 MG EC TABLET    Take 81 mg by mouth daily    ATORVASTATIN (LIPITOR) 40 MG TABLET    TAKE 1 TABLET BY MOUTH EVERY EVENING    CYANOCOBALAMIN (B-12) 500 MCG TABS    TAKE 1 TABLET BY MOUTH DAILY    FAMOTIDINE (PEPCID) 20 MG TABLET    TAKE 1 TABLET BY MOUTH TWICE DAILY    FARXIGA 10 MG TABLET    TAKE 1 TABLET BY MOUTH DAILY    FEROSUL 325 (65 FE) MG TABLET    TAKE 1 TABLET BY MOUTH TWICE DAILY    FUROSEMIDE (LASIX) 80 MG TABLET    TAKE 1 TABLET BY MOUTH DAILY    GABAPENTIN (NEURONTIN) 600 MG TABLET    TAKE UP TO 5 TABLETS BY MOUTH OVER 24 HOURS AS DIRECTED    HYDRALAZINE (APRESOLINE) 25 MG TABLET    TAKE 1 TABLET BY MOUTH THREE TIMES DAILY    INSULIN NPH (NOVOLIN N) 100 UNIT/ML INJECTION VIAL    Take 44 units with breakfast and 32 units with dinner.     INSULIN REGULAR (NOVOLIN R RELION) 100 UNIT/ML INJECTION    INJECT 20 TO 30 UNITS SUBCUTANEOUSLY THREE TIMES DAILY BEFORE MEAL(S)    INSULIN SYRINGE .5CC/29G 29G X 1/2\" 0.5 ML MISC    INJECT 4 TIMES DAILY AS NEEDED    ISOSORBIDE MONONITRATE (IMDUR) 30 MG CR TABLET    Take 1 tablet by mouth every morning Dr. Miki Lesches - Cardio    [facility]OL [facility]) 45 MCG/ACT INHALER    Inhale 1-2 puffs into the lungs every 4 hours as needed for Wheezing    METOPROLOL (LOPRESSOR) 25 MG TABLET    Take 25 mg by mouth daily     MUPIROCIN (BACTROBAN) 2 % OINTMENT    Apply 3 times daily as needed    NEBULIZERS (AIRIAL COMPACT MINI NEBULIZER) MISC    1 each by Does not apply route every 6 hours as needed (wheezing or shortness of breath)    NITROGLYCERIN (NITROSTAT) 0.3 MG SL TABLET    Take one sublingual for chest pain. May repeat twice at 5 min intervals. If not getting relief call 911.     OMEPRAZOLE (PRILOSEC) 20 MG DELAYED RELEASE CAPSULE    TAKE 1 CAPSULE BY MOUTH TWICE DAILY    POTASSIUM CHLORIDE (KLOR-CON) 10 MEQ EXTENDED RELEASE TABLET    TAKE 1 TABLET BY MOUTH DAILY    RESPIRATORY THERAPY SUPPLIES (NEBULIZER/TUBING/MOUTHPIECE) KIT    1 kit by Does not apply route every 6 hours as needed (for wheezing or shortness of breath)    TIOTROPIUM (SPIRIVA HANDIHALER) 18 MCG INHALATION CAPSULE    Inhale 1 capsule into the lungs daily INCLUDE inhaler device    UMECLIDINIUM-VILANTEROL (ANORO ELLIPTA) 62.5-25 MCG/INH AEPB INHALER    INHALE 1 PUFF INTO THE LUNGS DAILY    VITAMIN D (ERGOCALCIFEROL) 84281 UNITS CAPS CAPSULE    TK 1 C PO WEEKLY         ALLERGIES     Entresto [sacubitril-valsartan]    FAMILYHISTORY       Family History   Problem Relation Age of Onset    Cancer Mother         breast    Cancer Father         throat          SOCIAL HISTORY       Social History     Tobacco Use    Smoking status: Former Smoker     Packs/day: 3.50     Years: 32.00     Pack years: 112.00     Types: Cigarettes     Quit date: 1985     Years since quittin.5    Smokeless tobacco: Never Used    Tobacco comment: advised not to resume   Substance Use Topics    Alcohol use: No     Alcohol/week: 0.0 standard drinks    Drug use: No       SCREENINGS             PHYSICAL EXAM    (up to 7 for level 4, 8 or more for level 5)     ED Triage Vitals [21 0852]   BP Temp Temp Source Pulse Resp SpO2 Height Weight   (!) 190/72 98 °F (36.7 °C) Oral 83 15 96 % 5' 9\" (1.753 m) 290 lb (131.5 kg)       Physical Exam  Vitals and nursing note reviewed. Constitutional:       Appearance: He is well-developed. He is not diaphoretic. HENT:      Head: Normocephalic and atraumatic. Right Ear: External ear normal.      Left Ear: External ear normal.      Nose: Nose normal.   Eyes:      General:         Right eye: No discharge. Left eye: No discharge. Neck:      Trachea: No tracheal deviation. Cardiovascular:      Rate and Rhythm: Normal rate and regular rhythm. Pulmonary:      Effort: Pulmonary effort is normal. No respiratory distress. Comments: Diminished aeration to bilateral bases  Abdominal:      General: Bowel sounds are normal. There is no distension. Palpations: Abdomen is soft. Tenderness: There is abdominal tenderness. There is no guarding or rebound. Comments: Mild diffuse tenderness across the upper abdomen. There is no rebound or guarding. No peritoneal signs. Musculoskeletal:         General: Normal range of motion. Cervical back: Normal range of motion and neck supple. Skin:     General: Skin is warm and dry. Neurological:      Mental Status: He is alert and oriented to person, place, and time.    Psychiatric:         Behavior: Behavior normal.         DIAGNOSTIC RESULTS   LABS:    Labs Reviewed   CBC WITH AUTO DIFFERENTIAL - Abnormal; Notable for the following components:       Result Value    WBC 3.9 (*)     .5 (*)     RDW 16.0 (*)     Platelets 214 (*)     Lymphocytes Absolute 0.4 (*)     All other components within normal limits    Narrative:     Performed at:  [facility] [address], [place], [address] [place] [address] Drive   Phone [phone]   COMPREHENSIVE METABOLIC PANEL - Abnormal; Notable for the following components:    Chloride 97 (*)     Glucose 232 (*)     BUN 22 (*)     CREATININE 1.4 (*)     GFR Non- 49 (*)     GFR  59 (*)     Total Bilirubin 1.1 (*)     ALT 61 (*)     AST 79 (*)     All other components within normal limits    Narrative:     Performed at:  [facility] [address]   Phone [phone]   URINE RT REFLEX TO CULTURE - Abnormal; Notable for the following components:    Glucose, Ur 500 (*)     Bilirubin Urine SMALL (*)     Ketones, Urine 15 (*)     Blood, Urine LARGE (*)     Protein, UA 30 (*)     All other components within normal limits    Narrative:     Performed at:  [facility] [address]   Phone [phone]   BRAIN NATRIURETIC PEPTIDE - Abnormal; Notable for the following components:    Pro-BNP 21,696 (*)     All other components within normal limits    Narrative:     Performed at:  [facility] [address]   Phone [phone]   LACTATE, SEPSIS - Abnormal; Notable for the following components:    Lactic Acid, Sepsis 2.0 (*)     All other components within normal limits    Narrative:     Performed at:  [facility] [address]   Phone [phone]   MICROSCOPIC URINALYSIS - Abnormal; Notable for the following components:    RBC, UA 10 (*)     All other components within normal limits    Narrative:     Performed at:  [facility] [address]   Phone [phone]   C DIFF TOXIN/ANTIGEN   GASTROINTESTINAL PANEL, [address] [place] [address]    Narrative:     Performed at:  [facility] [address]   Phone [phone]   TROPONIN    Narrative:     Performed at:  [facility] [address]   Phone [phone]   LACTATE, SEPSIS    Narrative:     Performed at:  [facility] [address]   Phone [phone]       All other labs were within normal range or not returned as of this dictation. EKG: All EKG's are interpreted by the Emergency Department Physician in the absence of a cardiologist.  Please see their note for interpretation of EKG. RADIOLOGY:   Non-plain film images such as CT, Ultrasound and MRI are read by the radiologist. Plain radiographic images are visualized and preliminarily interpreted by the ED Provider with the below findings:        Interpretation per the Radiologist below, if available at the time of this note:    CT ABDOMEN PELVIS W IV CONTRAST Additional Contrast? None   Final Result   1. No acute abdominopelvic process demonstrated   2. Stones or sludge in the gallbladder   3. 1 cm right adrenal myelolipoma         XR CHEST PORTABLE   Final Result   Bibasilar peripheral pulmonary opacities are noted which could be secondary   to atelectasis versus pneumonia in the proper clinical setting. XR CHEST PORTABLE    Result Date: 6/29/2021  EXAMINATION: ONE XRAY VIEW OF THE CHEST 6/29/2021 9:14 am COMPARISON: Chest x-ray 04/08/2016 HISTORY: ORDERING SYSTEM PROVIDED HISTORY: SOB TECHNOLOGIST PROVIDED HISTORY: Reason for exam:->SOB Acuity: Unknown FINDINGS: Lung volumes are low. There are bibasilar peripheral parenchymal opacities. No pneumothorax. No definite pleural fluid. Cardiomediastinal silhouette has not changed significantly. Sternotomy wires mediastinal clips are unchanged. A dual lead pacer is present. Bibasilar peripheral pulmonary opacities are noted which could be secondary to atelectasis versus pneumonia in the proper clinical setting. PROCEDURES   Unless otherwise noted below, none     Procedures    CRITICAL CARE TIME   Critical Care  There was a high probability of life-threatening clinical deterioration in the patient's condition requiring my urgent intervention. Total critical care time with the patient was 40 minutes excluding separately reportable procedures.   Critical care required due to patients acute respiratory failure with hypoxemia, requiring supplemental oxygen, extensive work-up, and admission to hospitalist service      CONSULTS:  [address] and DIFFERENTIAL DIAGNOSIS/MDM:   Vitals:    Vitals:    06/29/21 0852 06/29/21 1130 06/29/21 1145 06/29/21 1200   BP: (!) 190/72 136/81 137/67 (!) 155/77   Pulse: 83 81 72 74   Resp: 15 14  14   Temp: 98 °F (36.7 °C)      TempSrc: Oral      SpO2: 96% 95% (!) 87% 99%   Weight: 290 lb (131.5 kg)      Height: 5' 9\" (1.753 m)          Patient was given the following medications:  Medications   morphine injection 4 mg (4 mg Intravenous Given 6/29/21 0954)   ondansetron (ZOFRAN) injection 4 mg (4 mg Intravenous Given 6/29/21 0954)   iopamidol (ISOVUE-370) 76 % injection 75 mL (75 mLs Intravenous Given 6/29/21 1046)   furosemide (LASIX) injection 40 mg (40 mg Intravenous Given 6/29/21 1151)           Briefly, this is an 71-year-old male who presents to the emergency department today for evaluation for nausea and vomiting. Patient states that his symptoms have been ongoing since Friday. Is not reporting bilious emesis. Has not been able to keep down any of his medications. On examination he has mild diffuse tenderness across his upper abdomen. There is no rebound or guarding. No peritoneal signs       EKG seconded by my attending, please see his note for further details. CBC shows no evidence of leukocytosis or anemia. CMP does show chronic kidney disease, does have a mild transaminitis with an elevated bilirubin, his lipase is normal.    Patient's troponin is normal however BNP is over 21,000 and x-ray does show bilateral pulmonary opacities. I went in to reassess the patient he was 87% on room air, 2 L of oxygen applied.   Patient will be given Lasix for possible CHF exacerbation as he has not been able to keep his medicine down since Friday    CT of the abdomen and pelvis obtained there are stones and sludge in the gallbladder I did discuss this with general surgery they will come to evaluate the patient did not feel that he needs antibiotics at this time. I feel that he will need to be admitted for his hypoxemia as well as a CHF exacerbation, hospitalist has agreed for admission, patient is updated and agreeable with plan. Stable for admission    FINAL IMPRESSION      1. Upper abdominal pain    2. Acute on chronic congestive heart failure, unspecified heart failure type [facility])          DISPOSITION/PLAN   DISPOSITION Admitted 06/29/2021 12:21:20 PM      PATIENT REFERRED TO:  No follow-up provider specified.     DISCHARGE MEDICATIONS:  New Prescriptions    No medications on file       DISCONTINUED MEDICATIONS:  Discontinued Medications    No medications on file              (Please note that portions of this note were completed with a voice recognition program.  Efforts were made to edit the dictations but occasionally words are mis-transcribed.)    Mindi Barnhart PA-C (electronically signed)            Mindi Barnhart PA-C  06/29/21 0296

## 2021-06-29 NOTE — CONSULTS
[facility] GENERAL AND LAPAROSCOPIC SURGERY                       PATIENT NAME: Keyla Marie     ADMISSION DATE: 6/29/2021  8:45 AM      TODAY'S DATE: 6/29/2021    Reason for Consult:  Abd concerns    Requesting Physician:  MARYLU Murrieta    HISTORY OF PRESENT ILLNESS:              The patient is a [de-identified] y.o. male who presents with abd pain, n/v/d. Pt with 4 days sx's. Has had sx's after large fatty meal. Not able to take / keep meds down. Had similar sx's years ago. Feels less pain now, no emesis after admit. No F/C. Similar meal, but not exact to wife who did not get ill. Prior CABG, COPD, HTN, CHF, CKD. Past Medical History:        Diagnosis Date    Acid reflux     Atrial flutter ([facility] 2013    converted    Bleeding stomach ulcer oct 2015    BPH (benign prostatic hyperplasia)     CAD (coronary artery disease)     11/12 angio with 2 blocked bypass and 2 patent    COPD (chronic obstructive pulmonary disease) ([address]     Diabetes mellitus type II     Edema     chronic left leg    Elevated PSA- nl 8/12/11     Hyperlipidemia     Hypertension     Ischemic cardiomyopathy     Lipoma of skin-RIGHT CHEST 5/5/2011    Obesity     Osteoarthritis     Skin tear of left forearm without complication 2/09/5829    Significant amount of epidermal loss with bleeding.     Spinal stenosis of lumbar region with neurogenic claudication- clinically 7/6/2018       Past Surgical History:        Procedure Laterality Date    CATARACT REMOVAL  2010, 2011    bilat    COLONOSCOPY      CORONARY ARTERY BYPASS GRAFT  1993    x 4    EYE SURGERY Left 2019    cataract coming back    JOINT REPLACEMENT Right total knee replacement    JOINT REPLACEMENT Left 09/14/2016       Current Medications:   Current Facility-Administered Medications: morphine injection 4 mg, 4 mg, Intravenous, Q30 Min PRN  [START ON 6/30/2021] isosorbide mononitrate (IMDUR) extended release tablet 30 mg, 30 mg, Oral, QAM  aspirin EC tablet 81 mg, 81 mg, Oral, Daily  levalbuterol (XOPENEX HFA) inhaler 2 puff, 2 puff, Inhalation, Q4H PRN  albuterol (PROVENTIL) nebulizer solution 2.5 mg, 2.5 mg, Nebulization, Q6H PRN  glycopyrrolate-formoterol (BEVESPI) 9-4.8 MCG/ACT inhaler 2 puff, 2 puff, Inhalation, BID  albuterol sulfate  (90 Base) MCG/ACT inhaler 2 puff, 2 puff, Inhalation, Q6H PRN  amiodarone (CORDARONE) tablet 200 mg, 1 tablet, Oral, Daily  pantoprazole (PROTONIX) injection 40 mg, 40 mg, Intravenous, Daily  insulin lispro (1 Unit Dial) 0-6 Units, 0-6 Units, Subcutaneous, TID WC  insulin lispro (1 Unit Dial) 0-3 Units, 0-3 Units, Subcutaneous, Nightly  insulin glargine (LANTUS;BASAGLAR) injection pen 20 Units, 20 Units, Subcutaneous, Nightly  sodium chloride flush 0.9 % injection 5-40 mL, 5-40 mL, Intravenous, 2 times per day  sodium chloride flush 0.9 % injection 5-40 mL, 5-40 mL, Intravenous, PRN  0.9 % sodium chloride infusion, 25 mL, Intravenous, PRN  enoxaparin (LOVENOX) injection 40 mg, 40 mg, Subcutaneous, Nightly  ondansetron (ZOFRAN-ODT) disintegrating tablet 4 mg, 4 mg, Oral, Q8H PRN **OR** ondansetron (ZOFRAN) injection 4 mg, 4 mg, Intravenous, Q6H PRN  polyethylene glycol (GLYCOLAX) packet 17 g, 17 g, Oral, Daily PRN  acetaminophen (TYLENOL) tablet 650 mg, 650 mg, Oral, Q6H PRN **OR** acetaminophen (TYLENOL) suppository 650 mg, 650 mg, Rectal, Q6H PRN  Prior to Admission medications    Medication Sig Start Date End Date Taking?  Authorizing Provider   amiodarone (CORDARONE) 200 MG tablet TAKE 1 TABLET BY MOUTH DAILY 6/4/21  Yes Lotus Juares MD   FARXIGA 10 MG tablet TAKE 1 TABLET BY MOUTH DAILY 5/6/21  Yes Historical Provider, MD   hydrALAZINE (APRESOLINE) 25 MG tablet TAKE 1 TABLET BY MOUTH THREE TIMES DAILY 4/12/21  Yes Historical Provider, MD   Cyanocobalamin (B-12) 500 MCG TABS TAKE 1 TABLET BY MOUTH DAILY 4/16/21  Yes Lotus Juares MD   albuterol sulfate HFA (VENTOLIN HFA) 108 (90 Base) MCG/ACT inhaler Inhale 2 puffs into the lungs every 6 hours as needed for Wheezing 4/14/21  Yes Zohaib Crawford MD   atorvastatin (LIPITOR) 40 MG tablet TAKE 1 TABLET BY MOUTH EVERY EVENING 3/29/21  Yes Zohaib Crawford MD   umeclidinium-vilanterol [facility] ELLIPTA) 62.5-25 MCG/INH AEPB inhaler INHALE 1 PUFF INTO THE LUNGS DAILY 3/11/21  Yes Zohaib Crawford MD   omeprazole (PRILOSEC) 20 MG delayed release capsule TAKE 1 CAPSULE BY MOUTH TWICE DAILY 3/1/21  Yes Zohaib Crawford MD   gabapentin (NEURONTIN) 600 MG tablet TAKE UP TO 5 TABLETS BY MOUTH OVER 24 HOURS AS DIRECTED 2/15/21 8/14/21 Yes Zohaib Crawford MD   furosemide (LASIX) 80 MG tablet TAKE 1 TABLET BY MOUTH DAILY 12/29/20  Yes Zohaib Crawford MD   potassium chloride (KLOR-CON) 10 MEQ extended release tablet TAKE 1 TABLET BY MOUTH DAILY 12/29/20  Yes Zohaib Crawford MD   insulin NPH (NOVOLIN N) 100 UNIT/ML injection vial Take 44 units with breakfast and 32 units with dinner. Patient taking differently: Take 20 units with breakfast and 20 units with dinner.  11/18/20  Yes Zohaib Crawford MD   insulin regular (NOVOLIN R RELION) 100 UNIT/ML injection INJECT 20 TO 30 UNITS SUBCUTANEOUSLY THREE TIMES DAILY BEFORE MEAL(S) 11/9/20  Yes Zohaib Crawford MD   FEROSUL 325 (65 Fe) MG tablet TAKE 1 TABLET BY MOUTH TWICE DAILY 10/1/20  Yes Zohaib Crawford MD   albuterol sulfate  (90 Base) MCG/ACT inhaler INHALE 2 PUFFS INTO THE LUNGS EVERY 6 HOURS AS NEEDED FOR WHEEZING 4/7/20  Yes Zohaib Crawford MD   vitamin D (ERGOCALCIFEROL) 71081 units CAPS capsule TK 1 C PO WEEKLY 7/29/19  Yes Historical Provider, MD   aspirin 81 MG EC tablet Take 81 mg by mouth daily   Yes Historical Provider, MD   apixaban (ELIQUIS) 2.5 MG TABS tablet Take 1 tablet by mouth 2 times daily 1/16/17  Yes Zohaib Crawford MD   metoprolol (LOPRESSOR) 25 MG tablet Take 25 mg by mouth daily    Yes Historical Provider, MD   isosorbide mononitrate (IMDUR) 30 MG CR tablet Take 1 tablet by mouth every morning Dr. Amish Hunt 4/23/15  Yes Zohaib Crawford MD   nitroGLYCERIN (NITROSTAT) 0.3 MG SL tablet Take one sublingual for chest pain. May repeat twice at 5 min intervals. If not getting relief call 911. 10/22/14  Yes Ellie Elizabeth MD   famotidine (PEPCID) 20 MG tablet TAKE 1 TABLET BY MOUTH TWICE DAILY 4/16/21   Ellie Elizabeth MD   tiotropium ([place] [employer]) 18 MCG inhalation capsule Inhale 1 capsule into the lungs daily INCLUDE inhaler device 8/10/20 8/10/21  Ellie Elizabeth MD   INSULIN SYRINGE .5CC/29G 29G X 1/2\" 0.5 ML MISC INJECT 4 TIMES DAILY AS NEEDED 7/20/20   Ellie Elizabeth MD   Nebulizers (AIRIAL COMPACT MINI NEBULIZER) MISC 1 each by Does not apply route every 6 hours as needed (wheezing or shortness of breath) 4/8/20   Ellie Elizabeth MD   Respiratory Therapy Supplies (NEBULIZER/TUBING/MOUTHPIECE) KIT 1 kit by Does not apply route every 6 hours as needed (for wheezing or shortness of breath) 4/8/20   Ellie Elizabeth MD   albuterol (PROVENTIL) (2.5 MG/3ML) 0.083% nebulizer solution Take 3 mLs by nebulization every 6 hours as needed for Wheezing 4/3/20 4/3/21  Ellie Elizabeth MD   mupirocin Jimi Grady) 2 % ointment Apply 3 times daily as needed 7/1/19   Ellie Elizabeth MD   levalbuterol [facility]) 45 MCG/ACT inhaler Inhale 1-2 puffs into the lungs every 4 hours as needed for Wheezing 2/18/19   Ellie Elizabeth MD        Allergies:  Carmen Mejia [sacubitril-valsartan]    Social History:    reports that he quit smoking about 36 years ago. His smoking use included cigarettes. He has a 112.00 pack-year smoking history. He has never used smokeless tobacco. He reports that he does not drink alcohol and does not use drugs.     Family History:        Problem Relation Age of Onset    Cancer Mother         breast    Cancer Father         throat       REVIEW OF SYSTEMS:  CONSTITUTIONAL:  positive for  fatigue  HEENT:  negative  RESPIRATORY:  negative  CARDIOVASCULAR:  negative  GASTROINTESTINAL:  negative except for nausea, vomiting, change in bowel habits and diarrhea  GENITOURINARY: negative  HEMATOLOGIC/LYMPHATIC:  negative  NEUROLOGICAL:  negative  SKIN: negative    PHYSICAL EXAM:  VITALS:  /61   Pulse 65   Temp 97.8 °F (36.6 °C) (Temporal)   Resp 12   Ht 5' 8\" (1.727 m)   Wt 252 lb 6.8 oz (114.5 kg)   SpO2 100%   BMI 38.38 kg/m²   24HR INTAKE/OUTPUT:      Intake/Output Summary (Last 24 hours) at 6/29/2021 1726  Last data filed at 6/29/2021 1523  Gross per 24 hour   Intake --   Output 1000 ml   Net -1000 ml     DRAIN/TUBE OUTPUT:     CONSTITUTIONAL:  alert, no apparent distress and morbidly obese  EYES:  sclera clear  ENT:  normocepalic, without obvious abnormality  NECK:  supple, symmetrical, trachea midline and no carotid bruits  LUNGS:  clear to auscultation  CARDIOVASCULAR:  regular rate and rhythm and no murmur noted  ABDOMEN:  healed scars, normal bowel sounds, soft, non-distended, non-tender, voluntary guarding absent, no masses palpated, no hepatosplenomegally and hernia absent  MUSCULOSKELETAL:  0+ pitting edema lower extremities  NEUROLOGIC:  Mental Status Exam:  Level of Alertness:   awake  Orientation:   person, place, time  SKIN:  no bruising or bleeding, normal skin color, texture, turgor and no redness, warmth, or swelling    DATA:    CBC:   Recent Labs     06/29/21  0934   WBC 3.9*   HGB 16.2   HCT 49.8   *     BMP:    Recent Labs     06/29/21  0934      K 4.2   CL 97*   CO2 30   BUN 22*   CREATININE 1.4*   GLUCOSE 232*     Hepatic:   Recent Labs     06/29/21  0934   AST 79*   ALT 61*   BILITOT 1.1*   ALKPHOS 109     Mag:    No results for input(s): MG in the last 72 hours. Phos:   No results for input(s): PHOS in the last 72 hours. INR: No results for input(s): INR in the last 72 hours. LIPASE:   Recent Labs     06/29/21  0934   LIPASE 49.0      AMYLASE: No results for input(s): AMYLASE in the last 72 hours.      Radiology Review:      CT ABDOMEN PELVIS W IV CONTRAST Additional Contrast? None    Result Date: 6/29/2021  EXAMINATION: CT OF THE ABDOMEN AND PELVIS WITH CONTRAST 6/29/2021 10:39 am TECHNIQUE: CT of the abdomen and pelvis was performed with the administration of intravenous contrast. Multiplanar reformatted images are provided for review. Dose modulation, iterative reconstruction, and/or weight based adjustment of the mA/kV was utilized to reduce the radiation dose to as low as reasonably achievable. COMPARISON: None. HISTORY: ORDERING SYSTEM PROVIDED HISTORY: RUQ pain n/v TECHNOLOGIST PROVIDED HISTORY: Reason for exam:->RUQ pain n/v Additional Contrast?->None Decision Support Exception - unselect if not a suspected or confirmed emergency medical condition->Emergency Medical Condition (MA) Reason for Exam: Emesis (Pt reports vomiting since going out to eat since Friday. States that the vomit is now green in color, also having some diarrhea. Only thing he can keep down is water. ) Acuity: Unknown Type of Exam: Unknown FINDINGS: Lower Chest: Cardiomegaly. Trace left pleural effusion Organs: High density material in the gallbladder lumen with no pericholecystic stranding. No hydronephrosis. 1.5 cm left renal cyst.  1.0 cm fat density right adrenal mass. Remaining solid organs unremarkable GI/Bowel: No gastrointestinal abnormality demonstrated. Normal appendix Pelvis: Urinary bladder unremarkable. No pelvic fluid Peritoneum/Retroperitoneum: No ascites or pneumoperitoneum. Normal caliber aorta Bones/Soft Tissues: No acute bony abnormality. Diffuse lumbar degenerative disease     1. No acute abdominopelvic process demonstrated 2. Stones or sludge in the gallbladder 3. 1 cm right adrenal myelolipoma     XR CHEST PORTABLE    Result Date: 6/29/2021  EXAMINATION: ONE XRAY VIEW OF THE CHEST 6/29/2021 9:14 am COMPARISON: Chest x-ray 04/08/2016 HISTORY: ORDERING SYSTEM PROVIDED HISTORY: SOB TECHNOLOGIST PROVIDED HISTORY: Reason for exam:->SOB Acuity: Unknown FINDINGS: Lung volumes are low. There are bibasilar peripheral parenchymal opacities.  No pneumothorax. No definite pleural fluid. Cardiomediastinal silhouette has not changed significantly. Sternotomy wires mediastinal clips are unchanged. A dual lead pacer is present. Bibasilar peripheral pulmonary opacities are noted which could be secondary to atelectasis versus pneumonia in the proper clinical setting. IMPRESSION/RECOMMENDATIONS:    Gastroenteritis with N/V/D possible  Subacute cholecystitis possible    Do supportive care, IV, rehydration, nausea meds, pain meds as needed  FU exam and labs in am, check stool studies  Follow cardiac status - resume meds and stabilize.  Consider GB surgery at some point thereafter    Thank you,        Rosa Elise MD

## 2021-06-29 NOTE — H&P
Hospital Medicine History & Physical      PCP: Lucero Greer MD    Date of Admission: 6/29/2021    Date of Service: Pt seen/examined on 6/29/2021. And Admitted to Inpatient with expected LOS greater than two midnights due to medical therapy. Chief Complaint: Nausea/vomiting/diarrhea      History Of Present Illness:       [de-identified] y.o. male   with a history of systolic cardiomyopathy, GERD, atrial fibrillation , coronary artery disease status post stents, COPD, hypertension, hyperlipidemia, chronic kidney disease stage III went to restaurant for dinner on Friday and soon after coming home, he started having nausea, vomiting, diarrhea. He has been throwing up greenish bile and he is having small, 5-6 bowel movements which are greenish in color as well. No fever. No recent use of antibiotics. Wife had the same food but she denies any symptoms. Had Covid vaccinations few months ago. Patient is not able to keep down anything since Friday. He is not able to take any medications including his diuretics . Denies any shortness of breath or chest pain. But apparently hypoxic at 87% on room air upon arrival.  Given 1 dose of IV Lasix in the emergency room. General surgery consulted. Patient now feeling better after receiving pain medications and antiemetics. On minimal oxygen.       Past Medical History:          Diagnosis Date    Acid reflux     Atrial flutter ([address] 2013    converted    Bleeding stomach ulcer oct 2015    BPH (benign prostatic hyperplasia)     CAD (coronary artery disease)     11/12 angio with 2 blocked bypass and 2 patent    COPD (chronic obstructive pulmonary disease) ([address]     Diabetes mellitus type II     Edema     chronic left leg    Elevated PSA- nl 8/12/11     Hyperlipidemia     Hypertension     Ischemic cardiomyopathy     Lipoma of skin-RIGHT CHEST 5/5/2011    Obesity     Osteoarthritis     Skin tear of left forearm without complication 2/51/9718    Significant amount dinner. Patient taking differently: Take 20 units with breakfast and 20 units with dinner. 11/18/20  Yes Mavis Yeboah MD   insulin regular (NOVOLIN R RELION) 100 UNIT/ML injection INJECT 20 TO 30 UNITS SUBCUTANEOUSLY THREE TIMES DAILY BEFORE MEAL(S) 11/9/20  Yes Mavis Yeboah MD   FEROSUL 325 (65 Fe) MG tablet TAKE 1 TABLET BY MOUTH TWICE DAILY 10/1/20  Yes Mavis Yeboah MD   albuterol sulfate  (90 Base) MCG/ACT inhaler INHALE 2 PUFFS INTO THE LUNGS EVERY 6 HOURS AS NEEDED FOR WHEEZING 4/7/20  Yes Mavis Yeboah MD   vitamin D (ERGOCALCIFEROL) 89132 units CAPS capsule TK 1 C PO WEEKLY 7/29/19  Yes Historical Provider, MD   aspirin 81 MG EC tablet Take 81 mg by mouth daily   Yes Historical Provider, MD   apixaban (ELIQUIS) 2.5 MG TABS tablet Take 1 tablet by mouth 2 times daily 1/16/17  Yes Mavis Yeboah MD   metoprolol (LOPRESSOR) 25 MG tablet Take 25 mg by mouth daily    Yes Historical Provider, MD   isosorbide mononitrate (IMDUR) 30 MG CR tablet Take 1 tablet by mouth every morning Dr. Claudeen Jack - Lockie Burke 4/23/15  Yes Mavis Yeboah MD   nitroGLYCERIN (NITROSTAT) 0.3 MG SL tablet Take one sublingual for chest pain. May repeat twice at 5 min intervals.  If not getting relief call 911. 10/22/14  Yes Mavis Yeboah MD   famotidine (PEPCID) 20 MG tablet TAKE 1 TABLET BY MOUTH TWICE DAILY 4/16/21   Mavis Yeboah MD   tiotropium ([place] [employer]) 18 MCG inhalation capsule Inhale 1 capsule into the lungs daily INCLUDE inhaler device 8/10/20 8/10/21  Mavis Yeboah MD   INSULIN SYRINGE .5CC/29G 29G X 1/2\" 0.5 ML MISC INJECT 4 TIMES DAILY AS NEEDED 7/20/20   Mavis Yeboah MD   Nebulizers (AIRIAL COMPACT MINI NEBULIZER) MISC 1 each by Does not apply route every 6 hours as needed (wheezing or shortness of breath) 4/8/20   Mavis Yeboah MD   Respiratory Therapy Supplies (NEBULIZER/TUBING/MOUTHPIECE) KIT 1 kit by Does not apply route every 6 hours as needed (for wheezing or shortness of breath) 4/8/20   Mavis Yeboah MD albuterol (PROVENTIL) (2.5 MG/3ML) 0.083% nebulizer solution Take 3 mLs by nebulization every 6 hours as needed for Wheezing 4/3/20 4/3/21  Mars Mac MD   mupirocdharmesh Carreno) 2 % ointment Apply 3 times daily as needed 7/1/19   Mars Mac MD   [facility]) 45 MCG/ACT inhaler Inhale 1-2 puffs into the lungs every 4 hours as needed for Wheezing 2/18/19   Mars Mac MD       Allergies:  Verlinda Verónica [sacubitril-valsartan]    Social History:      The patient currently lives at home with his wife    TOBACCO:   reports that he quit smoking about 36 years ago. His smoking use included cigarettes. He has a 112.00 pack-year smoking history. He has never used smokeless tobacco.  ETOH:   reports no history of alcohol use. E-Cigarettes/Vaping Use     Questions Responses    E-Cigarette/Vaping Use     Start Date     Passive Exposure     Quit Date     Counseling Given     Comments             Family History:        Reviewed in detail and negative for DM, CAD, Cancer, CVA. Positive as follows:        Problem Relation Age of Onset   Giovany Maloney Cancer Mother         breast    Cancer Father         throat       REVIEW OF SYSTEMS COMPLETED:   Pertinent positives as noted in the HPI. All other systems reviewed and negative. PHYSICAL EXAM PERFORMED:    BP (!) 155/77   Pulse 74   Temp 98 °F (36.7 °C) (Oral)   Resp 14   Ht 5' 9\" (1.753 m)   Wt 290 lb (131.5 kg)   SpO2 99%   BMI 42.83 kg/m²     General appearance:  No apparent distress, appears stated age and cooperative. HEENT:  Normal cephalic, atraumatic without obvious deformity. Pupils equal, round, and reactive to light. Extra ocular muscles intact. Conjunctivae/corneas clear. Neck: Supple, with full range of motion. No jugular venous distention. Trachea midline. Respiratory:  Normal respiratory effort. Clear to auscultation bilaterally.   Diminished breath sounds at bases  Cardiovascular:  Regular rate and rhythm with normal S1/S2 without murmurs, rubs or gallops. Abdomen: Soft, non-tender, non-distended with normal bowel sounds. Musculoskeletal:  No clubbing, cyanosis or edema bilaterally. Full range of motion without deformity. Skin: Bilateral mild pitting edema, left lower extremity greater than right lower extremity-chronic. Chronic dermatitis noted. Neurologic:  Neurovascularly intact without any focal sensory/motor deficits. Cranial nerves: II-XII intact, grossly non-focal.  Psychiatric:  Alert and oriented, thought content appropriate, normal insight  Capillary Refill: Brisk,3 seconds, normal  Peripheral Pulses: +2 palpable, equal bilaterally       Labs:     Recent Labs     06/29/21  0934   WBC 3.9*   HGB 16.2   HCT 49.8   *     Recent Labs     06/29/21  0934      K 4.2   CL 97*   CO2 30   BUN 22*   CREATININE 1.4*   CALCIUM 9.6     Recent Labs     06/29/21  0934   AST 79*   ALT 61*   BILITOT 1.1*   ALKPHOS 109     No results for input(s): INR in the last 72 hours. Recent Labs     06/29/21  0934   TROPONINI 0.01       Urinalysis:      Lab Results   Component Value Date    NITRU Negative 06/29/2021    WBCUA 2 06/29/2021    RBCUA 10 06/29/2021    BLOODU LARGE 06/29/2021    SPECGRAV >1.030 06/29/2021    GLUCOSEU 500 06/29/2021       Radiology:        EKG:  I have reviewed the EKG with the following interpretation: V paced rhythm    CT ABDOMEN PELVIS W IV CONTRAST Additional Contrast? None   Final Result   1. No acute abdominopelvic process demonstrated   2. Stones or sludge in the gallbladder   3. 1 cm right adrenal myelolipoma         XR CHEST PORTABLE   Final Result   Bibasilar peripheral pulmonary opacities are noted which could be secondary   to atelectasis versus pneumonia in the proper clinical setting.              ASSESSMENT:    Active Hospital Problems    Diagnosis Date Noted    CHF (congestive heart failure), NYHA class I, acute on chronic, combined [facility] [I50.43] 06/29/2021         PLAN:    Nausea/vomiting/diarrhea: Could be gastroenteritis. Transaminitis noted with minimal bilirubin elevation and normal lipase. CT abdomen showed stones or sludge in the gallbladder. No fever or leukocytosis. Stool cultures and C. difficile sent. Start on antiemetics. Clear liquids by mouth. PPI IV. Would not start any antibiotics at this time. Acute on chronic systolic heart failure : EF 30 to 35%. Secondary to not taking medications in light of ongoing nausea/vomiting.  given IV Lasix in emergency room given his hypoxia and chest x-ray evidence of vascular congestion with BNP elevation. Hold any further dose of Lasix as he received contrast with a CT abdomen as well and the findings of CKD. Continue to monitor closely. Oxygen as needed. CKD stage III: Creatinine at baseline. Monitor in the light of receiving IV Lasix and contrasted CT scan. Coronary artery disease status post CABG: Continue aspirin. Holding statin. history of atrial fibrillation, flutter, complete AV block: Pacemaker in situ. Currently in paced rhythm. Hold anticoagulation. Lovenox prophylaxis. Continue beta-blocker. COPD: Stable. Continue home inhalers. Hyperlipidemia: Hold statin. Hypertension: Continue beta-blocker. Hold other medications. GERD: PPI    Obesity: BMI 42.83. Advised weight loss. DVT Prophylaxis: Lovenox  Diet: Clear liquid  Code Status: Full code    PT/OT Eval Status: Not needed    Dispo -medical floor/telemetry       Azar Peterson MD    Thank you Shantel Lei MD for the opportunity to be involved in this patient's care. If you have any questions or concerns please feel free to contact me at [phone].

## 2021-06-29 NOTE — PLAN OF CARE
[place] [id number] and Laparoscopic Surgery        Assessment & Plan of Care    History of Present Illness: Mr. Shahida Dela Cruz is a [de-identified] y.o. male who presents with a 4-day history of nausea, bilious emesis, and diarrhea. His symptoms began after eating spare ribs, [employer] fries, and broccoli from [employer]. His wife consumed the same minus the broccoli and feels well. He has only been able to keep down small amounts of water over the last several days, and has not been taking his medications. His only other associated symptoms is a metallic taste in his mouth. He denies abdominal pain, bloating, jaundice, postprandial pain, fevers, chills, hematemesis, bloody stools, urinary symptoms, chest pain, or SOB. No recent antibiotic use. He does reports a couple of similar episodes with bilious emesis 5-10 years ago; at that time he reports being seen in urgent care and was told that he had a partially blocked bile duct but does not recall having any imaging or labs at that time and reports no gallbladder workup. No prior abdominal surgeries. Reports bleeding gastric ulcer in 2015 at which time he underwent both EGD and colonoscopy (normal). Medical history includes CAD status-post CABG, CHF (follows with cardiologist through [address] [place] [address], EF 30-35% on 4/12/2021 echocardiogram), chronic kidney disease, COPD, diabetes, hypertension, hyperlipidemia, and morbid obesity (BMI 42.83). Takes Eliquis--last dose was on 6/25/2021. Former smoker.     Physical Exam:  CONSTITUTIONAL:  alert, no apparent distress and morbidly obese  NEUROLOGIC:  Mental Status Exam:  Level of Alertness:   awake  Orientation:   person, place, time  EYES:  sclera clear  ENT:  normocepalic, without obvious abnormality  NECK:  supple, symmetrical, trachea midline  LUNGS:  clear to auscultation  CARDIOVASCULAR:  regular rate and rhythm and no murmur noted  ABDOMEN: soft, non-distended, non-tender, voluntary guarding absent, no masses palpated, normal

## 2021-06-29 NOTE — ED PROVIDER NOTES
I independently performed a history and physical on Negar Barragan. All diagnostic, treatment, and disposition decisions were made by myself in conjunction with the advanced practice provider. Briefly, this is a [de-identified] y.o. male here for a few days of nausea, vomiting, and po intolerance. Hx of vasculopathy with CAD,DM, HTN, HLD. Unable to keep his home medications down,     On exam, WDWN M, NAD, Heart RRR, Lungs cTAB. PPM to left chest wall. Median Sternotomy scar. Lymphedematous extremities. EKG  EKG is reviewed by myself. Dated today 09 42. Rate 85. Ventricular pacemaker. Prior from 2016 is sinus rhythm with a first-degree AV blockade versus a pacemaker spike. Screenings               MDM  20-year-old male with nausea and vomiting for the past several days now unable keep his medications down. We will check some diagnostics to see why he is vomiting if is any signs of bowel obstruction. The larger concern is that he cannot keep his medications down including amiodarone, apixaban, he is a type I diabetic on insulin as well. Patient Referrals:  No follow-up provider specified. Discharge Medications:  New Prescriptions    No medications on file       FINAL IMPRESSION  1. Upper abdominal pain    2. Acute on chronic congestive heart failure, unspecified heart failure type (HCC)        Blood pressure (!) 155/77, pulse 74, temperature 98 °F (36.7 °C), temperature source Oral, resp. rate 14, height 5' 9\" (1.753 m), weight 290 lb (131.5 kg), SpO2 99 %. For further details of [facility] emergency department encounter, please see documentation by advanced practice provider, Kathy Calvo.        Elias Barnett MD  06/29/21 [id number]

## 2021-06-29 NOTE — ED NOTES
Report given at this time. No questions comments or concerns voiced. Transported per inpatient nurse.      Brady Tamez RN  06/29/21 [id number]

## 2021-06-29 NOTE — ED NOTES
Bed: 20  Expected date:   Expected time:   Means of arrival:   Comments:  Papo Norman, JAVIER  06/29/21 2003

## 2021-06-30 LAB
A/G RATIO: 1.5 (ref 1.1–2.2)
ALBUMIN SERPL-MCNC: 3.2 G/DL (ref 3.4–5)
ALP BLD-CCNC: 95 U/L (ref 40–129)
ALT SERPL-CCNC: 70 U/L (ref 10–40)
ANION GAP SERPL CALCULATED.3IONS-SCNC: 9 MMOL/L (ref 3–16)
AST SERPL-CCNC: 87 U/L (ref 15–37)
BILIRUB SERPL-MCNC: 0.7 MG/DL (ref 0–1)
BUN BLDV-MCNC: 19 MG/DL (ref 7–20)
CALCIUM SERPL-MCNC: 8.8 MG/DL (ref 8.3–10.6)
CHLORIDE BLD-SCNC: 100 MMOL/L (ref 99–110)
CO2: 30 MMOL/L (ref 21–32)
CREAT SERPL-MCNC: 1.3 MG/DL (ref 0.8–1.3)
GFR AFRICAN AMERICAN: >60
GFR NON-AFRICAN AMERICAN: 53
GIARDIA ANTIGEN STOOL: NORMAL
GLOBULIN: 2.2 G/DL
GLUCOSE BLD-MCNC: 159 MG/DL (ref 70–99)
GLUCOSE BLD-MCNC: 206 MG/DL (ref 70–99)
GLUCOSE BLD-MCNC: 214 MG/DL (ref 70–99)
GLUCOSE BLD-MCNC: 218 MG/DL (ref 70–99)
GLUCOSE BLD-MCNC: 270 MG/DL (ref 70–99)
HCT VFR BLD CALC: 46.5 % (ref 40.5–52.5)
HEMOGLOBIN: 15.4 G/DL (ref 13.5–17.5)
MAGNESIUM: 2.2 MG/DL (ref 1.8–2.4)
MCH RBC QN AUTO: 33.1 PG (ref 26–34)
MCHC RBC AUTO-ENTMCNC: 33.1 G/DL (ref 31–36)
MCV RBC AUTO: 99.9 FL (ref 80–100)
PDW BLD-RTO: 16.1 % (ref 12.4–15.4)
PERFORMED ON: ABNORMAL
PLATELET # BLD: 100 K/UL (ref 135–450)
PMV BLD AUTO: 8.9 FL (ref 5–10.5)
POTASSIUM REFLEX MAGNESIUM: 3.3 MMOL/L (ref 3.5–5.1)
RBC # BLD: 4.65 M/UL (ref 4.2–5.9)
SODIUM BLD-SCNC: 139 MMOL/L (ref 136–145)
TOTAL PROTEIN: 5.4 G/DL (ref 6.4–8.2)
WBC # BLD: 2.9 K/UL (ref 4–11)

## 2021-06-30 PROCEDURE — 99232 SBSQ HOSP IP/OBS MODERATE 35: CPT | Performed by: SURGERY

## 2021-06-30 PROCEDURE — 2700000000 HC OXYGEN THERAPY PER DAY

## 2021-06-30 PROCEDURE — 36415 COLL VENOUS BLD VENIPUNCTURE: CPT

## 2021-06-30 PROCEDURE — 94761 N-INVAS EAR/PLS OXIMETRY MLT: CPT

## 2021-06-30 PROCEDURE — APPNB30 APP NON BILLABLE TIME 0-30 MINS: Performed by: NURSE PRACTITIONER

## 2021-06-30 PROCEDURE — 6370000000 HC RX 637 (ALT 250 FOR IP): Performed by: INTERNAL MEDICINE

## 2021-06-30 PROCEDURE — 2060000000 HC ICU INTERMEDIATE R&B

## 2021-06-30 PROCEDURE — 83735 ASSAY OF MAGNESIUM: CPT

## 2021-06-30 PROCEDURE — 6360000002 HC RX W HCPCS: Performed by: INTERNAL MEDICINE

## 2021-06-30 PROCEDURE — APPSS15 APP SPLIT SHARED TIME 0-15 MINUTES: Performed by: NURSE PRACTITIONER

## 2021-06-30 PROCEDURE — C9113 INJ PANTOPRAZOLE SODIUM, VIA: HCPCS | Performed by: INTERNAL MEDICINE

## 2021-06-30 PROCEDURE — 85027 COMPLETE CBC AUTOMATED: CPT

## 2021-06-30 PROCEDURE — 80053 COMPREHEN METABOLIC PANEL: CPT

## 2021-06-30 PROCEDURE — 94640 AIRWAY INHALATION TREATMENT: CPT

## 2021-06-30 PROCEDURE — 2580000003 HC RX 258: Performed by: INTERNAL MEDICINE

## 2021-06-30 RX ORDER — POTASSIUM CHLORIDE 7.45 MG/ML
10 INJECTION INTRAVENOUS
Status: COMPLETED | OUTPATIENT
Start: 2021-06-30 | End: 2021-06-30

## 2021-06-30 RX ADMIN — PANTOPRAZOLE SODIUM 40 MG: 40 INJECTION, POWDER, FOR SOLUTION INTRAVENOUS at 08:56

## 2021-06-30 RX ADMIN — AMIODARONE HYDROCHLORIDE 200 MG: 200 TABLET ORAL at 08:55

## 2021-06-30 RX ADMIN — METOPROLOL SUCCINATE 25 MG: 25 TABLET, EXTENDED RELEASE ORAL at 08:55

## 2021-06-30 RX ADMIN — ISOSORBIDE MONONITRATE 30 MG: 30 TABLET, EXTENDED RELEASE ORAL at 08:55

## 2021-06-30 RX ADMIN — INSULIN LISPRO 2 UNITS: 100 INJECTION, SOLUTION INTRAVENOUS; SUBCUTANEOUS at 20:33

## 2021-06-30 RX ADMIN — INSULIN LISPRO 2 UNITS: 100 INJECTION, SOLUTION INTRAVENOUS; SUBCUTANEOUS at 11:25

## 2021-06-30 RX ADMIN — Medication 10 ML: at 08:56

## 2021-06-30 RX ADMIN — POTASSIUM CHLORIDE 10 MEQ: 7.46 INJECTION, SOLUTION INTRAVENOUS at 12:57

## 2021-06-30 RX ADMIN — GLYCOPYRROLATE AND FORMOTEROL FUMARATE 2 PUFF: 9; 4.8 AEROSOL, METERED RESPIRATORY (INHALATION) at 08:20

## 2021-06-30 RX ADMIN — INSULIN LISPRO 2 UNITS: 100 INJECTION, SOLUTION INTRAVENOUS; SUBCUTANEOUS at 17:15

## 2021-06-30 RX ADMIN — ASPIRIN 81 MG: 81 TABLET, COATED ORAL at 08:55

## 2021-06-30 RX ADMIN — ENOXAPARIN SODIUM 40 MG: 40 INJECTION SUBCUTANEOUS at 20:41

## 2021-06-30 RX ADMIN — Medication 10 ML: at 20:41

## 2021-06-30 RX ADMIN — POTASSIUM CHLORIDE 10 MEQ: 7.46 INJECTION, SOLUTION INTRAVENOUS at 11:54

## 2021-06-30 RX ADMIN — INSULIN GLARGINE 20 UNITS: 100 INJECTION, SOLUTION SUBCUTANEOUS at 20:34

## 2021-06-30 RX ADMIN — GLYCOPYRROLATE AND FORMOTEROL FUMARATE 2 PUFF: 9; 4.8 AEROSOL, METERED RESPIRATORY (INHALATION) at 19:50

## 2021-06-30 RX ADMIN — INSULIN LISPRO 1 UNITS: 100 INJECTION, SOLUTION INTRAVENOUS; SUBCUTANEOUS at 07:35

## 2021-06-30 ASSESSMENT — PAIN SCALES - GENERAL
PAINLEVEL_OUTOF10: 0

## 2021-06-30 NOTE — PROGRESS NOTES
Hospitalist Progress Note      PCP: Pat Morales MD    Date of Admission: 6/29/2021    Chief Complaint: Nausea/vomiting/diarrhea. Hospital Course: Admitted with gastroenteritis versus gallbladder disease. Also pulmonary edema on admission. Subjective: Doing better. Able to tolerate clear liquids okay. No abdominal pain or fever. On 3 L oxygen. Denies any shortness of breath or chest pain. Medications:  Reviewed    Infusion Medications    sodium chloride      dextrose       Scheduled Medications    isosorbide mononitrate  30 mg Oral QAM    aspirin  81 mg Oral Daily    glycopyrrolate-formoterol  2 puff Inhalation BID    amiodarone  200 mg Oral Daily    pantoprazole  40 mg Intravenous Daily    insulin lispro  0-6 Units Subcutaneous TID WC    insulin lispro  0-3 Units Subcutaneous Nightly    insulin glargine  20 Units Subcutaneous Nightly    sodium chloride flush  5-40 mL Intravenous 2 times per day    enoxaparin  40 mg Subcutaneous Nightly    metoprolol succinate  25 mg Oral Daily     PRN Meds: morphine, sodium chloride flush, sodium chloride, ondansetron **OR** ondansetron, polyethylene glycol, acetaminophen **OR** acetaminophen, albuterol, glucose, dextrose, glucagon (rDNA), dextrose      Intake/Output Summary (Last 24 hours) at 6/30/2021 1700  Last data filed at 6/30/2021 1400  Gross per 24 hour   Intake 1251.78 ml   Output 1575 ml   Net -323.22 ml       Physical Exam Performed:    BP (!) 131/58   Pulse 72   Temp 97 °F (36.1 °C) (Temporal)   Resp 14   Ht 5' 8\" (1.727 m)   Wt 255 lb 11.7 oz (116 kg)   SpO2 95%   BMI 38.88 kg/m²     General appearance: No apparent distress, appears stated age and cooperative. HEENT: Pupils equal, round, and reactive to light. Conjunctivae/corneas clear. Neck: Supple, with full range of motion. No jugular venous distention. Trachea midline. Respiratory:  Normal respiratory effort.  Clear to auscultation, bilaterally without Rales/Wheezes/Rhonchi. Cardiovascular: Regular rate and rhythm with normal S1/S2 without murmurs, rubs or gallops. Abdomen: Soft, non-tender, non-distended with normal bowel sounds. Musculoskeletal: No clubbing, cyanosis ; chronic bilateral 1+ edema with dermatitis. Left greater than right leg-chronic. .  Full range of motion without deformity. Skin: Skin color, texture, turgor normal.  No rashes or lesions. Neurologic:  Neurovascularly intact without any focal sensory/motor deficits. Cranial nerves: II-XII intact, grossly non-focal.  Psychiatric: Alert and oriented, thought content appropriate, normal insight  Capillary Refill: Brisk,3 seconds, normal   Peripheral Pulses: +2 palpable, equal bilaterally       Labs:   Recent Labs     06/29/21  0934 06/30/21  0504   WBC 3.9* 2.9*   HGB 16.2 15.4   HCT 49.8 46.5   * 100*     Recent Labs     06/29/21  0934 06/30/21  0504    139   K 4.2 3.3*   CL 97* 100   CO2 30 30   BUN 22* 19   CREATININE 1.4* 1.3   CALCIUM 9.6 8.8     Recent Labs     06/29/21  0934 06/30/21  0504   AST 79* 87*   ALT 61* 70*   BILITOT 1.1* 0.7   ALKPHOS 109 95     No results for input(s): INR in the last 72 hours. Recent Labs     06/29/21  0934   TROPONINI 0.01       Urinalysis:      Lab Results   Component Value Date    NITRU Negative 06/29/2021    WBCUA 2 06/29/2021    RBCUA 10 06/29/2021    BLOODU LARGE 06/29/2021    SPECGRAV >1.030 06/29/2021    GLUCOSEU 500 06/29/2021       Radiology:  CT ABDOMEN PELVIS W IV CONTRAST Additional Contrast? None   Final Result   1. No acute abdominopelvic process demonstrated   2. Stones or sludge in the gallbladder   3. 1 cm right adrenal myelolipoma         XR CHEST PORTABLE   Final Result   Bibasilar peripheral pulmonary opacities are noted which could be secondary   to atelectasis versus pneumonia in the proper clinical setting.                  Assessment/Plan:    Active Hospital Problems    Diagnosis     CHF (congestive heart failure), NYHA class I, acute on chronic, combined ([facility] [address] [I50.43]      Nausea/vomiting/diarrhea: Could be gastroenteritis. Transaminitis noted with minimal bilirubin elevation and normal lipase. CT abdomen showed stones or sludge in the gallbladder. No fever or leukocytosis. GI PCR negative. C. difficile indeterminate. Await PCR. On PPI. Overall improving. Diet advanced this morning.       Acute on chronic systolic heart failure : EF 30 to 35%. Secondary to not taking medications in light of ongoing nausea/vomiting.  given IV Lasix in emergency room given his hypoxia and chest x-ray evidence of vascular congestion with BNP elevation. Holding  any further dose of Lasix as he received contrast with a CT abdomen as well and the findings of CKD. Creatinine stable. Will resume Lasix tomorrow if creatinine remains the same. Continue   Oxygen as needed.     CKD stage III: Creatinine at baseline. Monitor closely due to above-mentioned reason.     Coronary artery disease status post CABG: Continue aspirin. Holding statin.         history of atrial fibrillation, flutter, complete AV block: Pacemaker in situ. Currently in paced rhythm. Holding anticoagulation. Lovenox prophylaxis. Continue beta-blocker.     COPD: Stable. Continue home inhalers.     Hyperlipidemia: Holding statin.     Hypertension: Continue beta-blocker. Hold other medications.     GERD: PPI     Obesity: BMI 42.83. Advised weight loss. DVT Prophylaxis: lovenox  Diet: ADULT DIET;  Regular; 4 carb choices (60 gm/meal)  Code Status: Full Code    PT/OT Eval Status: will order    Dispo - inpt 1-2 more days    Claire Hooper MD

## 2021-06-30 NOTE — PROGRESS NOTES
Pt. Resting quietly in bed. BP elevated 160's. Meds as ordered for hypertension. Pt. Refuses chair. Chair set up for pt. When he is ready to get into chair. Pt. In bed eating breakfast now, clear liquid diet. Pt. Placed on 3L/O2 per NC through the night.

## 2021-06-30 NOTE — PROGRESS NOTES
[facility]        Progress Note    Patient Name: Monique Greenfield  MRN: [id number]  YOB: 1940  Date of Evaluation: 2021    Chief Complaint: Nausea, vomiting, diarrhea    Subjective:  No acute events overnight  Continues to deny any abdominal pain  No further nausea or vomiting, tolerating clear liquid diet  Continues to have diarrhea  Resting in bed at this time      Vital Signs:  Patient Vitals for the past 24 hrs:   BP Temp Temp src Pulse Resp SpO2 Height Weight   21 1121 (!) 131/58 97 °F (36.1 °C) Temporal 72 14 95 % -- --   21 0820 -- -- -- -- 18 97 % -- --   21 0736 (!) 163/67 97 °F (36.1 °C) Temporal 64 13 97 % -- --   21 0700 -- -- -- 60 16 96 % -- --   21 0400 (!) 143/68 97.2 °F (36.2 °C) Temporal 60 16 98 % -- 255 lb 11.7 oz (116 kg)   21 0200 -- -- -- 60 19 -- -- --   21 0100 -- -- -- 69 15 100 % -- --   21 0000 (!) 140/64 97.4 °F (36.3 °C) Temporal 66 13 96 % -- --   21 2300 -- -- -- 72 20 95 % -- --   21 2200 -- -- -- 75 20 95 % -- --   21 2100 -- -- -- 90 24 97 % -- --   21 2000 (!) 151/85 -- -- 79 17 97 % -- --   21 1800 137/81 -- -- 61 17 98 % -- --   21 1703 138/61 -- -- 65 -- -- -- --   21 1600 138/61 -- -- 64 16 100 % -- --   21 1500 (!) 155/70 97.8 °F (36.6 °C) Temporal 67 12 100 % 5' 8\" (1.727 m) 252 lb 6.8 oz (114.5 kg)      TEMPERATURE HISTORY 24H: Temp (24hrs), Av.3 °F (36.3 °C), Min:97 °F (36.1 °C), Max:97.8 °F (36.6 °C)    BLOOD PRESSURE HISTORY: Systolic (15ALG), ZWI:979 , Min:131 , JNF:391    Diastolic (64FZO), HELEN:32, Min:58, Max:85      Intake/Output:  I/O last 3 completed shifts:   In: 10 [I.V.:10]  Out: 2200 [Urine:1900; Stool:300]  I/O this shift:  In: 1241.8 [P.O.:1040; IV Piggyback:201.8]  Out: 375 [Urine:375]  Drain/tube Output:       Physical Exam:  General: awake, alert, oriented to  person, place, time  Cardiovascular: regular rate and rhythm and no murmur noted  Lungs: clear to auscultation  Abdomen: soft, nontender, nondistended, bowel sounds normal     Labs:  CBC:    Recent Labs     06/29/21  0934 06/30/21  0504   WBC 3.9* 2.9*   HGB 16.2 15.4   HCT 49.8 46.5   * 100*     BMP:    Recent Labs     06/29/21  0934 06/30/21  0504    139   K 4.2 3.3*   CL 97* 100   CO2 30 30   BUN 22* 19   CREATININE 1.4* 1.3   GLUCOSE 232* 218*     Hepatic:    Recent Labs     06/29/21  0934 06/30/21  0504   AST 79* 87*   ALT 61* 70*   BILITOT 1.1* 0.7   ALKPHOS 109 95     Amylase:  No results found for: AMYLASE  Lipase:    Lab Results   Component Value Date    LIPASE 49.0 06/29/2021      Mag:    Lab Results   Component Value Date    MG 2.20 06/30/2021     Phos:     Lab Results   Component Value Date    PHOS 3.9 06/10/2021    PHOS 4.1 02/12/2021      Coags:   Lab Results   Component Value Date    PROTIME 11.4 09/06/2016    INR 1.00 09/06/2016    APTT 33.1 09/06/2016       Cultures:  Anaerobic culture  No results found for: LABANAE  Fungus stain  No results found for requested labs within last 30 days. Gram stain  No results found for requested labs within last 30 days. Organism  No results found for: [facility]  Surgical culture  No results found for: CXSURG  Blood culture 1  No results found for requested labs within last 30 days. Blood culture 2  No results found for requested labs within last 30 days. Fecal occult  No results found for requested labs within last 30 days. GI bacterial pathogens by PCR  No results found for requested labs within last 30 days. C. difficile  No results found for requested labs within last 30 days.      Urine culture  No results found for: LABURIN    Pathology:  No relevant pathology     Imaging:  I have personally reviewed the following films:    CT ABDOMEN PELVIS W IV CONTRAST Additional Contrast? None    Result Date: 6/29/2021  EXAMINATION: CT OF THE ABDOMEN AND PELVIS WITH CONTRAST fluid. Cardiomediastinal silhouette has not changed significantly. Sternotomy wires mediastinal clips are unchanged. A dual lead pacer is present. Bibasilar peripheral pulmonary opacities are noted which could be secondary to atelectasis versus pneumonia in the proper clinical setting. Scheduled Meds:   isosorbide mononitrate  30 mg Oral QAM    aspirin  81 mg Oral Daily    glycopyrrolate-formoterol  2 puff Inhalation BID    amiodarone  200 mg Oral Daily    pantoprazole  40 mg Intravenous Daily    insulin lispro  0-6 Units Subcutaneous TID WC    insulin lispro  0-3 Units Subcutaneous Nightly    insulin glargine  20 Units Subcutaneous Nightly    sodium chloride flush  5-40 mL Intravenous 2 times per day    enoxaparin  40 mg Subcutaneous Nightly    metoprolol succinate  25 mg Oral Daily     Continuous Infusions:   sodium chloride      dextrose       PRN Meds:.morphine, sodium chloride flush, sodium chloride, ondansetron **OR** ondansetron, polyethylene glycol, acetaminophen **OR** acetaminophen, albuterol, glucose, dextrose, glucagon (rDNA), dextrose      Assessment:  [de-identified] y.o. male admitted with   1. Upper abdominal pain    2. Acute on chronic congestive heart failure, unspecified heart failure type (HCC)        Nausea, vomiting, diarrhea--probable gastroenteritis  Cholelithiasis  CHF exacerbation  Hypertension  CAD, status-post CABG  Chronic kidney disease  Morbid obesity, BMI 42.83       Plan:  1. No plans for surgical intervention at this time, continue supportive care--C. Diff indeterminate, GI pathogens pending; may benefit from cholecystectomy electively at some point   2. Advance to general diet as tolerated; monitor stools  3. IV hydration; monitor and correct electrolytes  4. Activity as tolerated  5. PRN analgesics and antiemetics--minimizing narcotics as tolerated  6. DVT prophylaxis with Lovenox  7.  Management of medical comorbid etiologies per primary team and consulting services    EDUCATION:  Educated patient on plan of care and disease process--all questions answered. Plans discussed with patient and nursing. Reviewed and discussed with Dr. Miguel A Evans. Signed:  RAYMUNDO Johnson - CNP  6/30/2021 2:53 PM     Surg Staff:     Pt seen and examined with NP  See full note above  Pt has had no further N/V.  Continues to have loose BM  C diff indeterminate, addn studies pending  Adv diet, continue supportive care  LC electively in the future    Simran Anton MD

## 2021-06-30 NOTE — PROGRESS NOTES
Pt. Dougie Farris in contact isolation for indeterminate C-Diff result, PCR pending. Potassium replacement started now.

## 2021-06-30 NOTE — PROGRESS NOTES
Oxygen placed on pt last PM for dropping oxygen saturation. This am pt. On 3L/O2 per NC with an O2 saturation of 97%. Left posterior lung base diminished. Pt. palced on room air for 15 minutes pt. Oxygen saturation dropped to 79%, pt. Placed back on 3L/O2 and oxygen saturation back up to 95%.

## 2021-07-01 VITALS
DIASTOLIC BLOOD PRESSURE: 67 MMHG | BODY MASS INDEX: 38.65 KG/M2 | OXYGEN SATURATION: 94 % | HEART RATE: 66 BPM | WEIGHT: 255 LBS | TEMPERATURE: 97.2 F | HEIGHT: 68 IN | SYSTOLIC BLOOD PRESSURE: 146 MMHG | RESPIRATION RATE: 20 BRPM

## 2021-07-01 DIAGNOSIS — I10 ESSENTIAL HYPERTENSION: ICD-10-CM

## 2021-07-01 LAB
A/G RATIO: 1.2 (ref 1.1–2.2)
ALBUMIN SERPL-MCNC: 2.9 G/DL (ref 3.4–5)
ALP BLD-CCNC: 94 U/L (ref 40–129)
ALT SERPL-CCNC: 65 U/L (ref 10–40)
ANION GAP SERPL CALCULATED.3IONS-SCNC: 7 MMOL/L (ref 3–16)
AST SERPL-CCNC: 65 U/L (ref 15–37)
BILIRUB SERPL-MCNC: 0.7 MG/DL (ref 0–1)
BUN BLDV-MCNC: 14 MG/DL (ref 7–20)
C. DIFFICILE TOXIN MOLECULAR: NORMAL
CALCIUM SERPL-MCNC: 8.7 MG/DL (ref 8.3–10.6)
CHLORIDE BLD-SCNC: 101 MMOL/L (ref 99–110)
CO2: 31 MMOL/L (ref 21–32)
CREAT SERPL-MCNC: 1.2 MG/DL (ref 0.8–1.3)
GFR AFRICAN AMERICAN: >60
GFR NON-AFRICAN AMERICAN: 58
GI BACTERIAL PATHOGENS BY PCR: NORMAL
GLOBULIN: 2.4 G/DL
GLUCOSE BLD-MCNC: 139 MG/DL (ref 70–99)
GLUCOSE BLD-MCNC: 201 MG/DL (ref 70–99)
HCT VFR BLD CALC: 42.9 % (ref 40.5–52.5)
HEMOGLOBIN: 14.2 G/DL (ref 13.5–17.5)
MAGNESIUM: 2 MG/DL (ref 1.8–2.4)
MCH RBC QN AUTO: 33.2 PG (ref 26–34)
MCHC RBC AUTO-ENTMCNC: 33.1 G/DL (ref 31–36)
MCV RBC AUTO: 100.1 FL (ref 80–100)
PDW BLD-RTO: 15.8 % (ref 12.4–15.4)
PERFORMED ON: ABNORMAL
PLATELET # BLD: 97 K/UL (ref 135–450)
PMV BLD AUTO: 8.8 FL (ref 5–10.5)
POTASSIUM REFLEX MAGNESIUM: 2.9 MMOL/L (ref 3.5–5.1)
RBC # BLD: 4.29 M/UL (ref 4.2–5.9)
SODIUM BLD-SCNC: 139 MMOL/L (ref 136–145)
TOTAL PROTEIN: 5.3 G/DL (ref 6.4–8.2)
WBC # BLD: 2.9 K/UL (ref 4–11)

## 2021-07-01 PROCEDURE — 97530 THERAPEUTIC ACTIVITIES: CPT

## 2021-07-01 PROCEDURE — 6370000000 HC RX 637 (ALT 250 FOR IP): Performed by: INTERNAL MEDICINE

## 2021-07-01 PROCEDURE — 2580000003 HC RX 258: Performed by: INTERNAL MEDICINE

## 2021-07-01 PROCEDURE — 94761 N-INVAS EAR/PLS OXIMETRY MLT: CPT

## 2021-07-01 PROCEDURE — 83735 ASSAY OF MAGNESIUM: CPT

## 2021-07-01 PROCEDURE — 80053 COMPREHEN METABOLIC PANEL: CPT

## 2021-07-01 PROCEDURE — 99232 SBSQ HOSP IP/OBS MODERATE 35: CPT | Performed by: SURGERY

## 2021-07-01 PROCEDURE — 97116 GAIT TRAINING THERAPY: CPT

## 2021-07-01 PROCEDURE — APPNB30 APP NON BILLABLE TIME 0-30 MINS: Performed by: NURSE PRACTITIONER

## 2021-07-01 PROCEDURE — 6360000002 HC RX W HCPCS: Performed by: INTERNAL MEDICINE

## 2021-07-01 PROCEDURE — 85027 COMPLETE CBC AUTOMATED: CPT

## 2021-07-01 PROCEDURE — 94640 AIRWAY INHALATION TREATMENT: CPT

## 2021-07-01 PROCEDURE — C9113 INJ PANTOPRAZOLE SODIUM, VIA: HCPCS | Performed by: INTERNAL MEDICINE

## 2021-07-01 PROCEDURE — APPSS15 APP SPLIT SHARED TIME 0-15 MINUTES: Performed by: NURSE PRACTITIONER

## 2021-07-01 PROCEDURE — 97165 OT EVAL LOW COMPLEX 30 MIN: CPT

## 2021-07-01 PROCEDURE — 97161 PT EVAL LOW COMPLEX 20 MIN: CPT

## 2021-07-01 RX ORDER — POTASSIUM CHLORIDE 7.45 MG/ML
10 INJECTION INTRAVENOUS ONCE
Status: COMPLETED | OUTPATIENT
Start: 2021-07-01 | End: 2021-07-01

## 2021-07-01 RX ORDER — POTASSIUM CHLORIDE 20 MEQ/1
40 TABLET, EXTENDED RELEASE ORAL ONCE
Status: COMPLETED | OUTPATIENT
Start: 2021-07-01 | End: 2021-07-01

## 2021-07-01 RX ORDER — FUROSEMIDE 40 MG/1
80 TABLET ORAL DAILY
Status: DISCONTINUED | OUTPATIENT
Start: 2021-07-01 | End: 2021-07-01 | Stop reason: HOSPADM

## 2021-07-01 RX ADMIN — GLYCOPYRROLATE AND FORMOTEROL FUMARATE 2 PUFF: 9; 4.8 AEROSOL, METERED RESPIRATORY (INHALATION) at 09:41

## 2021-07-01 RX ADMIN — METOPROLOL SUCCINATE 25 MG: 25 TABLET, EXTENDED RELEASE ORAL at 08:19

## 2021-07-01 RX ADMIN — ISOSORBIDE MONONITRATE 30 MG: 30 TABLET, EXTENDED RELEASE ORAL at 08:19

## 2021-07-01 RX ADMIN — Medication 10 ML: at 08:19

## 2021-07-01 RX ADMIN — PANTOPRAZOLE SODIUM 40 MG: 40 INJECTION, POWDER, FOR SOLUTION INTRAVENOUS at 08:19

## 2021-07-01 RX ADMIN — ASPIRIN 81 MG: 81 TABLET, COATED ORAL at 08:19

## 2021-07-01 RX ADMIN — FUROSEMIDE 80 MG: 40 TABLET ORAL at 11:11

## 2021-07-01 RX ADMIN — AMIODARONE HYDROCHLORIDE 200 MG: 200 TABLET ORAL at 08:19

## 2021-07-01 RX ADMIN — POTASSIUM CHLORIDE 10 MEQ: 7.46 INJECTION, SOLUTION INTRAVENOUS at 09:39

## 2021-07-01 RX ADMIN — POTASSIUM CHLORIDE 40 MEQ: 1500 TABLET, EXTENDED RELEASE ORAL at 06:27

## 2021-07-01 ASSESSMENT — PAIN SCALES - GENERAL
PAINLEVEL_OUTOF10: 0

## 2021-07-01 NOTE — PROGRESS NOTES
[facility]        Progress Note    Patient Name: Brayan Engle  MRN: [id number]  YOB: 1940  Date of Evaluation: 2021    Chief Complaint: Nausea, vomiting, diarrhea    Subjective:  No acute events overnight  Continues to deny any abdominal pain  No nausea or vomiting, tolerating general diet  No diarrhea since yesterday  Up to chair at this time; eager to discharge home today      Vital Signs:  Patient Vitals for the past 24 hrs:   BP Temp Temp src Pulse Resp SpO2 Weight   21 0941 -- -- -- -- 20 94 % --   21 0849 (!) 146/67 97.2 °F (36.2 °C) Temporal 66 20 93 % --   21 0847 -- -- -- -- -- 98 % --   21 0400 -- -- -- -- -- -- 255 lb (115.7 kg)   21 2300 -- -- -- 66 22 -- --   21 2200 -- -- -- 62 16 -- --   21 2100 -- -- -- 65 14 -- --   21 (!) 144/54 97.6 °F (36.4 °C) Temporal 67 16 98 % --   21 -- -- -- -- -- 99 % --   21 1719 134/61 97.8 °F (36.6 °C) Temporal 64 13 99 % --   21 1121 (!) 131/58 97 °F (36.1 °C) Temporal 72 14 95 % --      TEMPERATURE HISTORY 24H: Temp (24hrs), Av.4 °F (36.3 °C), Min:97 °F (36.1 °C), Max:97.8 °F (36.6 °C)    BLOOD PRESSURE HISTORY: Systolic (22RHE), AGF:294 , Min:131 , KOFI:119    Diastolic (29SVR), VCU:00, Min:54, Max:68      Intake/Output:  I/O last 3 completed shifts: In: 1251.8 [P.O.:1040; I.V.:10; IV Piggyback:201.8]  Out: 375 [Urine:375]  No intake/output data recorded.   Drain/tube Output:       Physical Exam:  General: awake, alert, oriented to  person, place, time  Cardiovascular:  regular rate and rhythm and no murmur noted  Lungs: clear to auscultation  Abdomen: soft, nontender, nondistended, bowel sounds normal     Labs:  CBC:    Recent Labs     21  0934 21  0504 21  0453   WBC 3.9* 2.9* 2.9*   HGB 16.2 15.4 14.2   HCT 49.8 46.5 42.9   * 100* 97*     BMP:    Recent Labs     21  0934 21  0504 07/01/21  0453    139 139   K 4.2 3.3* 2.9*   CL 97* 100 101   CO2 30 30 31   BUN 22* 19 14   CREATININE 1.4* 1.3 1.2   GLUCOSE 232* 218* 201*     Hepatic:    Recent Labs     06/29/21  0934 06/30/21  0504 07/01/21  0453   AST 79* 87* 65*   ALT 61* 70* 65*   BILITOT 1.1* 0.7 0.7   ALKPHOS 109 95 94     Amylase:  No results found for: AMYLASE  Lipase:    Lab Results   Component Value Date    LIPASE 49.0 06/29/2021      Mag:    Lab Results   Component Value Date    MG 2.00 07/01/2021    MG 2.20 06/30/2021     Phos:     Lab Results   Component Value Date    PHOS 3.9 06/10/2021    PHOS 4.1 02/12/2021      Coags:   Lab Results   Component Value Date    PROTIME 11.4 09/06/2016    INR 1.00 09/06/2016    APTT 33.1 09/06/2016       Cultures:  Anaerobic culture  No results found for: LABANAE  Fungus stain  No results found for requested labs within last 30 days. Gram stain  No results found for requested labs within last 30 days. Organism  No results found for: [facility]  Surgical culture  No results found for: CXSURG  Blood culture 1  No results found for requested labs within last 30 days. Blood culture 2  No results found for requested labs within last 30 days. Fecal occult  No results found for requested labs within last 30 days. GI bacterial pathogens by PCR  Results in Past 30 Days  Result Component Current Result Ref Range Previous Result Ref Range   GI Bacterial Pathogens By PCR No Shigella spp/EIEC DNA detected  No Shiga toxin-producing gene(s) detected  No Campylobacter spp. (jejuni and coli)DNA detected  No Salmonella spp. DNA detected  Normal Range:  None detected   (6/29/2021)  Not in Time Range      C. difficile  No results found for requested labs within last 30 days.      Urine culture  No results found for: LABURIN    Pathology:  No relevant pathology     Imaging:  I have personally reviewed the following films:    CT ABDOMEN PELVIS W IV CONTRAST Additional Contrast? None    Result Date: 6/29/2021  EXAMINATION: CT OF THE ABDOMEN AND PELVIS WITH CONTRAST 6/29/2021 10:39 am TECHNIQUE: CT of the abdomen and pelvis was performed with the administration of intravenous contrast. Multiplanar reformatted images are provided for review. Dose modulation, iterative reconstruction, and/or weight based adjustment of the mA/kV was utilized to reduce the radiation dose to as low as reasonably achievable. COMPARISON: None. HISTORY: ORDERING SYSTEM PROVIDED HISTORY: RUQ pain n/v TECHNOLOGIST PROVIDED HISTORY: Reason for exam:->RUQ pain n/v Additional Contrast?->None Decision Support Exception - unselect if not a suspected or confirmed emergency medical condition->Emergency Medical Condition (MA) Reason for Exam: Emesis (Pt reports vomiting since going out to eat since Friday. States that the vomit is now green in color, also having some diarrhea. Only thing he can keep down is water. ) Acuity: Unknown Type of Exam: Unknown FINDINGS: Lower Chest: Cardiomegaly. Trace left pleural effusion Organs: High density material in the gallbladder lumen with no pericholecystic stranding. No hydronephrosis. 1.5 cm left renal cyst.  1.0 cm fat density right adrenal mass. Remaining solid organs unremarkable GI/Bowel: No gastrointestinal abnormality demonstrated. Normal appendix Pelvis: Urinary bladder unremarkable. No pelvic fluid Peritoneum/Retroperitoneum: No ascites or pneumoperitoneum. Normal caliber aorta Bones/Soft Tissues: No acute bony abnormality. Diffuse lumbar degenerative disease     1. No acute abdominopelvic process demonstrated 2.  Stones or sludge in the gallbladder 3. 1 cm right adrenal myelolipoma     Scheduled Meds:   furosemide  80 mg Oral Daily    isosorbide mononitrate  30 mg Oral QAM    aspirin  81 mg Oral Daily    glycopyrrolate-formoterol  2 puff Inhalation BID    amiodarone  200 mg Oral Daily    pantoprazole  40 mg Intravenous Daily    insulin lispro  0-6 Units Subcutaneous TID WC    insulin lispro  0-3 Units Subcutaneous Nightly    insulin glargine  20 Units Subcutaneous Nightly    sodium chloride flush  5-40 mL Intravenous 2 times per day    enoxaparin  40 mg Subcutaneous Nightly    metoprolol succinate  25 mg Oral Daily     Continuous Infusions:   sodium chloride      dextrose       PRN Meds:.morphine, sodium chloride flush, sodium chloride, ondansetron **OR** ondansetron, polyethylene glycol, acetaminophen **OR** acetaminophen, albuterol, glucose, dextrose, glucagon (rDNA), dextrose      Assessment:  [de-identified] y.o. male admitted with   1. Upper abdominal pain    2. Acute on chronic congestive heart failure, unspecified heart failure type (HCC)        Nausea, vomiting, diarrhea--probable gastroenteritis  Cholelithiasis  CHF exacerbation  Hypertension  CAD, status-post CABG  Chronic kidney disease  Morbid obesity, BMI 42.83       Plan:  1. No plans for surgical intervention at this time, continue supportive care--C. Diff and GI pathogens negative; may benefit from cholecystectomy electively at some point  2. General diet as tolerated; monitor stools  3. IV hydration until PO intake is adequate; monitor and correct electrolytes  4. Activity as tolerated  5. PRN analgesics and antiemetics--minimizing narcotics as tolerated  6. DVT prophylaxis with Lovenox  7. Management of medical comorbid etiologies per primary team and consulting services  8. Disposition: Okay for discharge home from a surgical perspective; follow up with Dr. Gabriela Babin in about 4 weeks/when acute issues resolved to discuss elective cholecystectomy    EDUCATION:  Educated patient on plan of care and disease process--all questions answered. Plans discussed with patient and nursing. Reviewed and discussed with Dr. Gabriela Babin. Signed:  RAYMUNDO Kohler CNP  7/1/2021 10:41 AM     Surg Staff:     Pt seen and examined with NP  See full note above  Pt has had resolution of N/V/D.   Has no pain persisting  OK for DC home from our standpoint  Can see in office in a few weeks and plan outpt Jaylin Smith MD

## 2021-07-01 NOTE — PROGRESS NOTES
Patient given and reviewed discharge instructions with patient. Instructed on CHF and ways to help prevent readmission. Weighing daily, limiting fluid intake to 64 ounces per day and low sodium diet. Patient verbalizes understanding. Patient taken via wheelchair to front door for  by family for discharge.

## 2021-07-01 NOTE — PROGRESS NOTES
Nutrition Education    Provided heart failure diet education. Education included low sodium diet guidelines (3-4 gm Na+/day) and fluid restriction (64 oz/day). Reviewed foods to choose and foods to avoid, along with label reading and ways to add flavor to food. Pt does not currently follow a low sodium diet at home. Pt states understanding of education. Time spent with patient: 10 minutes.        Electronically signed by Varun Gan RD, MANUEL on 7/1/21 at 11:14 AM EDT    Contact: [phone]

## 2021-07-01 NOTE — PROGRESS NOTES
Physical Therapy    Facility/Department: [facility] ICU  Initial Assessment/Discharge Summary    NAME: Nikia Nance  : 1940  MRN: [id number]    Date of Service: 2021    Discharge Recommendations:  Nikia Nance scored a 18/24 on the AM-PAC short mobility form. At this time, no further PT is recommended upon discharge due to pt performing functional mobility at baseline functional level. Pt expressed that prior to admission he had no need for PT services and felt that following discharge he would continue to not require PT services or needs. Recommend patient returns to prior setting with prior services. Home independently   PT Equipment Recommendations  Equipment Needed: No    Assessment   Assessment: Pt presents with dx of CHF, NYHA class 1, acute on chronic combined and is currently performing functional mobility at baseline functional level so pt is appropriate for discharge from inpatient physical therapy services. Pt expressed that he had no PT needs prior to hospitalization and following discharge pt expressed he felt safe and comfortable to return home without physical therapy services or needs. Prognosis: Good  Decision Making: Low Complexity  PT Education: PT Role;Goals;Plan of Care;General Safety  Patient Education: d/c recommendations - pt verbalized understanding  No Skilled PT: At baseline function  REQUIRES PT FOLLOW UP: No  Activity Tolerance  Activity Tolerance: Patient Tolerated treatment well       Patient Diagnosis(es): The primary encounter diagnosis was Upper abdominal pain. A diagnosis of Acute on chronic congestive heart failure, unspecified heart failure type [facility]) was also pertinent to this visit.      has a past medical history of Acid reflux, Atrial flutter ([facility] [address] Bleeding stomach ulcer, BPH (benign prostatic hyperplasia), CAD (coronary artery disease), COPD (chronic obstructive pulmonary disease) ([facility] [address] Diabetes mellitus type II, Edema, Elevated PSA- nl 11, Hyperlipidemia, Hypertension, Ischemic cardiomyopathy, Lipoma of skin-RIGHT CHEST, Obesity, Osteoarthritis, Skin tear of left forearm without complication, and Spinal stenosis of lumbar region with neurogenic claudication- clinically. has a past surgical history that includes Coronary artery bypass graft (1993); Cataract removal (2010, 2011); Colonoscopy; joint replacement (Right, total knee replacement); joint replacement (Left, 09/14/2016); and Eye surgery (Left, 2019). Restrictions  Restrictions/Precautions  Restrictions/Precautions: Fall Risk, Modified Diet (moderate fall risk, adult diet 4 carb choices (60 g/ml))  Required Braces or Orthoses?: No  Position Activity Restriction  Other position/activity restrictions: [de-identified] y.o. male   with a history of systolic cardiomyopathy, GERD, atrial fibrillation , coronary artery disease status post stents, COPD, hypertension, hyperlipidemia, chronic kidney disease stage III went to restaurant for dinner on Friday and soon after coming home, he started having nausea, vomiting, diarrhea. He has been throwing up greenish bile and he is having small, 5-6 bowel movements which are greenish in color as well. No fever. No recent use of antibiotics. Wife had the same food but she denies any symptoms. Had Covid vaccinations few months ago. Patient is not able to keep down anything since Friday. He is not able to take any medications including his diuretics . Denies any shortness of breath or chest pain. But apparently hypoxic at 87% on room air upon arrival.  Given 1 dose of IV Lasix in the emergency room. General surgery consulted. Patient now feeling better after receiving pain medications and antiemetics. On minimal oxygen.   Vision/Hearing        Subjective  General  Chart Reviewed: Yes  Patient assessed for rehabilitation services?: Yes  Family / Caregiver Present: No  Diagnosis: CHF (congestive heart failure), NYHA class 1, acute on chronic  General Comment  Comments: Pt supine in bed with HOB elevated. Subjective  Subjective: Pt denies pain. Pt agreeable to PT/OT session. Pain Screening  Patient Currently in Pain: Denies  Vital Signs  Patient Currently in Pain: Denies       Orientation  Orientation  Overall Orientation Status: Within Normal Limits  Social/Functional History  Social/Functional History  Lives With: Spouse  Type of Home: House  Home Layout: One level  Home Access: Stairs to enter without rails  Entrance Stairs - Number of Steps: 1 step  Bathroom Shower/Tub: Tub/Shower unit  Bathroom Toilet: Handicap height  Bathroom Equipment: Grab bars in shower  Bathroom Accessibility: Walker accessible  Home Equipment: Rolling walker, Cane, Reacher  ADL Assistance: Independent  Homemaking Assistance: Independent  Homemaking Responsibilities: Yes  Ambulation Assistance: Independent  Transfer Assistance: Independent  Active : Yes  Mode of Transportation: Car  Occupation: Retired  Leisure & Hobbies: shooting guns  Additional Comments: pt is caregiver to wife 24/7  Cognition   Cognition  Overall Cognitive Status: WNL    Objective     Observation/Palpation  Posture: Fair  Observation: Pt observed to have L LE swelling and redness. Pt expressed swelling has been chronic since \"they took a vein out for my heart surgery\". Pt on 3 L O2 via NC upon arrival. SPO2 taken and pt read 98%. Pt then transitioned to room air and SPO2 levels read 94%. Pt conducted functional mobility without O2 and pt SPO2 read 93% after activity. Pt left on room air. RN notified. PROM RLE (degrees)  RLE PROM: Exceptions  RLE General PROM: Pt R LE plantarflexion 25% less than L LE plantarflexion. AROM RLE (degrees)  RLE AROM: Exceptions  RLE General AROM: Pt R LE dorsiflexion AROM 25% less than  L LE dorsiflexion range.   PROM LLE (degrees)  LLE PROM: WFL  AROM LLE (degrees)  LLE AROM : WFL  Strength RLE  Strength RLE: WFL  Comment: hip flexors 4+/5, knee extensors 5/5, ankle dorsiflexors 4/5, ankle plantarflexors 4/5  Strength LLE  Strength LLE: WFL  Comment: hip flexors 4+/5, knee extensors 5/5, ankle dorsiflexors 4/5, ankle plantarflexors 4/5     Sensation  Overall Sensation Status: Impaired (neuropathy in B hands and feet)  Bed mobility  Bridging: Supervision   Sit to Supine: Supervision  (HOB elevated)  Scooting:Supervision   Comment: pt seated in recliner at beginning of session. pt supine in bed with HOB elevated at end of session. Transfers  Sit to Stand: Supervision (sit to stand from seated in recliner to standing x 1 w SBA)  Stand to sit: Supervision (stand to sit from standing to seated at EOB x 1 with SBA, pt able to utilize rails in order to lower)  Ambulation  Ambulation?: Yes  More Ambulation?: No  Ambulation 1  Surface: level tile  Device: No Device  Assistance: Contact guard assistance (CGA to ensure safety due to pt expressing he may be initially \"unsteady\" due to not ambulating long distances since in hospital) progressing to Supervision  Gait Deviations: Decreased step length; Slow Rita;Decreased step height;Staggers  Distance: 270' x 1  Comments: pt ambulated 150 without AD and CGA and then required a standing rest break due to feeling SOB. Pt expressed this is his baseline and he  was SOB due to \"being in bed so much\". Pt then ambulated 120' back to room to be seated in recliner. Pt SPO2 read 93% following ambulation. Stairs/Curb  Stairs?: No     Balance  Posture: Fair  Sitting - Static: Good  Sitting - Dynamic: Good  Standing - Static: Fair (supervision to ensure safety)  Standing - Dynamic: Fair (supervision to ensure safety)  Exercises  Comments: Pt instructed to perform ankle pumps as tolerated. Plan   Plan  Times per week: no plan since pt appropriate for discharge due to performing functional mobility at baseline functional level  Safety Devices  Type of devices:  All fall risk precautions in place, Bed alarm in place, Patient at risk for falls, Left in bed, Call light within reach, Nurse notified  Restraints  Initially in place: No      AM-PAC Score  AM-PAC Inpatient Mobility Raw Score : 18 (07/01/21 1207)  AM-PAC Inpatient T-Scale Score : 43.63 (07/01/21 1207)  Mobility Inpatient CMS 0-100% Score: 46.58 (07/01/21 1207)  Mobility Inpatient CMS G-Code Modifier : CK (07/01/21 1207)          Goals  Patient Goals   Patient goals : no goals since pt is appropriate for discharge due to performing functional mobility at baseline functional level. Therapy Time   Individual Concurrent Group Co-treatment   Time In 0827         Time Out 4667         Minutes 54         Timed Code Treatment Minutes: 501 [address]   Willistine Heimlich, PT Therapist observed and directed the above evaluation.  Thanks, Willistine Heimlich, [address] [facility], DPT [id number]

## 2021-07-01 NOTE — PROGRESS NOTES
Occupational Therapy   Occupational Therapy Initial Assessment  Date: 2021   Patient Name: Zev Werner  MRN: [id number]     : 1940    Date of Service: 2021    Discharge Recommendations:  Zev Werner scored a 19/24 on the AM-PAC ADL Inpatient form. At this time, no further OT is recommended upon discharge due to pt anticipated to be close to baseline at d/c. Recommend patient returns to prior setting with prior services. OT Equipment Recommendations  Equipment Needed: Yes  Mobility Devices: ADL Assistive Devices  ADL Assistive Devices: Shower Chair with back (d/t decreased endurance and balance during eval)    Assessment   Performance deficits / Impairments: Decreased ADL status; Decreased high-level IADLs;Decreased endurance;Decreased balance  Assessment: pt is close to baseline level of functioning but would benefit from continued skilled OT services in order to maximize independent functioning and return to PLOF  Treatment Diagnosis: CHF, gallbladder disease  Prognosis: Good  Decision Making: Low Complexity  History: pt lives with spouse, independent at baseline  Patient Education: eval, POC, discharge, DME needs-pt independent with education  REQUIRES OT FOLLOW UP: Yes  Activity Tolerance  Activity Tolerance: Patient Tolerated treatment well  Safety Devices  Safety Devices in place: Yes  Type of devices: All fall risk precautions in place;Nurse notified;Call light within reach; Bed alarm in place; Left in bed;Patient at risk for falls           Patient Diagnosis(es): The primary encounter diagnosis was Upper abdominal pain. A diagnosis of Acute on chronic congestive heart failure, unspecified heart failure type [facility]) was also pertinent to this visit.      has a past medical history of Acid reflux, Atrial flutter ([facility] [address] Bleeding stomach ulcer, BPH (benign prostatic hyperplasia), CAD (coronary artery disease), COPD (chronic obstructive pulmonary disease) ([facility] [address] Diabetes mellitus step  Bathroom Shower/Tub: Tub/Shower unit  Bathroom Toilet: Handicap height  Bathroom Equipment: Grab bars in shower  Bathroom Accessibility: Walker accessible  Home Equipment: Rolling walker, Cane, Reacher  ADL Assistance: Independent  Homemaking Assistance: Independent  Homemaking Responsibilities: Yes  Ambulation Assistance: Independent  Transfer Assistance: Independent  Active : Yes  Mode of Transportation: Car  Occupation: Retired  Leisure & Hobbies: shooting guns  Additional Comments: pt is caregiver to wife 24/7       Objective   Vision: Impaired  Vision Exceptions: Wears glasses for reading  Hearing: Exceptions to [employer]  Hearing Exceptions: Hard of hearing/hearing concerns    Orientation  Overall Orientation Status: Within Normal Limits  Observation/Palpation  Posture: Fair  Balance  Sitting Balance: Independent  Standing Balance: Stand by assistance  Standing Balance  Time: ~10 minutes total  Activity: one standing rest break ~270 ft without device  Comment: rest break d/t SOB. SPo2 at 94%. pt able to continue, reports SOB is chronic  Functional Mobility  Functional - Mobility Device: No device  Activity: Other  Assist Level: Contact guard assistance  ADL  Feeding: Independent  Additional Comments: pt declined ADLs, offered grooming, tolieting, bathing/dressing.  pt declined  Tone RUE  RUE Tone: Normotonic  Tone LUE  LUE Tone: Normotonic  Coordination  Coordination and Movement description: Right UE;Left UE (FM deficits secondary to neuropathy)     Bed mobility  Bridging: Stand by assistance  Sit to Supine: Stand by assistance  Scooting: Stand by assistance  Comment: pt returned to bed after mobility, sitting in recliner  upon arrival  Transfers  Sit to stand: Stand by assistance  Stand to sit: Stand by assistance     Cognition  Overall Cognitive Status: WFL  Perception  Overall Perceptual Status: WFL     Sensation  Overall Sensation Status: Impaired (neuropathy in B hands and feet)        LUE AROM (degrees)  LUE AROM : WNL  RUE AROM (degrees)  RUE AROM : WNL  LUE Strength  Gross LUE Strength: WFL  RUE Strength  Gross RUE Strength: WFL                   Plan   Plan  Times per week: 1-2  Current Treatment Recommendations: Balance Training, Self-Care / ADL, Home Management Training      AM-PAC Score        AM-PAC Inpatient Daily Activity Raw Score: 19 (07/01/21 0940)  AM-PAC Inpatient ADL T-Scale Score : 40.22 (07/01/21 0940)  ADL Inpatient CMS 0-100% Score: 42.8 (07/01/21 0940)  ADL Inpatient CMS G-Code Modifier : CK (07/01/21 0940)    Goals  Short term goals  Time Frame for Short term goals: discharge  Short term goal 1: bed mobility independent  Short term goal 2: functional ADL transfer independent  Short term goal 3: functional mobility for ADL/IADL tasks mod I (rest breaks as needed)  Short term goal 4: UB/LB ADLs mod I  Patient Goals   Patient goals : return home today       Therapy Time   Individual Concurrent Group Co-treatment   Time In 0827         Time Out 0921         Minutes 54           Timed Code Treatment Minutes:   39 minutes    Total Treatment Minutes:  54 minutes      Lynn Martinez OTR/L [id number]      Lynn Martinez OT

## 2021-07-01 NOTE — DISCHARGE SUMMARY
Hospital Medicine Discharge Summary    Patient ID: Monique Greenfield      Patient's PCP: Myke Michael MD    Admit Date: 6/29/2021     Discharge Date:   7/1/2021    Admitting Physician: Scott Barlow MD     Discharge Physician: Scott Barlow MD     Discharge Diagnoses: Active Hospital Problems    Diagnosis     CHF (congestive heart failure), NYHA class I, acute on chronic, combined ([facility] [I50.43]        The patient was seen and examined on day of discharge and this discharge summary is in conjunction with any daily progress note from day of discharge. Hospital Course:      81-year-old male admitted with nausea vomiting and diarrhea. Treated as follows:    Nausea/vomiting/diarrhea: Secondary to gastroenteritis.  Transaminitis noted with minimal bilirubin elevation and normal lipase.  CT abdomen showed stones or sludge in the gallbladder. General surgery consulted. No interventions planned at this time given no fever or leukocytosis.    GI PCR negative as well as c diff. Overall improved and able to tolerate diet okay prior to discharge.      Acute on chronic systolic heart failure : EF 30 to 35%.  Secondary to not taking medications secondary to ongoing nausea/vomiting.  given IV Lasix in emergency room given his hypoxia and chest x-ray evidence of vascular congestion with BNP elevation.  Home dose Lasix continued afterwards. CKD stage III: Creatinine at baseline.         Coronary artery disease status post CABG:  aspirin.  Holding statin.         history of atrial fibrillation, flutter, complete AV block: Pacemaker in situ.   in paced rhythm. Beta-blocker continued. Eliquis held initially but resumed after no surgical plans.     COPD: Stable.  Continued home inhalers.     Hyperlipidemia:  statin.     Hypertension: Controlled. .     GERD: PPI     Obesity: BMI 42.83.  Advised weight loss.         Physical Exam Performed:     BP (!) 146/67   Pulse 66   Temp 97.2 °F (36.2 °C) (Temporal)   Resp 20   Ht 5' 8\" (1.727 m)   Wt 255 lb (115.7 kg)   SpO2 94%   BMI 38.77 kg/m²       General appearance:  No apparent distress, appears stated age and cooperative. HEENT:  Normal cephalic, atraumatic without obvious deformity. Pupils equal, round, and reactive to light. Extra ocular muscles intact. Conjunctivae/corneas clear. Neck: Supple, with full range of motion. No jugular venous distention. Trachea midline. Respiratory:  Normal respiratory effort. Clear to auscultation, bilaterally without Rales/Wheezes/Rhonchi. Cardiovascular:  Regular rate and rhythm with normal S1/S2 without murmurs, rubs or gallops. Abdomen: Soft, non-tender, non-distended with normal bowel sounds. Musculoskeletal:  No clubbing, cyanosis or edema bilaterally. Full range of motion without deformity. Skin: Skin color, texture, turgor normal.  No rashes or lesions. Neurologic:  Neurovascularly intact without any focal sensory/motor deficits. Cranial nerves: II-XII intact, grossly non-focal.  Psychiatric:  Alert and oriented, thought content appropriate, normal insight  Capillary Refill: Brisk,< 3 seconds   Peripheral Pulses: +2 palpable, equal bilaterally       Labs: For convenience and continuity at follow-up the following most recent labs are provided:      CBC:    Lab Results   Component Value Date    WBC 2.9 07/01/2021    HGB 14.2 07/01/2021    HCT 42.9 07/01/2021    PLT 97 07/01/2021       Renal:    Lab Results   Component Value Date     07/01/2021    K 2.9 07/01/2021     07/01/2021    CO2 31 07/01/2021    BUN 14 07/01/2021    CREATININE 1.2 07/01/2021    CALCIUM 8.7 07/01/2021    PHOS 3.9 06/10/2021         Significant Diagnostic Studies    Radiology:   CT ABDOMEN PELVIS W IV CONTRAST Additional Contrast? None   Final Result   1. No acute abdominopelvic process demonstrated   2.  Stones or sludge in the gallbladder   3. 1 cm right adrenal myelolipoma         XR CHEST PORTABLE   Final Result Essential hypertension      insulin NPH (NOVOLIN N) 100 UNIT/ML injection vial Take 44 units with breakfast and 32 units with dinner. Qty: 30 mL, Refills: 3    Associated Diagnoses: Type 2 diabetes mellitus with diabetic nephropathy, with long-term current use of insulin ([facility])      insulin regular (NOVOLIN R RELION) 100 UNIT/ML injection INJECT 20 TO 30 UNITS SUBCUTANEOUSLY THREE TIMES DAILY BEFORE MEAL(S)  Qty: 30 mL, Refills: 0    Associated Diagnoses: Type 2 diabetes mellitus with diabetic nephropathy, with long-term current use of insulin ([facility])      FEROSUL 325 (65 Fe) MG tablet TAKE 1 TABLET BY MOUTH TWICE DAILY  Qty: 180 tablet, Refills: 3      vitamin D (ERGOCALCIFEROL) 21464 units CAPS capsule TK 1 C PO WEEKLY  Refills: 2      aspirin 81 MG EC tablet Take 81 mg by mouth daily      apixaban (ELIQUIS) 2.5 MG TABS tablet Take 1 tablet by mouth 2 times daily  Qty: 60 tablet, Refills: 5    Associated Diagnoses: Personal history of atrial flutter      metoprolol (LOPRESSOR) 25 MG tablet Take 25 mg by mouth daily       isosorbide mononitrate (IMDUR) 30 MG CR tablet Take 1 tablet by mouth every morning Dr. Lucía Johnson - Cardio  Qty: 30 tablet, Refills: 6    Associated Diagnoses: CAD (coronary artery disease)      nitroGLYCERIN (NITROSTAT) 0.3 MG SL tablet Take one sublingual for chest pain. May repeat twice at 5 min intervals. If not getting relief call 911.   Qty: 25 tablet, Refills: 3    Associated Diagnoses: CAD (coronary artery disease)      tiotropium (SPIRIVA HANDIHALER) 18 MCG inhalation capsule Inhale 1 capsule into the lungs daily INCLUDE inhaler device  Qty: 30 capsule, Refills: 5    Associated Diagnoses: Pulmonary emphysema, unspecified emphysema type ([facility])      INSULIN SYRINGE .5CC/29G 29G X 1/2\" 0.5 ML MISC INJECT 4 TIMES DAILY AS NEEDED  Qty: 400 each, Refills: 3      Nebulizers (AIRIAL COMPACT MINI NEBULIZER) MISC 1 each by Does not apply route every 6 hours as needed (wheezing or shortness of breath)  Qty: 1 each, Refills: 0      Respiratory Therapy Supplies (NEBULIZER/TUBING/MOUTHPIECE) KIT 1 kit by Does not apply route every 6 hours as needed (for wheezing or shortness of breath)  Qty: 1 kit, Refills: 1      albuterol (PROVENTIL) (2.5 MG/3ML) 0.083% nebulizer solution Take 3 mLs by nebulization every 6 hours as needed for Wheezing  Qty: 50 vial, Refills: 3      mupirocin (BACTROBAN) 2 % ointment Apply 3 times daily as needed  Qty: 22 g, Refills: 1    Associated Diagnoses: Cellulitis of left lower extremity      levalbuterol (XOPENEX HFA) 45 MCG/ACT inhaler Inhale 1-2 puffs into the lungs every 4 hours as needed for Wheezing  Qty: 1 Inhaler, Refills: 3             Time Spent on discharge is more than 30 minutes in the examination, evaluation, counseling and review of medications and discharge plan. Signed:    Alicia Monahan MD   7/1/2021      Thank you Liliana Christianson MD for the opportunity to be involved in this patient's care. If you have any questions or concerns please feel free to contact me at [phone].

## 2021-07-02 ENCOUNTER — TELEPHONE (OUTPATIENT)
Dept: OTHER | Age: 81
End: 2021-07-02

## 2021-07-02 ENCOUNTER — CARE COORDINATION (OUTPATIENT)
Dept: CASE MANAGEMENT | Age: 81
End: 2021-07-02

## 2021-07-02 RX ORDER — POTASSIUM CHLORIDE 750 MG/1
TABLET, FILM COATED, EXTENDED RELEASE ORAL
Qty: 90 TABLET | Refills: 1 | Status: SHIPPED | OUTPATIENT
Start: 2021-07-02 | End: 2022-01-18

## 2021-07-02 RX ORDER — FUROSEMIDE 80 MG
TABLET ORAL
Qty: 90 TABLET | Refills: 0 | Status: SHIPPED | OUTPATIENT
Start: 2021-07-02 | End: 2021-10-01

## 2021-07-02 NOTE — TELEPHONE ENCOUNTER
[address] FAILURE PROGRAM  TELEPHONE ENCOUNTER FORM    Brayan Engle 1940    Attempted to call patient for HF follow-up. No answer at this time.       Next MD/ Clinic appointment: 8/3 with Dr. Janet Fontanez, RN 7/2/2021 11:33 AM

## 2021-07-02 NOTE — PROGRESS NOTES
[facility] MARY Garcia 1940    History:  Past Medical History:   Diagnosis Date    Acid reflux     Atrial flutter ([facility] 2013    converted    Bleeding stomach ulcer oct 2015    BPH (benign prostatic hyperplasia)     CAD (coronary artery disease)     11/12 angio with 2 blocked bypass and 2 patent    COPD (chronic obstructive pulmonary disease) ([facility]     Diabetes mellitus type II     Edema     chronic left leg    Elevated PSA- nl 8/12/11     Hyperlipidemia     Hypertension     Ischemic cardiomyopathy     Lipoma of skin-RIGHT CHEST 5/5/2011    Obesity     Osteoarthritis     Skin tear of left forearm without complication 5/54/5656    Significant amount of epidermal loss with bleeding.  Spinal stenosis of lumbar region with neurogenic claudication- clinically 7/6/2018       ECHO:    4/21 at [facility]    Summary:   Overall left ventricular ejection fraction is estimated to be 30-35%. The left ventricular function is moderately reduced. There is mild concentric left ventricular hypertrophy. The left ventricle is moderately dilated. There is moderate to severe global hypokinesis of the left ventricle. There is a pacemaker lead in the right ventricle. The left atrium is mildly dilated. There is mild mitral annular calcification present. There is mild to moderate mitral regurgitation. Mild to moderate valvular aortic stenosis. Right ventricular systolic pressure is severely elevated     ACE/ARB: No ace/arb   BB: Metoprolol 25 mg daily  Aldosterone Antagonist: No aldactone antagonist     DM History: Yes  DM medications: Farxiga 10 mg tablet   Consult for DM educator: No      Code Status: Full   Discharge plans: Patient to return home. Lives independently with wife    Family Present: No    Mr. Claudia Garcia was seen for heart failure which is not new for him he follows with Dr. Schmid Meals of [address] [place] [address].  He was admitted for a nausea and vomiting that lasted a few days in duration after eating out. His wife ate the same meal. He weighs daily, tries to follow a low sodium diet and was unaware of a fluid restriction. He was fluid up from missing his medications from nausea and vomiting. He is being seen by GI for gallbladder. Possible need for a cholecystectomy in the future. Patient provided with both written and verbal education on CHF signs/ symptoms, causes, discharge medications, daily weights, low sodium diet, activity, and follow-up. Pt to call if gains 3 pounds in one day or 5 pounds in one week. Mutually agreed upon goals were discussed such as calling the MD as soon as they recognize symptoms and weight gain, maintaining proper diet, taking medications as prescribed, joining cardiac rehab when able. Also reviewed importance of risk factor reduction. Patient provided with CHF Zone Management tool and CHF symptoms magnet. Discussed importance of lifestyle changes: daily weights, low sodium and fluid restriction diet     PATIENT/CAREGIVER TEACHING:    Level of patient/caregiver understanding able to:   [ x] Verbalize understanding [ ] Demonstrate understanding [ ] Teach back   [ ] Needs reinforcement [ ] Other:       Time spent teachin minutes     1. WEIGHT: Admit Weight: 290 lb (131.5 kg)      Today  Weight: 255 lb (115.7 kg)   2. I/O No intake or output data in the 24 hours ending 21 [id number]    Recommendations:   1. Patient will need a follow up appointment at time of discharge  2. Educate further on fluid restriction 48 oz- 64 oz during inpatient stay so he can understand how to measure intake at home. 3. Continue to educate on S/S.   4. Emphasize daily weights, diet, and knowing when and who to call  5. Provided patient with CHF Resource Line for questions and concerns.        Emile Ryan RN 2021 2:34 PM

## 2021-07-02 NOTE — CARE COORDINATION
Venkatesh 45 Transitions Initial Follow Up Call    Call within 2 business days of discharge: Yes    Patient: Sushma Gan Patient : 1940   MRN: [id number]  Reason for Admission:   Discharge Date: 21 RARS: Readmission Risk Score: 13      Last Discharge [facility]       Complaint Diagnosis Description Type Department Provider    21 Emesis Upper abdominal pain . .. ED to Hosp-Admission (Discharged) (ADMITTED) F ICU Jericho Dubois MD; Anjelica Helms . ..            Initial 24 hr call attempted, contact info left on vm    Follow Up  Future Appointments   Date Time Provider Bailey Razo   2021  8:00 AM Umair Hicks MD [facility]   2021  8:15 AM MARYLU Davis ORTHO NICOLE Nix RN

## 2021-07-06 ENCOUNTER — CARE COORDINATION (OUTPATIENT)
Dept: CASE MANAGEMENT | Age: 81
End: 2021-07-06

## 2021-07-06 DIAGNOSIS — R10.10 UPPER ABDOMINAL PAIN: Primary | ICD-10-CM

## 2021-07-06 PROCEDURE — 1111F DSCHRG MED/CURRENT MED MERGE: CPT | Performed by: FAMILY MEDICINE

## 2021-07-06 NOTE — CARE COORDINATION
Venkatesh 45 Transitions Initial Follow Up Call    Call within 2 business days of discharge: Yes    Patient: Zev Werner Patient : 1940   MRN: [id number]  Reason for Admission:   Discharge Date: 21 RARS: Readmission Risk Score: 13      Last Discharge [facility]       Complaint Diagnosis Description Type Department Provider    21 Emesis Upper abdominal pain . .. ED to Hosp-Admission (Discharged) (ADMITTED) [facility] ICU Shivani Barrett MD; Mj Hol[place] . [place] [facility] Non-face-to-face services provided:  Obtained and reviewed discharge summary and/or continuity of care documents  1111F completed   Transitions of Care Initial Call    Was this an external facility discharge? No Discharge Facility:     Challenges to be reviewed by the provider   Additional needs identified to be addressed with provider: No  none             Method of communication with provider : none      Advance Care Planning:   Does patient have an Advance Directive:  not on file; education provided. Was this a readmission? No  Patient stated reason for admission: abd pain  Patients top risk factors for readmission: medical condition-abd pain, CHF    Care Transition Nurse (CTN) contacted the family by telephone to perform post hospital discharge assessment. Verified name and  with family as identifiers. Provided introduction to self, and explanation of the CTN role. CTN reviewed discharge instructions, medical action plan and red flags with family who verbalized understanding. Family given an opportunity to ask questions and does not have any further questions or concerns at this time. Were discharge instructions available to patient? Yes. Reviewed appropriate site of care based on symptoms and resources available to patient including: When to call 911. The family agrees to contact the PCP office for questions related to their healthcare.      Medication reconciliation was performed with family, who verbalizes understanding of administration of home medications. Advised obtaining a 90-day supply of all daily and as-needed medications. Reviewed and educated family on any new and changed medications related to discharge diagnosis. Was patient discharged with a pulse oximeter? No Discussed and confirmed pulse oximeter discharge instructions and when to notify provider or seek emergency care. Pt's wife states pt is doing well, no issues or concerns. Denies SOB, CP, abd pain. He has not weighed as yet today. Encouraged wife  to make f/u appt with PCP, voiced understanding. Agreed to more CTC f/u calls. CTN provided contact information. Plan for follow-up call in 5-7 days based on severity of symptoms and risk factors.     Care Transitions 24 Hour Call    Do you have any ongoing symptoms?: No  Do you have a copy of your discharge instructions?: Yes  Do you have all of your prescriptions and are they filled?: Yes  Have you been contacted by a [address]?: No  Have you scheduled your follow up appointment?: No  Were you discharged with any Home Care or Post Acute Services: No  Do you feel like you have everything you need to keep you well at home?: Yes  Care Transitions Interventions  No Identified Needs         Follow Up  Future Appointments   Date Time Provider Bailey Razo   9/17/2021  8:00 AM MD Artem Owens   12/14/2021  8:15 AM MARYLU Pink RN

## 2021-07-13 ENCOUNTER — CARE COORDINATION (OUTPATIENT)
Dept: CASE MANAGEMENT | Age: 81
End: 2021-07-13

## 2021-07-13 ENCOUNTER — TELEPHONE (OUTPATIENT)
Dept: FAMILY MEDICINE CLINIC | Age: 81
End: 2021-07-13

## 2021-07-13 NOTE — CARE COORDINATION
Venkatesh 45 Transitions Follow Up Call    2021    Patient: King Arzate  Patient : 1940   MRN: [id number]  Reason for Admission:   Discharge Date: 21 RARS: Readmission Risk Score: 15         Spoke with: [address] Transitions Subsequent and Final Call    Subsequent and Final Calls  Do you have any ongoing symptoms?: No  Have your medications changed?: No  Do you have any questions related to your medications?: No  Do you currently have any active services?: No  Do you have any needs or concerns that I can assist you with?: No  Identified Barriers: None  Care Transitions Interventions  No Identified Needs  Other Interventions:         Pt states doing well, no issues or concerns. Denies any abd issues,states after he gets back from vacation he will get scheduled to have his gallbladder taken out. Denies SOB, CP. States weight stays steady at 275 lbs. He states that the weight of 255 lbs that is the last weight listed in his chart is not an accurate weight.  Agreed to more CTC f/u calls      Follow Up  Future Appointments   Date Time Provider Bailey Razo   2021 10:30 AM Rosa Rizo MD [facility] [place] [facility]   2021  8:00 AM Rosa Rizo MD [facility] [place] [facility]   2021  8:15 AM MARYLU Mora RN

## 2021-07-16 ENCOUNTER — OFFICE VISIT (OUTPATIENT)
Dept: FAMILY MEDICINE CLINIC | Age: 81
End: 2021-07-16
Payer: MEDICARE

## 2021-07-16 VITALS
SYSTOLIC BLOOD PRESSURE: 110 MMHG | OXYGEN SATURATION: 94 % | RESPIRATION RATE: 14 BRPM | DIASTOLIC BLOOD PRESSURE: 64 MMHG | BODY MASS INDEX: 40.68 KG/M2 | HEART RATE: 86 BPM | HEIGHT: 68 IN | WEIGHT: 268.4 LBS

## 2021-07-16 DIAGNOSIS — I25.10 CORONARY ARTERY DISEASE INVOLVING NATIVE CORONARY ARTERY OF NATIVE HEART WITHOUT ANGINA PECTORIS: ICD-10-CM

## 2021-07-16 DIAGNOSIS — K80.20 CALCULUS OF GALLBLADDER WITHOUT CHOLECYSTITIS WITHOUT OBSTRUCTION: ICD-10-CM

## 2021-07-16 DIAGNOSIS — Z79.4 TYPE 2 DIABETES MELLITUS WITH DIABETIC NEPHROPATHY, WITH LONG-TERM CURRENT USE OF INSULIN (HCC): ICD-10-CM

## 2021-07-16 DIAGNOSIS — I50.43 CHF (CONGESTIVE HEART FAILURE), NYHA CLASS I, ACUTE ON CHRONIC, COMBINED (HCC): ICD-10-CM

## 2021-07-16 DIAGNOSIS — I10 ESSENTIAL HYPERTENSION: ICD-10-CM

## 2021-07-16 DIAGNOSIS — E11.21 TYPE 2 DIABETES MELLITUS WITH DIABETIC NEPHROPATHY, WITH LONG-TERM CURRENT USE OF INSULIN (HCC): ICD-10-CM

## 2021-07-16 LAB
A/G RATIO: 1.6 (ref 1.1–2.2)
ALBUMIN SERPL-MCNC: 3.6 G/DL (ref 3.4–5)
ALP BLD-CCNC: 119 U/L (ref 40–129)
ALT SERPL-CCNC: 24 U/L (ref 10–40)
ANION GAP SERPL CALCULATED.3IONS-SCNC: 13 MMOL/L (ref 3–16)
AST SERPL-CCNC: 40 U/L (ref 15–37)
BASOPHILS ABSOLUTE: 0.1 K/UL (ref 0–0.2)
BASOPHILS RELATIVE PERCENT: 1.8 %
BILIRUB SERPL-MCNC: 0.6 MG/DL (ref 0–1)
BUN BLDV-MCNC: 25 MG/DL (ref 7–20)
CALCIUM SERPL-MCNC: 8.6 MG/DL (ref 8.3–10.6)
CHLORIDE BLD-SCNC: 101 MMOL/L (ref 99–110)
CO2: 29 MMOL/L (ref 21–32)
CREAT SERPL-MCNC: 1.8 MG/DL (ref 0.8–1.3)
EOSINOPHILS ABSOLUTE: 0.1 K/UL (ref 0–0.6)
EOSINOPHILS RELATIVE PERCENT: 2.4 %
GFR AFRICAN AMERICAN: 44
GFR NON-AFRICAN AMERICAN: 36
GLOBULIN: 2.3 G/DL
GLUCOSE BLD-MCNC: 327 MG/DL (ref 70–99)
HCT VFR BLD CALC: 42.6 % (ref 40.5–52.5)
HEMOGLOBIN: 14.2 G/DL (ref 13.5–17.5)
LYMPHOCYTES ABSOLUTE: 0.5 K/UL (ref 1–5.1)
LYMPHOCYTES RELATIVE PERCENT: 15 %
MCH RBC QN AUTO: 33 PG (ref 26–34)
MCHC RBC AUTO-ENTMCNC: 33.2 G/DL (ref 31–36)
MCV RBC AUTO: 99.5 FL (ref 80–100)
MONOCYTES ABSOLUTE: 0.5 K/UL (ref 0–1.3)
MONOCYTES RELATIVE PERCENT: 13.2 %
NEUTROPHILS ABSOLUTE: 2.5 K/UL (ref 1.7–7.7)
NEUTROPHILS RELATIVE PERCENT: 67.6 %
PDW BLD-RTO: 14.9 % (ref 12.4–15.4)
PLATELET # BLD: 109 K/UL (ref 135–450)
PMV BLD AUTO: 10.7 FL (ref 5–10.5)
POTASSIUM SERPL-SCNC: 4.2 MMOL/L (ref 3.5–5.1)
RBC # BLD: 4.29 M/UL (ref 4.2–5.9)
SODIUM BLD-SCNC: 143 MMOL/L (ref 136–145)
TOTAL PROTEIN: 5.9 G/DL (ref 6.4–8.2)
WBC # BLD: 3.7 K/UL (ref 4–11)

## 2021-07-16 PROCEDURE — G8417 CALC BMI ABV UP PARAM F/U: HCPCS | Performed by: NURSE PRACTITIONER

## 2021-07-16 PROCEDURE — 1036F TOBACCO NON-USER: CPT | Performed by: NURSE PRACTITIONER

## 2021-07-16 PROCEDURE — G8427 DOCREV CUR MEDS BY ELIG CLIN: HCPCS | Performed by: NURSE PRACTITIONER

## 2021-07-16 PROCEDURE — 1123F ACP DISCUSS/DSCN MKR DOCD: CPT | Performed by: NURSE PRACTITIONER

## 2021-07-16 PROCEDURE — 4040F PNEUMOC VAC/ADMIN/RCVD: CPT | Performed by: NURSE PRACTITIONER

## 2021-07-16 PROCEDURE — 1111F DSCHRG MED/CURRENT MED MERGE: CPT | Performed by: NURSE PRACTITIONER

## 2021-07-16 PROCEDURE — 99214 OFFICE O/P EST MOD 30 MIN: CPT | Performed by: NURSE PRACTITIONER

## 2021-07-16 PROCEDURE — 3052F HG A1C>EQUAL 8.0%<EQUAL 9.0%: CPT | Performed by: NURSE PRACTITIONER

## 2021-07-16 SDOH — ECONOMIC STABILITY: FOOD INSECURITY: WITHIN THE PAST 12 MONTHS, THE FOOD YOU BOUGHT JUST DIDN'T LAST AND YOU DIDN'T HAVE MONEY TO GET MORE.: NEVER TRUE

## 2021-07-16 SDOH — ECONOMIC STABILITY: FOOD INSECURITY: WITHIN THE PAST 12 MONTHS, YOU WORRIED THAT YOUR FOOD WOULD RUN OUT BEFORE YOU GOT MONEY TO BUY MORE.: NEVER TRUE

## 2021-07-16 ASSESSMENT — ENCOUNTER SYMPTOMS
DIARRHEA: 0
VOMITING: 0
CONSTIPATION: 0
NAUSEA: 0
SHORTNESS OF BREATH: 0
COUGH: 0
ABDOMINAL PAIN: 0

## 2021-07-16 ASSESSMENT — SOCIAL DETERMINANTS OF HEALTH (SDOH): HOW HARD IS IT FOR YOU TO PAY FOR THE VERY BASICS LIKE FOOD, HOUSING, MEDICAL CARE, AND HEATING?: NOT HARD AT ALL

## 2021-07-16 NOTE — PROGRESS NOTES
Post-Discharge Transitional Care Management Services or Hospital Follow Up      Claudeen Fuse   YOB: 1940    Date of Office Visit:  7/16/2021  Date of Hospital Admission: 6/29/21  Date of Hospital Discharge: 7/1/21  Readmission Risk Score(high >=14%.  Medium >=10%):Readmission Risk Score: 13      Care management risk score Rising risk (score 2-5) and Complex Care (Scores >=6): 7     Non face to face  following discharge, date last encounter closed (first attempt may have been earlier): 7/6/2021  9:47 AM 7/6/2021  9:47 AM    Call initiated 2 business days of discharge: Yes     Patient Active Problem List   Diagnosis    Diabetes mellitus type II, uncontrolled ([address]    Tinnitus    Leg edema    Elevated PSA- nl 8/12/11    Hyperlipidemia LDL goal <70    Osteoarthritis    Diabetic retinopathy ([address]    BPH (benign prostatic hyperplasia)    COPD (chronic obstructive pulmonary disease) ([address]    Rosacea    Erectile dysfunction    Essential hypertension    Coronary artery disease involving native coronary artery of native heart without angina pectoris    Diabetes mellitus, type II ([address]    Diabetic nephropathy- pos microalb 8/11    Diabetic neuropathy ([address]    GERD (gastroesophageal reflux disease)    B12 deficiency    Impingement syndrome of left shoulder    Obesity, Class III, BMI 40-49.9 (morbid obesity) ([address]    Personal history of atrial flutter    Peptic ulcer disease    S/P total knee arthroplasty, right    Vitamin D deficiency    Spinal stenosis of lumbar region with neurogenic claudication- clinically    BMI 40.0-44.9, adult ([address]    RBBB    Left anterior fascicular hemiblock    Paroxysmal atrial fibrillation (HCC)    CHF (congestive heart failure), NYHA class I, acute on chronic, combined ([address]    Upper abdominal pain       Allergies   Allergen Reactions    Entresto [Sacubitril-Valsartan] Other (See Comments)     Renal failure       Medications listed as ordered at the time of discharge from hospital   Desiree HERNANDEZ   Home Medication Instructions GERMÁN:    Printed on:07/16/21 1500   Medication Information                      albuterol (PROVENTIL) (2.5 MG/3ML) 0.083% nebulizer solution  Take 3 mLs by nebulization every 6 hours as needed for Wheezing             albuterol sulfate HFA (VENTOLIN HFA) 108 (90 Base) MCG/ACT inhaler  Inhale 2 puffs into the lungs every 6 hours as needed for Wheezing             amiodarone (CORDARONE) 200 MG tablet  TAKE 1 TABLET BY MOUTH DAILY             apixaban (ELIQUIS) 2.5 MG TABS tablet  Take 1 tablet by mouth 2 times daily             aspirin 81 MG EC tablet  Take 81 mg by mouth daily             atorvastatin (LIPITOR) 40 MG tablet  TAKE 1 TABLET BY MOUTH EVERY EVENING             Cyanocobalamin (B-12) 500 MCG TABS  TAKE 1 TABLET BY MOUTH DAILY             FARXIGA 10 MG tablet  TAKE 1 TABLET BY MOUTH DAILY             FEROSUL 325 (65 Fe) MG tablet  TAKE 1 TABLET BY MOUTH TWICE DAILY             furosemide (LASIX) 80 MG tablet  TAKE 1 TABLET BY MOUTH DAILY             gabapentin (NEURONTIN) 600 MG tablet  TAKE UP TO 5 TABLETS BY MOUTH OVER 24 HOURS AS DIRECTED             hydrALAZINE (APRESOLINE) 25 MG tablet  TAKE 1 TABLET BY MOUTH THREE TIMES DAILY             insulin NPH (NOVOLIN N) 100 UNIT/ML injection vial  Take 44 units with breakfast and 32 units with dinner.              insulin regular (NOVOLIN R RELION) 100 UNIT/ML injection  INJECT 20 TO 30 UNITS SUBCUTANEOUSLY THREE TIMES DAILY BEFORE MEAL(S)             INSULIN SYRINGE .5CC/29G 29G X 1/2\" 0.5 ML MISC  INJECT 4 TIMES DAILY AS NEEDED             isosorbide mononitrate (IMDUR) 30 MG CR tablet  Take 1 tablet by mouth every morning Dr. Bassett Getting - Cardio             levalbuterol [facility] HFA) 45 MCG/ACT inhaler  Inhale 1-2 puffs into the lungs every 4 hours as needed for Wheezing             metoprolol (LOPRESSOR) 25 MG tablet  Take 25 mg by mouth daily              mupirocin (BACTROBAN) 2 % ointment  Apply 3 times daily as needed             Nebulizers (AIRIAL COMPACT MINI NEBULIZER) MISC  1 each by Does not apply route every 6 hours as needed (wheezing or shortness of breath)             nitroGLYCERIN (NITROSTAT) 0.3 MG SL tablet  Take one sublingual for chest pain. May repeat twice at 5 min intervals. If not getting relief call 911.              omeprazole (PRILOSEC) 20 MG delayed release capsule  TAKE 1 CAPSULE BY MOUTH TWICE DAILY             potassium chloride (KLOR-CON) 10 MEQ extended release tablet  TAKE 1 TABLET BY MOUTH DAILY             Respiratory Therapy Supplies (NEBULIZER/TUBING/MOUTHPIECE) KIT  1 kit by Does not apply route every 6 hours as needed (for wheezing or shortness of breath)             tiotropium (SPIRIVA HANDIHALER) 18 MCG inhalation capsule  Inhale 1 capsule into the lungs daily INCLUDE inhaler device             umeclidinium-vilanterol (ANORO ELLIPTA) 62.5-25 MCG/INH AEPB inhaler  INHALE 1 PUFF INTO THE LUNGS DAILY             vitamin D (ERGOCALCIFEROL) 54179 units CAPS capsule  TK 1 C PO WEEKLY                   Medications marked \"taking\" at this time  Outpatient Medications Marked as Taking for the 7/16/21 encounter (Office Visit) with RAYMUNDO Shea - CNP   Medication Sig Dispense Refill    furosemide (LASIX) 80 MG tablet TAKE 1 TABLET BY MOUTH DAILY 90 tablet 0    potassium chloride (KLOR-CON) 10 MEQ extended release tablet TAKE 1 TABLET BY MOUTH DAILY 90 tablet 1    amiodarone (CORDARONE) 200 MG tablet TAKE 1 TABLET BY MOUTH DAILY 90 tablet 0    FARXIGA 10 MG tablet TAKE 1 TABLET BY MOUTH DAILY      hydrALAZINE (APRESOLINE) 25 MG tablet TAKE 1 TABLET BY MOUTH THREE TIMES DAILY      Cyanocobalamin (B-12) 500 MCG TABS TAKE 1 TABLET BY MOUTH DAILY 90 tablet 1    albuterol sulfate HFA (VENTOLIN HFA) 108 (90 Base) MCG/ACT inhaler Inhale 2 puffs into the lungs every 6 hours as needed for Wheezing 1 Inhaler 3    atorvastatin (LIPITOR) 40 MG tablet TAKE 1 TABLET BY MOUTH EVERY EVENING 90 tablet 1    umeclidinium-vilanterol (ANORO ELLIPTA) 62.5-25 MCG/INH AEPB inhaler INHALE 1 PUFF INTO THE LUNGS DAILY 1 each 5    omeprazole (PRILOSEC) 20 MG delayed release capsule TAKE 1 CAPSULE BY MOUTH TWICE DAILY 180 capsule 1    gabapentin (NEURONTIN) 600 MG tablet TAKE UP TO 5 TABLETS BY MOUTH OVER 24 HOURS AS DIRECTED 150 tablet 5    insulin NPH (NOVOLIN N) 100 UNIT/ML injection vial Take 44 units with breakfast and 32 units with dinner.  (Patient taking differently: Take 20 units with breakfast and 20 units with dinner.) 30 mL 3    insulin regular (NOVOLIN R RELION) 100 UNIT/ML injection INJECT 20 TO 30 UNITS SUBCUTANEOUSLY THREE TIMES DAILY BEFORE MEAL(S) 30 mL 0    FEROSUL 325 (65 Fe) MG tablet TAKE 1 TABLET BY MOUTH TWICE DAILY 180 tablet 3    tiotropium (SPIRIVA HANDIHALER) 18 MCG inhalation capsule Inhale 1 capsule into the lungs daily INCLUDE inhaler device 30 capsule 5    INSULIN SYRINGE .5CC/29G 29G X 1/2\" 0.5 ML MISC INJECT 4 TIMES DAILY AS NEEDED 400 each 3    Nebulizers (AIRAliveshoes COMPACT MINI NEBULIZER) MISC 1 each by Does not apply route every 6 hours as needed (wheezing or shortness of breath) 1 each 0    Respiratory Therapy Supplies (NEBULIZER/TUBING/MOUTHPIECE) KIT 1 kit by Does not apply route every 6 hours as needed (for wheezing or shortness of breath) 1 kit 1    albuterol (PROVENTIL) (2.5 MG/3ML) 0.083% nebulizer solution Take 3 mLs by nebulization every 6 hours as needed for Wheezing 50 vial 3    vitamin D (ERGOCALCIFEROL) 56913 units CAPS capsule TK 1 C PO WEEKLY  2    mupirocin (BACTROBAN) 2 % ointment Apply 3 times daily as needed 22 g 1    levalbuterol (XOPENEX HFA) 45 MCG/ACT inhaler Inhale 1-2 puffs into the lungs every 4 hours as needed for Wheezing 1 Inhaler 3    aspirin 81 MG EC tablet Take 81 mg by mouth daily      apixaban (ELIQUIS) 2.5 MG TABS tablet Take 1 tablet by mouth 2 times daily 60 tablet 5    metoprolol (LOPRESSOR) 25 MG tablet Take 25 mg by mouth daily       isosorbide mononitrate (IMDUR) 30 MG CR tablet Take 1 tablet by mouth every morning Dr. Janice Angela - Cardio 30 tablet 6    nitroGLYCERIN (NITROSTAT) 0.3 MG SL tablet Take one sublingual for chest pain. May repeat twice at 5 min intervals. If not getting relief call 911. 25 tablet 3        Medications patient taking as of now reconciled against medications ordered at time of hospital discharge: Yes    Chief Complaint   Patient presents with    Follow-up     6/29-7/1 @ [facility]. gallbladder problems. pt says he feels much better now. HPI  Patient is here for hospital f/u from 6/29-7/1. Patient reports that he went out to eat with his wife and had spare ribs, french fries and veggies  and then a couple of hours later started with nausea, vomiting, and diarrhea. Nausea/vomiting/diarrhea- transaminitis noted with minimal bilirubin elevation and normal lipase. CT abd showed stones or sludge in the gallbladder. GI PCR negative. C-diff negative. Seen Dr. Miguel A Evnas for the cholelithiasis  Lab Results   Component Value Date    ALKPHOS 94 07/01/2021    ALT 65 07/01/2021    AST 65 07/01/2021    PROT 5.3 07/01/2021    PROT 6.0 03/12/2013    BILITOT 0.7 07/01/2021    BILIDIR 0.3 02/25/2021    LABALBU 2.9 07/01/2021     Since being discharged has not had recurrent episodes of nausea, vomiting, or diarrhea. Acute on chronic systolic heart failure- Secondary to not taking medication in light of nausea and vomiting. Treated with IV lasix. Needed oxygen in hospital. Has not needed since discharge. In 1992 had CABG x4  Has pacemaker. Follows with Dr. Janice Angela. Denies current chest pain or increased shortness of breath    Reports that weight prior to hospitalization was 280. Since being discharged from hospital weight has been in upper 260s. CKD stage 3- stable during hospitalization.    Lab Results   Component Value Date    CREATININE 1.2 07/01/2021 BUN 14 07/01/2021     07/01/2021    K 2.9 (LL) 07/01/2021     07/01/2021    CO2 31 07/01/2021       DM- checks BS QID. Running typically around 150. Reports that she does not always take his meal time insulin. If he goes out to eat he will not take insulin with him. Lab Results   Component Value Date    LABA1C 8.1 05/27/2021     Lab Results   Component Value Date    .8 05/27/2021       Inpatient course: Discharge summary reviewed- see chart. Review of Systems   Constitutional: Negative for activity change. Respiratory: Negative for cough and shortness of breath. Cardiovascular: Negative for chest pain and palpitations. Gastrointestinal: Negative for abdominal pain, constipation, diarrhea, nausea and vomiting. Neurological: Negative for dizziness and headaches. Vitals:    07/16/21 1452   BP: 110/64   Pulse: 86   Resp: 14   SpO2: 94%   Weight: 268 lb 6.4 oz (121.7 kg)   Height: 5' 8\" (1.727 m)     Body mass index is 40.81 kg/m². Wt Readings from Last 3 Encounters:   07/16/21 268 lb 6.4 oz (121.7 kg)   07/01/21 255 lb (115.7 kg)   05/24/21 280 lb (127 kg)     BP Readings from Last 3 Encounters:   07/16/21 110/64   07/01/21 (!) 146/67   05/24/21 118/74       Physical Exam  Vitals and nursing note reviewed. Constitutional:       General: He is not in acute distress. Appearance: He is well-developed. He is obese. HENT:      Head: Normocephalic and atraumatic. Cardiovascular:      Rate and Rhythm: Normal rate and regular rhythm. Heart sounds: Normal heart sounds. No murmur heard. No friction rub. No gallop. Pulmonary:      Effort: Pulmonary effort is normal. No respiratory distress. Breath sounds: Normal breath sounds. Abdominal:      General: Bowel sounds are normal.      Palpations: Abdomen is soft. Tenderness: There is no abdominal tenderness. Musculoskeletal:      Cervical back: Neck supple. Right lower leg: Edema present.       Left lower leg: Edema present. Comments: +1 hyun ankle edema    Skin:     General: Skin is warm and dry. Neurological:      Mental Status: He is alert and oriented to person, place, and time. Psychiatric:         Behavior: Behavior normal.         Thought Content: Thought content normal.         Judgment: Judgment normal.             Assessment/Plan:  1. Calculus of gallbladder without cholecystitis without obstruction  - Comprehensive Metabolic Panel; Future  - CBC Auto Differential; Future  Reports that he is feeling better. Planning on having f/u with general surgery Dr. Maurisio Wiley. Advised low fat diet. To call with recurrent symptoms. 2. CHF (congestive heart failure), NYHA class I, acute on chronic, combined ([facility]  - Comprehensive Metabolic Panel; Future  - CBC Auto Differential; Future  Symptoms have improved. Continue current medications. Continue to monitor daily weights and call if increase. Continue f/u with cardiologist Dr. Miki Lesches. 3. Coronary artery disease involving native coronary artery of native heart without angina pectoris  Stable. Continue current medications. Continue f/u with Dr. Miki Lesches     4. Essential hypertension  Controlled. Continue current medications. 5. Type 2 diabetes mellitus with diabetic nephropathy, with long-term current use of insulin ([facility])  On NPH insulin BID and Novolin R meal time insulin. He is missing his meal time insulin frequently throughout the week when he goes out to eat. Advised to take with him and can administer insulin at restaurant. He agrees. Advised to work on low carb diet and weight loss.

## 2021-07-19 DIAGNOSIS — N18.32 STAGE 3B CHRONIC KIDNEY DISEASE (HCC): Primary | ICD-10-CM

## 2021-07-20 ENCOUNTER — CARE COORDINATION (OUTPATIENT)
Dept: CASE MANAGEMENT | Age: 81
End: 2021-07-20

## 2021-07-20 NOTE — CARE COORDINATION
[facility] Transitions Follow Up Call    2021    Patient: Graham Montoya  Patient : 1940   MRN: <[id number]>  Reason for Admission: cholelithiasis, AoC CHF  Discharge Date: 21 RARS: Readmission Risk Score: 15         Spoke with: Morgan Pakarez [id number] Transitions Follow Up Call    Needs to be reviewed by the provider   Additional needs identified to be addressed with provider: No  none             Method of communication with provider : phone      Care Transition Nurse (CTN) contacted the patient by telephone to follow up after admission. Verified name and  with patient as identifiers. Addressed changes since last contact: none  Discussed follow-up appointments. If no appointment was previously scheduled, appointment scheduling offered: Yes. Is follow up appointment scheduled within 7 days of discharge? Yes. Advance Care Planning:   Does patient have an Advance Directive: decision maker updated. Advance Care Planning   Healthcare Decision Maker:    Primary Decision Maker: Gillian Lawrence [facility] - [phone]    Secondary Decision Maker: Sisi Lee - [phone]    CTN reviewed discharge instructions, medical action plan and red flags with patient and discussed any barriers to care and/or understanding of plan of care after discharge. Discussed appropriate site of care based on symptoms and resources available to patient including: PCP and Specialist. The patient agrees to contact the PCP office for questions related to their healthcare. Patients top risk factors for readmission: medical condition-cholelithiasis    Patient verified  and was pleasant and agreeable to transition calls. States things are going well. Denies N/V. Appetite good. LPN CC discussed a low-fat diet to help prevent gallbladder attacks, patient verbalized understanding. Patient going on vacation -.  Requests f/u to occur when he returns from vacation, does not wish to be called while on vacation. PCP visit 7/16 went well. Denies medication changes. Patient states he plans to f/u gallbladder removal when he returns from vacation. Weight 265 today. Denies any acute needs at present time. Agreeable to f/u calls. Educated on the use of urgent care or physicians 24 hr access line if assistance is needed after hours. CTN provided contact information for future needs. Plan for follow-up call in 10-14 days based on severity of symptoms and risk factors. Domo Jameson LPN [address] [place] [address]  Care Transitions  [phone]            Care Transitions Subsequent and Final Call    Subsequent and Final Calls  Do you currently have any active services?: No  Care Transitions Interventions  Other Interventions:            Follow Up  Future Appointments   Date Time Provider Bailey Razo   9/17/2021  8:00 AM Melanie Mustafa MD [facility]   12/14/2021  8:15 AM MARYLU Toro [facility]       Domo Jameson LPN

## 2021-07-21 ENCOUNTER — CLINICAL DOCUMENTATION (OUTPATIENT)
Dept: SPIRITUAL SERVICES | Age: 81
End: 2021-07-21

## 2021-08-03 ENCOUNTER — TELEPHONE (OUTPATIENT)
Dept: FAMILY MEDICINE CLINIC | Age: 81
End: 2021-08-03

## 2021-08-03 ENCOUNTER — CARE COORDINATION (OUTPATIENT)
Dept: CASE MANAGEMENT | Age: 81
End: 2021-08-03

## 2021-08-03 DIAGNOSIS — K27.9 PEPTIC ULCER DISEASE: ICD-10-CM

## 2021-08-03 NOTE — CARE COORDINATION
"  History     Chief Complaint   Patient presents with     Nausea, Vomiting, & Diarrhea     The history is provided by the patient.     Shaneka Alvarez is a 58 year old female with a history of breast cancer with metastasis to the bones who presents to the emergency department with nausea, vomiting, and diarrhea. The patient is currently in chemotherapy and she had a treatment 8 days ago. She began experiencing nasea, vomiting, and diarrhea approximately 6 days ago. She states she \"is unable to keep anything in.\" She is dizzy. She denies any hematochezia, hematemesis, fever, or abdominal pain. Compazine has been effective for nausea in the past.     Allergies:  Allergies   Allergen Reactions     Bactrim [Sulfamethoxazole W/Trimethoprim]      Internal bleeding     Copaxone [Glatiramer] Hives       Problem List:    Patient Active Problem List    Diagnosis Date Noted     Bone metastases (H) 10/22/2019     Priority: Medium     Metastatic breast cancer (H) 09/18/2019     Priority: Medium     Metastatic disease (H) 08/31/2019     Priority: Medium     S/P breast reconstruction, bilateral 07/31/2018     Priority: Medium     Anxiety and depression 10/25/2017     Priority: Medium     Hx of breast cancer 10/25/2017     Priority: Medium     Major depressive disorder, recurrent episode, moderate (H) 04/07/2016     Priority: Medium     Anxiety 03/12/2016     Priority: Medium     Migraine without aura and without status migrainosus, not intractable 10/23/2015     Priority: Medium     Obesity, Class II, BMI 35-39.9 10/23/2015     Priority: Medium     Multiple sclerosis (H) 08/11/2015     Priority: Medium     CARDIOVASCULAR SCREENING; LDL GOAL LESS THAN 160 08/11/2015     Priority: Medium     Raynaud's syndrome 08/11/2015     Priority: Medium     Breast cancer (H) 08/11/2015     Priority: Medium     Age 43       Complication of anesthesia 08/11/2015     Priority: Medium     Arthritis 08/11/2015     Priority: Medium     Autoimmune " [facility] 45 Transitions Follow Up Call    8/3/2021    Patient: Christal Bhakta  Patient : 1940   MRN: [id number]  Reason for Admission:   Discharge Date: 21 RARS: Readmission Risk Score: 13         Final transition call made, contact info left on .         Follow Up  Future Appointments   Date Time Provider Bailey Razo   2021  8:00 AM Markos Hdz MD [facility]   2021  8:15 AM MARYLU Pink RN disorder (H) 2015     Priority: Medium     Other insomnia 2015     Priority: Medium     Medication management contract agreement 2015     Priority: Medium     Ambien 12.5 mg, dispense 30 per month, follow up every 6 months        Neurogenic bladder 2014     Priority: Medium     Vision changes 2014     Priority: Medium     Demyelinating disorder (H) 2014     Priority: Medium     Weakness 2014     Priority: Medium     GERD (gastroesophageal reflux disease) 10/22/2014     Priority: Medium     History of breast cancer 10/22/2014     Priority: Medium     Overview:   stage 3, diagnosed in  s/p double mastectomy, chemo and radiation.        Migraine 10/22/2014     Priority: Medium        Past Medical History:    Past Medical History:   Diagnosis Date     Breast cancer (H)      Common migraine      H/O bilateral mastectomy      Mild major depression (H)      Multiple sclerosis (H)        Past Surgical History:    Past Surgical History:   Procedure Laterality Date     ENDOBRONCHIAL ULTRASOUND FLEXIBLE N/A 2019    Procedure: Flexible Bronchoscopy, Endobronchial Ultrasound, Radial Probe;  Surgeon: Sree Sanchez MD;  Location: [facility] OR     HC REMOVAL OF OVARY/TUBE(S)       HERNIA REPAIR, UMBILICAL       mastectomy  Bilateral 2006     MASTECTOMY, BILATERAL         Family History:    Family History   Problem Relation Age of Onset     Breast Cancer Maternal Aunt 50         at 85     Breast Cancer Cousin 40     Breast Cancer Mother 49        2nd primary, contralateral breast at 69;  at 86     Melanoma Mother      Breast Cancer Maternal Grandmother 40     Ovarian Cancer Maternal Grandmother      Breast Cancer Paternal Grandmother 50         at 60     Brain Cancer Cousin 20     Breast Cancer Paternal Aunt 50         at 60     Breast Cancer Cousin 45        paternal cousin       Social History:  Marital Status:   [2]  Social History     Tobacco Use      "Smoking status: Former Smoker     Types: Cigarettes     Last attempt to quit: 2005     Years since quittin.0     Smokeless tobacco: Never Used   Substance Use Topics     Alcohol use: Yes     Alcohol/week: 2.0 standard drinks     Types: 1 Glasses of wine, 1 Cans of beer per week     Comment: TWICE A WEEK     Drug use: No        Medications:    acetaminophen (TYLENOL) 500 MG tablet  busPIRone (BUSPAR) 15 MG tablet  calcium citrate-vitamin D (CITRACAL) 315-250 MG-UNIT TABS  Cholecalciferol (VITAMIN D3 PO)  diclofenac (VOLTAREN) 1 % topical gel  erenumab-aooe (AIMOVIG 140 DOSE) 70 MG/ML injection  Lidocaine (LIDOCARE) 4 % Patch  LORazepam (ATIVAN) 0.5 MG tablet  magnesium 250 MG tablet  naloxone (NARCAN) 1 mg/mL for intranasal kit (2 syringes with 2 mucosal atomizer device)  ondansetron (ZOFRAN-ODT) 8 MG ODT tab  oxyCODONE (ROXICODONE) 5 MG tablet  polyethylene glycol (MIRALAX/GLYCOLAX) packet  prochlorperazine (COMPAZINE) 10 MG tablet  propranolol (INDERAL LA) 60 MG 24 hr capsule  ranitidine (ZANTAC) 150 MG tablet  senna-docusate (SENOKOT-S/PERICOLACE) 8.6-50 MG tablet  syringe/needle, disp, (B-D [employer] SYRINGE) 23G X 1\" 3 ML MISC  tolterodine ER (DETROL LA) 4 MG 24 hr capsule  traZODone (DESYREL) 50 MG tablet  venlafaxine (EFFEXOR-ER) 225 MG TB24 24 hr tablet          Review of Systems   All other systems reviewed and are negative.      Physical Exam   BP: (!) 128/109  Heart Rate: 127  Temp: 98.7  F (37.1  C)  Resp: 16  Weight: 62 kg (136 lb 9.6 oz)  SpO2: 97 %      Physical Exam  Vitals signs and nursing note reviewed.   Constitutional:       General: She is not in acute distress.     Appearance: She is not diaphoretic.   HENT:      Head: Normocephalic and atraumatic.      Mouth/Throat:      Mouth: Mucous membranes are moist.      Pharynx: Oropharynx is clear. No oropharyngeal exudate.   Eyes:      General: No scleral icterus.  Neck:      Musculoskeletal: Normal range of motion.   Cardiovascular:      Rate " and Rhythm: Regular rhythm. Tachycardia present.      Heart sounds: Normal heart sounds.   Pulmonary:      Effort: Pulmonary effort is normal. No respiratory distress.      Breath sounds: Normal breath sounds. No stridor. No wheezing, rhonchi or rales.   Abdominal:      Palpations: Abdomen is soft.      Tenderness: There is no abdominal tenderness.   Musculoskeletal:         General: No tenderness.   Skin:     General: Skin is warm.      Findings: No rash.   Neurological:      General: No focal deficit present.      Mental Status: She is alert.      Cranial Nerves: No cranial nerve deficit.   Psychiatric:         Mood and Affect: Mood normal.         Behavior: Behavior normal.         Thought Content: Thought content normal.         Judgment: Judgment normal.         ED Course        Procedures               Critical Care time:  none               Results for orders placed or performed during the hospital encounter of 12/24/19 (from the past 24 hour(s))   CBC with platelets differential   Result Value Ref Range    WBC 4.8 4.0 - 11.0 10e9/L    RBC Count 4.68 3.8 - 5.2 10e12/L    Hemoglobin 13.9 11.7 - 15.7 g/dL    Hematocrit 43.3 35.0 - 47.0 %    MCV 93 78 - 100 fl    MCH 29.7 26.5 - 33.0 pg    MCHC 32.1 31.5 - 36.5 g/dL    RDW 14.3 10.0 - 15.0 %    Platelet Count 256 150 - 450 10e9/L    Diff Method Automated Method     % Neutrophils 70.1 %    % Lymphocytes 10.3 %    % Monocytes 15.9 %    % Eosinophils 0.2 %    % Basophils 1.0 %    % Immature Granulocytes 2.5 %    Nucleated RBCs 0 0 /100    Absolute Neutrophil 3.3 1.6 - 8.3 10e9/L    Absolute Lymphocytes 0.5 (L) 0.8 - 5.3 10e9/L    Absolute Monocytes 0.8 0.0 - 1.3 10e9/L    Absolute Basophils 0.1 0.0 - 0.2 10e9/L    Abs Immature Granulocytes 0.1 0 - 0.4 10e9/L    Absolute Nucleated RBC 0.0    Basic metabolic panel   Result Value Ref Range    Sodium 137 133 - 144 mmol/L    Potassium 3.3 (L) 3.4 - 5.3 mmol/L    Chloride 103 94 - 109 mmol/L    Carbon Dioxide 24 20 - 32  mmol/L    Anion Gap 10 3 - 14 mmol/L    Glucose 99 70 - 99 mg/dL    Urea Nitrogen 8 7 - 30 mg/dL    Creatinine 0.57 0.52 - 1.04 mg/dL    GFR Estimate >90 >60 mL/min/[1.73_m2]    GFR Estimate If Black >90 >60 mL/min/[1.73_m2]    Calcium 8.9 8.5 - 10.1 mg/dL       Medications   0.9% sodium chloride BOLUS (0 mLs Intravenous Stopped 12/24/19 1716)     Followed by   0.9% sodium chloride BOLUS (0 mLs Intravenous Stopped 12/24/19 1816)   prochlorperazine (COMPAZINE) injection 10 mg (10 mg Intravenous Given 12/24/19 1615)       Assessments & Plan (with Medical Decision Making)  58-year-old female with metastatic breast cancer and recent chemotherapy with some vomiting and diarrhea with dehydration.  Improved here after IV Compazine and fluids.  Discharged home in improved condition.     I have reviewed the nursing notes.    I have reviewed the findings, diagnosis, plan and need for follow up with the patient.       New Prescriptions    No medications on file       Final diagnoses:   Dehydration   This document serves as a record of services personally performed by Bony Lr MD. It was created on their behalf by Dominique Gordon, a trained medical scribe. The creation of this record is based on the provider's personal observations and the statements of the patient. This document has been checked and approved by the attending provider.  Note: Chart documentation done in part with Dragon Voice Recognition software. Although reviewed after completion, some word and grammatical errors may remain.    12/24/2019   [facility] EMERGENCY DEPARTMENT     Bony Lr MD  12/24/19 1451

## 2021-08-04 NOTE — TELEPHONE ENCOUNTER
Hospital discharge notes say to stop taking but patient states he does still take this and needs it.  Please advise

## 2021-08-05 NOTE — TELEPHONE ENCOUNTER
Does he need it for heartburn? Is he also taking omeprazole which usually is stronger? Probably does not need both.

## 2021-08-06 RX ORDER — FAMOTIDINE 20 MG/1
TABLET, FILM COATED ORAL
Qty: 180 TABLET | Refills: 0 | OUTPATIENT
Start: 2021-08-06

## 2021-08-23 ENCOUNTER — TELEPHONE (OUTPATIENT)
Dept: FAMILY MEDICINE CLINIC | Age: 81
End: 2021-08-23

## 2021-08-25 DIAGNOSIS — K27.9 PEPTIC ULCER DISEASE: ICD-10-CM

## 2021-08-25 RX ORDER — FAMOTIDINE 20 MG/1
TABLET, FILM COATED ORAL
Qty: 180 TABLET | Refills: 0 | OUTPATIENT
Start: 2021-08-25

## 2021-08-29 ENCOUNTER — CLINICAL DOCUMENTATION (OUTPATIENT)
Dept: SPIRITUAL SERVICES | Age: 81
End: 2021-08-29

## 2021-09-01 RX ORDER — AMIODARONE HYDROCHLORIDE 200 MG/1
TABLET ORAL
Qty: 90 TABLET | Refills: 0 | Status: SHIPPED | OUTPATIENT
Start: 2021-09-01 | End: 2021-12-29

## 2021-09-13 ENCOUNTER — CLINICAL DOCUMENTATION (OUTPATIENT)
Dept: SPIRITUAL SERVICES | Age: 81
End: 2021-09-13

## 2021-09-17 ENCOUNTER — CLINICAL DOCUMENTATION (OUTPATIENT)
Dept: SPIRITUAL SERVICES | Age: 81
End: 2021-09-17

## 2021-09-17 ENCOUNTER — OFFICE VISIT (OUTPATIENT)
Dept: FAMILY MEDICINE CLINIC | Age: 81
End: 2021-09-17
Payer: MEDICARE

## 2021-09-17 VITALS
BODY MASS INDEX: 40.92 KG/M2 | RESPIRATION RATE: 16 BRPM | HEART RATE: 65 BPM | OXYGEN SATURATION: 98 % | SYSTOLIC BLOOD PRESSURE: 124 MMHG | HEIGHT: 68 IN | WEIGHT: 270 LBS | DIASTOLIC BLOOD PRESSURE: 78 MMHG

## 2021-09-17 DIAGNOSIS — N18.30 STAGE 3 CHRONIC KIDNEY DISEASE, UNSPECIFIED WHETHER STAGE 3A OR 3B CKD (HCC): ICD-10-CM

## 2021-09-17 DIAGNOSIS — J43.9 PULMONARY EMPHYSEMA, UNSPECIFIED EMPHYSEMA TYPE (HCC): ICD-10-CM

## 2021-09-17 DIAGNOSIS — E11.621 DIABETIC ULCER OF TOE OF RIGHT FOOT ASSOCIATED WITH TYPE 2 DIABETES MELLITUS, WITH FAT LAYER EXPOSED (HCC): ICD-10-CM

## 2021-09-17 DIAGNOSIS — I25.10 CORONARY ARTERY DISEASE INVOLVING NATIVE CORONARY ARTERY OF NATIVE HEART WITHOUT ANGINA PECTORIS: ICD-10-CM

## 2021-09-17 DIAGNOSIS — I10 ESSENTIAL HYPERTENSION: ICD-10-CM

## 2021-09-17 DIAGNOSIS — I50.43 CHF (CONGESTIVE HEART FAILURE), NYHA CLASS I, ACUTE ON CHRONIC, COMBINED (HCC): ICD-10-CM

## 2021-09-17 DIAGNOSIS — Z23 NEEDS FLU SHOT: ICD-10-CM

## 2021-09-17 DIAGNOSIS — E11.65 UNCONTROLLED TYPE 2 DIABETES MELLITUS WITH HYPERGLYCEMIA (HCC): Primary | ICD-10-CM

## 2021-09-17 DIAGNOSIS — L97.512 DIABETIC ULCER OF TOE OF RIGHT FOOT ASSOCIATED WITH TYPE 2 DIABETES MELLITUS, WITH FAT LAYER EXPOSED (HCC): ICD-10-CM

## 2021-09-17 LAB — HBA1C MFR BLD: 7 %

## 2021-09-17 PROCEDURE — 99214 OFFICE O/P EST MOD 30 MIN: CPT | Performed by: FAMILY MEDICINE

## 2021-09-17 PROCEDURE — 1123F ACP DISCUSS/DSCN MKR DOCD: CPT | Performed by: FAMILY MEDICINE

## 2021-09-17 PROCEDURE — 83036 HEMOGLOBIN GLYCOSYLATED A1C: CPT | Performed by: FAMILY MEDICINE

## 2021-09-17 PROCEDURE — G0008 ADMIN INFLUENZA VIRUS VAC: HCPCS | Performed by: FAMILY MEDICINE

## 2021-09-17 PROCEDURE — 4040F PNEUMOC VAC/ADMIN/RCVD: CPT | Performed by: FAMILY MEDICINE

## 2021-09-17 PROCEDURE — 90694 VACC AIIV4 NO PRSRV 0.5ML IM: CPT | Performed by: FAMILY MEDICINE

## 2021-09-17 PROCEDURE — 3051F HG A1C>EQUAL 7.0%<8.0%: CPT | Performed by: FAMILY MEDICINE

## 2021-09-17 PROCEDURE — G8417 CALC BMI ABV UP PARAM F/U: HCPCS | Performed by: FAMILY MEDICINE

## 2021-09-17 PROCEDURE — G8427 DOCREV CUR MEDS BY ELIG CLIN: HCPCS | Performed by: FAMILY MEDICINE

## 2021-09-17 PROCEDURE — G8926 SPIRO NO PERF OR DOC: HCPCS | Performed by: FAMILY MEDICINE

## 2021-09-17 PROCEDURE — 3023F SPIROM DOC REV: CPT | Performed by: FAMILY MEDICINE

## 2021-09-17 PROCEDURE — 1036F TOBACCO NON-USER: CPT | Performed by: FAMILY MEDICINE

## 2021-09-17 NOTE — PATIENT INSTRUCTIONS
MA- any samples of Farxiga  INSTRUCTIONS  NEXT APPOINTMENT: Please schedule check-up in 3 months. · PLEASE TAKE THIS FORM TO CHECK-OUT WINDOW TO SCHEDULE NEXT VISIT. · Be seen in wound center next week. Patient Education     Balance Exercises for Seniors    Everyone from the youngest exercisers to the oldest can benefit from balance training exercises. All functional movements require a working balance system, from getting up out of your chair to walking to dancing to sprinting and everything in between. Good balance prevents injury, improves athletic performance, and eases activities of daily life as you go about your day. Balance exercises are especially important for senior citizens, who sadly experience a high rate of injury and fatality due to declining physical systems, including the balance system. If youre not yet a senior yourself but you have stumbled upon this article, think of your loved ones who may benefit from this information and email this article to them or share on Facebook. Keep reading to learn why balance exercises are especially important for seniors, as well as how the physiology of balance works in seniors, the biological, behavioral, and environmental aspects of balance in seniors, and effective exercises seniors can do to improve their balance. Before beginning this or any exercise program, be sure to clear it with your doctor. The Importance of Balance Exercises for Seniors  Every year, one in three seniors ages 72 and over experience a fall. Twenty to thirty percent of these falls result in serious injuries, such as lacerations, fractures, and traumatic brain injuries. Even worse, falls are the leading cause of injury-related death in senior citizens. The good news is that many of these falls can be prevented through physical activity, including strength training, cardiovascular exercise, and especially balance training.  Although declining balance in seniors is partially based in unavoidable biological changes, behavioral factors and environmental factors are just as important, and this is where balance training exercises and mindfulness of balance issues come in to make a big difference. The decline of balance in seniors can be looked at as a downward spiral. The first fall or just the decline of stability can lead to a fear of falling. We begin to limit our activities due to this new fear. Decreased physical activity then leads to decreased fitness levels in the areas of strength, endurance, flexibility, and balance, compounding the hare against time and gravity. Thus, the risk of falling is actually increased when we begin to limit our activity level due to our fear of falling. Dont fall prey to the downward spiral of declining balance ability; staying active to maintain your physical fitness is the best way to prevent falls. Everyday Activities Affected by Balance  The following challenges to balance become more difficult to navigate with aging- and inactivity- related changes:  Stopping and starting   Standing up and sitting down   Surface changes, such as pavement to grass or wood floor to carpet   Uneven surfaces, such as sidewalks or park trails   Negotiating obstacles, such as the corner of a rug or coffee table   Changing speed   Changing direction   Level changes, such as going up or down curbs  When you consider how many of the above scenarios you encounter every day, perhaps it will give you a new appreciation for the importance of balance and a renewed desire to maintain and improve your balance system! The Physiology of Balance in Seniors  Three sensory systems must work together to anticipate and respond to challenges to balance. These are the visual system, the vestibular system, and the somatosensory system. If any of these three systems are limited, reaction time and movement quality will be affected. The visual system refers to your sense of sight. Age-related changes in vision may decrease inputs from this sensory system. The vestibular system refers to the inner ear, which gives tells us whether its us thats moving, or the world around us that is moving. The somatosensory system refers to our sense of touch, vibration, pain, etc., resulting from mechanical pressure of some sort on the body. The somatosensory system gives us spatial information about where in space our body parts are located at the current moment. Joint receptors inside the joints themselves also give us information about our body position. The somatosensory system can be trained to become more sensitive to inputs, as well as more effective at communicating outputs that in turn translate to appropriate movements. The speed and accuracy of movements generated by the movement system depend to a large degree on the quality of inputs from the somatosensory system. This is where balance training exercises make a big difference. Three Factors Affecting Balance in Seniors: Biology, Behavior, and Environment  Aging-related changes present very real health and safety hazards. Some of these factors can be controlled, others can be minimized, and some are inevitable. You have more control than you may think you have over factors affecting your balance. Biological Factors  Vision may decline with age, affecting clarity, contrast, depth perception, and peripheral vision. Hearing may decline, possible affecting the vestibular system in the inner ear. Arthritis affects the quality of sensory input from the somatosensory system. Some seniors unfortunately experience chronic conditions such as Parkinsons and MS which place further limitations on the balance systems. All of this makes it especially important to focus on fitness, which optimizes input from the somatosensory system, helping to compensate for the more inevitable aspects of aging.  While strength, power, and endurance decline as part of the aging process, this can be counteracted through physical activity. Key activities include strength training, cardiovascular training, flexibility exercises, and balance exercises. Make exercise more fun by adding group fitness classes such as Cecile Gold to your exercise schedule. Bill Sommersnevaeh have a great time while getting fit and healthy and meeting new people. Behavioral Factors  Fortunately, many behavioral factors affecting balance in seniors can also be controlled. Behavioral factors include: Activity versus inactivity: Stay active to maintain and improve your fitness. As mentioned above, use an integrated fitness program including components strength training, cardio conditioning, flexibility training, and balance exercises. Proper nutrition: Learn about nutrition and eat a healthy diet. There is some evidence to support the use of certain supplements, such as Vitamin D3, for helping to maintain the balance system. Footwear: Wear shoes that support your feet without being cumbersome and restrictive. Some experts advocate wearing minimalist footwear that mimic the experience of being barefoot while still protecting the foot. Bare feet are in better contact with the ground, allowing for higher-quality somatosensory input. Walk around barefoot as much as possible. Avoid excessive wearing of slippers and flip flops, and discard high heels altogether, as they are the culprit for some serious posture issues. Medications: Medication side effects can vary from person to person. Be mindful of the effects various medications may have on your balance. Ask your doctor about possible side effects of your meds. Environmental Factors  The outside world is not necessarily well set up for seniors experiencing balance issues. Make sure to control the environmental factors in your home, and be mindful of potential hazards when you are out and about.  Environmental factors to be aware of include, but are not limited to:  Slippery floors and loose rugs   Poor lighting   Stairs, escalators, and moving sidewalks   Cracked, uneven sidewalks   Icy or wet patches in cold weather   Buildings designed with too-narrow or too-wide hallways    Balance Exercises for Seniors  Remember to check with your doctor before beginning this or any exercise program!  Add these balance exercises for seniors to your daily routine to maintain and improve your balance. These exercises are great for seniors, but are also beneficial for folks of any age looking to improve balance, posture, and alignment! Perform these exercises barefoot for maximum sensory input through your feet and ankles. Keep a sturdy chair or something else stable next to you if you have known balance issues. If need be, you can hang onto the sturdy object for balance until your balance improves. Other than a sturdy object to hang on to, these exercises require no equipment, making them easy to perform every day at home. Just make sure you have a clear area with no obstacles and no changes in surface (such as wood floor to carpet) in which to perform your exercises. These balance exercises for seniors include suggestions for progressing the exercise in difficulty toward the end of the description. The rule is to never progress until you have perfected the basic movement. Once the basic movement becomes easy to perform with optimal posture, try the suggestions for progressing the exercises difficulty. The exercises begin with posture. Posture is the basis for all movements, and poor posture can be the hidden cause of pain and injury. Make sure to focus on your posture and bodily alignment throughout the day. 8 Effective Balance Training Exercises for Seniors    1. Align at the [employer] with your back against a wall and take stock of your body position.  Common postural misalignments include head forward off the wall, shoulders and upper back excessively rounded, tilted pelvis, and overly bent knees. Try to straighten out so that your points of contact with the wall are the back of your head, your shoulders, your rear end, and the backs of your feet. Lengthen through the spine and stand straight. From the side, you should have an invisible racing stripe running down your body: ear, shoulder, hip, knee, and ankle should all be in vertical alignment. Breathing deeply, hold this position with closed eyes for at least 30 seconds, sensing and feeling your body position. As you open your eyes and step away from the wall, maintain this ideal posture. Check in at the wall at various points throughout the day. With time, your posture should naturally improve as your ability to sense and feel your body position becomes more refined. 2. Standing Weight Shifts       Start with optimal posture and alignment established during the Align at the Wall exercise. Lean slightly forward at the ankles, keeping the rest of the body aligned. Keep the feet grounded and active. Lean slightly back at the ankles to challenge your balance in the opposite direction. Lean in this way from side to side as well without letting your alignment change. Then, go in a [place] through all four directions several times. Stance options for this move in order of their difficulty include feet hip width apart, a split stance with one foot staggered in front of the other, and finally standing on one leg at a time. Watch the following video to see a demonstration of the single leg version of this movement. You will see that the lean is a pretty subtle shift; it doesnt need to be big. Remember to master the basic movement with both feet on the floor before progressing in difficulty! 3. Marching in Place  This exercise is quite simple. Starting with ideal posture, raise one knee at a time to a 90 degree angle. Turn your head from side to side and/or add upper body movements as you march to increase the challenge. pattern without touching them. Difficulty can be progressed by decreasing the distance between the obstacles. 8. Toe and Heel Walking  This is also a great exercise for strengthening the feet and lower legs. Simply walk on tiptoes for several steps before switching to walking on your heels for several steps. Then switch from toe walking to heel walking every step or so. Increase the difficulty by closing your eyes or turning your head from side to side as you walk. Once you have mastered all of the exercises above, you can increase the difficulty of the sequence by singing or reading aloud while you do your exercises. You can also toss a ball back and forth with a partner as you go through the sequence. These activities increase the sensory inputs as well as the cognitive and motor demands of the exercises, mimicking the distractions of day-to-day life. [employer]  I love the following quote about aging by Evin Gilman: Emilia Pickering dont stop playing because we grow old. We grow old because we stop playing.   Remember that while some aging-related changes are inevitable, you have more control over your body and your life than you might think. Staying active is one of the very best things you can do for yourself at any phase of your life, and especially as you age. You can also find a ton of videos showing balance exercise sequences for seniors by visiting [email or web address] and typing in balance exercises for seniors.

## 2021-09-17 NOTE — ACP (ADVANCE CARE PLANNING)
Advance Care Planning   Advance Care Planning Clinical Specialist  Conversation Note      Date of ACP Conversation: 9/17/2021    Conversation Conducted with:   Patient with Decision Making Capacity    ACP Clinical Specialist: Evens Blevins makes decisions on behalf of the incapacitated patient: Decision Maker is asked to consider and make decisions based on patient values, known preferences, or best interests. Health Care Decision Maker:     Current Designated Health Care Decision Maker:    Primary Decision Maker: Elvia Connor Spouse - [phone]    Secondary Decision Maker: Jesusita Edgardo - [phone]  (If there is a valid Health Care Decision Maker named in the [address] Makers\" box in the ACP activity, but it is not visible above, be sure to open that field and then select the health care decision maker relationship (ie \"primary\") in the blank space to the right of the name.) Validate  this information as still accurate & up-to-date; edit [address] field as needed. Note: Assess and validate information in current ACP documents, as indicated. If no Decision Maker listed above or available through scanned documents, then:        Note: If the relationship of these Decision-Makers to the patient does NOT follow your state's Next of Kin hierarchy, recommend that patient complete ACP document that meets state-specific requirements to allow them to act on the patient's behalf when appropriate. Care Preferences    Hospitalization: \"If your health worsens and it becomes clear that your chance of recovery is unlikely, what would your preference be regarding hospitalization? \"    Choice:  [x]  The patient wants hospitalization  []  The patient prefers comfort-focused treatment without hospitalization. Ventilation:   \"If you were in your present state of health and suddenly became very ill and were unable to breathe on your own, what would your preference be about the use of a ventilator (breathing machine) if it were available to you? \"      If patient would desire the use of a ventilator (breathing machine), answer \"yes\", if not \"no\":no    \"If your health worsens and it becomes clear that your chance of recovery is unlikely, what would your preference be about the use of a ventilator (breathing machine) if it were available to you? \"     If patient would desire the use of a ventilator (breathing machine), answer \"yes\", if not \"no\" No    Resuscitation  \"CPR works best to restart the heart when there is a sudden event, like a heart attack, in someone who is otherwise healthy. Unfortunately, CPR does not typically restart the heart for people who have serious health conditions or who are very sick. \"    \"In the event your heart stopped as a result of an underlying serious health condition, would you want attempts to be made to restart your heart (answer \"yes\" for attempt to resuscitate) or would you prefer a natural death (answer \"no\" for do not attempt to resuscitate)? \" no      NOTE: If the patient has a valid advance directive AND now provides care preference(s) that are inconsistent with that prior directive, advise the patient to consider either: creating a new advance directive that complies with state-specific requirements; or, if that is not possible, orally revoking that prior directive in accordance with state-specific requirements, which must be documented in the EHR. [] Yes  [] No   Educated Patient / Rebbecca Branch regarding differences between Advance Directives and portable DNR orders.       Length of ACP Conversation in minutes:  76  Conversation Outcomes:  [x] ACP discussion completed  [] Existing advance directive reviewed with patient; no changes to patient's previously recorded wishes   [x] New Advance Directive completed   [] Portable Do Not Rescitate prepared for Provider review and signature  [] POLST/POST/MOLST/MOST prepared for Provider review and signature      Follow-up plan:    [] Schedule follow-up conversation to continue planning  [] Referred individual to Provider for additional questions/concerns   [] Advised patient/agent/surrogate to review completed ACP document and update if needed with changes in condition, patient preferences or care setting   [x] This note routed to one or more involved healthcare providers

## 2021-09-17 NOTE — PROGRESS NOTES
112.00 pack-year smoking history. He has never used smokeless tobacco.  Health Maintenance Due   Topic Date Due    Shingles Vaccine (2 of 3) 11/28/2012    Flu vaccine (1) 09/01/2021     Current Outpatient Medications   Medication Instructions    albuterol (PROVENTIL) 2.5 mg, Nebulization, EVERY 6 HOURS PRN    albuterol sulfate HFA (VENTOLIN HFA) 108 (90 Base) MCG/ACT inhaler 2 puffs, Inhalation, EVERY 6 HOURS PRN    amiodarone (CORDARONE) 200 MG tablet TAKE 1 TABLET BY MOUTH DAILY    apixaban (ELIQUIS) 2.5 mg, Oral, 2 TIMES DAILY    aspirin 81 mg, Oral, DAILY    atorvastatin (LIPITOR) 40 MG tablet TAKE 1 TABLET BY MOUTH EVERY EVENING    Cyanocobalamin (B-12) 500 MCG TABS TAKE 1 TABLET BY MOUTH DAILY    dapagliflozin (FARXIGA) 10 mg, Oral, DAILY    FARXIGA 10 MG tablet TAKE 1 TABLET BY MOUTH DAILY    FEROSUL 325 (65 Fe) MG tablet TAKE 1 TABLET BY MOUTH TWICE DAILY    furosemide (LASIX) 80 MG tablet TAKE 1 TABLET BY MOUTH DAILY    gabapentin (NEURONTIN) 600 MG tablet TAKE UP TO 5 TABLETS BY MOUTH OVER 24 HOURS AS DIRECTED    hydrALAZINE (APRESOLINE) 25 MG tablet TAKE 1 TABLET BY MOUTH THREE TIMES DAILY    insulin NPH (NOVOLIN N) 100 UNIT/ML injection vial Take 44 units with breakfast and 32 units with dinner.  insulin regular (NOVOLIN R RELION) 100 UNIT/ML injection INJECT 20 TO 30 UNITS SUBCUTANEOUSLY THREE TIMES DAILY BEFORE MEAL(S)    INSULIN SYRINGE .5CC/29G 29G X 1/2\" 0.5 ML MISC INJECT 4 TIMES DAILY AS NEEDED    isosorbide mononitrate (IMDUR) 30 mg, Oral, EVERY MORNING, Dr. Indio Tineo - Cardio    levalbuterol [facility] HFA) 45 MCG/ACT inhaler 1-2 puffs, Inhalation, EVERY 4 HOURS PRN    metoprolol tartrate (LOPRESSOR) 25 mg, Oral, DAILY    mupirocin (BACTROBAN) 2 % ointment Apply 3 times daily as needed    Nebulizers (AIRIAL COMPACT MINI NEBULIZER) MISC 1 each, Does not apply, EVERY 6 HOURS PRN    nitroGLYCERIN (NITROSTAT) 0.3 MG SL tablet Take one sublingual for chest pain.   May repeat twice at 5 min intervals. If not getting relief call 911.     omeprazole (PRILOSEC) 20 MG delayed release capsule TAKE 1 CAPSULE BY MOUTH TWICE DAILY    potassium chloride (KLOR-CON) 10 MEQ extended release tablet TAKE 1 TABLET BY MOUTH DAILY    Respiratory Therapy Supplies (NEBULIZER/TUBING/MOUTHPIECE) KIT 1 kit, Does not apply, EVERY 6 HOURS PRN    Spiriva HandiHaler 18 mcg, Inhalation, DAILY, INCLUDE inhaler device    umeclidinium-vilanterol (ANORO ELLIPTA) 62.5-25 MCG/INH AEPB inhaler INHALE 1 PUFF INTO THE LUNGS DAILY    vitamin D (ERGOCALCIFEROL) 69574 units CAPS capsule TK 1 C PO WEEKLY     LAST LABS  LDL Calculated   Date Value Ref Range Status   02/25/2021 92 <100 mg/dL Final     Lab Results   Component Value Date    HDL 52 02/25/2021     Lab Results   Component Value Date    TRIG 121 02/25/2021     Lab Results   Component Value Date     07/16/2021    K 4.2 07/16/2021    CREATININE 1.8 (H) 07/16/2021     Lab Results   Component Value Date    WBC 3.7 (L) 07/16/2021    HGB 14.2 07/16/2021     (L) 07/16/2021     Lab Results   Component Value Date    ALT 24 07/16/2021    AST 40 (H) 07/16/2021    ALKPHOS 119 07/16/2021    BILITOT 0.6 07/16/2021     TSH (uIU/mL)   Date Value   02/25/2021 2.44     Lab Results   Component Value Date    GLUCOSE 327 (H) 07/16/2021     Lab Results   Component Value Date    LABA1C 8.1 05/27/2021    LABA1C 7.6 02/25/2021    LABA1C 8.0 11/10/2020     Objective:   PHYSICAL EXAM   /78 (Site: Left Upper Arm, Position: Sitting, Cuff Size: Large Adult)   Pulse 65   Resp 16   Ht 5' 8\" (1.727 m)   Wt 270 lb (122.5 kg)   SpO2 98%   BMI 41.05 kg/m²   BP Readings from Last 5 Encounters:   09/17/21 124/78   07/16/21 110/64   07/01/21 (!) 146/67   05/24/21 118/74   02/25/21 132/66     Wt Readings from Last 5 Encounters:   09/17/21 270 lb (122.5 kg)   07/16/21 268 lb 6.4 oz (121.7 kg)   07/01/21 255 lb (115.7 kg)   05/24/21 280 lb (127 kg)   02/25/21 285 lb (129.3 kg) GENERAL:   · well-developed, well-nourished, alert, no distress. LUNGS:    · Breathing unlabored  · clear to auscultation bilaterally and good air movement  CARDIOVASC:   · regular rate and rhythm  · LEGS:  Lower extremity edema: none    SKIN: warm and dry  · R plantar grt toe 2-3 cm ulcer, visible fat layer     Assessment and Plan:      Diagnosis Orders   1. Uncontrolled type 2 diabetes mellitus with hyperglycemia (HCC)  POCT glycosylated hemoglobin (Hb A1C)   2. Needs flu shot  INFLUENZA, QUADV, ADJUVANTED, 65 YRS =, IM, PF, PREFILL SYR, 0.5ML (FLUAD)   3. CHF (congestive heart failure), NYHA class I, acute on chronic, combined ([address]     4. Pulmonary emphysema, unspecified emphysema type ([address]     5. Coronary artery disease involving native coronary artery of native heart without angina pectoris     6. Essential hypertension     7. Stage 3 chronic kidney disease, unspecified whether stage 3a or 3b CKD ([address]     8. Diabetic ulcer of toe of right foot associated with type 2 diabetes mellitus, with fat layer exposed ([address]  [address]   Stable     Continue current Tx plan. Any changes marked below. MA- any samples of Farxiga  INSTRUCTIONS  NEXT APPOINTMENT: Please schedule check-up in 3 months. · PLEASE TAKE THIS FORM TO CHECK-OUT WINDOW TO SCHEDULE NEXT VISIT. · Be seen in wound center next week.

## 2021-09-17 NOTE — PROGRESS NOTES
General Advance Care Planning (ACP) Conversation    Date of Conversation: 9/17/2021  Conducted with: Patient with Decision Making Capacity    Healthcare Decision Maker:    Primary Decision Maker: Roseann Grajeda - Spouse - [phone]    Secondary Decision Maker: Iwona Alston - [phone]  Click here to complete Healthcare Decision Makers including selection of the Healthcare Decision Maker Relationship (ie \"Primary\"). Today we documented Decision Maker(s) consistent with ACP documents on file. Content/Action Overview:   Has ACP document(s) on file - reflects the patient's care preferences  Reviewed DNR/DNI and patient elects DNR order - referred to ACP Clinical Specialist & placed order        Length of Voluntary ACP Conversation in minutes:  1 hour    [facility]

## 2021-09-30 ENCOUNTER — HOSPITAL ENCOUNTER (OUTPATIENT)
Dept: WOUND CARE | Age: 81
Discharge: HOME | End: 2021-09-30
Payer: MEDICARE

## 2021-09-30 VITALS
SYSTOLIC BLOOD PRESSURE: 133 MMHG | TEMPERATURE: 96.8 F | RESPIRATION RATE: 16 BRPM | HEART RATE: 64 BPM | DIASTOLIC BLOOD PRESSURE: 73 MMHG | HEIGHT: 68 IN | WEIGHT: 265 LBS | BODY MASS INDEX: 40.16 KG/M2

## 2021-09-30 DIAGNOSIS — I87.2 PERIPHERAL VENOUS INSUFFICIENCY: ICD-10-CM

## 2021-09-30 DIAGNOSIS — L97.512 ULCER OF TOE OF RIGHT FOOT, WITH FAT LAYER EXPOSED (HCC): ICD-10-CM

## 2021-09-30 DIAGNOSIS — Z74.09 IMPAIRED MOBILITY: ICD-10-CM

## 2021-09-30 DIAGNOSIS — I70.235 ATHEROSCLEROSIS OF NATIVE ARTERIES OF RIGHT LEG WITH ULCERATION OF OTHER PART OF FOOT (HCC): ICD-10-CM

## 2021-09-30 DIAGNOSIS — E11.42 DIABETIC POLYNEUROPATHY ASSOCIATED WITH TYPE 2 DIABETES MELLITUS (HCC): ICD-10-CM

## 2021-09-30 DIAGNOSIS — R09.89 DECREASED PULSES IN FEET: Primary | ICD-10-CM

## 2021-09-30 PROBLEM — E11.621 DIABETIC ULCER OF TOE OF RIGHT FOOT ASSOCIATED WITH TYPE 2 DIABETES MELLITUS, WITH FAT LAYER EXPOSED (HCC): Status: ACTIVE | Noted: 2021-09-30

## 2021-09-30 PROCEDURE — 99204 OFFICE O/P NEW MOD 45 MIN: CPT | Performed by: EMERGENCY MEDICINE

## 2021-09-30 PROCEDURE — 11042 DBRDMT SUBQ TIS 1ST 20SQCM/<: CPT | Performed by: EMERGENCY MEDICINE

## 2021-09-30 PROCEDURE — 11042 DBRDMT SUBQ TIS 1ST 20SQCM/<: CPT

## 2021-09-30 PROCEDURE — 99213 OFFICE O/P EST LOW 20 MIN: CPT

## 2021-09-30 NOTE — H&P
[address] [facility]   History and Physical Note   Referring provider: Dr. Jose G Valverde  Reason for referral: [facility]     Cyndi RECORD NUMBER:  [id number]  AGE: [de-identified] y.o. GENDER: male  : 1940  EPISODE DATE:  2021    Subjective:     Chief Complaint   Patient presents with    Wound Check     Right Great toe         HISTORY of PRESENT ILLNESS HPI     Darlyn Guerra is a [de-identified] y.o. male who presents today for an initial evaluation of a wound/ulcer. Patient is new to the wound center. Wound duration: mid 2021. History of Wound Context: 67-year-old male diabetic who struck his toe against furniture mid 2021. The patient states that the rug slipped under him next to his bed and he accidentally struck his toe. Now with an ulcer to the plantar right great toe. Patient has chronic venous stasis as well. Has had trouble with diabetic control but his last hemoglobin A1c on 2021 was noted to be 7.0 which is considered to be an improvement for him. He does admit to intermittently having a poor diet with significant sodium intake as well. Denies any associated systemic complaints. Patient presented to his primary care doctor recently for evaluation of diabetes. The patient was noted to have the ulcer and referred here for ongoing wound care recommendations. He denies any pain at the site of the wound. Does have diabetic neuropathy. Previous smoker.      Pertinent associated symptoms: drainage , redness, swelling and impaired mobility    PAST MEDICAL HISTORY        Diagnosis Date    Acid reflux     Atrial flutter ([facility]     converted    Bleeding stomach ulcer oct 2015    BPH (benign prostatic hyperplasia)     CAD (coronary artery disease)      angio with 2 blocked bypass and 2 patent    COPD (chronic obstructive pulmonary disease) ([facility]     Diabetes mellitus type II     Edema     chronic left leg    Elevated PSA- nl 11  Hyperlipidemia     Hypertension     Ischemic cardiomyopathy     Lipoma of skin-RIGHT CHEST 2011    Obesity     Osteoarthritis     Skin tear of left forearm without complication     Significant amount of epidermal loss with bleeding.     Spinal stenosis of lumbar region with neurogenic claudication- clinically 2018       PAST SURGICAL HISTORY    Past Surgical History:   Procedure Laterality Date    CATARACT REMOVAL  ,     bilat    COLONOSCOPY      CORONARY ARTERY BYPASS GRAFT  1993    x 4    EYE SURGERY Left 2019    cataract coming back    JOINT REPLACEMENT Right total knee replacement    JOINT REPLACEMENT Left 2016       FAMILY HISTORY    Family History   Problem Relation Age of Onset    Cancer Mother         breast    Cancer Father         throat       SOCIAL HISTORY    Social History     Tobacco Use    Smoking status: Former Smoker     Packs/day: 3.50     Years: 32.00     Pack years: 112.00     Types: Cigarettes     Quit date: 1985     Years since quittin.7    Smokeless tobacco: Never Used    Tobacco comment: advised not to resume   Substance Use Topics    Alcohol use: No     Alcohol/week: 0.0 standard drinks    Drug use: No       ALLERGIES    Allergies   Allergen Reactions    Entresto [Sacubitril-Valsartan] Other (See Comments)     Renal failure       MEDICATIONS    Current Outpatient Medications on File Prior to Encounter   Medication Sig Dispense Refill    dapagliflozin (FARXIGA) 5 MG tablet Take 1 tablet by mouth daily LOT [id number] EXP  4 BOXES   LOT [id number] EXP  2 BOXES 30 tablet 0    omeprazole (PRILOSEC) 20 MG delayed release capsule TAKE 1 CAPSULE BY MOUTH TWICE DAILY 180 capsule 1    amiodarone (CORDARONE) 200 MG tablet TAKE 1 TABLET BY MOUTH DAILY 90 tablet 0    furosemide (LASIX) 80 MG tablet TAKE 1 TABLET BY MOUTH DAILY 90 tablet 0    potassium chloride (KLOR-CON) 10 MEQ extended release tablet TAKE 1 TABLET BY MOUTH DAILY 90 tablet 1    FARXIGA 10 MG tablet TAKE 1 TABLET BY MOUTH DAILY      hydrALAZINE (APRESOLINE) 25 MG tablet TAKE 1 TABLET BY MOUTH THREE TIMES DAILY      Cyanocobalamin (B-12) 500 MCG TABS TAKE 1 TABLET BY MOUTH DAILY 90 tablet 1    albuterol sulfate HFA (VENTOLIN HFA) 108 (90 Base) MCG/ACT inhaler Inhale 2 puffs into the lungs every 6 hours as needed for Wheezing 1 Inhaler 3    atorvastatin (LIPITOR) 40 MG tablet TAKE 1 TABLET BY MOUTH EVERY EVENING 90 tablet 1    umeclidinium-vilanterol (ANORO ELLIPTA) 62.5-25 MCG/INH AEPB inhaler INHALE 1 PUFF INTO THE LUNGS DAILY 1 each 5    gabapentin (NEURONTIN) 600 MG tablet TAKE UP TO 5 TABLETS BY MOUTH OVER 24 HOURS AS DIRECTED 150 tablet 5    insulin NPH (NOVOLIN N) 100 UNIT/ML injection vial Take 44 units with breakfast and 32 units with dinner.  (Patient taking differently: Take 20 units with breakfast and 20 units with dinner.) 30 mL 3    insulin regular (NOVOLIN R RELION) 100 UNIT/ML injection INJECT 20 TO 30 UNITS SUBCUTANEOUSLY THREE TIMES DAILY BEFORE MEAL(S) 30 mL 0    FEROSUL 325 (65 Fe) MG tablet TAKE 1 TABLET BY MOUTH TWICE DAILY 180 tablet 3    tiotropium (SPIRIVA HANDIHALER) 18 MCG inhalation capsule Inhale 1 capsule into the lungs daily INCLUDE inhaler device 30 capsule 5    INSULIN SYRINGE .5CC/29G 29G X 1/2\" 0.5 ML MISC INJECT 4 TIMES DAILY AS NEEDED 400 each 3    Nebulizers (AIRIAL COMPACT MINI NEBULIZER) MISC 1 each by Does not apply route every 6 hours as needed (wheezing or shortness of breath) 1 each 0    Respiratory Therapy Supplies (NEBULIZER/TUBING/MOUTHPIECE) KIT 1 kit by Does not apply route every 6 hours as needed (for wheezing or shortness of breath) 1 kit 1    albuterol (PROVENTIL) (2.5 MG/3ML) 0.083% nebulizer solution Take 3 mLs by nebulization every 6 hours as needed for Wheezing 50 vial 3    vitamin D (ERGOCALCIFEROL) 31284 units CAPS capsule TK 1 C PO WEEKLY  2    mupirocin (BACTROBAN) 2 % ointment Apply 3 times daily as needed 22 g 1    levalbuterol (XOPENEX HFA) 45 MCG/ACT inhaler Inhale 1-2 puffs into the lungs every 4 hours as needed for Wheezing 1 Inhaler 3    aspirin 81 MG EC tablet Take 81 mg by mouth daily      apixaban (ELIQUIS) 2.5 MG TABS tablet Take 1 tablet by mouth 2 times daily 60 tablet 5    metoprolol (LOPRESSOR) 25 MG tablet Take 25 mg by mouth daily       isosorbide mononitrate (IMDUR) 30 MG CR tablet Take 1 tablet by mouth every morning Dr. Brown Oh - Cardio 30 tablet 6    nitroGLYCERIN (NITROSTAT) 0.3 MG SL tablet Take one sublingual for chest pain. May repeat twice at 5 min intervals. If not getting relief call 911. 25 tablet 3     No current facility-administered medications on file prior to encounter. REVIEW OF SYSTEMS  A comprehensive review of systems was negative except for: Pertinent items are noted in HPI. Objective:      /73   Pulse 64   Temp 96.8 °F (36 °C) (Infrared)   Resp 16   Ht 5' 8\" (1.727 m)   Wt 265 lb (120.2 kg)   BMI 40.29 kg/m²     Wt Readings from Last 3 Encounters:   09/30/21 265 lb (120.2 kg)   09/17/21 270 lb (122.5 kg)   07/16/21 268 lb 6.4 oz (121.7 kg)       PHYSICAL EXAM  General Appearance/Constitutional: alert and oriented to person, place and time, well-developed and well nourished, and in no acute distress. Nontoxic. Skin: warm and dry, no rash, positive wound per LDA documentation. Head: normocephalic and atraumatic. Eyes: extraocular eye movements intact, conjunctivae normal, and sclera anicteric. ENT: hearing grossly normal bilaterally. Normal appearance. Pulmonary/Chest: no chest wall tenderness and clear anteriorly. No respiratory distress. Cardiovascular: normal rate and regular rhythm. GI: abdomen soft, non-tender and non-distended. Musculoskeletal: normal range of motion of joints. Nontender calves. No cyanosis. Nontender calves. Edema 2+  Neurologic: no gross cranial nerve deficit and speech normal. No focal deficits.  Mental status normal.      Medical Decision Makin-year-old male with a diabetic foot ulcer to plantar surface of right great toe. Severity of fat layers exposed. Guzman grade 1. Wound evaluation: The patient has an ulcer over the plantar surface of his right great toe with overlying necrotic and nonviable tissue. Dry exudates noted as well. Toe is a little bulbous with acute inflammatory changes. The entire right lower extremity with obvious stasis dermatitis and some lymphedema lipodermatosclerosis skin changes    Problem List Items Addressed This Visit     Diabetic neuropathy ([facility]    Relevant Orders    VL DUP LOWER EXTREMITY ARTERIES BILATERAL      Other Visit Diagnoses     Decreased pulses in feet    -  Primary    Relevant Orders    VL DUP LOWER EXTREMITY ARTERIES BILATERAL        Comorbid conditions affecting wound healing: DM type 2, CHF, COPD, CAD, HTN, chronic kidney disease, chronic anticoagulation. Previous smoker, quit 1985    Problems addressed during this encounter:  1. Diabetic foot ulcer, fat layers exposed. Debridement done. Offloading felt placed. 2. Diabetes Mellitus type 2, uncontrolled. Last hemoglobin A1c was 7.0 on 2021. Education and counseling provided here. 3. Impaired mobility, counseling and education provided to assist with flexibility as well as movement but at the same time help with pressure relief. Offloading felt placed. Postop shoe provided. 4. Venous stasis/leg edema. Elevation. Education counseling provided. Gentle compression initiated. 5. Suspected atherosclerosis of native arteries of right lower extremity. Screening MICKY unable to be done in the office due to noncompressible state. Arterial Dopplers ordered. Data reviewed and analyzed:  1. Assessment required other independent historian(s): No patient . 2. Review of prior external note from other providers done today: Yes  3. New imaging or lab ordered today: Yes  4.  Review of test results done today: Yes  5. Independent interpretation of test(s): No  6. Discussion of management or test interpretation with another provider, other qualified health care professional and other external source. Risk of complications and/or mortality of patient management:  1. This patient has a moderate risk of morbidity and mortality from additional diagnostic testing or treatment. This is due to the above conditions affecting wound healing as well as patient and procedure risk factors. Education and discussion held with patient regarding these disease processes pertinent to wound(s). 2. Other pertinent decisions include: minor surgery or procedures as below. 3. The patient's diagnosis or treatment is not significantly limited by social determinants of health as noted by: N/A .  4. Prescription drug management: N/A     Procedures done this visit:     Procedure Note  Indications:  Based on my examination of this patient's wound(s)/ulcer(s) today, debridement is required to promote healing and evaluate the wound base. Performed by: Nayeli Rosario MD  Consent obtained:  Yes  Time out taken:  Yes  Pain Control: Anesthetic  Anesthetic: 4% Lidocaine Cream     Debridement: Excisional Debridement  Using curette the wound(s)/ulcer(s) was/were debrided down through and including the removal of subcutaneous tissue.       Devitalized Tissue Debrided:  fibrin, biofilm, slough, necrotic/eschar and exudate  Pre Debridement Measurements:  Are located in the [place]  Documentation Flow Sheet  Diabetic/Pressure/Non Pressure Ulcers only:  Ulcer: Diabetic ulcer, fat layer exposed   Wound/Ulcer #: 1  Post Debridement Measurements:  Wound/Ulcer Descriptions are Pre Debridement except measurements:    Wound 09/30/21 Toe (Comment  which one) Right;Plantar Great #1 (Active)   Wound Image   09/30/21 0813   Wound Etiology Diabetic 09/30/21 0813   Wound Cleansed Cleansed with saline 09/30/21 0813   Offloading for Diabetic Foot Ulcers No 09/30/21 0813   Wound Length (cm) 1.3 cm 09/30/21 0813   Wound Width (cm) 1.5 cm 09/30/21 0813   Wound Depth (cm) 0.1 cm 09/30/21 0813   Wound Surface Area (cm^2) 1.95 cm^2 09/30/21 0813   Wound Volume (cm^3) 0.195 cm^3 09/30/21 0813   Post-Procedure Length (cm) 1.4 cm 09/30/21 0849   Post-Procedure Width (cm) 1.6 cm 09/30/21 0849   Post-Procedure Depth (cm) 0.15 cm 09/30/21 0849   Post-Procedure Surface Area (cm^2) 2.24 cm^2 09/30/21 0849   Post-Procedure Volume (cm^3) 0.336 cm^3 09/30/21 0849   Wound Assessment Bleeding 09/30/21 0849   Drainage Amount Moderate 09/30/21 0849   Drainage Description Serosanguinous 09/30/21 0849   Odor None 09/30/21 0813   Jaylyn-wound Assessment Hyperkeratosis (callous) 09/30/21 0813   Margins Attached edges 09/30/21 0813   Number of days: 0          Total Surface Area Debrided:  1.95 sq cm   Estimated Blood Loss:  Minimal  Hemostasis Achieved:  by pressure  Procedural Pain:  0  / 10   Post Procedural Pain:  0 / 10   Response to treatment:  Well tolerated by patient. Written patient dismissal instructions given to patient and signed by patient or POA. Discharge Instructions         [address], [address] [place] [address] Road  Telephone: [phone]    Discharge Instructions    Important reminders:    1. Increase Protein intake for optimal wound healing  2. No added salt to reduce any swelling  3. If diabetic, maintain good glucose control  4. If you smoke, smoking prohibits wound healing, we ask that you refrain from smoking. 5. When taking antibiotics take the entire prescription as ordered. Do not stop taking until medication is all gone unless otherwise instructed. 6. Exercise as tolerated. 7. Keep weight off wounds and reposition every 2 hours if applicable. 8. If wound(s) is on your lower extremity, elevate legs to the level of the heart or above for 30 minutes 4-5 times a day and/or when sitting.  Avoid standing for long periods of time. 9. Do not get wounds wet in bath or shower unless otherwise instructed by your physician. If your wound is on your foot or leg, you may purchase a cast bag. Please ask at the pharmacy. If Vascular testing is ordered, please call [facility] ([phone]) to schedule. Vascular tests ordered by Wound Care Physicians may take up to 2 hours to complete. Please keep that in mind when scheduling. If Vascular testing is scheduled, please bring supplies to replace your dressing after testing is done. The vascular department does not stock supplies. Wound:  Right Greater Toe     With each dressing change, rinse wounds with 0.9% Saline. (May use wound wash or soft contact solution. Both can be purchased at a local drug store). If unable to obtain saline, may use a gentle soap and water. Dressing care: Keep felt on all week, Give Post op she, Silver Alginate, dry dressing, Conforming Roll Gauze. Changing Daily . Wash with Vashe when cleaning other than that keep dry. Call and schedule Arterial test [facility]. Home Care Agency:     Your wound-care supplies will be provided by:   Please note, depending on your insurance coverage, you may have out-of-pocket expenses for these supplies. Someone from the company should call you to confirm your order and discuss those potential costs before they ship your products -- please anticipate that call. If your out-of-pocket cost could be substantial, Many companies have financial hardship programs for patients who qualify, so please ask about that if you might need a hand. If you have any questions about your supplies or your potential out-of-pocket costs, or if you need to place an order for a refill of supplies (typically monthly), please call the company directly. Your  is Lord Aiken    Follow up with Dr Damian Thomas In 1 week(s) in the wound care center.        Wound Care Center Information: Should you experience any significant changes in your wound(s) or have questions about your wound care, please contact the [facility] at [phone] Monday  - Thursday 8:00 am - 4:00 pm and Friday 8:00 am - 1:00pm. If you need help with your wound outside these hours and cannot wait until we are again available, contact your PCP or go to the hospital emergency room. PLEASE NOTE: IF YOU ARE UNABLE TO OBTAIN WOUND SUPPLIES, CONTINUE TO USE THE SUPPLIES YOU HAVE AVAILABLE UNTIL YOU ARE ABLE TO REACH US. IT IS MOST IMPORTANT TO KEEP THE WOUND COVERED AT ALL TIMES.                 Electronically signed by Lisset Bingham MD on 9/30/2021 at 8:58 AM

## 2021-09-30 NOTE — PLAN OF CARE
Discharge instructions given. Patient verbalized understanding. Return to [facility] in 1 week(s).   Called/faxed orders to [facility]

## 2021-09-30 NOTE — PROGRESS NOTES
[address] [facility]:      [address] f: [phone] [phone] p: [phone] [email or web address]     Ordering Center: [address]  [facility] [address]  [phone]  Dept: [phone]   Fax# [phone]    Patient Information:      Tommy Shelby  [address] [place] [address]   [phone]   : 1940  AGE: [de-identified] y.o. GENDER: male   TODAYS DATE:  2021    Insurance:      PRIMARY INSURANCE:  Plan: MEDICARE PART A AND B  Coverage: MEDICARE  Effective Date: 2015  Group Number: [unfilled]  Subscriber Number: [id number] - (Medicare)    Payor/Plan Subscr  Sex Relation Sub. Ins. ID Effective Group Num   1. [address] A 1940 Male Self [id number] 1/1/15                                    [address] [address]   2.  MEDICAL [address] A 1940 Male Self [id number]                                   [address]         Patient Wound Information:     Additional ICD-10 Codes: DFU    Patient Active Problem List   Diagnosis Code    Diabetes mellitus type II, uncontrolled ([facility] E11.65    Tinnitus H93.19    Leg edema R60.0    Elevated PSA- nl 11 R97.20    Hyperlipidemia LDL goal <70 E78.5    Osteoarthritis M19.90    Diabetic retinopathy ([facility] E11.319    BPH (benign prostatic hyperplasia) N40.0    COPD (chronic obstructive pulmonary disease) ([facility]) J44.9    Rosacea L71.9    Erectile dysfunction N52.9    Essential hypertension I10    Coronary artery disease involving native coronary artery of native heart without angina pectoris I25.10    Diabetes mellitus, type II ([facility] E11.9    Diabetic nephropathy- pos microalb  E11.21    Diabetic neuropathy ([facility]) E11.40    GERD (gastroesophageal reflux disease) K21.9    B12 deficiency E53.8    Impingement syndrome of left shoulder M75.42    Obesity, Class III, BMI 40-49.9 (morbid obesity) ([facility] E66.01    Personal history of atrial flutter Z86.79    Peptic ulcer disease K27.9    S/P total knee arthroplasty, right Z96.651    Vitamin D deficiency E55.9    Spinal stenosis of lumbar region with neurogenic claudication- clinically M48.062    BMI 40.0-44.9, adult ([facility]) Z68.41    RBBB I45.10    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    Paroxysmal atrial fibrillation ([facility]) I48.0    CHF (congestive heart failure), NYHA class I, acute on chronic, combined ([facility]) I50.43    Upper abdominal pain R10.10    Stage 3 chronic kidney disease ([facility]) N18.30       WOUNDS REQUIRING DRESSING SUPPLIES:     Wound 09/30/21 Toe (Comment  which one) Right;Plantar Great #1 (Active)   Wound Image   09/30/21 0813   Wound Etiology Diabetic 09/30/21 0813   Wound Cleansed Cleansed with saline 09/30/21 0813   Offloading for Diabetic Foot Ulcers No 09/30/21 0813   Wound Length (cm) 1.3 cm 09/30/21 0813   Wound Width (cm) 1.5 cm 09/30/21 0813   Wound Depth (cm) 0.1 cm 09/30/21 0813   Wound Surface Area (cm^2) 1.95 cm^2 09/30/21 0813   Wound Volume (cm^3) 0.195 cm^3 09/30/21 0813   Post-Procedure Length (cm) 1.4 cm 09/30/21 0849   Post-Procedure Width (cm) 1.6 cm 09/30/21 0849   Post-Procedure Depth (cm) 0.15 cm 09/30/21 0849   Post-Procedure Surface Area (cm^2) 2.24 cm^2 09/30/21 0849   Post-Procedure Volume (cm^3) 0.336 cm^3 09/30/21 0849   Wound Assessment Bleeding 09/30/21 0849   Drainage Amount Moderate 09/30/21 0849   Drainage Description Serosanguinous 09/30/21 0849   Odor None 09/30/21 0813   Jaylyn-wound Assessment Hyperkeratosis (callous) 09/30/21 0813   Margins Attached edges 09/30/21 0813   Number of days: 0          Supplies Requested :      WOUND #: 1   PRIMARY DRESSING:    Alginate with silver pad   Cover and Secure with: 2X2 gauze pad  Conforming roll gauze     FREQUENCY OF DRESSING CHANGES:  Daily    Wound Thickness [x] Full   []Partial                                     Patient Wound(s) Debrided: [x] Yes   [] No    Debridement Date: 9/30/2021    Debribement Type: Excisional/Sharp    ADDITIONAL ITEMS:  [] Gloves Small  [x] Gloves Medium [] Gloves Large [] Gloves Lindsey Campa  [] Paper Tape 1\" [] Paper Tape 2\" [] Paper Tape 3\"  [x] Medipore Tape 3\"  [x] Saline  [] Skin Prep   [] Adhesive Remover   [] Cotton Tip Applicators  [] Tubular Stocking   [] Size E  [] Size G  [] Other:    Patient currently being seen by Home Health: [] Yes   [x] No    Duration for needed supplies:  [x]15  []30  []60  []90 Days    Provider Information:      PROVIDER'S NAME/NPI  Dr Sherman Bhatti [id number]    I give permission to coordinate the care for this patient

## 2021-10-01 DIAGNOSIS — E78.5 HYPERLIPIDEMIA: ICD-10-CM

## 2021-10-01 RX ORDER — FUROSEMIDE 80 MG
TABLET ORAL
Qty: 90 TABLET | Refills: 0 | Status: SHIPPED | OUTPATIENT
Start: 2021-10-01 | End: 2022-01-10

## 2021-10-01 RX ORDER — ATORVASTATIN CALCIUM 40 MG/1
TABLET, FILM COATED ORAL
Qty: 90 TABLET | Refills: 1 | Status: SHIPPED | OUTPATIENT
Start: 2021-10-01 | End: 2022-06-22

## 2021-10-06 ENCOUNTER — TELEPHONE (OUTPATIENT)
Dept: FAMILY MEDICINE CLINIC | Age: 81
End: 2021-10-06

## 2021-10-06 RX ORDER — OMEPRAZOLE 20 MG/1
CAPSULE, DELAYED RELEASE ORAL
Qty: 180 CAPSULE | Refills: 1 | OUTPATIENT
Start: 2021-10-06

## 2021-10-06 RX ORDER — OMEPRAZOLE 20 MG/1
CAPSULE, DELAYED RELEASE ORAL
Qty: 180 CAPSULE | Refills: 1 | Status: SHIPPED | OUTPATIENT
Start: 2021-10-06 | End: 2022-05-02

## 2021-10-06 NOTE — TELEPHONE ENCOUNTER
Patient calling to check status of medication   Patient is completely out of this medication   Omeprazole   Please advise

## 2021-10-06 NOTE — TELEPHONE ENCOUNTER
Patient called back advised pharmacy did not get this prescription  Contacted [facility] on [address]  Janeth Kurtz from pharmacy stating there was an issue back in September with some other offices prescriptions were not coming through. This prescription is not at [facility]. Janeth Kurtz stating he can have it filled just needs authorization over the phone.    Patient is completely out of medication,     Please advise

## 2021-10-07 ENCOUNTER — HOSPITAL ENCOUNTER (OUTPATIENT)
Dept: WOUND CARE | Age: 81
Discharge: HOME | End: 2021-10-07
Payer: MEDICARE

## 2021-10-07 VITALS
SYSTOLIC BLOOD PRESSURE: 137 MMHG | TEMPERATURE: 97.5 F | HEART RATE: 84 BPM | RESPIRATION RATE: 16 BRPM | DIASTOLIC BLOOD PRESSURE: 77 MMHG

## 2021-10-07 DIAGNOSIS — R09.89 DECREASED PULSES IN FEET: ICD-10-CM

## 2021-10-07 DIAGNOSIS — I70.235 ATHEROSCLEROSIS OF NATIVE ARTERIES OF RIGHT LEG WITH ULCERATION OF OTHER PART OF FOOT (HCC): ICD-10-CM

## 2021-10-07 DIAGNOSIS — I87.2 PERIPHERAL VENOUS INSUFFICIENCY: ICD-10-CM

## 2021-10-07 DIAGNOSIS — L97.512 ULCER OF TOE OF RIGHT FOOT, WITH FAT LAYER EXPOSED (HCC): Primary | ICD-10-CM

## 2021-10-07 PROCEDURE — 11042 DBRDMT SUBQ TIS 1ST 20SQCM/<: CPT | Performed by: EMERGENCY MEDICINE

## 2021-10-07 PROCEDURE — 11042 DBRDMT SUBQ TIS 1ST 20SQCM/<: CPT

## 2021-10-07 NOTE — H&P
[address] [facility] and Physical Note   Referring provider: Dr. Kaia La  Reason for referral: [facility]     Cyndi RECORD NUMBER:  [id number]  AGE: [de-identified] y.o. GENDER: male  : 1940  EPISODE DATE:  10/7/2021    Subjective:     Chief Complaint   Patient presents with    Wound Check     Right great toe         HISTORY of PRESENT ILLNESS HPI     Katerine Calabrese is a [de-identified] y.o. male who presents today for an initial evaluation of a wound/ulcer. Patient is new to the wound center. Wound duration: mid 2021. History of Wound Context: 81-year-old male diabetic who struck his toe against furniture mid 2021. The patient states that the rug slipped under him next to his bed and he accidentally struck his toe. Now with an ulcer to the plantar right great toe. Patient has chronic venous stasis as well. Has had trouble with diabetic control but his last hemoglobin A1c on 2021 was noted to be 7.0 which is considered to be an improvement for him. He does admit to intermittently having a poor diet with significant sodium intake as well. Denies any associated systemic complaints. Patient presented to his primary care doctor recently for evaluation of diabetes. The patient was noted to have the ulcer and referred here for ongoing wound care recommendations. He denies any pain at the site of the wound. Does have diabetic neuropathy. Previous smoker.      Pertinent associated symptoms: drainage , redness, swelling and impaired mobility    PAST MEDICAL HISTORY        Diagnosis Date    Acid reflux     Atrial flutter ([address]     converted    Bleeding stomach ulcer oct 2015    BPH (benign prostatic hyperplasia)     CAD (coronary artery disease)      angio with 2 blocked bypass and 2 patent    COPD (chronic obstructive pulmonary disease) ([address]     Diabetes mellitus type II     Edema     chronic left leg    Elevated PSA- nl 11 potassium chloride (KLOR-CON) 10 MEQ extended release tablet TAKE 1 TABLET BY MOUTH DAILY 90 tablet 1    FARXIGA 10 MG tablet TAKE 1 TABLET BY MOUTH DAILY      hydrALAZINE (APRESOLINE) 25 MG tablet TAKE 1 TABLET BY MOUTH THREE TIMES DAILY      Cyanocobalamin (B-12) 500 MCG TABS TAKE 1 TABLET BY MOUTH DAILY 90 tablet 1    albuterol sulfate HFA (VENTOLIN HFA) 108 (90 Base) MCG/ACT inhaler Inhale 2 puffs into the lungs every 6 hours as needed for Wheezing 1 Inhaler 3    umeclidinium-vilanterol (ANORO ELLIPTA) 62.5-25 MCG/INH AEPB inhaler INHALE 1 PUFF INTO THE LUNGS DAILY 1 each 5    gabapentin (NEURONTIN) 600 MG tablet TAKE UP TO 5 TABLETS BY MOUTH OVER 24 HOURS AS DIRECTED 150 tablet 5    insulin NPH (NOVOLIN N) 100 UNIT/ML injection vial Take 44 units with breakfast and 32 units with dinner.  (Patient taking differently: Take 20 units with breakfast and 20 units with dinner.) 30 mL 3    insulin regular (NOVOLIN R RELION) 100 UNIT/ML injection INJECT 20 TO 30 UNITS SUBCUTANEOUSLY THREE TIMES DAILY BEFORE MEAL(S) 30 mL 0    FEROSUL 325 (65 Fe) MG tablet TAKE 1 TABLET BY MOUTH TWICE DAILY 180 tablet 3    tiotropium (SPIRIVA HANDIHALER) 18 MCG inhalation capsule Inhale 1 capsule into the lungs daily INCLUDE inhaler device 30 capsule 5    INSULIN SYRINGE .5CC/29G 29G X 1/2\" 0.5 ML MISC INJECT 4 TIMES DAILY AS NEEDED 400 each 3    Nebulizers (AIRIAL COMPACT MINI NEBULIZER) MISC 1 each by Does not apply route every 6 hours as needed (wheezing or shortness of breath) 1 each 0    Respiratory Therapy Supplies (NEBULIZER/TUBING/MOUTHPIECE) KIT 1 kit by Does not apply route every 6 hours as needed (for wheezing or shortness of breath) 1 kit 1    albuterol (PROVENTIL) (2.5 MG/3ML) 0.083% nebulizer solution Take 3 mLs by nebulization every 6 hours as needed for Wheezing 50 vial 3    vitamin D (ERGOCALCIFEROL) 25155 units CAPS capsule TK 1 C PO WEEKLY  2    mupirocin (BACTROBAN) 2 % ointment Apply 3 times daily as needed 22 g 1    levalbuterol (XOPENEX HFA) 45 MCG/ACT inhaler Inhale 1-2 puffs into the lungs every 4 hours as needed for Wheezing 1 Inhaler 3    aspirin 81 MG EC tablet Take 81 mg by mouth daily      apixaban (ELIQUIS) 2.5 MG TABS tablet Take 1 tablet by mouth 2 times daily 60 tablet 5    metoprolol (LOPRESSOR) 25 MG tablet Take 25 mg by mouth daily       isosorbide mononitrate (IMDUR) 30 MG CR tablet Take 1 tablet by mouth every morning Dr. Daljit Ca - Cardio 30 tablet 6    nitroGLYCERIN (NITROSTAT) 0.3 MG SL tablet Take one sublingual for chest pain. May repeat twice at 5 min intervals. If not getting relief call 911. 25 tablet 3     No current facility-administered medications on file prior to encounter. REVIEW OF SYSTEMS  A comprehensive review of systems was negative except for: Pertinent items are noted in HPI. Objective:      /77   Pulse 84   Temp 97.5 °F (36.4 °C) (Infrared)   Resp 16     Wt Readings from Last 3 Encounters:   21 265 lb (120.2 kg)   21 270 lb (122.5 kg)   21 268 lb 6.4 oz (121.7 kg)       PHYSICAL EXAM  General Appearance/Constitutional: alert and oriented to person, place and time, well-developed and well nourished, and in no acute distress. Nontoxic. Skin: warm and dry, no rash, positive wound per LDA documentation. Head: normocephalic and atraumatic. Eyes: extraocular eye movements intact, conjunctivae normal, and sclera anicteric. ENT: hearing grossly normal bilaterally. Normal appearance. Pulmonary/Chest: no chest wall tenderness and clear anteriorly. No respiratory distress. Cardiovascular: normal rate and regular rhythm. GI: abdomen soft, non-tender and non-distended. Musculoskeletal: normal range of motion of joints. Nontender calves. No cyanosis. Nontender calves. Edema 2+  Neurologic: no gross cranial nerve deficit and speech normal. No focal deficits.  Mental status normal.      Medical Decision Makin-year-old male with a month. Data reviewed and analyzed:  1. Assessment required other independent historian(s): No patient . 2. Review of prior external note from other providers done today: No  3. New imaging or lab ordered today: No  4. Review of test results done today: Yes  5. Independent interpretation of test(s): No  6. Discussion of management or test interpretation with another provider, other qualified health care professional and other external source. Risk of complications and/or mortality of patient management:  1. This patient has a moderate risk of morbidity and mortality from additional diagnostic testing or treatment. This is due to the above conditions affecting wound healing as well as patient and procedure risk factors. Education and discussion held with patient regarding these disease processes pertinent to wound(s). 2. Other pertinent decisions include: minor surgery or procedures as below. 3. The patient's diagnosis or treatment is not significantly limited by social determinants of health as noted by: N/A .  4. Prescription drug management: N/A     Procedures done this visit:     Procedure Note  Indications:  Based on my examination of this patient's wound(s)/ulcer(s) today, debridement is required to promote healing and evaluate the wound base. Performed by: Deisy Vázquez MD  Consent obtained:  Yes  Time out taken:  Yes  Pain Control: Anesthetic  Anesthetic: 4% Lidocaine Cream     Debridement: Excisional Debridement  Using curette the wound(s)/ulcer(s) was/were debrided down through and including the removal of subcutaneous tissue.       Devitalized Tissue Debrided:  fibrin, biofilm, slough, necrotic/eschar and exudate  Pre Debridement Measurements:  Are located in the [place]  Documentation Flow Sheet  Diabetic/Pressure/Non Pressure Ulcers only:  Ulcer: Diabetic ulcer, fat layer exposed   Wound/Ulcer #: 1  Post Debridement Measurements:  Wound/Ulcer Descriptions are Pre Debridement except measurements:    Wound 09/30/21 Toe (Comment  which one) Right;Plantar Great #1 (Active)   Wound Image   09/30/21 0813   Wound Etiology Diabetic 10/07/21 0851   Wound Cleansed Cleansed with saline 10/07/21 0851   Offloading for Diabetic Foot Ulcers No offloading required 10/07/21 0851   Wound Length (cm) 2 cm 10/07/21 0851   Wound Width (cm) 1.1 cm 10/07/21 0851   Wound Depth (cm) 0.1 cm 10/07/21 0851   Wound Surface Area (cm^2) 2.2 cm^2 10/07/21 0851   Change in Wound Size % (l*w) -12.82 10/07/21 0851   Wound Volume (cm^3) 0.22 cm^3 10/07/21 0851   Wound Healing % -13 10/07/21 0851   Post-Procedure Length (cm) 1.4 cm 09/30/21 0849   Post-Procedure Width (cm) 1.6 cm 09/30/21 0849   Post-Procedure Depth (cm) 0.15 cm 09/30/21 0849   Post-Procedure Surface Area (cm^2) 2.24 cm^2 09/30/21 0849   Post-Procedure Volume (cm^3) 0.336 cm^3 09/30/21 0849   Wound Assessment Slough;Pink/red 10/07/21 0851   Drainage Amount Small 10/07/21 0851   Drainage Description Serosanguinous 10/07/21 0851   Odor None 10/07/21 0851   Jaylyn-wound Assessment Hyperkeratosis (callous) 10/07/21 0851   Margins Attached edges 10/07/21 0851   Number of days: 7          Total Surface Area Debrided: 2.2 sq cm   Estimated Blood Loss:  Minimal  Hemostasis Achieved:  by pressure  Procedural Pain:  0  / 10   Post Procedural Pain:  0 / 10   Response to treatment:  Well tolerated by patient. Written patient dismissal instructions given to patient and signed by patient or POA. Discharge Instructions       [address], [address] [place] [address] Road  Telephone: [phone]    Discharge Instructions    Important reminders:    1. Increase Protein intake for optimal wound healing  2. No added salt to reduce any swelling  3. If diabetic, maintain good glucose control  4. If you smoke, smoking prohibits wound healing, we ask that you refrain from smoking.   5. When taking antibiotics take the entire prescription as ordered. Do not stop taking until medication is all gone unless otherwise instructed. 6. Exercise as tolerated. 7. Keep weight off wounds and reposition every 2 hours if applicable. 8. If wound(s) is on your lower extremity, elevate legs to the level of the heart or above for 30 minutes 4-5 times a day and/or when sitting. Avoid standing for long periods of time. 9. Do not get wounds wet in bath or shower unless otherwise instructed by your physician. If your wound is on your foot or leg, you may purchase a cast bag. Please ask at the pharmacy. If Vascular testing is ordered, [phone] to schedule. Vascular tests ordered by Wound Care Physicians may take up to 2 hours to complete. Please keep that in mind when scheduling. If Vascular testing is scheduled, please bring supplies to replace your dressing after testing is done. The vascular department does not stock supplies. Wound:  Right Greater Toe     With each dressing change, rinse wounds with 0.9% Saline. (May use wound wash or soft contact solution. Both can be purchased at a local drug store). If unable to obtain saline, may use a gentle soap and water. Dressing care: Keep felt on all week, Give Post op shoe, Silver Alginate, dry dressing, Conforming Roll Gauze. Changing Daily . Wash with Vashe when cleaning other than that keep dry. Call and schedule Arterial test [address] [place] [address]. Home Care Agency:     Your wound-care supplies will be provided by:   Please note, depending on your insurance coverage, you may have out-of-pocket expenses for these supplies. Someone from the company should call you to confirm your order and discuss those potential costs before they ship your products -- please anticipate that call. If your out-of-pocket cost could be substantial, Many companies have financial hardship programs for patients who qualify, so please ask about that if you might need a hand.  If you have any questions about your supplies or your potential out-of-pocket costs, or if you need to place an order for a refill of supplies (typically monthly), please call the company directly. Your  is Aisha Medina    Follow up with Dr Tereso Severance In 1 week(s) in the wound care center. Wound Care Center Information: Should you experience any significant changes in your wound(s) or have questions about your wound care, please contact the [place] [address] at [phone] Monday  - Thursday 8:00 am - 4:00 pm and Friday 8:00 am - 1:00pm. If you need help with your wound outside these hours and cannot wait until we are again available, contact your PCP or go to the hospital emergency room. PLEASE NOTE: IF YOU ARE UNABLE TO OBTAIN WOUND SUPPLIES, CONTINUE TO USE THE SUPPLIES YOU HAVE AVAILABLE UNTIL YOU ARE ABLE TO REACH US. IT IS MOST IMPORTANT TO KEEP THE WOUND COVERED AT ALL TIMES.             Electronically signed by Medina Hill MD on 10/7/2021 at 9:32 AM

## 2021-10-14 ENCOUNTER — HOSPITAL ENCOUNTER (OUTPATIENT)
Dept: WOUND CARE | Age: 81
Discharge: HOME | End: 2021-10-14
Payer: MEDICARE

## 2021-10-14 VITALS
TEMPERATURE: 97.1 F | DIASTOLIC BLOOD PRESSURE: 75 MMHG | SYSTOLIC BLOOD PRESSURE: 130 MMHG | RESPIRATION RATE: 16 BRPM | HEART RATE: 89 BPM

## 2021-10-14 DIAGNOSIS — I70.235 ATHEROSCLEROSIS OF NATIVE ARTERIES OF RIGHT LEG WITH ULCERATION OF OTHER PART OF FOOT (HCC): ICD-10-CM

## 2021-10-14 DIAGNOSIS — E11.649 UNCONTROLLED TYPE 2 DIABETES MELLITUS WITH HYPOGLYCEMIA, UNSPECIFIED HYPOGLYCEMIA COMA STATUS (HCC): ICD-10-CM

## 2021-10-14 DIAGNOSIS — R09.89 DECREASED PULSES IN FEET: ICD-10-CM

## 2021-10-14 DIAGNOSIS — I87.2 PERIPHERAL VENOUS INSUFFICIENCY: ICD-10-CM

## 2021-10-14 DIAGNOSIS — L97.512 ULCER OF TOE OF RIGHT FOOT, WITH FAT LAYER EXPOSED (HCC): Primary | ICD-10-CM

## 2021-10-14 DIAGNOSIS — Z74.09 IMPAIRED MOBILITY: ICD-10-CM

## 2021-10-14 PROCEDURE — 11042 DBRDMT SUBQ TIS 1ST 20SQCM/<: CPT

## 2021-10-14 PROCEDURE — 11042 DBRDMT SUBQ TIS 1ST 20SQCM/<: CPT | Performed by: EMERGENCY MEDICINE

## 2021-10-14 RX ORDER — CLOBETASOL PROPIONATE 0.5 MG/G
OINTMENT TOPICAL ONCE
Status: CANCELLED | OUTPATIENT
Start: 2021-10-14 | End: 2021-10-14

## 2021-10-14 RX ORDER — BACITRACIN, NEOMYCIN, POLYMYXIN B 400; 3.5; 5 [USP'U]/G; MG/G; [USP'U]/G
OINTMENT TOPICAL ONCE
Status: CANCELLED | OUTPATIENT
Start: 2021-10-14 | End: 2021-10-14

## 2021-10-14 RX ORDER — LIDOCAINE 40 MG/G
CREAM TOPICAL ONCE
Status: CANCELLED | OUTPATIENT
Start: 2021-10-14 | End: 2021-10-14

## 2021-10-14 RX ORDER — GENTAMICIN SULFATE 1 MG/G
OINTMENT TOPICAL ONCE
Status: CANCELLED | OUTPATIENT
Start: 2021-10-14 | End: 2021-10-14

## 2021-10-14 RX ORDER — LIDOCAINE HYDROCHLORIDE 20 MG/ML
JELLY TOPICAL ONCE
Status: CANCELLED | OUTPATIENT
Start: 2021-10-14 | End: 2021-10-14

## 2021-10-14 RX ORDER — BETAMETHASONE DIPROPIONATE 0.05 %
OINTMENT (GRAM) TOPICAL ONCE
Status: CANCELLED | OUTPATIENT
Start: 2021-10-14 | End: 2021-10-14

## 2021-10-14 RX ORDER — LIDOCAINE 50 MG/G
OINTMENT TOPICAL ONCE
Status: CANCELLED | OUTPATIENT
Start: 2021-10-14 | End: 2021-10-14

## 2021-10-14 RX ORDER — LIDOCAINE HYDROCHLORIDE 40 MG/ML
SOLUTION TOPICAL ONCE
Status: CANCELLED | OUTPATIENT
Start: 2021-10-14 | End: 2021-10-14

## 2021-10-14 RX ORDER — BACITRACIN ZINC AND POLYMYXIN B SULFATE 500; 1000 [USP'U]/G; [USP'U]/G
OINTMENT TOPICAL ONCE
Status: CANCELLED | OUTPATIENT
Start: 2021-10-14 | End: 2021-10-14

## 2021-10-14 RX ORDER — GINSENG 100 MG
CAPSULE ORAL ONCE
Status: CANCELLED | OUTPATIENT
Start: 2021-10-14 | End: 2021-10-14

## 2021-10-14 RX ORDER — LIDOCAINE 40 MG/G
CREAM TOPICAL ONCE
Status: DISCONTINUED | OUTPATIENT
Start: 2021-10-14 | End: 2021-10-15 | Stop reason: HOSPADM

## 2021-10-14 NOTE — PLAN OF CARE
Discharge instructions given. Patient verbalized understanding. Return to [address] [place] [address],[address] in 1 week(s).   Continue Silver Alginate and Felt

## 2021-10-14 NOTE — H&P
[address] [facility]   History and Physical Note   Referring provider: Dr. Shantell Gerard  Reason for referral: [facility]     Cyndi RECORD NUMBER:  [id number]  AGE: [de-identified] y.o. GENDER: male  : 1940  EPISODE DATE:  10/14/2021    Subjective:     Chief Complaint   Patient presents with    Wound Check     Right great toe         HISTORY of PRESENT ILLNESS HPI     Patricia Pope is a [de-identified] y.o. male who presents today for an initial evaluation of a wound/ulcer. Patient is new to the wound center. Wound duration: mid 2021. History of Wound Context: 23-year-old male diabetic who struck his toe against furniture mid 2021. The patient states that the rug slipped under him next to his bed and he accidentally struck his toe. Now with an ulcer to the plantar right great toe. Patient has chronic venous stasis as well. Has had trouble with diabetic control but his last hemoglobin A1c on 2021 was noted to be 7.0 which is considered to be an improvement for him. He does admit to intermittently having a poor diet with significant sodium intake as well. Denies any associated systemic complaints. Patient presented to his primary care doctor recently for evaluation of diabetes. The patient was noted to have the ulcer and referred here for ongoing wound care recommendations. He denies any pain at the site of the wound. Does have diabetic neuropathy. Previous smoker.      Pertinent associated symptoms: drainage , redness, swelling and impaired mobility    PAST MEDICAL HISTORY        Diagnosis Date    Acid reflux     Atrial flutter ([address]     converted    Bleeding stomach ulcer oct 2015    BPH (benign prostatic hyperplasia)     CAD (coronary artery disease)      angio with 2 blocked bypass and 2 patent    COPD (chronic obstructive pulmonary disease) ([address]     Diabetes mellitus type II     Edema     chronic left leg    Elevated PSA- nl 11  Hyperlipidemia     Hypertension     Ischemic cardiomyopathy     Lipoma of skin-RIGHT CHEST 2011    Obesity     Osteoarthritis     Skin tear of left forearm without complication     Significant amount of epidermal loss with bleeding.     Spinal stenosis of lumbar region with neurogenic claudication- clinically 2018       PAST SURGICAL HISTORY    Past Surgical History:   Procedure Laterality Date    CATARACT REMOVAL  ,     bilat    COLONOSCOPY      CORONARY ARTERY BYPASS GRAFT  1993    x 4    EYE SURGERY Left 2019    cataract coming back    JOINT REPLACEMENT Right total knee replacement    JOINT REPLACEMENT Left 2016       FAMILY HISTORY    Family History   Problem Relation Age of Onset    Cancer Mother         breast    Cancer Father         throat       SOCIAL HISTORY    Social History     Tobacco Use    Smoking status: Former Smoker     Packs/day: 3.50     Years: 32.00     Pack years: 112.00     Types: Cigarettes     Quit date: 1985     Years since quittin.8    Smokeless tobacco: Never Used    Tobacco comment: advised not to resume   Substance Use Topics    Alcohol use: No     Alcohol/week: 0.0 standard drinks    Drug use: No       ALLERGIES    Allergies   Allergen Reactions    Entresto [Sacubitril-Valsartan] Other (See Comments)     Renal failure       MEDICATIONS    Current Outpatient Medications on File Prior to Encounter   Medication Sig Dispense Refill    omeprazole (PRILOSEC) 20 MG delayed release capsule TAKE 1 CAPSULE BY MOUTH TWICE DAILY 180 capsule 1    furosemide (LASIX) 80 MG tablet TAKE 1 TABLET BY MOUTH DAILY 90 tablet 0    atorvastatin (LIPITOR) 40 MG tablet TAKE 1 TABLET BY MOUTH EVERY EVENING 90 tablet 1    dapagliflozin (FARXIGA) 5 MG tablet Take 1 tablet by mouth daily LOT [id number] EXP  4 BOXES   LOT [id number] EXP 02/24 2 BOXES 30 tablet 0    amiodarone (CORDARONE) 200 MG tablet TAKE 1 TABLET BY MOUTH DAILY 90 tablet 0    potassium chloride (KLOR-CON) 10 MEQ extended release tablet TAKE 1 TABLET BY MOUTH DAILY 90 tablet 1    FARXIGA 10 MG tablet TAKE 1 TABLET BY MOUTH DAILY      hydrALAZINE (APRESOLINE) 25 MG tablet TAKE 1 TABLET BY MOUTH THREE TIMES DAILY      Cyanocobalamin (B-12) 500 MCG TABS TAKE 1 TABLET BY MOUTH DAILY 90 tablet 1    albuterol sulfate HFA (VENTOLIN HFA) 108 (90 Base) MCG/ACT inhaler Inhale 2 puffs into the lungs every 6 hours as needed for Wheezing 1 Inhaler 3    umeclidinium-vilanterol (ANORO ELLIPTA) 62.5-25 MCG/INH AEPB inhaler INHALE 1 PUFF INTO THE LUNGS DAILY 1 each 5    gabapentin (NEURONTIN) 600 MG tablet TAKE UP TO 5 TABLETS BY MOUTH OVER 24 HOURS AS DIRECTED 150 tablet 5    insulin NPH (NOVOLIN N) 100 UNIT/ML injection vial Take 44 units with breakfast and 32 units with dinner.  (Patient taking differently: Take 20 units with breakfast and 20 units with dinner.) 30 mL 3    insulin regular (NOVOLIN R RELION) 100 UNIT/ML injection INJECT 20 TO 30 UNITS SUBCUTANEOUSLY THREE TIMES DAILY BEFORE MEAL(S) 30 mL 0    FEROSUL 325 (65 Fe) MG tablet TAKE 1 TABLET BY MOUTH TWICE DAILY 180 tablet 3    tiotropium (SPIRIVA HANDIHALER) 18 MCG inhalation capsule Inhale 1 capsule into the lungs daily INCLUDE inhaler device 30 capsule 5    INSULIN SYRINGE .5CC/29G 29G X 1/2\" 0.5 ML MISC INJECT 4 TIMES DAILY AS NEEDED 400 each 3    Nebulizers (AIRIAL COMPACT MINI NEBULIZER) MISC 1 each by Does not apply route every 6 hours as needed (wheezing or shortness of breath) 1 each 0    Respiratory Therapy Supplies (NEBULIZER/TUBING/MOUTHPIECE) KIT 1 kit by Does not apply route every 6 hours as needed (for wheezing or shortness of breath) 1 kit 1    albuterol (PROVENTIL) (2.5 MG/3ML) 0.083% nebulizer solution Take 3 mLs by nebulization every 6 hours as needed for Wheezing 50 vial 3    vitamin D (ERGOCALCIFEROL) 09988 units CAPS capsule TK 1 C PO WEEKLY  2    mupirocin (BACTROBAN) 2 % ointment Apply 3 times daily as needed 22 g 1    levalbuterol (XOPENEX HFA) 45 MCG/ACT inhaler Inhale 1-2 puffs into the lungs every 4 hours as needed for Wheezing 1 Inhaler 3    aspirin 81 MG EC tablet Take 81 mg by mouth daily      apixaban (ELIQUIS) 2.5 MG TABS tablet Take 1 tablet by mouth 2 times daily 60 tablet 5    metoprolol (LOPRESSOR) 25 MG tablet Take 25 mg by mouth daily       isosorbide mononitrate (IMDUR) 30 MG CR tablet Take 1 tablet by mouth every morning Dr. Anna Soto - Cardio 30 tablet 6    nitroGLYCERIN (NITROSTAT) 0.3 MG SL tablet Take one sublingual for chest pain. May repeat twice at 5 min intervals. If not getting relief call 911. 25 tablet 3     No current facility-administered medications on file prior to encounter. REVIEW OF SYSTEMS  A comprehensive review of systems was negative except for: Pertinent items are noted in HPI. Objective:      /75   Pulse 89   Temp 97.1 °F (36.2 °C) (Infrared)   Resp 16     Wt Readings from Last 3 Encounters:   21 265 lb (120.2 kg)   21 270 lb (122.5 kg)   21 268 lb 6.4 oz (121.7 kg)       PHYSICAL EXAM  General Appearance/Constitutional: alert and oriented to person, place and time, well-developed and well nourished, and in no acute distress. Nontoxic. Skin: warm and dry, no rash, positive wound per LDA documentation. Head: normocephalic and atraumatic. Eyes: extraocular eye movements intact, conjunctivae normal, and sclera anicteric. ENT: hearing grossly normal bilaterally. Normal appearance. Pulmonary/Chest: no chest wall tenderness and clear anteriorly. No respiratory distress. Cardiovascular: normal rate and regular rhythm. GI: abdomen soft, non-tender and non-distended. Musculoskeletal: normal range of motion of joints. Nontender calves. No cyanosis. Nontender calves. Edema 2+  Neurologic: no gross cranial nerve deficit and speech normal. No focal deficits.  Mental status normal.      Medical Decision Makin-year-old male with a diabetic foot ulcer to plantar surface of right great toe. Severity of fat layers exposed. Guzman grade 1. Wound evaluation: The patient has an ulcer over the plantar surface of his right great toe with overlying necrotic and nonviable tissue. Dry exudates noted as well. Toe is a little bulbous with acute inflammatory changes. The entire right lower extremity with obvious stasis dermatitis and some lymphedema lipodermatosclerosis skin changes    Problem List Items Addressed This Visit     Ulcer of toe of right foot, with fat layer exposed ([address] - Primary    Impaired mobility    Peripheral venous insufficiency    Atherosclerosis of native arteries of right leg with ulceration of other part of foot (HCC)    Decreased pulses in feet        Comorbid conditions affecting wound healing: DM type 2, CHF, COPD, CAD, HTN, chronic kidney disease, chronic anticoagulation. Previous smoker, quit Jan 1985    Wound evaluation: Again definitely more epithelization throughout. Size is deceiving as there is a lot of epithelial bridging. But it is measuring smaller. Mild fibrous nonviable tissue requiring debridement. Problems addressed during this encounter:  1. Diabetic foot ulcer, fat layers exposed. Debridement done. Offloading felt placed. 2. Diabetes Mellitus type 2, uncontrolled. Last hemoglobin A1c was 7.0 on 9/17/2021. Education and counseling provided here. 3. Impaired mobility, counseling and education provided to assist with flexibility as well as movement but at the same time help with pressure relief. Offloading felt placed. Postop shoe provided. 4. Venous stasis/leg edema. Elevation. Education counseling provided. Gentle compression initiated. 5. Suspected atherosclerosis of native arteries of right lower extremity. Screening MICKY unable to be done in the office due to noncompressible state. Arterial Dopplers still pending.  Unfortunately he is not able to get them done until the end of October    Data reviewed and analyzed:  1. Assessment required other independent historian(s): No patient . 2. Review of prior external note from other providers done today: No  3. New imaging or lab ordered today: No  4. Review of test results done today: Yes  5. Independent interpretation of test(s): No  6. Discussion of management or test interpretation with another provider, other qualified health care professional and other external source. Risk of complications and/or mortality of patient management:  1. This patient has a moderate risk of morbidity and mortality from additional diagnostic testing or treatment. This is due to the above conditions affecting wound healing as well as patient and procedure risk factors. Education and discussion held with patient regarding these disease processes pertinent to wound(s). 2. Other pertinent decisions include: minor surgery or procedures as below. 3. The patient's diagnosis or treatment is not significantly limited by social determinants of health as noted by: N/A .  4. Prescription drug management: N/A     Procedures done this visit:     Procedure Note  Indications:  Based on my examination of this patient's wound(s)/ulcer(s) today, debridement is required to promote healing and evaluate the wound base. Performed by: Charlie Tineo MD  Consent obtained:  Yes  Time out taken:  Yes  Pain Control: Anesthetic  Anesthetic: 4% Lidocaine Cream     Debridement: Excisional Debridement  Using curette the wound(s)/ulcer(s) was/were debrided down through and including the removal of subcutaneous tissue.       Devitalized Tissue Debrided:  fibrin, biofilm, slough, necrotic/eschar and exudate  Pre Debridement Measurements:  Are located in the [place]  Documentation Flow Sheet  Diabetic/Pressure/Non Pressure Ulcers only:  Ulcer: Diabetic ulcer, fat layer exposed   Wound/Ulcer #: 1  Post Debridement Measurements:  Wound/Ulcer Descriptions are Pre Debridement except measurements:    Wound 09/30/21 Toe (Comment  which one) Right;Plantar Great #1 (Active)   Wound Image   09/30/21 0813   Wound Etiology Diabetic 10/14/21 0931   Wound Cleansed Cleansed with saline 10/14/21 0931   Offloading for Diabetic Foot Ulcers Post op shoe 10/14/21 0931   Wound Length (cm) 1.8 cm 10/14/21 0931   Wound Width (cm) 1.1 cm 10/14/21 0931   Wound Depth (cm) 0.1 cm 10/14/21 0931   Wound Surface Area (cm^2) 1.98 cm^2 10/14/21 0931   Change in Wound Size % (l*w) -1.54 10/14/21 0931   Wound Volume (cm^3) 0.198 cm^3 10/14/21 0931   Wound Healing % -2 10/14/21 0931   Post-Procedure Length (cm) 1.9 cm 10/14/21 0947   Post-Procedure Width (cm) 1.2 cm 10/14/21 0947   Post-Procedure Depth (cm) 0.15 cm 10/14/21 0947   Post-Procedure Surface Area (cm^2) 2.28 cm^2 10/14/21 0947   Post-Procedure Volume (cm^3) 0.342 cm^3 10/14/21 0947   Wound Assessment Bleeding 10/14/21 0947   Drainage Amount Moderate 10/14/21 0947   Drainage Description Serosanguinous 10/14/21 0947   Odor None 10/14/21 0931   Jaylyn-wound Assessment Hyperkeratosis (callous); Ecchymosis 10/14/21 0931   Margins Attached edges 10/14/21 0931   Number of days: 14          Total Surface Area Debrided: 1.98 sq cm   Estimated Blood Loss:  Minimal  Hemostasis Achieved:  by pressure  Procedural Pain:  0  / 10   Post Procedural Pain:  0 / 10   Response to treatment:  Well tolerated by patient. Written patient dismissal instructions given to patient and signed by patient or POA. Discharge Instructions       [address], [address] [place] [address]  Telephone: [phone]    Discharge Instructions    Important reminders:    1. Increase Protein intake for optimal wound healing  2. No added salt to reduce any swelling  3. If diabetic, maintain good glucose control  4. If you smoke, smoking prohibits wound healing, we ask that you refrain from smoking.   5. When taking antibiotics take the entire prescription as ordered. Do not stop taking until medication is all gone unless otherwise instructed. 6. Exercise as tolerated. 7. Keep weight off wounds and reposition every 2 hours if applicable. 8. If wound(s) is on your lower extremity, elevate legs to the level of the heart or above for 30 minutes 4-5 times a day and/or when sitting. Avoid standing for long periods of time. 9. Do not get wounds wet in bath or shower unless otherwise instructed by your physician. If your wound is on your foot or leg, you may purchase a cast bag. Please ask at the pharmacy. If Vascular testing is ordered, please call [address] [place] [address] [phone]) to schedule. Vascular tests ordered by Wound Care Physicians may take up to 2 hours to complete. Please keep that in mind when scheduling. If Vascular testing is scheduled, please bring supplies to replace your dressing after testing is done. The vascular department does not stock supplies. Wound:  Right Greater Toe     With each dressing change, rinse wounds with 0.9% Saline. (May use wound wash or soft contact solution. Both can be purchased at a local drug store). If unable to obtain saline, may use a gentle soap and water. Dressing care: Keep felt on all week,  Post op shoe, Smith to kimberly wound (stays on all week), Silver Alginate, dry dressing, Conforming Roll Gauze. Changing Daily . Wash with Vashe when cleaning other than that keep dry. Arterial test scheduled 10/27/21    Home Care Agency:     Your wound-care supplies will be provided by:   Please note, depending on your insurance coverage, you may have out-of-pocket expenses for these supplies. Someone from the company should call you to confirm your order and discuss those potential costs before they ship your products -- please anticipate that call.  If your out-of-pocket cost could be substantial, Many companies have financial hardship programs for patients who qualify, so please ask about that if you might need a hand. If you have any questions about your supplies or your potential out-of-pocket costs, or if you need to place an order for a refill of supplies (typically monthly), please call the company directly. Your  is Eamon Busing    Follow up with Dr Denise Pena In 1 week(s) in the wound care center. Wound Care Center Information: Should you experience any significant changes in your wound(s) or have questions about your wound care, please contact the [place] [address] at [phone] Monday  - Thursday 8:00 am - 4:00 pm and Friday 8:00 am - 1:00pm. If you need help with your wound outside these hours and cannot wait until we are again available, contact your PCP or go to the hospital emergency room. PLEASE NOTE: IF YOU ARE UNABLE TO OBTAIN WOUND SUPPLIES, CONTINUE TO USE THE SUPPLIES YOU HAVE AVAILABLE UNTIL YOU ARE ABLE TO REACH US. IT IS MOST IMPORTANT TO KEEP THE WOUND COVERED AT ALL TIMES.           Electronically signed by Tam Clark MD on 10/14/2021 at 9:59 AM

## 2021-10-21 ENCOUNTER — HOSPITAL ENCOUNTER (OUTPATIENT)
Dept: WOUND CARE | Age: 81
Discharge: HOME | End: 2021-10-21
Payer: MEDICARE

## 2021-10-21 VITALS
TEMPERATURE: 96.8 F | DIASTOLIC BLOOD PRESSURE: 69 MMHG | HEART RATE: 93 BPM | SYSTOLIC BLOOD PRESSURE: 116 MMHG | RESPIRATION RATE: 16 BRPM

## 2021-10-21 DIAGNOSIS — L97.512 ULCER OF TOE OF RIGHT FOOT, WITH FAT LAYER EXPOSED (HCC): Primary | ICD-10-CM

## 2021-10-21 DIAGNOSIS — I70.235 ATHEROSCLEROSIS OF NATIVE ARTERIES OF RIGHT LEG WITH ULCERATION OF OTHER PART OF FOOT (HCC): ICD-10-CM

## 2021-10-21 DIAGNOSIS — Z74.09 IMPAIRED MOBILITY: ICD-10-CM

## 2021-10-21 DIAGNOSIS — E11.649 UNCONTROLLED TYPE 2 DIABETES MELLITUS WITH HYPOGLYCEMIA, UNSPECIFIED HYPOGLYCEMIA COMA STATUS (HCC): ICD-10-CM

## 2021-10-21 DIAGNOSIS — I87.2 PERIPHERAL VENOUS INSUFFICIENCY: ICD-10-CM

## 2021-10-21 DIAGNOSIS — R09.89 DECREASED PULSES IN FEET: ICD-10-CM

## 2021-10-21 PROCEDURE — 99213 OFFICE O/P EST LOW 20 MIN: CPT | Performed by: EMERGENCY MEDICINE

## 2021-10-21 PROCEDURE — 99213 OFFICE O/P EST LOW 20 MIN: CPT

## 2021-10-21 RX ORDER — CLOBETASOL PROPIONATE 0.5 MG/G
OINTMENT TOPICAL ONCE
Status: CANCELLED | OUTPATIENT
Start: 2021-10-21 | End: 2021-10-21

## 2021-10-21 RX ORDER — LIDOCAINE 40 MG/G
CREAM TOPICAL ONCE
Status: DISCONTINUED | OUTPATIENT
Start: 2021-10-21 | End: 2021-10-22 | Stop reason: HOSPADM

## 2021-10-21 RX ORDER — BETAMETHASONE DIPROPIONATE 0.05 %
OINTMENT (GRAM) TOPICAL ONCE
Status: CANCELLED | OUTPATIENT
Start: 2021-10-21 | End: 2021-10-21

## 2021-10-21 RX ORDER — BACITRACIN, NEOMYCIN, POLYMYXIN B 400; 3.5; 5 [USP'U]/G; MG/G; [USP'U]/G
OINTMENT TOPICAL ONCE
Status: CANCELLED | OUTPATIENT
Start: 2021-10-21 | End: 2021-10-21

## 2021-10-21 RX ORDER — BACITRACIN ZINC AND POLYMYXIN B SULFATE 500; 1000 [USP'U]/G; [USP'U]/G
OINTMENT TOPICAL ONCE
Status: CANCELLED | OUTPATIENT
Start: 2021-10-21 | End: 2021-10-21

## 2021-10-21 RX ORDER — LIDOCAINE HYDROCHLORIDE 20 MG/ML
JELLY TOPICAL ONCE
Status: CANCELLED | OUTPATIENT
Start: 2021-10-21 | End: 2021-10-21

## 2021-10-21 RX ORDER — LIDOCAINE HYDROCHLORIDE 40 MG/ML
SOLUTION TOPICAL ONCE
Status: CANCELLED | OUTPATIENT
Start: 2021-10-21 | End: 2021-10-21

## 2021-10-21 RX ORDER — GINSENG 100 MG
CAPSULE ORAL ONCE
Status: CANCELLED | OUTPATIENT
Start: 2021-10-21 | End: 2021-10-21

## 2021-10-21 RX ORDER — LIDOCAINE 50 MG/G
OINTMENT TOPICAL ONCE
Status: CANCELLED | OUTPATIENT
Start: 2021-10-21 | End: 2021-10-21

## 2021-10-21 RX ORDER — LIDOCAINE 40 MG/G
CREAM TOPICAL ONCE
Status: CANCELLED | OUTPATIENT
Start: 2021-10-21 | End: 2021-10-21

## 2021-10-21 RX ORDER — GENTAMICIN SULFATE 1 MG/G
OINTMENT TOPICAL ONCE
Status: CANCELLED | OUTPATIENT
Start: 2021-10-21 | End: 2021-10-21

## 2021-10-21 NOTE — PLAN OF CARE
Discharge instructions given. Patient verbalized understanding. Return to [address] [place] [address],[address] in 1 week(s).   Continue Silver Alg

## 2021-10-21 NOTE — H&P
[address] [facility]   History and Physical Note   Referring provider: Dr. Natalia Cifuentes  Reason for referral: [facility]     Cyndi RECORD NUMBER:  [id number]  AGE: [de-identified] y.o. GENDER: male  : 1940  EPISODE DATE:  10/21/2021    Subjective:     Chief Complaint   Patient presents with    Wound Check     Right foot wound follow up         HISTORY of PRESENT ILLNESS LAURI Ralph is a [de-identified] y.o. male who presents today for an initial evaluation of a wound/ulcer. Patient is new to the wound center. Wound duration: mid 2021. History of Wound Context: 45-year-old male diabetic who struck his toe against furniture mid 2021. The patient states that the rug slipped under him next to his bed and he accidentally struck his toe. Now with an ulcer to the plantar right great toe. Patient has chronic venous stasis as well. Has had trouble with diabetic control but his last hemoglobin A1c on 2021 was noted to be 7.0 which is considered to be an improvement for him. He does admit to intermittently having a poor diet with significant sodium intake as well. Denies any associated systemic complaints. Patient presented to his primary care doctor recently for evaluation of diabetes. The patient was noted to have the ulcer and referred here for ongoing wound care recommendations. He denies any pain at the site of the wound. Does have diabetic neuropathy. Previous smoker.      Pertinent associated symptoms: drainage , redness, swelling and impaired mobility    PAST MEDICAL HISTORY        Diagnosis Date    Acid reflux     Atrial flutter ([facility]     converted    Bleeding stomach ulcer oct 2015    BPH (benign prostatic hyperplasia)     CAD (coronary artery disease)      angio with 2 blocked bypass and 2 patent    COPD (chronic obstructive pulmonary disease) ([facility]     Diabetes mellitus type II     Edema     chronic left leg    Elevated PSA- nl 11     Hyperlipidemia     Hypertension     Ischemic cardiomyopathy     Lipoma of skin-RIGHT CHEST 2011    Obesity     Osteoarthritis     Skin tear of left forearm without complication     Significant amount of epidermal loss with bleeding.     Spinal stenosis of lumbar region with neurogenic claudication- clinically 2018       PAST SURGICAL HISTORY    Past Surgical History:   Procedure Laterality Date    CATARACT REMOVAL  ,     bilat    COLONOSCOPY      CORONARY ARTERY BYPASS GRAFT  1993    x 4    EYE SURGERY Left 2019    cataract coming back    JOINT REPLACEMENT Right total knee replacement    JOINT REPLACEMENT Left 2016       FAMILY HISTORY    Family History   Problem Relation Age of Onset    Cancer Mother         breast    Cancer Father         throat       SOCIAL HISTORY    Social History     Tobacco Use    Smoking status: Former Smoker     Packs/day: 3.50     Years: 32.00     Pack years: 112.00     Types: Cigarettes     Quit date: 1985     Years since quittin.8    Smokeless tobacco: Never Used    Tobacco comment: advised not to resume   Substance Use Topics    Alcohol use: No     Alcohol/week: 0.0 standard drinks    Drug use: No       ALLERGIES    Allergies   Allergen Reactions    Entresto [Sacubitril-Valsartan] Other (See Comments)     Renal failure       MEDICATIONS    Current Outpatient Medications on File Prior to Encounter   Medication Sig Dispense Refill    omeprazole (PRILOSEC) 20 MG delayed release capsule TAKE 1 CAPSULE BY MOUTH TWICE DAILY 180 capsule 1    furosemide (LASIX) 80 MG tablet TAKE 1 TABLET BY MOUTH DAILY 90 tablet 0    atorvastatin (LIPITOR) 40 MG tablet TAKE 1 TABLET BY MOUTH EVERY EVENING 90 tablet 1    dapagliflozin (FARXIGA) 5 MG tablet Take 1 tablet by mouth daily LOT [id number] EXP  4 BOXES   LOT [id number] EXP 02/24 2 BOXES 30 tablet 0    amiodarone (CORDARONE) 200 MG tablet TAKE 1 TABLET BY MOUTH DAILY 90 times daily as needed 22 g 1    levalbuterol (XOPENEX HFA) 45 MCG/ACT inhaler Inhale 1-2 puffs into the lungs every 4 hours as needed for Wheezing 1 Inhaler 3    aspirin 81 MG EC tablet Take 81 mg by mouth daily      apixaban (ELIQUIS) 2.5 MG TABS tablet Take 1 tablet by mouth 2 times daily 60 tablet 5    metoprolol (LOPRESSOR) 25 MG tablet Take 25 mg by mouth daily       isosorbide mononitrate (IMDUR) 30 MG CR tablet Take 1 tablet by mouth every morning Dr. Carlito Haile - Cardio 30 tablet 6    nitroGLYCERIN (NITROSTAT) 0.3 MG SL tablet Take one sublingual for chest pain. May repeat twice at 5 min intervals. If not getting relief call 911. 25 tablet 3     No current facility-administered medications on file prior to encounter. REVIEW OF SYSTEMS  A comprehensive review of systems was negative except for: Pertinent items are noted in HPI. Objective:      /69   Pulse 93   Temp 96.8 °F (36 °C) (Infrared)   Resp 16     Wt Readings from Last 3 Encounters:   09/30/21 265 lb (120.2 kg)   09/17/21 270 lb (122.5 kg)   07/16/21 268 lb 6.4 oz (121.7 kg)       PHYSICAL EXAM  General Appearance/Constitutional: alert and oriented to person, place and time, well-developed and well nourished, and in no acute distress. Nontoxic. Skin: warm and dry, no rash, positive wound per LDA documentation. Head: normocephalic and atraumatic. Eyes: extraocular eye movements intact, conjunctivae normal, and sclera anicteric. ENT: hearing grossly normal bilaterally. Normal appearance. Pulmonary/Chest: no chest wall tenderness and clear anteriorly. No respiratory distress. Cardiovascular: normal rate and regular rhythm. GI: abdomen soft, non-tender and non-distended. Musculoskeletal: normal range of motion of joints. Nontender calves. No cyanosis. Nontender calves. Edema 2+  Neurologic: no gross cranial nerve deficit and speech normal. No focal deficits.  Mental status normal.      Medical Decision Making:     [de-identified]year-old able to get them done until the end of October    Data reviewed and analyzed:  1. Assessment required other independent historian(s): No patient . 2. Review of prior external note from other providers done today: No  3. New imaging or lab ordered today: No  4. Review of test results done today: No  5. Independent interpretation of test(s): No  6. Discussion of management or test interpretation with other qualified health care professional and other external source. Risk of complications and/or mortality of patient management:  1. This patient has a moderate risk of morbidity and mortality from additional diagnostic testing or treatment. This is due to the above conditions affecting wound healing as well as patient and procedure risk factors. Education and discussion held with patient regarding these disease processes pertinent to wound(s). 2. Other pertinent decisions include: minor surgery or procedures as below. 3. The patient's diagnosis or treatment is not significantly limited by social determinants of health as noted by: N/A .  4.  Prescription drug management: N/A     20 min sent with patient and pt care issues     Wound 09/30/21 Toe (Comment  which one) Right;Plantar Great #1 (Active)   Wound Image   09/30/21 0813   Wound Etiology Diabetic 10/21/21 0831   Wound Cleansed Cleansed with saline 10/21/21 0831   Offloading for Diabetic Foot Ulcers Post op shoe 10/21/21 0831   Wound Length (cm) 1.2 cm 10/21/21 0831   Wound Width (cm) 0.8 cm 10/21/21 0831   Wound Depth (cm) 0.1 cm 10/21/21 0831   Wound Surface Area (cm^2) 0.96 cm^2 10/21/21 0831   Change in Wound Size % (l*w) 50.77 10/21/21 0831   Wound Volume (cm^3) 0.096 cm^3 10/21/21 0831   Wound Healing % 51 10/21/21 0831   Post-Procedure Length (cm) 1.9 cm 10/14/21 0947   Post-Procedure Width (cm) 1.2 cm 10/14/21 0947   Post-Procedure Depth (cm) 0.15 cm 10/14/21 0947   Post-Procedure Surface Area (cm^2) 2.28 cm^2 10/14/21 0947   Post-Procedure Volume (cm^3) 0.342 cm^3 10/14/21 0947   Wound Assessment Granulation tissue;Slough 10/21/21 0831   Drainage Amount Small 10/21/21 0831   Drainage Description Serosanguinous 10/21/21 0831   Odor None 10/21/21 0831   Jaylyn-wound Assessment Hyperkeratosis (callous) 10/21/21 0831   Margins Attached edges; Defined edges 10/21/21 0831   Number of days: 21       Written patient dismissal instructions given to patient and signed by patient or POA. Discharge Instructions       [place]  [address], [address]  Telephone: [phone]    Discharge Instructions    Important reminders:    1. Increase Protein intake for optimal wound healing  2. No added salt to reduce any swelling  3. If diabetic, maintain good glucose control  4. If you smoke, smoking prohibits wound healing, we ask that you refrain from smoking. 5. When taking antibiotics take the entire prescription as ordered. Do not stop taking until medication is all gone unless otherwise instructed. 6. Exercise as tolerated. 7. Keep weight off wounds and reposition every 2 hours if applicable. 8. If wound(s) is on your lower extremity, elevate legs to the level of the heart or above for 30 minutes 4-5 times a day and/or when sitting. Avoid standing for long periods of time. 9. Do not get wounds wet in bath or shower unless otherwise instructed by your physician. If your wound is on your foot or leg, you may purchase a cast bag. Please ask at the pharmacy. If Vascular testing is ordered, please call [address] [place] [address] [phone]) to schedule. Vascular tests ordered by Wound Care Physicians may take up to 2 hours to complete. Please keep that in mind when scheduling. If Vascular testing is scheduled, please bring supplies to replace your dressing after testing is done. The vascular department does not stock supplies.      Wound:  Right Greater Toe     With each dressing change, rinse wounds with 0.9% Saline. (May use wound wash or soft contact solution. Both can be purchased at a local drug store). If unable to obtain saline, may use a gentle soap and water. Dressing care: Keep felt on all week,  Post op shoe, [place] to kimberly wound (stays on all week), Silver Alginate, dry dressing, Conforming Roll Gauze. Changing Daily . Wash with Vashe when cleaning other than that keep dry. Arterial test scheduled 10/27/21    Home Care Agency:     Your wound-care supplies will be provided by:   Please note, depending on your insurance coverage, you may have out-of-pocket expenses for these supplies. Someone from the company should call you to confirm your order and discuss those potential costs before they ship your products -- please anticipate that call. If your out-of-pocket cost could be substantial, Many companies have financial hardship programs for patients who qualify, so please ask about that if you might need a hand. If you have any questions about your supplies or your potential out-of-pocket costs, or if you need to place an order for a refill of supplies (typically monthly), please call the company directly. Your  is Baldo Mack    Follow up with Dr Caitlin Cook In 1 week(s) in the wound care center. Wound Care Center Information: Should you experience any significant changes in your wound(s) or have questions about your wound care, please contact the [facility] at [phone] Monday  - Thursday 8:00 am - 4:00 pm and Friday 8:00 am - 1:00pm. If you need help with your wound outside these hours and cannot wait until we are again available, contact your PCP or go to the hospital emergency room. PLEASE NOTE: IF YOU ARE UNABLE TO OBTAIN WOUND SUPPLIES, CONTINUE TO USE THE SUPPLIES YOU HAVE AVAILABLE UNTIL YOU ARE ABLE TO REACH US. IT IS MOST IMPORTANT TO KEEP THE WOUND COVERED AT ALL TIMES.             Electronically signed by Sherita Claudio MD on 10/21/2021 at 9:21 AM

## 2021-10-25 ENCOUNTER — TELEPHONE (OUTPATIENT)
Dept: FAMILY MEDICINE CLINIC | Age: 81
End: 2021-10-25

## 2021-10-25 NOTE — TELEPHONE ENCOUNTER
Called patient denied [facility] does not know where it is located. Offered to give directions denied. Patient cannot do a vv on his cellphone. Are we able to do a phone call visit due to patient cannot go do vv?   Please advise

## 2021-10-26 ENCOUNTER — VIRTUAL VISIT (OUTPATIENT)
Dept: FAMILY MEDICINE CLINIC | Age: 81
End: 2021-10-26
Payer: MEDICARE

## 2021-10-26 ENCOUNTER — TELEPHONE (OUTPATIENT)
Dept: FAMILY MEDICINE CLINIC | Age: 81
End: 2021-10-26

## 2021-10-26 ENCOUNTER — HOSPITAL ENCOUNTER (OUTPATIENT)
Dept: WOUND CARE | Age: 81
Discharge: HOME | End: 2021-10-26
Payer: MEDICARE

## 2021-10-26 VITALS
SYSTOLIC BLOOD PRESSURE: 138 MMHG | RESPIRATION RATE: 16 BRPM | HEART RATE: 83 BPM | TEMPERATURE: 96.9 F | DIASTOLIC BLOOD PRESSURE: 75 MMHG

## 2021-10-26 DIAGNOSIS — J44.1 COPD EXACERBATION (HCC): Primary | ICD-10-CM

## 2021-10-26 DIAGNOSIS — E11.21 TYPE 2 DIABETES MELLITUS WITH DIABETIC NEPHROPATHY, WITH LONG-TERM CURRENT USE OF INSULIN (HCC): Primary | ICD-10-CM

## 2021-10-26 DIAGNOSIS — E11.65 UNCONTROLLED TYPE 2 DIABETES MELLITUS WITH HYPERGLYCEMIA (HCC): ICD-10-CM

## 2021-10-26 DIAGNOSIS — I70.235 ATHEROSCLEROSIS OF NATIVE ARTERIES OF RIGHT LEG WITH ULCERATION OF OTHER PART OF FOOT (HCC): ICD-10-CM

## 2021-10-26 DIAGNOSIS — I87.2 PERIPHERAL VENOUS INSUFFICIENCY: ICD-10-CM

## 2021-10-26 DIAGNOSIS — L97.512 ULCER OF TOE OF RIGHT FOOT, WITH FAT LAYER EXPOSED (HCC): Primary | ICD-10-CM

## 2021-10-26 DIAGNOSIS — E11.649 UNCONTROLLED TYPE 2 DIABETES MELLITUS WITH HYPOGLYCEMIA, UNSPECIFIED HYPOGLYCEMIA COMA STATUS (HCC): ICD-10-CM

## 2021-10-26 DIAGNOSIS — Z74.09 IMPAIRED MOBILITY: ICD-10-CM

## 2021-10-26 DIAGNOSIS — Z79.4 TYPE 2 DIABETES MELLITUS WITH DIABETIC NEPHROPATHY, WITH LONG-TERM CURRENT USE OF INSULIN (HCC): Primary | ICD-10-CM

## 2021-10-26 DIAGNOSIS — R09.89 DECREASED PULSES IN FEET: ICD-10-CM

## 2021-10-26 PROCEDURE — 99442 PR PHYS/QHP TELEPHONE EVALUATION 11-20 MIN: CPT | Performed by: FAMILY MEDICINE

## 2021-10-26 PROCEDURE — 11042 DBRDMT SUBQ TIS 1ST 20SQCM/<: CPT | Performed by: EMERGENCY MEDICINE

## 2021-10-26 PROCEDURE — 11042 DBRDMT SUBQ TIS 1ST 20SQCM/<: CPT

## 2021-10-26 RX ORDER — LIDOCAINE 40 MG/G
CREAM TOPICAL ONCE
Status: CANCELLED | OUTPATIENT
Start: 2021-10-26 | End: 2021-10-26

## 2021-10-26 RX ORDER — LIDOCAINE HYDROCHLORIDE 40 MG/ML
SOLUTION TOPICAL ONCE
Status: CANCELLED | OUTPATIENT
Start: 2021-10-26 | End: 2021-10-26

## 2021-10-26 RX ORDER — BACITRACIN, NEOMYCIN, POLYMYXIN B 400; 3.5; 5 [USP'U]/G; MG/G; [USP'U]/G
OINTMENT TOPICAL ONCE
Status: CANCELLED | OUTPATIENT
Start: 2021-10-26 | End: 2021-10-26

## 2021-10-26 RX ORDER — LIDOCAINE HYDROCHLORIDE 20 MG/ML
JELLY TOPICAL ONCE
Status: CANCELLED | OUTPATIENT
Start: 2021-10-26 | End: 2021-10-26

## 2021-10-26 RX ORDER — LIDOCAINE 50 MG/G
OINTMENT TOPICAL ONCE
Status: CANCELLED | OUTPATIENT
Start: 2021-10-26 | End: 2021-10-26

## 2021-10-26 RX ORDER — CLOBETASOL PROPIONATE 0.5 MG/G
OINTMENT TOPICAL ONCE
Status: CANCELLED | OUTPATIENT
Start: 2021-10-26 | End: 2021-10-26

## 2021-10-26 RX ORDER — BACITRACIN ZINC AND POLYMYXIN B SULFATE 500; 1000 [USP'U]/G; [USP'U]/G
OINTMENT TOPICAL ONCE
Status: CANCELLED | OUTPATIENT
Start: 2021-10-26 | End: 2021-10-26

## 2021-10-26 RX ORDER — LIDOCAINE 40 MG/G
CREAM TOPICAL ONCE
Status: DISCONTINUED | OUTPATIENT
Start: 2021-10-26 | End: 2021-10-27 | Stop reason: HOSPADM

## 2021-10-26 RX ORDER — PREDNISONE 10 MG/1
10 TABLET ORAL 2 TIMES DAILY
Qty: 10 TABLET | Refills: 0 | Status: SHIPPED | OUTPATIENT
Start: 2021-10-26 | End: 2021-10-31

## 2021-10-26 RX ORDER — BLOOD-GLUCOSE METER
1 EACH MISCELLANEOUS DAILY
Qty: 1 KIT | Refills: 0 | Status: CANCELLED | OUTPATIENT
Start: 2021-10-26

## 2021-10-26 RX ORDER — BETAMETHASONE DIPROPIONATE 0.05 %
OINTMENT (GRAM) TOPICAL ONCE
Status: CANCELLED | OUTPATIENT
Start: 2021-10-26 | End: 2021-10-26

## 2021-10-26 RX ORDER — GENTAMICIN SULFATE 1 MG/G
OINTMENT TOPICAL ONCE
Status: CANCELLED | OUTPATIENT
Start: 2021-10-26 | End: 2021-10-26

## 2021-10-26 RX ORDER — GINSENG 100 MG
CAPSULE ORAL ONCE
Status: CANCELLED | OUTPATIENT
Start: 2021-10-26 | End: 2021-10-26

## 2021-10-26 NOTE — TELEPHONE ENCOUNTER
To get thru until January, we can do a Duoneb nebulizer with similar meds as Anoro but less long acting. The nebulizer can be used up to four times daily every day for prevention. #120 doses for $66.36 on [employer] coupon at [place].     Would he like that sent in?

## 2021-10-26 NOTE — PROGRESS NOTES
PHONE VISIT    Katina Stevenson is a [de-identified] y.o. male evaluated via telephone on 10/26/2021. Consent:  He and/or health care decision maker is aware that that he may receive a bill for this telephone service, depending on his insurance coverage, and has provided verbal consent to proceed: Yes    I affirm this is a Patient Initiated Episode with an Established Patient who has not had a related appointment within my department in the past 7 days or scheduled within the next 24 hours. UPPER RESPIRATORY VISIT  Subjective:      Chief Complaint   Patient presents with    URI      Katina Stevenson is a [de-identified] y.o. male who presents for evaluation of symptoms of URI. Onset of symptoms was 6 days ago. Symptoms include congestion, productive cough with  white colored sputum and shortness of breath and are unchanged since that time. Denies: achiness, low grade fever, sneezing and wheezing. Tried OTC: cough suppressant of choice with no relief of symptoms     Other issues: pt has been coughing and coughing having issues sleeping, the anorro inhaler helps, and he has a rattle in his chest.  Productive grey to white. It is getting better but slowly getting better. In the past dr Nikhil Avila would give pt a cough syrup, antibitotic and a steroid. Smell and taste OK. Started 6 days ago. Getting little better today. No fever. Has been vaccinated for flu and COVID. In donut hole so trying not to use Anoro every days. Little hoarse, little wheeze but speaks in long sentences      Review of Systems   General ROS: fever? No,    Night sweats? No  Ophthalmic ROS: change in vision? No  Endocrine ROS: fatigue? No   Unexpected weight changes? No  Hematological and Lymphatic ROS: easy bruising? No   Swollen lymph nodes? No  ENT ROS: headaches? No   Sore throat? No  Respiratory ROS: cough? Yes   Wheezing? Yes  Cardiovascular ROS: chest pain? No   Shortness of breath? No  Gastrointestinal ROS: abdominal pain? No   Change in stools? No    Documentation provided by medical assistant reviewed and updated by provider. HISTORY:  Patient's medications, allergies, past medical, and social histories were reviewed and updated as appropriate. CHART REVIEW  Health Maintenance   Topic Date Due    Shingles Vaccine (2 of 3) 11/28/2012    DTaP/Tdap/Td vaccine (3 - Td or Tdap) 11/28/2021    Lipid screen  02/25/2022    TSH testing  02/25/2022    Annual Wellness Visit (AWV)  05/25/2022    Potassium monitoring  07/16/2022    Creatinine monitoring  07/16/2022    Flu vaccine  Completed    Pneumococcal 65+ years Vaccine  Completed    COVID-19 Vaccine  Completed    Hepatitis A vaccine  Aged Out    Hib vaccine  Aged Out    Meningococcal (ACWY) vaccine  Aged Out     The ASCVD Risk score (Flako Guardado., et al., 2013) failed to calculate for the following reasons: The 2013 ASCVD risk score is only valid for ages 36 to 78  Prior to Visit Medications    Medication Sig Taking?  Authorizing Provider   blood glucose test strips (ASCENSIA AUTODISC VI;ONE TOUCH ULTRA TEST VI) strip four times daily Yes Trena Whalen MD   predniSONE (DELTASONE) 10 MG tablet Take 1 tablet by mouth 2 times daily for 5 days Yes Trena Whalen MD   INSULIN SYRINGE .5CC/29G 29G X 1/2\" 0.5 ML MISC INJECT 4 TIMES DAILY AS NEEDED Yes Trena Whalen MD   omeprazole (PRILOSEC) 20 MG delayed release capsule TAKE 1 CAPSULE BY MOUTH TWICE DAILY Yes Trena Whalen MD   furosemide (LASIX) 80 MG tablet TAKE 1 TABLET BY MOUTH DAILY Yes Trena Whalen MD   atorvastatin (LIPITOR) 40 MG tablet TAKE 1 TABLET BY MOUTH EVERY EVENING Yes Trena Whalen MD   dapagliflozin (FARXIGA) 5 MG tablet Take 1 tablet by mouth daily LOT [id number] EXP  4 BOXES   LOT [id number] EXP 02/24 2 BOXES Yes Trena Whalen MD   amiodarone (CORDARONE) 200 MG tablet TAKE 1 TABLET BY MOUTH DAILY Yes Trena Whalen MD   potassium chloride (KLOR-CON) 10 MEQ extended release tablet TAKE 1 TABLET BY MOUTH DAILY Yes Trena Whalen MD FARXIGA 10 MG tablet TAKE 1 TABLET BY MOUTH DAILY Yes Historical Provider, MD   hydrALAZINE (APRESOLINE) 25 MG tablet TAKE 1 TABLET BY MOUTH THREE TIMES DAILY Yes Historical Provider, MD   Cyanocobalamin (B-12) 500 MCG TABS TAKE 1 TABLET BY MOUTH DAILY Yes Valentine Garcia MD   albuterol sulfate HFA (VENTOLIN HFA) 108 (90 Base) MCG/ACT inhaler Inhale 2 puffs into the lungs every 6 hours as needed for Wheezing Yes Valentine Garcia MD   umeclidinium-vilanterol (ANORO ELLIPTA) 62.5-25 MCG/INH AEPB inhaler INHALE 1 PUFF INTO THE LUNGS DAILY Yes Valentine Garcia MD   insulin NPH (NOVOLIN N) 100 UNIT/ML injection vial Take 44 units with breakfast and 32 units with dinner. Patient taking differently: Take 20 units with breakfast and 20 units with dinner.  Yes Valentine Garcia MD   insulin regular (NOVOLIN R RELION) 100 UNIT/ML injection INJECT 20 TO 30 UNITS SUBCUTANEOUSLY THREE TIMES DAILY BEFORE MEAL(S) Yes Valentine Garcia MD   FEROSUL 325 (65 Fe) MG tablet TAKE 1 TABLET BY MOUTH TWICE DAILY Yes Valentine Garcia MD   Nebulizers (AIRIAL COMPACT MINI NEBULIZER) MISC 1 each by Does not apply route every 6 hours as needed (wheezing or shortness of breath) Yes Valentine Garcia MD   Respiratory Therapy Supplies (NEBULIZER/TUBING/MOUTHPIECE) KIT 1 kit by Does not apply route every 6 hours as needed (for wheezing or shortness of breath) Yes Valentine Garcia MD   albuterol (PROVENTIL) (2.5 MG/3ML) 0.083% nebulizer solution Take 3 mLs by nebulization every 6 hours as needed for Wheezing Yes Valentine Garcia MD   vitamin D (ERGOCALCIFEROL) 71988 units CAPS capsule TK 1 C PO WEEKLY Yes Historical Provider, MD   mupirocin (BACTROBAN) 2 % ointment Apply 3 times daily as needed Yes Valentine Garcia MD   levalbuterol (XOPENEX HFA) 45 MCG/ACT inhaler Inhale 1-2 puffs into the lungs every 4 hours as needed for Wheezing Yes Valentine Garcia MD   aspirin 81 MG EC tablet Take 81 mg by mouth daily Yes Historical Provider, MD   apixaban (ELIQUIS) 2.5 MG TABS tablet Take 1 tablet by mouth 2 times daily Yes Dwight Samuels MD   metoprolol (LOPRESSOR) 25 MG tablet Take 25 mg by mouth daily  Yes August Michaels MD   isosorbide mononitrate (IMDUR) 30 MG CR tablet Take 1 tablet by mouth every morning Dr. Yen Willis Yes Dwight Samuels MD   nitroGLYCERIN (NITROSTAT) 0.3 MG SL tablet Take one sublingual for chest pain. May repeat twice at 5 min intervals. If not getting relief call 911.  Yes Dwight Samuels MD   gabapentin (NEURONTIN) 600 MG tablet TAKE UP TO 5 TABLETS BY MOUTH OVER 24 HOURS AS DIRECTED  Dwight Samuels MD   tiotropium (Crystal Noun) 18 MCG inhalation capsule Inhale 1 capsule into the lungs daily INCLUDE inhaler device  Dwight Samuels MD      Family History   Problem Relation Age of Onset    Cancer Mother         breast    Cancer Father         throat     Social History     Tobacco Use    Smoking status: Former Smoker     Packs/day: 3.50     Years: 32.00     Pack years: 112.00     Types: Cigarettes     Quit date: 1985     Years since quittin.8    Smokeless tobacco: Never Used    Tobacco comment: advised not to resume   Substance Use Topics    Alcohol use: No     Alcohol/week: 0.0 standard drinks    Drug use: No      LAST LABS  Cholesterol, Total   Date Value Ref Range Status   2021 168 0 - 199 mg/dL Final     LDL Calculated   Date Value Ref Range Status   2021 92 <100 mg/dL Final     HDL   Date Value Ref Range Status   2021 52 40 - 60 mg/dL Final   2012 37 (L) 40 - 60 mg/dl Final     Triglycerides   Date Value Ref Range Status   2021 121 0 - 150 mg/dL Final     Lab Results   Component Value Date    GLUCOSE 327 (H) 2021     Lab Results   Component Value Date     2021    K 4.2 2021    CREATININE 1.8 (H) 2021     Lab Results   Component Value Date    WBC 3.7 (L) 2021    HGB 14.2 2021    HCT 42.6 2021    MCV 99.5 2021     (L) 2021     Lab Results   Component Value Date    ALT 24 07/16/2021    AST 40 (H) 07/16/2021    ALKPHOS 119 07/16/2021    BILITOT 0.6 07/16/2021     TSH (uIU/mL)   Date Value   02/25/2021 2.44     Lab Results   Component Value Date    LABA1C 7.0 09/17/2021      Assessment and Plan:      Diagnosis Orders   1. COPD exacerbation (HCC)  predniSONE (DELTASONE) 10 MG tablet   2. Uncontrolled type 2 diabetes mellitus with hyperglycemia (HCC)  blood glucose test strips (ASCENSIA AUTODISC VI;ONE TOUCH ULTRA TEST VI) strip     INSTRUCTIONS  · OK to use albuterol every 4 hours as needed. · Please finish taking ALL of the antibiotics and prednisone. · May take Mucinex (guaifenisen) and Robitussin DM (guafenisen, dextromethorphan). I affirm this is a Patient Initiated Episode with an Established Patient who has not had a related appointment within my department in the past 7 days or scheduled within the next 24 hours.     Total Time: minutes: 11-20 minutes    Note: not billable if this call serves to triage the patient into an appointment for the relevant concern    Ivory Feng MD

## 2021-10-26 NOTE — TELEPHONE ENCOUNTER
Pt calling stated that he needs to get a new meter. Stated that he would like to know if it is possible to pick out his own meter or see them in person after rx is put in. Stated that the meter would need to be compatable with One Touch Ultra Strips.     Please Advise  Pt can be reached at [phone]

## 2021-10-26 NOTE — PLAN OF CARE
Discharge instructions given. Patient verbalized understanding. Return to [address] [place] [address],[address] in 1 week(s). Continue silver Ag.  And Felt

## 2021-10-26 NOTE — PROGRESS NOTES
[address] [facility]   Progress Note   Referring provider: Dr. Francesca Montejo  Reason for referral: Pending sale to [facility]     yCndi RECORD NUMBER:  [id number]  AGE: [de-identified] y.o. GENDER: male  : 1940  EPISODE DATE:  10/26/2021    Subjective:     Chief Complaint   Patient presents with    Wound Check     Right great toe      10/26/21: doing well. No new problems    HISTORY of PRESENT ILLNESS HPI on initial visit to wound center[de-identified]      Louis Rendon is a [de-identified] y.o. male who presents today for an initial evaluation of a wound/ulcer. Patient is new to the wound center. Wound duration: mid 2021. History of Wound Context: 42-year-old male diabetic who struck his toe against furniture mid 2021. The patient states that the rug slipped under him next to his bed and he accidentally struck his toe. Now with an ulcer to the plantar right great toe. Patient has chronic venous stasis as well. Has had trouble with diabetic control but his last hemoglobin A1c on 2021 was noted to be 7.0 which is considered to be an improvement for him. He does admit to intermittently having a poor diet with significant sodium intake as well. Denies any associated systemic complaints. Patient presented to his primary care doctor recently for evaluation of diabetes. The patient was noted to have the ulcer and referred here for ongoing wound care recommendations. He denies any pain at the site of the wound. Does have diabetic neuropathy. Previous smoker.      Pertinent associated symptoms: drainage , redness, swelling and impaired mobility    PAST MEDICAL HISTORY        Diagnosis Date    Acid reflux     Atrial flutter ([facility]     converted    Bleeding stomach ulcer oct 2015    BPH (benign prostatic hyperplasia)     CAD (coronary artery disease)      angio with 2 blocked bypass and 2 patent    COPD (chronic obstructive pulmonary disease) ([facility]     Diabetes mellitus type II     Edema     chronic left leg    Elevated PSA- nl 11     Hyperlipidemia     Hypertension     Ischemic cardiomyopathy     Lipoma of skin-RIGHT CHEST 2011    Obesity     Osteoarthritis     Skin tear of left forearm without complication 9269    Significant amount of epidermal loss with bleeding.     Spinal stenosis of lumbar region with neurogenic claudication- clinically 2018       PAST SURGICAL HISTORY    Past Surgical History:   Procedure Laterality Date    CATARACT REMOVAL  ,     bilat    COLONOSCOPY      CORONARY ARTERY BYPASS GRAFT  1993    x 4    EYE SURGERY Left 2019    cataract coming back    JOINT REPLACEMENT Right total knee replacement    JOINT REPLACEMENT Left 2016       FAMILY HISTORY    Family History   Problem Relation Age of Onset    Cancer Mother         breast    Cancer Father         throat       SOCIAL HISTORY    Social History     Tobacco Use    Smoking status: Former Smoker     Packs/day: 3.50     Years: 32.00     Pack years: 112.00     Types: Cigarettes     Quit date: 1985     Years since quittin.8    Smokeless tobacco: Never Used    Tobacco comment: advised not to resume   Substance Use Topics    Alcohol use: No     Alcohol/week: 0.0 standard drinks    Drug use: No       ALLERGIES    Allergies   Allergen Reactions    Entresto [Sacubitril-Valsartan] Other (See Comments)     Renal failure       MEDICATIONS    Current Outpatient Medications on File Prior to Encounter   Medication Sig Dispense Refill    INSULIN SYRINGE .5CC/29G 29G X 1/2\" 0.5 ML MISC INJECT 4 TIMES DAILY AS NEEDED 400 each 1    omeprazole (PRILOSEC) 20 MG delayed release capsule TAKE 1 CAPSULE BY MOUTH TWICE DAILY 180 capsule 1    furosemide (LASIX) 80 MG tablet TAKE 1 TABLET BY MOUTH DAILY 90 tablet 0    atorvastatin (LIPITOR) 40 MG tablet TAKE 1 TABLET BY MOUTH EVERY EVENING 90 tablet 1    dapagliflozin (FARXIGA) 5 MG tablet Take 1 tablet by mouth daily LOT [id number] EXP  4 BOXES   LOT [id number] EXP 02/24 2 BOXES 30 tablet 0    amiodarone (CORDARONE) 200 MG tablet TAKE 1 TABLET BY MOUTH DAILY 90 tablet 0    potassium chloride (KLOR-CON) 10 MEQ extended release tablet TAKE 1 TABLET BY MOUTH DAILY 90 tablet 1    FARXIGA 10 MG tablet TAKE 1 TABLET BY MOUTH DAILY      hydrALAZINE (APRESOLINE) 25 MG tablet TAKE 1 TABLET BY MOUTH THREE TIMES DAILY      Cyanocobalamin (B-12) 500 MCG TABS TAKE 1 TABLET BY MOUTH DAILY 90 tablet 1    albuterol sulfate HFA (VENTOLIN HFA) 108 (90 Base) MCG/ACT inhaler Inhale 2 puffs into the lungs every 6 hours as needed for Wheezing 1 Inhaler 3    umeclidinium-vilanterol (ANORO ELLIPTA) 62.5-25 MCG/INH AEPB inhaler INHALE 1 PUFF INTO THE LUNGS DAILY 1 each 5    gabapentin (NEURONTIN) 600 MG tablet TAKE UP TO 5 TABLETS BY MOUTH OVER 24 HOURS AS DIRECTED 150 tablet 5    insulin NPH (NOVOLIN N) 100 UNIT/ML injection vial Take 44 units with breakfast and 32 units with dinner.  (Patient taking differently: Take 20 units with breakfast and 20 units with dinner.) 30 mL 3    insulin regular (NOVOLIN R RELION) 100 UNIT/ML injection INJECT 20 TO 30 UNITS SUBCUTANEOUSLY THREE TIMES DAILY BEFORE MEAL(S) 30 mL 0    FEROSUL 325 (65 Fe) MG tablet TAKE 1 TABLET BY MOUTH TWICE DAILY 180 tablet 3    tiotropium (SPIRIVA HANDIHALER) 18 MCG inhalation capsule Inhale 1 capsule into the lungs daily INCLUDE inhaler device 30 capsule 5    Nebulizers (AIRIAL COMPACT MINI NEBULIZER) MISC 1 each by Does not apply route every 6 hours as needed (wheezing or shortness of breath) 1 each 0    Respiratory Therapy Supplies (NEBULIZER/TUBING/MOUTHPIECE) KIT 1 kit by Does not apply route every 6 hours as needed (for wheezing or shortness of breath) 1 kit 1    albuterol (PROVENTIL) (2.5 MG/3ML) 0.083% nebulizer solution Take 3 mLs by nebulization every 6 hours as needed for Wheezing 50 vial 3    vitamin D (ERGOCALCIFEROL) 42618 units CAPS capsule TK 1 C PO WEEKLY  2 normal.      Medical Decision Makin-year-old male with a diabetic foot ulcer to plantar surface of right great toe. Severity of fat layers exposed. Guzman grade 1. Wound evaluation: continues to heal. More epithelization, mild fibrous nonviable tissue with callus    Problem List Items Addressed This Visit     Diabetes mellitus type II, uncontrolled ([facility] [address] [facility]    Relevant Medications    lidocaine (LMX) 4 % cream    Other Relevant Orders    Initiate Outpatient Wound Care Protocol    Ulcer of toe of right foot, with fat layer exposed ([facility] [address] - Primary    Relevant Medications    lidocaine (LMX) 4 % cream    Other Relevant Orders    Initiate Outpatient Wound Care Protocol    Impaired mobility    Peripheral venous insufficiency    Relevant Medications    lidocaine (LMX) 4 % cream    Other Relevant Orders    Initiate Outpatient Wound Care Protocol    Atherosclerosis of native arteries of right leg with ulceration of other part of foot (HCC)    Relevant Medications    lidocaine (LMX) 4 % cream    Other Relevant Orders    Initiate Outpatient Wound Care Protocol    Decreased pulses in feet        Comorbid conditions affecting wound healing: DM type 2, CHF, COPD, CAD, HTN, chronic kidney disease, chronic anticoagulation. Previous smoker, quit 1985    Problems addressed during this encounter:  1. Diabetic foot ulcer, fat layers exposed. Debridement. Offloading felt placed. 2. Diabetes Mellitus type 2, uncontrolled. Last hemoglobin A1c was 7.0 on 2021. Education and counseling provided here. 3. Impaired mobility, counseling and education provided to assist with flexibility as well as movement but at the same time help with pressure relief. Offloading felt placed. Postop shoe provided. 4. Venous stasis/leg edema. Elevation. Education counseling provided. Gentle compression initiated. 5. Suspected atherosclerosis of native arteries of right lower extremity.   Screening MICKY unable to be done in the office due to noncompressible state. Arterial Dopplers still pending. Unfortunately he is not able to get them done until the end of October, scheduled 10/27/21. Data reviewed and analyzed:  1. Assessment required other independent historian(s): No patient . 2. Review of prior external note from other providers done today: No  3. New imaging or lab ordered today: No  4. Review of test results done today: No  5. Independent interpretation of test(s): No  6. Discussion of management or test interpretation with other qualified health care professional and other external source. Risk of complications and/or mortality of patient management:  1. This patient has a moderate risk of morbidity and mortality from additional diagnostic testing or treatment. This is due to the above conditions affecting wound healing as well as patient and procedure risk factors. Education and discussion held with patient regarding these disease processes pertinent to wound(s). 2. Other pertinent decisions include: minor surgery or procedures as below. 3. The patient's diagnosis or treatment is not significantly limited by social determinants of health as noted by: N/A .  4. Prescription drug management: N/A       Procedure Note  Indications:  Based on my examination of this patient's wound(s)/ulcer(s) today, debridement is required to promote healing and evaluate the wound base. Performed by: Noemi Meckel, MD    Consent obtained:  Yes    Time out taken:  Yes    Pain Control: Anesthetic  Anesthetic: 4% Lidocaine Cream       Debridement: Excisional Debridement    Using #15 blade scalpel and forceps the wound(s)/ulcer(s) was/were debrided down through and including the removal of subcutaneous tissue.         Devitalized Tissue Debrided:  fibrin, biofilm, slough and callus    Pre Debridement Measurements:  Are located in the Wound/Ulcer Documentation Flow Sheet    Diabetic/Pressure/Non Pressure Ulcers only:  Ulcer: Diabetic ulcer, fat layer exposed     Wound/Ulcer #: 1    Post Debridement Measurements:  Wound/Ulcer Descriptions are Pre Debridement except measurements:    Wound 09/30/21 Toe (Comment  which one) Right;Plantar Great #1 (Active)   Wound Image   10/26/21 1308   Wound Etiology Diabetic 10/26/21 1308   Wound Cleansed Cleansed with saline 10/26/21 1308   Offloading for Diabetic Foot Ulcers Post op shoe;Felt or foam 10/26/21 1308   Wound Length (cm) 0.4 cm 10/26/21 1308   Wound Width (cm) 0.3 cm 10/26/21 1308   Wound Depth (cm) 0.1 cm 10/26/21 1308   Wound Surface Area (cm^2) 0.12 cm^2 10/26/21 1308   Change in Wound Size % (l*w) 93.85 10/26/21 1308   Wound Volume (cm^3) 0.012 cm^3 10/26/21 1308   Wound Healing % 94 10/26/21 1308   Post-Procedure Length (cm) 0.5 cm 10/26/21 1318   Post-Procedure Width (cm) 0.4 cm 10/26/21 1318   Post-Procedure Depth (cm) 0.15 cm 10/26/21 1318   Post-Procedure Surface Area (cm^2) 0.2 cm^2 10/26/21 1318   Post-Procedure Volume (cm^3) 0.03 cm^3 10/26/21 1318   Wound Assessment Bleeding 10/26/21 1318   Drainage Amount Moderate 10/26/21 1318   Drainage Description Serosanguinous 10/26/21 1318   Odor None 10/26/21 1308   Jaylyn-wound Assessment Hyperkeratosis (callous) 10/26/21 1308   Margins Attached edges 10/26/21 1308   Number of days: 26          Total Surface Area Debrided:  0.12 sq cm     Estimated Blood Loss:  Minimal    Hemostasis Achieved:  by pressure    Procedural Pain:  0  / 10     Post Procedural Pain:  0 / 10     Response to treatment:  Well tolerated by patient. Written patient dismissal instructions given to patient and signed by patient or POA. Discharge Instructions       [address]  Telephone: [phone]    Discharge Instructions    Important reminders:    1. Increase Protein intake for optimal wound healing  2. No added salt to reduce any swelling  3. If diabetic, maintain good glucose control  4.  If you smoke, smoking prohibits wound healing, we ask that you refrain from smoking. 5. When taking antibiotics take the entire prescription as ordered. Do not stop taking until medication is all gone unless otherwise instructed. 6. Exercise as tolerated. 7. Keep weight off wounds and reposition every 2 hours if applicable. 8. If wound(s) is on your lower extremity, elevate legs to the level of the heart or above for 30 minutes 4-5 times a day and/or when sitting. Avoid standing for long periods of time. 9. Do not get wounds wet in bath or shower unless otherwise instructed by your physician. If your wound is on your foot or leg, you may purchase a cast bag. Please ask at the pharmacy. If Vascular testing is ordered, please call [address] [place] [address] ([phone]) to schedule. Vascular tests ordered by Wound Care Physicians may take up to 2 hours to complete. Please keep that in mind when scheduling. If Vascular testing is scheduled, please bring supplies to replace your dressing after testing is done. The vascular department does not stock supplies. Wound:  Right Greater Toe     With each dressing change, rinse wounds with 0.9% Saline. (May use wound wash or soft contact solution. Both can be purchased at a local drug store). If unable to obtain saline, may use a gentle soap and water. Dressing care: Keep felt on all week,  Post op shoe, [place] to kimberly wound (stays on all week), Silver Alginate, dry dressing, Conforming Roll Gauze. Changing Daily . Wash with Vashe when cleaning other than that keep dry. Arterial test scheduled 10/27/21    Home Care Agency:     Your wound-care supplies will be provided by:   Please note, depending on your insurance coverage, you may have out-of-pocket expenses for these supplies. Someone from the company should call you to confirm your order and discuss those potential costs before they ship your products -- please anticipate that call.  If your out-of-pocket cost could be substantial, Many companies have financial hardship programs for patients who qualify, so please ask about that if you might need a hand. If you have any questions about your supplies or your potential out-of-pocket costs, or if you need to place an order for a refill of supplies (typically monthly), please call the company directly. Your  is Alfredo    Follow up with Dr Albert Hernández In 1 week(s) in the wound care center. Wound Care Center Information: Should you experience any significant changes in your wound(s) or have questions about your wound care, please contact the [place] [address] at [phone] Monday  - Thursday 8:00 am - 4:00 pm and Friday 8:00 am - 1:00pm. If you need help with your wound outside these hours and cannot wait until we are again available, contact your PCP or go to the hospital emergency room. PLEASE NOTE: IF YOU ARE UNABLE TO OBTAIN WOUND SUPPLIES, CONTINUE TO USE THE SUPPLIES YOU HAVE AVAILABLE UNTIL YOU ARE ABLE TO REACH US. IT IS MOST IMPORTANT TO KEEP THE WOUND COVERED AT ALL TIMES.               Electronically signed by Tanya Schmidt MD on 10/26/2021 at 1:16 PM

## 2021-10-27 ENCOUNTER — HOSPITAL ENCOUNTER (OUTPATIENT)
Dept: VASCULAR LAB | Age: 81
Discharge: HOME | End: 2021-10-27
Payer: MEDICARE

## 2021-10-27 DIAGNOSIS — R09.89 DECREASED PULSES IN FEET: ICD-10-CM

## 2021-10-27 DIAGNOSIS — E11.21 TYPE 2 DIABETES MELLITUS WITH DIABETIC NEPHROPATHY, WITH LONG-TERM CURRENT USE OF INSULIN (HCC): Primary | ICD-10-CM

## 2021-10-27 DIAGNOSIS — E11.42 DIABETIC POLYNEUROPATHY ASSOCIATED WITH TYPE 2 DIABETES MELLITUS (HCC): ICD-10-CM

## 2021-10-27 DIAGNOSIS — Z79.4 TYPE 2 DIABETES MELLITUS WITH DIABETIC NEPHROPATHY, WITH LONG-TERM CURRENT USE OF INSULIN (HCC): Primary | ICD-10-CM

## 2021-10-27 PROCEDURE — 93925 LOWER EXTREMITY STUDY: CPT

## 2021-10-27 RX ORDER — GLUCOSAMINE HCL/CHONDROITIN SU 500-400 MG
CAPSULE ORAL
Qty: 200 STRIP | Refills: 3 | Status: SHIPPED | OUTPATIENT
Start: 2021-10-27

## 2021-10-27 NOTE — TELEPHONE ENCOUNTER
[address] called stating that they got the order for this pts test strips but he also needs the one touch lancets as well    Please Advise

## 2021-10-28 RX ORDER — LANCETS 30 GAUGE
1 EACH MISCELLANEOUS DAILY
Qty: 100 EACH | Refills: 3 | Status: SHIPPED | OUTPATIENT
Start: 2021-10-28

## 2021-11-01 ENCOUNTER — TELEPHONE (OUTPATIENT)
Dept: FAMILY MEDICINE CLINIC | Age: 81
End: 2021-11-01

## 2021-11-01 DIAGNOSIS — R05.9 COUGH: Primary | ICD-10-CM

## 2021-11-01 NOTE — TELEPHONE ENCOUNTER
Pt finished steroids today   pt still has congestion and some wheezing wanting to know what to do?    Pt also stated he is trying to get a meter sent for his diabetes      Please call to advise

## 2021-11-02 ENCOUNTER — HOSPITAL ENCOUNTER (OUTPATIENT)
Dept: WOUND CARE | Age: 81
Discharge: HOME | End: 2021-11-02
Payer: MEDICARE

## 2021-11-02 ENCOUNTER — HOSPITAL ENCOUNTER (OUTPATIENT)
Age: 81
Discharge: HOME | End: 2021-11-02
Payer: MEDICARE

## 2021-11-02 ENCOUNTER — HOSPITAL ENCOUNTER (OUTPATIENT)
Dept: GENERAL RADIOLOGY | Age: 81
Discharge: HOME | End: 2021-11-02
Payer: MEDICARE

## 2021-11-02 VITALS
TEMPERATURE: 96.8 F | DIASTOLIC BLOOD PRESSURE: 68 MMHG | RESPIRATION RATE: 16 BRPM | SYSTOLIC BLOOD PRESSURE: 125 MMHG | HEART RATE: 84 BPM

## 2021-11-02 DIAGNOSIS — R05.9 COUGH: ICD-10-CM

## 2021-11-02 DIAGNOSIS — I70.235 ATHEROSCLEROSIS OF NATIVE ARTERIES OF RIGHT LEG WITH ULCERATION OF OTHER PART OF FOOT (HCC): ICD-10-CM

## 2021-11-02 DIAGNOSIS — R09.89 DECREASED PULSES IN FEET: ICD-10-CM

## 2021-11-02 DIAGNOSIS — I87.2 PERIPHERAL VENOUS INSUFFICIENCY: ICD-10-CM

## 2021-11-02 DIAGNOSIS — L97.512 ULCER OF TOE OF RIGHT FOOT, WITH FAT LAYER EXPOSED (HCC): Primary | ICD-10-CM

## 2021-11-02 DIAGNOSIS — E11.649 UNCONTROLLED TYPE 2 DIABETES MELLITUS WITH HYPOGLYCEMIA, UNSPECIFIED HYPOGLYCEMIA COMA STATUS (HCC): ICD-10-CM

## 2021-11-02 DIAGNOSIS — Z74.09 IMPAIRED MOBILITY: ICD-10-CM

## 2021-11-02 PROCEDURE — 11042 DBRDMT SUBQ TIS 1ST 20SQCM/<: CPT | Performed by: EMERGENCY MEDICINE

## 2021-11-02 PROCEDURE — 11042 DBRDMT SUBQ TIS 1ST 20SQCM/<: CPT

## 2021-11-02 PROCEDURE — 71046 X-RAY EXAM CHEST 2 VIEWS: CPT

## 2021-11-02 RX ORDER — BACITRACIN ZINC AND POLYMYXIN B SULFATE 500; 1000 [USP'U]/G; [USP'U]/G
OINTMENT TOPICAL ONCE
Status: CANCELLED | OUTPATIENT
Start: 2021-11-02 | End: 2021-11-02

## 2021-11-02 RX ORDER — BACITRACIN, NEOMYCIN, POLYMYXIN B 400; 3.5; 5 [USP'U]/G; MG/G; [USP'U]/G
OINTMENT TOPICAL ONCE
Status: CANCELLED | OUTPATIENT
Start: 2021-11-02 | End: 2021-11-02

## 2021-11-02 RX ORDER — BETAMETHASONE DIPROPIONATE 0.05 %
OINTMENT (GRAM) TOPICAL ONCE
Status: CANCELLED | OUTPATIENT
Start: 2021-11-02 | End: 2021-11-02

## 2021-11-02 RX ORDER — GINSENG 100 MG
CAPSULE ORAL ONCE
Status: CANCELLED | OUTPATIENT
Start: 2021-11-02 | End: 2021-11-02

## 2021-11-02 RX ORDER — LIDOCAINE 50 MG/G
OINTMENT TOPICAL ONCE
Status: CANCELLED | OUTPATIENT
Start: 2021-11-02 | End: 2021-11-02

## 2021-11-02 RX ORDER — GENTAMICIN SULFATE 1 MG/G
OINTMENT TOPICAL ONCE
Status: CANCELLED | OUTPATIENT
Start: 2021-11-02 | End: 2021-11-02

## 2021-11-02 RX ORDER — CLOBETASOL PROPIONATE 0.5 MG/G
OINTMENT TOPICAL ONCE
Status: CANCELLED | OUTPATIENT
Start: 2021-11-02 | End: 2021-11-02

## 2021-11-02 RX ORDER — LIDOCAINE HYDROCHLORIDE 20 MG/ML
JELLY TOPICAL ONCE
Status: CANCELLED | OUTPATIENT
Start: 2021-11-02 | End: 2021-11-02

## 2021-11-02 RX ORDER — LIDOCAINE HYDROCHLORIDE 40 MG/ML
SOLUTION TOPICAL ONCE
Status: CANCELLED | OUTPATIENT
Start: 2021-11-02 | End: 2021-11-02

## 2021-11-02 RX ORDER — LIDOCAINE 40 MG/G
CREAM TOPICAL ONCE
Status: CANCELLED | OUTPATIENT
Start: 2021-11-02 | End: 2021-11-02

## 2021-11-02 RX ORDER — LIDOCAINE 40 MG/G
CREAM TOPICAL ONCE
Status: DISCONTINUED | OUTPATIENT
Start: 2021-11-02 | End: 2021-11-03 | Stop reason: HOSPADM

## 2021-11-02 NOTE — PLAN OF CARE
Discharge instructions given. Patient verbalized understanding. Return to [address] [place] [address],[address] in 1 week(s).   Continue felt and Silver Ag

## 2021-11-02 NOTE — PROGRESS NOTES
[address]   Progress Note   Referring provider: Dr. Alcides Davis  Reason for referral: [facility]     Cyndi RECORD NUMBER:  [id number]  AGE: [de-identified] y.o. GENDER: male  : 1940  EPISODE DATE:  2021    Subjective:     Chief Complaint   Patient presents with    Wound Check     Right great toe      10/26/21: doing well. No new problems    21: had dopplers done. No new wound care issues    HISTORY of PRESENT ILLNESS HPI on initial visit to wound center[de-identified]      Óscar Emery is a [de-identified] y.o. male who presents today for an initial evaluation of a wound/ulcer. Patient is new to the wound center. Wound duration: mid 2021. History of Wound Context: 43-year-old male diabetic who struck his toe against furniture mid 2021. The patient states that the rug slipped under him next to his bed and he accidentally struck his toe. Now with an ulcer to the plantar right great toe. Patient has chronic venous stasis as well. Has had trouble with diabetic control but his last hemoglobin A1c on 2021 was noted to be 7.0 which is considered to be an improvement for him. He does admit to intermittently having a poor diet with significant sodium intake as well. Denies any associated systemic complaints. Patient presented to his primary care doctor recently for evaluation of diabetes. The patient was noted to have the ulcer and referred here for ongoing wound care recommendations. He denies any pain at the site of the wound. Does have diabetic neuropathy. Previous smoker.      Pertinent associated symptoms: drainage , redness, swelling and impaired mobility    PAST MEDICAL HISTORY        Diagnosis Date    Acid reflux     Atrial flutter ([address]     converted    Bleeding stomach ulcer oct 2015    BPH (benign prostatic hyperplasia)     CAD (coronary artery disease)      angio with 2 blocked bypass and 2 patent    COPD (chronic obstructive  Nebulizers (AIRIAL COMPACT MINI NEBULIZER) MISC 1 each by Does not apply route every 6 hours as needed (wheezing or shortness of breath) 1 each 0    Respiratory Therapy Supplies (NEBULIZER/TUBING/MOUTHPIECE) KIT 1 kit by Does not apply route every 6 hours as needed (for wheezing or shortness of breath) 1 kit 1    albuterol (PROVENTIL) (2.5 MG/3ML) 0.083% nebulizer solution Take 3 mLs by nebulization every 6 hours as needed for Wheezing 50 vial 3    vitamin D (ERGOCALCIFEROL) 40763 units CAPS capsule TK 1 C PO WEEKLY  2    mupirocin (BACTROBAN) 2 % ointment Apply 3 times daily as needed 22 g 1    levalbuterol (XOPENEX HFA) 45 MCG/ACT inhaler Inhale 1-2 puffs into the lungs every 4 hours as needed for Wheezing 1 Inhaler 3    aspirin 81 MG EC tablet Take 81 mg by mouth daily      apixaban (ELIQUIS) 2.5 MG TABS tablet Take 1 tablet by mouth 2 times daily 60 tablet 5    metoprolol (LOPRESSOR) 25 MG tablet Take 25 mg by mouth daily       isosorbide mononitrate (IMDUR) 30 MG CR tablet Take 1 tablet by mouth every morning Dr. Mima Barbosa - Cardio 30 tablet 6    nitroGLYCERIN (NITROSTAT) 0.3 MG SL tablet Take one sublingual for chest pain. May repeat twice at 5 min intervals. If not getting relief call 911. 25 tablet 3     No current facility-administered medications on file prior to encounter. REVIEW OF SYSTEMS  A comprehensive review of systems was negative except for: Pertinent items are noted in HPI. Objective:      /68   Pulse 84   Temp 96.8 °F (36 °C) (Infrared)   Resp 16     Wt Readings from Last 3 Encounters:   09/30/21 265 lb (120.2 kg)   09/17/21 270 lb (122.5 kg)   07/16/21 268 lb 6.4 oz (121.7 kg)       PHYSICAL EXAM  General Appearance/Constitutional: alert and oriented to person, place and time, well-developed and well nourished, and in no acute distress. Nontoxic. Skin: warm and dry, no rash, positive wound per LDA documentation. Head: normocephalic and atraumatic.   Eyes: extraocular eye movements intact, conjunctivae normal, and sclera anicteric. ENT: hearing grossly normal bilaterally. Normal appearance. Pulmonary/Chest: no chest wall tenderness and clear anteriorly. No respiratory distress. Cardiovascular: normal rate and regular rhythm. GI: abdomen soft, non-tender and non-distended. Musculoskeletal: normal range of motion of joints. Nontender calves. No cyanosis. Nontender calves. Edema 2+  Neurologic: no gross cranial nerve deficit and speech normal. No focal deficits. Mental status normal.      Medical Decision Makin-year-old male with a diabetic foot ulcer to plantar surface of right great toe. Severity of fat layers exposed. Guzman grade 1.     Wound evaluation: continues to heal. More epithelization, mild fibrous nonviable tissue with callus    Problem List Items Addressed This Visit     Diabetes mellitus type II, uncontrolled ([address]    Relevant Medications    lidocaine (LMX) 4 % cream    Other Relevant Orders    Initiate Outpatient Wound Care Protocol    Ulcer of toe of right foot, with fat layer exposed ([address] - Primary    Relevant Medications    lidocaine (LMX) 4 % cream    Other Relevant Orders    Initiate Outpatient Wound Care Protocol    [facility]    Impaired mobility    Peripheral venous insufficiency    Relevant Medications    lidocaine (LMX) 4 % cream    Other Relevant Orders    Initiate Outpatient Wound Care Protocol    Atherosclerosis of native arteries of right leg with ulceration of other part of foot (HCC)    Relevant Medications    lidocaine (LMX) 4 % cream    Other Relevant Orders    Initiate Outpatient Wound Care Protocol    [facility]    Decreased pulses in feet    Relevant Orders    [facility] James[facility]        Comorbid conditions affecting wound healing: DM type 2, CHF, COPD, CAD, HTN, chronic kidney disease, chronic anticoagulation. Previous smoker, quit Jan 1985    Problems addressed during this encounter:  1. Diabetic foot ulcer, fat layers exposed. Debridement. Offloading felt placed. 2. Diabetes Mellitus type 2, uncontrolled. Last hemoglobin A1c was 7.0 on 9/17/2021. Education and counseling provided here. 3. Impaired mobility, counseling and education provided to assist with flexibility as well as movement but at the same time help with pressure relief. Offloading felt placed. Postop shoe provided. 4. Venous stasis/leg edema. Elevation. Education counseling provided. Gentle compression initiated. 5. Suspected atherosclerosis of native arteries of right lower extremity. Screening MICKY unable to be done in the office due to noncompressible state. Arterial Dopplers done. Images reviewed. Data reviewed and analyzed:  1. Assessment required other independent historian(s): No patient . 2. Review of prior external note from other providers done today: No  3. New imaging or lab ordered today: No  4. Review of test results done today: Yes arterial doppers, abnormal. Will send to vascular. 5. Independent interpretation of test(s): Yes  6. Discussion of management or test interpretation with another provider and other external source, referral message to Dr. Sloan Nguyen. Risk of complications and/or mortality of patient management:  1. This patient has a moderate risk of morbidity and mortality from additional diagnostic testing or treatment. This is due to the above conditions affecting wound healing as well as patient and procedure risk factors. Education and discussion held with patient regarding these disease processes pertinent to wound(s). 2. Other pertinent decisions include: minor surgery or procedures as below. 3. The patient's diagnosis or treatment is not significantly limited by social determinants of health as noted by: N/A .  4.  Prescription drug management: N/A       Procedure 11/02/21 1303   Number of days: 33          Total Surface Area Debrided:  0.04 sq cm     Estimated Blood Loss:  Minimal    Hemostasis Achieved:  by pressure    Procedural Pain:  0  / 10     Post Procedural Pain:  0 / 10     Response to treatment:  Well tolerated by patient. Written patient dismissal instructions given to patient and signed by patient or POA. Discharge Instructions       [facility]  [address] [place] [address], [address]  Telephone: [phone]    Discharge Instructions    Important reminders:    1. Increase Protein intake for optimal wound healing  2. No added salt to reduce any swelling  3. If diabetic, maintain good glucose control  4. If you smoke, smoking prohibits wound healing, we ask that you refrain from smoking. 5. When taking antibiotics take the entire prescription as ordered. Do not stop taking until medication is all gone unless otherwise instructed. 6. Exercise as tolerated. 7. Keep weight off wounds and reposition every 2 hours if applicable. 8. If wound(s) is on your lower extremity, elevate legs to the level of the heart or above for 30 minutes 4-5 times a day and/or when sitting. Avoid standing for long periods of time. 9. Do not get wounds wet in bath or shower unless otherwise instructed by your physician. If your wound is on your foot or leg, you may purchase a cast bag. Please ask at the pharmacy. If Vascular testing is ordered, please call [address] [place] [address] [phone]) to schedule. Vascular tests ordered by Wound Care Physicians may take up to 2 hours to complete. Please keep that in mind when scheduling. If Vascular testing is scheduled, please bring supplies to replace your dressing after testing is done. The vascular department does not stock supplies. Wound:  Right Greater Toe     With each dressing change, rinse wounds with 0.9% Saline. (May use wound wash or soft contact solution.  Both can be purchased at a local drug store). If unable to obtain saline, may use a gentle soap and water. Dressing care: Keep felt on all week,  Post op shoe, [place] to kimberly wound (stays on all week), Silver Alginate, dry dressing, Conforming Roll Gauze. Changing Daily . Wash with Vashe when cleaning other than that keep dry. Arterial test done 10/27/21    Dr Ayad Samuels, Vascular Surgeon  [address] [address], [address]  Phone# [phone]  Fax# [phone]    Home Care Agency:     Your wound-care supplies will be provided by:   Please note, depending on your insurance coverage, you may have out-of-pocket expenses for these supplies. Someone from the company should call you to confirm your order and discuss those potential costs before they ship your products -- please anticipate that call. If your out-of-pocket cost could be substantial, Many companies have financial hardship programs for patients who qualify, so please ask about that if you might need a hand. If you have any questions about your supplies or your potential out-of-pocket costs, or if you need to place an order for a refill of supplies (typically monthly), please call the company directly. Your  is Alfredo    Follow up with Dr Garrick Flowers In 1 week(s) in the wound care center. Wound Care Center Information: Should you experience any significant changes in your wound(s) or have questions about your wound care, please contact the [facility] at [phone] Monday  - Thursday 8:00 am - 4:00 pm and Friday 8:00 am - 1:00pm. If you need help with your wound outside these hours and cannot wait until we are again available, contact your PCP or go to the hospital emergency room. PLEASE NOTE: IF YOU ARE UNABLE TO OBTAIN WOUND SUPPLIES, CONTINUE TO USE THE SUPPLIES YOU HAVE AVAILABLE UNTIL YOU ARE ABLE TO REACH US. IT IS MOST IMPORTANT TO KEEP THE WOUND COVERED AT ALL TIMES.             Electronically signed by Lisset Bingham MD on 11/2/2021 at 1:27 PM

## 2021-11-09 ENCOUNTER — HOSPITAL ENCOUNTER (OUTPATIENT)
Dept: WOUND CARE | Age: 81
Discharge: HOME | End: 2021-11-09
Payer: MEDICARE

## 2021-11-09 VITALS
TEMPERATURE: 97.3 F | DIASTOLIC BLOOD PRESSURE: 59 MMHG | RESPIRATION RATE: 16 BRPM | HEART RATE: 64 BPM | SYSTOLIC BLOOD PRESSURE: 114 MMHG

## 2021-11-09 DIAGNOSIS — I70.235 ATHEROSCLEROSIS OF NATIVE ARTERIES OF RIGHT LEG WITH ULCERATION OF OTHER PART OF FOOT (HCC): ICD-10-CM

## 2021-11-09 DIAGNOSIS — I87.2 PERIPHERAL VENOUS INSUFFICIENCY: ICD-10-CM

## 2021-11-09 DIAGNOSIS — Z74.09 IMPAIRED MOBILITY: ICD-10-CM

## 2021-11-09 DIAGNOSIS — L97.512 ULCER OF TOE OF RIGHT FOOT, WITH FAT LAYER EXPOSED (HCC): Primary | ICD-10-CM

## 2021-11-09 DIAGNOSIS — E11.649 UNCONTROLLED TYPE 2 DIABETES MELLITUS WITH HYPOGLYCEMIA, UNSPECIFIED HYPOGLYCEMIA COMA STATUS (HCC): ICD-10-CM

## 2021-11-09 PROCEDURE — 11042 DBRDMT SUBQ TIS 1ST 20SQCM/<: CPT | Performed by: EMERGENCY MEDICINE

## 2021-11-09 PROCEDURE — 11042 DBRDMT SUBQ TIS 1ST 20SQCM/<: CPT

## 2021-11-09 RX ORDER — BETAMETHASONE DIPROPIONATE 0.05 %
OINTMENT (GRAM) TOPICAL ONCE
Status: CANCELLED | OUTPATIENT
Start: 2021-11-09 | End: 2021-11-09

## 2021-11-09 RX ORDER — BACITRACIN, NEOMYCIN, POLYMYXIN B 400; 3.5; 5 [USP'U]/G; MG/G; [USP'U]/G
OINTMENT TOPICAL ONCE
Status: CANCELLED | OUTPATIENT
Start: 2021-11-09 | End: 2021-11-09

## 2021-11-09 RX ORDER — LIDOCAINE HYDROCHLORIDE 20 MG/ML
JELLY TOPICAL ONCE
Status: CANCELLED | OUTPATIENT
Start: 2021-11-09 | End: 2021-11-09

## 2021-11-09 RX ORDER — GENTAMICIN SULFATE 1 MG/G
OINTMENT TOPICAL ONCE
Status: CANCELLED | OUTPATIENT
Start: 2021-11-09 | End: 2021-11-09

## 2021-11-09 RX ORDER — CLOBETASOL PROPIONATE 0.5 MG/G
OINTMENT TOPICAL ONCE
Status: CANCELLED | OUTPATIENT
Start: 2021-11-09 | End: 2021-11-09

## 2021-11-09 RX ORDER — BACITRACIN ZINC AND POLYMYXIN B SULFATE 500; 1000 [USP'U]/G; [USP'U]/G
OINTMENT TOPICAL ONCE
Status: CANCELLED | OUTPATIENT
Start: 2021-11-09 | End: 2021-11-09

## 2021-11-09 RX ORDER — GINSENG 100 MG
CAPSULE ORAL ONCE
Status: CANCELLED | OUTPATIENT
Start: 2021-11-09 | End: 2021-11-09

## 2021-11-09 RX ORDER — LIDOCAINE HYDROCHLORIDE 40 MG/ML
SOLUTION TOPICAL ONCE
Status: CANCELLED | OUTPATIENT
Start: 2021-11-09 | End: 2021-11-09

## 2021-11-09 RX ORDER — LIDOCAINE 50 MG/G
OINTMENT TOPICAL ONCE
Status: CANCELLED | OUTPATIENT
Start: 2021-11-09 | End: 2021-11-09

## 2021-11-09 RX ORDER — LIDOCAINE 40 MG/G
CREAM TOPICAL ONCE
Status: DISCONTINUED | OUTPATIENT
Start: 2021-11-09 | End: 2021-11-10 | Stop reason: HOSPADM

## 2021-11-09 RX ORDER — LIDOCAINE 40 MG/G
CREAM TOPICAL ONCE
Status: CANCELLED | OUTPATIENT
Start: 2021-11-09 | End: 2021-11-09

## 2021-11-09 NOTE — PLAN OF CARE
Discharge instructions given. Patient verbalized understanding. Return to [facility] in 1 week(s).   Appt with Dr Sherryle Massy tomorrow

## 2021-11-09 NOTE — PROGRESS NOTES
[facility]   Progress Note   Referring provider: Dr. Sukh Palcaio  Reason for referral: [facility]     Cyndi RECORD NUMBER:  [id number]  AGE: [de-identified] y.o. GENDER: male  : 1940  EPISODE DATE:  2021    Subjective:     Chief Complaint   Patient presents with    Wound Check     Right great toe      10/26/21: doing well. No new problems    21: had dopplers done. No new wound care issues    21: has appt with Dr. William Cotto tomorrow. Doing well with dressing changes. Feels wound improving. HISTORY of PRESENT ILLNESS HPI on initial visit to wound center[de-identified]      Wei Méndez is a [de-identified] y.o. male who presents today for an initial evaluation of a wound/ulcer. Patient is new to the wound center. Wound duration: mid 2021. History of Wound Context: 81-year-old male diabetic who struck his toe against furniture mid 2021. The patient states that the rug slipped under him next to his bed and he accidentally struck his toe. Now with an ulcer to the plantar right great toe. Patient has chronic venous stasis as well. Has had trouble with diabetic control but his last hemoglobin A1c on 2021 was noted to be 7.0 which is considered to be an improvement for him. He does admit to intermittently having a poor diet with significant sodium intake as well. Denies any associated systemic complaints. Patient presented to his primary care doctor recently for evaluation of diabetes. The patient was noted to have the ulcer and referred here for ongoing wound care recommendations. He denies any pain at the site of the wound. Does have diabetic neuropathy. Previous smoker.      Pertinent associated symptoms: drainage , redness, swelling and impaired mobility    PAST MEDICAL HISTORY        Diagnosis Date    Acid reflux     Atrial flutter ([address]     converted    Bleeding stomach ulcer oct 2015    BPH (benign prostatic hyperplasia)     CAD (coronary artery disease)      angio with 2 blocked bypass and 2 patent    COPD [facility] disease) [facility]     Diabetes mellitus type II     Edema     chronic left leg    Elevated PSA- nl 11     Hyperlipidemia     Hypertension     Ischemic cardiomyopathy     Lipoma of skin-RIGHT CHEST 2011    Obesity     Osteoarthritis     Skin tear of left forearm without complication 3231    Significant amount of epidermal loss with bleeding.  Spinal stenosis of lumbar region with neurogenic claudication- clinically 2018       PAST SURGICAL HISTORY    Past Surgical History:   Procedure Laterality Date    CATARACT REMOVAL  2011    bilat    COLONOSCOPY      CORONARY ARTERY BYPASS GRAFT  1993    x 4    EYE SURGERY Left 2019    cataract coming back    JOINT REPLACEMENT Right total knee replacement    JOINT REPLACEMENT Left 2016       FAMILY HISTORY    Family History   Problem Relation Age of Onset    Cancer Mother         breast    Cancer Father         throat       SOCIAL HISTORY    Social History     Tobacco Use    Smoking status: Former Smoker     Packs/day: 3.50     Years: 32.00     Pack years: 112.00     Types: Cigarettes     Quit date: 1985     Years since quittin.8    Smokeless tobacco: Never Used    Tobacco comment: advised not to resume   Substance Use Topics    Alcohol use: No     Alcohol/week: 0.0 standard drinks    Drug use: No       ALLERGIES    Allergies   Allergen Reactions    Entresto [Sacubitril-Valsartan] Other (See Comments)     Renal failure       MEDICATIONS    Current Outpatient Medications on File Prior to Encounter   Medication Sig Dispense Refill    Lancets MISC 1 each by Does not apply route daily Use to check glucose twice a day. One Touch brand. DX E11.9 100 each 3    blood glucose monitor kit and supplies Test 2 times a day & as needed for symptoms of irregular blood glucose.  1 kit 0    blood glucose monitor strips Test 2 times a day & as needed for symptoms of irregular blood glucose. 200 strip 3    blood glucose test strips (ASCENSIA AUTODISC VI;ONE TOUCH ULTRA TEST VI) strip four times daily 400 strip 3    INSULIN SYRINGE .5CC/29G 29G X 1/2\" 0.5 ML MISC INJECT 4 TIMES DAILY AS NEEDED 400 each 1    omeprazole (PRILOSEC) 20 MG delayed release capsule TAKE 1 CAPSULE BY MOUTH TWICE DAILY 180 capsule 1    furosemide (LASIX) 80 MG tablet TAKE 1 TABLET BY MOUTH DAILY 90 tablet 0    atorvastatin (LIPITOR) 40 MG tablet TAKE 1 TABLET BY MOUTH EVERY EVENING 90 tablet 1    dapagliflozin (FARXIGA) 5 MG tablet Take 1 tablet by mouth daily LOT [id number] EXP  4 BOXES   LOT [id number] EXP 02/24 2 BOXES 30 tablet 0    amiodarone (CORDARONE) 200 MG tablet TAKE 1 TABLET BY MOUTH DAILY 90 tablet 0    potassium chloride (KLOR-CON) 10 MEQ extended release tablet TAKE 1 TABLET BY MOUTH DAILY 90 tablet 1    FARXIGA 10 MG tablet TAKE 1 TABLET BY MOUTH DAILY      hydrALAZINE (APRESOLINE) 25 MG tablet TAKE 1 TABLET BY MOUTH THREE TIMES DAILY      Cyanocobalamin (B-12) 500 MCG TABS TAKE 1 TABLET BY MOUTH DAILY 90 tablet 1    albuterol sulfate HFA (VENTOLIN HFA) 108 (90 Base) MCG/ACT inhaler Inhale 2 puffs into the lungs every 6 hours as needed for Wheezing 1 Inhaler 3    umeclidinium-vilanterol (ANORO ELLIPTA) 62.5-25 MCG/INH AEPB inhaler INHALE 1 PUFF INTO THE LUNGS DAILY 1 each 5    gabapentin (NEURONTIN) 600 MG tablet TAKE UP TO 5 TABLETS BY MOUTH OVER 24 HOURS AS DIRECTED 150 tablet 5    insulin NPH (NOVOLIN N) 100 UNIT/ML injection vial Take 44 units with breakfast and 32 units with dinner.  (Patient taking differently: Take 20 units with breakfast and 20 units with dinner.) 30 mL 3    insulin regular (NOVOLIN R RELION) 100 UNIT/ML injection INJECT 20 TO 30 UNITS SUBCUTANEOUSLY THREE TIMES DAILY BEFORE MEAL(S) 30 mL 0    FEROSUL 325 (65 Fe) MG tablet TAKE 1 TABLET BY MOUTH TWICE DAILY 180 tablet 3    tiotropium (SPIRIVA Nontoxic. Skin: warm and dry, no rash, positive wound per LDA documentation. Head: normocephalic and atraumatic. Eyes: extraocular eye movements intact, conjunctivae normal, and sclera anicteric. ENT: hearing grossly normal bilaterally. Normal appearance. Pulmonary/Chest: no chest wall tenderness and clear anteriorly. No respiratory distress. Cardiovascular: normal rate and regular rhythm. GI: abdomen soft, non-tender and non-distended. Musculoskeletal: normal range of motion of joints. Nontender calves. No cyanosis. Nontender calves. Edema 2+  Neurologic: no gross cranial nerve deficit and speech normal. No focal deficits. Mental status normal.      Medical Decision Makin-year-old male with a diabetic foot ulcer to plantar surface of right great toe. Severity of fat layers exposed. Guzman grade 1. Wound evaluation: continues to heal. More epithelization, mild fibrous nonviable tissue with callus    Problem List Items Addressed This Visit     Diabetes mellitus type II, uncontrolled ([address]    Relevant Medications    lidocaine (LMX) 4 % cream    Other Relevant Orders    Initiate Outpatient Wound Care Protocol    Ulcer of toe of right foot, with fat layer exposed ([address] - Primary    Relevant Medications    lidocaine (LMX) 4 % cream    Other Relevant Orders    Initiate Outpatient Wound Care Protocol    Impaired mobility    Peripheral venous insufficiency    Relevant Medications    lidocaine (LMX) 4 % cream    Other Relevant Orders    Initiate Outpatient Wound Care Protocol    Atherosclerosis of native arteries of right leg with ulceration of other part of foot (HCC)    Relevant Medications    lidocaine (LMX) 4 % cream    Other Relevant Orders    Initiate Outpatient Wound Care Protocol        Comorbid conditions affecting wound healing: DM type 2, CHF, COPD, CAD, HTN, chronic kidney disease, chronic anticoagulation.  Previous smoker, quit 1985    Problems addressed during this encounter:  1. Diabetic foot ulcer, fat layers exposed. Debridement. Offloading felt placed. 2. Diabetes Mellitus type 2, uncontrolled. Last hemoglobin A1c was 7.0 on 9/17/2021. Education and counseling provided here. 3. Impaired mobility, counseling and education provided to assist with flexibility as well as movement but at the same time help with pressure relief. Offloading felt placed. Postop shoe provided. 4. Venous stasis/leg edema. Elevation. Education counseling provided. Gentle compression initiated. 5. Suspected atherosclerosis of native arteries of right lower extremity. Screening MICKY unable to be done in the office due to noncompressible state. Arterial Dopplers done. Images reviewed. Abnormal. appt with vascular 11/10/21. Data reviewed and analyzed:  1. Assessment required other independent historian(s): No patient . 2. Review of prior external note from other providers done today: No  3. New imaging or lab ordered today: No  4. Review of test results done today: Yes arterial doppers, abnormal. Referral to vascular done. 5. Independent interpretation of test(s): Yes  6. Discussion of management or test interpretation with another provider and other external source, referral message to Dr. Marci Dalton. Risk of complications and/or mortality of patient management:  1. This patient has a moderate risk of morbidity and mortality from additional diagnostic testing or treatment. This is due to the above conditions affecting wound healing as well as patient and procedure risk factors. Education and discussion held with patient regarding these disease processes pertinent to wound(s). 2. Other pertinent decisions include: minor surgery or procedures as below. 3. The patient's diagnosis or treatment is not significantly limited by social determinants of health as noted by: N/A .  4.  Prescription drug management: N/A       Procedure Note  Indications:  Based on my examination of this patient's wound(s)/ulcer(s) today, debridement is required to promote healing and evaluate the wound base. Performed by: Smitha Solano MD    Consent obtained:  Yes    Time out taken:  Yes    Pain Control: Anesthetic  Anesthetic: 4% Lidocaine Cream       Debridement: Excisional Debridement    Using #15 blade scalpel and forceps the wound(s)/ulcer(s) was/were debrided down through and including the removal of subcutaneous tissue.         Devitalized Tissue Debrided:  fibrin, biofilm, slough and callus    Pre Debridement Measurements:  Are located in the Wound/Ulcer Documentation Flow Sheet    Diabetic/Pressure/Non Pressure Ulcers only:  Ulcer: Diabetic ulcer, fat layer exposed     Wound/Ulcer #: 1    Post Debridement Measurements:  Wound/Ulcer Descriptions are Pre Debridement except measurements:    Wound 09/30/21 Toe (Comment  which one) Right;Plantar Great #1 (Active)   Wound Image   10/26/21 1308   Wound Etiology Diabetic 11/09/21 1343   Wound Cleansed Cleansed with saline 11/09/21 1343   Offloading for Diabetic Foot Ulcers Post op shoe 11/09/21 1343   Wound Length (cm) 0.3 cm 11/09/21 1343   Wound Width (cm) 0.1 cm 11/09/21 1343   Wound Depth (cm) 0.1 cm 11/09/21 1343   Wound Surface Area (cm^2) 0.03 cm^2 11/09/21 1343   Change in Wound Size % (l*w) 98.46 11/09/21 1343   Wound Volume (cm^3) 0.003 cm^3 11/09/21 1343   Wound Healing % 98 11/09/21 1343   Post-Procedure Length (cm) 0.3 cm 11/09/21 1355   Post-Procedure Width (cm) 0.2 cm 11/09/21 1355   Post-Procedure Depth (cm) 0.1 cm 11/09/21 1355   Post-Procedure Surface Area (cm^2) 0.06 cm^2 11/09/21 1355   Post-Procedure Volume (cm^3) 0.006 cm^3 11/09/21 1355   Wound Assessment Bleeding 11/09/21 1355   Drainage Amount Scant 11/09/21 1343   Drainage Description Serosanguinous 11/09/21 1343   Odor Fecal 11/09/21 1343   Jaylyn-wound Assessment Hyperkeratosis (callous) 11/09/21 1343   Margins Attached edges 11/09/21 1343   Number of days: 40          Total Surface Area Debrided: 0.03 sq cm     Estimated Blood Loss:  Minimal    Hemostasis Achieved:  by pressure    Procedural Pain:  0  / 10     Post Procedural Pain:  0 / 10     Response to treatment:  Well tolerated by patient. Written patient dismissal instructions given to patient and signed by patient or POA. Discharge Instructions       [facility]  [address], [address]  Telephone: [phone]    Discharge Instructions    Important reminders:    1. Increase Protein intake for optimal wound healing  2. No added salt to reduce any swelling  3. If diabetic, maintain good glucose control  4. If you smoke, smoking prohibits wound healing, we ask that you refrain from smoking. 5. When taking antibiotics take the entire prescription as ordered. Do not stop taking until medication is all gone unless otherwise instructed. 6. Exercise as tolerated. 7. Keep weight off wounds and reposition every 2 hours if applicable. 8. If wound(s) is on your lower extremity, elevate legs to the level of the heart or above for 30 minutes 4-5 times a day and/or when sitting. Avoid standing for long periods of time. 9. Do not get wounds wet in bath or shower unless otherwise instructed by your physician. If your wound is on your foot or leg, you may purchase a cast bag. Please ask at the pharmacy. If Vascular testing is ordered, please call [address] [place] [address] ([phone]) to schedule. Vascular tests ordered by Wound Care Physicians may take up to 2 hours to complete. Please keep that in mind when scheduling. If Vascular testing is scheduled, please bring supplies to replace your dressing after testing is done. The vascular department does not stock supplies. Wound:  Right Greater Toe     With each dressing change, rinse wounds with 0.9% Saline. (May use wound wash or soft contact solution. Both can be purchased at a local drug store).  If unable to obtain saline, may use a gentle soap and water. Dressing care: Keep felt on all week,  Post op shoe, [place] to kimberly wound (stays on all week), Silver Alginate, dry dressing, Conforming Roll Gauze. Changing Daily . Wash with Vashe when cleaning other than that keep dry. Arterial test done 10/27/21    Dr Steven Coleman, Vascular Surgeon  [address] [place] [address]  Phone# [phone]  Fax# [phone]    Home Care Agency:     Your wound-care supplies will be provided by:   Please note, depending on your insurance coverage, you may have out-of-pocket expenses for these supplies. Someone from the company should call you to confirm your order and discuss those potential costs before they ship your products -- please anticipate that call. If your out-of-pocket cost could be substantial, Many companies have financial hardship programs for patients who qualify, so please ask about that if you might need a hand. If you have any questions about your supplies or your potential out-of-pocket costs, or if you need to place an order for a refill of supplies (typically monthly), please call the company directly. Your  is Nini Neal    Follow up with Dr Lexa Richard In 1 week(s) in the wound care center. Wound Care Center Information: Should you experience any significant changes in your wound(s) or have questions about your wound care, please contact the [facility] at [phone] Monday  - Thursday 8:00 am - 4:00 pm and Friday 8:00 am - 1:00pm. If you need help with your wound outside these hours and cannot wait until we are again available, contact your PCP or go to the hospital emergency room. PLEASE NOTE: IF YOU ARE UNABLE TO OBTAIN WOUND SUPPLIES, CONTINUE TO USE THE SUPPLIES YOU HAVE AVAILABLE UNTIL YOU ARE ABLE TO REACH US. IT IS MOST IMPORTANT TO KEEP THE WOUND COVERED AT ALL TIMES.               Electronically signed by Charlie Tineo MD on 11/9/2021 at 2:02 PM

## 2021-11-10 ENCOUNTER — OFFICE VISIT (OUTPATIENT)
Dept: VASCULAR SURGERY | Age: 81
End: 2021-11-10
Payer: MEDICARE

## 2021-11-10 ENCOUNTER — HOSPITAL ENCOUNTER (OUTPATIENT)
Age: 81
Discharge: HOME | End: 2021-11-10
Payer: MEDICARE

## 2021-11-10 VITALS
SYSTOLIC BLOOD PRESSURE: 128 MMHG | WEIGHT: 273 LBS | DIASTOLIC BLOOD PRESSURE: 78 MMHG | HEIGHT: 68 IN | BODY MASS INDEX: 41.37 KG/M2

## 2021-11-10 DIAGNOSIS — I70.235 ATHEROSCLEROSIS OF NATIVE ARTERIES OF RIGHT LEG WITH ULCERATION OF OTHER PART OF FOOT (HCC): ICD-10-CM

## 2021-11-10 DIAGNOSIS — I70.235 ATHEROSCLEROSIS OF NATIVE ARTERIES OF RIGHT LEG WITH ULCERATION OF OTHER PART OF FOOT (HCC): Primary | ICD-10-CM

## 2021-11-10 LAB
ANION GAP SERPL CALCULATED.3IONS-SCNC: 13 MMOL/L (ref 3–16)
BUN BLDV-MCNC: 25 MG/DL (ref 7–20)
CALCIUM SERPL-MCNC: 8.6 MG/DL (ref 8.3–10.6)
CHLORIDE BLD-SCNC: 103 MMOL/L (ref 99–110)
CO2: 27 MMOL/L (ref 21–32)
CREAT SERPL-MCNC: 1.9 MG/DL (ref 0.8–1.3)
GFR AFRICAN AMERICAN: 41
GFR NON-AFRICAN AMERICAN: 34
GLUCOSE BLD-MCNC: 273 MG/DL (ref 70–99)
HCT VFR BLD CALC: 40.9 % (ref 40.5–52.5)
HEMOGLOBIN: 13.2 G/DL (ref 13.5–17.5)
INR BLD: 1.06 (ref 0.88–1.12)
MCH RBC QN AUTO: 33 PG (ref 26–34)
MCHC RBC AUTO-ENTMCNC: 32.3 G/DL (ref 31–36)
MCV RBC AUTO: 102.1 FL (ref 80–100)
PDW BLD-RTO: 15.6 % (ref 12.4–15.4)
PLATELET # BLD: 100 K/UL (ref 135–450)
PMV BLD AUTO: 10.4 FL (ref 5–10.5)
POTASSIUM SERPL-SCNC: 3.9 MMOL/L (ref 3.5–5.1)
PROTHROMBIN TIME: 12 SEC (ref 9.9–12.7)
RBC # BLD: 4 M/UL (ref 4.2–5.9)
SODIUM BLD-SCNC: 143 MMOL/L (ref 136–145)
WBC # BLD: 3.8 K/UL (ref 4–11)

## 2021-11-10 PROCEDURE — 85027 COMPLETE CBC AUTOMATED: CPT

## 2021-11-10 PROCEDURE — G8484 FLU IMMUNIZE NO ADMIN: HCPCS | Performed by: SURGERY

## 2021-11-10 PROCEDURE — 80048 BASIC METABOLIC PNL TOTAL CA: CPT

## 2021-11-10 PROCEDURE — 1036F TOBACCO NON-USER: CPT | Performed by: SURGERY

## 2021-11-10 PROCEDURE — 4040F PNEUMOC VAC/ADMIN/RCVD: CPT | Performed by: SURGERY

## 2021-11-10 PROCEDURE — 85610 PROTHROMBIN TIME: CPT

## 2021-11-10 PROCEDURE — G8427 DOCREV CUR MEDS BY ELIG CLIN: HCPCS | Performed by: SURGERY

## 2021-11-10 PROCEDURE — 36415 COLL VENOUS BLD VENIPUNCTURE: CPT

## 2021-11-10 PROCEDURE — 99204 OFFICE O/P NEW MOD 45 MIN: CPT | Performed by: SURGERY

## 2021-11-10 PROCEDURE — G8417 CALC BMI ABV UP PARAM F/U: HCPCS | Performed by: SURGERY

## 2021-11-10 PROCEDURE — 1123F ACP DISCUSS/DSCN MKR DOCD: CPT | Performed by: SURGERY

## 2021-11-10 NOTE — PROGRESS NOTES
[facility]  Consultation Note    Chief Complaint / Reason for Consultation  Right hallux ulceration    History of Present Illness  Patient is a [de-identified] y.o. male with history of diabetes, COPD, coronary artery disease, atrial flutter, ischemic cardiomyopathy, hypertension, hyperlipidemia referred for a right toe ulceration. Overall he states the wound is healing well although slowly. He denies any antecedent claudication or rest pain. Review of Systems     Denies fevers, chills, chest pain, shortness of breath, nausea, vomiting, hematemesis, diarrhea, constipation, melena, hematochezia, wt changes, vision problems, blindness, hearing problems, facial droop, slurred speech, extremity weakness, extremity numbness, dysuria. Past Medical History:   Diagnosis Date    Acid reflux     Atrial flutter ([address] 2013    converted    Bleeding stomach ulcer oct 2015    BPH (benign prostatic hyperplasia)     CAD (coronary artery disease)     11/12 angio with 2 blocked bypass and 2 patent    COPD (chronic obstructive pulmonary disease) ([facility]     Diabetes mellitus type II     Edema     chronic left leg    Elevated PSA- nl 8/12/11     Hyperlipidemia     Hypertension     Ischemic cardiomyopathy     Lipoma of skin-RIGHT CHEST 5/5/2011    Obesity     Osteoarthritis     Skin tear of left forearm without complication 2/43/2791    Significant amount of epidermal loss with bleeding.     Spinal stenosis of lumbar region with neurogenic claudication- clinically 7/6/2018       Past Surgical History:   Procedure Laterality Date    CATARACT REMOVAL  2010, 2011    bilat    COLONOSCOPY      CORONARY ARTERY BYPASS GRAFT  1993    x 4    EYE SURGERY Left 2019    cataract coming back    JOINT REPLACEMENT Right total knee replacement    JOINT REPLACEMENT Left 09/14/2016       Allergies   Allergen Reactions    Entresto [Sacubitril-Valsartan] Other (See Comments)     Renal failure       Social History Socioeconomic History    Marital status:      Spouse name: Not on file    Number of children: Not on file    Years of education: Not on file    Highest education level: Not on file   Occupational History    Not on file   Tobacco Use    Smoking status: Former Smoker     Packs/day: 3.50     Years: 32.00     Pack years: 112.00     Types: Cigarettes     Quit date: 1985     Years since quittin.8    Smokeless tobacco: Never Used    Tobacco comment: advised not to resume   Substance and Sexual Activity    Alcohol use: No     Alcohol/week: 0.0 standard drinks    Drug use: No    Sexual activity: Not Currently   Other Topics Concern    Not on file   Social History Narrative    Lives with spouse. Exercise: walking sometimes. Seatbelt use: Always. Living will: no, additional information provided. Self-testicular exams: Yes      Social Determinants of Health     Financial Resource Strain: Low Risk     Difficulty of Paying Living Expenses: Not hard at all   Food Insecurity: No Food Insecurity    Worried About Running Out of Food in the Last Year: Never true    Gissel of Food in the Last Year: Never true   Transportation Needs:     Lack of Transportation (Medical): Not on file    Lack of Transportation (Non-Medical):  Not on file   Physical Activity:     Days of Exercise per Week: Not on file    Minutes of Exercise per Session: Not on file   Stress:     Feeling of Stress : Not on file   Social Connections:     Frequency of Communication with Friends and Family: Not on file    Frequency of Social Gatherings with Friends and Family: Not on file    Attends Druze Services: Not on file    Active Member of Clubs or Organizations: Not on file    Attends Club or Organization Meetings: Not on file    Marital Status: Not on file   Intimate Partner Violence:     Fear of Current or Ex-Partner: Not on file    Emotionally Abused: Not on file    Physically Abused: Not on file   Theodoro Milder Sexually Abused: Not on file   Housing Stability:     Unable to Pay for Housing in the Last Year: Not on file    Number of [place] in the Last Year: Not on file    Unstable Housing in the Last Year: Not on file       Family History   Problem Relation Age of Onset    Cancer Mother         breast    Cancer Father         throat     - No history of bleeding or clotting disorders    Vital Signs  There were no vitals filed for this visit. Physical Examination  General:  no apparent distress  Psychiatric: affect appropriate  Head/Eyes/Ears/Nose/Throat:  Atraumatic, vision and hearing intact, face symmetric  Neck:  supple  Chest/Lungs: clear to auscultation bilaterally  Cardiac:  Regular rate and rhythm  Abdomen: soft, nontender  Extremities: warm and well perfused  Ulceration right hallux  Vascular exam:  Non-palp      Labs  Lab Results   Component Value Date    WBC 3.7 07/16/2021    HGB 14.2 07/16/2021    HCT 42.6 07/16/2021    MCV 99.5 07/16/2021     07/16/2021     Lab Results   Component Value Date     07/16/2021    K 4.2 07/16/2021    K 2.9 07/01/2021     07/16/2021    CO2 29 07/16/2021    PHOS 3.9 06/10/2021    BUN 25 07/16/2021    CREATININE 1.8 07/16/2021      No components found for: GLU    Imaging:        Right Impression   Right MICKY: 1.69. This is consistent with non-compressible vessels due to   arterial calcification. Ultrasound images of the right lower extremity reveal moderate calcific plaque   formation throughout. Elevated velocity of the proximal to distal common femoral artery (284 to 233   cm/sec)suggests a probable > 50% stenosis. Elevated velocity of the proxilmal superficial femoral artery(230 cm/sec)   suggests a greater than 50% stenosis. Calf vessels were poorly seen but reveal some hyperemic monophasic flow in all   three runoff vessels. Left Impression   Left MICKY: 1.69. This is consistent with non-compressible vessels due to   arterial calcification. Ultrasound images of the left lower extremity reveal moderate calcific plaque   formation throughout. Elevated velocity of the proximal superficial femoral artery(267 cm/sec)   suggest a greater than 50% stenosis. Calf vessels were poorly seen. Imaging of calf vessels reveal monophasic and somewhat hyperemic flow in two   runoff vessels. Assessment:   Right hallux ulceration  Diabetes      Plan:  81-year-old male with nonhealing right hallux ulceration. Noninvasive studies suggest right common femoral and superficial femoral artery stenosis that is clinically significant. At this point we will move forward with angiogram and possible right lower extremity intervention. All risk discussed in detail today. All questions answered. Simona Nguyen M.D., FACS.  11/10/2021  12:18 PM

## 2021-11-11 ENCOUNTER — PREP FOR PROCEDURE (OUTPATIENT)
Dept: VASCULAR SURGERY | Age: 81
End: 2021-11-11

## 2021-11-11 RX ORDER — SODIUM CHLORIDE 0.9 % (FLUSH) 0.9 %
5-40 SYRINGE (ML) INJECTION EVERY 12 HOURS SCHEDULED
Status: CANCELLED | OUTPATIENT
Start: 2021-11-11

## 2021-11-11 RX ORDER — SODIUM CHLORIDE 0.9 % (FLUSH) 0.9 %
5-40 SYRINGE (ML) INJECTION PRN
Status: CANCELLED | OUTPATIENT
Start: 2021-11-11

## 2021-11-11 RX ORDER — SODIUM CHLORIDE 9 MG/ML
INJECTION, SOLUTION INTRAVENOUS CONTINUOUS
Status: CANCELLED | OUTPATIENT
Start: 2021-11-11

## 2021-11-11 RX ORDER — SODIUM CHLORIDE 9 MG/ML
25 INJECTION, SOLUTION INTRAVENOUS PRN
Status: CANCELLED | OUTPATIENT
Start: 2021-11-11

## 2021-11-16 ENCOUNTER — HOSPITAL ENCOUNTER (OUTPATIENT)
Dept: WOUND CARE | Age: 81
Discharge: HOME | End: 2021-11-16
Payer: MEDICARE

## 2021-11-16 VITALS
RESPIRATION RATE: 16 BRPM | DIASTOLIC BLOOD PRESSURE: 77 MMHG | HEART RATE: 89 BPM | SYSTOLIC BLOOD PRESSURE: 127 MMHG | TEMPERATURE: 97.6 F

## 2021-11-16 DIAGNOSIS — E11.649 UNCONTROLLED TYPE 2 DIABETES MELLITUS WITH HYPOGLYCEMIA, UNSPECIFIED HYPOGLYCEMIA COMA STATUS (HCC): ICD-10-CM

## 2021-11-16 DIAGNOSIS — I70.235 ATHEROSCLEROSIS OF NATIVE ARTERIES OF RIGHT LEG WITH ULCERATION OF OTHER PART OF FOOT (HCC): ICD-10-CM

## 2021-11-16 DIAGNOSIS — Z74.09 IMPAIRED MOBILITY: ICD-10-CM

## 2021-11-16 DIAGNOSIS — I87.2 PERIPHERAL VENOUS INSUFFICIENCY: ICD-10-CM

## 2021-11-16 DIAGNOSIS — L97.512 ULCER OF TOE OF RIGHT FOOT, WITH FAT LAYER EXPOSED (HCC): Primary | ICD-10-CM

## 2021-11-16 PROCEDURE — 11042 DBRDMT SUBQ TIS 1ST 20SQCM/<: CPT | Performed by: EMERGENCY MEDICINE

## 2021-11-16 PROCEDURE — 11042 DBRDMT SUBQ TIS 1ST 20SQCM/<: CPT

## 2021-11-16 RX ORDER — LIDOCAINE 50 MG/G
OINTMENT TOPICAL ONCE
Status: CANCELLED | OUTPATIENT
Start: 2021-11-16 | End: 2021-11-16

## 2021-11-16 RX ORDER — LIDOCAINE 40 MG/G
CREAM TOPICAL ONCE
Status: CANCELLED | OUTPATIENT
Start: 2021-11-16 | End: 2021-11-16

## 2021-11-16 RX ORDER — BETAMETHASONE DIPROPIONATE 0.05 %
OINTMENT (GRAM) TOPICAL ONCE
Status: CANCELLED | OUTPATIENT
Start: 2021-11-16 | End: 2021-11-16

## 2021-11-16 RX ORDER — CLOBETASOL PROPIONATE 0.5 MG/G
OINTMENT TOPICAL ONCE
Status: CANCELLED | OUTPATIENT
Start: 2021-11-16 | End: 2021-11-16

## 2021-11-16 RX ORDER — LIDOCAINE 40 MG/G
CREAM TOPICAL ONCE
Status: DISCONTINUED | OUTPATIENT
Start: 2021-11-16 | End: 2021-11-17 | Stop reason: HOSPADM

## 2021-11-16 RX ORDER — BACITRACIN ZINC AND POLYMYXIN B SULFATE 500; 1000 [USP'U]/G; [USP'U]/G
OINTMENT TOPICAL ONCE
Status: CANCELLED | OUTPATIENT
Start: 2021-11-16 | End: 2021-11-16

## 2021-11-16 RX ORDER — LIDOCAINE HYDROCHLORIDE 40 MG/ML
SOLUTION TOPICAL ONCE
Status: CANCELLED | OUTPATIENT
Start: 2021-11-16 | End: 2021-11-16

## 2021-11-16 RX ORDER — BACITRACIN, NEOMYCIN, POLYMYXIN B 400; 3.5; 5 [USP'U]/G; MG/G; [USP'U]/G
OINTMENT TOPICAL ONCE
Status: CANCELLED | OUTPATIENT
Start: 2021-11-16 | End: 2021-11-16

## 2021-11-16 RX ORDER — GINSENG 100 MG
CAPSULE ORAL ONCE
Status: CANCELLED | OUTPATIENT
Start: 2021-11-16 | End: 2021-11-16

## 2021-11-16 RX ORDER — LIDOCAINE HYDROCHLORIDE 20 MG/ML
JELLY TOPICAL ONCE
Status: CANCELLED | OUTPATIENT
Start: 2021-11-16 | End: 2021-11-16

## 2021-11-16 RX ORDER — GENTAMICIN SULFATE 1 MG/G
OINTMENT TOPICAL ONCE
Status: CANCELLED | OUTPATIENT
Start: 2021-11-16 | End: 2021-11-16

## 2021-11-16 NOTE — PLAN OF CARE
Discharge instructions given. Patient verbalized understanding. Return to [facility] in 1 week(s).   Continue with La

## 2021-11-16 NOTE — PROGRESS NOTES
[address] [facility]   Progress Note   Referring provider: Dr. Bing Barnett  Reason for referral: Replaced by [facility]     Cyndi RECORD NUMBER:  [id number]  AGE: [de-identified] y.o. GENDER: male  : 1940  EPISODE DATE:  2021    Subjective:     Chief Complaint   Patient presents with    Wound Check     right lower extremity      10/26/21: doing well. No new problems    21: had dopplers done. No new wound care issues    21: has appt with Dr. Rosa Gilman tomorrow. Doing well with dressing changes. Feels wound improving. 21:     HISTORY of PRESENT ILLNESS HPI on initial visit to wound center[de-identified]      Melanie Casillas is a [de-identified] y.o. male who presents today for an initial evaluation of a wound/ulcer. Patient is new to the wound center. Wound duration: mid 2021. History of Wound Context: 42-year-old male diabetic who struck his toe against furniture mid 2021. The patient states that the rug slipped under him next to his bed and he accidentally struck his toe. Now with an ulcer to the plantar right great toe. Patient has chronic venous stasis as well. Has had trouble with diabetic control but his last hemoglobin A1c on 2021 was noted to be 7.0 which is considered to be an improvement for him. He does admit to intermittently having a poor diet with significant sodium intake as well. Denies any associated systemic complaints. Patient presented to his primary care doctor recently for evaluation of diabetes. The patient was noted to have the ulcer and referred here for ongoing wound care recommendations. He denies any pain at the site of the wound. Does have diabetic neuropathy. Previous smoker.      Pertinent associated symptoms: drainage , redness, swelling and impaired mobility    PAST MEDICAL HISTORY        Diagnosis Date    Acid reflux     Atrial flutter ([address]     converted    Bleeding stomach ulcer oct 2015    BPH (benign prostatic hyperplasia)     CAD (coronary artery disease)      angio with 2 blocked bypass and 2 patent    COPD (chronic obstructive pulmonary disease) [facility]     Diabetes mellitus type II     Edema     chronic left leg    Elevated PSA- nl 11     Hyperlipidemia     Hypertension     Ischemic cardiomyopathy     Lipoma of skin-RIGHT CHEST 2011    Obesity     Osteoarthritis     Skin tear of left forearm without complication 8597    Significant amount of epidermal loss with bleeding.  Spinal stenosis of lumbar region with neurogenic claudication- clinically 2018       PAST SURGICAL HISTORY    Past Surgical History:   Procedure Laterality Date    CATARACT REMOVAL  ,     bilat    COLONOSCOPY      CORONARY ARTERY BYPASS GRAFT  1993    x 4    EYE SURGERY Left 2019    cataract coming back    JOINT REPLACEMENT Right total knee replacement    JOINT REPLACEMENT Left 2016       FAMILY HISTORY    Family History   Problem Relation Age of Onset    Cancer Mother         breast    Cancer Father         throat       SOCIAL HISTORY    Social History     Tobacco Use    Smoking status: Former Smoker     Packs/day: 3.50     Years: 32.00     Pack years: 112.00     Types: Cigarettes     Quit date: 1985     Years since quittin.8    Smokeless tobacco: Never Used    Tobacco comment: advised not to resume   Substance Use Topics    Alcohol use: No     Alcohol/week: 0.0 standard drinks    Drug use: No       ALLERGIES    Allergies   Allergen Reactions    Entresto [Sacubitril-Valsartan] Other (See Comments)     Renal failure       MEDICATIONS    Current Outpatient Medications on File Prior to Encounter   Medication Sig Dispense Refill    dapagliflozin (FARXIGA) 5 MG tablet Take 1 tablet by mouth daily LOT [id number] EXP  2 BOXES   LOT [id number] EXP  1 BOXES 30 tablet 0    Lancets MISC 1 each by Does not apply route daily Use to check glucose twice a day. One Touch brand.   DX E11.9 100 each 3    blood glucose monitor kit and supplies Test 2 times a day & as needed for symptoms of irregular blood glucose. 1 kit 0    blood glucose monitor strips Test 2 times a day & as needed for symptoms of irregular blood glucose. 200 strip 3    blood glucose test strips (ASCENSIA AUTODISC VI;ONE TOUCH ULTRA TEST VI) strip four times daily 400 strip 3    INSULIN SYRINGE .5CC/29G 29G X 1/2\" 0.5 ML MISC INJECT 4 TIMES DAILY AS NEEDED 400 each 1    omeprazole (PRILOSEC) 20 MG delayed release capsule TAKE 1 CAPSULE BY MOUTH TWICE DAILY 180 capsule 1    furosemide (LASIX) 80 MG tablet TAKE 1 TABLET BY MOUTH DAILY 90 tablet 0    atorvastatin (LIPITOR) 40 MG tablet TAKE 1 TABLET BY MOUTH EVERY EVENING 90 tablet 1    amiodarone (CORDARONE) 200 MG tablet TAKE 1 TABLET BY MOUTH DAILY 90 tablet 0    potassium chloride (KLOR-CON) 10 MEQ extended release tablet TAKE 1 TABLET BY MOUTH DAILY 90 tablet 1    FARXIGA 10 MG tablet TAKE 1 TABLET BY MOUTH DAILY      hydrALAZINE (APRESOLINE) 25 MG tablet TAKE 1 TABLET BY MOUTH THREE TIMES DAILY      Cyanocobalamin (B-12) 500 MCG TABS TAKE 1 TABLET BY MOUTH DAILY 90 tablet 1    albuterol sulfate HFA (VENTOLIN HFA) 108 (90 Base) MCG/ACT inhaler Inhale 2 puffs into the lungs every 6 hours as needed for Wheezing 1 Inhaler 3    umeclidinium-vilanterol (ANORO ELLIPTA) 62.5-25 MCG/INH AEPB inhaler INHALE 1 PUFF INTO THE LUNGS DAILY 1 each 5    gabapentin (NEURONTIN) 600 MG tablet TAKE UP TO 5 TABLETS BY MOUTH OVER 24 HOURS AS DIRECTED 150 tablet 5    insulin NPH (NOVOLIN N) 100 UNIT/ML injection vial Take 44 units with breakfast and 32 units with dinner.  (Patient taking differently: Take 20 units with breakfast and 20 units with dinner.) 30 mL 3    insulin regular (NOVOLIN R RELION) 100 UNIT/ML injection INJECT 20 TO 30 UNITS SUBCUTANEOUSLY THREE TIMES DAILY BEFORE MEAL(S) 30 mL 0    FEROSUL 325 (65 Fe) MG tablet TAKE 1 TABLET BY MOUTH TWICE DAILY 180 tablet 3    tiotropium (SPIRIVA HANDIHALER) 18 MCG inhalation capsule Inhale 1 capsule into the lungs daily INCLUDE inhaler device 30 capsule 5    Nebulizers (AIRIAL COMPACT MINI NEBULIZER) MISC 1 each by Does not apply route every 6 hours as needed (wheezing or shortness of breath) 1 each 0    Respiratory Therapy Supplies (NEBULIZER/TUBING/MOUTHPIECE) KIT 1 kit by Does not apply route every 6 hours as needed (for wheezing or shortness of breath) 1 kit 1    albuterol (PROVENTIL) (2.5 MG/3ML) 0.083% nebulizer solution Take 3 mLs by nebulization every 6 hours as needed for Wheezing 50 vial 3    vitamin D (ERGOCALCIFEROL) 27766 units CAPS capsule TK 1 C PO WEEKLY  2    mupirocin (BACTROBAN) 2 % ointment Apply 3 times daily as needed 22 g 1    levalbuterol (XOPENEX HFA) 45 MCG/ACT inhaler Inhale 1-2 puffs into the lungs every 4 hours as needed for Wheezing 1 Inhaler 3    aspirin 81 MG EC tablet Take 81 mg by mouth daily      apixaban (ELIQUIS) 2.5 MG TABS tablet Take 1 tablet by mouth 2 times daily 60 tablet 5    metoprolol (LOPRESSOR) 25 MG tablet Take 25 mg by mouth daily       isosorbide mononitrate (IMDUR) 30 MG CR tablet Take 1 tablet by mouth every morning Dr. Jamil Perdomo - Cardio 30 tablet 6    nitroGLYCERIN (NITROSTAT) 0.3 MG SL tablet Take one sublingual for chest pain. May repeat twice at 5 min intervals. If not getting relief call 911. 25 tablet 3     No current facility-administered medications on file prior to encounter. REVIEW OF SYSTEMS  A comprehensive review of systems was negative except for: Pertinent items are noted in HPI. Objective:      /77   Pulse 89   Temp 97.6 °F (36.4 °C) (Infrared)   Resp 16     Wt Readings from Last 3 Encounters:   11/10/21 273 lb (123.8 kg)   09/30/21 265 lb (120.2 kg)   09/17/21 270 lb (122.5 kg)       PHYSICAL EXAM  General Appearance/Constitutional: alert and oriented to person, place and time, well-developed and well nourished, and in no acute distress. Nontoxic. Skin: warm and dry, no rash, positive wound per LDA documentation. Head: normocephalic and atraumatic. Eyes: extraocular eye movements intact, conjunctivae normal, and sclera anicteric. ENT: hearing grossly normal bilaterally. Normal appearance. Pulmonary/Chest: no chest wall tenderness and clear anteriorly. No respiratory distress. Cardiovascular: normal rate and regular rhythm. GI: abdomen soft, non-tender and non-distended. Musculoskeletal: normal range of motion of joints. Nontender calves. No cyanosis. Nontender calves. Edema 2+  Neurologic: no gross cranial nerve deficit and speech normal. No focal deficits. Mental status normal.      Medical Decision Makin-year-old male with a diabetic foot ulcer to plantar surface of right great toe. Severity of fat layers exposed. Guzman grade 1.     Wound evaluation: continues to heal. More epithelization, mild fibrous nonviable tissue with callus    Problem List Items Addressed This Visit     Diabetes mellitus type II, uncontrolled ([address]    Relevant Medications    lidocaine (LMX) 4 % cream (Start on 2021  2:15 PM)    Other Relevant Orders    Initiate Outpatient Wound Care Protocol    Ulcer of toe of right foot, with fat layer exposed ([facility] - Primary    Relevant Medications    lidocaine (LMX) 4 % cream (Start on 2021  2:15 PM)    Other Relevant Orders    Initiate Outpatient Wound Care Protocol    Impaired mobility    Peripheral venous insufficiency    Relevant Medications    lidocaine (LMX) 4 % cream (Start on 2021  2:15 PM)    Other Relevant Orders    Initiate Outpatient Wound Care Protocol    Atherosclerosis of native arteries of right leg with ulceration of other part of foot (HCC)    Relevant Medications    lidocaine (LMX) 4 % cream (Start on 2021  2:15 PM)    Other Relevant Orders    Initiate Outpatient Wound Care Protocol        Comorbid conditions affecting wound healing: DM type 2, CHF, COPD, CAD, HTN, chronic kidney disease, chronic anticoagulation. Previous smoker, quit Jan 1985    Problems addressed during this encounter:  1. Diabetic foot ulcer, fat layers exposed. Debridement. Offloading felt placed. 2. Diabetes Mellitus type 2, uncontrolled. Last hemoglobin A1c was 7.0 on 9/17/2021. Education and counseling provided here. 3. Impaired mobility, counseling and education provided to assist with flexibility as well as movement but at the same time help with pressure relief. Offloading felt placed. Postop shoe provided. 4. Venous stasis/leg edema. Elevation. Education counseling provided. Gentle compression initiated. 5. Suspected atherosclerosis of native arteries of right lower extremity. Screening MICKY unable to be done in the office due to noncompressible state. Arterial Dopplers done. Images reviewed. Abnormal. appt with vascular 11/10/21. Data reviewed and analyzed:  1. Assessment required other independent historian(s): No patient . 2. Review of prior external note from other providers done today: No  6. New imaging or lab ordered today: No  3. Review of test results done today: Yes arterial doppers, abnormal. Referral to vascular done. Patient has been seen by Dr. Bartolo Newman. He plans on vascular intervention   4. Independent interpretation of test(s): No  5. Discussion of management or test interpretation with another provider and other external source, referral message to Dr. Bartolo Newman. Risk of complications and/or mortality of patient management:  1. This patient has a moderate risk of morbidity and mortality from additional diagnostic testing or treatment. This is due to the above conditions affecting wound healing as well as patient and procedure risk factors. Education and discussion held with patient regarding these disease processes pertinent to wound(s). 2. Other pertinent decisions include: minor surgery or procedures as below.   3. The patient's diagnosis or treatment is not significantly limited by social determinants of health as noted by: N/A .  4. Prescription drug management: N/A       Procedure Note  Indications:  Based on my examination of this patient's wound(s)/ulcer(s) today, debridement is required to promote healing and evaluate the wound base. Performed by: Rahat Gtz MD    Consent obtained:  Yes    Time out taken:  Yes    Pain Control: Anesthetic  Anesthetic: 4% Lidocaine Cream       Debridement: Excisional Debridement    Using #15 blade scalpel and forceps the wound(s)/ulcer(s) was/were debrided down through and including the removal of subcutaneous tissue.         Devitalized Tissue Debrided:  fibrin, biofilm, slough and callus    Pre Debridement Measurements:  Are located in the [facility]  Documentation Flow Sheet    Diabetic/Pressure/Non Pressure Ulcers only:  Ulcer: Diabetic ulcer, fat layer exposed     Wound/Ulcer #: 1    Post Debridement Measurements:  Wound/Ulcer Descriptions are Pre Debridement except measurements:    Wound 09/30/21 Toe (Comment  which one) Right;Plantar Great #1 (Active)   Wound Image   10/26/21 1308   Wound Etiology Diabetic 11/16/21 1354   Wound Cleansed Cleansed with saline 11/16/21 1354   Dressing/Treatment Alginate; Dry dressing 11/09/21 1452   Offloading for Diabetic Foot Ulcers Post op shoe 11/16/21 1354   Wound Length (cm) 0.2 cm 11/16/21 1354   Wound Width (cm) 0.1 cm 11/16/21 1354   Wound Depth (cm) 0.1 cm 11/16/21 1354   Wound Surface Area (cm^2) 0.02 cm^2 11/16/21 1354   Change in Wound Size % (l*w) 98.97 11/16/21 1354   Wound Volume (cm^3) 0.002 cm^3 11/16/21 1354   Wound Healing % 99 11/16/21 1354   Post-Procedure Length (cm) 0.3 cm 11/09/21 1355   Post-Procedure Width (cm) 0.2 cm 11/09/21 1355   Post-Procedure Depth (cm) 0.1 cm 11/09/21 1355   Post-Procedure Surface Area (cm^2) 0.06 cm^2 11/09/21 1355   Post-Procedure Volume (cm^3) 0.006 cm^3 11/09/21 1355   Wound Assessment Bleeding 11/16/21 1354   Drainage Amount Scant 11/16/21 5737 Drainage Description Serosanguinous 11/16/21 1354   Odor None 11/16/21 1354   Jaylyn-wound Assessment Hyperkeratosis (callous) 11/16/21 1354   Margins Defined edges 11/16/21 1354   Number of days: 47          Total Surface Area Debrided:  0.02 sq cm     Estimated Blood Loss:  Minimal    Hemostasis Achieved:  by pressure    Procedural Pain:  0  / 10     Post Procedural Pain:  0 / 10     Response to treatment:  Well tolerated by patient. Written patient dismissal instructions given to patient and signed by patient or POA. Discharge Instructions       [facility]  [address] [place] [address], [address] [place] [address] Road  Telephone: [phone]    Discharge Instructions    Important reminders:    1. Increase Protein intake for optimal wound healing  2. No added salt to reduce any swelling  3. If diabetic, maintain good glucose control  4. If you smoke, smoking prohibits wound healing, we ask that you refrain from smoking. 5. When taking antibiotics take the entire prescription as ordered. Do not stop taking until medication is all gone unless otherwise instructed. 6. Exercise as tolerated. 7. Keep weight off wounds and reposition every 2 hours if applicable. 8. If wound(s) is on your lower extremity, elevate legs to the level of the heart or above for 30 minutes 4-5 times a day and/or when sitting. Avoid standing for long periods of time. 9. Do not get wounds wet in bath or shower unless otherwise instructed by your physician. If your wound is on your foot or leg, you may purchase a cast bag. Please ask at the pharmacy. If Vascular testing is ordered, please call [address] [place] [address] ([phone]) to schedule. Vascular tests ordered by Wound Care Physicians may take up to 2 hours to complete. Please keep that in mind when scheduling. If Vascular testing is scheduled, please bring supplies to replace your dressing after testing is done.  The vascular department does not stock UNTIL YOU ARE ABLE TO REACH US. IT IS MOST IMPORTANT TO KEEP THE WOUND COVERED AT ALL TIMES.               Electronically signed by Vitaly Swanson MD on 11/16/2021 at 2:03 PM

## 2021-11-23 ENCOUNTER — HOSPITAL ENCOUNTER (OUTPATIENT)
Dept: WOUND CARE | Age: 81
Discharge: HOME | End: 2021-11-23
Payer: MEDICARE

## 2021-11-23 VITALS
RESPIRATION RATE: 16 BRPM | HEART RATE: 83 BPM | DIASTOLIC BLOOD PRESSURE: 77 MMHG | SYSTOLIC BLOOD PRESSURE: 142 MMHG | TEMPERATURE: 97.3 F

## 2021-11-23 DIAGNOSIS — I87.2 PERIPHERAL VENOUS INSUFFICIENCY: ICD-10-CM

## 2021-11-23 DIAGNOSIS — E11.649 UNCONTROLLED TYPE 2 DIABETES MELLITUS WITH HYPOGLYCEMIA, UNSPECIFIED HYPOGLYCEMIA COMA STATUS (HCC): ICD-10-CM

## 2021-11-23 DIAGNOSIS — L97.512 ULCER OF TOE OF RIGHT FOOT, WITH FAT LAYER EXPOSED (HCC): Primary | ICD-10-CM

## 2021-11-23 DIAGNOSIS — I70.235 ATHEROSCLEROSIS OF NATIVE ARTERIES OF RIGHT LEG WITH ULCERATION OF OTHER PART OF FOOT (HCC): ICD-10-CM

## 2021-11-23 PROCEDURE — 99213 OFFICE O/P EST LOW 20 MIN: CPT

## 2021-11-23 PROCEDURE — 99212 OFFICE O/P EST SF 10 MIN: CPT | Performed by: EMERGENCY MEDICINE

## 2021-11-23 RX ORDER — GINSENG 100 MG
CAPSULE ORAL ONCE
Status: CANCELLED | OUTPATIENT
Start: 2021-11-23 | End: 2021-11-23

## 2021-11-23 RX ORDER — LIDOCAINE HYDROCHLORIDE 20 MG/ML
JELLY TOPICAL ONCE
Status: CANCELLED | OUTPATIENT
Start: 2021-11-23 | End: 2021-11-23

## 2021-11-23 RX ORDER — LIDOCAINE 40 MG/G
CREAM TOPICAL ONCE
Status: DISCONTINUED | OUTPATIENT
Start: 2021-11-23 | End: 2021-11-24 | Stop reason: HOSPADM

## 2021-11-23 RX ORDER — BETAMETHASONE DIPROPIONATE 0.05 %
OINTMENT (GRAM) TOPICAL ONCE
Status: CANCELLED | OUTPATIENT
Start: 2021-11-23 | End: 2021-11-23

## 2021-11-23 RX ORDER — LIDOCAINE 50 MG/G
OINTMENT TOPICAL ONCE
Status: CANCELLED | OUTPATIENT
Start: 2021-11-23 | End: 2021-11-23

## 2021-11-23 RX ORDER — BACITRACIN ZINC AND POLYMYXIN B SULFATE 500; 1000 [USP'U]/G; [USP'U]/G
OINTMENT TOPICAL ONCE
Status: CANCELLED | OUTPATIENT
Start: 2021-11-23 | End: 2021-11-23

## 2021-11-23 RX ORDER — LIDOCAINE 40 MG/G
CREAM TOPICAL ONCE
Status: CANCELLED | OUTPATIENT
Start: 2021-11-23 | End: 2021-11-23

## 2021-11-23 RX ORDER — CLOBETASOL PROPIONATE 0.5 MG/G
OINTMENT TOPICAL ONCE
Status: CANCELLED | OUTPATIENT
Start: 2021-11-23 | End: 2021-11-23

## 2021-11-23 RX ORDER — GENTAMICIN SULFATE 1 MG/G
OINTMENT TOPICAL ONCE
Status: CANCELLED | OUTPATIENT
Start: 2021-11-23 | End: 2021-11-23

## 2021-11-23 RX ORDER — BACITRACIN, NEOMYCIN, POLYMYXIN B 400; 3.5; 5 [USP'U]/G; MG/G; [USP'U]/G
OINTMENT TOPICAL ONCE
Status: CANCELLED | OUTPATIENT
Start: 2021-11-23 | End: 2021-11-23

## 2021-11-23 RX ORDER — LIDOCAINE HYDROCHLORIDE 40 MG/ML
SOLUTION TOPICAL ONCE
Status: CANCELLED | OUTPATIENT
Start: 2021-11-23 | End: 2021-11-23

## 2021-11-23 NOTE — PROGRESS NOTES
[address] [facility]   Progress Note   Referring provider: Dr. Jordan Victor  Reason for referral: [facility]     Cyndi RECORD NUMBER:  [id number]  AGE: [de-identified] y.o. GENDER: male  : 1940  EPISODE DATE:  2021    Subjective:     Chief Complaint   Patient presents with    Wound Check     Right great toe      10/26/21: doing well. No new problems    21: had dopplers done. No new wound care issues    21: has appt with Dr. España Listen tomorrow. Doing well with dressing changes. Feels wound improving. 21: no new issues    HISTORY of PRESENT ILLNESS HPI on initial visit to wound center[de-identified]      Mikayla Brito is a [de-identified] y.o. male who presents today for an initial evaluation of a wound/ulcer. Patient is new to the wound center. Wound duration: mid 2021. History of Wound Context: 81-year-old male diabetic who struck his toe against furniture mid 2021. The patient states that the rug slipped under him next to his bed and he accidentally struck his toe. Now with an ulcer to the plantar right great toe. Patient has chronic venous stasis as well. Has had trouble with diabetic control but his last hemoglobin A1c on 2021 was noted to be 7.0 which is considered to be an improvement for him. He does admit to intermittently having a poor diet with significant sodium intake as well. Denies any associated systemic complaints. Patient presented to his primary care doctor recently for evaluation of diabetes. The patient was noted to have the ulcer and referred here for ongoing wound care recommendations. He denies any pain at the site of the wound. Does have diabetic neuropathy. Previous smoker.      Pertinent associated symptoms: drainage , redness, swelling and impaired mobility    PAST MEDICAL HISTORY        Diagnosis Date    Acid reflux     Atrial flutter ([address]     converted    Bleeding stomach ulcer oct 2015    BPH (benign prostatic hyperplasia)     CAD (coronary artery disease)      angio with 2 blocked bypass and 2 patent    COPD [facility]ch[facility] obstructive [facility] disease) [facility]     Diabetes mellitus type II     Edema     chronic left leg    Elevated PSA- nl 11     Hyperlipidemia     Hypertension     Ischemic cardiomyopathy     Lipoma of skin-RIGHT CHEST 2011    Obesity     Osteoarthritis     Skin tear of left forearm without complication     Significant amount of epidermal loss with bleeding.  Spinal stenosis of lumbar region with neurogenic claudication- clinically 2018       PAST SURGICAL HISTORY    Past Surgical History:   Procedure Laterality Date    CATARACT REMOVAL  2011    bilat    COLONOSCOPY      CORONARY ARTERY BYPASS GRAFT  1993    x 4    EYE SURGERY Left 2019    cataract coming back    JOINT REPLACEMENT Right total knee replacement    JOINT REPLACEMENT Left 2016       FAMILY HISTORY    Family History   Problem Relation Age of Onset    Cancer Mother         breast    Cancer Father         throat       SOCIAL HISTORY    Social History     Tobacco Use    Smoking status: Former Smoker     Packs/day: 3.50     Years: 32.00     Pack years: 112.00     Types: Cigarettes     Quit date: 1985     Years since quittin.9    Smokeless tobacco: Never Used    Tobacco comment: advised not to resume   Substance Use Topics    Alcohol use: No     Alcohol/week: 0.0 standard drinks    Drug use: No       ALLERGIES    Allergies   Allergen Reactions    Entresto [Sacubitril-Valsartan] Other (See Comments)     Renal failure       MEDICATIONS    Current Outpatient Medications on File Prior to Encounter   Medication Sig Dispense Refill    dapagliflozin (FARXIGA) 5 MG tablet Take 1 tablet by mouth daily LOT [id number] EXP  2 BOXES   LOT [id number] EXP  1 BOXES 30 tablet 0    Lancets MISC 1 each by Does not apply route daily Use to check glucose twice a day. One Touch brand. DX E11.9 100 each 3    blood glucose monitor kit and supplies Test 2 times a day & as needed for symptoms of irregular blood glucose. 1 kit 0    blood glucose monitor strips Test 2 times a day & as needed for symptoms of irregular blood glucose. 200 strip 3    blood glucose test strips (ASCENSIA AUTODISC VI;ONE TOUCH ULTRA TEST VI) strip four times daily 400 strip 3    INSULIN SYRINGE .5CC/29G 29G X 1/2\" 0.5 ML MISC INJECT 4 TIMES DAILY AS NEEDED 400 each 1    omeprazole (PRILOSEC) 20 MG delayed release capsule TAKE 1 CAPSULE BY MOUTH TWICE DAILY 180 capsule 1    furosemide (LASIX) 80 MG tablet TAKE 1 TABLET BY MOUTH DAILY 90 tablet 0    atorvastatin (LIPITOR) 40 MG tablet TAKE 1 TABLET BY MOUTH EVERY EVENING 90 tablet 1    amiodarone (CORDARONE) 200 MG tablet TAKE 1 TABLET BY MOUTH DAILY 90 tablet 0    potassium chloride (KLOR-CON) 10 MEQ extended release tablet TAKE 1 TABLET BY MOUTH DAILY 90 tablet 1    FARXIGA 10 MG tablet TAKE 1 TABLET BY MOUTH DAILY      hydrALAZINE (APRESOLINE) 25 MG tablet TAKE 1 TABLET BY MOUTH THREE TIMES DAILY      Cyanocobalamin (B-12) 500 MCG TABS TAKE 1 TABLET BY MOUTH DAILY 90 tablet 1    albuterol sulfate HFA (VENTOLIN HFA) 108 (90 Base) MCG/ACT inhaler Inhale 2 puffs into the lungs every 6 hours as needed for Wheezing 1 Inhaler 3    umeclidinium-vilanterol (ANORO ELLIPTA) 62.5-25 MCG/INH AEPB inhaler INHALE 1 PUFF INTO THE LUNGS DAILY 1 each 5    gabapentin (NEURONTIN) 600 MG tablet TAKE UP TO 5 TABLETS BY MOUTH OVER 24 HOURS AS DIRECTED 150 tablet 5    insulin NPH (NOVOLIN N) 100 UNIT/ML injection vial Take 44 units with breakfast and 32 units with dinner.  (Patient taking differently: Take 20 units with breakfast and 20 units with dinner.) 30 mL 3    insulin regular (NOVOLIN R RELION) 100 UNIT/ML injection INJECT 20 TO 30 UNITS SUBCUTANEOUSLY THREE TIMES DAILY BEFORE MEAL(S) 30 mL 0    FEROSUL 325 (65 Fe) MG tablet TAKE 1 TABLET BY MOUTH TWICE DAILY 180 tablet 3  tiotropium (SPIRIVA HANDIHALER) 18 MCG inhalation capsule Inhale 1 capsule into the lungs daily INCLUDE inhaler device 30 capsule 5    Nebulizers (AIRIAL COMPACT MINI NEBULIZER) MISC 1 each by Does not apply route every 6 hours as needed (wheezing or shortness of breath) 1 each 0    Respiratory Therapy Supplies (NEBULIZER/TUBING/MOUTHPIECE) KIT 1 kit by Does not apply route every 6 hours as needed (for wheezing or shortness of breath) 1 kit 1    albuterol (PROVENTIL) (2.5 MG/3ML) 0.083% nebulizer solution Take 3 mLs by nebulization every 6 hours as needed for Wheezing 50 vial 3    vitamin D (ERGOCALCIFEROL) 52359 units CAPS capsule TK 1 C PO WEEKLY  2    mupirocin (BACTROBAN) 2 % ointment Apply 3 times daily as needed 22 g 1    levalbuterol (XOPENEX HFA) 45 MCG/ACT inhaler Inhale 1-2 puffs into the lungs every 4 hours as needed for Wheezing 1 Inhaler 3    aspirin 81 MG EC tablet Take 81 mg by mouth daily      apixaban (ELIQUIS) 2.5 MG TABS tablet Take 1 tablet by mouth 2 times daily 60 tablet 5    metoprolol (LOPRESSOR) 25 MG tablet Take 25 mg by mouth daily       isosorbide mononitrate (IMDUR) 30 MG CR tablet Take 1 tablet by mouth every morning Dr. Praful Govea - Cardio 30 tablet 6    nitroGLYCERIN (NITROSTAT) 0.3 MG SL tablet Take one sublingual for chest pain. May repeat twice at 5 min intervals. If not getting relief call 911. 25 tablet 3     No current facility-administered medications on file prior to encounter. REVIEW OF SYSTEMS  A comprehensive review of systems was negative except for: Pertinent items are noted in HPI.     Objective:      BP (!) 142/77   Pulse 83   Temp 97.3 °F (36.3 °C) (Infrared)   Resp 16     Wt Readings from Last 3 Encounters:   11/10/21 273 lb (123.8 kg)   09/30/21 265 lb (120.2 kg)   09/17/21 270 lb (122.5 kg)       PHYSICAL EXAM  General Appearance/Constitutional: alert and oriented to person, place and time, well-developed and well nourished, and in no acute distress. Nontoxic. Skin: warm and dry, no rash, positive wound per LDA documentation. Head: normocephalic and atraumatic. Eyes: extraocular eye movements intact, conjunctivae normal, and sclera anicteric. ENT: hearing grossly normal bilaterally. Normal appearance. Pulmonary/Chest: no chest wall tenderness and clear anteriorly. No respiratory distress. Cardiovascular: normal rate and regular rhythm. GI: abdomen soft, non-tender and non-distended. Musculoskeletal: normal range of motion of joints. Nontender calves. No cyanosis. Nontender calves. Edema 2+  Neurologic: no gross cranial nerve deficit and speech normal. No focal deficits. Mental status normal.      Medical Decision Makin-year-old male with a diabetic foot ulcer to plantar surface of right great toe. Severity of fat layers exposed. Guzman grade 1. Wound evaluation: continues to heal. More epithelization, mild fibrous nonviable tissue with callus    Problem List Items Addressed This Visit     Diabetes mellitus type II, uncontrolled (HCC)    Relevant Medications    lidocaine (LMX) 4 % cream    Other Relevant Orders    Initiate Outpatient Wound Care Protocol    Ulcer of toe of right foot, with fat layer exposed ([facility] - Primary    Relevant Medications    lidocaine (LMX) 4 % cream    Other Relevant Orders    Initiate Outpatient Wound Care Protocol    Peripheral venous insufficiency    Relevant Medications    lidocaine (LMX) 4 % cream    Other Relevant Orders    Initiate Outpatient Wound Care Protocol    Atherosclerosis of native arteries of right leg with ulceration of other part of foot (HCC)    Relevant Medications    lidocaine (LMX) 4 % cream    Other Relevant Orders    Initiate Outpatient Wound Care Protocol        Comorbid conditions affecting wound healing: DM type 2, CHF, COPD, CAD, HTN, chronic kidney disease, chronic anticoagulation. Previous smoker, quit 1985    Problems addressed during this encounter:  1.  Diabetic foot Diabetic/Pressure/Non Pressure Ulcers only:  Ulcer: Diabetic ulcer, fat layer exposed     Wound/Ulcer #: 1    Post Debridement Measurements:  Wound/Ulcer Descriptions are Pre Debridement except measurements:    Wound 09/30/21 Toe (Comment  which one) Right;Plantar Great #1 (Active)   Wound Image   11/23/21 1347   Wound Etiology Diabetic 11/23/21 1347   Wound Cleansed Cleansed with saline 11/23/21 1347   Dressing/Treatment Alginate; Dry dressing 11/09/21 1452   Offloading for Diabetic Foot Ulcers Post op shoe 11/23/21 1347   Wound Length (cm) 0 cm 11/23/21 1347   Wound Width (cm) 0 cm 11/23/21 1347   Wound Depth (cm) 0 cm 11/23/21 1347   Wound Surface Area (cm^2) 0 cm^2 11/23/21 1347   Change in Wound Size % (l*w) 100 11/23/21 1347   Wound Volume (cm^3) 0 cm^3 11/23/21 1347   Wound Healing % 100 11/23/21 1347   Post-Procedure Length (cm) 0.3 cm 11/16/21 1409   Post-Procedure Width (cm) 0.2 cm 11/16/21 1409   Post-Procedure Depth (cm) 0.15 cm 11/16/21 1409   Post-Procedure Surface Area (cm^2) 0.06 cm^2 11/16/21 1409   Post-Procedure Volume (cm^3) 0.009 cm^3 11/16/21 1409   Wound Assessment Epithelialization; Dry 11/23/21 1347   Drainage Amount None 11/23/21 1347   Drainage Description Serosanguinous 11/16/21 1354   Odor None 11/23/21 1347   Jaylyn-wound Assessment Hyperkeratosis (callous) 11/23/21 1347   Margins Attached edges 11/23/21 1347   Number of days: 47       Written patient dismissal instructions given to patient and signed by patient or POA. Discharge Instructions       [address]  Telephone: [phone]    Discharge Instructions  Congratulations! You have completed your treatment program. We have outlined a list of reminders to assist you in maintaining your continues health. 1. Return to your primary care physician for all your health issues. 2. Resume your ordinary activities as prescribed.   3. Take your medications as prescribed by your doctor. 4. Check your skin daily for cracks, bruises, sores, or dryness. 5. Clean and dry your skin thoroughly. 6. Avoid alcohol. Quit smoking if applicable. 7. Maintain a nutritious diet; take a multivitamin daily. 8. Avoid pressure on the wound site. Keep your legs elevated above the level of your heart whenever possible. 9. Continue to use wraps/stockings/compresion if prescribed/  10. Replace compression hose/stockings every 6 months to insure proper fit. 11. Wear well fitting shoes. Call the [address] [place] [address] Road if your wound reopens or you develop new wounds or if you have any other questions about follow up care  [id number]    Important reminders:    1. Increase Protein intake for optimal wound healing  2. No added salt to reduce any swelling  3. If diabetic, maintain good glucose control  4. If you smoke, smoking prohibits wound healing, we ask that you refrain from smoking. 5. When taking antibiotics take the entire prescription as ordered. Do not stop taking until medication is all gone unless otherwise instructed. 6. Exercise as tolerated. 7. Keep weight off wounds and reposition every 2 hours if applicable. 8. If wound(s) is on your lower extremity, elevate legs to the level of the heart or above for 30 minutes 4-5 times a day and/or when sitting. Avoid standing for long periods of time. 9. Do not get wounds wet in bath or shower unless otherwise instructed by your physician. If your wound is on your foot or leg, you may purchase a cast bag. Please ask at the pharmacy. If Vascular testing is ordered, please call [address] [place] [address] [phone]) to schedule. Vascular tests ordered by Wound Care Physicians may take up to 2 hours to complete. Please keep that in mind when scheduling. If Vascular testing is scheduled, please bring supplies to replace your dressing after testing is done. The vascular department does not stock supplies.      Wound:  Right Greater Toe     With each dressing change, rinse wounds with 0.9% Saline. (May use wound wash or soft contact solution. Both can be purchased at a local drug store). If unable to obtain saline, may use a gentle soap and water. Dressing care: Follow these orders for another 2 weeks. Keep felt on all week,  Post op shoe, [place] to kimberly wound (stays on all week),  dry dressing, Conforming Roll Gauze. Changing Daily . Wash with Vashe when cleaning other than that keep dry. Angio 12/15/21  Get Diabetic Shoes with Podiatry    Dr Donny Morejon, Vascular Surgeon  [address] [facility], [address]  Phone# [phone]  Fax# [phone]    Home Care Agency:     Your wound-care supplies will be provided by:   Please note, depending on your insurance coverage, you may have out-of-pocket expenses for these supplies. Someone from the company should call you to confirm your order and discuss those potential costs before they ship your products -- please anticipate that call. If your out-of-pocket cost could be substantial, Many companies have financial hardship programs for patients who qualify, so please ask about that if you might need a hand. If you have any questions about your supplies or your potential out-of-pocket costs, or if you need to place an order for a refill of supplies (typically monthly), please call the company directly. Your  is Alfredo Lopez Follow up in the wound care center. Wound Care Center Information: Should you experience any significant changes in your wound(s) or have questions about your wound care, please contact the [facility] at [phone] Monday  - Thursday 8:00 am - 4:00 pm and Friday 8:00 am - 1:00pm. If you need help with your wound outside these hours and cannot wait until we are again available, contact your PCP or go to the hospital emergency room.      PLEASE NOTE: IF YOU ARE UNABLE TO OBTAIN WOUND SUPPLIES, CONTINUE TO USE THE SUPPLIES YOU HAVE AVAILABLE UNTIL YOU ARE ABLE TO REACH US. IT IS MOST IMPORTANT TO KEEP THE WOUND COVERED AT ALL TIMES.                 Electronically signed by Melissa Sofia MD on 11/23/2021 at 2:33 PM

## 2021-11-23 NOTE — PLAN OF CARE
Discharge instructions given. Patient verbalized understanding. No Return to [facility].   Healed

## 2021-12-09 ENCOUNTER — HOSPITAL ENCOUNTER (OUTPATIENT)
Age: 81
Discharge: HOME | End: 2021-12-09
Payer: MEDICARE

## 2021-12-09 LAB
ALBUMIN SERPL-MCNC: 3.9 G/DL (ref 3.4–5)
ANION GAP SERPL CALCULATED.3IONS-SCNC: 12 MMOL/L (ref 3–16)
BUN BLDV-MCNC: 29 MG/DL (ref 7–20)
CALCIUM SERPL-MCNC: 8.9 MG/DL (ref 8.3–10.6)
CHLORIDE BLD-SCNC: 96 MMOL/L (ref 99–110)
CO2: 30 MMOL/L (ref 21–32)
CREAT SERPL-MCNC: 1.9 MG/DL (ref 0.8–1.3)
CREATININE URINE: 118.7 MG/DL (ref 39–259)
GFR AFRICAN AMERICAN: 41
GFR NON-AFRICAN AMERICAN: 34
GLUCOSE BLD-MCNC: 156 MG/DL (ref 70–99)
HCT VFR BLD CALC: 44.2 % (ref 40.5–52.5)
HEMOGLOBIN: 14.3 G/DL (ref 13.5–17.5)
IRON SATURATION: 28 % (ref 20–50)
IRON: 85 UG/DL (ref 59–158)
MAGNESIUM: 2.4 MG/DL (ref 1.8–2.4)
MCH RBC QN AUTO: 32.8 PG (ref 26–34)
MCHC RBC AUTO-ENTMCNC: 32.4 G/DL (ref 31–36)
MCV RBC AUTO: 101.4 FL (ref 80–100)
MICROALBUMIN UR-MCNC: <1.2 MG/DL
MICROALBUMIN/CREAT UR-RTO: NORMAL MG/G (ref 0–30)
PARATHYROID HORMONE INTACT: 113.7 PG/ML (ref 14–72)
PDW BLD-RTO: 14.9 % (ref 12.4–15.4)
PHOSPHORUS: 4.4 MG/DL (ref 2.5–4.9)
PLATELET # BLD: 141 K/UL (ref 135–450)
PMV BLD AUTO: 9.5 FL (ref 5–10.5)
POTASSIUM SERPL-SCNC: 3.8 MMOL/L (ref 3.5–5.1)
RBC # BLD: 4.36 M/UL (ref 4.2–5.9)
SODIUM BLD-SCNC: 138 MMOL/L (ref 136–145)
TOTAL IRON BINDING CAPACITY: 304 UG/DL (ref 260–445)
TRANSFERRIN: 262 MG/DL (ref 200–360)
URIC ACID, SERUM: 8.7 MG/DL (ref 3.5–7.2)
VITAMIN D 25-HYDROXY: 47.4 NG/ML
WBC # BLD: 3.6 K/UL (ref 4–11)

## 2021-12-09 PROCEDURE — 83970 ASSAY OF PARATHORMONE: CPT

## 2021-12-09 PROCEDURE — 82043 UR ALBUMIN QUANTITATIVE: CPT

## 2021-12-09 PROCEDURE — 84550 ASSAY OF BLOOD/URIC ACID: CPT

## 2021-12-09 PROCEDURE — 85027 COMPLETE CBC AUTOMATED: CPT

## 2021-12-09 PROCEDURE — 83735 ASSAY OF MAGNESIUM: CPT

## 2021-12-09 PROCEDURE — 82728 ASSAY OF FERRITIN: CPT

## 2021-12-09 PROCEDURE — 82306 VITAMIN D 25 HYDROXY: CPT

## 2021-12-09 PROCEDURE — 82570 ASSAY OF URINE CREATININE: CPT

## 2021-12-09 PROCEDURE — 84466 ASSAY OF TRANSFERRIN: CPT

## 2021-12-09 PROCEDURE — 83540 ASSAY OF IRON: CPT

## 2021-12-09 PROCEDURE — 80069 RENAL FUNCTION PANEL: CPT

## 2021-12-10 ENCOUNTER — TELEPHONE (OUTPATIENT)
Dept: FAMILY MEDICINE CLINIC | Age: 81
End: 2021-12-10

## 2021-12-10 LAB — FERRITIN: 326.9 NG/ML (ref 30–400)

## 2021-12-15 ENCOUNTER — HOSPITAL ENCOUNTER (OUTPATIENT)
Dept: CARDIAC CATH/INVASIVE PROCEDURES | Age: 81
Discharge: HOME | End: 2021-12-15
Attending: SURGERY | Admitting: SURGERY
Payer: MEDICARE

## 2021-12-15 VITALS
TEMPERATURE: 98 F | HEART RATE: 64 BPM | WEIGHT: 280 LBS | OXYGEN SATURATION: 94 % | SYSTOLIC BLOOD PRESSURE: 159 MMHG | HEIGHT: 68 IN | BODY MASS INDEX: 42.44 KG/M2 | RESPIRATION RATE: 18 BRPM | DIASTOLIC BLOOD PRESSURE: 73 MMHG

## 2021-12-15 PROCEDURE — 75625 CONTRAST EXAM ABDOMINL AORTA: CPT | Performed by: SURGERY

## 2021-12-15 PROCEDURE — 6360000004 HC RX CONTRAST MEDICATION: Performed by: SURGERY

## 2021-12-15 PROCEDURE — 36246 INS CATH ABD/L-EXT ART 2ND: CPT | Performed by: SURGERY

## 2021-12-15 PROCEDURE — 75710 ARTERY X-RAYS ARM/LEG: CPT | Performed by: SURGERY

## 2021-12-15 PROCEDURE — C1894 INTRO/SHEATH, NON-LASER: HCPCS

## 2021-12-15 PROCEDURE — 99153 MOD SED SAME PHYS/QHP EA: CPT

## 2021-12-15 PROCEDURE — 36246 INS CATH ABD/L-EXT ART 2ND: CPT

## 2021-12-15 PROCEDURE — C1887 CATHETER, GUIDING: HCPCS

## 2021-12-15 PROCEDURE — 76937 US GUIDE VASCULAR ACCESS: CPT | Performed by: SURGERY

## 2021-12-15 PROCEDURE — 99152 MOD SED SAME PHYS/QHP 5/>YRS: CPT | Performed by: SURGERY

## 2021-12-15 PROCEDURE — 2709999900 HC NON-CHARGEABLE SUPPLY

## 2021-12-15 PROCEDURE — 6360000002 HC RX W HCPCS

## 2021-12-15 PROCEDURE — C1769 GUIDE WIRE: HCPCS

## 2021-12-15 PROCEDURE — 75625 CONTRAST EXAM ABDOMINL AORTA: CPT

## 2021-12-15 PROCEDURE — 99152 MOD SED SAME PHYS/QHP 5/>YRS: CPT

## 2021-12-15 PROCEDURE — 2500000003 HC RX 250 WO HCPCS

## 2021-12-15 PROCEDURE — 75710 ARTERY X-RAYS ARM/LEG: CPT

## 2021-12-15 RX ORDER — SODIUM CHLORIDE 9 MG/ML
INJECTION, SOLUTION INTRAVENOUS CONTINUOUS
Status: DISCONTINUED | OUTPATIENT
Start: 2021-12-15 | End: 2021-12-15 | Stop reason: HOSPADM

## 2021-12-15 RX ORDER — IODIXANOL 320 MG/ML
54 INJECTION, SOLUTION INTRAVASCULAR ONCE
Status: COMPLETED | OUTPATIENT
Start: 2021-12-15 | End: 2021-12-15

## 2021-12-15 RX ORDER — SODIUM CHLORIDE 0.9 % (FLUSH) 0.9 %
5-40 SYRINGE (ML) INJECTION EVERY 12 HOURS SCHEDULED
Status: DISCONTINUED | OUTPATIENT
Start: 2021-12-15 | End: 2021-12-15 | Stop reason: HOSPADM

## 2021-12-15 RX ORDER — SODIUM CHLORIDE 9 MG/ML
25 INJECTION, SOLUTION INTRAVENOUS PRN
Status: DISCONTINUED | OUTPATIENT
Start: 2021-12-15 | End: 2021-12-15 | Stop reason: HOSPADM

## 2021-12-15 RX ORDER — SODIUM CHLORIDE 0.9 % (FLUSH) 0.9 %
5-40 SYRINGE (ML) INJECTION PRN
Status: DISCONTINUED | OUTPATIENT
Start: 2021-12-15 | End: 2021-12-15 | Stop reason: HOSPADM

## 2021-12-15 RX ADMIN — IODIXANOL 54 ML: 320 INJECTION, SOLUTION INTRAVASCULAR at 12:35

## 2021-12-15 NOTE — H&P
Consultation/History & Physical    Date of Admission:  12/15/2021  Date of Consultation:  12/15/2021    PCP:  Lainey Bridges MD     Chief Complaint: right foot ulceration    History of Present Illness:  Halie Salazar is a [de-identified] y.o. male who presents with right foot ulceration. Presents today for peripheral angiogram .     PMH:   has a past medical history of Acid reflux, Atrial flutter ([facility] Bleeding stomach ulcer, BPH (benign prostatic hyperplasia), CAD (coronary artery disease), COPD (chronic obstructive pulmonary disease) ([address] Diabetes mellitus type II, Edema, Elevated PSA- nl 8/12/11, Hyperlipidemia, Hypertension, Ischemic cardiomyopathy, Lipoma of skin-RIGHT CHEST, Obesity, Osteoarthritis, Skin tear of left forearm without complication, and Spinal stenosis of lumbar region with neurogenic claudication- clinically. PSH:   has a past surgical history that includes Coronary artery bypass graft (1993); Cataract removal (2010, 2011); Colonoscopy; joint replacement (Right, total knee replacement); joint replacement (Left, 09/14/2016); and Eye surgery (Left, 2019). Allergies: Allergies   Allergen Reactions    Entresto [Sacubitril-Valsartan] Other (See Comments)     Renal failure        Home Meds:    Prior to Admission medications    Medication Sig Start Date End Date Taking?  Authorizing Provider   FEROSUL 325 (65 Fe) MG tablet TAKE 1 TABLET BY MOUTH TWICE DAILY 12/10/21  Yes RAYMUNDO Ramos - CNP   dapagliflozin (FARXIGA) 5 MG tablet Take 1 tablet by mouth daily LOT [id number] EXP 04/2024 6 BOXES 12/10/21  Yes Isiah Hidalgo MD   omeprazole (PRILOSEC) 20 MG delayed release capsule TAKE 1 CAPSULE BY MOUTH TWICE DAILY 10/6/21  Yes Isiah Hidalgo MD   furosemide (LASIX) 80 MG tablet TAKE 1 TABLET BY MOUTH DAILY 10/1/21  Yes Isiah Hidalgo MD   atorvastatin (LIPITOR) 40 MG tablet TAKE 1 TABLET BY MOUTH EVERY EVENING 10/1/21  Yes Isiah Hidalgo MD   amiodarone (CORDARONE) 200 MG tablet TAKE 1 TABLET BY MOUTH DAILY 9/1/21  Yes Sukh Palacio MD   potassium chloride (KLOR-CON) 10 MEQ extended release tablet TAKE 1 TABLET BY MOUTH DAILY 7/2/21  Yes Sukh Palacio MD   hydrALAZINE (APRESOLINE) 25 MG tablet TAKE 1 TABLET BY MOUTH THREE TIMES DAILY 4/12/21  Yes Historical Provider, MD   Cyanocobalamin (B-12) 500 MCG TABS TAKE 1 TABLET BY MOUTH DAILY 4/16/21  Yes Sukh Palacio MD   albuterol sulfate HFA (VENTOLIN HFA) 108 (90 Base) MCG/ACT inhaler Inhale 2 puffs into the lungs every 6 hours as needed for Wheezing 4/14/21  Yes Sukh Palacio MD   umeclidinium-vilanterol [facility]) 62.5-25 MCG/INH AEPB inhaler INHALE 1 PUFF INTO THE LUNGS DAILY 3/11/21  Yes Sukh Palacio MD   insulin NPH (NOVOLIN N) 100 UNIT/ML injection vial Take 44 units with breakfast and 32 units with dinner. Patient taking differently: Take 20 units with breakfast and 20 units with dinner. 11/18/20  Yes Sukh Palacio MD   insulin regular (NOVOLIN R RELION) 100 UNIT/ML injection INJECT 20 TO 30 UNITS SUBCUTANEOUSLY THREE TIMES DAILY BEFORE MEAL(S) 11/9/20  Yes Sukh Palacio MD   vitamin D (ERGOCALCIFEROL) 74272 units CAPS capsule TK 1 C PO WEEKLY 7/29/19  Yes Historical Provider, MD   mupirocin (BACTROBAN) 2 % ointment Apply 3 times daily as needed 7/1/19  Yes Sukh Palacio MD   levalbuterol [facility] HFA) 45 MCG/ACT inhaler Inhale 1-2 puffs into the lungs every 4 hours as needed for Wheezing 2/18/19  Yes Sukh Palacio MD   aspirin 81 MG EC tablet Take 81 mg by mouth daily   Yes Historical Provider, MD   metoprolol (LOPRESSOR) 25 MG tablet Take 25 mg by mouth daily    Yes Historical Provider, MD   isosorbide mononitrate (IMDUR) 30 MG CR tablet Take 1 tablet by mouth every morning Dr. Ana M Serna 4/23/15  Yes Sukh Palacio MD   Lancets MISC 1 each by Does not apply route daily Use to check glucose twice a day. One Touch brand.   DX E11.9 10/28/21   Sukh Palacio MD   blood glucose monitor kit and supplies Test 2 times a day & as needed for symptoms of irregular blood glucose. 10/27/21   Sathish Abbasi MD   blood glucose monitor strips Test 2 times a day & as needed for symptoms of irregular blood glucose. 10/27/21   Sathish Abbasi MD   blood glucose test strips (ASCENSIA AUTODISC VI;ONE TOUCH ULTRA TEST VI) strip four times daily 10/26/21   Sathish Abbasi MD   INSULIN SYRINGE .5CC/29G 29G X 1/2\" 0.5 ML MISC INJECT 4 TIMES DAILY AS NEEDED 10/21/21   Sathish Abbasi MD   gabapentin (NEURONTIN) 600 MG tablet TAKE UP TO 5 TABLETS BY MOUTH OVER 24 HOURS AS DIRECTED 2/15/21 8/14/21  Sathsih Abbasi MD   tiotropium (Kaylyn Coma) 18 MCG inhalation capsule Inhale 1 capsule into the lungs daily INCLUDE inhaler device 8/10/20 8/10/21  Sathish Abbasi MD   Nebulizers (AIRIAL COMPACT MINI NEBULIZER) MISC 1 each by Does not apply route every 6 hours as needed (wheezing or shortness of breath) 4/8/20   Sathish Abbasi MD   Respiratory Therapy Supplies (NEBULIZER/TUBING/MOUTHPIECE) KIT 1 kit by Does not apply route every 6 hours as needed (for wheezing or shortness of breath) 4/8/20   Sathish Abbasi MD   albuterol (PROVENTIL) (2.5 MG/3ML) 0.083% nebulizer solution Take 3 mLs by nebulization every 6 hours as needed for Wheezing 4/3/20 10/28/21  Sathish Abbasi MD   apixaban (ELIQUIS) 2.5 MG TABS tablet Take 1 tablet by mouth 2 times daily 1/16/17   Sathish Abbasi MD   nitroGLYCERIN (NITROSTAT) 0.3 MG SL tablet Take one sublingual for chest pain. May repeat twice at 5 min intervals.  If not getting relief call 911. 10/22/14   Sathish Abbasi MD        [facility] Meds:    Current Facility-Administered Medications   Medication Dose Route Frequency Provider Last Rate Last Admin    0.9 % sodium chloride infusion   IntraVENous Continuous RAYMUNDO Rodriguez - CNP        0.9 % sodium chloride infusion  25 mL IntraVENous PRN RAYMUNDO Rodriguez CNP        sodium chloride flush 0.9 % injection 5-40 mL  5-40 mL IntraVENous 2 times per day RAYMUNDO Rodriguez CNP        sodium chloride flush 0.9 % injection 5-40 mL  5-40 mL IntraVENous PRN RAYMUNDO Diallo - CNP           Social History:       Social History     Socioeconomic History    Marital status:      Spouse name: Not on file    Number of children: Not on file    Years of education: Not on file    Highest education level: Not on file   Occupational History    Not on file   Tobacco Use    Smoking status: Former Smoker     Packs/day: 3.50     Years: 32.00     Pack years: 112.00     Types: Cigarettes     Quit date: 1985     Years since quittin.9    Smokeless tobacco: Never Used    Tobacco comment: advised not to resume   Substance and Sexual Activity    Alcohol use: No     Alcohol/week: 0.0 standard drinks    Drug use: No    Sexual activity: Not Currently   Other Topics Concern    Not on file   Social History Narrative    Lives with spouse. Exercise: walking sometimes. Seatbelt use: Always. Living will: no, additional information provided. Self-testicular exams: Yes      Social Determinants of Health     Financial Resource Strain: Low Risk     Difficulty of Paying Living Expenses: Not hard at all   Food Insecurity: No Food Insecurity    Worried About Running Out of Food in the Last Year: Never true    Gissel of Food in the Last Year: Never true   Transportation Needs:     Lack of Transportation (Medical): Not on file    Lack of Transportation (Non-Medical):  Not on file   Physical Activity:     Days of Exercise per Week: Not on file    Minutes of Exercise per Session: Not on file   Stress:     Feeling of Stress : Not on file   Social Connections:     Frequency of Communication with Friends and Family: Not on file    Frequency of Social Gatherings with Friends and Family: Not on file    Attends Latter day Services: Not on file    Active Member of Clubs or Organizations: Not on file    Attends Club or Organization Meetings: Not on file    Marital Status: Not on file   Intimate Partner Violence:     Fear of Current or Ex-Partner: Not on file    Emotionally Abused: Not on file    Physically Abused: Not on file    Sexually Abused: Not on file   Housing Stability:     Unable to Pay for Housing in the Last Year: Not on file    Number of Places Lived in the Last Year: Not on file    Unstable Housing in the Last Year: Not on file       Family Histroy:      Family History   Problem Relation Age of Onset    Cancer Mother         breast    Cancer Father         throat       Review of Systems:  Review of systems performed and negative with the exception of the above findings        Physical Exam   Vital Signs: BP (!) 159/73   Pulse 64   Temp 98 °F (36.7 °C)   Resp 18   Ht 5' 8\" (1.727 m)   Wt 280 lb (127 kg)   SpO2 94%   BMI 42.57 kg/m²        Admission Weight: 280 lb (127 kg)   ASA 3 - Patient with moderate systemic disease with functional limitations    Mallampati Airway Assessment:  Mallampati Class II - (soft palate, fauces & uvula are visible)    General appearance: alert, appears stated age, cooperative and no distress  Head: Normocephalic, without obvious abnormality, atraumatic  Lungs: clear to auscultation bilaterally  Heart: regular rate and rhythm, S1, S2 normal, no murmur, click, rub or gallop  Abdomen: soft, non-tender.  Bowel sounds normal. No masses,  no organomegaly  Extremities: extremities normal, atraumatic, no cyanosis or edema  Pulses:      Labs:    BMP:   Lab Results   Component Value Date     12/09/2021    K 3.8 12/09/2021    K 2.9 07/01/2021    CL 96 12/09/2021    CO2 30 12/09/2021    PHOS 4.4 12/09/2021    BUN 29 12/09/2021    CREATININE 1.9 12/09/2021      No components found for: GLU  Lab Results   Component Value Date    MG 2.40 12/09/2021      CBC:   Lab Results   Component Value Date    WBC 3.6 12/09/2021    HGB 14.3 12/09/2021    HCT 44.2 12/09/2021    .4 12/09/2021     12/09/2021      Coagulation:   Lab Results   Component Value Date    INR 1.06 11/10/2021    APTT 33.1 09/06/2016     Cardiac markers:   Lab Results   Component Value Date    TROPONINI 0.01 06/29/2021     Lab Results   Component Value Date    LABA1C 7.0 09/17/2021    No results found for: ALB    Lab Results   Component Value Date    BILITOT 0.6 07/16/2021    BILIDIR 0.3 02/25/2021    AST 40 07/16/2021    ALT 24 07/16/2021    ALKPHOS 119 07/16/2021      Lab Results   Component Value Date    CHOL 168 02/25/2021    HDL 52 02/25/2021    HDL 37 03/08/2012    LDLCALC 92 02/25/2021    TRIG 121 02/25/2021          Diagnosis:  Patient Active Problem List   Diagnosis    Diabetes mellitus type II, uncontrolled ([address]    Tinnitus    Leg edema    Elevated PSA- nl 8/12/11    Hyperlipidemia LDL goal <70    Osteoarthritis    Diabetic retinopathy ([address]    BPH (benign prostatic hyperplasia)    COPD (chronic obstructive pulmonary disease) ([facility])    Rosacea    Erectile dysfunction    Essential hypertension    Coronary artery disease involving native coronary artery of native heart without angina pectoris    Diabetes mellitus, type II ([address]    Diabetic nephropathy- pos microalb 8/11    Diabetic neuropathy ([facility])    GERD (gastroesophageal reflux disease)    B12 deficiency    Impingement syndrome of left shoulder    Obesity, Class III, BMI 40-49.9 (morbid obesity) ([facility])    Personal history of atrial flutter    Peptic ulcer disease    S/P total knee arthroplasty, right    Vitamin D deficiency    Spinal stenosis of lumbar region with neurogenic claudication- clinically    BMI 40.0-44.9, adult ([facility])    RBBB    Left anterior fascicular hemiblock    Paroxysmal atrial fibrillation ([facility])    CHF (congestive heart failure), NYHA class I, acute on chronic, combined ([facility])    Upper abdominal pain    Stage 3 chronic kidney disease ([facility])    Ulcer of toe of right foot, with fat layer exposed ([address]    Impaired mobility    Peripheral venous insufficiency    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   12/15/2021  11:41 AM

## 2021-12-15 NOTE — OP NOTE
[address]                     [facility], [address] [place] [address]                                OPERATIVE REPORT    PATIENT NAME: Randal Menard                  :        1940  MED REC NO:   [id number]                          ROOM:  ACCOUNT NO:   [de-identified]                           ADMIT DATE: 12/15/2021  PROVIDER:     Jennetta Bloch, MD    DATE OF PROCEDURE:  12/15/2021    PREPROCEDURE DIAGNOSIS:  Right lower extremity ulceration. POSTPROCEDURE DIAGNOSIS:  Right lower extremity ulceration. PROCEDURE:  1. Ultrasound-guided left common femoral artery access. 2.  Abdominal aortogram with second order selective right lower  extremity angiogram.    SURGEON:  Jennetta Bloch, MD    ANESTHESIA:  Local/IV. ESTIMATED BLOOD LOSS:  0.    INDICATIONS:  The patient is an 81-year-old male with a nonhealing  ulceration of his right lower extremity. There are concerns for  possibility of proximal SFA or common femoral artery stenosis on duplex  and presents for formal angiographic evaluation. All risks, benefits,  and alternatives were discussed in detail including risk for  contrast-induced nephropathy given the patient's mild underlying renal  insufficiency. All questions were answered. PROCEDURE:  After witnessed informed consent was obtained, the patient  was brought to cath lab where under my supervision Versed and fentanyl  were administered intravenously for moderate sedation. Pulse oximetry,  heart rate and blood pressure were monitored by an independent trained  observer present. I spent 27 minutes of face-to-face sedation time with  the patient. His left groin was carefully prepped and draped. Ultrasound was used to identify the left common femoral artery which was  noted to be patent. An image was saved to the patient's permanent  medical record. Under direct visualization, it was accessed with a  4-Lao micropuncture needle.   Talisha wire was introduced and a  5-Thai sheath was placed. An Omni Flush catheter was inserted to the  level of renal arteries and an abdominal aortogram was performed. Given  his underlying renal insufficiency, bilateral lower extremity runoff was  not performed. The Omni Flush was used to hook the aortic bifurcation. A stiff Glidewire was placed up and over the bifurcation into the right  SFA. The Omni Flush was removed and a Onita Tara was placed up and over  the bifurcation into the right common femoral artery and a right lower  extremity angiogram was performed as well as oblique views of the common  femoral artery. There did appear to be an irregular 20-30% stenosis in  the right common femoral artery. The catheter was advanced into the SFA  and pullback pressures were performed with a 10 mm gradient difference  between the SFA and the proximal common femoral artery. The procedure  was terminated at this point. Manual pressure was held. He tolerated  the procedure well and was transferred to recovery room in stable  condition. FINDINGS:  1. Abdominal aortogram:  Right and left renal patent. Infrarenal  abdominal aorta, bilateral common, external and internal iliac arteries  show no significant disease. 2.  Right lower extremity runoff:  Right common femoral with 20-30%  eccentric calcified stenosis. The profunda is widely patent. SFA is  widely patent. Popliteal is widely patent. There is a three-vessel  runoff with no evidence of distal disease. PLAN:  The patient has no evidence of critical arterial insufficiency in  right lower extremity with a 20% stenosis of the right common femoral  artery with no significant pressure gradient across this lesion. He  will continue with his ongoing wound care and medical therapy.         Yesenia Easley MD    D: 12/15/2021 12:40:31       T: 12/15/2021 12:42:58     [email or web address]  Job#: [id number]     Doc#: [id number]    CC:

## 2021-12-20 ENCOUNTER — OFFICE VISIT (OUTPATIENT)
Dept: ORTHOPEDIC SURGERY | Age: 81
End: 2021-12-20
Payer: MEDICARE

## 2021-12-20 VITALS — HEIGHT: 68 IN | RESPIRATION RATE: 18 BRPM | WEIGHT: 280 LBS | BODY MASS INDEX: 42.44 KG/M2

## 2021-12-20 DIAGNOSIS — Z96.651 S/P TOTAL KNEE ARTHROPLASTY, RIGHT: ICD-10-CM

## 2021-12-20 DIAGNOSIS — Z96.652 S/P TOTAL KNEE ARTHROPLASTY, LEFT: Primary | ICD-10-CM

## 2021-12-20 PROCEDURE — 1036F TOBACCO NON-USER: CPT | Performed by: PHYSICIAN ASSISTANT

## 2021-12-20 PROCEDURE — G8484 FLU IMMUNIZE NO ADMIN: HCPCS | Performed by: PHYSICIAN ASSISTANT

## 2021-12-20 PROCEDURE — 1123F ACP DISCUSS/DSCN MKR DOCD: CPT | Performed by: PHYSICIAN ASSISTANT

## 2021-12-20 PROCEDURE — 99213 OFFICE O/P EST LOW 20 MIN: CPT | Performed by: PHYSICIAN ASSISTANT

## 2021-12-20 PROCEDURE — G8417 CALC BMI ABV UP PARAM F/U: HCPCS | Performed by: PHYSICIAN ASSISTANT

## 2021-12-20 PROCEDURE — 4040F PNEUMOC VAC/ADMIN/RCVD: CPT | Performed by: PHYSICIAN ASSISTANT

## 2021-12-20 PROCEDURE — G8427 DOCREV CUR MEDS BY ELIG CLIN: HCPCS | Performed by: PHYSICIAN ASSISTANT

## 2021-12-20 NOTE — PROGRESS NOTES
Subjective:      Patient ID: Joanne Finley is a [de-identified] y.o.  male. Here for annual follow up visit. S/P bilateral knee arthroplasty. Surgeon: Zoe Chang   Issues or complaints: None      Review of Systems:   A 14 point review of systems and history form completed by the patient has been reviewed. This form is scanned in the media tab of the patient's chart under today's date. Past Medical History:   Diagnosis Date    Acid reflux     Atrial flutter ([address]     converted    Bleeding stomach ulcer oct 2015    BPH (benign prostatic hyperplasia)     CAD (coronary artery disease)      angio with 2 blocked bypass and 2 patent    COPD (chronic obstructive pulmonary disease) ([address]     Diabetes mellitus type II     Edema     chronic left leg    Elevated PSA- nl 11     Hyperlipidemia     Hypertension     Ischemic cardiomyopathy     Lipoma of skin-RIGHT CHEST 2011    Obesity     Osteoarthritis     Skin tear of left forearm without complication     Significant amount of epidermal loss with bleeding.  Spinal stenosis of lumbar region with neurogenic claudication- clinically 2018       Family History   Problem Relation Age of Onset    Cancer Mother         breast    Cancer Father         throat       Past Surgical History:   Procedure Laterality Date    CATARACT REMOVAL  ,     bilat    COLONOSCOPY      CORONARY ARTERY BYPASS GRAFT  1993    x 4    EYE SURGERY Left 2019    cataract coming back    JOINT REPLACEMENT Right total knee replacement    JOINT REPLACEMENT Left 2016       Social History     Occupational History    Not on file   Tobacco Use    Smoking status: Former Smoker     Packs/day: 3.50     Years: 32.00     Pack years: 112.00     Types: Cigarettes     Quit date: 1985     Years since quittin.9    Smokeless tobacco: Never Used    Tobacco comment: advised not to resume   Substance and Sexual Activity    Alcohol use:  No Alcohol/week: 0.0 standard drinks    Drug use: No    Sexual activity: Not Currently       Current Outpatient Medications   Medication Sig Dispense Refill    FEROSUL 325 (65 Fe) MG tablet TAKE 1 TABLET BY MOUTH TWICE DAILY 180 tablet 0    dapagliflozin (FARXIGA) 5 MG tablet Take 1 tablet by mouth daily LOT [id number] EXP 04/2024 6 BOXES 30 tablet 0    Lancets MISC 1 each by Does not apply route daily Use to check glucose twice a day. One Touch brand. DX E11.9 100 each 3    blood glucose monitor kit and supplies Test 2 times a day & as needed for symptoms of irregular blood glucose. 1 kit 0    blood glucose monitor strips Test 2 times a day & as needed for symptoms of irregular blood glucose.  200 strip 3    blood glucose test strips (ASCENSIA AUTODISC VI;ONE TOUCH ULTRA TEST VI) strip four times daily 400 strip 3    INSULIN SYRINGE .5CC/29G 29G X 1/2\" 0.5 ML MISC INJECT 4 TIMES DAILY AS NEEDED 400 each 1    omeprazole (PRILOSEC) 20 MG delayed release capsule TAKE 1 CAPSULE BY MOUTH TWICE DAILY 180 capsule 1    furosemide (LASIX) 80 MG tablet TAKE 1 TABLET BY MOUTH DAILY 90 tablet 0    atorvastatin (LIPITOR) 40 MG tablet TAKE 1 TABLET BY MOUTH EVERY EVENING 90 tablet 1    amiodarone (CORDARONE) 200 MG tablet TAKE 1 TABLET BY MOUTH DAILY 90 tablet 0    potassium chloride (KLOR-CON) 10 MEQ extended release tablet TAKE 1 TABLET BY MOUTH DAILY 90 tablet 1    hydrALAZINE (APRESOLINE) 25 MG tablet TAKE 1 TABLET BY MOUTH THREE TIMES DAILY      Cyanocobalamin (B-12) 500 MCG TABS TAKE 1 TABLET BY MOUTH DAILY 90 tablet 1    albuterol sulfate HFA (VENTOLIN HFA) 108 (90 Base) MCG/ACT inhaler Inhale 2 puffs into the lungs every 6 hours as needed for Wheezing 1 Inhaler 3    umeclidinium-vilanterol (ANORO ELLIPTA) 62.5-25 MCG/INH AEPB inhaler INHALE 1 PUFF INTO THE LUNGS DAILY 1 each 5    gabapentin (NEURONTIN) 600 MG tablet TAKE UP TO 5 TABLETS BY MOUTH OVER 24 HOURS AS DIRECTED 150 tablet 5    insulin NPH (NOVOLIN N) 100 UNIT/ML injection vial Take 44 units with breakfast and 32 units with dinner. (Patient taking differently: Take 20 units with breakfast and 20 units with dinner.) 30 mL 3    insulin regular (NOVOLIN R RELION) 100 UNIT/ML injection INJECT 20 TO 30 UNITS SUBCUTANEOUSLY THREE TIMES DAILY BEFORE MEAL(S) 30 mL 0    tiotropium (SPIRIVA HANDIHALER) 18 MCG inhalation capsule Inhale 1 capsule into the lungs daily INCLUDE inhaler device 30 capsule 5    Nebulizers (AIRIAL COMPACT MINI NEBULIZER) MISC 1 each by Does not apply route every 6 hours as needed (wheezing or shortness of breath) 1 each 0    Respiratory Therapy Supplies (NEBULIZER/TUBING/MOUTHPIECE) KIT 1 kit by Does not apply route every 6 hours as needed (for wheezing or shortness of breath) 1 kit 1    albuterol (PROVENTIL) (2.5 MG/3ML) 0.083% nebulizer solution Take 3 mLs by nebulization every 6 hours as needed for Wheezing 50 vial 3    vitamin D (ERGOCALCIFEROL) 16720 units CAPS capsule TK 1 C PO WEEKLY  2    mupirocin (BACTROBAN) 2 % ointment Apply 3 times daily as needed 22 g 1    levalbuterol (XOPENEX HFA) 45 MCG/ACT inhaler Inhale 1-2 puffs into the lungs every 4 hours as needed for Wheezing 1 Inhaler 3    aspirin 81 MG EC tablet Take 81 mg by mouth daily      apixaban (ELIQUIS) 2.5 MG TABS tablet Take 1 tablet by mouth 2 times daily 60 tablet 5    metoprolol (LOPRESSOR) 25 MG tablet Take 25 mg by mouth daily       isosorbide mononitrate (IMDUR) 30 MG CR tablet Take 1 tablet by mouth every morning Dr. Maico Solomon - Cardio 30 tablet 6    nitroGLYCERIN (NITROSTAT) 0.3 MG SL tablet Take one sublingual for chest pain. May repeat twice at 5 min intervals. If not getting relief call 911. 25 tablet 3     No current facility-administered medications for this visit. Objective:   He is alert, oriented x 3, pleasant, well nourished, developed and in no acute distress.     Resp 18   Ht 5' 8\" (1.727 m)   Wt 280 lb (127 kg)   BMI 42.57 kg/m² Examination of the left and right knee: Inspection of the skin demonstrates a well-healed midline incision. Inspection of the soft tissues without significant swelling or erythema. The overall alignment of the knee is neutral.  There is minimal intra-articular effusion. AROM     Extension 0     Flexion  120   There no pain associated with ROM testing. Pes anserine bursa non-tender to palpation. Patellar tendon non-tender to palpation. Quadriceps tendon non-tender to palpation. Collateral ligaments non-tender to palpation. Popliteal fossa non-tender to palpation. Retro patellar crepitus is not present. There is no instability with varus/valgus stress testing in full extension or 90 degrees of flexion. There is no instability with anterior drawer testing. Extensor Mechanism is intact. Examination of the lower extremities are intact with sensation to light touch. Motor testing  5/5 in all major motor groups of the lower extremities. SLR negative. Examination of the lower extremities shows intact perfusion to all extremities. No cyanosis. Digits are warm to touch, capillary refill is less than 2 seconds. There is no edema noted. Examination of the skin over both lower extremities reveals: The skin to be intact without lacerations or abrasions. No significant erythema. No rashes or skin lesions. X Rays: was performed in the office today:   AP Standing, Lateral and Sunrise Left Knee: There is a left cemented knee arthroplasty present. The alignment is satisfactory. There are no signs of failure or loosening. AP Standing, Lateral and Sunrise Right Knee: There is a right cemented total knee arthroplasty present. The alignment is satisfactory. There are no signs of failure or loosening. Diagnosis:        ICD-10-CM    1. S/P total knee arthroplasty, left 9/14/2016  Z96.652 XR KNEE BILATERAL STANDING     XR KNEE LEFT (1-2 VIEWS)   2.  S/P total knee arthroplasty, right 3/21/2016  Z96.651 XR KNEE BILATERAL STANDING     XR KNEE RIGHT (1-2 VIEWS)          Assessment/ Plan:     Assessment:    Clinically and radiographically stable left and right knee arthroplasty 5 years post op. Plan:  Medications-discussed the risk and benefits of prophylactic antibiotics for dental and surgical procedures. PT- A home exercise program was instructed today including ROM exercises and strengthening exercises. The patient verbalized understanding of these exercises as well as the importance of the exercise program to promote return of normal function. If pain intensifies or other problems arise you are to notify the office. Follow up- 1 year   Call or return to clinic if these symptoms worsen or fail to improve as anticipated.

## 2021-12-29 RX ORDER — AMIODARONE HYDROCHLORIDE 200 MG/1
TABLET ORAL
Qty: 90 TABLET | Refills: 0 | Status: SHIPPED | OUTPATIENT
Start: 2021-12-29 | End: 2022-04-01

## 2022-01-10 RX ORDER — FUROSEMIDE 80 MG
TABLET ORAL
Qty: 90 TABLET | Refills: 0 | Status: SHIPPED | OUTPATIENT
Start: 2022-01-10 | End: 2022-09-05

## 2022-01-12 ENCOUNTER — OFFICE VISIT (OUTPATIENT)
Dept: VASCULAR SURGERY | Age: 82
End: 2022-01-12
Payer: MEDICARE

## 2022-01-12 VITALS
HEIGHT: 68 IN | WEIGHT: 260 LBS | BODY MASS INDEX: 39.4 KG/M2 | DIASTOLIC BLOOD PRESSURE: 74 MMHG | SYSTOLIC BLOOD PRESSURE: 134 MMHG

## 2022-01-12 DIAGNOSIS — I70.235 ATHEROSCLEROSIS OF NATIVE ARTERIES OF RIGHT LEG WITH ULCERATION OF OTHER PART OF FOOT (HCC): Primary | ICD-10-CM

## 2022-01-12 PROCEDURE — 1036F TOBACCO NON-USER: CPT | Performed by: SURGERY

## 2022-01-12 PROCEDURE — G8484 FLU IMMUNIZE NO ADMIN: HCPCS | Performed by: SURGERY

## 2022-01-12 PROCEDURE — G8417 CALC BMI ABV UP PARAM F/U: HCPCS | Performed by: SURGERY

## 2022-01-12 PROCEDURE — 1123F ACP DISCUSS/DSCN MKR DOCD: CPT | Performed by: SURGERY

## 2022-01-12 PROCEDURE — 4040F PNEUMOC VAC/ADMIN/RCVD: CPT | Performed by: SURGERY

## 2022-01-12 PROCEDURE — 99212 OFFICE O/P EST SF 10 MIN: CPT | Performed by: SURGERY

## 2022-01-12 PROCEDURE — G8427 DOCREV CUR MEDS BY ELIG CLIN: HCPCS | Performed by: SURGERY

## 2022-01-12 NOTE — PROGRESS NOTES
[facility])   Vascular Surgery Followup    Referring Provider:  Knu Mejia MD     Chief Complaint   Patient presents with    Follow-up     Follow up- Angiogram        History of Present Illness:  31-year-old male here today for follow-up of right lower extremity ulceration and recent angiogram December 15, 2021. His angiogram revealed a very mild stenosis of approximately 20% of the right common femoral artery with a widely patent SFA popliteal and three-vessel runoff with no evidence of distal arterial disease. He is doing very well and has no complaints today. Past Medical History:   has a past medical history of Acid reflux, Atrial flutter ([facility] [address] Bleeding stomach ulcer, BPH (benign prostatic hyperplasia), CAD (coronary artery disease), COPD (chronic obstructive pulmonary disease) ([facility] [address] Diabetes mellitus type II, Edema, Elevated PSA- nl 8/12/11, Hyperlipidemia, Hypertension, Ischemic cardiomyopathy, Lipoma of skin-RIGHT CHEST, Obesity, Osteoarthritis, Skin tear of left forearm without complication, and Spinal stenosis of lumbar region with neurogenic claudication- clinically. Surgical History:   has a past surgical history that includes Coronary artery bypass graft (1993); Cataract removal (2010, 2011); Colonoscopy; joint replacement (Right, total knee replacement); joint replacement (Left, 09/14/2016); and Eye surgery (Left, 2019). Social History:   reports that he quit smoking about 37 years ago. His smoking use included cigarettes. He has a 112.00 pack-year smoking history. He has never used smokeless tobacco. He reports that he does not drink alcohol and does not use drugs. Family History:  family history includes Cancer in his father and mother.      Home Medications:  Current Outpatient Medications   Medication Sig Dispense Refill    furosemide (LASIX) 80 MG tablet TAKE 1 TABLET BY MOUTH DAILY 90 tablet 0    dapagliflozin (FARXIGA) 5 MG tablet Take 1 tablet by mouth daily LOT [id number] EXP 04/2024 6 BOXES 30 tablet 0    amiodarone (CORDARONE) 200 MG tablet TAKE 1 TABLET BY MOUTH DAILY 90 tablet 0    FEROSUL 325 (65 Fe) MG tablet TAKE 1 TABLET BY MOUTH TWICE DAILY 180 tablet 0    Lancets MISC 1 each by Does not apply route daily Use to check glucose twice a day. One Touch brand. DX E11.9 100 each 3    blood glucose monitor kit and supplies Test 2 times a day & as needed for symptoms of irregular blood glucose. 1 kit 0    blood glucose monitor strips Test 2 times a day & as needed for symptoms of irregular blood glucose. 200 strip 3    blood glucose test strips (ASCENSIA AUTODISC VI;ONE TOUCH ULTRA TEST VI) strip four times daily 400 strip 3    INSULIN SYRINGE .5CC/29G 29G X 1/2\" 0.5 ML MISC INJECT 4 TIMES DAILY AS NEEDED 400 each 1    omeprazole (PRILOSEC) 20 MG delayed release capsule TAKE 1 CAPSULE BY MOUTH TWICE DAILY 180 capsule 1    atorvastatin (LIPITOR) 40 MG tablet TAKE 1 TABLET BY MOUTH EVERY EVENING 90 tablet 1    potassium chloride (KLOR-CON) 10 MEQ extended release tablet TAKE 1 TABLET BY MOUTH DAILY 90 tablet 1    hydrALAZINE (APRESOLINE) 25 MG tablet TAKE 1 TABLET BY MOUTH THREE TIMES DAILY      Cyanocobalamin (B-12) 500 MCG TABS TAKE 1 TABLET BY MOUTH DAILY 90 tablet 1    albuterol sulfate HFA (VENTOLIN HFA) 108 (90 Base) MCG/ACT inhaler Inhale 2 puffs into the lungs every 6 hours as needed for Wheezing 1 Inhaler 3    umeclidinium-vilanterol (ANORO ELLIPTA) 62.5-25 MCG/INH AEPB inhaler INHALE 1 PUFF INTO THE LUNGS DAILY 1 each 5    insulin NPH (NOVOLIN N) 100 UNIT/ML injection vial Take 44 units with breakfast and 32 units with dinner.  (Patient taking differently: Take 20 units with breakfast and 20 units with dinner.) 30 mL 3    insulin regular (NOVOLIN R RELION) 100 UNIT/ML injection INJECT 20 TO 30 UNITS SUBCUTANEOUSLY THREE TIMES DAILY BEFORE MEAL(S) 30 mL 0    Nebulizers (AIRIAL COMPACT MINI NEBULIZER) MISC 1 each by Does not apply route every 6 hours as needed (wheezing or shortness of breath) 1 each 0    Respiratory Therapy Supplies (NEBULIZER/TUBING/MOUTHPIECE) KIT 1 kit by Does not apply route every 6 hours as needed (for wheezing or shortness of breath) 1 kit 1    vitamin D (ERGOCALCIFEROL) 49181 units CAPS capsule TK 1 C PO WEEKLY  2    mupirocin (BACTROBAN) 2 % ointment Apply 3 times daily as needed 22 g 1    levalbuterol (XOPENEX HFA) 45 MCG/ACT inhaler Inhale 1-2 puffs into the lungs every 4 hours as needed for Wheezing 1 Inhaler 3    aspirin 81 MG EC tablet Take 81 mg by mouth daily      apixaban (ELIQUIS) 2.5 MG TABS tablet Take 1 tablet by mouth 2 times daily 60 tablet 5    metoprolol (LOPRESSOR) 25 MG tablet Take 25 mg by mouth daily       isosorbide mononitrate (IMDUR) 30 MG CR tablet Take 1 tablet by mouth every morning Dr. Claritza Mercado - Cardio 30 tablet 6    nitroGLYCERIN (NITROSTAT) 0.3 MG SL tablet Take one sublingual for chest pain. May repeat twice at 5 min intervals. If not getting relief call 911. 25 tablet 3    gabapentin (NEURONTIN) 600 MG tablet TAKE UP TO 5 TABLETS BY MOUTH OVER 24 HOURS AS DIRECTED 150 tablet 5    tiotropium (SPIRIVA HANDIHALER) 18 MCG inhalation capsule Inhale 1 capsule into the lungs daily INCLUDE inhaler device 30 capsule 5    albuterol (PROVENTIL) (2.5 MG/3ML) 0.083% nebulizer solution Take 3 mLs by nebulization every 6 hours as needed for Wheezing 50 vial 3     No current facility-administered medications for this visit. Allergies:  Entresto [sacubitril-valsartan]     Review of Systems:   · Constitutional: there has been no unanticipated weight loss. There's been no change in energy level, sleep pattern, or activity level. · Eyes: No visual changes or diplopia. No scleral icterus. · ENT: No Headaches, hearing loss or vertigo. No mouth sores or sore throat. · Cardiovascular: Reviewed in HPI  · Respiratory: No cough or wheezing, no sputum production. No hematemesis.     · Gastrointestinal: No abdominal pain, appetite loss, blood in stools. No change in bowel or bladder habits. · Genitourinary: No dysuria, trouble voiding, or hematuria. · Musculoskeletal:  No gait disturbance, weakness or joint complaints. · Integumentary: No rash or pruritis. · Neurological: No headache, diplopia, change in muscle strength, numbness or tingling. No change in gait, balance, coordination, mood, affect, memory, mentation, behavior. · Psychiatric: No anxiety, no depression. · Endocrine: No malaise, fatigue or temperature intolerance. No excessive thirst, fluid intake, or urination. No tremor. · Hematologic/Lymphatic: No abnormal bruising or bleeding, blood clots or swollen lymph nodes. · Allergic/Immunologic: No nasal congestion or hives. Physical Examination:    Vitals:    01/12/22 1023   BP: 134/74          General appearance: alert, appears stated age, cooperative and no distress  Right foot warm with strong biphasic pedal signals.   1+ edema noted      Assessment:     Patient Active Problem List   Diagnosis    Diabetes mellitus type II, uncontrolled ([address]    Tinnitus    Leg edema    Elevated PSA- nl 8/12/11    Hyperlipidemia LDL goal <70    Osteoarthritis    Diabetic retinopathy ([address]    BPH (benign prostatic hyperplasia)    COPD (chronic obstructive pulmonary disease) (HCC)    Rosacea    Erectile dysfunction    Essential hypertension    Coronary artery disease involving native coronary artery of native heart without angina pectoris    Diabetes mellitus, type II ([address]    Diabetic nephropathy- pos microalb 8/11    Diabetic neuropathy (HCC)    GERD (gastroesophageal reflux disease)    B12 deficiency    Impingement syndrome of left shoulder    Obesity, Class III, BMI 40-49.9 (morbid obesity) ([address]    Personal history of atrial flutter    Peptic ulcer disease    S/P total knee arthroplasty, left    Vitamin D deficiency    Spinal stenosis of lumbar region with neurogenic claudication- clinically    BMI 40.0-44.9, adult ([address]    RBBB    Left anterior fascicular hemiblock    Paroxysmal atrial fibrillation (HCC)    CHF (congestive heart failure), NYHA class I, acute on chronic, combined ([address]    Upper abdominal pain    Stage 3 chronic kidney disease (HCC)    Ulcer of toe of right foot, with fat layer exposed ([address]    Impaired mobility    Peripheral venous insufficiency    Atherosclerosis of native arteries of right leg with ulceration of other part of foot ([address]    Decreased pulses in feet       Plan:  58-year-old male with no evidence of arterial sufficiency of the right lower extremity. His angiogram results were again reviewed with him in detail. At this point he can follow-up with me as    Thank you for allowing me to participate in the care of this individual.  Please do not hesitate to contact me with any questions. Jamil Strauss M.D., FACS.  1/12/2022  10:31 AM

## 2022-01-18 DIAGNOSIS — I10 ESSENTIAL HYPERTENSION: ICD-10-CM

## 2022-01-18 RX ORDER — POTASSIUM CHLORIDE 750 MG/1
TABLET, FILM COATED, EXTENDED RELEASE ORAL
Qty: 90 TABLET | Refills: 1 | Status: SHIPPED | OUTPATIENT
Start: 2022-01-18 | End: 2022-09-05

## 2022-02-01 ENCOUNTER — OFFICE VISIT (OUTPATIENT)
Dept: FAMILY MEDICINE CLINIC | Age: 82
End: 2022-02-01
Payer: MEDICARE

## 2022-02-01 VITALS
HEART RATE: 66 BPM | DIASTOLIC BLOOD PRESSURE: 82 MMHG | RESPIRATION RATE: 16 BRPM | HEIGHT: 68 IN | OXYGEN SATURATION: 99 % | WEIGHT: 268 LBS | SYSTOLIC BLOOD PRESSURE: 118 MMHG | BODY MASS INDEX: 40.62 KG/M2

## 2022-02-01 DIAGNOSIS — N18.30 STAGE 3 CHRONIC KIDNEY DISEASE, UNSPECIFIED WHETHER STAGE 3A OR 3B CKD (HCC): ICD-10-CM

## 2022-02-01 DIAGNOSIS — J44.1 COPD EXACERBATION (HCC): ICD-10-CM

## 2022-02-01 DIAGNOSIS — I50.43 CHF (CONGESTIVE HEART FAILURE), NYHA CLASS I, ACUTE ON CHRONIC, COMBINED (HCC): ICD-10-CM

## 2022-02-01 DIAGNOSIS — E11.65 UNCONTROLLED TYPE 2 DIABETES MELLITUS WITH HYPERGLYCEMIA (HCC): Primary | ICD-10-CM

## 2022-02-01 DIAGNOSIS — I10 ESSENTIAL HYPERTENSION: ICD-10-CM

## 2022-02-01 DIAGNOSIS — I48.0 PAROXYSMAL ATRIAL FIBRILLATION (HCC): ICD-10-CM

## 2022-02-01 DIAGNOSIS — E66.01 OBESITY, CLASS III, BMI 40-49.9 (MORBID OBESITY) (HCC): ICD-10-CM

## 2022-02-01 LAB — HBA1C MFR BLD: 8.1 %

## 2022-02-01 PROCEDURE — G8417 CALC BMI ABV UP PARAM F/U: HCPCS | Performed by: FAMILY MEDICINE

## 2022-02-01 PROCEDURE — 4040F PNEUMOC VAC/ADMIN/RCVD: CPT | Performed by: FAMILY MEDICINE

## 2022-02-01 PROCEDURE — G8484 FLU IMMUNIZE NO ADMIN: HCPCS | Performed by: FAMILY MEDICINE

## 2022-02-01 PROCEDURE — 83036 HEMOGLOBIN GLYCOSYLATED A1C: CPT | Performed by: FAMILY MEDICINE

## 2022-02-01 PROCEDURE — 1123F ACP DISCUSS/DSCN MKR DOCD: CPT | Performed by: FAMILY MEDICINE

## 2022-02-01 PROCEDURE — G8427 DOCREV CUR MEDS BY ELIG CLIN: HCPCS | Performed by: FAMILY MEDICINE

## 2022-02-01 PROCEDURE — 1036F TOBACCO NON-USER: CPT | Performed by: FAMILY MEDICINE

## 2022-02-01 PROCEDURE — 99214 OFFICE O/P EST MOD 30 MIN: CPT | Performed by: FAMILY MEDICINE

## 2022-02-01 PROCEDURE — 3023F SPIROM DOC REV: CPT | Performed by: FAMILY MEDICINE

## 2022-02-01 NOTE — PROGRESS NOTES
DIABETES MELLITUS FOLOW-UP  Subjective:      Chief Complaint   Patient presents with    Diabetes     Yi Fuentes is an 81 y.o. male who presents for follow up of following chronic problems:  1. Uncontrolled type 2 diabetes mellitus with hyperglycemia ([facility] [address]    2. Uncontrolled type 2 diabetes mellitus with hypoglycemia and coma ([facility] [address]    3. Essential hypertension    4. COPD exacerbation ([facility] [address]    5. CHF (congestive heart failure), NYHA class I, acute on chronic, combined (HCC)    6. Paroxysmal atrial fibrillation (HCC)    7. Stage 3 chronic kidney disease, unspecified whether stage 3a or 3b CKD ([facility] [address]    8. Obesity, Class III, BMI 40-49.9 (morbid obesity) ([facility] [address]      Complaints: pt states the farxiga is $500 so he is going to talk to cardio about something else. He is having hip pain. Senia price went up. Forgets to take insulin a few times a week. · Patient checks sugars 3  time(s) daily. Range: 110-200     · Any low sugars? No  · Patent follows diabetic diet? No  · Exercise: walking rarely  · Taking medicines daily as directed? Yes  · Any side effects of medications? No    A1c is 8.1 today. Review of Systems   Ophthalmic ROS: change in vision? No  Respiratory ROS: Shortness of breath? No  Cardiovascular ROS: chest pain? No  Neurological ROS: Numbness/tingling? No  Dermatological ROS: rash or sores on feet? No    Health Maintenance Due   Topic Date Due    Shingles Vaccine (2 of 3) 11/28/2012    DTaP/Tdap/Td vaccine (3 - Td or Tdap) 11/28/2021     *Chief complaint, HPI, History and ROS provided by the medical assistant has been reviewed and verified by provider- Yevgeniy Solis MD    CHART REVIEW   reports that he quit smoking about 37 years ago. His smoking use included cigarettes. He has a 112.00 pack-year smoking history.  He has never used smokeless tobacco.  Health Maintenance Due   Topic Date Due    Shingles Vaccine (2 of 3) 11/28/2012    DTaP/Tdap/Td vaccine (3 - Td or Tdap) 11/28/2021     Current Outpatient Medications   Medication Instructions    albuterol (PROVENTIL) 2.5 mg, Nebulization, EVERY 6 HOURS PRN    albuterol sulfate HFA (VENTOLIN HFA) 108 (90 Base) MCG/ACT inhaler 2 puffs, Inhalation, EVERY 6 HOURS PRN    amiodarone (CORDARONE) 200 MG tablet TAKE 1 TABLET BY MOUTH DAILY    apixaban (ELIQUIS) 2.5 mg, Oral, 2 TIMES DAILY    aspirin 81 mg, Oral, DAILY    atorvastatin (LIPITOR) 40 MG tablet TAKE 1 TABLET BY MOUTH EVERY EVENING    blood glucose monitor kit and supplies Test 2 times a day & as needed for symptoms of irregular blood glucose.  blood glucose monitor strips Test 2 times a day & as needed for symptoms of irregular blood glucose.  blood glucose test strips (ASCENSIA AUTODISC VI;ONE TOUCH ULTRA TEST VI) strip four times daily    Cyanocobalamin (B-12) 500 MCG TABS TAKE 1 TABLET BY MOUTH DAILY    dapagliflozin (FARXIGA) 5 mg, Oral, DAILY, LOT [id number] EXP 04/2024 6 BOXES    FEROSUL 325 (65 Fe) MG tablet TAKE 1 TABLET BY MOUTH TWICE DAILY    furosemide (LASIX) 80 MG tablet TAKE 1 TABLET BY MOUTH DAILY    gabapentin (NEURONTIN) 600 MG tablet TAKE UP TO 5 TABLETS BY MOUTH OVER 24 HOURS AS DIRECTED    hydrALAZINE (APRESOLINE) 25 MG tablet TAKE 1 TABLET BY MOUTH THREE TIMES DAILY    insulin NPH (NOVOLIN N) 100 UNIT/ML injection vial Take 44 units with breakfast and 32 units with dinner.  insulin regular (NOVOLIN R RELION) 100 UNIT/ML injection INJECT 20 TO 30 UNITS SUBCUTANEOUSLY THREE TIMES DAILY BEFORE MEAL(S)    INSULIN SYRINGE .5CC/29G 29G X 1/2\" 0.5 ML MISC INJECT 4 TIMES DAILY AS NEEDED    isosorbide mononitrate (IMDUR) 30 mg, Oral, EVERY MORNING, Dr. Joselo Gaston - Cardio    Lancets MISC 1 each, Does not apply, DAILY, Use to check glucose twice a day. One Touch brand.   DX E11.9    levalbuterol (XOPENEX HFA) 45 MCG/ACT inhaler 1-2 puffs, Inhalation, EVERY 4 HOURS PRN    metoprolol tartrate (LOPRESSOR) 25 mg, Oral, DAILY    mupirocin (BACTROBAN) 2 % ointment Apply 3 times daily as needed    Nebulizers (AIRIAL COMPACT MINI NEBULIZER) MISC 1 each, Does not apply, EVERY 6 HOURS PRN    nitroGLYCERIN (NITROSTAT) 0.3 MG SL tablet Take one sublingual for chest pain. May repeat twice at 5 min intervals. If not getting relief call 911.     omeprazole (PRILOSEC) 20 MG delayed release capsule TAKE 1 CAPSULE BY MOUTH TWICE DAILY    potassium chloride (KLOR-CON) 10 MEQ extended release tablet TAKE 1 TABLET BY MOUTH DAILY    Respiratory Therapy Supplies (NEBULIZER/TUBING/MOUTHPIECE) KIT 1 kit, Does not apply, EVERY 6 HOURS PRN    Spiriva HandiHaler 18 mcg, Inhalation, DAILY, INCLUDE inhaler device    umeclidinium-vilanterol (ANORO ELLIPTA) 62.5-25 MCG/INH AEPB inhaler INHALE 1 PUFF INTO THE LUNGS DAILY    vitamin D (ERGOCALCIFEROL) 89306 units CAPS capsule TK 1 C PO WEEKLY     LAST LABS  LDL Calculated   Date Value Ref Range Status   02/25/2021 92 <100 mg/dL Final     Lab Results   Component Value Date    HDL 52 02/25/2021     Lab Results   Component Value Date    TRIG 121 02/25/2021     Lab Results   Component Value Date     12/09/2021    K 3.8 12/09/2021    CREATININE 1.9 (H) 12/09/2021     Lab Results   Component Value Date    WBC 3.6 (L) 12/09/2021    HGB 14.3 12/09/2021     12/09/2021     Lab Results   Component Value Date    ALT 24 07/16/2021    AST 40 (H) 07/16/2021    ALKPHOS 119 07/16/2021    BILITOT 0.6 07/16/2021     TSH (uIU/mL)   Date Value   02/25/2021 2.44     Lab Results   Component Value Date    GLUCOSE 156 (H) 12/09/2021     Lab Results   Component Value Date    LABA1C 7.0 09/17/2021    LABA1C 8.1 05/27/2021    LABA1C 7.6 02/25/2021     Objective:   PHYSICAL EXAM   /82 (Site: Left Upper Arm, Position: Sitting, Cuff Size: Large Adult)   Pulse 66   Resp 16   Ht 5' 8\" (1.727 m)   Wt 268 lb (121.6 kg)   SpO2 99%   BMI 40.75 kg/m²   BP Readings from Last 5 Encounters:   02/01/22 118/82   01/12/22 134/74   12/15/21 (!) 159/73 11/23/21 (!) 142/77   11/16/21 127/77     Wt Readings from Last 5 Encounters:   02/01/22 268 lb (121.6 kg)   01/12/22 260 lb (117.9 kg)   12/20/21 280 lb (127 kg)   12/15/21 280 lb (127 kg)   11/10/21 273 lb (123.8 kg)      GENERAL:   · well-developed, well-nourished, alert, no distress. LUNGS:    · Breathing unlabored  · clear to auscultation bilaterally and good air movement  CARDIOVASC:   · regular rate and rhythm  · LEGS:  Lower extremity edema: none    SKIN: warm and dry     Assessment and Plan:      Diagnosis Orders   1. Uncontrolled type 2 diabetes mellitus with hyperglycemia (HCC)  POCT glycosylated hemoglobin (Hb A1C)   2. Uncontrolled type 2 diabetes mellitus with hypoglycemia and coma ([facility] [address]     3. Essential hypertension     4. COPD exacerbation ([facility] [address]     5. CHF (congestive heart failure), NYHA class I, acute on chronic, combined (HCC)     6. Paroxysmal atrial fibrillation (HCC)     7. Stage 3 chronic kidney disease, unspecified whether stage 3a or 3b CKD ([facility] [address]     8. Obesity, Class III, BMI 40-49.9 (morbid obesity) (HCC)     unchanged     Continue current Tx plan. Any changes marked below. INSTRUCTIONS  NEXT APPOINTMENT: Please schedule check-up in 3 months. · PLEASE TAKE THIS FORM TO CHECK-OUT WINDOW TO SCHEDULE NEXT VISIT. · Dr. Abdul Knows office may be able to prior auth for [place] based upon heart failure study. · Remember to take insulin before eating.

## 2022-02-01 NOTE — PATIENT INSTRUCTIONS
INSTRUCTIONS  NEXT APPOINTMENT: Please schedule check-up in 3 months. · PLEASE TAKE THIS FORM TO CHECK-OUT WINDOW TO SCHEDULE NEXT VISIT. · Dr. Nichole Henderson office may be able to prior auth for [place] based upon heart failure study. · Remember to take insulin before eating.

## 2022-02-07 NOTE — TELEPHONE ENCOUNTER
Patient has been rescheduled from 3/14/22 to 5/18/22 at 1.  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     GI : please order golytely prep for patient. Patient states the sutabs are too expensive for her.       Pt is calling because he has questions about if his gabapentin dosage has change the pt will like a call back today before 2 pm.     Please advise,    Thanks.

## 2022-03-21 RX ORDER — FERROUS SULFATE 325(65) MG
TABLET ORAL
Qty: 180 TABLET | Refills: 3 | Status: SHIPPED | OUTPATIENT
Start: 2022-03-21 | End: 2022-09-05

## 2022-04-01 RX ORDER — AMIODARONE HYDROCHLORIDE 200 MG/1
TABLET ORAL
Qty: 30 TABLET | Refills: 0 | Status: SHIPPED | OUTPATIENT
Start: 2022-04-01 | End: 2022-05-16

## 2022-04-01 RX ORDER — AMIODARONE HYDROCHLORIDE 200 MG/1
TABLET ORAL
Qty: 90 TABLET | OUTPATIENT
Start: 2022-04-01

## 2022-04-01 RX ORDER — AMIODARONE HYDROCHLORIDE 200 MG/1
TABLET ORAL
Qty: 30 TABLET | Refills: 0 | Status: SHIPPED | OUTPATIENT
Start: 2022-04-01 | End: 2022-04-01

## 2022-04-19 RX ORDER — ALBUTEROL SULFATE 90 UG/1
2 AEROSOL, METERED RESPIRATORY (INHALATION) EVERY 6 HOURS PRN
Qty: 18 G | Refills: 5 | Status: SHIPPED | OUTPATIENT
Start: 2022-04-19

## 2022-05-02 RX ORDER — OMEPRAZOLE 20 MG/1
CAPSULE, DELAYED RELEASE ORAL
Qty: 180 CAPSULE | Refills: 1 | Status: SHIPPED | OUTPATIENT
Start: 2022-05-02

## 2022-05-03 ENCOUNTER — HOSPITAL ENCOUNTER (OUTPATIENT)
Age: 82
Discharge: HOME | End: 2022-05-03
Payer: MEDICARE

## 2022-05-03 ENCOUNTER — HOSPITAL ENCOUNTER (OUTPATIENT)
Dept: GENERAL RADIOLOGY | Age: 82
Discharge: HOME | End: 2022-05-03
Payer: MEDICARE

## 2022-05-03 ENCOUNTER — OFFICE VISIT (OUTPATIENT)
Dept: FAMILY MEDICINE CLINIC | Age: 82
End: 2022-05-03
Payer: MEDICARE

## 2022-05-03 VITALS
OXYGEN SATURATION: 95 % | WEIGHT: 278 LBS | DIASTOLIC BLOOD PRESSURE: 60 MMHG | SYSTOLIC BLOOD PRESSURE: 118 MMHG | BODY MASS INDEX: 42.13 KG/M2 | HEART RATE: 67 BPM | HEIGHT: 68 IN | RESPIRATION RATE: 18 BRPM

## 2022-05-03 DIAGNOSIS — L97.922 ULCER OF LEFT LOWER EXTREMITY WITH FAT LAYER EXPOSED (HCC): ICD-10-CM

## 2022-05-03 DIAGNOSIS — I87.2 CHRONIC VENOUS STASIS DERMATITIS: ICD-10-CM

## 2022-05-03 DIAGNOSIS — J44.1 CHRONIC OBSTRUCTIVE PULMONARY DISEASE WITH ACUTE EXACERBATION (HCC): Primary | ICD-10-CM

## 2022-05-03 DIAGNOSIS — I87.2 CHRONIC VENOUS INSUFFICIENCY: ICD-10-CM

## 2022-05-03 DIAGNOSIS — I42.0 DILATED CARDIOMYOPATHY (HCC): ICD-10-CM

## 2022-05-03 DIAGNOSIS — E11.42 DIABETIC POLYNEUROPATHY ASSOCIATED WITH TYPE 2 DIABETES MELLITUS (HCC): ICD-10-CM

## 2022-05-03 DIAGNOSIS — J44.1 CHRONIC OBSTRUCTIVE PULMONARY DISEASE WITH ACUTE EXACERBATION (HCC): ICD-10-CM

## 2022-05-03 PROCEDURE — 3023F SPIROM DOC REV: CPT | Performed by: FAMILY MEDICINE

## 2022-05-03 PROCEDURE — 99214 OFFICE O/P EST MOD 30 MIN: CPT | Performed by: FAMILY MEDICINE

## 2022-05-03 PROCEDURE — 1123F ACP DISCUSS/DSCN MKR DOCD: CPT | Performed by: FAMILY MEDICINE

## 2022-05-03 PROCEDURE — 4040F PNEUMOC VAC/ADMIN/RCVD: CPT | Performed by: FAMILY MEDICINE

## 2022-05-03 PROCEDURE — G8417 CALC BMI ABV UP PARAM F/U: HCPCS | Performed by: FAMILY MEDICINE

## 2022-05-03 PROCEDURE — G8427 DOCREV CUR MEDS BY ELIG CLIN: HCPCS | Performed by: FAMILY MEDICINE

## 2022-05-03 PROCEDURE — 1036F TOBACCO NON-USER: CPT | Performed by: FAMILY MEDICINE

## 2022-05-03 PROCEDURE — 71046 X-RAY EXAM CHEST 2 VIEWS: CPT

## 2022-05-03 RX ORDER — PREDNISONE 20 MG/1
20 TABLET ORAL DAILY
Qty: 5 TABLET | Refills: 0 | Status: SHIPPED | OUTPATIENT
Start: 2022-05-03 | End: 2022-05-08

## 2022-05-03 RX ORDER — TORSEMIDE 20 MG/1
20 TABLET ORAL DAILY
Qty: 30 TABLET | Refills: 3 | Status: ON HOLD | OUTPATIENT
Start: 2022-05-03 | End: 2022-09-05 | Stop reason: HOSPADM

## 2022-05-03 ASSESSMENT — PATIENT HEALTH QUESTIONNAIRE - PHQ9
SUM OF ALL RESPONSES TO PHQ QUESTIONS 1-9: 0
SUM OF ALL RESPONSES TO PHQ9 QUESTIONS 1 & 2: 0
1. LITTLE INTEREST OR PLEASURE IN DOING THINGS: 0
2. FEELING DOWN, DEPRESSED OR HOPELESS: 0

## 2022-05-03 NOTE — PROGRESS NOTES
UPPER RESPIRATORY VISIT  Subjective:      Chief Complaint   Patient presents with    COPD     Pt said that he is having shortness and breath and wheezing. Pt said that he has the most trouble at night when trying to sleep because he will cough.  Other     Pt stated that he needs a referral for the wound clinc because he has a wound on his left leg in the is just above the ankle. Pt said that he is does get petting edema on both legs. Valdez Mendez is a 81 y.o. male who presents for evaluation of symptoms of URI. Onset of symptoms was several weeks ago. · Took 120 mg of lasix yesterday and better breathing last night. ·  Legs started swelling 2 weeks ago and are now weeping. Has ulcer on left lateral ankle. · Getting new pacemaker in June with a defibrillator  · Report recent vascular study that was OK arteruial    CHART REVIEW  Health Maintenance   Topic Date Due    Shingles vaccine (2 of 3) 11/28/2012    DTaP/Tdap/Td vaccine (3 - Td or Tdap) 11/28/2021    Lipids  02/25/2022    Depression Screen  05/24/2022    Annual Wellness Visit (AWV)  05/25/2022    TSH  03/18/2023    Potassium  03/18/2023    Creatinine  03/18/2023    Flu vaccine  Completed    Pneumococcal 65+ years Vaccine  Completed    COVID-19 Vaccine  Completed    Hepatitis A vaccine  Aged Out    Hib vaccine  Aged Out    Meningococcal (ACWY) vaccine  Aged Out     Prior to Visit Medications    Medication Sig Taking?  Authorizing Provider   torsemide (DEMADEX) 20 MG tablet Take 1 tablet by mouth daily Yes Rito Koyanagi, MD   predniSONE (DELTASONE) 20 MG tablet Take 1 tablet by mouth daily for 5 days Yes Rito Koyanagi, MD   omeprazole (PRILOSEC) 20 MG delayed release capsule TAKE 1 CAPSULE BY MOUTH TWICE DAILY Yes Rito Koyanagi, MD   albuterol sulfate  (90 Base) MCG/ACT inhaler INHALE 2 PUFFS INTO THE LUNGS EVERY 6 HOURS AS NEEDED FOR WHEEZING Yes Rito Koyanagi, MD   amiodarone (CORDARONE) 200 MG tablet TAKE 1 TABLET BY MOUTH DAILY Yes Mima Martin MD   FEROSUL 325 (65 Fe) MG tablet TAKE 1 TABLET BY MOUTH TWICE DAILY Yes Sandra Davis MD   dapagliflozin (FARXIGA) 5 MG tablet Take 1 tablet by mouth daily LOT [id number] EXP 07/31/2024 4 BOXES Yes Sandra Davis MD   potassium chloride (KLOR-CON) 10 MEQ extended release tablet TAKE 1 TABLET BY MOUTH DAILY Yes Sandra Davis MD   furosemide (LASIX) 80 MG tablet TAKE 1 TABLET BY MOUTH DAILY Yes Sandra Davis MD   Lancets MISC 1 each by Does not apply route daily Use to check glucose twice a day. One Touch brand. DX E11.9 Yes Sandra Davis MD   blood glucose monitor kit and supplies Test 2 times a day & as needed for symptoms of irregular blood glucose. Yes Sandra Davis MD   blood glucose monitor strips Test 2 times a day & as needed for symptoms of irregular blood glucose. Yes Sandra Davis MD   blood glucose test strips (ASCENSIA AUTODISC VI;ONE TOUCH ULTRA TEST VI) strip four times daily Yes Sandra Davis MD   INSULIN SYRINGE .5CC/29G 29G X 1/2\" 0.5 ML MISC INJECT 4 TIMES DAILY AS NEEDED Yes Sandra Davis MD   atorvastatin (LIPITOR) 40 MG tablet TAKE 1 TABLET BY MOUTH EVERY EVENING Yes Sandra Davis MD   hydrALAZINE (APRESOLINE) 25 MG tablet TAKE 1 TABLET BY MOUTH THREE TIMES DAILY Yes August Michaels MD   Cyanocobalamin (B-12) 500 MCG TABS TAKE 1 TABLET BY MOUTH DAILY Yes Sandra Davis MD   gabapentin (NEURONTIN) 600 MG tablet TAKE UP TO 5 TABLETS BY MOUTH OVER 24 HOURS AS DIRECTED Yes Sandra Davis MD   insulin NPH (NOVOLIN N) 100 UNIT/ML injection vial Take 44 units with breakfast and 32 units with dinner. Patient taking differently: Take 20 units with breakfast and 20 units with dinner.  Yes Sandra Davis MD   insulin regular (NOVOLIN R RELION) 100 UNIT/ML injection INJECT 20 TO 30 UNITS SUBCUTANEOUSLY THREE TIMES DAILY BEFORE MEAL(S) Yes Sandra Davis MD   Nebulizers (AIRIAL COMPACT MINI NEBULIZER) MISC 1 each by Does not apply route every 6 hours as needed (wheezing or shortness of breath) Yes Mars Mac MD   Respiratory Therapy Supplies (NEBULIZER/TUBING/MOUTHPIECE) KIT 1 kit by Does not apply route every 6 hours as needed (for wheezing or shortness of breath) Yes Mars Mac MD   vitamin D (ERGOCALCIFEROL) 13609 units CAPS capsule TK 1 C PO WEEKLY Yes Historical Provider, MD   mupirocin (BACTROBAN) 2 % ointment Apply 3 times daily as needed Yes Mars Mac MD   levalbuterol (XOPENEX HFA) 45 MCG/ACT inhaler Inhale 1-2 puffs into the lungs every 4 hours as needed for Wheezing Yes Mars Mac MD   aspirin 81 MG EC tablet Take 81 mg by mouth daily Yes Historical Provider, MD   apixaban (ELIQUIS) 2.5 MG TABS tablet Take 1 tablet by mouth 2 times daily Yes Mars Mac MD   metoprolol (LOPRESSOR) 25 MG tablet Take 25 mg by mouth daily  Yes Historical Provider, MD   isosorbide mononitrate (IMDUR) 30 MG CR tablet Take 1 tablet by mouth every morning Dr. Praveen Montiel Yes Mars aMc MD   nitroGLYCERIN (NITROSTAT) 0.3 MG SL tablet Take one sublingual for chest pain. May repeat twice at 5 min intervals. If not getting relief call 911.  Yes Mars Mac MD   umeclidinium-vilanterol [facility] ELLIPTA) 62.5-25 MCG/INH AEPB inhaler INHALE 1 PUFF INTO THE LUNGS DAILY  Patient not taking: Reported on 5/3/2022  Mars Mac MD   tiotropium (Jae Hedges) 18 MCG inhalation capsule Inhale 1 capsule into the lungs daily INCLUDE inhaler device  Mars Mac MD   albuterol (PROVENTIL) (2.5 MG/3ML) 0.083% nebulizer solution Take 3 mLs by nebulization every 6 hours as needed for Wheezing  Mars Mac MD      Social History     Tobacco Use    Smoking status: Former Smoker     Packs/day: 3.50     Years: 32.00     Pack years: 112.00     Types: Cigarettes     Quit date: 1985     Years since quittin.3    Smokeless tobacco: Never Used    Tobacco comment: advised not to resume   Substance Use Topics    Alcohol use: No     Alcohol/week: 0.0 standard drinks    Drug use: No      LAST LABS  Cholesterol, Total   Date Value Ref Range Status   02/25/2021 168 0 - 199 mg/dL Final     LDL Calculated   Date Value Ref Range Status   02/25/2021 92 <100 mg/dL Final     HDL   Date Value Ref Range Status   02/25/2021 52 40 - 60 mg/dL Final   03/08/2012 37 (L) 40 - 60 mg/dl Final     Triglycerides   Date Value Ref Range Status   02/25/2021 121 0 - 150 mg/dL Final     Lab Results   Component Value Date    GLUCOSE 156 (H) 12/09/2021     Lab Results   Component Value Date     12/09/2021    K 3.8 12/09/2021    CREATININE 1.9 (H) 12/09/2021     Lab Results   Component Value Date    WBC 3.6 (L) 12/09/2021    HGB 14.3 12/09/2021    HCT 44.2 12/09/2021    .4 (H) 12/09/2021     12/09/2021     Lab Results   Component Value Date    ALT 24 07/16/2021    AST 40 (H) 07/16/2021    ALKPHOS 119 07/16/2021    BILITOT 0.6 07/16/2021     TSH (uIU/mL)   Date Value   02/25/2021 2.44     Lab Results   Component Value Date    LABA1C 8.1 02/01/2022     Objective:   PHYSICAL EXAM  /60 (Site: Right Upper Arm, Position: Sitting, Cuff Size: Large Adult)   Pulse 67   Resp 18   Ht 5' 8\" (1.727 m)   Wt 278 lb (126.1 kg)   SpO2 95%   BMI 42.27 kg/m²   Wt Readings from Last 3 Encounters:   05/03/22 278 lb (126.1 kg)   02/01/22 268 lb (121.6 kg)   01/12/22 260 lb (117.9 kg)     BP Readings from Last 3 Encounters:   05/03/22 118/60   02/01/22 118/82   01/12/22 134/74     GENERAL: well-developed, well-nourished, alert, no distress  EYES: negative findings: lids and lashes normal and conjunctivae and sclerae normal  ENT: normal TM's and external ear canals both ears  · External nose and ears appear normal  · Pharynx: normal. Exudates: None  NECK: Supple, symmetrical, trachea midline  · Thyroid not enlarged, symmetric, no tenderness/mass/nodules  · no cervical nodes, no supraclavicular nodes  LUNGS:  Breathing unlabored  · diminished breath sounds bibasilar and wheezes bilaterally,  fair air movement  CARDIOVASC: regular rate and rhythm  SKIN- 4x 3 ulcer into dermis left lateral lower leg  · Chronic venous stasis  · Weeping from erosion medial lower left leg    Assessment/Plan:      Diagnosis Orders   1. Chronic obstructive pulmonary disease with acute exacerbation (HCC)  predniSONE (DELTASONE) 20 MG tablet    XR CHEST (2 VW)   2. Dilated cardiomyopathy (HCC)  torsemide (DEMADEX) 20 MG tablet    XR CHEST (2 VW)   3. Ulcer of left lower extremity with fat layer exposed ([facility] [address]  Amb External Referral To Wound Clinic   4. Chronic venous stasis dermatitis  Amb External Referral To Wound Clinic   5. Chronic venous insufficiency  Amb External Referral To Wound Clinic    torsemide (DEMADEX) 20 MG tablet     INSTRUCTIONS  · Get x-ray. Can walk in to [facility] to get the x-ray. No appointment needed. · Take demedex when you get home today and then every morning with the lasix. · Please call if symptoms getting worse. · Check weights daily. · Take prednisone for 5 days.

## 2022-05-03 NOTE — PATIENT INSTRUCTIONS
INSTRUCTIONS  · Get x-ray. Can walk in to [facility] to get the x-ray. No appointment needed. · Take demedex when you get home today and then every morning with the lasix. · Please call if symptoms getting worse. · Check weights daily.

## 2022-05-04 RX ORDER — GABAPENTIN 600 MG/1
TABLET ORAL
Qty: 150 TABLET | Refills: 11 | Status: SHIPPED | OUTPATIENT
Start: 2022-05-04 | End: 2023-05-04

## 2022-05-04 RX ORDER — TORSEMIDE 20 MG/1
20 TABLET ORAL DAILY
Qty: 90 TABLET | OUTPATIENT
Start: 2022-05-04

## 2022-05-05 ENCOUNTER — OFFICE VISIT (OUTPATIENT)
Dept: FAMILY MEDICINE CLINIC | Age: 82
End: 2022-05-05
Payer: MEDICARE

## 2022-05-05 ENCOUNTER — HOSPITAL ENCOUNTER (OUTPATIENT)
Dept: WOUND CARE | Age: 82
Discharge: HOME | End: 2022-05-05
Payer: MEDICARE

## 2022-05-05 VITALS
OXYGEN SATURATION: 94 % | DIASTOLIC BLOOD PRESSURE: 72 MMHG | WEIGHT: 269.6 LBS | HEIGHT: 68 IN | SYSTOLIC BLOOD PRESSURE: 134 MMHG | HEART RATE: 74 BPM | RESPIRATION RATE: 18 BRPM | BODY MASS INDEX: 40.86 KG/M2

## 2022-05-05 VITALS
SYSTOLIC BLOOD PRESSURE: 132 MMHG | RESPIRATION RATE: 18 BRPM | HEART RATE: 86 BPM | TEMPERATURE: 98.1 F | DIASTOLIC BLOOD PRESSURE: 75 MMHG | HEIGHT: 68 IN | BODY MASS INDEX: 41.06 KG/M2 | WEIGHT: 270.95 LBS

## 2022-05-05 DIAGNOSIS — I50.43 CHF (CONGESTIVE HEART FAILURE), NYHA CLASS I, ACUTE ON CHRONIC, COMBINED (HCC): ICD-10-CM

## 2022-05-05 DIAGNOSIS — I87.2 CHRONIC VENOUS INSUFFICIENCY: ICD-10-CM

## 2022-05-05 DIAGNOSIS — E11.21 TYPE 2 DIABETES MELLITUS WITH DIABETIC NEPHROPATHY, WITH LONG-TERM CURRENT USE OF INSULIN (HCC): ICD-10-CM

## 2022-05-05 DIAGNOSIS — E11.649 UNCONTROLLED TYPE 2 DIABETES MELLITUS WITH HYPOGLYCEMIA, UNSPECIFIED HYPOGLYCEMIA COMA STATUS (HCC): ICD-10-CM

## 2022-05-05 DIAGNOSIS — I10 ESSENTIAL HYPERTENSION: ICD-10-CM

## 2022-05-05 DIAGNOSIS — E11.65 UNCONTROLLED TYPE 2 DIABETES MELLITUS WITH HYPERGLYCEMIA (HCC): Primary | ICD-10-CM

## 2022-05-05 DIAGNOSIS — I70.235 ATHEROSCLEROSIS OF NATIVE ARTERIES OF RIGHT LEG WITH ULCERATION OF OTHER PART OF FOOT (HCC): ICD-10-CM

## 2022-05-05 DIAGNOSIS — I87.2 PERIPHERAL VENOUS INSUFFICIENCY: ICD-10-CM

## 2022-05-05 DIAGNOSIS — I42.0 DILATED CARDIOMYOPATHY (HCC): ICD-10-CM

## 2022-05-05 DIAGNOSIS — Z79.4 TYPE 2 DIABETES MELLITUS WITH DIABETIC NEPHROPATHY, WITH LONG-TERM CURRENT USE OF INSULIN (HCC): ICD-10-CM

## 2022-05-05 DIAGNOSIS — L97.512 ULCER OF TOE OF RIGHT FOOT, WITH FAT LAYER EXPOSED (HCC): Primary | ICD-10-CM

## 2022-05-05 DIAGNOSIS — I87.2 CHRONIC VENOUS STASIS DERMATITIS: ICD-10-CM

## 2022-05-05 DIAGNOSIS — J44.1 CHRONIC OBSTRUCTIVE PULMONARY DISEASE WITH ACUTE EXACERBATION (HCC): ICD-10-CM

## 2022-05-05 DIAGNOSIS — N18.32 STAGE 3B CHRONIC KIDNEY DISEASE (HCC): ICD-10-CM

## 2022-05-05 LAB
ANION GAP SERPL CALCULATED.3IONS-SCNC: 11 MMOL/L (ref 3–16)
ANISOCYTOSIS: ABNORMAL
BANDED NEUTROPHILS RELATIVE PERCENT: 3 % (ref 0–7)
BASOPHILS ABSOLUTE: 0 K/UL (ref 0–0.2)
BASOPHILS RELATIVE PERCENT: 0 %
BUN BLDV-MCNC: 43 MG/DL (ref 7–20)
CALCIUM SERPL-MCNC: 9.1 MG/DL (ref 8.3–10.6)
CHLORIDE BLD-SCNC: 96 MMOL/L (ref 99–110)
CO2: 33 MMOL/L (ref 21–32)
CREAT SERPL-MCNC: 2.1 MG/DL (ref 0.8–1.3)
EOSINOPHILS ABSOLUTE: 0.1 K/UL (ref 0–0.6)
EOSINOPHILS RELATIVE PERCENT: 1 %
GFR AFRICAN AMERICAN: 37
GFR NON-AFRICAN AMERICAN: 30
GLUCOSE BLD-MCNC: 400 MG/DL (ref 70–99)
HBA1C MFR BLD: 8.7 %
HCT VFR BLD CALC: 39.1 % (ref 40.5–52.5)
HEMOGLOBIN: 12.9 G/DL (ref 13.5–17.5)
HYPOCHROMIA: ABNORMAL
LYMPHOCYTES ABSOLUTE: 0.4 K/UL (ref 1–5.1)
LYMPHOCYTES RELATIVE PERCENT: 4 %
MACROCYTES: ABNORMAL
MCH RBC QN AUTO: 33 PG (ref 26–34)
MCHC RBC AUTO-ENTMCNC: 33 G/DL (ref 31–36)
MCV RBC AUTO: 100 FL (ref 80–100)
MICROCYTES: ABNORMAL
MONOCYTES ABSOLUTE: 0.5 K/UL (ref 0–1.3)
MONOCYTES RELATIVE PERCENT: 6 %
NEUTROPHILS ABSOLUTE: 8.1 K/UL (ref 1.7–7.7)
NEUTROPHILS RELATIVE PERCENT: 86 %
PDW BLD-RTO: 15.1 % (ref 12.4–15.4)
PLATELET # BLD: 164 K/UL (ref 135–450)
PMV BLD AUTO: 9.4 FL (ref 5–10.5)
POTASSIUM SERPL-SCNC: 4.7 MMOL/L (ref 3.5–5.1)
RBC # BLD: 3.91 M/UL (ref 4.2–5.9)
SLIDE REVIEW: ABNORMAL
SODIUM BLD-SCNC: 140 MMOL/L (ref 136–145)
WBC # BLD: 9.1 K/UL (ref 4–11)

## 2022-05-05 PROCEDURE — 11042 DBRDMT SUBQ TIS 1ST 20SQCM/<: CPT

## 2022-05-05 PROCEDURE — G8427 DOCREV CUR MEDS BY ELIG CLIN: HCPCS | Performed by: FAMILY MEDICINE

## 2022-05-05 PROCEDURE — 1036F TOBACCO NON-USER: CPT | Performed by: FAMILY MEDICINE

## 2022-05-05 PROCEDURE — G8417 CALC BMI ABV UP PARAM F/U: HCPCS | Performed by: FAMILY MEDICINE

## 2022-05-05 PROCEDURE — 99213 OFFICE O/P EST LOW 20 MIN: CPT

## 2022-05-05 PROCEDURE — 4040F PNEUMOC VAC/ADMIN/RCVD: CPT | Performed by: FAMILY MEDICINE

## 2022-05-05 PROCEDURE — 83036 HEMOGLOBIN GLYCOSYLATED A1C: CPT | Performed by: FAMILY MEDICINE

## 2022-05-05 PROCEDURE — 1123F ACP DISCUSS/DSCN MKR DOCD: CPT | Performed by: FAMILY MEDICINE

## 2022-05-05 PROCEDURE — 99214 OFFICE O/P EST MOD 30 MIN: CPT | Performed by: FAMILY MEDICINE

## 2022-05-05 PROCEDURE — 3023F SPIROM DOC REV: CPT | Performed by: FAMILY MEDICINE

## 2022-05-05 PROCEDURE — 3052F HG A1C>EQUAL 8.0%<EQUAL 9.0%: CPT | Performed by: FAMILY MEDICINE

## 2022-05-05 RX ORDER — LIDOCAINE 50 MG/G
OINTMENT TOPICAL ONCE
Status: CANCELLED | OUTPATIENT
Start: 2022-05-05 | End: 2022-05-05

## 2022-05-05 RX ORDER — CLOBETASOL PROPIONATE 0.5 MG/G
OINTMENT TOPICAL ONCE
Status: CANCELLED | OUTPATIENT
Start: 2022-05-05 | End: 2022-05-05

## 2022-05-05 RX ORDER — BACITRACIN ZINC AND POLYMYXIN B SULFATE 500; 1000 [USP'U]/G; [USP'U]/G
OINTMENT TOPICAL ONCE
Status: CANCELLED | OUTPATIENT
Start: 2022-05-05 | End: 2022-05-05

## 2022-05-05 RX ORDER — LIDOCAINE HYDROCHLORIDE 40 MG/ML
SOLUTION TOPICAL ONCE
Status: CANCELLED | OUTPATIENT
Start: 2022-05-05 | End: 2022-05-05

## 2022-05-05 RX ORDER — LIDOCAINE 40 MG/G
CREAM TOPICAL ONCE
Status: CANCELLED | OUTPATIENT
Start: 2022-05-05 | End: 2022-05-05

## 2022-05-05 RX ORDER — GINSENG 100 MG
CAPSULE ORAL ONCE
Status: CANCELLED | OUTPATIENT
Start: 2022-05-05 | End: 2022-05-05

## 2022-05-05 RX ORDER — GENTAMICIN SULFATE 1 MG/G
OINTMENT TOPICAL ONCE
Status: CANCELLED | OUTPATIENT
Start: 2022-05-05 | End: 2022-05-05

## 2022-05-05 RX ORDER — BACITRACIN, NEOMYCIN, POLYMYXIN B 400; 3.5; 5 [USP'U]/G; MG/G; [USP'U]/G
OINTMENT TOPICAL ONCE
Status: CANCELLED | OUTPATIENT
Start: 2022-05-05 | End: 2022-05-05

## 2022-05-05 RX ORDER — LIDOCAINE HYDROCHLORIDE 20 MG/ML
JELLY TOPICAL ONCE
Status: CANCELLED | OUTPATIENT
Start: 2022-05-05 | End: 2022-05-05

## 2022-05-05 RX ORDER — LIDOCAINE HYDROCHLORIDE 40 MG/ML
SOLUTION TOPICAL ONCE
Status: COMPLETED | OUTPATIENT
Start: 2022-05-05 | End: 2022-05-05

## 2022-05-05 RX ORDER — BETAMETHASONE DIPROPIONATE 0.05 %
OINTMENT (GRAM) TOPICAL ONCE
Status: CANCELLED | OUTPATIENT
Start: 2022-05-05 | End: 2022-05-05

## 2022-05-05 RX ADMIN — LIDOCAINE HYDROCHLORIDE: 40 SOLUTION TOPICAL at 09:14

## 2022-05-05 ASSESSMENT — PAIN SCALES - GENERAL: PAINLEVEL_OUTOF10: 0

## 2022-05-05 NOTE — LETTER
[address]  [address] [address]  [phone]  Dept: [phone]        Thank you . Natalio Yung for choosing [facility] for your Wound Care needs. We hope you found your first visit both encouraging and educational.  We look forward to serving you throughout the healing process. Before your next visit please review the information you received in your [employer]. The first visit can be overwhelming and this is a useful tool to refresh what information you may have been given. If you find yourself with any questions prior to your upcoming appointment, please feel free to contact us. Often wounds can be challenging and it is our goal to see you through the healing process with as much support possible. Again, thank you for choosing [facility]!     Sincerely,      The Staff of [address] [place] [address] Pkwy and Hyperbaric Oxygen Therapy

## 2022-05-05 NOTE — LETTER
[address]  [address] OFF[address]  [facility] [address]  [phone]  Dept: [phone]   TODAYS DATE: [address] Wound Care   Appointment Treatment Guidelines  Welcome Mr. Saida Cobian to the [address] [place] [address] Pkwy. We appreciate the confidence you have shown in choosing us as your wound care provider. Our goal is to heal your wound(s) as quickly as possible. Please read the items below regarding the nature of your appointments. 1. We will make every effort to schedule appointments that are convenient for you. Certain days and times may not be available, depending on your providers office hours and details of your care. 2. Patients will not necessarily be brought to an exam room in the order in which they arrive. Many providers work out of this office and patients are here for different procedures. Our goal is to serve you as quickly as possible. 3. We acknowledge that your time is valuable. Please remember that wound healing takes time and we appreciate your understanding that the length of each patients appointment will vary depending upon their need. 4. It is for your protection that we ask for insurance cards, photo ID, and new consent forms on your first visit and periodically throughout your treatment at all our facilities. 5. Wound Care treatment is known to be most effective when provided on a regular basis. Missed appointments, and not following the recommended plan of care can result in ineffective treatment and a poor outcome. If you find it difficult to keep appointments or to follow the recommended plan of care, it is your responsibility to let the staff know, so that we can work with you toward a solution. 6. If you need to miss an appointment, please call to let us know. We expect 24 hours notice for all cancellations.  We also expect missed visits to be rescheduled as soon as possible, preferably within the same week to promote the most effective healing time for your wound(s). 7. If you will be late for an appointment, please call our center to be sure that the provider can still see you when you arrive. If you are more than 15 minutes late your appointment may need to be rescheduled. 8. If two (2) appointments are missed without notifying us, your care plan may be discontinued. The same may happen if multiple visits are cancelled or rescheduled, even with notice. A missed visit is time when another patient, who also needs care, could have been seen. Thank you for your understanding and consideration.

## 2022-05-05 NOTE — PATIENT INSTRUCTIONS
INSTRUCTIONS  NEXT APPOINTMENT: Please schedule check-up in 2 months   · Dr. Flavio Velazquez and Ofelia Carrel (Nurse Practitioner) are sharing their practices as a team.  This will allow increased access to care. Office visits may alternate between these providers while we continue to maintain high quality of care. · PLEASE TAKE THIS FORM TO CHECK-OUT WINDOW TO SCHEDULE NEXT VISIT. · PLEASE GET BLOODWORK DRAWN TODAY ON FIRST FLOOR in 170. Take orders with you. · See cardiology and nephrology as planned. · Change demedex/torsemide to as needed for weight gain. Check weight when you get home as a baseline. · Continue lasix daily. · Finish prednisone. · INCREASE NPH Take 50 units with breakfast and 34 units with dinner.   · Continue R sliding scale

## 2022-05-05 NOTE — PROGRESS NOTES
[address]   Progress Note and Procedure Note      Cyndi RECORD NUMBER:  [id number]  AGE: 81 y.o. GENDER: male  : 1940  EPISODE DATE:  2022    Subjective:     Chief Complaint   Patient presents with    Wound Check     Initial Visit on Bilateral Lower Legs; Patient has been dealing with CHF and has been having weeping from his legs for about 1 week; Dr. Rafael Napier referred him here         HISTORY of PRESENT ILLNESS HPI     Ugo Cardenas is a 81 y.o. male who presents today for wound/ulcer evaluation. History of Wound Context: Patient presents today for bilateral lower extremity ulcerations. Patient relates he has a history of congestive heart failure and has been dealing with excess fluid. He has adjusted his diuretics per his primary care physician and presents here for further evaluation management of the ulcerations on his legs. He states they have been leaking for a few days. He denies any other constitutional symptoms. He states his cough is improving since his dose of diuretics has been increased. Patient has a history of peripheral vascular disease with a previous angiogram by Dr. Mag Baldwin that was unremarkable and required no further intervention.   Wound/Ulcer Pain Timing/Severity: none  Quality of pain: N/A  Severity:  0 / 10   Modifying Factors: None  Associated Signs/Symptoms: edema and drainage    Ulcer Identification:  Ulcer Type: venous    Contributing Factors: edema, venous stasis, lymphedema, diabetes, poor glucose control and obesity    Acute Wound: N/A    PAST MEDICAL HISTORY        Diagnosis Date    Acid reflux     Atrial flutter ([facility] [address]     converted    Bleeding stomach ulcer oct 2015    BPH (benign prostatic hyperplasia)     CAD (coronary artery disease)      angio with 2 blocked bypass and 2 patent    COPD (chronic obstructive pulmonary disease) ([facility] [address]     Diabetes mellitus type II     Edema     chronic left leg    Elevated PSA- nl 11     Hyperlipidemia     Hypertension     Ischemic cardiomyopathy     Lipoma of skin-RIGHT CHEST 2011    Obesity     Osteoarthritis     Skin tear of left forearm without complication     Significant amount of epidermal loss with bleeding.     Spinal stenosis of lumbar region with neurogenic claudication- clinically 2018       PAST SURGICAL HISTORY    Past Surgical History:   Procedure Laterality Date    CATARACT REMOVAL  ,     bilat    COLONOSCOPY      CORONARY ARTERY BYPASS GRAFT  1993    x 4    EYE SURGERY Left 2019    cataract coming back    JOINT REPLACEMENT Right total knee replacement    JOINT REPLACEMENT Left 2016       FAMILY HISTORY    Family History   Problem Relation Age of Onset    Cancer Mother         breast    Cancer Father         throat       SOCIAL HISTORY    Social History     Tobacco Use    Smoking status: Former Smoker     Packs/day: 3.50     Years: 32.00     Pack years: 112.00     Types: Cigarettes     Quit date: 1985     Years since quittin.3    Smokeless tobacco: Never Used    Tobacco comment: advised not to resume   Substance Use Topics    Alcohol use: No     Alcohol/week: 0.0 standard drinks    Drug use: No       ALLERGIES    Allergies   Allergen Reactions    Entresto [Sacubitril-Valsartan] Other (See Comments)     Renal failure       MEDICATIONS    Current Outpatient Medications on File Prior to Encounter   Medication Sig Dispense Refill    gabapentin (NEURONTIN) 600 MG tablet TAKE UP TO 5 TABLETS BY MOUTH OVER 24 HOURS AS DIRECTED 150 tablet 11    torsemide (DEMADEX) 20 MG tablet Take 1 tablet by mouth daily 30 tablet 3    predniSONE (DELTASONE) 20 MG tablet Take 1 tablet by mouth daily for 5 days 5 tablet 0    omeprazole (PRILOSEC) 20 MG delayed release capsule TAKE 1 CAPSULE BY MOUTH TWICE DAILY 180 capsule 1    albuterol sulfate  (90 Base) MCG/ACT inhaler INHALE 2 PUFFS INTO THE LUNGS EVERY 6 HOURS AS NEEDED FOR WHEEZING 18 g 5    amiodarone (CORDARONE) 200 MG tablet TAKE 1 TABLET BY MOUTH DAILY 30 tablet 0    FEROSUL 325 (65 Fe) MG tablet TAKE 1 TABLET BY MOUTH TWICE DAILY 180 tablet 3    dapagliflozin (FARXIGA) 5 MG tablet Take 1 tablet by mouth daily LOT CB2009 EXP 07/31/2024 4 BOXES 30 tablet 0    potassium chloride (KLOR-CON) 10 MEQ extended release tablet TAKE 1 TABLET BY MOUTH DAILY 90 tablet 1    furosemide (LASIX) 80 MG tablet TAKE 1 TABLET BY MOUTH DAILY 90 tablet 0    Lancets MISC 1 each by Does not apply route daily Use to check glucose twice a day. One Touch brand. DX E11.9 100 each 3    blood glucose monitor kit and supplies Test 2 times a day & as needed for symptoms of irregular blood glucose. 1 kit 0    blood glucose monitor strips Test 2 times a day & as needed for symptoms of irregular blood glucose. 200 strip 3    blood glucose test strips (ASCENSIA AUTODISC VI;ONE TOUCH ULTRA TEST VI) strip four times daily 400 strip 3    INSULIN SYRINGE .5CC/29G 29G X 1/2\" 0.5 ML MISC INJECT 4 TIMES DAILY AS NEEDED 400 each 1    atorvastatin (LIPITOR) 40 MG tablet TAKE 1 TABLET BY MOUTH EVERY EVENING 90 tablet 1    hydrALAZINE (APRESOLINE) 25 MG tablet TAKE 1 TABLET BY MOUTH THREE TIMES DAILY      Cyanocobalamin (B-12) 500 MCG TABS TAKE 1 TABLET BY MOUTH DAILY 90 tablet 1    umeclidinium-vilanterol (ANORO ELLIPTA) 62.5-25 MCG/INH AEPB inhaler INHALE 1 PUFF INTO THE LUNGS DAILY (Patient not taking: Reported on 5/3/2022) 1 each 5    insulin NPH (NOVOLIN N) 100 UNIT/ML injection vial Take 44 units with breakfast and 32 units with dinner.  (Patient taking differently: Take 20 units with breakfast and 20 units with dinner.) 30 mL 3    insulin regular (NOVOLIN R RELION) 100 UNIT/ML injection INJECT 20 TO 30 UNITS SUBCUTANEOUSLY THREE TIMES DAILY BEFORE MEAL(S) 30 mL 0    tiotropium (SPIRIVA HANDIHALER) 18 MCG inhalation capsule Inhale 1 capsule into the lungs daily INCLUDE inhaler device 30 capsule 5    Nebulizers (AIRIAL COMPACT MINI NEBULIZER) MISC 1 each by Does not apply route every 6 hours as needed (wheezing or shortness of breath) 1 each 0    Respiratory Therapy Supplies (NEBULIZER/TUBING/MOUTHPIECE) KIT 1 kit by Does not apply route every 6 hours as needed (for wheezing or shortness of breath) 1 kit 1    albuterol (PROVENTIL) (2.5 MG/3ML) 0.083% nebulizer solution Take 3 mLs by nebulization every 6 hours as needed for Wheezing 50 vial 3    vitamin D (ERGOCALCIFEROL) 49417 units CAPS capsule TK 1 C PO WEEKLY  2    mupirocin (BACTROBAN) 2 % ointment Apply 3 times daily as needed 22 g 1    levalbuterol (XOPENEX HFA) 45 MCG/ACT inhaler Inhale 1-2 puffs into the lungs every 4 hours as needed for Wheezing 1 Inhaler 3    aspirin 81 MG EC tablet Take 81 mg by mouth daily      apixaban (ELIQUIS) 2.5 MG TABS tablet Take 1 tablet by mouth 2 times daily 60 tablet 5    metoprolol (LOPRESSOR) 25 MG tablet Take 25 mg by mouth daily       isosorbide mononitrate (IMDUR) 30 MG CR tablet Take 1 tablet by mouth every morning Dr. Abbie Engle - Cardio 30 tablet 6    nitroGLYCERIN (NITROSTAT) 0.3 MG SL tablet Take one sublingual for chest pain. May repeat twice at 5 min intervals. If not getting relief call 911. 25 tablet 3     No current facility-administered medications on file prior to encounter. REVIEW OF SYSTEMS  Review of Systems    Pertinent items are noted in HPI. Review of Systems: A 12 point review of symptoms is unremarkable with the exception of the chief complaint. Patient specifically denies nausea, fever, vomiting, chills, shortness of breath, chest pain, abdominal pain, constipation or difficulty urinating.       Objective:      /75   Pulse 86   Temp 98.1 °F (36.7 °C) (Temporal)   Resp 18   Ht 5' 8\" (1.727 m)   Wt 270 lb 15.1 oz (122.9 kg)   BMI 41.20 kg/m²     Wt Readings from Last 3 Encounters:   05/05/22 270 lb 15.1 oz (122.9 kg)   05/03/22 278 lb (126.1 kg)   02/01/22 268 lb (121.6 kg)       PHYSICAL EXAM  Physical Exam    Patient has nonpalpable pedal pulses bilaterally. He has biphasic DP PT on the left with hand-held Doppler and a monophasic on the right. Patient has extensive bilateral lower extremity edema with hypertrophic skin changes, brawny discoloration and cobbling consistent with chronic venous disease left greater than right. Patient has absent pedal hair growth noted. Epicritic sensation is grossly intact bilaterally. Negative clonus and babinski reflex is down going. There are multiple ulcerations noted on the bilateral lower extremities that are weeping clear serous drainage. There is no warmth or fluctuance or crepitus associated with these ulcerations. There is an intact blister on the posterior lateral aspect of the left heel. There is no surrounding erythema, warmth or crepitus associated with this. Patient's muscle strength for dorsiflexion plantarflexion inversion and eversion is intact bilaterally. He does have semirigid hammertoe contractures noted bilaterally. Assessment:        Problem List Items Addressed This Visit     Diabetes mellitus type II, uncontrolled ([address]    Relevant Orders    Initiate Outpatient Wound Care Protocol    Ulcer of toe of right foot, with fat layer exposed ([facility] [address] - Primary    Relevant Orders    Initiate Outpatient Wound Care Protocol    Peripheral venous insufficiency    Relevant Orders    Initiate Outpatient Wound Care Protocol    Atherosclerosis of native arteries of right leg with ulceration of other part of foot [facility])    Relevant Orders    Initiate Outpatient Wound Care Protocol           Procedure Note  Indications:  Based on my examination of this patient's wound(s)/ulcer(s) today, debridement is required to promote healing and evaluate the wound base.     Performed by: Rylee Reid DPM    Consent obtained:  Yes    Time out taken:  Yes    Pain Control: Anesthetic  Anesthetic: 4% Lidocaine Liquid Topical       Debridement: Excisional Debridement    Using #15 blade scalpel the wound(s)/ulcer(s) was/were debrided down through and including the removal of epidermis, dermis and subcutaneous tissue. Devitalized Tissue Debrided:  fibrin and slough    Pre Debridement Measurements:  Are located in the [place]  Documentation Flow Sheet    Diabetic/Pressure/Non Pressure Ulcers only:  Ulcer: Non-Pressure ulcer, fat layer exposed     Wound/Ulcer #: 1, 2 and 4    Post Debridement Measurements:  Wound/Ulcer Descriptions are Pre Debridement except measurements:    Wound 05/05/22 Leg Left;Lateral;Lower #1 Noted 4/28/22 (Active)   Wound Image   05/05/22 0907   Wound Etiology Venous 05/05/22 0907   Dressing Status Old drainage noted 05/05/22 0907   Wound Length (cm) 2 cm 05/05/22 0907   Wound Width (cm) 1.5 cm 05/05/22 0907   Wound Depth (cm) 0.2 cm 05/05/22 0907   Wound Surface Area (cm^2) 3 cm^2 05/05/22 0907   Wound Volume (cm^3) 0.6 cm^3 05/05/22 0907   Post-Procedure Length (cm) 2.2 cm 05/05/22 0936   Post-Procedure Width (cm) 1.7 cm 05/05/22 0936   Post-Procedure Depth (cm) 0.2 cm 05/05/22 0936   Post-Procedure Surface Area (cm^2) 3.74 cm^2 05/05/22 0936   Post-Procedure Volume (cm^3) 0.748 cm^3 05/05/22 0936   Wound Assessment Granulation tissue;Slough 05/05/22 0907   Drainage Amount Moderate 05/05/22 0907   Drainage Description Serous; Yellow 05/05/22 0907   Odor None 05/05/22 0907   Jaylyn-wound Assessment Maceration 05/05/22 0907   Margins Attached edges 05/05/22 0907   Wound Thickness Description not for Pressure Injury Full thickness 05/05/22 0907   Number of days: 0       Wound 05/05/22 Leg Left;Medial;Lower #2 Noted 4/28/22 (Active)   Wound Image   05/05/22 0907   Wound Etiology Venous 05/05/22 0907   Dressing Status Old drainage noted 05/05/22 0907   Wound Length (cm) 3 cm 05/05/22 0907   Wound Width (cm) 2.8 cm 05/05/22 0907   Wound Depth (cm) 0.1 cm 05/05/22 0907   Wound Surface Area (cm^2) 8.4 cm^2 05/05/22 0907   Wound Volume (cm^3) 0.84 cm^3 05/05/22 0907   Post-Procedure Length (cm) 3.2 cm 05/05/22 0936   Post-Procedure Width (cm) 3 cm 05/05/22 0936   Post-Procedure Depth (cm) 0.1 cm 05/05/22 0936   Post-Procedure Surface Area (cm^2) 9.6 cm^2 05/05/22 0936   Post-Procedure Volume (cm^3) 0.96 cm^3 05/05/22 0936   Wound Assessment Granulation tissue;Slough 05/05/22 0907   Drainage Amount Moderate 05/05/22 0907   Drainage Description Yellow;Serous 05/05/22 0907   Odor None 05/05/22 0907   Jaylyn-wound Assessment Maceration 05/05/22 0907   Margins Undefined edges 05/05/22 0907   Wound Thickness Description not for Pressure Injury Full thickness 05/05/22 0907   Number of days: 0       Wound 05/05/22 Heel Left #3 Noted 4/28/22 (Active)   Wound Image   05/05/22 0907   Wound Length (cm) 2 cm 05/05/22 0907   Wound Width (cm) 3.5 cm 05/05/22 0907   Wound Depth (cm) 0.1 cm 05/05/22 0907   Wound Surface Area (cm^2) 7 cm^2 05/05/22 0907   Wound Volume (cm^3) 0.7 cm^3 05/05/22 0907   Post-Procedure Length (cm) 2 cm 05/05/22 0936   Post-Procedure Width (cm) 3.5 cm 05/05/22 0936   Post-Procedure Depth (cm) 0.1 cm 05/05/22 0936   Post-Procedure Surface Area (cm^2) 7 cm^2 05/05/22 0936   Post-Procedure Volume (cm^3) 0.7 cm^3 05/05/22 0936   Wound Assessment Fluid filled blister 05/05/22 0907   Drainage Amount None 05/05/22 0907   Odor None 05/05/22 0907   Jaylyn-wound Assessment Dry/flaky 05/05/22 0907   Margins Undefined edges 05/05/22 0907   Wound Thickness Description not for Pressure Injury Full thickness 05/05/22 0907   Number of days: 0       Wound 05/05/22 Leg Right; Anterior; Lower #4 Noted 4/28/22 (Active)   Wound Image   05/05/22 0907   Wound Etiology Venous 05/05/22 0907   Dressing Status Old drainage noted 05/05/22 0907   Wound Length (cm) 0.7 cm 05/05/22 0907   Wound Width (cm) 0.6 cm 05/05/22 0907   Wound Depth (cm) 0.1 cm 05/05/22 0907   Wound Surface Area (cm^2) 0.42 cm^2 05/05/22 0907   Wound Volume (cm^3) 0.042 cm^3 05/05/22 0907   Post-Procedure Length (cm) 0.9 cm 05/05/22 0936   Post-Procedure Width (cm) 0.8 cm 05/05/22 0936   Post-Procedure Depth (cm) 0.1 cm 05/05/22 0936   Post-Procedure Surface Area (cm^2) 0.72 cm^2 05/05/22 0936   Post-Procedure Volume (cm^3) 0.072 cm^3 05/05/22 0936   Wound Assessment Granulation tissue;Slough 05/05/22 0907   Drainage Amount Small 05/05/22 0907   Drainage Description Serous; Yellow 05/05/22 0907   Odor None 05/05/22 0907   Jaylyn-wound Assessment Dry/flaky 05/05/22 0907   Margins Attached edges 05/05/22 0907   Wound Thickness Description not for Pressure Injury Full thickness 05/05/22 0907   Number of days: 0          Total Surface Area Debrided:  14.06 sq cm     Estimated Blood Loss:  Minimal    Hemostasis Achieved:  by pressure    Procedural Pain:  0  / 10     Post Procedural Pain:  0 / 10     Response to treatment:  Well tolerated by patient. Plan:   E/M x30 minutes of a new problem of venous leg ulcerations likely due to fluid overload. Patient was instructed to continue taking his diuretic as prescribed by Dr. Harini Bowman his primary care physician. Ulcerations were excisionally debrided. Please see the above dictation for details and findings. Will control edema with multilayer compression dressing applied to the bilateral lower extremities. Patient will come in on Monday for dressing changes as I would expect increased drainage during the initial phase of compression. Treatment Note please see attached Discharge Instructions    Written patient dismissal instructions given to patient and signed by patient or POA. Reappoint 1 week for follow-up. Patient will see Arslan Schmitt CNP as I will be out of town.     Discharge Instructions       [address] and [facility]   Physician Orders and Discharge Instructions  [facility]  [address] [place] [address], [address]  Telephone: [phone] [facility] [phone]    NAME:  Alvan Primrose  YOB: 1940  MEDICAL RECORD NUMBER:  [id number]  DATE:  5/5/2022    Wound Cleansing:   Do not scrub or use excessive force. Cleanse wound prior to applying a clean dressing with:  [x] Normal Saline  [x] Keep Wound Dry in Shower    [] Wound Cleanser   [] Cleanse wound with Mild Soap & Water  [] May Shower at Discharge   [] Other:       Topical Treatments:  Do not apply lotions, creams, or ointments to wound bed unless directed. [] Apply moisturizing lotion to skin surrounding the wound prior to dressing change.  [] Apply antifungal ointment to skin surrounding the wound prior to dressing change.  [] Apply thin film of moisture barrier ointment to skin immediately around wound. [] Other:       Dressings:           Wound Location : RIGHT AND LEFT LOWER LEG WOUNDS    [x] Apply Primary Dressing:       [x] Alginate with Silver    [] Other:    [] Pack wound loosely with  [] Iodoform   [] Plain Packing  [] Other   [x] Cover and Secure with:     [] Gauze [] Phillip [] Roll gauze   [] Ace Wrap [] Cover Roll Tape [] ABD     [x] Other: DRAWTEX AND OPTILOCK   Avoid contact of tape with skin. [] Change dressing: [] Daily    [] Every Other Day [] Three times per week   [] Once a week [x] Do Not Change Dressing   [] Other:    Dressings:           Wound Location : LEFT HEEL    [x] Apply Primary Dressing:       [x] Other:  Maribel Meier    [] Cover and Secure with:     [] Gauze [] Phillip [] Kerlix   [] Ace Wrap [] Cover Roll Tape [] ABD     [] Other:    Avoid contact of tape with skin.   [] Change dressing: [] Daily    [] Every Other Day [] Three times per week   [] Once a week [x] Do Not Change Dressing   [] Other:        Edema Control:  Apply: [] Compression Stocking []Right Leg []Left Leg   [] Tubigrip []Right Leg Double Layer []Left Leg Double Layer       []Right Leg Single Layer []Left Leg Single Layer   [] SpandaGrip []Right Leg  []Left Leg []Low compression 5-10 mm/Hg      []Medium compression 10-20 mm/Hg     []High compression  20-30 mm/Hg   every morning immediately when getting up should be applied to affected leg(s) from mid foot to knee making sure to cover the heel. Remove every night before going to bed. [x] Elevate leg(s) above the level of the heart when sitting. [x] Avoid prolonged standing in one place. Compression: COBAN 2  Apply: [x] Multilayer Compression Wrap Applied in Clinic [x]RightLeg [x]Left Leg   [x] Multi-layer compression. Do not get leg(s) with wrap wet. If wraps become too tight call the center or completely remove the wrap. [x] Elevate leg(s) above the level of the heart when sitting. [x] Avoid prolonged standing in one place. Off-Loading:   [] Off-loading when [] walking  [] in bed [] sitting  [] Total non-weight bearing  [] Right Leg  [] Left Leg   [x] Assistive Device [] Walker [x] Cane  [] Wheelchair  [] Crutches   [] Surgical shoe    [] Podus Boot(s)   [] Foam Boot(s)  [] Roll About    [] Cast Boot [] CROW Boot  [] Other:    Dietary:  [] Diet as tolerated:   [x] Calorie Diabetic Diet:   [] No Added Salt:  [x] Increase Protein:   [] Other:     Activity:  [x] Activity as tolerated:  [] Patient has no activity restrictions     [] Strict Bedrest: [] Remain off Work:     [] May return to full duty work:                                   [] Return to work with restrictions: If you are still having pain after you go home:  [x] Elevate the affected limb. [x] Use over-the-counter medications you would normally use for pain as permitted by your family doctor. [x] For persistent pain not relieved by the above interventions, please call your family doctor.              Return Appointment:  [] Wound and dressing supply provider:   [] ECF or Home Healthcare:  [x] Wound Assessment: Monday, MAY 9TH [] Physician or NP scheduled for Wound Assessment:   [x] Return Appointment: With Marino Duque CNP  in 1 Week(s) THEN DR Juliana Villagomez THE FOLLOWING WEEK  [] Ordered tests:     Nurse Case Manger:  ROSEANN     Electronically signed by Flavio Hernandez RN on 5/5/2022 at 9:28 AM     [address] [facility] Information: Should you experience any significant changes in your wound(s) or have questions about your wound care, please contact the [address] [place] [address] at [address] 8:00 am - 4:30 pm and Friday 8:00 am - 12:30 pm.  If you need help with your wound outside these hours and cannot wait until we are again available, contact your PCP or go to the hospital emergency room. PLEASE NOTE: IF YOU ARE UNABLE TO OBTAIN WOUND SUPPLIES, CONTINUE TO USE THE SUPPLIES YOU HAVE AVAILABLE UNTIL YOU ARE ABLE TO REACH US. IT IS MOST IMPORTANT TO KEEP THE WOUND COVERED AT ALL TIMES.      Physician Signature:_______________________    Date: ___________ Time:  ____________        Dr Josh Hill                        Electronically signed by Josh Hill DPM on 5/5/2022 at 10:12 AM

## 2022-05-05 NOTE — PROGRESS NOTES
CHRONIC CONDITION FOLOW-UP       Assessment and Plan:      Diagnosis Orders   1. Uncontrolled type 2 diabetes mellitus with hyperglycemia ([facility])  POCT glycosylated hemoglobin (Hb A1C)   2. Dilated cardiomyopathy ([facility]     3. Chronic venous insufficiency     4. Stage 3b chronic kidney disease ([facility]  Basic Metabolic Panel    CBC with Auto Differential   5. Chronic obstructive pulmonary disease with acute exacerbation ([facility]     6. Essential hypertension  Basic Metabolic Panel   7. Chronic venous stasis dermatitis     8. CHF (congestive heart failure), NYHA class I, acute on chronic, combined ([facility])  CBC with Auto Differential   9. Type 2 diabetes mellitus with diabetic nephropathy, with long-term current use of insulin ([facility])  insulin NPH (NOVOLIN N) 100 UNIT/ML injection vial   Stable     Continue current Tx plan. Any changes marked below. INSTRUCTIONS  NEXT APPOINTMENT: Please schedule check-up in 2 months   · Dr. Vamsi Hagen and Roland Mullen (Nurse Practitioner) are sharing their practices as a team.  This will allow increased access to care. Office visits may alternate between these providers while we continue to maintain high quality of care. · PLEASE TAKE THIS FORM TO CHECK-OUT WINDOW TO SCHEDULE NEXT VISIT. · PLEASE GET BLOODWORK DRAWN TODAY ON FIRST FLOOR in 170. Take orders with you. · See cardiology and nephrology as planned. · Change demedex/torsemide to as needed for weight gain. Check weight when you get home as a baseline. · Continue lasix daily. · Finish prednisone. · INCREASE NPH Take 50 units with breakfast and 34 units with dinner. · Continue R sliding scale    Subjective:      Chief Complaint   Patient presents with    Diabetes     Pt is here for a diabetic follow up. Diomedes Oliver is an 81 y.o. male who presents for follow up    Complaints:    Saw wound care today, legs now wrapped   Wt down 9 lbs in 2 days with demedex   Breathing  A lot better.  Not baseline yet   CXR was clear   's since starting prednisone. A1c 8.7 today. CHART REVIEW   reports that he quit smoking about 37 years ago. His smoking use included cigarettes. He has a 112.00 pack-year smoking history. He has never used smokeless tobacco.  Health Maintenance Due   Topic Date Due    Shingles vaccine (2 of 3) 11/28/2012    DTaP/Tdap/Td vaccine (3 - Td or Tdap) 11/28/2021    Lipids  02/25/2022    Annual Wellness Visit (AWV)  05/25/2022     Current Outpatient Medications   Medication Instructions    albuterol (PROVENTIL) 2.5 mg, Nebulization, EVERY 6 HOURS PRN    albuterol sulfate  (90 Base) MCG/ACT inhaler 2 puffs, Inhalation, EVERY 6 HOURS PRN    amiodarone (CORDARONE) 200 MG tablet TAKE 1 TABLET BY MOUTH DAILY    apixaban (ELIQUIS) 2.5 mg, Oral, 2 TIMES DAILY    aspirin 81 mg, Oral, DAILY    atorvastatin (LIPITOR) 40 MG tablet TAKE 1 TABLET BY MOUTH EVERY EVENING    blood glucose monitor kit and supplies Test 2 times a day & as needed for symptoms of irregular blood glucose.  blood glucose monitor strips Test 2 times a day & as needed for symptoms of irregular blood glucose.  blood glucose test strips (ASCENSIA AUTODISC VI;ONE TOUCH ULTRA TEST VI) strip four times daily    Cyanocobalamin (B-12) 500 MCG TABS TAKE 1 TABLET BY MOUTH DAILY    dapagliflozin (FARXIGA) 5 mg, Oral, DAILY, LOT [id number] EXP 07/31/2024 4 BOXES    FEROSUL 325 (65 Fe) MG tablet TAKE 1 TABLET BY MOUTH TWICE DAILY    furosemide (LASIX) 80 MG tablet TAKE 1 TABLET BY MOUTH DAILY    gabapentin (NEURONTIN) 600 MG tablet TAKE UP TO 5 TABLETS BY MOUTH OVER 24 HOURS AS DIRECTED    hydrALAZINE (APRESOLINE) 25 MG tablet TAKE 1 TABLET BY MOUTH THREE TIMES DAILY    insulin NPH (NOVOLIN N) 100 UNIT/ML injection vial Take 50 units with breakfast and 34 units with dinner.     insulin regular (NOVOLIN R RELION) 100 UNIT/ML injection INJECT 20 TO 30 UNITS SUBCUTANEOUSLY THREE TIMES DAILY BEFORE MEAL(S)    INSULIN SYRINGE .5CC/29G 29G X 1/2\" 0.5 ML MISC INJECT 4 TIMES DAILY AS NEEDED    isosorbide mononitrate (IMDUR) 30 mg, Oral, EVERY MORNING, Dr. Alfredo Abraham - Cardio    Lancets MISC 1 each, Does not apply, DAILY, Use to check glucose twice a day. One Touch brand. DX E11.9    levalbuterol (XOPENEX HFA) 45 MCG/ACT inhaler 1-2 puffs, Inhalation, EVERY 4 HOURS PRN    metoprolol tartrate (LOPRESSOR) 25 mg, Oral, DAILY    mupirocin (BACTROBAN) 2 % ointment Apply 3 times daily as needed    Nebulizers (AIRIAL COMPACT MINI NEBULIZER) MISC 1 each, Does not apply, EVERY 6 HOURS PRN    nitroGLYCERIN (NITROSTAT) 0.3 MG SL tablet Take one sublingual for chest pain. May repeat twice at 5 min intervals. If not getting relief call 911.     omeprazole (PRILOSEC) 20 MG delayed release capsule TAKE 1 CAPSULE BY MOUTH TWICE DAILY    potassium chloride (KLOR-CON) 10 MEQ extended release tablet TAKE 1 TABLET BY MOUTH DAILY    predniSONE (DELTASONE) 20 mg, Oral, DAILY    Respiratory Therapy Supplies (NEBULIZER/TUBING/MOUTHPIECE) KIT 1 kit, Does not apply, EVERY 6 HOURS PRN    Spiriva HandiHaler 18 mcg, Inhalation, DAILY, INCLUDE inhaler device    torsemide (DEMADEX) 20 mg, Oral, DAILY    umeclidinium-vilanterol (ANORO ELLIPTA) 62.5-25 MCG/INH AEPB inhaler INHALE 1 PUFF INTO THE LUNGS DAILY    vitamin D (ERGOCALCIFEROL) 97831 units CAPS capsule TK 1 C PO WEEKLY       LAST LABS  Lab Results   Component Value Date    LDLCALC 92 02/25/2021     Lab Results   Component Value Date    HDL 52 02/25/2021     Lab Results   Component Value Date    TRIG 121 02/25/2021     Lab Results   Component Value Date     12/09/2021    K 3.8 12/09/2021    CREATININE 1.9 (H) 12/09/2021     Lab Results   Component Value Date    WBC 3.6 (L) 12/09/2021    HGB 14.3 12/09/2021     12/09/2021     Lab Results   Component Value Date    ALT 24 07/16/2021    AST 40 (H) 07/16/2021    ALKPHOS 119 07/16/2021    BILITOT 0.6 07/16/2021     TSH (uIU/mL)   Date Value   02/25/2021 2.44     Lab Results   Component Value Date    GLUCOSE 156 (H) 12/09/2021     Lab Results   Component Value Date    LABA1C 8.1 02/01/2022    LABA1C 7.0 09/17/2021    LABA1C 8.1 05/27/2021     Objective:   PHYSICAL EXAM   /72 (Site: Right Upper Arm, Position: Sitting, Cuff Size: Large Adult)   Pulse 74   Resp 18   Ht 5' 8\" (1.727 m)   Wt 269 lb 9.6 oz (122.3 kg)   SpO2 94%   BMI 40.99 kg/m²   BP Readings from Last 5 Encounters:   05/05/22 134/72   05/05/22 132/75   05/03/22 118/60   02/01/22 118/82   01/12/22 134/74     Wt Readings from Last 5 Encounters:   05/05/22 269 lb 9.6 oz (122.3 kg)   05/05/22 270 lb 15.1 oz (122.9 kg)   05/03/22 278 lb (126.1 kg)   02/01/22 268 lb (121.6 kg)   01/12/22 260 lb (117.9 kg)      GENERAL:   · well-developed, well-nourished, alert, no distress.      LUNGS:    · Breathing unlabored  · clear to auscultation bilaterally and good air movement  CARDIOVASC:   · regular rate and rhythm  SKIN: warm and dry

## 2022-05-05 NOTE — PLAN OF CARE
Multilayer Compression Wrap   (Not Unna) Below the Knee    NAME:  Christal Bhakta  YOB: 1940  MEDICAL RECORD NUMBER:  [id number]  DATE:  5/5/2022    Multilayer compression wrap: Applied moisturizing agent to dry skin as needed. Applied primary and secondary dressing as ordered. Applied multilayered dressing below the knee to right lower leg. Applied multilayered dressing below the knee to left lower leg. Instructed patient/caregiver not to remove dressing and to keep it clean and dry. Instructed patient/caregiver on complications to report to provider, such as pain, numbness in toes, heavy drainage, and slippage of dressing. Instructed patient on purpose of compression dressing and on activity and exercise recommendations.       Electronically signed by Yessy Ndiaye RN on 5/5/2022 at 10:22 AM

## 2022-05-05 NOTE — PLAN OF CARE
Patient Name:  Tammy Antoine  YOB: 1940  Today's Date:  May 5, 2022  Medical Record Number:  [id number]  Provider:    [address] [place] [address] [address]kw[address]   Appointment Treatment Guidelines        The [address] [place] [address] [address]kw[address] Appointment Treatment Guidelines were reviewed on May 5, 2022 with the patient. Mr. Giuliano Thorpeandree understanding of the [address] [place] [address] Pkwy Appointment Treatment Guidelines.       Electronically signed by Corrinne Kennedy, RN on 5/5/22 at 9:16 AM EDT

## 2022-05-09 ENCOUNTER — HOSPITAL ENCOUNTER (OUTPATIENT)
Dept: WOUND CARE | Age: 82
Discharge: HOME | End: 2022-05-09
Payer: MEDICARE

## 2022-05-09 VITALS
RESPIRATION RATE: 24 BRPM | SYSTOLIC BLOOD PRESSURE: 128 MMHG | DIASTOLIC BLOOD PRESSURE: 77 MMHG | TEMPERATURE: 98.1 F | HEART RATE: 84 BPM

## 2022-05-09 DIAGNOSIS — L03.116 CELLULITIS OF LEFT THIGH: ICD-10-CM

## 2022-05-09 DIAGNOSIS — L97.512 ULCER OF TOE OF RIGHT FOOT, WITH FAT LAYER EXPOSED (HCC): Primary | ICD-10-CM

## 2022-05-09 DIAGNOSIS — I87.2 PERIPHERAL VENOUS INSUFFICIENCY: ICD-10-CM

## 2022-05-09 DIAGNOSIS — E66.01 OBESITY, CLASS III, BMI 40-49.9 (MORBID OBESITY) (HCC): ICD-10-CM

## 2022-05-09 DIAGNOSIS — E11.649 UNCONTROLLED TYPE 2 DIABETES MELLITUS WITH HYPOGLYCEMIA, UNSPECIFIED HYPOGLYCEMIA COMA STATUS (HCC): ICD-10-CM

## 2022-05-09 DIAGNOSIS — I70.235 ATHEROSCLEROSIS OF NATIVE ARTERIES OF RIGHT LEG WITH ULCERATION OF OTHER PART OF FOOT (HCC): ICD-10-CM

## 2022-05-09 DIAGNOSIS — R09.89 DECREASED PULSES IN FEET: ICD-10-CM

## 2022-05-09 PROCEDURE — 99212 OFFICE O/P EST SF 10 MIN: CPT | Performed by: NURSE PRACTITIONER

## 2022-05-09 PROCEDURE — 29581 APPL MULTLAYER CMPRN SYS LEG: CPT

## 2022-05-09 RX ORDER — GINSENG 100 MG
CAPSULE ORAL ONCE
Status: CANCELLED | OUTPATIENT
Start: 2022-05-09 | End: 2022-05-09

## 2022-05-09 RX ORDER — BACITRACIN, NEOMYCIN, POLYMYXIN B 400; 3.5; 5 [USP'U]/G; MG/G; [USP'U]/G
OINTMENT TOPICAL ONCE
Status: CANCELLED | OUTPATIENT
Start: 2022-05-09 | End: 2022-05-09

## 2022-05-09 RX ORDER — LIDOCAINE HYDROCHLORIDE 40 MG/ML
SOLUTION TOPICAL ONCE
Status: CANCELLED | OUTPATIENT
Start: 2022-05-09 | End: 2022-05-09

## 2022-05-09 RX ORDER — SULFAMETHOXAZOLE AND TRIMETHOPRIM 800; 160 MG/1; MG/1
1 TABLET ORAL 2 TIMES DAILY
Qty: 14 TABLET | Refills: 0 | Status: SHIPPED | OUTPATIENT
Start: 2022-05-09 | End: 2022-05-16

## 2022-05-09 RX ORDER — BACITRACIN ZINC AND POLYMYXIN B SULFATE 500; 1000 [USP'U]/G; [USP'U]/G
OINTMENT TOPICAL ONCE
Status: CANCELLED | OUTPATIENT
Start: 2022-05-09 | End: 2022-05-09

## 2022-05-09 RX ORDER — LIDOCAINE HYDROCHLORIDE 20 MG/ML
JELLY TOPICAL ONCE
Status: CANCELLED | OUTPATIENT
Start: 2022-05-09 | End: 2022-05-09

## 2022-05-09 RX ORDER — LIDOCAINE 40 MG/G
CREAM TOPICAL ONCE
Status: CANCELLED | OUTPATIENT
Start: 2022-05-09 | End: 2022-05-09

## 2022-05-09 RX ORDER — BETAMETHASONE DIPROPIONATE 0.05 %
OINTMENT (GRAM) TOPICAL ONCE
Status: CANCELLED | OUTPATIENT
Start: 2022-05-09 | End: 2022-05-09

## 2022-05-09 RX ORDER — GENTAMICIN SULFATE 1 MG/G
OINTMENT TOPICAL ONCE
Status: CANCELLED | OUTPATIENT
Start: 2022-05-09 | End: 2022-05-09

## 2022-05-09 RX ORDER — CLOBETASOL PROPIONATE 0.5 MG/G
OINTMENT TOPICAL ONCE
Status: CANCELLED | OUTPATIENT
Start: 2022-05-09 | End: 2022-05-09

## 2022-05-09 RX ORDER — CEPHALEXIN 500 MG/1
500 CAPSULE ORAL 2 TIMES DAILY
Qty: 14 CAPSULE | Refills: 0 | Status: SHIPPED | OUTPATIENT
Start: 2022-05-09 | End: 2022-05-16

## 2022-05-09 RX ORDER — LIDOCAINE 50 MG/G
OINTMENT TOPICAL ONCE
Status: CANCELLED | OUTPATIENT
Start: 2022-05-09 | End: 2022-05-09

## 2022-05-09 ASSESSMENT — PAIN SCALES - GENERAL: PAINLEVEL_OUTOF10: 0

## 2022-05-09 NOTE — PLAN OF CARE
Multilayer Compression Wrap   (Not Unna) Below the Knee    NAME:  Lizbet Weeks  YOB: 1940  MEDICAL RECORD NUMBER:  [id number]  DATE:  5/9/2022    Multilayer compression wrap: Removed old Multilayer wrap if indicated and wash leg with mild soap/water. Applied moisturizing agent to dry skin as needed. Applied primary and secondary dressing as ordered. Applied multilayered dressing below the knee to right lower leg. Instructed patient/caregiver not to remove dressing and to keep it clean and dry. Instructed patient/caregiver on complications to report to provider, such as pain, numbness in toes, heavy drainage, and slippage of dressing. Instructed patient on purpose of compression dressing and on activity and exercise recommendations.       Electronically signed by Edda Casas RN on 5/9/2022 at 3:20 PM

## 2022-05-10 PROBLEM — L03.116 CELLULITIS OF LEFT THIGH: Status: ACTIVE | Noted: 2022-05-10

## 2022-05-10 NOTE — PROGRESS NOTES
[address]   Progress Note and Procedure Note      Cyndi RECORD NUMBER:  [id number]  AGE: 81 y.o. GENDER: male  : 1940  EPISODE DATE:  2022    Subjective:     Chief Complaint   Patient presents with    Other     left leg swelling and redness         HISTORY of PRESENT ILLNESS HPI   Raghavendra Martins is a 81 y.o. male who presents today initially for compression wrap change as a nurse visit. Pt had new onset redness, pain and warmth upper left leg and thigh. History of Wound Context: Last week pt seen by Dr. Justin Alcantara for  bilateral lower extremity ulcerations. Patient relateda history of congestive heart failure and has been dealing with excess fluid. He has adjusted his diuretics per his primary care physician and presents here for further evaluation management of the ulcerations on his legs. He states they had been leaking for a few days. He denies any other constitutional symptoms. He states his cough is improving since his dose of diuretics has been increased. Patient has a history of peripheral vascular disease with a previous angiogram by Dr. Loki Fields that was unremarkable and required no further intervention.   Wound/Ulcer Pain Timing/Severity: left thigh tender with palpation   Quality of pain: tender  Severity: 4 / 10   Modifying Factors: None  Associated Signs/Symptoms: edema, redness with lower leg drainage improved since last week.       Ulcer Identification:  Ulcer Type: venous     Contributing Factors: edema, venous stasis, lymphedema, diabetes, poor glucose control and obesity     Acute Wound: N/A  PAST MEDICAL HISTORY        Diagnosis Date    Acid reflux     Atherosclerosis of native arteries of right leg with ulceration of other part of foot ([address] 2021    Atrial flutter ([address]     converted    Bleeding stomach ulcer oct 2015    BPH (benign prostatic hyperplasia)     CAD (coronary artery disease)      angio with 2 blocked bypass and 2 patent    Cellulitis of left thigh 5/10/2022    COPD (chronic obstructive pulmonary disease) (HCC)     Diabetes mellitus type II     Edema     chronic left leg    Elevated PSA- nl 11     Hyperlipidemia     Hypertension     Ischemic cardiomyopathy     Lipoma of skin-RIGHT CHEST 2011    Obesity     Osteoarthritis     Peripheral venous insufficiency 2021    Skin tear of left forearm without complication     Significant amount of epidermal loss with bleeding.     Spinal stenosis of lumbar region with neurogenic claudication- clinically 2018       PAST SURGICAL HISTORY    Past Surgical History:   Procedure Laterality Date    CATARACT REMOVAL  ,     bilat    COLONOSCOPY      CORONARY ARTERY BYPASS GRAFT  1993    x 4    EYE SURGERY Left 2019    cataract coming back    JOINT REPLACEMENT Right total knee replacement    JOINT REPLACEMENT Left 2016       FAMILY HISTORY    Family History   Problem Relation Age of Onset    Cancer Mother         breast    Cancer Father         throat       SOCIAL HISTORY    Social History     Tobacco Use    Smoking status: Former Smoker     Packs/day: 3.50     Years: 32.00     Pack years: 112.00     Types: Cigarettes     Quit date: 1985     Years since quittin.3    Smokeless tobacco: Never Used    Tobacco comment: advised not to resume   Substance Use Topics    Alcohol use: No     Alcohol/week: 0.0 standard drinks    Drug use: No       ALLERGIES    Allergies   Allergen Reactions    Entresto [Sacubitril-Valsartan] Other (See Comments)     Renal failure       MEDICATIONS    Current Outpatient Medications on File Prior to Encounter   Medication Sig Dispense Refill    cephALEXin (KEFLEX) 500 MG capsule Take 1 capsule by mouth 2 times daily for 7 days 14 capsule 0    sulfamethoxazole-trimethoprim (BACTRIM DS;SEPTRA DS) 800-160 MG per tablet Take 1 tablet by mouth 2 times daily for 7 days 14 tablet 0    insulin NPH (NOVOLIN N) 100 UNIT/ML injection vial Take 50 units with breakfast and 34 units with dinner. 30 mL 3    gabapentin (NEURONTIN) 600 MG tablet TAKE UP TO 5 TABLETS BY MOUTH OVER 24 HOURS AS DIRECTED 150 tablet 11    torsemide (DEMADEX) 20 MG tablet Take 1 tablet by mouth daily 30 tablet 3    omeprazole (PRILOSEC) 20 MG delayed release capsule TAKE 1 CAPSULE BY MOUTH TWICE DAILY 180 capsule 1    albuterol sulfate  (90 Base) MCG/ACT inhaler INHALE 2 PUFFS INTO THE LUNGS EVERY 6 HOURS AS NEEDED FOR WHEEZING 18 g 5    amiodarone (CORDARONE) 200 MG tablet TAKE 1 TABLET BY MOUTH DAILY 30 tablet 0    FEROSUL 325 (65 Fe) MG tablet TAKE 1 TABLET BY MOUTH TWICE DAILY 180 tablet 3    dapagliflozin (FARXIGA) 5 MG tablet Take 1 tablet by mouth daily LOT [id number] EXP 07/31/2024 4 BOXES 30 tablet 0    potassium chloride (KLOR-CON) 10 MEQ extended release tablet TAKE 1 TABLET BY MOUTH DAILY 90 tablet 1    furosemide (LASIX) 80 MG tablet TAKE 1 TABLET BY MOUTH DAILY 90 tablet 0    Lancets MISC 1 each by Does not apply route daily Use to check glucose twice a day. One Touch brand. DX E11.9 100 each 3    blood glucose monitor kit and supplies Test 2 times a day & as needed for symptoms of irregular blood glucose. 1 kit 0    blood glucose monitor strips Test 2 times a day & as needed for symptoms of irregular blood glucose.  200 strip 3    blood glucose test strips (ASCENSIA AUTODISC VI;ONE TOUCH ULTRA TEST VI) strip four times daily 400 strip 3    INSULIN SYRINGE .5CC/29G 29G X 1/2\" 0.5 ML MISC INJECT 4 TIMES DAILY AS NEEDED 400 each 1    atorvastatin (LIPITOR) 40 MG tablet TAKE 1 TABLET BY MOUTH EVERY EVENING 90 tablet 1    hydrALAZINE (APRESOLINE) 25 MG tablet TAKE 1 TABLET BY MOUTH THREE TIMES DAILY      Cyanocobalamin (B-12) 500 MCG TABS TAKE 1 TABLET BY MOUTH DAILY 90 tablet 1    umeclidinium-vilanterol (ANORO ELLIPTA) 62.5-25 MCG/INH AEPB inhaler INHALE 1 PUFF INTO THE LUNGS DAILY (Patient not taking: Reported on 5/3/2022) 1 each 5    insulin regular (NOVOLIN R RELION) 100 UNIT/ML injection INJECT 20 TO 30 UNITS SUBCUTANEOUSLY THREE TIMES DAILY BEFORE MEAL(S) 30 mL 0    tiotropium (SPIRIVA HANDIHALER) 18 MCG inhalation capsule Inhale 1 capsule into the lungs daily INCLUDE inhaler device 30 capsule 5    Nebulizers (AIRIAL COMPACT MINI NEBULIZER) MISC 1 each by Does not apply route every 6 hours as needed (wheezing or shortness of breath) 1 each 0    Respiratory Therapy Supplies (NEBULIZER/TUBING/MOUTHPIECE) KIT 1 kit by Does not apply route every 6 hours as needed (for wheezing or shortness of breath) 1 kit 1    albuterol (PROVENTIL) (2.5 MG/3ML) 0.083% nebulizer solution Take 3 mLs by nebulization every 6 hours as needed for Wheezing 50 vial 3    vitamin D (ERGOCALCIFEROL) 83954 units CAPS capsule TK 1 C PO WEEKLY  2    mupirocin (BACTROBAN) 2 % ointment Apply 3 times daily as needed 22 g 1    levalbuterol (XOPENEX HFA) 45 MCG/ACT inhaler Inhale 1-2 puffs into the lungs every 4 hours as needed for Wheezing 1 Inhaler 3    aspirin 81 MG EC tablet Take 81 mg by mouth daily      apixaban (ELIQUIS) 2.5 MG TABS tablet Take 1 tablet by mouth 2 times daily 60 tablet 5    metoprolol (LOPRESSOR) 25 MG tablet Take 25 mg by mouth daily       isosorbide mononitrate (IMDUR) 30 MG CR tablet Take 1 tablet by mouth every morning Dr. [place] [facility] - Cardio 30 tablet 6    nitroGLYCERIN (NITROSTAT) 0.3 MG SL tablet Take one sublingual for chest pain. May repeat twice at 5 min intervals. If not getting relief call 911. 25 tablet 3     No current facility-administered medications on file prior to encounter. REVIEW OF SYSTEMS  Review of Systems    Pertinent items are noted in HPI. Objective: There were no vitals taken for this visit.     Wt Readings from Last 3 Encounters:   05/05/22 270 lb 15.1 oz (122.9 kg)   05/05/22 269 lb 9.6 oz (122.3 kg)   05/03/22 278 lb (126.1 kg)       PHYSICAL EXAM  Physical Exam    General Appearance: alert and oriented to person, place and time, with anxious affect and obese  Skin: warm and dry  Head: normocephalic and atraumatic  Eyes: pupils equal, round, and reactive to light  Pulmonary/Chest: normal air movement, no respiratory distress  Cardiovascular: normal rate and regular rhythm  Extremities: no cyanosis, no clubbing and 1 + edema-  bilateral lower legs      Assessment:        Problem List Items Addressed This Visit     Cellulitis of left thigh    Diabetes mellitus type II, uncontrolled (HCC)    Ulcer of toe of right foot, with fat layer exposed ([address] - Primary    Peripheral venous insufficiency    Atherosclerosis of native arteries of right leg with ulceration of other part of foot (HCC)    Decreased pulses in feet    RESOLVED: Obesity, Class III, BMI 40-49.9 (morbid obesity) ([address]           Procedure Note  Indications:  Based on my examination of this patient's wound(s)/ulcer(s) today, debridement is not required to promote healing and evaluate the wound base. Wound/Ulcer Descriptions are Pre Debridement except measurements:    Wound 05/05/22 Leg Left;Lateral;Lower #1 Noted 4/28/22 (Active)   Wound Image   05/05/22 0907   Wound Etiology Venous 05/05/22 0907   Dressing Status New dressing applied 05/05/22 0954   Wound Cleansed Cleansed with saline 05/05/22 0954   Dressing/Treatment Alginate with Ag; Other (comment) 05/09/22 1510   Wound Length (cm) 1.5 cm 05/09/22 1453   Wound Width (cm) 1.2 cm 05/09/22 1453   Wound Depth (cm) 0.1 cm 05/09/22 1453   Wound Surface Area (cm^2) 1.8 cm^2 05/09/22 1453   Change in Wound Size % (l*w) 40 05/09/22 1453   Wound Volume (cm^3) 0.18 cm^3 05/09/22 1453   Wound Healing % 70 05/09/22 1453   Post-Procedure Length (cm) 2.2 cm 05/05/22 0936   Post-Procedure Width (cm) 1.7 cm 05/05/22 0936   Post-Procedure Depth (cm) 0.2 cm 05/05/22 0936   Post-Procedure Surface Area (cm^2) 3.74 cm^2 05/05/22 0936   Post-Procedure Volume (cm^3) 0.748 cm^3 05/05/22 0936   Wound Assessment Granulation tissue;Slough 05/09/22 1453   Drainage Amount Moderate 05/09/22 1453   Drainage Description Serous; Yellow 05/09/22 1453   Odor None 05/09/22 1453   Jaylyn-wound Assessment Dry/flaky 05/09/22 1453   Margins Attached edges 05/09/22 1453   Wound Thickness Description not for Pressure Injury Full thickness 05/09/22 1453   Number of days: 5       Wound 05/05/22 Leg Left;Medial;Lower #2 Noted 4/28/22 (Active)   Wound Image   05/05/22 0907   Wound Etiology Venous 05/05/22 0907   Dressing Status New dressing applied 05/05/22 0954   Wound Cleansed Cleansed with saline 05/05/22 0954   Dressing/Treatment Alginate with Ag 05/05/22 0954   Wound Length (cm) 0 cm 05/09/22 1453   Wound Width (cm) 0 cm 05/09/22 1453   Wound Depth (cm) 0 cm 05/09/22 1453   Wound Surface Area (cm^2) 0 cm^2 05/09/22 1453   Change in Wound Size % (l*w) 100 05/09/22 1453   Wound Volume (cm^3) 0 cm^3 05/09/22 1453   Wound Healing % 100 05/09/22 1453   Post-Procedure Length (cm) 0 cm 05/09/22 1453   Post-Procedure Width (cm) 0 cm 05/09/22 1453   Post-Procedure Depth (cm) 0 cm 05/09/22 1453   Post-Procedure Surface Area (cm^2) 0 cm^2 05/09/22 1453   Post-Procedure Volume (cm^3) 0 cm^3 05/09/22 1453   Wound Assessment Epithelialization 05/09/22 1453   Drainage Amount Moderate 05/05/22 0907   Drainage Description Yellow;Serous 05/05/22 0907   Odor None 05/05/22 0907   Jaylyn-wound Assessment Maceration 05/05/22 0907   Margins Undefined edges 05/05/22 0907   Wound Thickness Description not for Pressure Injury Full thickness 05/05/22 0907   Number of days: 5       Wound 05/05/22 Heel Left #3 Noted 4/28/22 (Active)   Wound Image   05/05/22 0907   Wound Etiology Pressure Stage 2 05/05/22 0907   Dressing Status New dressing applied 05/05/22 0954   Wound Cleansed Cleansed with saline 05/05/22 0954   Dressing/Treatment Alginate with Ag; Other (comment); Roll gauze 05/09/22 1510   Wound Length (cm) 2 cm 05/09/22 1453   Wound Width (cm) 3.4 cm 05/09/22 1453   Wound Depth (cm) 0.1 cm 05/09/22 1453   Wound Surface Area (cm^2) 6.8 cm^2 05/09/22 1453   Change in Wound Size % (l*w) 2.86 05/09/22 1453   Wound Volume (cm^3) 0.68 cm^3 05/09/22 1453   Wound Healing % 3 05/09/22 1453   Post-Procedure Length (cm) 2 cm 05/05/22 0936   Post-Procedure Width (cm) 3.5 cm 05/05/22 0936   Post-Procedure Depth (cm) 0.1 cm 05/05/22 0936   Post-Procedure Surface Area (cm^2) 7 cm^2 05/05/22 0936   Post-Procedure Volume (cm^3) 0.7 cm^3 05/05/22 0936   Wound Assessment Fluid filled blister 05/09/22 1453   Drainage Amount None 05/09/22 1453   Odor None 05/09/22 1453   Jaylyn-wound Assessment Dry/flaky 05/09/22 1453   Margins Undefined edges 05/09/22 1453   Wound Thickness Description not for Pressure Injury Full thickness 05/09/22 1453   Number of days: 5       Wound 05/05/22 Leg Right; Lower; Lateral #4 Noted 4/28/22 (Active)   Wound Image   05/05/22 0907   Wound Etiology Venous 05/05/22 0907   Dressing Status Old drainage noted 05/09/22 1509   Wound Cleansed Cleansed with saline 05/05/22 0954   Dressing/Treatment Alginate with Ag; Other (comment) 05/09/22 1510   Wound Length (cm) 1.3 cm 05/09/22 1509   Wound Width (cm) 1 cm 05/09/22 1509   Wound Depth (cm) 0.1 cm 05/09/22 1509   Wound Surface Area (cm^2) 1.3 cm^2 05/09/22 1509   Change in Wound Size % (l*w) -209.52 05/09/22 1509   Wound Volume (cm^3) 0.13 cm^3 05/09/22 1509   Wound Healing % -210 05/09/22 1509   Post-Procedure Length (cm) 0.9 cm 05/05/22 0936   Post-Procedure Width (cm) 0.8 cm 05/05/22 0936   Post-Procedure Depth (cm) 0.1 cm 05/05/22 0936   Post-Procedure Surface Area (cm^2) 0.72 cm^2 05/05/22 0936   Post-Procedure Volume (cm^3) 0.072 cm^3 05/05/22 0936   Wound Assessment Granulation tissue;Slough 05/09/22 1509   Drainage Amount Small 05/09/22 1509   Drainage Description Serous; Yellow 05/09/22 1509   Odor None 05/09/22 1509   Jaylyn-wound Assessment Dry/flaky 05/09/22 1509 Margins Attached edges 05/09/22 1509   Wound Thickness Description not for Pressure Injury Full thickness 05/09/22 1509   Number of days: 5              Plan:   Pt education per provider related to new onset left thigh redness, swelling, warmth and pain. Pt does not want to go to the ED, he cares for his wife at home. Started him on Keflex and Bactrim for 7 days. Pt agreeable with plan and will come to visit on Thursday @ [address] [place] [address],[address]. Treatment Note please see attached Discharge Instructions    Written patient dismissal instructions given to patient and signed by patient or POA. Discharge [address] and Hyperbaric Oxygen Therapy   Physician Orders and Discharge Instructions  [address]   [address], [facility]  Telephone: [phone]     NAME:  Skylar Khan OF BIRTH:  1940  MEDICAL RECORD NUMBER:  [id number]  DATE:  5/9/2022     Wound Cleansing:   Do not scrub or use excessive force. Cleanse wound prior to applying a clean dressing with:  [x]? Normal Saline  [x]? Keep Wound Dry in Shower    []? Wound Cleanser   []? Cleanse wound with Mild Soap & Water  []? May Shower at Discharge   []? Other:        Topical Treatments:  Do not apply lotions, creams, or ointments to wound bed unless directed. []? Apply moisturizing lotion to skin surrounding the wound prior to dressing change. []? Apply antifungal ointment to skin surrounding the wound prior to dressing change. []? Apply thin film of moisture barrier ointment to skin immediately around wound. []? Other:                  Dressings:                  Wound Location : RIGHT  LOWER LEG WOUNDS      [x]? Apply Primary Dressing:                                            [x]? Alginate with Silver                   [x]? Cover and Secure with:                   []? Gauze         []? Socrates Montiel           []?  Roll gauze []? Ace Wrap   []? Cover Roll Tape     []? ABD                                      [x]? Other: DRAWTEX AND OPTILOCK              Avoid contact of tape with skin. []? Change dressing:   []? Daily           []? Every Other Day    []? Three times per week              []? Once a week          [x]? Do Not Change Dressing              []? Other:     Dressings:                  Wound Location : LEFT HEEL         [x]? Apply Primary Dressing:                                          [x]? Other:  Nilsa Zhu     []? Cover and Secure with:                   []? Gauze         []? Maretta Sandoval           []? Kerlix              []? Ace Wrap   []? Cover Roll Tape     []? ABD                                      []? Other:               Avoid contact of tape with skin. []? Change dressing:   []? Daily           []? Every Other Day    []? Three times per week              []? Once a week         [x]? Do Not Change Dressing              []? Other:                              Edema Control: Left Lower Leg  Apply:  []? Compression Stocking       []? Right Leg     []? Left Leg              []? Tubigrip      []? Right Leg Double Layer      []? Left Leg Double Layer                                                  []?Right Leg Single Layer       []? Left Leg Single Layer              [x]? SpandaGrip            []? Right Leg     []? Left Leg                                      []?Low compression 5-10 mm/Hg                                             [x]? Medium compression 10-20 mm/Hg                                      []?High compression  20-30 mm/Hg              every morning immediately when getting up should be applied to affected leg(s) from mid foot to knee making sure to cover the heel. Remove every night before going to bed. [x]? Elevate leg(s) above the level of the heart when sitting. [x]? Avoid prolonged standing in one place.                   Compression: COBAN 2  Apply:  [x]?  Multilayer Compression Wrap Applied in Clinic    [x]? RightLeg      []? Left Leg              [x]? Multi-layer compression. Do not get leg(s) with wrap wet. If wraps become too tight call the center or completely remove the wrap. [x]? Elevate leg(s) above the level of the heart when sitting. [x]? Avoid prolonged standing in one place.     Off-Loading:   []? Off-loading when    []? walking       []? in bed         []? sitting  []? Total non-weight bearing  []? Right Leg  []? Left Leg          [x]? Assistive Device     []? Hawa Volodymyr        [x]? Cane           []? Wheelchair  []? Crutches              []? Surgical shoe    []? Podus Boot(s)   []? Foam Boot(s)  []? Roll About              []? Cast Boot   []? CROW Boot  []? Other:     Dietary:  []? Diet as tolerated:     [x]? Calorie Diabetic Diet:           []? No Added Salt:  [x]? Increase Protein:     []? Other:                Activity:  [x]? Activity as tolerated:  []? Patient has no activity restrictions     []? Strict Bedrest: []? Remain off Work:     []? May return to full duty work:                                    []? Return to work with restrictions:                 If you are still having pain after you go home:  [x]? Elevate the affected limb. [x]? Use over-the-counter medications you would normally use for pain as permitted by your family doctor. [x]? For persistent pain not relieved by the above interventions, please call your family doctor.   Dorla Riedel   Return Appointment:  []? Wound and dressing supply provider:   []? ECF or Home Healthcare:  [x]? Wound Assessment: Monday, MAY 9TH []? Physician or NP scheduled for Wound Assessment:   [x]? Return Appointment: With Sandy Castro CNP  in  1 Week(s) THEN DR GUTIERREZ THE FOLLOWING WEEK  []?  Ordered tests:      Nurse Jimmy WHITFIELD  Electronically signed by Akira Mead RN on 5/9/2022 at 3:18 PM       START [address] [place] [address] Information: Should you experience any significant changes in your wound(s) or have questions about your wound care, please contact the [address] [place] [address] at [address] 8:00 am - 4:30 pm and Friday 8:00 am - 12:30 pm.  If you need help with your wound outside these hours and cannot wait until we are again available, contact your PCP or go to the hospital emergency room.      PLEASE NOTE: IF YOU ARE UNABLE TO OBTAIN WOUND SUPPLIES, CONTINUE TO USE THE SUPPLIES YOU HAVE AVAILABLE UNTIL YOU ARE ABLE TO [facility]. IT IS MOST IMPORTANT TO KEEP THE WOUND COVERED AT ALL TIMES.      Physician Signature:_______________________     Date: ___________ Time:  ____________                                Dr Xiomara Thompson        Electronically signed by RAYMUNDO Christine CNP on 5/10/2022 at 11:03 AM

## 2022-05-12 ENCOUNTER — HOSPITAL ENCOUNTER (OUTPATIENT)
Dept: WOUND CARE | Age: 82
Discharge: HOME | End: 2022-05-12
Payer: MEDICARE

## 2022-05-12 VITALS
HEART RATE: 94 BPM | TEMPERATURE: 97.3 F | RESPIRATION RATE: 18 BRPM | DIASTOLIC BLOOD PRESSURE: 71 MMHG | SYSTOLIC BLOOD PRESSURE: 118 MMHG

## 2022-05-12 DIAGNOSIS — L97.929 VENOUS ULCER OF LEFT LEG (HCC): ICD-10-CM

## 2022-05-12 DIAGNOSIS — E11.649 UNCONTROLLED TYPE 2 DIABETES MELLITUS WITH HYPOGLYCEMIA, UNSPECIFIED HYPOGLYCEMIA COMA STATUS (HCC): ICD-10-CM

## 2022-05-12 DIAGNOSIS — I87.2 PERIPHERAL VENOUS INSUFFICIENCY: ICD-10-CM

## 2022-05-12 DIAGNOSIS — L97.512 ULCER OF TOE OF RIGHT FOOT, WITH FAT LAYER EXPOSED (HCC): Primary | ICD-10-CM

## 2022-05-12 DIAGNOSIS — I83.029 VENOUS ULCER OF LEFT LEG (HCC): ICD-10-CM

## 2022-05-12 DIAGNOSIS — I70.235 ATHEROSCLEROSIS OF NATIVE ARTERIES OF RIGHT LEG WITH ULCERATION OF OTHER PART OF FOOT (HCC): ICD-10-CM

## 2022-05-12 PROCEDURE — 29581 APPL MULTLAYER CMPRN SYS LEG: CPT

## 2022-05-12 PROCEDURE — 11042 DBRDMT SUBQ TIS 1ST 20SQCM/<: CPT | Performed by: NURSE PRACTITIONER

## 2022-05-12 PROCEDURE — 11042 DBRDMT SUBQ TIS 1ST 20SQCM/<: CPT

## 2022-05-12 RX ORDER — CLOBETASOL PROPIONATE 0.5 MG/G
OINTMENT TOPICAL ONCE
Status: CANCELLED | OUTPATIENT
Start: 2022-05-12 | End: 2022-05-12

## 2022-05-12 RX ORDER — BACITRACIN, NEOMYCIN, POLYMYXIN B 400; 3.5; 5 [USP'U]/G; MG/G; [USP'U]/G
OINTMENT TOPICAL ONCE
Status: CANCELLED | OUTPATIENT
Start: 2022-05-12 | End: 2022-05-12

## 2022-05-12 RX ORDER — LIDOCAINE HYDROCHLORIDE 20 MG/ML
JELLY TOPICAL ONCE
Status: CANCELLED | OUTPATIENT
Start: 2022-05-12 | End: 2022-05-12

## 2022-05-12 RX ORDER — GINSENG 100 MG
CAPSULE ORAL ONCE
Status: CANCELLED | OUTPATIENT
Start: 2022-05-12 | End: 2022-05-12

## 2022-05-12 RX ORDER — LIDOCAINE 40 MG/G
CREAM TOPICAL ONCE
Status: CANCELLED | OUTPATIENT
Start: 2022-05-12 | End: 2022-05-12

## 2022-05-12 RX ORDER — BACITRACIN ZINC AND POLYMYXIN B SULFATE 500; 1000 [USP'U]/G; [USP'U]/G
OINTMENT TOPICAL ONCE
Status: CANCELLED | OUTPATIENT
Start: 2022-05-12 | End: 2022-05-12

## 2022-05-12 RX ORDER — GENTAMICIN SULFATE 1 MG/G
OINTMENT TOPICAL ONCE
Status: CANCELLED | OUTPATIENT
Start: 2022-05-12 | End: 2022-05-12

## 2022-05-12 RX ORDER — LIDOCAINE HYDROCHLORIDE 40 MG/ML
SOLUTION TOPICAL ONCE
Status: COMPLETED | OUTPATIENT
Start: 2022-05-12 | End: 2022-05-12

## 2022-05-12 RX ORDER — LIDOCAINE 50 MG/G
OINTMENT TOPICAL ONCE
Status: CANCELLED | OUTPATIENT
Start: 2022-05-12 | End: 2022-05-12

## 2022-05-12 RX ORDER — BETAMETHASONE DIPROPIONATE 0.05 %
OINTMENT (GRAM) TOPICAL ONCE
Status: CANCELLED | OUTPATIENT
Start: 2022-05-12 | End: 2022-05-12

## 2022-05-12 RX ORDER — LIDOCAINE HYDROCHLORIDE 40 MG/ML
SOLUTION TOPICAL ONCE
Status: CANCELLED | OUTPATIENT
Start: 2022-05-12 | End: 2022-05-12

## 2022-05-12 RX ADMIN — LIDOCAINE HYDROCHLORIDE: 40 SOLUTION TOPICAL at 08:48

## 2022-05-12 ASSESSMENT — PAIN SCALES - GENERAL: PAINLEVEL_OUTOF10: 0

## 2022-05-12 NOTE — PLAN OF CARE
Multilayer Compression Wrap   (Not Unna) Below the Knee    NAME:  Claudeen Fuse  YOB: 1940  MEDICAL RECORD NUMBER:  [id number]  DATE:  5/12/2022    Multilayer compression wrap: Applied moisturizing agent to dry skin as needed. Applied primary and secondary dressing as ordered. Applied multilayered dressing below the knee to left lower leg. Instructed patient/caregiver not to remove dressing and to keep it clean and dry. Instructed patient/caregiver on complications to report to provider, such as pain, numbness in toes, heavy drainage, and slippage of dressing. Instructed patient on purpose of compression dressing and on activity and exercise recommendations.       Electronically signed by Cedrick Ruggiero RN on 5/12/2022 at 10:03 AM

## 2022-05-12 NOTE — PLAN OF CARE
Multilayer Compression Wrap   (Not Unna) Below the Knee    NAME:  Sukhjinder Vance  YOB: 1940  MEDICAL RECORD NUMBER:  [id number]  DATE:  5/12/2022    Multilayer compression wrap: Removed old Multilayer wrap if indicated and wash leg with mild soap/water. Applied moisturizing agent to dry skin as needed. Applied primary and secondary dressing as ordered. Applied multilayered dressing below the knee to right lower leg. Instructed patient/caregiver not to remove dressing and to keep it clean and dry. Instructed patient/caregiver on complications to report to provider, such as pain, numbness in toes, heavy drainage, and slippage of dressing. Instructed patient on purpose of compression dressing and on activity and exercise recommendations.       Electronically signed by Kay Alexandre RN on 5/12/2022 at 10:01 AM

## 2022-05-12 NOTE — PROGRESS NOTES
[address]   Progress Note and Procedure Note      Cyndi RECORD NUMBER:  [id number]  AGE: 81 y.o. GENDER: male  : 1940  EPISODE DATE:  2022    Subjective:     Chief Complaint   Patient presents with    Wound Check     Follow Up on Bilateral Lower Legs         HISTORY of PRESENT ILLNESS HPI    Roseann Soares a 81 y. o. male who presents today for follow up of LLE cellulitis. He reports that it has improved significantly with use of the antibiotics (Keflex and Bactrim DS).    History of Wound Context: Patient is normally seen by Dr. Grace Dale for  bilateral lower extremity ulcerations.  Patient related a history of congestive heart failure and has been dealing with excess fluid.  He has adjusted his diuretics per his primary care physician and presents here for further evaluation and management of the ulcerations on his legs.  He denies any other constitutional symptoms.  Patient has a history of peripheral vascular disease with a previous angiogram by Dr. Shanon Jeans that was unremarkable and required no further intervention.   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 with ulceration of other part of foot ([address] 2021    Atrial flutter ([address]     converted    Bleeding stomach ulcer oct 2015    BPH (benign prostatic hyperplasia)     CAD (coronary artery disease)      angio with 2 blocked bypass and 2 patent    Cellulitis of left thigh 5/10/2022    [facility]     Diabetes mellitus type II     Edema     chronic left leg    Elevated PSA- nl 11     Hyperlipidemia     Hypertension     Ischemic cardiomyopathy     Lipoma of skin-RIGHT CHEST 2011    Obesity     Osteoarthritis     Peripheral venous insufficiency 2021    Skin tear of left forearm without complication     Significant amount of epidermal loss with bleeding.  Spinal stenosis of lumbar region with neurogenic claudication- clinically 2018       PAST SURGICAL HISTORY    Past Surgical History:   Procedure Laterality Date    CATARACT REMOVAL  ,     bilat    COLONOSCOPY      CORONARY ARTERY BYPASS GRAFT  1993    x 4    EYE SURGERY Left 2019    cataract coming back    JOINT REPLACEMENT Right total knee replacement    JOINT REPLACEMENT Left 2016       FAMILY HISTORY    Family History   Problem Relation Age of Onset    Cancer Mother         breast    Cancer Father         throat       SOCIAL HISTORY    Social History     Tobacco Use    Smoking status: Former Smoker     Packs/day: 3.50     Years: 32.00     Pack years: 112.00     Types: Cigarettes     Quit date: 1985     Years since quittin.3    Smokeless tobacco: Never Used    Tobacco comment: advised not to resume   Substance Use Topics    Alcohol use: No     Alcohol/week: 0.0 standard drinks    Drug use: No       ALLERGIES    Allergies   Allergen Reactions    Entresto [Sacubitril-Valsartan] Other (See Comments)     Renal failure       MEDICATIONS    Current Outpatient Medications on File Prior to Encounter   Medication Sig Dispense Refill    cephALEXin (KEFLEX) 500 MG capsule Take 1 capsule by mouth 2 times daily for 7 days 14 capsule 0    sulfamethoxazole-trimethoprim (BACTRIM DS;SEPTRA DS) 800-160 MG per tablet Take 1 tablet by mouth 2 times daily for 7 days 14 tablet 0    insulin NPH (NOVOLIN N) 100 UNIT/ML injection vial Take 50 units with breakfast and 34 units with dinner. 30 mL 3    gabapentin (NEURONTIN) 600 MG tablet TAKE UP TO 5 TABLETS BY MOUTH OVER 24 HOURS AS DIRECTED 150 tablet 11    torsemide (DEMADEX) 20 MG tablet Take 1 tablet by mouth daily 30 tablet 3    omeprazole (PRILOSEC) 20 MG delayed release capsule TAKE 1 CAPSULE BY MOUTH TWICE DAILY 180 capsule 1    albuterol sulfate  (90 Base) MCG/ACT inhaler INHALE 2 PUFFS INTO THE LUNGS EVERY 6 HOURS AS NEEDED FOR WHEEZING 18 g 5    amiodarone (CORDARONE) 200 MG tablet TAKE 1 TABLET BY MOUTH DAILY 30 tablet 0    FEROSUL 325 (65 Fe) MG tablet TAKE 1 TABLET BY MOUTH TWICE DAILY 180 tablet 3    dapagliflozin (FARXIGA) 5 MG tablet Take 1 tablet by mouth daily LOT [id number] EXP 07/31/2024 4 BOXES 30 tablet 0    potassium chloride (KLOR-CON) 10 MEQ extended release tablet TAKE 1 TABLET BY MOUTH DAILY 90 tablet 1    furosemide (LASIX) 80 MG tablet TAKE 1 TABLET BY MOUTH DAILY 90 tablet 0    Lancets MISC 1 each by Does not apply route daily Use to check glucose twice a day. One Touch brand. DX E11.9 100 each 3    blood glucose monitor kit and supplies Test 2 times a day & as needed for symptoms of irregular blood glucose. 1 kit 0    blood glucose monitor strips Test 2 times a day & as needed for symptoms of irregular blood glucose.  200 strip 3    blood glucose test strips (ASCENSIA AUTODISC VI;ONE TOUCH ULTRA TEST VI) strip four times daily 400 strip 3    INSULIN SYRINGE .5CC/29G 29G X 1/2\" 0.5 ML MISC INJECT 4 TIMES DAILY AS NEEDED 400 each 1    atorvastatin (LIPITOR) 40 MG tablet TAKE 1 TABLET BY MOUTH EVERY EVENING 90 tablet 1    hydrALAZINE (APRESOLINE) 25 MG tablet TAKE 1 TABLET BY MOUTH THREE TIMES DAILY      Cyanocobalamin (B-12) 500 MCG TABS TAKE 1 TABLET BY MOUTH DAILY 90 tablet 1    umeclidinium-vilanterol (ANORO ELLIPTA) 62.5-25 MCG/INH AEPB inhaler INHALE 1 PUFF INTO THE LUNGS DAILY (Patient not taking: Reported on 5/3/2022) 1 each 5    insulin regular (NOVOLIN R RELION) 100 UNIT/ML injection INJECT 20 TO 30 UNITS SUBCUTANEOUSLY THREE TIMES DAILY BEFORE MEAL(S) 30 mL 0    tiotropium (SPIRIVA HANDIHALER) 18 MCG inhalation capsule Inhale 1 capsule into the lungs daily INCLUDE inhaler device 30 capsule 5    Nebulizers (AIRIAL COMPACT MINI NEBULIZER) MISC 1 each by Does not apply route every 6 hours as needed (wheezing or shortness of breath) 1 each 0    Respiratory Therapy Supplies (NEBULIZER/TUBING/MOUTHPIECE) KIT 1 kit by Does not apply route every 6 hours as needed (for wheezing or shortness of breath) 1 kit 1    albuterol (PROVENTIL) (2.5 MG/3ML) 0.083% nebulizer solution Take 3 mLs by nebulization every 6 hours as needed for Wheezing 50 vial 3    vitamin D (ERGOCALCIFEROL) 09902 units CAPS capsule TK 1 C PO WEEKLY  2    mupirocin (BACTROBAN) 2 % ointment Apply 3 times daily as needed 22 g 1    levalbuterol (XOPENEX HFA) 45 MCG/ACT inhaler Inhale 1-2 puffs into the lungs every 4 hours as needed for Wheezing 1 Inhaler 3    aspirin 81 MG EC tablet Take 81 mg by mouth daily      apixaban (ELIQUIS) 2.5 MG TABS tablet Take 1 tablet by mouth 2 times daily 60 tablet 5    metoprolol (LOPRESSOR) 25 MG tablet Take 25 mg by mouth daily       isosorbide mononitrate (IMDUR) 30 MG CR tablet Take 1 tablet by mouth every morning Dr. Janice Angela - Cardio 30 tablet 6    nitroGLYCERIN (NITROSTAT) 0.3 MG SL tablet Take one sublingual for chest pain. May repeat twice at 5 min intervals. If not getting relief call 911. 25 tablet 3     No current facility-administered medications on file prior to encounter. REVIEW OF SYSTEMS    Pertinent items are noted in HPI.       Objective:      /71   Pulse 94   Temp 97.3 °F (36.3 °C) (Temporal)   Resp 18     Wt Readings from Last 3 Encounters:   05/05/22 270 lb 15.1 oz (122.9 kg)   05/05/22 269 lb 9.6 oz (122.3 kg)   05/03/22 278 lb (126.1 kg)       PHYSICAL EXAM      General Appearance: alert and oriented to person, place and time, with anxious affect and obese  Skin: warm and dry  Head: normocephalic and atraumatic  Eyes: pupils equal, round, and reactive to light  Pulmonary/Chest: normal air movement, no respiratory distress  Cardiovascular: normal rate and regular rhythm; pedal pulses per doppler  Extremities: no cyanosis or clubbing; LLE with +3 pitting edema, RLE with nonpitting edema      Assessment:        Problem List Items Addressed This Visit     Diabetes mellitus type II, uncontrolled ([facility]    Relevant Orders    Initiate Outpatient Wound Care Protocol    Ulcer of toe of right foot, with fat layer exposed ([facility] - Primary    Relevant Orders    Initiate Outpatient Wound Care Protocol    Peripheral venous insufficiency    Relevant Orders    Initiate Outpatient Wound Care Protocol    Atherosclerosis of native arteries of right leg with ulceration of other part of foot [facility])    Relevant Orders    Initiate Outpatient Wound Care Protocol           Procedure Note  Indications:  Based on my examination of this patient's wound(s)/ulcer(s) today, debridement is required to promote healing and evaluate the wound base. Performed by: RAYMUNDO Avila - CNP    Consent obtained:  Yes    Time out taken:  Yes    Pain Control: Anesthetic  Anesthetic: 4% Lidocaine Liquid Topical       Debridement: Excisional Debridement    Using curette the wound(s)/ulcer(s) was/were debrided down through and including the removal of epidermis, dermis and subcutaneous tissue.         Devitalized Tissue Debrided:  fibrin and slough    Pre Debridement Measurements:  Are located in the [facility]  Documentation Flow Sheet    Diabetic/Pressure/Non Pressure Ulcers only:  Ulcer: N/A     Wound/Ulcer #: 1    Post Debridement Measurements:  Wound/Ulcer Descriptions are Pre Debridement except measurements:    Wound 05/05/22 Leg Left;Lateral;Lower #1 Noted 4/28/22 (Active)   Wound Image   05/05/22 0907   Wound Etiology Venous 05/05/22 0907   Dressing Status New dressing applied 05/12/22 0915   Wound Cleansed Cleansed with saline 05/05/22 0954   Dressing/Treatment Alginate with Ag 05/12/22 0915   Wound Length (cm) 2.2 cm 05/12/22 0838   Wound Width (cm) 1.5 cm 05/12/22 0838   Wound Depth (cm) 0.1 cm 05/12/22 0838   Wound Surface Area (cm^2) 3.3 cm^2 05/12/22 0838   Change in Wound Size % (l*w) -10 05/12/22 0838   Wound Volume (cm^3) 0.33 cm^3 05/12/22 0838   Wound Healing % 45 05/12/22 0838   Post-Procedure Length (cm) 2.3 cm 05/12/22 0858   Post-Procedure Width (cm) 1.6 cm 05/12/22 0858   Post-Procedure Depth (cm) 0.2 cm 05/12/22 0858   Post-Procedure Surface Area (cm^2) 3.68 cm^2 05/12/22 0858   Post-Procedure Volume (cm^3) 0.736 cm^3 05/12/22 0858   Wound Assessment Granulation tissue;Slough 05/12/22 0838   Drainage Amount Small 05/12/22 0838   Drainage Description Serosanguinous 05/12/22 0838   Odor None 05/12/22 0838   Jaylyn-wound Assessment Dry/flaky 05/12/22 0838   Margins Attached edges 05/12/22 0838   Wound Thickness Description not for Pressure Injury Full thickness 05/12/22 0838   Number of days: 7       Wound 05/05/22 Heel Left #3 Noted 4/28/22 (Active)   Wound Image   05/05/22 0907   Wound Etiology Pressure Stage 2 05/05/22 0907   Dressing Status New dressing applied 05/12/22 0915   Wound Cleansed Cleansed with saline 05/05/22 0954   Dressing/Treatment Other (comment) 05/12/22 0915   Wound Length (cm) 2.3 cm 05/12/22 0838   Wound Width (cm) 4 cm 05/12/22 0838   Wound Depth (cm) 0.1 cm 05/12/22 0838   Wound Surface Area (cm^2) 9.2 cm^2 05/12/22 0838   Change in Wound Size % (l*w) -31.43 05/12/22 0838   Wound Volume (cm^3) 0.92 cm^3 05/12/22 0838   Wound Healing % -31 05/12/22 0838   Post-Procedure Length (cm) 2.3 cm 05/12/22 0858   Post-Procedure Width (cm) 4 cm 05/12/22 0858   Post-Procedure Depth (cm) 0.1 cm 05/12/22 0858   Post-Procedure Surface Area (cm^2) 9.2 cm^2 05/12/22 0858   Post-Procedure Volume (cm^3) 0.92 cm^3 05/12/22 0858   Wound Assessment Fluid filled blister 05/12/22 0838   Drainage Amount None 05/12/22 0838   Odor None 05/12/22 0838   Jaylyn-wound Assessment Dry/flaky 05/12/22 0838   Margins Undefined edges 05/12/22 0838   Wound Thickness Description not for Pressure Injury Full thickness 05/12/22 0838   Number of days: 7       Wound 05/05/22 Leg Right; Lower; Lateral #4 Noted 4/28/22 (Active)   Wound Image   05/12/22 0838   Wound Etiology Venous 05/05/22 0907   Dressing Status New dressing applied 05/12/22 0915   Wound Cleansed Cleansed with saline 05/05/22 0954   Dressing/Treatment Other (comment) 05/12/22 0915   Wound Length (cm) 0 cm 05/12/22 0838   Wound Width (cm) 0 cm 05/12/22 0838   Wound Depth (cm) 0 cm 05/12/22 0838   Wound Surface Area (cm^2) 0 cm^2 05/12/22 0838   Change in Wound Size % (l*w) 100 05/12/22 0838   Wound Volume (cm^3) 0 cm^3 05/12/22 0838   Wound Healing % 100 05/12/22 0838   Post-Procedure Length (cm) 0 cm 05/12/22 0858   Post-Procedure Width (cm) 0 cm 05/12/22 0858   Post-Procedure Depth (cm) 0 cm 05/12/22 0858   Post-Procedure Surface Area (cm^2) 0 cm^2 05/12/22 0858   Post-Procedure Volume (cm^3) 0 cm^3 05/12/22 0858   Wound Assessment Epithelialization 05/12/22 0838   Drainage Amount Small 05/09/22 1509   Drainage Description Serous; Yellow 05/09/22 1509   Odor None 05/09/22 1509   Jaylyn-wound Assessment Dry/flaky 05/09/22 1509   Margins Attached edges 05/09/22 1509   Wound Thickness Description not for Pressure Injury Full thickness 05/09/22 1509   Number of days: 7          Total Surface Area Debrided:  3.68 sq cm     Estimated Blood Loss:  Minimal    Hemostasis Achieved:  by pressure    Procedural Pain:  0  / 10     Post Procedural Pain:  0 / 10     Response to treatment:  Well tolerated by patient. Plan:     Treatment Note please see attached Discharge Instructions    Written patient dismissal instructions given to patient and signed by patient or POA.          Discharge Instructions       [facility] W[facility] and [facility] O[facility] Therapy Physician Orders and Discharge Instructions  [address]   [address] [facility]  Telephone: [phone]     NAME:  Kush Rao OF BIRTH:  1940  MEDICAL RECORD NUMBER:  [id number]  DATE:  5/12/2022     Wound Cleansing:   Do not scrub or use excessive force. Cleanse wound prior to applying a clean dressing with:  [x]? Normal Saline  [x]? Keep Wound Dry in Shower    []? Wound Cleanser   []? Cleanse wound with Mild Soap & Water  []? May Shower at Discharge   []? Other:        Topical Treatments:  Do not apply lotions, creams, or ointments to wound bed unless directed. []? Apply moisturizing lotion to skin surrounding the wound prior to dressing change. []? Apply antifungal ointment to skin surrounding the wound prior to dressing change. []? Apply thin film of moisture barrier ointment to skin immediately around wound. []? Other:                  Dressings:                  Wound Location :  LEFT LOWER LEG WOUNDS      [x]? Apply Primary Dressing:                                          [x]? Alginate with Silver               []? Other:    []? Pack wound loosely with    []? Iodoform   []? Plain Packing           []? Other   [x]? Cover and Secure with:                   []? Gauze         []? Tracie Whelan           []? Roll gauze              []? Ace Wrap   []? Cover Roll Tape     []? ABD                                      [x]? Other: DRAWTEX AND OPTILOCK              Avoid contact of tape with skin. []? Change dressing:   []? Daily           []? Every Other Day    []? Three times per week              []? Once a week          [x]? Do Not Change Dressing              []? Other:     Dressings:                  Wound Location : LEFT HEEL         [x]? Apply Primary Dressing:                                          [x]? Other:  Jaky Barroso     []? Cover and Secure with:                   []? Gauze         []?  Tracie Whelan []? Kerlix              []? Ace Wrap   []? Cover Roll Tape     []? ABD                                      []? Other:               Avoid contact of tape with skin. []? Change dressing:   []? Daily           []? Every Other Day    []? Three times per week              []? Once a week         [x]? Do Not Change Dressing              []? Other:                              Edema Control:  Apply:  []? Compression Stocking       []? Right Leg     []? Left Leg              []? Tubigrip      []? Right Leg Double Layer      []? Left Leg Double Layer                                                  []?Right Leg Single Layer       []? Left Leg Single Layer              []? SpandaGrip            []? Right Leg     []? Left Leg                                      []?Low compression 5-10 mm/Hg                                             []?Medium compression 10-20 mm/Hg                                      []?High compression  20-30 mm/Hg              every morning immediately when getting up should be applied to affected leg(s) from mid foot to knee making sure to cover the heel. Remove every night before going to bed. [x]? Elevate leg(s) above the level of the heart when sitting. [x]? Avoid prolonged standing in one place.                   Compression: COBAN 2  Apply:  [x]? Multilayer Compression Wrap Applied in Clinic    [x]? RightLeg      [x]? Left Leg              [x]? Multi-layer compression. Do not get leg(s) with wrap wet. If wraps become too tight call the center or completely remove the wrap. [x]? Elevate leg(s) above the level of the heart when sitting. [x]? Avoid prolonged standing in one place.     Off-Loading:   []? Off-loading when    []? walking       []? in bed         []? sitting  []? Total non-weight bearing  []? Right Leg  []? Left Leg          [x]? Assistive Device     []? Eliezer Crys        [x]? Cane           []? Wheelchair  []?  Crutches []? Surgical shoe    []? Podus Boot(s)   []? Foam Boot(s)  []? Roll About              []? Cast Boot   []? CROW Boot  []? Other:     Dietary:  []? Diet as tolerated:     [x]? Calorie Diabetic Diet:           []? No Added Salt:  [x]? Increase Protein:     []? Other:                Activity:  [x]? Activity as tolerated:  []? Patient has no activity restrictions     []? Strict Bedrest: []? Remain off Work:     []? May return to full duty work:                                    []? Return to work with restrictions:                 If you are still having pain after you go home:  [x]? Elevate the affected limb. [x]? Use over-the-counter medications you would normally use for pain as permitted by your family doctor. [x]? For persistent pain not relieved by the above interventions, please call your family doctor.   Douglas Bledsoe   Return Appointment:  []? Wound and dressing supply provider:   []? ECF or Home Healthcare:  [x]? Wound Assessment: Monday, MAY 16TH []? Physician or NP scheduled for Wound Assessment:   [x]? Return Appointment: With DR GUTIERREZ in  1 Week(s)  []? Ordered tests:      Nurse Case Manger:  ROSEANN      Electronically signed by Emmy Amaya RN on 5/5/2022 at 9:28 13 [facility] Information: Should you experience any significant changes in your wound(s) or have questions about your wound care, please contact the [address] [place] [address] at [address] 8:00 am - 4:30 pm and Friday 8:00 am - 12:30 pm.  If you need help with your wound outside these hours and cannot wait until we are again available, contact your PCP or go to the hospital emergency room.      PLEASE NOTE: IF YOU ARE UNABLE TO OBTAIN WOUND SUPPLIES, CONTINUE TO USE THE SUPPLIES YOU HAVE AVAILABLE UNTIL YOU ARE ABLE TO [facility]. IT IS MOST IMPORTANT TO KEEP THE WOUND COVERED AT ALL TIMES.      Physician Signature:_______________________     Date: ___________ Time:  ____________       Dr Gera Stout   [X] Anabel Yates CNP        Electronically signed by RAYMUNDO Morales CNP on 5/12/2022 at 4:59 PM

## 2022-05-15 PROBLEM — I83.029 VENOUS ULCER OF LEFT LEG (HCC): Status: ACTIVE | Noted: 2022-05-15

## 2022-05-15 PROBLEM — L97.929 VENOUS ULCER OF LEFT LEG (HCC): Status: ACTIVE | Noted: 2022-05-15

## 2022-05-16 ENCOUNTER — HOSPITAL ENCOUNTER (OUTPATIENT)
Dept: WOUND CARE | Age: 82
Discharge: HOME | End: 2022-05-16
Payer: MEDICARE

## 2022-05-16 VITALS
TEMPERATURE: 97.2 F | DIASTOLIC BLOOD PRESSURE: 65 MMHG | HEART RATE: 88 BPM | SYSTOLIC BLOOD PRESSURE: 112 MMHG | RESPIRATION RATE: 18 BRPM

## 2022-05-16 DIAGNOSIS — E11.649 UNCONTROLLED TYPE 2 DIABETES MELLITUS WITH HYPOGLYCEMIA, UNSPECIFIED HYPOGLYCEMIA COMA STATUS (HCC): ICD-10-CM

## 2022-05-16 DIAGNOSIS — L97.512 ULCER OF TOE OF RIGHT FOOT, WITH FAT LAYER EXPOSED (HCC): ICD-10-CM

## 2022-05-16 DIAGNOSIS — I87.2 PERIPHERAL VENOUS INSUFFICIENCY: ICD-10-CM

## 2022-05-16 DIAGNOSIS — I70.235 ATHEROSCLEROSIS OF NATIVE ARTERIES OF RIGHT LEG WITH ULCERATION OF OTHER PART OF FOOT (HCC): ICD-10-CM

## 2022-05-16 PROCEDURE — 29581 APPL MULTLAYER CMPRN SYS LEG: CPT

## 2022-05-16 RX ORDER — AMIODARONE HYDROCHLORIDE 200 MG/1
TABLET ORAL
Qty: 30 TABLET | Refills: 5 | Status: SHIPPED | OUTPATIENT
Start: 2022-05-16

## 2022-05-16 ASSESSMENT — PAIN SCALES - GENERAL: PAINLEVEL_OUTOF10: 0

## 2022-05-16 NOTE — PLAN OF CARE
Multilayer Compression Wrap   (Not Unna) Below the Knee    NAME:  Eliz Nunez  YOB: 1940  MEDICAL RECORD NUMBER:  [id number]  DATE:  5/16/2022    Multilayer compression wrap: Removed old Multilayer wrap if indicated and wash leg with mild soap/water. Applied moisturizing agent to dry skin as needed. Applied primary and secondary dressing as ordered. Applied multilayered dressing below the knee to right lower leg. Applied multilayered dressing below the knee to left lower leg. Instructed patient/caregiver not to remove dressing and to keep it clean and dry. Instructed patient/caregiver on complications to report to provider, such as pain, numbness in toes, heavy drainage, and slippage of dressing. Instructed patient on purpose of compression dressing and on activity and exercise recommendations.       Electronically signed by Ngoc Freedman RN on 5/16/2022 at 8:39 AM

## 2022-05-17 RX ORDER — AMIODARONE HYDROCHLORIDE 200 MG/1
TABLET ORAL
Qty: 30 TABLET | Refills: 0 | OUTPATIENT
Start: 2022-05-17

## 2022-05-19 ENCOUNTER — HOSPITAL ENCOUNTER (OUTPATIENT)
Dept: WOUND CARE | Age: 82
Discharge: HOME | End: 2022-05-19
Payer: MEDICARE

## 2022-05-19 VITALS
HEART RATE: 88 BPM | DIASTOLIC BLOOD PRESSURE: 67 MMHG | TEMPERATURE: 98.1 F | SYSTOLIC BLOOD PRESSURE: 124 MMHG | RESPIRATION RATE: 18 BRPM

## 2022-05-19 DIAGNOSIS — S41.102A ARM WOUND, LEFT, INITIAL ENCOUNTER: ICD-10-CM

## 2022-05-19 DIAGNOSIS — R09.89 DECREASED PULSES IN FEET: ICD-10-CM

## 2022-05-19 DIAGNOSIS — I70.235 ATHEROSCLEROSIS OF NATIVE ARTERIES OF RIGHT LEG WITH ULCERATION OF OTHER PART OF FOOT (HCC): ICD-10-CM

## 2022-05-19 DIAGNOSIS — E11.649 UNCONTROLLED TYPE 2 DIABETES MELLITUS WITH HYPOGLYCEMIA, UNSPECIFIED HYPOGLYCEMIA COMA STATUS (HCC): ICD-10-CM

## 2022-05-19 DIAGNOSIS — L97.512 ULCER OF TOE OF RIGHT FOOT, WITH FAT LAYER EXPOSED (HCC): Primary | ICD-10-CM

## 2022-05-19 DIAGNOSIS — I83.029 VENOUS ULCER OF LEFT LEG (HCC): ICD-10-CM

## 2022-05-19 DIAGNOSIS — I87.2 PERIPHERAL VENOUS INSUFFICIENCY: ICD-10-CM

## 2022-05-19 DIAGNOSIS — L97.929 VENOUS ULCER OF LEFT LEG (HCC): ICD-10-CM

## 2022-05-19 PROCEDURE — 29581 APPL MULTLAYER CMPRN SYS LEG: CPT

## 2022-05-19 PROCEDURE — 11042 DBRDMT SUBQ TIS 1ST 20SQCM/<: CPT

## 2022-05-19 PROCEDURE — 11042 DBRDMT SUBQ TIS 1ST 20SQCM/<: CPT | Performed by: NURSE PRACTITIONER

## 2022-05-19 RX ORDER — LIDOCAINE HYDROCHLORIDE 40 MG/ML
SOLUTION TOPICAL ONCE
Status: CANCELLED | OUTPATIENT
Start: 2022-05-19 | End: 2022-05-19

## 2022-05-19 RX ORDER — LIDOCAINE HYDROCHLORIDE 20 MG/ML
JELLY TOPICAL ONCE
OUTPATIENT
Start: 2022-05-19 | End: 2022-05-19

## 2022-05-19 RX ORDER — BACITRACIN ZINC AND POLYMYXIN B SULFATE 500; 1000 [USP'U]/G; [USP'U]/G
OINTMENT TOPICAL ONCE
OUTPATIENT
Start: 2022-05-19 | End: 2022-05-19

## 2022-05-19 RX ORDER — CLOBETASOL PROPIONATE 0.5 MG/G
OINTMENT TOPICAL ONCE
OUTPATIENT
Start: 2022-05-19 | End: 2022-05-19

## 2022-05-19 RX ORDER — GINSENG 100 MG
CAPSULE ORAL ONCE
OUTPATIENT
Start: 2022-05-19 | End: 2022-05-19

## 2022-05-19 RX ORDER — GENTAMICIN SULFATE 1 MG/G
OINTMENT TOPICAL ONCE
OUTPATIENT
Start: 2022-05-19 | End: 2022-05-19

## 2022-05-19 RX ORDER — BETAMETHASONE DIPROPIONATE 0.05 %
OINTMENT (GRAM) TOPICAL ONCE
OUTPATIENT
Start: 2022-05-19 | End: 2022-05-19

## 2022-05-19 RX ORDER — LIDOCAINE HYDROCHLORIDE 40 MG/ML
SOLUTION TOPICAL ONCE
Status: COMPLETED | OUTPATIENT
Start: 2022-05-19 | End: 2022-05-19

## 2022-05-19 RX ORDER — BACITRACIN, NEOMYCIN, POLYMYXIN B 400; 3.5; 5 [USP'U]/G; MG/G; [USP'U]/G
OINTMENT TOPICAL ONCE
OUTPATIENT
Start: 2022-05-19 | End: 2022-05-19

## 2022-05-19 RX ORDER — LIDOCAINE 50 MG/G
OINTMENT TOPICAL ONCE
OUTPATIENT
Start: 2022-05-19 | End: 2022-05-19

## 2022-05-19 RX ORDER — LIDOCAINE 40 MG/G
CREAM TOPICAL ONCE
OUTPATIENT
Start: 2022-05-19 | End: 2022-05-19

## 2022-05-19 RX ADMIN — LIDOCAINE HYDROCHLORIDE: 40 SOLUTION TOPICAL at 08:34

## 2022-05-19 ASSESSMENT — PAIN SCALES - GENERAL: PAINLEVEL_OUTOF10: 0

## 2022-05-19 NOTE — PROGRESS NOTES
[address]   Progress Note and Procedure Note      Cyndi RECORD NUMBER:  [id number]  AGE: 81 y.o. GENDER: male  : 1940  EPISODE DATE:  2022    Subjective:     Chief Complaint   Patient presents with    Wound Check     Follow up for bilateral lower leg wounds. HISTORY of PRESENT ILLNESS HPI  Andrew galvin 81 y. o. male who presents today for follow up of LLE cellulitis. He reports that it has improved significantly with use of the antibiotics (Keflex and Bactrim DS). Pt presents with new skin tears right lower arm from falling at home over books left on floor. Only left heel wound remains.   History of Wound Context: Patient is normally seen by Dr. Jaelyn Hoskins. .  Patient related a history of congestive heart failure and has been dealing with excess fluid.  He has adjusted his diuretics per his primary care physician and presents here for further evaluation and management of the ulcerations on his legs.  He denies any other constitutional symptoms.  Patient has a history of peripheral vascular disease with a previous angiogram by Dr. Concepcion Govea that was unremarkable and required no further intervention.   Wound/Ulcer Pain Timing/Severity: left thigh tenderness has resolved   Quality of pain: N/A  Severity: 0 / 10   Modifying Factors: None  Associated Signs/Symptoms: edema       Ulcer Identification:  Ulcer Type: venous     Contributing Factors: edema, venous stasis, lymphedema, diabetes, poor glucose control and obesity     Acute Wound: N/A     PAST MEDICAL HISTORY        Diagnosis Date    Acid reflux     Arm wound, left, initial encounter 2022    Skin tears    Atherosclerosis of native arteries of right leg with ulceration of other part of foot ([address] 2021    Atrial flutter ([address]     converted    Bleeding stomach ulcer oct 2015    BPH (benign prostatic hyperplasia)     CAD (coronary artery disease)      angio with 2 blocked bypass and 2 patent    Cellulitis of left thigh 5/10/2022    COPD (chronic obstructive pulmonary disease) (HCC)     Diabetes mellitus type II     Edema     chronic left leg    Elevated PSA- nl 11     Hyperlipidemia     Hypertension     Ischemic cardiomyopathy     Lipoma of skin-RIGHT CHEST 2011    Obesity     Osteoarthritis     Peripheral venous insufficiency 2021    Skin tear of left forearm without complication     Significant amount of epidermal loss with bleeding.  Spinal stenosis of lumbar region with neurogenic claudication- clinically 2018       PAST SURGICAL HISTORY    Past Surgical History:   Procedure Laterality Date    CATARACT REMOVAL  ,     bilat    COLONOSCOPY      CORONARY ARTERY BYPASS GRAFT  1993    x 4    EYE SURGERY Left 2019    cataract coming back    JOINT REPLACEMENT Right total knee replacement    JOINT REPLACEMENT Left 2016       FAMILY HISTORY    Family History   Problem Relation Age of Onset    Cancer Mother         breast    Cancer Father         throat       SOCIAL HISTORY    Social History     Tobacco Use    Smoking status: Former Smoker     Packs/day: 3.50     Years: 32.00     Pack years: 112.00     Types: Cigarettes     Quit date: 1985     Years since quittin.4    Smokeless tobacco: Never Used    Tobacco comment: advised not to resume   Substance Use Topics    Alcohol use: No     Alcohol/week: 0.0 standard drinks    Drug use: No       ALLERGIES    Allergies   Allergen Reactions    Entresto [Sacubitril-Valsartan] Other (See Comments)     Renal failure       MEDICATIONS    Current Outpatient Medications on File Prior to Encounter   Medication Sig Dispense Refill    amiodarone (CORDARONE) 200 MG tablet TAKE 1 TABLET BY MOUTH DAILY 30 tablet 5    insulin NPH (NOVOLIN N) 100 UNIT/ML injection vial Take 50 units with breakfast and 34 units with dinner.  30 mL 3    gabapentin (NEURONTIN) 600 MG tablet TAKE UP TO 5 TABLETS BY MOUTH OVER 24 HOURS AS DIRECTED 150 tablet 11    torsemide (DEMADEX) 20 MG tablet Take 1 tablet by mouth daily 30 tablet 3    omeprazole (PRILOSEC) 20 MG delayed release capsule TAKE 1 CAPSULE BY MOUTH TWICE DAILY 180 capsule 1    albuterol sulfate  (90 Base) MCG/ACT inhaler INHALE 2 PUFFS INTO THE LUNGS EVERY 6 HOURS AS NEEDED FOR WHEEZING 18 g 5    FEROSUL 325 (65 Fe) MG tablet TAKE 1 TABLET BY MOUTH TWICE DAILY 180 tablet 3    dapagliflozin (FARXIGA) 5 MG tablet Take 1 tablet by mouth daily LOT [id number] EXP 07/31/2024 4 BOXES 30 tablet 0    potassium chloride (KLOR-CON) 10 MEQ extended release tablet TAKE 1 TABLET BY MOUTH DAILY 90 tablet 1    furosemide (LASIX) 80 MG tablet TAKE 1 TABLET BY MOUTH DAILY 90 tablet 0    Lancets MISC 1 each by Does not apply route daily Use to check glucose twice a day. One Touch brand. DX E11.9 100 each 3    blood glucose monitor kit and supplies Test 2 times a day & as needed for symptoms of irregular blood glucose. 1 kit 0    blood glucose monitor strips Test 2 times a day & as needed for symptoms of irregular blood glucose.  200 strip 3    blood glucose test strips (ASCENSIA AUTODISC VI;ONE TOUCH ULTRA TEST VI) strip four times daily 400 strip 3    INSULIN SYRINGE .5CC/29G 29G X 1/2\" 0.5 ML MISC INJECT 4 TIMES DAILY AS NEEDED 400 each 1    atorvastatin (LIPITOR) 40 MG tablet TAKE 1 TABLET BY MOUTH EVERY EVENING 90 tablet 1    hydrALAZINE (APRESOLINE) 25 MG tablet TAKE 1 TABLET BY MOUTH THREE TIMES DAILY      Cyanocobalamin (B-12) 500 MCG TABS TAKE 1 TABLET BY MOUTH DAILY 90 tablet 1    umeclidinium-vilanterol (ANORO ELLIPTA) 62.5-25 MCG/INH AEPB inhaler INHALE 1 PUFF INTO THE LUNGS DAILY (Patient not taking: Reported on 5/3/2022) 1 each 5    insulin regular (NOVOLIN R RELION) 100 UNIT/ML injection INJECT 20 TO 30 UNITS SUBCUTANEOUSLY THREE TIMES DAILY BEFORE MEAL(S) 30 mL 0    tiotropium (SPIRIVA HANDIHALER) 18 MCG inhalation capsule Inhale 1 capsule into the lungs daily INCLUDE inhaler device 30 capsule 5    Nebulizers (AIRIAL COMPACT MINI NEBULIZER) MISC 1 each by Does not apply route every 6 hours as needed (wheezing or shortness of breath) 1 each 0    Respiratory Therapy Supplies (NEBULIZER/TUBING/MOUTHPIECE) KIT 1 kit by Does not apply route every 6 hours as needed (for wheezing or shortness of breath) 1 kit 1    albuterol (PROVENTIL) (2.5 MG/3ML) 0.083% nebulizer solution Take 3 mLs by nebulization every 6 hours as needed for Wheezing 50 vial 3    vitamin D (ERGOCALCIFEROL) 62088 units CAPS capsule TK 1 C PO WEEKLY  2    mupirocin (BACTROBAN) 2 % ointment Apply 3 times daily as needed 22 g 1    levalbuterol (XOPENEX HFA) 45 MCG/ACT inhaler Inhale 1-2 puffs into the lungs every 4 hours as needed for Wheezing 1 Inhaler 3    aspirin 81 MG EC tablet Take 81 mg by mouth daily      apixaban (ELIQUIS) 2.5 MG TABS tablet Take 1 tablet by mouth 2 times daily 60 tablet 5    metoprolol (LOPRESSOR) 25 MG tablet Take 25 mg by mouth daily       isosorbide mononitrate (IMDUR) 30 MG CR tablet Take 1 tablet by mouth every morning Dr. Derrek Victor - Cardio 30 tablet 6    nitroGLYCERIN (NITROSTAT) 0.3 MG SL tablet Take one sublingual for chest pain. May repeat twice at 5 min intervals. If not getting relief call 911. 25 tablet 3     No current facility-administered medications on file prior to encounter. REVIEW OF SYSTEMS  Review of Systems    Pertinent items are noted in HPI.     Objective:      /67   Pulse 88   Temp 98.1 °F (36.7 °C) (Infrared)   Resp 18     Wt Readings from Last 3 Encounters:   05/05/22 270 lb 15.1 oz (122.9 kg)   05/05/22 269 lb 9.6 oz (122.3 kg)   05/03/22 278 lb (126.1 kg)       PHYSICAL EXAM  Physical Exam    General Appearance: alert and oriented to person, place and time  Skin: warm and dry  Head: normocephalic and atraumatic  Eyes: pupils equal, round, and reactive to light  Pulmonary/Chest: normal air movement, no respiratory distress  Cardiovascular: normal rate, regular rhythm and distal pulses diminished  Extremities: no cyanosis, no clubbing and no edema      Assessment:        Problem List Items Addressed This Visit     Venous ulcer of left leg (HCC)    Arm wound, left, initial encounter    Diabetes mellitus type II, uncontrolled ([facility]    Relevant Orders    Initiate Outpatient Wound Care Protocol    Ulcer of toe of right foot, with fat layer exposed ([facility] - Primary    Relevant Orders    Initiate Outpatient Wound Care Protocol    Peripheral venous insufficiency    Relevant Orders    Initiate Outpatient Wound Care Protocol    Atherosclerosis of native arteries of right leg with ulceration of other part of foot ([facility]    Relevant Orders    Initiate Outpatient Wound Care Protocol    Decreased pulses in feet           Procedure Note  Indications:  Based on my examination of this patient's wound(s)/ulcer(s) today, debridement is required to promote healing and evaluate the wound base. Performed by: RAYMUNDO Welch CNP    Consent obtained:  Yes    Time out taken:  Yes    Pain Control: Anesthetic  Anesthetic: 4% Lidocaine Liquid Topical       Debridement: Excisional Debridement    Using curette the wound(s)/ulcer(s) was/were debrided down through and including the removal of epidermis, dermis and subcutaneous tissue.         Devitalized Tissue Debrided:  fibrin, biofilm and slough    Pre Debridement Measurements:  Are located in the [facility]  Documentation Flow Sheet    Diabetic/Pressure/Non Pressure Ulcers only:  Ulcer: Pressure ulcer, Stage 2     Wound/Ulcer #: 2    Post Debridement Measurements:  Wound/Ulcer Descriptions are Pre Debridement except measurements:    Wound 05/05/22 Leg Left;Lateral;Lower #1 Noted 4/28/22 (Active)   Wound Image   05/19/22 0825   Wound Etiology Venous 05/05/22 0907   Dressing Status New dressing applied 05/16/22 0837   Wound Cleansed Cleansed with saline 05/05/22 0969 Dressing/Treatment Alginate with Ag; Other (comment) 05/16/22 0837   Wound Length (cm) 0 cm 05/19/22 0825   Wound Width (cm) 0 cm 05/19/22 0825   Wound Depth (cm) 0 cm 05/19/22 0825   Wound Surface Area (cm^2) 0 cm^2 05/19/22 0825   Change in Wound Size % (l*w) 100 05/19/22 0825   Wound Volume (cm^3) 0 cm^3 05/19/22 0825   Wound Healing % 100 05/19/22 0825   Post-Procedure Length (cm) 0 cm 05/19/22 0937   Post-Procedure Width (cm) 0 cm 05/19/22 0937   Post-Procedure Depth (cm) 0 cm 05/19/22 0937   Post-Procedure Surface Area (cm^2) 0 cm^2 05/19/22 0937   Post-Procedure Volume (cm^3) 0 cm^3 05/19/22 0937   Wound Assessment Epithelialization 05/19/22 0825   Drainage Amount Small 05/16/22 0835   Drainage Description Serosanguinous 05/16/22 0835   Odor None 05/16/22 0835   Jaylyn-wound Assessment Dry/flaky 05/16/22 0835   Margins Attached edges 05/16/22 0835   Wound Thickness Description not for Pressure Injury Full thickness 05/16/22 0835   Number of days: 14       Wound 05/05/22 Heel Left #3 Noted 4/28/22 (Active)   Wound Image   05/05/22 0907   Wound Etiology Pressure Stage 2 05/05/22 0907   Dressing Status New dressing applied 05/19/22 1044   Wound Cleansed Cleansed with saline 05/05/22 0954   Dressing/Treatment Gauze dressing/dressing sponge; Other (comment) 05/19/22 1044   Wound Length (cm) 3.5 cm 05/19/22 0825   Wound Width (cm) 2.2 cm 05/19/22 0825   Wound Depth (cm) 0.1 cm 05/19/22 0825   Wound Surface Area (cm^2) 7.7 cm^2 05/19/22 0825   Change in Wound Size % (l*w) -10 05/19/22 0825   Wound Volume (cm^3) 0.77 cm^3 05/19/22 0825   Wound Healing % -10 05/19/22 0825   Post-Procedure Length (cm) 3.5 cm 05/19/22 0937   Post-Procedure Width (cm) 2.2 cm 05/19/22 0937   Post-Procedure Depth (cm) 0.1 cm 05/19/22 0937   Post-Procedure Surface Area (cm^2) 7.7 cm^2 05/19/22 0937   Post-Procedure Volume (cm^3) 0.77 cm^3 05/19/22 0937   Wound Assessment Fluid filled blister 05/19/22 0825   Drainage Amount None 05/19/22 0825 Odor None 05/19/22 0825   Jaylyn-wound Assessment Dry/flaky 05/19/22 0825   Margins Undefined edges 05/19/22 0825   Wound Thickness Description not for Pressure Injury Full thickness 05/19/22 0825   Number of days: 14       Wound 05/19/22 Arm Left; Lower #4 ( INJURY ABOUT 5/15/2022 ) (Active)   Wound Image   05/19/22 0825   Wound Etiology Traumatic 05/19/22 0825   Dressing Status New dressing applied 05/19/22 1044   Dressing/Treatment Roll gauze; Hydrating gel 05/19/22 1044   Wound Length (cm) 6.5 cm 05/19/22 0825   Wound Width (cm) 3.5 cm 05/19/22 0825   Wound Depth (cm) 0.1 cm 05/19/22 0825   Wound Surface Area (cm^2) 22.75 cm^2 05/19/22 0825   Wound Volume (cm^3) 2.275 cm^3 05/19/22 0825   Wound Assessment Slough;Granulation tissue;Eschar moist 05/19/22 0825   Drainage Amount Moderate 05/19/22 0825   Drainage Description Sanguinous 05/19/22 0825   Odor None 05/19/22 0825   Jaylyn-wound Assessment Ecchymosis 05/19/22 0825   Margins Undefined edges 05/19/22 0825   Wound Thickness Description not for Pressure Injury Full thickness 05/19/22 0825   Number of days: 0          Total Surface Area Debrided:  7.7 sq cm     Estimated Blood Loss:  Minimal    Hemostasis Achieved:  by pressure    Procedural Pain:  0  / 10     Post Procedural Pain:  0 / 10     Response to treatment:  Well tolerated by patient. Plan:    Pt education per provider related to care plan for heel wound and new skin tears. Pt agreeable and questions answered. Treatment Note please see attached Discharge Instructions    Written patient dismissal instructions given to patient and signed by patient or POA.          Discharge Instructions       [facility] Wound Care and Hyperbaric Oxygen Therapy   Physician Orders and Discharge Instructions  [address] [facility], [facility]  Telephone: [phone]      FAX [phone]     NAME: Rafi:  1940  MEDICAL RECORD NUMBER:  [id number]  DATE:  5/19/2022     Wound Cleansing:   Do not scrub or use excessive force. Cleanse wound prior to applying a clean dressing with:  [x]? ? Normal Saline  [x]? ? Keep Wound Dry in Shower    []? ? Wound Cleanser   []? ? Cleanse wound with Mild Soap & Water  []? ? May Shower at Discharge   []? ? Other:        Topical Treatments:  Do not apply lotions, creams, or ointments to wound bed unless directed. []?? Apply moisturizing lotion to skin surrounding the wound prior to dressing change. []?? Apply antifungal ointment to skin surrounding the wound prior to dressing change. []?? Apply thin film of moisture barrier ointment to skin immediately around wound. []?? Other:                  Dressings:                  Wound Location :  LEFT LOWER LEG WOUNDS      []? ? Apply Primary Dressing:                                          []? ? Alginate with Silver               []? ? Other:    []? ? Pack wound loosely with    []? ? Iodoform   []? ? Plain Packing           []? ? Other   []? ? Cover and Secure with:                   []? ? Gauze         []? ? Phillip           []? ? Roll gauze              []? ? Ace Wrap   []? ? Cover Roll Tape     []? ? ABD                                      []? ? Other:               Avoid contact of tape with skin. []?? Change dressing:   []? ?Elton Suzanna           []? ? Every Other Day    []? ? Three times per week              []? ? Once a week          []? ? Do Not Change Dressing              []? ? Other:     Dressings:                  Wound Location : LEFT HEEL         [x]? ? Apply Primary Dressing:                                          [x]? ? Other: Bal Frieze      []? ? Cover and Secure with:                   []? ? Gauze         []? ? Phillip           []? ? Kerlix              []? ? Ace Wrap   []? ? Cover Roll Tape     []? ? ABD                                      []? ? Other:               Avoid contact of tape with skin. []?? Change dressing:   []? ?Elton Suzanna           []? ? Every Other Day    []? ? Three times per week              []? ? Once a week         [x]? ? Do Not Change Dressing              []? ? Other:      Dressings : Wound location : LEFT ARM        STERI STRIPS APPLIED PER AYALA RICHARD NP        MEPITEL AND DAB OF HYDROGEL       GAUZE THEN ROLL GAUZE       CHANGE OUTER DRESSING ONLY IF NEEDED                                Edema Control:  Apply:  []?? Compression Stocking       []? ? Right Leg     []? ? Left Leg              []? ? Tubigrip      []? ? Right Leg Double Layer      []? ? Left Leg Double Layer                                                  []? ? Right Leg Single Layer       []? ? Left Leg Single Layer              []? ? SpandaGrip            []? ? Right Leg     []? ? Left Leg                                      []? ?Low compression 5-10 mm/Hg                                             []? ? Medium compression 10-20 mm/Hg                                      []? ? High compression  20-30 mm/Hg              every morning immediately when getting up should be applied to affected leg(s) from mid foot to knee making sure to cover the heel.  Remove every night before going to bed.              [x]? ? Elevate leg(s) above the level of the heart when sitting.                 [x]? ? Avoid prolonged standing in one place.                   Compression: COBAN 2  Apply:  [x]? ? Multilayer Compression Wrap Applied in Clinic    [x]? ? RightLeg      [x]? ? Left Leg              [x]? ? Multi-layer compression.  Do not get leg(s) with wrap wet.  If wraps become too tight call the center or completely remove the wrap.                      [x]? ? Elevate leg(s) above the level of the heart when sitting.                 [x]? ? Avoid prolonged standing in one place.     Off-Loading:   []?? Off-loading when    []? ? walking       []? ? in bed         []? ? sitting  []? ? Total non-weight bearing  []? ? Right Leg  []? ? Left Leg          [x]? ?Vilma Dash Device     []? ? Walker        [x]? ?[facility] [employer]           []? ? Wheelchair  []? ? Crutches              []? ? Surgical shoe    []? ? Podus Boot(s)   []? ? Foam Boot(s)  []? ? Roll About              []? ? Cast Boot   []? ?[employer]  []? ? Other:     Dietary:  []?? Diet as tolerated:     [x]? ? Calorie Diabetic Diet:           []? ? No Added Salt:  [x]? ? Increase Protein:     []? ? Other:                Activity:  [x]? ? Activity as tolerated:  []?? Patient has no activity restrictions     []? ? Strict Bedrest: []? ? Remain off Work:     []? ? May return to full duty work:                                    []? ? Return to work with [address]     If you are still having pain after you go home:  [x]? ? Elevate the affected limb.    [x]? ? Use over-the-counter medications you would normally use for pain as permitted by your family doctor. [x]? ? For persistent pain not relieved by the above interventions, please call your family doctor.             Return Appointment:  []?? Wound and dressing supply provider:   []?? ECF or Home Healthcare:  []?? Wound Assessment:  []?? Physician or NP scheduled for Wound Assessment:   [x]? ? Return Appointment: Tari GUTIERREZ AND AYALA RICHARD in  1 Week(s)  []? ? Ordered tests:      Nurse Case Manger: Peterson aMc     Electronically signed by Rica Kilgore RN on 5/19/2022 at [address] [place] [address] Information: Should you experience any significant changes in your wound(s) or have questions about your wound care, please contact the [facility] [phone] [address] 8:00 am - 4:30 pm and Friday 8:00 am - 12:30 pm.  If you need help with your wound outside these hours and cannot wait until we are again available, contact your PCP or go to the hospital emergency room.      PLEASE NOTE: IF YOU ARE UNABLE TO OBTAIN WOUND SUPPLIES, CONTINUE TO USE THE SUPPLIES YOU HAVE AVAILABLE UNTIL YOU ARE ABLE TO [facility].  IT IS MOST IMPORTANT TO KEEP THE WOUND COVERED AT ALL TIMES.     Physician Signature:_______________________     Date: ___________ Time:  ____________                                GZ Araseli Drake   [X] Morgan Major CNP        Electronically signed by RAYMUNDO Echols CNP on 5/19/2022 at 4:49 PM

## 2022-05-22 ENCOUNTER — APPOINTMENT (OUTPATIENT)
Dept: GENERAL RADIOLOGY | Age: 82
End: 2022-05-22
Payer: MEDICARE

## 2022-05-22 ENCOUNTER — HOSPITAL ENCOUNTER (EMERGENCY)
Age: 82
Discharge: HOME | End: 2022-05-22
Payer: MEDICARE

## 2022-05-22 VITALS
DIASTOLIC BLOOD PRESSURE: 75 MMHG | SYSTOLIC BLOOD PRESSURE: 121 MMHG | WEIGHT: 260 LBS | HEIGHT: 68 IN | OXYGEN SATURATION: 92 % | BODY MASS INDEX: 39.4 KG/M2 | HEART RATE: 77 BPM | TEMPERATURE: 98.9 F | RESPIRATION RATE: 16 BRPM

## 2022-05-22 DIAGNOSIS — S52.124A CLOSED NONDISPLACED FRACTURE OF HEAD OF RIGHT RADIUS, INITIAL ENCOUNTER: Primary | ICD-10-CM

## 2022-05-22 PROCEDURE — 73080 X-RAY EXAM OF ELBOW: CPT

## 2022-05-22 PROCEDURE — 99283 EMERGENCY DEPT VISIT LOW MDM: CPT

## 2022-05-22 ASSESSMENT — PAIN - FUNCTIONAL ASSESSMENT: PAIN_FUNCTIONAL_ASSESSMENT: 0-10

## 2022-05-22 ASSESSMENT — PAIN DESCRIPTION - ORIENTATION: ORIENTATION: RIGHT

## 2022-05-22 ASSESSMENT — PAIN DESCRIPTION - LOCATION: LOCATION: ARM

## 2022-05-22 ASSESSMENT — PAIN SCALES - GENERAL: PAINLEVEL_OUTOF10: 1

## 2022-05-22 NOTE — ED PROVIDER NOTES
[address]        Pt Name: Diomedes Oliver  MRN: [id number]  [place] 1940  Date of evaluation: 5/22/2022  Provider: Oscar Mirza PA-C  PCP: Burgess Michelle MD  Note Started: 4:05 PM EDT       SANDRA. I have evaluated this patient. My supervising physician was available for consultation. CHIEF COMPLAINT       Chief Complaint   Patient presents with    Fall     Right elbow swelling x 4 days, cannot lift elbow to shoulder level due to pain. Fall 4-5 days ago with right elbow injury. Pt takes Eliquis, hx CHF. Also c/o bilateral finger tingling. HISTORY OF PRESENT ILLNESS   (Location, Timing/Onset, Context/Setting, Quality, Duration, Modifying Factors, Severity, Associated Signs and Symptoms)  Note limiting factors. Chief Complaint: Right elbow pain    Diomedes Oliver is a 81 y.o. male who presents to the emergency department with a chief complaint of some right elbow pain. He is anticoagulated on Eliquis and tripped over some blocks at home 4 days ago falling onto the floor on his right elbow. He is right-hand dominant. Having some swelling now in his right arm and elbow with some decreased range of motion after the fall. Denies any his head, loss of conscious or previous history of injuries or surgeries to the right elbow. He states he was just sitting still the pain is only about a 1 out of 10. States pain is worse with movement. Denies any other symptoms. Nursing Notes were all reviewed and agreed with or any disagreements were addressed in the HPI. REVIEW OF SYSTEMS    (2-9 systems for level 4, 10 or more for level 5)     Review of Systems    Positives and Pertinent negatives as per HPI. Except as noted above in the ROS, all other systems were reviewed and negative.        PAST MEDICAL HISTORY     Past Medical History:   Diagnosis Date    Acid reflux     Arm wound, left, initial encounter 5/19/2022 Skin tears    Atherosclerosis of native arteries of right leg with ulceration of other part of foot ([address] 9/30/2021    Atrial flutter ([address] 2013    converted    Bleeding stomach ulcer oct 2015    BPH (benign prostatic hyperplasia)     CAD (coronary artery disease)     11/12 angio with 2 blocked bypass and 2 patent    Cellulitis of left thigh 5/10/2022    COPD (chronic obstructive pulmonary disease) [facility]     Diabetes mellitus type II     Edema     chronic left leg    Elevated PSA- nl 8/12/11     Hyperlipidemia     Hypertension     Ischemic cardiomyopathy     Lipoma of skin-RIGHT CHEST 5/5/2011    Obesity     Osteoarthritis     Peripheral venous insufficiency 9/30/2021    Skin tear of left forearm without complication 6/38/3866    Significant amount of epidermal loss with bleeding.  Spinal stenosis of lumbar region with neurogenic claudication- clinically 7/6/2018         SURGICAL HISTORY     Past Surgical History:   Procedure Laterality Date    CATARACT REMOVAL  2010, 2011    bilat    COLONOSCOPY      CORONARY ARTERY BYPASS GRAFT  1993    x 4    EYE SURGERY Left 2019    cataract coming back    JOINT REPLACEMENT Right total knee replacement    JOINT REPLACEMENT Left 09/14/2016         CURRENTMEDICATIONS       Previous Medications    ALBUTEROL (PROVENTIL) (2.5 MG/3ML) 0.083% NEBULIZER SOLUTION    Take 3 mLs by nebulization every 6 hours as needed for Wheezing    ALBUTEROL SULFATE  (90 BASE) MCG/ACT INHALER    INHALE 2 PUFFS INTO THE LUNGS EVERY 6 HOURS AS NEEDED FOR WHEEZING    AMIODARONE (CORDARONE) 200 MG TABLET    TAKE 1 TABLET BY MOUTH DAILY    APIXABAN (ELIQUIS) 2.5 MG TABS TABLET    Take 1 tablet by mouth 2 times daily    ASPIRIN 81 MG EC TABLET    Take 81 mg by mouth daily    ATORVASTATIN (LIPITOR) 40 MG TABLET    TAKE 1 TABLET BY MOUTH EVERY EVENING    BLOOD GLUCOSE MONITOR KIT AND SUPPLIES    Test 2 times a day & as needed for symptoms of irregular blood glucose.     BLOOD GLUCOSE MONITOR STRIPS    Test 2 times a day & as needed for symptoms of irregular blood glucose. BLOOD GLUCOSE TEST STRIPS (ASCENSIA AUTODISC VI;ONE TOUCH ULTRA TEST VI) STRIP    four times daily    CYANOCOBALAMIN (B-12) 500 MCG TABS    TAKE 1 TABLET BY MOUTH DAILY    DAPAGLIFLOZIN (FARXIGA) 5 MG TABLET    Take 1 tablet by mouth daily LOT [id number] EXP 07/31/2024 4 BOXES    FEROSUL 325 (65 FE) MG TABLET    TAKE 1 TABLET BY MOUTH TWICE DAILY    FUROSEMIDE (LASIX) 80 MG TABLET    TAKE 1 TABLET BY MOUTH DAILY    GABAPENTIN (NEURONTIN) 600 MG TABLET    TAKE UP TO 5 TABLETS BY MOUTH OVER 24 HOURS AS DIRECTED    HYDRALAZINE (APRESOLINE) 25 MG TABLET    TAKE 1 TABLET BY MOUTH THREE TIMES DAILY    INSULIN NPH (NOVOLIN N) 100 UNIT/ML INJECTION VIAL    Take 50 units with breakfast and 34 units with dinner. INSULIN REGULAR (NOVOLIN R RELION) 100 UNIT/ML INJECTION    INJECT 20 TO 30 UNITS SUBCUTANEOUSLY THREE TIMES DAILY BEFORE MEAL(S)    INSULIN SYRINGE .5CC/29G 29G X 1/2\" 0.5 ML MISC    INJECT 4 TIMES DAILY AS NEEDED    ISOSORBIDE MONONITRATE (IMDUR) 30 MG CR TABLET    Take 1 tablet by mouth every morning Dr. Dallas Edmondson    1 each by Does not apply route daily Use to check glucose twice a day. One Touch brand. DX E11.9    LEVALBUTEROL (XOPENEX HFA) 45 MCG/ACT INHALER    Inhale 1-2 puffs into the lungs every 4 hours as needed for Wheezing    METOPROLOL (LOPRESSOR) 25 MG TABLET    Take 25 mg by mouth daily     MUPIROCIN (BACTROBAN) 2 % OINTMENT    Apply 3 times daily as needed    NEBULIZERS (AIRIAL COMPACT MINI NEBULIZER) MISC    1 each by Does not apply route every 6 hours as needed (wheezing or shortness of breath)    NITROGLYCERIN (NITROSTAT) 0.3 MG SL TABLET    Take one sublingual for chest pain. May repeat twice at 5 min intervals. If not getting relief call 911.     OMEPRAZOLE (PRILOSEC) 20 MG DELAYED RELEASE CAPSULE    TAKE 1 CAPSULE BY MOUTH TWICE DAILY    POTASSIUM CHLORIDE (KLOR-CON) 10 MEQ EXTENDED RELEASE TABLET    TAKE 1 TABLET BY MOUTH DAILY    RESPIRATORY THERAPY SUPPLIES (NEBULIZER/TUBING/MOUTHPIECE) KIT    1 kit by Does not apply route every 6 hours as needed (for wheezing or shortness of breath)    TIOTROPIUM (SPIRIVA HANDIHALER) 18 MCG INHALATION CAPSULE    Inhale 1 capsule into the lungs daily INCLUDE inhaler device    TORSEMIDE (DEMADEX) 20 MG TABLET    Take 1 tablet by mouth daily    UMECLIDINIUM-VILANTEROL (ANORO ELLIPTA) 62.5-25 MCG/INH AEPB INHALER    INHALE 1 PUFF INTO THE LUNGS DAILY    VITAMIN D (ERGOCALCIFEROL) 53766 UNITS CAPS CAPSULE    TK 1 C PO WEEKLY         ALLERGIES     Entresto [sacubitril-valsartan]    FAMILYHISTORY       Family History   Problem Relation Age of Onset    Cancer Mother         breast    Cancer Father         throat          SOCIAL HISTORY       Social History     Tobacco Use    Smoking status: Former Smoker     Packs/day: 3.50     Years: 32.00     Pack years: 112.00     Types: Cigarettes     Quit date: 1985     Years since quittin.4    Smokeless tobacco: Never Used    Tobacco comment: advised not to resume   Substance Use Topics    Alcohol use: No     Alcohol/week: 0.0 standard drinks    Drug use: No       SCREENINGS    Rebecca Coma Scale  Eye Opening: Spontaneous  Best Verbal Response: Oriented  Best Motor Response: Obeys commands  [place] Coma Scale Score: 15        PHYSICAL EXAM    (up to 7 for level 4, 8 or more for level 5)     ED Triage Vitals [22 1511]   BP Temp Temp Source Pulse Resp SpO2 Height Weight   121/75 98.9 °F (37.2 °C) Oral 77 16 92 % 5' 8\" (1.727 m) 260 lb (117.9 kg)       Physical Exam  Vitals and nursing note reviewed. Constitutional:       Appearance: He is well-developed. He is not diaphoretic. HENT:      Head: Atraumatic. Nose: Nose normal.   Eyes:      General:         Right eye: No discharge. Left eye: No discharge. Cardiovascular:      Pulses: Normal pulses.       Comments: 2+ radial pulse in right wrist.  Musculoskeletal:         General: Tenderness present. Cervical back: Normal range of motion. Comments: There is some tenderness over the olecranon of the right elbow with mild decreased range of motion with extension of the right elbow. Full range of motion of flexion and supination and pronation. Full range of motion of right shoulder pain and right wrist.  No joint warmth erythema or rash. Sensation light touch in right upper extremity intact. Skin:     General: Skin is warm and dry. Findings: No erythema or rash. Neurological:      Mental Status: He is alert and oriented to person, place, and time. Cranial Nerves: No cranial nerve deficit. Psychiatric:         Behavior: Behavior normal.         DIAGNOSTIC RESULTS   LABS:    Labs Reviewed - No data to display    When ordered only abnormal lab results are displayed. All other labs were within normal range or not returned as of this dictation. EKG: When ordered, EKG's are interpreted by the Emergency Department Physician in the absence of a cardiologist.  Please see their note for interpretation of EKG. RADIOLOGY:   Non-plain film images such as CT, Ultrasound and MRI are read by the radiologist. Plain radiographic images are visualized and preliminarily interpreted by the ED Provider with the below findings:        Interpretation per the Radiologist below, if available at the time of this note:    XR ELBOW RIGHT (MIN 3 VIEWS)   Final Result   Possible nondisplaced radial head fracture           No results found.         PROCEDURES   Unless otherwise noted below, none     Procedures    CRITICAL CARE TIME       CONSULTS:  None      EMERGENCY DEPARTMENT COURSE and DIFFERENTIAL DIAGNOSIS/MDM:   Vitals:    Vitals:    05/22/22 1511   BP: 121/75   Pulse: 77   Resp: 16   Temp: 98.9 °F (37.2 °C)   TempSrc: Oral   SpO2: 92%   Weight: 260 lb (117.9 kg)   Height: 5' 8\" (1.727 m)       Patient was given the following medications:  Medications - No data to display      Is this patient to be included in the SEP-1 Core Measure due to severe sepsis or septic shock? No   Exclusion criteria - the patient is NOT to be included for SEP-1 Core Measure due to: Infection is not suspected    Patient presented with some right elbow pain. Has some swelling there was some mild decreased range of motion. This happened 4 days ago. Has possible nondisplaced radial head fracture. Will be placed in a sling but is educated to take his arm out to move this as early movement should ultimately help. He will follow-up with this orthopedic doctor. States he used to see Dr. Luis Helton but has been seeing Summer Mcintyre at [facility].  Does not want anything stronger for pain at home. Nothing to suggest septic arthritis, cellulitis, septic bursitis or other emergent etiology. Patient will follow-up as an outpatient return here for any worsening of symptoms or problems at home. FINAL IMPRESSION      1. Closed nondisplaced fracture of head of right radius, initial encounter          DISPOSITION/PLAN   DISPOSITION Decision To Discharge 05/22/2022 04:15:39 PM      PATIENT REFERRED TO:  Tamika Roman, [address]  [phone]    Schedule an appointment as soon as possible for a visit in 3 days  For re-check    [facility] Emergency Department  [address] [place] [address]  [phone]    As needed      DISCHARGE MEDICATIONS:  New Prescriptions    No medications on file       DISCONTINUED MEDICATIONS:  Discontinued Medications    No medications on file              (Please note that portions of this note were completed with a voice recognition program.  Efforts were made to edit the dictations but occasionally words are mis-transcribed.)    Lyn Haddad PA-C (electronically signed)           Lyn Haddad PA-C  05/22/22 [id number]

## 2022-05-23 ENCOUNTER — HOSPITAL ENCOUNTER (OUTPATIENT)
Dept: WOUND CARE | Age: 82
Discharge: HOME | End: 2022-05-23

## 2022-05-26 ENCOUNTER — OFFICE VISIT (OUTPATIENT)
Dept: ORTHOPEDIC SURGERY | Age: 82
End: 2022-05-26
Payer: MEDICARE

## 2022-05-26 ENCOUNTER — HOSPITAL ENCOUNTER (OUTPATIENT)
Dept: WOUND CARE | Age: 82
Discharge: HOME | End: 2022-05-26
Payer: MEDICARE

## 2022-05-26 ENCOUNTER — APPOINTMENT (OUTPATIENT)
Dept: WOUND CARE | Age: 82
End: 2022-05-26
Payer: MEDICARE

## 2022-05-26 VITALS — HEIGHT: 68 IN | BODY MASS INDEX: 39.4 KG/M2 | RESPIRATION RATE: 18 BRPM | WEIGHT: 260 LBS

## 2022-05-26 VITALS
DIASTOLIC BLOOD PRESSURE: 61 MMHG | HEART RATE: 85 BPM | SYSTOLIC BLOOD PRESSURE: 115 MMHG | TEMPERATURE: 97.4 F | RESPIRATION RATE: 20 BRPM

## 2022-05-26 DIAGNOSIS — E11.649 UNCONTROLLED TYPE 2 DIABETES MELLITUS WITH HYPOGLYCEMIA, UNSPECIFIED HYPOGLYCEMIA COMA STATUS (HCC): ICD-10-CM

## 2022-05-26 DIAGNOSIS — S41.102A ARM WOUND, LEFT, INITIAL ENCOUNTER: Primary | ICD-10-CM

## 2022-05-26 DIAGNOSIS — I87.2 PERIPHERAL VENOUS INSUFFICIENCY: ICD-10-CM

## 2022-05-26 DIAGNOSIS — I70.235 ATHEROSCLEROSIS OF NATIVE ARTERIES OF RIGHT LEG WITH ULCERATION OF OTHER PART OF FOOT (HCC): ICD-10-CM

## 2022-05-26 DIAGNOSIS — M25.421 EFFUSION OF RIGHT ELBOW: Primary | ICD-10-CM

## 2022-05-26 DIAGNOSIS — L97.512 ULCER OF TOE OF RIGHT FOOT, WITH FAT LAYER EXPOSED (HCC): ICD-10-CM

## 2022-05-26 PROCEDURE — 1123F ACP DISCUSS/DSCN MKR DOCD: CPT | Performed by: PHYSICIAN ASSISTANT

## 2022-05-26 PROCEDURE — 1036F TOBACCO NON-USER: CPT | Performed by: PHYSICIAN ASSISTANT

## 2022-05-26 PROCEDURE — G8417 CALC BMI ABV UP PARAM F/U: HCPCS | Performed by: PHYSICIAN ASSISTANT

## 2022-05-26 PROCEDURE — G8427 DOCREV CUR MEDS BY ELIG CLIN: HCPCS | Performed by: PHYSICIAN ASSISTANT

## 2022-05-26 PROCEDURE — 99213 OFFICE O/P EST LOW 20 MIN: CPT

## 2022-05-26 PROCEDURE — 99213 OFFICE O/P EST LOW 20 MIN: CPT | Performed by: PHYSICIAN ASSISTANT

## 2022-05-26 RX ORDER — LIDOCAINE HYDROCHLORIDE 20 MG/ML
JELLY TOPICAL ONCE
OUTPATIENT
Start: 2022-05-26 | End: 2022-05-26

## 2022-05-26 RX ORDER — BACITRACIN, NEOMYCIN, POLYMYXIN B 400; 3.5; 5 [USP'U]/G; MG/G; [USP'U]/G
OINTMENT TOPICAL ONCE
OUTPATIENT
Start: 2022-05-26 | End: 2022-05-26

## 2022-05-26 RX ORDER — GINSENG 100 MG
CAPSULE ORAL ONCE
OUTPATIENT
Start: 2022-05-26 | End: 2022-05-26

## 2022-05-26 RX ORDER — GENTAMICIN SULFATE 1 MG/G
OINTMENT TOPICAL ONCE
OUTPATIENT
Start: 2022-05-26 | End: 2022-05-26

## 2022-05-26 RX ORDER — LIDOCAINE HYDROCHLORIDE 40 MG/ML
SOLUTION TOPICAL ONCE
Status: COMPLETED | OUTPATIENT
Start: 2022-05-26 | End: 2022-05-26

## 2022-05-26 RX ORDER — BACITRACIN ZINC AND POLYMYXIN B SULFATE 500; 1000 [USP'U]/G; [USP'U]/G
OINTMENT TOPICAL ONCE
OUTPATIENT
Start: 2022-05-26 | End: 2022-05-26

## 2022-05-26 RX ORDER — CLOBETASOL PROPIONATE 0.5 MG/G
OINTMENT TOPICAL ONCE
OUTPATIENT
Start: 2022-05-26 | End: 2022-05-26

## 2022-05-26 RX ORDER — BETAMETHASONE DIPROPIONATE 0.05 %
OINTMENT (GRAM) TOPICAL ONCE
OUTPATIENT
Start: 2022-05-26 | End: 2022-05-26

## 2022-05-26 RX ORDER — LIDOCAINE HYDROCHLORIDE 40 MG/ML
SOLUTION TOPICAL ONCE
OUTPATIENT
Start: 2022-05-26 | End: 2022-05-26

## 2022-05-26 RX ORDER — LIDOCAINE 50 MG/G
OINTMENT TOPICAL ONCE
OUTPATIENT
Start: 2022-05-26 | End: 2022-05-26

## 2022-05-26 RX ORDER — LIDOCAINE 40 MG/G
CREAM TOPICAL ONCE
OUTPATIENT
Start: 2022-05-26 | End: 2022-05-26

## 2022-05-26 RX ADMIN — LIDOCAINE HYDROCHLORIDE: 40 SOLUTION TOPICAL at 09:02

## 2022-05-26 ASSESSMENT — PAIN SCALES - GENERAL
PAINLEVEL_OUTOF10: 0
PAINLEVEL_OUTOF10: 0

## 2022-05-26 NOTE — PROGRESS NOTES
[address]   Progress Note and Procedure Note      Cyndi RECORD NUMBER:  [id number]  AGE: 81 y.o. GENDER: male  : 1940  EPISODE DATE:  2022    Subjective:     No chief complaint on file. HISTORY of PRESENT ILLNESS HPI     Zev Werner is a 81 y.o. male who presents today for wound/ulcer evaluation. History of Wound Context: Patient presents today for bilateral lower extremity ulcerations. Patient relates he has a history of congestive heart failure and has been dealing with excess fluid. He has adjusted his diuretics per his primary care physician and presents here for further evaluation management of the ulcerations on his legs. He states they have been leaking for a few days. He denies any other constitutional symptoms. He states his cough is improving since his dose of diuretics has been increased. Patient has a history of peripheral vascular disease with a previous angiogram by Dr. Anabela To that was unremarkable and required no further intervention.   Wound/Ulcer Pain Timing/Severity: none  Quality of pain: N/A  Severity:  0 / 10   Modifying Factors: None  Associated Signs/Symptoms: edema and drainage    Ulcer Identification:  Ulcer Type: venous    Contributing Factors: edema, venous stasis, lymphedema, diabetes, poor glucose control and obesity    Acute Wound: N/A    PAST MEDICAL HISTORY        Diagnosis Date    Acid reflux     Arm wound, left, initial encounter 2022    Skin tears    Atherosclerosis of native arteries of right leg with ulceration of other part of foot ([address] 2021    Atrial flutter ([address]     converted    Bleeding stomach ulcer oct 2015    BPH (benign prostatic hyperplasia)     CAD (coronary artery disease)      angio with 2 blocked bypass and 2 patent    Cellulitis of left thigh 5/10/2022    COPD (chronic obstructive pulmonary disease) ([address]     Diabetes mellitus type II     Edema chronic left leg    Elevated PSA- nl 11     Hyperlipidemia     Hypertension     Ischemic cardiomyopathy     Lipoma of skin-RIGHT CHEST 2011    Obesity     Osteoarthritis     Peripheral venous insufficiency 2021    Skin tear of left forearm without complication     Significant amount of epidermal loss with bleeding.  Spinal stenosis of lumbar region with neurogenic claudication- clinically 2018       PAST SURGICAL HISTORY    Past Surgical History:   Procedure Laterality Date    CATARACT REMOVAL  ,     bilat    COLONOSCOPY      CORONARY ARTERY BYPASS GRAFT  1993    x 4    EYE SURGERY Left 2019    cataract coming back    JOINT REPLACEMENT Right total knee replacement    JOINT REPLACEMENT Left 2016       FAMILY HISTORY    Family History   Problem Relation Age of Onset    Cancer Mother         breast    Cancer Father         throat       SOCIAL HISTORY    Social History     Tobacco Use    Smoking status: Former Smoker     Packs/day: 3.50     Years: 32.00     Pack years: 112.00     Types: Cigarettes     Quit date: 1985     Years since quittin.4    Smokeless tobacco: Never Used    Tobacco comment: advised not to resume   Substance Use Topics    Alcohol use: No     Alcohol/week: 0.0 standard drinks    Drug use: No       ALLERGIES    Allergies   Allergen Reactions    Entresto [Sacubitril-Valsartan] Other (See Comments)     Renal failure       MEDICATIONS    Current Outpatient Medications on File Prior to Encounter   Medication Sig Dispense Refill    amiodarone (CORDARONE) 200 MG tablet TAKE 1 TABLET BY MOUTH DAILY 30 tablet 5    insulin NPH (NOVOLIN N) 100 UNIT/ML injection vial Take 50 units with breakfast and 34 units with dinner.  30 mL 3    gabapentin (NEURONTIN) 600 MG tablet TAKE UP TO 5 TABLETS BY MOUTH OVER 24 HOURS AS DIRECTED 150 tablet 11    torsemide (DEMADEX) 20 MG tablet Take 1 tablet by mouth daily 30 tablet 3    omeprazole every 6 hours as needed (wheezing or shortness of breath) 1 each 0    Respiratory Therapy Supplies (NEBULIZER/TUBING/MOUTHPIECE) KIT 1 kit by Does not apply route every 6 hours as needed (for wheezing or shortness of breath) 1 kit 1    albuterol (PROVENTIL) (2.5 MG/3ML) 0.083% nebulizer solution Take 3 mLs by nebulization every 6 hours as needed for Wheezing 50 vial 3    vitamin D (ERGOCALCIFEROL) 86729 units CAPS capsule TK 1 C PO WEEKLY  2    mupirocin (BACTROBAN) 2 % ointment Apply 3 times daily as needed 22 g 1    levalbuterol (XOPENEX HFA) 45 MCG/ACT inhaler Inhale 1-2 puffs into the lungs every 4 hours as needed for Wheezing 1 Inhaler 3    aspirin 81 MG EC tablet Take 81 mg by mouth daily      apixaban (ELIQUIS) 2.5 MG TABS tablet Take 1 tablet by mouth 2 times daily 60 tablet 5    metoprolol (LOPRESSOR) 25 MG tablet Take 25 mg by mouth daily       isosorbide mononitrate (IMDUR) 30 MG CR tablet Take 1 tablet by mouth every morning Dr. Leiva Mess - Cardio 30 tablet 6    nitroGLYCERIN (NITROSTAT) 0.3 MG SL tablet Take one sublingual for chest pain. May repeat twice at 5 min intervals. If not getting relief call 911. 25 tablet 3     No current facility-administered medications on file prior to encounter. REVIEW OF SYSTEMS  Review of Systems    Pertinent items are noted in HPI. Review of Systems: A 12 point review of symptoms is unremarkable with the exception of the chief complaint. Patient specifically denies nausea, fever, vomiting, chills, shortness of breath, chest pain, abdominal pain, constipation or difficulty urinating. Objective:      /61   Pulse 85   Temp 97.4 °F (36.3 °C) (Infrared)   Resp 20     Wt Readings from Last 3 Encounters:   05/26/22 260 lb (117.9 kg)   05/22/22 260 lb (117.9 kg)   05/05/22 270 lb 15.1 oz (122.9 kg)       PHYSICAL EXAM  Physical Exam    Patient has nonpalpable pedal pulses bilaterally.   He has biphasic DP PT on the left with hand-held Doppler and a monophasic on the right. Patient has extensive bilateral lower extremity edema with hypertrophic skin changes, brawny discoloration and cobbling consistent with chronic venous disease left greater than right. Patient has absent pedal hair growth noted. Epicritic sensation is grossly intact bilaterally. Negative clonus and babinski reflex is down going. The previously noted ulcerations noted on the bilateral lower extremities are no longer weeping clear serous drainage and have epithelialized. There is no warmth or fluctuance or crepitus associated with these areas. There is an intact blister on the posterior lateral aspect of the left heel. There is no surrounding erythema, warmth or crepitus associated with this. After deroofing the area the underlying skin is intact. Patient's muscle strength for dorsiflexion plantarflexion inversion and eversion is intact bilaterally. He does have semirigid hammertoe contractures noted bilaterally. Assessment:        Problem List Items Addressed This Visit     Diabetes mellitus type II, uncontrolled ([address]    Relevant Orders    Initiate Outpatient Wound Care Protocol    Ulcer of toe of right foot, with fat layer exposed ([address]    Relevant Orders    Initiate Outpatient Wound Care Protocol    Peripheral venous insufficiency    Relevant Orders    Initiate Outpatient Wound Care Protocol    Atherosclerosis of native arteries of right leg with ulceration of other part of foot ([address]    Relevant Orders    Initiate Outpatient Wound Care Protocol    Arm wound, left, initial encounter - Primary    Relevant Orders    Initiate Outpatient Wound Care Protocol               Plan:   E/M x30 minutes of a problem of venous leg ulcerations likely due to fluid overload. Patient was instructed to continue taking his diuretic as prescribed by Dr. Yamile Tripp his primary care physician. Rx compression garments bilateral lower extremities.  Patient was referred to [facility] for fitting and recommendations of circaids vs stockings based on patient's ability to apply the garments. Patient will follow up next week for care of a left forearm ulceration due to a skin tear. Treatment Note please see attached Discharge Instructions    Written patient dismissal instructions given to patient and signed by patient or POA. Reappoint 1 week for follow-up. Patient will see Saul Masterson CNP or Gisel Michele CNP    Discharge Instructions       [facility]   Physician Orders and Discharge Instructions  [address]   [address]  Telephone: [phone]      FAX [phone]     NAME: Charles Roca  DATE OF BIRTH:  1940  MEDICAL RECORD NUMBER:  [id number]  DATE:  5/26/2022     Wound Cleansing:   Do not scrub or use excessive force. Cleanse wound prior to applying a clean dressing with:  [x]? ?? Normal Saline  [x]? ?? Keep Wound Dry in Shower    []??? Wound Cleanser   []? ?? Cleanse wound with Mild Soap & Water  []??? May Shower at Discharge   []? ?? Other:        Topical Treatments:  Do not apply lotions, creams, or ointments to wound bed unless directed.   []??? Apply moisturizing lotion to skin surrounding the wound prior to dressing change.  []??? Apply antifungal ointment to skin surrounding the wound prior to dressing change.  []??? Apply thin film of moisture barrier ointment to skin immediately around wound.  []??? Other:                  Dressings:                  Wound Location :    []??? Apply Primary Dressing:                                          []??? Alginate with Silver               []? ?? Other:    []??? Pack wound loosely with    []? ?? Iodoform   []? ?? Plain Packing           []? ?? Other   []? ?? Cover and Secure with:                   []??? Gauze         []? ?? Phillip           []? ?? Roll gauze              []??? Ace Wrap   []? ??[employer] Tape     []??? ABD                                      []? ?? Other:               AAQNE contact of tape with skin.  []??? Change dressing:   []??? Daily           []? ?? Every Other Day    []? ?? Three times per week              []? ?? Once a week          []? ?? Do Not Change Dressing              []? ?? Other:     Dressings:                  Wound Location : LEFT HEEL   ( Orders per Dr Jesse Chaney )         [x]? ?? Apply Primary Dressing:                                          [x]? ?? MEPILEX BORDER - REMOVE Monday, MAY 30TH     []? ?? Cover and Secure with:                   []??? Gauze         []? ?? Phillip           []? ?? Kerlix              []??? Ace Wrap   []? ?? Cover Roll Tape     []??? ABD                                      []? ?? Other:               EUJLD contact of tape with skin.  []??? Change dressing:   []??? Daily           []? ?? Every Other Day    []? ?? Three times per week              []? ?? Once a week         []? ?? Do Not Change Dressing              []? ?? Other:        Dressings : Wound location : LEFT ARM ( orders per Nilsa Arias, JOSÉ LUIS)               MEPITEL        GAUZE THEN ROLL GAUZE       CHANGE OUTER DRESSING ONLY IF NEEDED                                 Edema Control:   Apply:  []??? Compression Stocking       []? ? ? Right Leg     []? ? ?Left Leg              []? ?? Tubigrip      []? ? ? Right Leg Double Layer      []? ? ?Left Leg Double Layer                                                  []? ? ? Right Leg Single Layer       []? ? ?Left Leg Single Layer              [x]? ?? SPANDAGRIP - WEAR UNTIL YOU GET YOUR CIRC-AIDS           [x]? ? ? Right Leg     [x]? ? ? Left Leg                                      []? ? ?Low compression 5-10 mm/Hg                                             []? ? ? Medium compression 10-20 mm/Hg                                      [x]? ? ? High compression  20-30 mm/Hg              every morning immediately when getting up should be applied to affected leg(s) from mid foot to knee making sure to cover the heel.  Remove every night before going to bed.              [x]? ?? Elevate leg(s) above the level of the heart when sitting.                 [x]? ?? Avoid prolonged standing in one place.                   Compression:   Apply:  []??? Multilayer Compression Wrap Applied in Clinic    []? ? ? RightLeg      []? ? ?Left Leg              []? ?? Multi-layer compression.  Do not get leg(s) with wrap wet.  If wraps become too tight call the center or completely remove the wrap.                      []??? Elevate leg(s) above the level of the heart when sitting.                 []? ?? Avoid prolonged standing in one place.     Off-Loading:   []??? Off-loading when    []? ?? walking       []? ?? in bed         []? ?? sitting  []? ?? Total non-weight bearing  []??? Right Leg  []??? Left Leg          [x]? ?? Assistive Device     []??? Walker        [x]? ?? Cane           []??? Wheelchair  []??? Crutches              []??? Surgical shoe    []? ?? Podus Boot(s)   []? ?? Foam Boot(s)  []??? Roll About              []? ?? Cast Boot   []? ?? CROW Boot  []??? Other:     Dietary:  []??? Diet as tolerated:     [x]? ?? Calorie Diabetic Diet:           []??? No Added Salt:  [x]??? Increase Protein:     []??? Other:                Activity:  [x]??? Activity as tolerated:  []??? Patient has no activity restrictions     []??? Strict Bedrest: []??? Remain off Work:     []? ?? May return to full duty work:                                    []? ?? Return to work with [address]     If you are still having pain after you go home:  [x]??? Elevate the affected limb.    [x]? ?? Use over-the-counter medications you would normally use for pain as permitted by your family doctor.   [x]??? For persistent pain not relieved by the above interventions, please call your family doctor.             Return Appointment:  []??? Wound and dressing supply provider:   []??? ECF or Home Healthcare:  []??? Wound Assessment:  []??? Physician or NP scheduled for Wound Assessment:   [x]??? Return Appointment: With Caroline Dykes  1 Week(s)  []??? Ordered tests:      Nurse Case Manger: Paddy Connor     Electronically signed by Keyon Robles RN on 5/26/2022 at [address] Information: Should you experience any significant changes in your wound(s) or have questions about your wound care, please contact the [address] [place] [address] [phone] [address] 8:00 am - 4:30 pm and Friday 8:00 am - 12:30 pm.  If you need help with your wound outside these hours and cannot wait until we are again available, contact your PCP or go to the hospital emergency room.      PLEASE NOTE: IF YOU ARE UNABLE TO OBTAIN WOUND SUPPLIES, CONTINUE TO USE THE SUPPLIES YOU HAVE AVAILABLE UNTIL YOU ARE ABLE TO [facility].  IT IS MOST IMPORTANT TO KEEP THE WOUND COVERED AT ALL TIMES.     Physician Signature:_______________________     Date: ___________ Time:  ____________                             [x]   Dr Araseli Drake   [ ] Gurinder Butler CNP                   Electronically signed by Rhett Jeans, DPM on 5/26/2022 at 3:25 PM

## 2022-05-26 NOTE — PROGRESS NOTES
Subjective:      Patient ID: King Arzate is a 81 y.o. male who presents to the office for an initial evaluation of right elbow pain and swelling. Injury 5/15/2022 when he fell in his home after tripping over some blocks which were lying out on the floor. He believes he put his right arm out to break his fall. He was seen in the ER on 5/22/2022 where x-rays demonstrated joint effusion possible occult fracture of the radial head or neck. Pain Scale 2/10 VAS. Location of pain right elbow. Pain is worse with movement. Pain improves with rest.   Previous treatments have included ice and Tylenol. Review Of Systems:   Review of Systems:  I have reviewed the clinically relevant past medical history, medications, allergies, family history, social history, and 13 point Review of Systems from the patient's recent history form & documented any details relevant to today's presenting complaints in the history above. The patient's self-reported past medical history, medications, allergies, family history, social history, and Review of Systems form from today's date have been scanned into the chart under the \"Media\" tab. As noted in the HPI. Negative for fever or chills. Positive for poly-joint pain, swelling and stiffness. Negative for numbness or tingling.      Past Medical History:   Diagnosis Date    Acid reflux     Arm wound, left, initial encounter 5/19/2022    Skin tears    Atherosclerosis of native arteries of right leg with ulceration of other part of foot ([address] 9/30/2021    Atrial flutter ([address] 2013    converted    Bleeding stomach ulcer oct 2015    BPH (benign prostatic hyperplasia)     CAD (coronary artery disease)     11/12 angio with 2 blocked bypass and 2 patent    Cellulitis of left thigh 5/10/2022    COPD (chronic obstructive pulmonary disease) ([address]     Diabetes mellitus type II     Edema     chronic left leg    Elevated PSA- nl 8/12/11     Hyperlipidemia     Hypertension  Ischemic cardiomyopathy     Lipoma of skin-RIGHT CHEST 2011    Obesity     Osteoarthritis     Peripheral venous insufficiency 2021    Skin tear of left forearm without complication 8640    Significant amount of epidermal loss with bleeding.  Spinal stenosis of lumbar region with neurogenic claudication- clinically 2018       Family History   Problem Relation Age of Onset    Cancer Mother         breast    Cancer Father         throat       Past Surgical History:   Procedure Laterality Date    CATARACT REMOVAL  ,     bilat    COLONOSCOPY      CORONARY ARTERY BYPASS GRAFT  1993    x 4    EYE SURGERY Left 2019    cataract coming back    JOINT REPLACEMENT Right total knee replacement    JOINT REPLACEMENT Left 2016       Social History     Occupational History    Not on file   Tobacco Use    Smoking status: Former Smoker     Packs/day: 3.50     Years: 32.00     Pack years: 112.00     Types: Cigarettes     Quit date: 1985     Years since quittin.4    Smokeless tobacco: Never Used    Tobacco comment: advised not to resume   Substance and Sexual Activity    Alcohol use: No     Alcohol/week: 0.0 standard drinks    Drug use: No    Sexual activity: Not Currently       Current Outpatient Medications   Medication Sig Dispense Refill    amiodarone (CORDARONE) 200 MG tablet TAKE 1 TABLET BY MOUTH DAILY 30 tablet 5    insulin NPH (NOVOLIN N) 100 UNIT/ML injection vial Take 50 units with breakfast and 34 units with dinner.  30 mL 3    gabapentin (NEURONTIN) 600 MG tablet TAKE UP TO 5 TABLETS BY MOUTH OVER 24 HOURS AS DIRECTED 150 tablet 11    torsemide (DEMADEX) 20 MG tablet Take 1 tablet by mouth daily 30 tablet 3    omeprazole (PRILOSEC) 20 MG delayed release capsule TAKE 1 CAPSULE BY MOUTH TWICE DAILY 180 capsule 1    albuterol sulfate  (90 Base) MCG/ACT inhaler INHALE 2 PUFFS INTO THE LUNGS EVERY 6 HOURS AS NEEDED FOR WHEEZING 18 g 5    FEROSUL 325 (65 Fe) MG tablet TAKE 1 TABLET BY MOUTH TWICE DAILY 180 tablet 3    dapagliflozin (FARXIGA) 5 MG tablet Take 1 tablet by mouth daily LOT [id number] EXP 07/31/2024 4 BOXES 30 tablet 0    potassium chloride (KLOR-CON) 10 MEQ extended release tablet TAKE 1 TABLET BY MOUTH DAILY 90 tablet 1    furosemide (LASIX) 80 MG tablet TAKE 1 TABLET BY MOUTH DAILY 90 tablet 0    Lancets MISC 1 each by Does not apply route daily Use to check glucose twice a day. One Touch brand. DX E11.9 100 each 3    blood glucose monitor kit and supplies Test 2 times a day & as needed for symptoms of irregular blood glucose. 1 kit 0    blood glucose monitor strips Test 2 times a day & as needed for symptoms of irregular blood glucose.  200 strip 3    blood glucose test strips (ASCENSIA AUTODISC VI;ONE TOUCH ULTRA TEST VI) strip four times daily 400 strip 3    INSULIN SYRINGE .5CC/29G 29G X 1/2\" 0.5 ML MISC INJECT 4 TIMES DAILY AS NEEDED 400 each 1    atorvastatin (LIPITOR) 40 MG tablet TAKE 1 TABLET BY MOUTH EVERY EVENING 90 tablet 1    hydrALAZINE (APRESOLINE) 25 MG tablet TAKE 1 TABLET BY MOUTH THREE TIMES DAILY      Cyanocobalamin (B-12) 500 MCG TABS TAKE 1 TABLET BY MOUTH DAILY 90 tablet 1    umeclidinium-vilanterol (ANORO ELLIPTA) 62.5-25 MCG/INH AEPB inhaler INHALE 1 PUFF INTO THE LUNGS DAILY (Patient not taking: Reported on 5/3/2022) 1 each 5    insulin regular (NOVOLIN R RELION) 100 UNIT/ML injection INJECT 20 TO 30 UNITS SUBCUTANEOUSLY THREE TIMES DAILY BEFORE MEAL(S) 30 mL 0    tiotropium (SPIRIVA HANDIHALER) 18 MCG inhalation capsule Inhale 1 capsule into the lungs daily INCLUDE inhaler device 30 capsule 5    Nebulizers (AIRIAL COMPACT MINI NEBULIZER) MISC 1 each by Does not apply route every 6 hours as needed (wheezing or shortness of breath) 1 each 0    Respiratory Therapy Supplies (NEBULIZER/TUBING/MOUTHPIECE) KIT 1 kit by Does not apply route every 6 hours as needed (for wheezing or shortness of breath) 1 kit 1    albuterol (PROVENTIL) (2.5 MG/3ML) 0.083% nebulizer solution Take 3 mLs by nebulization every 6 hours as needed for Wheezing 50 vial 3    vitamin D (ERGOCALCIFEROL) 21590 units CAPS capsule TK 1 C PO WEEKLY  2    mupirocin (BACTROBAN) 2 % ointment Apply 3 times daily as needed 22 g 1    levalbuterol (XOPENEX HFA) 45 MCG/ACT inhaler Inhale 1-2 puffs into the lungs every 4 hours as needed for Wheezing 1 Inhaler 3    aspirin 81 MG EC tablet Take 81 mg by mouth daily      apixaban (ELIQUIS) 2.5 MG TABS tablet Take 1 tablet by mouth 2 times daily 60 tablet 5    metoprolol (LOPRESSOR) 25 MG tablet Take 25 mg by mouth daily       isosorbide mononitrate (IMDUR) 30 MG CR tablet Take 1 tablet by mouth every morning Dr. Vickey Vásquez - Cardio 30 tablet 6    nitroGLYCERIN (NITROSTAT) 0.3 MG SL tablet Take one sublingual for chest pain. May repeat twice at 5 min intervals. If not getting relief call 911. 25 tablet 3     No current facility-administered medications for this visit. Objective:     He is alert, oriented x 3, pleasant, well nourished, developed and in no   acute distress. Resp 18   Ht 5' 8\" (1.727 m)   Wt 260 lb (117.9 kg)   BMI 39.53 kg/m²      Right elbow examination:  Mild soft tissue swelling about the elbow. Ecchymosis is present. Tenderness over the radial head. Nontender over olecranon. Pain with active and passive elbow flexion and extension as well as pronation and supination. Active elbow extension-   15 degrees from full extension. Active elbow flexion-   90 degrees. Examination of the upper extremities shows intact perfusion to all extremities. He has no cyanosis and digigts are warm to touch, capillary refill is less than 2 seconds. He has no edema noted. He has intact skin without lacerations or abrasions, no significant erythema, rashes or skin lesions.              X Rays: were not performed in the office today:   [facility]:  Narrative EXAMINATION:   THREE XRAY VIEWS OF THE RIGHT ELBOW       5/22/2022 3:48 pm       COMPARISON:   None.       HISTORY:   ORDERING SYSTEM PROVIDED HISTORY: Injury   TECHNOLOGIST PROVIDED HISTORY:   Reason for exam:->Injury   Reason for Exam: Injury       FINDINGS:   Joint effusion.  Anatomic alignment.  Possible nondisplaced radial head   fracture.  No other fracture identified.           Impression   Possible nondisplaced radial head fracture             Additional Tests reviewed: none  Additional Outside Records reviewed: none    Diagnosis:       ICD-10-CM    1. Effusion of right elbow  M25.421         Assessment and Plan:       Assessment:  Right elbow injury after a fall on 5/15/2022. X-rays 5/22/2022 do show a joint effusion and he does have tenderness over the radial head suggestive of an occult/ nondisplaced radial head or neck fracture. I had an extensive discussion with Mr. Graham Montoya regarding the natural history, etiology, and long term consequences of his condition. I have presented reasonable alternatives to the patient's proposed care, treatment, and services. Risks and benefits of the treatment options also reviewed in detail. I have outlined a treatment plan with them. He has had full opportunity to ask his questions. I have answered them all to his satisfaction. I feel that Mr. Graham Montoya understands our discussion today     Plan:  Medications-continue Tylenol for pain. PT-Elbow range of motion including flexion, extension, pronation and supination were instructed today. He was cautioned to avoid any lifting, pushing or pulling with the right upper extremity. Further Imaging-recommend repeat x-ray right elbow in 1 week. Procedures-at this time there is no surgical indication. Follow up-1 week. Call or return to clinic if these symptoms worsen or fail to improve as anticipated.                                                         Jacklyn Chowdhury Physician [address] [facility]                                         Disclaimer: This note was generated with use of a verbal recognition program (DRAGON) and an attempt was made to check for errors. It is possible that there are still dictated errors within this office note. If so, please bring any significant errors to my attention for an addendum. All efforts were made to ensure that this office note is accurate.

## 2022-06-02 ENCOUNTER — OFFICE VISIT (OUTPATIENT)
Dept: ORTHOPEDIC SURGERY | Age: 82
End: 2022-06-02

## 2022-06-02 VITALS — HEIGHT: 68 IN | BODY MASS INDEX: 39.4 KG/M2 | RESPIRATION RATE: 18 BRPM | WEIGHT: 260 LBS

## 2022-06-02 DIAGNOSIS — M25.421 EFFUSION OF RIGHT ELBOW: Primary | ICD-10-CM

## 2022-06-02 PROCEDURE — G8417 CALC BMI ABV UP PARAM F/U: HCPCS | Performed by: PHYSICIAN ASSISTANT

## 2022-06-02 PROCEDURE — G8427 DOCREV CUR MEDS BY ELIG CLIN: HCPCS | Performed by: PHYSICIAN ASSISTANT

## 2022-06-02 PROCEDURE — 1036F TOBACCO NON-USER: CPT | Performed by: PHYSICIAN ASSISTANT

## 2022-06-02 PROCEDURE — 99212 OFFICE O/P EST SF 10 MIN: CPT | Performed by: PHYSICIAN ASSISTANT

## 2022-06-02 PROCEDURE — 1123F ACP DISCUSS/DSCN MKR DOCD: CPT | Performed by: PHYSICIAN ASSISTANT

## 2022-06-02 NOTE — PROGRESS NOTES
Subjective:      Patient ID: Durga Preciado is a 81 y.o. male who is here for follow up evaluation of the elbow after injury 5/15/2022. He did have a right elbow joint effusion on x-ray. Probable occult radial head or neck fracture. He has been working on range of motion exercises. He feels some improvement of his right elbow pain. Review Of Systems:   Musculoskeletal ROS:   Negative for fever or chills. Negative for numbness or tingling in the right upper extremity. Past Medical History:   Diagnosis Date    Acid reflux     Arm wound, left, initial encounter 5/19/2022    Skin tears    Atherosclerosis of native arteries of right leg with ulceration of other part of foot ([address] 9/30/2021    Atrial flutter ([address] 2013    converted    Bleeding stomach ulcer oct 2015    BPH (benign prostatic hyperplasia)     CAD (coronary artery disease)     11/12 angio with 2 blocked bypass and 2 patent    Cellulitis of left thigh 5/10/2022    COPD (chronic obstructive pulmonary disease) ([address]     Diabetes mellitus type II     Edema     chronic left leg    Elevated PSA- nl 8/12/11     Hyperlipidemia     Hypertension     Ischemic cardiomyopathy     Lipoma of skin-RIGHT CHEST 5/5/2011    Obesity     Osteoarthritis     Peripheral venous insufficiency 9/30/2021    Skin tear of left forearm without complication 2/60/7356    Significant amount of epidermal loss with bleeding.     Spinal stenosis of lumbar region with neurogenic claudication- clinically 7/6/2018       Family History   Problem Relation Age of Onset    Cancer Mother         breast    Cancer Father         throat       Past Surgical History:   Procedure Laterality Date    CATARACT REMOVAL  2010, 2011    bilat    COLONOSCOPY      CORONARY ARTERY BYPASS GRAFT  1993    x 4    EYE SURGERY Left 2019    cataract coming back    JOINT REPLACEMENT Right total knee replacement    JOINT REPLACEMENT Left 09/14/2016       Social History Occupational History    Not on file   Tobacco Use    Smoking status: Former Smoker     Packs/day: 3.50     Years: 32.00     Pack years: 112.00     Types: Cigarettes     Quit date: 1985     Years since quittin.4    Smokeless tobacco: Never Used    Tobacco comment: advised not to resume   Substance and Sexual Activity    Alcohol use: No     Alcohol/week: 0.0 standard drinks    Drug use: No    Sexual activity: Not Currently       Current Outpatient Medications   Medication Sig Dispense Refill    amiodarone (CORDARONE) 200 MG tablet TAKE 1 TABLET BY MOUTH DAILY 30 tablet 5    insulin NPH (NOVOLIN N) 100 UNIT/ML injection vial Take 50 units with breakfast and 34 units with dinner. 30 mL 3    gabapentin (NEURONTIN) 600 MG tablet TAKE UP TO 5 TABLETS BY MOUTH OVER 24 HOURS AS DIRECTED 150 tablet 11    torsemide (DEMADEX) 20 MG tablet Take 1 tablet by mouth daily 30 tablet 3    omeprazole (PRILOSEC) 20 MG delayed release capsule TAKE 1 CAPSULE BY MOUTH TWICE DAILY 180 capsule 1    albuterol sulfate  (90 Base) MCG/ACT inhaler INHALE 2 PUFFS INTO THE LUNGS EVERY 6 HOURS AS NEEDED FOR WHEEZING 18 g 5    FEROSUL 325 (65 Fe) MG tablet TAKE 1 TABLET BY MOUTH TWICE DAILY 180 tablet 3    dapagliflozin (FARXIGA) 5 MG tablet Take 1 tablet by mouth daily LOT [id number] EXP 2024 4 BOXES 30 tablet 0    potassium chloride (KLOR-CON) 10 MEQ extended release tablet TAKE 1 TABLET BY MOUTH DAILY 90 tablet 1    furosemide (LASIX) 80 MG tablet TAKE 1 TABLET BY MOUTH DAILY 90 tablet 0    Lancets MISC 1 each by Does not apply route daily Use to check glucose twice a day. One Touch brand. DX E11.9 100 each 3    blood glucose monitor kit and supplies Test 2 times a day & as needed for symptoms of irregular blood glucose. 1 kit 0    blood glucose monitor strips Test 2 times a day & as needed for symptoms of irregular blood glucose.  200 strip 3    blood glucose test strips ([address] ULTRA TEST VI) strip four times daily 400 strip 3    INSULIN SYRINGE .5CC/29G 29G X 1/2\" 0.5 ML MISC INJECT 4 TIMES DAILY AS NEEDED 400 each 1    atorvastatin (LIPITOR) 40 MG tablet TAKE 1 TABLET BY MOUTH EVERY EVENING 90 tablet 1    hydrALAZINE (APRESOLINE) 25 MG tablet TAKE 1 TABLET BY MOUTH THREE TIMES DAILY      Cyanocobalamin (B-12) 500 MCG TABS TAKE 1 TABLET BY MOUTH DAILY 90 tablet 1    umeclidinium-vilanterol (ANORO ELLIPTA) 62.5-25 MCG/INH AEPB inhaler INHALE 1 PUFF INTO THE LUNGS DAILY (Patient not taking: Reported on 5/3/2022) 1 each 5    insulin regular (NOVOLIN R RELION) 100 UNIT/ML injection INJECT 20 TO 30 UNITS SUBCUTANEOUSLY THREE TIMES DAILY BEFORE MEAL(S) 30 mL 0    tiotropium (SPIRIVA HANDIHALER) 18 MCG inhalation capsule Inhale 1 capsule into the lungs daily INCLUDE inhaler device 30 capsule 5    Nebulizers (AIRinSelly COMPACT MINI NEBULIZER) MISC 1 each by Does not apply route every 6 hours as needed (wheezing or shortness of breath) 1 each 0    Respiratory Therapy Supplies (NEBULIZER/TUBING/MOUTHPIECE) KIT 1 kit by Does not apply route every 6 hours as needed (for wheezing or shortness of breath) 1 kit 1    albuterol (PROVENTIL) (2.5 MG/3ML) 0.083% nebulizer solution Take 3 mLs by nebulization every 6 hours as needed for Wheezing 50 vial 3    vitamin D (ERGOCALCIFEROL) 74845 units CAPS capsule TK 1 C PO WEEKLY  2    mupirocin (BACTROBAN) 2 % ointment Apply 3 times daily as needed 22 g 1    levalbuterol (XOPENEX HFA) 45 MCG/ACT inhaler Inhale 1-2 puffs into the lungs every 4 hours as needed for Wheezing 1 Inhaler 3    aspirin 81 MG EC tablet Take 81 mg by mouth daily      apixaban (ELIQUIS) 2.5 MG TABS tablet Take 1 tablet by mouth 2 times daily 60 tablet 5    metoprolol (LOPRESSOR) 25 MG tablet Take 25 mg by mouth daily       isosorbide mononitrate (IMDUR) 30 MG CR tablet Take 1 tablet by mouth every morning Dr. Derrek Victor - Cardio 30 tablet 6    nitroGLYCERIN (NITROSTAT) 0.3 MG SL tablet Take one sublingual for chest pain. May repeat twice at 5 min intervals. If not getting relief call 911. 25 tablet 3     No current facility-administered medications for this visit. Objective:     He is alert, oriented x 3, pleasant, well nourished, developed and in no   acute distress. Resp 18   Ht 5' 8\" (1.727 m)   Wt 260 lb (117.9 kg)   BMI 39.53 kg/m²      Right elbow examination shows mild soft tissue swelling. Resolving right elbow joint effusion. Right elbow flexion to 120, right elbow extension 20 degrees from full extension. X Rays: performed in the office today:   AP and lateral right elbow shows degenerative changes of the elbow joint. No definite fracture seen. Resolving elbow effusion noted. Diagnosis:       ICD-10-CM    1. Effusion of right elbow  M25.421 XR ELBOW RIGHT (2 VIEWS)        Assessment and Plan:       Assessment:  Resolving right elbow effusion with probable nondisplaced radial head or neck fracture. Arthritis of the right elbow. I had an extensive discussion with Mr. Franco Roque regarding the natural history, etiology, and long term consequences of his condition. I have presented reasonable alternatives to the patient's proposed care, treatment, and services. Risks and benefits of the treatment options also reviewed in detail. I have outlined a treatment plan with them. He has had full opportunity to ask his questions. I have answered them all to his satisfaction. I feel that Mr. Franco Roque understands our discussion today. Plan:  Medications-   Tylenol recommended for discomfort. PT-gentle elbow range of motion including flexion, extension, pronation and supination exercises reviewed today. Follow up- 3-4 weeks. Call or return to clinic if these symptoms worsen or fail to improve as anticipated.                                                       Eliezer Hollins PA-C   Senior Physician Assistant   [facility]                                         Disclaimer: This note was generated with use of a verbal recognition program (DRAGON) and an attempt was made to check for errors. It is possible that there are still dictated errors within this office note. If so, please bring any significant errors to my attention for an addendum. All efforts were made to ensure that this office note is accurate.

## 2022-06-07 ENCOUNTER — HOSPITAL ENCOUNTER (OUTPATIENT)
Dept: WOUND CARE | Age: 82
Discharge: HOME | End: 2022-06-07

## 2022-06-14 ENCOUNTER — TELEPHONE (OUTPATIENT)
Dept: WOUND CARE | Age: 82
End: 2022-06-14

## 2022-06-14 NOTE — TELEPHONE ENCOUNTER
RN called patient in regards to possible need for a follow up visit. [x]  last appointment: May 26, 2022. Phone call information:   [x]  Phone call made today to patient at  9:16 AM EDT  at number provided within chart:                             [x]  Patient did not answer, message left with instructions to call, let us know how patient is doing and if they are still in need of wound care.          Electronically signed by Johnathan Dan RN on 6/14/22 at 9:16 AM EDT

## 2022-06-20 ENCOUNTER — TELEPHONE (OUTPATIENT)
Dept: ORTHOPEDIC SURGERY | Age: 82
End: 2022-06-20

## 2022-06-20 ENCOUNTER — HOSPITAL ENCOUNTER (OUTPATIENT)
Dept: VASCULAR LAB | Age: 82
Discharge: HOME | End: 2022-06-20
Payer: COMMERCIAL

## 2022-06-20 ENCOUNTER — OFFICE VISIT (OUTPATIENT)
Dept: ORTHOPEDIC SURGERY | Age: 82
End: 2022-06-20

## 2022-06-20 VITALS — WEIGHT: 280 LBS | BODY MASS INDEX: 42.44 KG/M2 | HEIGHT: 68 IN | RESPIRATION RATE: 16 BRPM

## 2022-06-20 DIAGNOSIS — M79.662 PAIN OF LEFT CALF: ICD-10-CM

## 2022-06-20 DIAGNOSIS — R52 PAIN: Primary | ICD-10-CM

## 2022-06-20 DIAGNOSIS — Z96.652 HISTORY OF TOTAL LEFT KNEE REPLACEMENT: ICD-10-CM

## 2022-06-20 DIAGNOSIS — S81.802A WOUND OF LEFT LOWER EXTREMITY, INITIAL ENCOUNTER: ICD-10-CM

## 2022-06-20 PROCEDURE — 99213 OFFICE O/P EST LOW 20 MIN: CPT | Performed by: PHYSICIAN ASSISTANT

## 2022-06-20 PROCEDURE — 93971 EXTREMITY STUDY: CPT

## 2022-06-20 PROCEDURE — G8427 DOCREV CUR MEDS BY ELIG CLIN: HCPCS | Performed by: PHYSICIAN ASSISTANT

## 2022-06-20 PROCEDURE — G8417 CALC BMI ABV UP PARAM F/U: HCPCS | Performed by: PHYSICIAN ASSISTANT

## 2022-06-20 PROCEDURE — 1123F ACP DISCUSS/DSCN MKR DOCD: CPT | Performed by: PHYSICIAN ASSISTANT

## 2022-06-20 PROCEDURE — 1036F TOBACCO NON-USER: CPT | Performed by: PHYSICIAN ASSISTANT

## 2022-06-20 NOTE — TELEPHONE ENCOUNTER
STAT venous doppler scheduled at [facility] at 12:45p / 12:30p arrival.    Pt informed

## 2022-06-21 PROBLEM — M79.662 PAIN OF LEFT CALF: Status: ACTIVE | Noted: 2022-06-21

## 2022-06-21 PROBLEM — S81.802A WOUND OF LEFT LEG: Status: ACTIVE | Noted: 2022-06-21

## 2022-06-21 NOTE — PROGRESS NOTES
Subjective:      Patient ID: Brayan Engle is a 81 y.o. male who is here for evaluation of left knee pain. He has a history of left knee arthroplasty performed 9/4/2016. Left knee had been doing well until recently. He states he believes he fell asleep in his office chair while paying his bills. His foot was propped up on the lower part of a TV tray. Upon awakening from his nap he had discomfort in his left knee. Left knee pain has progressively worsened over the past 3 weeks. He recently underwent exchange of his pacemaker wires and implantation of defibrillator. He has not been able to weight-bear on his left knee since procedure on 6/14/2022. He normally wears compression dressings on both lower extremities for venous stasis ulcers. He has not had these on his legs since procedure 6/14/2022. He has developed significant swelling of the left lower extremity below the knee. Review Of Systems:   Negative for fever or chills. Past Medical History:   Diagnosis Date    Acid reflux     Arm wound, left, initial encounter 5/19/2022    Skin tears    Atherosclerosis of native arteries of right leg with ulceration of other part of foot ([address] 9/30/2021    Atrial flutter ([address] 2013    converted    Bleeding stomach ulcer oct 2015    BPH (benign prostatic hyperplasia)     CAD (coronary artery disease)     11/12 angio with 2 blocked bypass and 2 patent    Cellulitis of left thigh 5/10/2022    COPD (chronic obstructive pulmonary disease) ([address]     Diabetes mellitus type II     Edema     chronic left leg    Elevated PSA- nl 8/12/11     Hyperlipidemia     Hypertension     Ischemic cardiomyopathy     Lipoma of skin-RIGHT CHEST 5/5/2011    Obesity     Osteoarthritis     Peripheral venous insufficiency 9/30/2021    Skin tear of left forearm without complication 5/35/1396    Significant amount of epidermal loss with bleeding.     Spinal stenosis of lumbar region with neurogenic claudication- clinically 2018       Family History   Problem Relation Age of Onset    Cancer Mother         breast    Cancer Father         throat       Past Surgical History:   Procedure Laterality Date    CATARACT REMOVAL  ,     bilat    COLONOSCOPY      CORONARY ARTERY BYPASS GRAFT  1993    x 4    EYE SURGERY Left 2019    cataract coming back    JOINT REPLACEMENT Right total knee replacement    JOINT REPLACEMENT Left 2016       Social History     Occupational History    Not on file   Tobacco Use    Smoking status: Former Smoker     Packs/day: 3.50     Years: 32.00     Pack years: 112.00     Types: Cigarettes     Quit date: 1985     Years since quittin.4    Smokeless tobacco: Never Used    Tobacco comment: advised not to resume   Substance and Sexual Activity    Alcohol use: No     Alcohol/week: 0.0 standard drinks    Drug use: No    Sexual activity: Not Currently       Current Outpatient Medications   Medication Sig Dispense Refill    amiodarone (CORDARONE) 200 MG tablet TAKE 1 TABLET BY MOUTH DAILY 30 tablet 5    insulin NPH (NOVOLIN N) 100 UNIT/ML injection vial Take 50 units with breakfast and 34 units with dinner.  30 mL 3    gabapentin (NEURONTIN) 600 MG tablet TAKE UP TO 5 TABLETS BY MOUTH OVER 24 HOURS AS DIRECTED 150 tablet 11    torsemide (DEMADEX) 20 MG tablet Take 1 tablet by mouth daily 30 tablet 3    omeprazole (PRILOSEC) 20 MG delayed release capsule TAKE 1 CAPSULE BY MOUTH TWICE DAILY 180 capsule 1    albuterol sulfate  (90 Base) MCG/ACT inhaler INHALE 2 PUFFS INTO THE LUNGS EVERY 6 HOURS AS NEEDED FOR WHEEZING 18 g 5    FEROSUL 325 (65 Fe) MG tablet TAKE 1 TABLET BY MOUTH TWICE DAILY 180 tablet 3    dapagliflozin (FARXIGA) 5 MG tablet Take 1 tablet by mouth daily LOT [id number] EXP 2024 4 BOXES 30 tablet 0    potassium chloride (KLOR-CON) 10 MEQ extended release tablet TAKE 1 TABLET BY MOUTH DAILY 90 tablet 1    furosemide (LASIX) 80 MG tablet TAKE 1 TABLET BY MOUTH DAILY 90 tablet 0    Lancets MISC 1 each by Does not apply route daily Use to check glucose twice a day. One Touch brand. DX E11.9 100 each 3    blood glucose monitor kit and supplies Test 2 times a day & as needed for symptoms of irregular blood glucose. 1 kit 0    blood glucose monitor strips Test 2 times a day & as needed for symptoms of irregular blood glucose.  200 strip 3    blood glucose test strips (ASCENSIA AUTODISC VI;ONE TOUCH ULTRA TEST VI) strip four times daily 400 strip 3    INSULIN SYRINGE .5CC/29G 29G X 1/2\" 0.5 ML MISC INJECT 4 TIMES DAILY AS NEEDED 400 each 1    atorvastatin (LIPITOR) 40 MG tablet TAKE 1 TABLET BY MOUTH EVERY EVENING 90 tablet 1    hydrALAZINE (APRESOLINE) 25 MG tablet TAKE 1 TABLET BY MOUTH THREE TIMES DAILY      Cyanocobalamin (B-12) 500 MCG TABS TAKE 1 TABLET BY MOUTH DAILY 90 tablet 1    umeclidinium-vilanterol (ANORO ELLIPTA) 62.5-25 MCG/INH AEPB inhaler INHALE 1 PUFF INTO THE LUNGS DAILY (Patient not taking: Reported on 5/3/2022) 1 each 5    insulin regular (NOVOLIN R RELION) 100 UNIT/ML injection INJECT 20 TO 30 UNITS SUBCUTANEOUSLY THREE TIMES DAILY BEFORE MEAL(S) 30 mL 0    tiotropium (SPIRIVA HANDIHALER) 18 MCG inhalation capsule Inhale 1 capsule into the lungs daily INCLUDE inhaler device 30 capsule 5    Nebulizers (AIRIAL COMPACT MINI NEBULIZER) MISC 1 each by Does not apply route every 6 hours as needed (wheezing or shortness of breath) 1 each 0    Respiratory Therapy Supplies (NEBULIZER/TUBING/MOUTHPIECE) KIT 1 kit by Does not apply route every 6 hours as needed (for wheezing or shortness of breath) 1 kit 1    albuterol (PROVENTIL) (2.5 MG/3ML) 0.083% nebulizer solution Take 3 mLs by nebulization every 6 hours as needed for Wheezing 50 vial 3    vitamin D (ERGOCALCIFEROL) 46387 units CAPS capsule TK 1 C PO WEEKLY  2    mupirocin (BACTROBAN) 2 % ointment Apply 3 times daily as needed 22 g 1    levalbuterol (XOPENEX HFA) 45 MCG/ACT inhaler Inhale 1-2 puffs into the lungs every 4 hours as needed for Wheezing 1 Inhaler 3    aspirin 81 MG EC tablet Take 81 mg by mouth daily      apixaban (ELIQUIS) 2.5 MG TABS tablet Take 1 tablet by mouth 2 times daily 60 tablet 5    metoprolol (LOPRESSOR) 25 MG tablet Take 25 mg by mouth daily       isosorbide mononitrate (IMDUR) 30 MG CR tablet Take 1 tablet by mouth every morning Dr. [place] - Cardio 30 tablet 6    nitroGLYCERIN (NITROSTAT) 0.3 MG SL tablet Take one sublingual for chest pain. May repeat twice at 5 min intervals. If not getting relief call 911. 25 tablet 3     No current facility-administered medications for this visit. Objective:     He is alert, oriented x 3, pleasant, well nourished, developed and in no   acute distress. Resp 16   Ht 5' 8\" (1.727 m)   Wt 280 lb (127 kg)   BMI 42.57 kg/m²      Examination of the left knee: Inspection of the skin demonstrates a well-healed midline incision. Inspection of the soft tissues over the left knee is without significant swelling or erythema. The overall alignment of the knee is neutral.  There is no intra-articular effusion. AROM     Extension 5     Flexion  100   There minimal pain associated with ROM testing. Pes anserine bursa non-tender to palpation. Patellar tendon non-tender to palpation. Quadriceps tendon non-tender to palpation. Collateral ligaments non-tender to palpation. Popliteal fossa non-tender to palpation. Retro patellar crepitus is not present. There is no instability with varus/valgus stress testing in full extension or 90 degrees of flexion. There is no instability with anterior drawer testing. Extensor Mechanism is intact. He has marked edema noted LLE. He has a venous stasis related skin changes left greater than right lower extremity. X Rays: performed in the office today:   AP Standing, Lateral and Sunrise Left Knee: There is a left cemeted knee arthroplasty present.  The alignment is satisfactory. There are no signs of failure or loosening. Diagnosis:       ICD-10-CM    1. Pain  R52 XR KNEE LEFT (3 VIEWS)   2. Pain of left calf  M79.662 VL Extremity Venous Left   3. Wound of left lower extremity, initial encounter  [facility]   4. History of total left knee replacement  Z96.652         Assessment and Plan:       Assessment:  Clinically stable left total knee arthroplasty. At this time, low concern for septic joint as he has no joint effusion. Minimal discomfort with knee range of motion. No associated erythema about the left knee. He does have significant left calf swelling and would like to rule out DVT. I believe most of this is due to his venous insufficiency and lack of compression dressings. These are typically applied by wound care. I had an extensive discussion with Mr. Monique Greenfield regarding the natural history, etiology, and long term consequences of his condition. I have presented reasonable alternatives to the patient's proposed care, treatment, and services. Risks and benefits of the treatment options also reviewed in detail. I have outlined a treatment plan with them. He has had full opportunity to ask his questions. I have answered them all to his satisfaction. I feel that Mr. Monique Greenfield understands our discussion today. Plan:  Medications-Tylenol for discomfort. Further Imaging-venous ultrasound was obtained left lower extremity which was verbally reported as negative for DVT. Procedures-he will be referred to [facility] wound care for his lower extremity venous stasis. Follow up-2 weeks. Call or return to clinic if these symptoms worsen or fail to improve as anticipated. The total time spent on today's visit including reviewing test results, history, performance of physical exam, counseling/ education, ordering of medications, tests or procedures was 22 minutes.   This time does not include completion of the medical record. This time excludes any time spent performing procedures or tests in the office. Geraldine Alford PA-C   Senior Physician Assistant   [facility]                                         Disclaimer: This note was generated with use of a verbal recognition program (DRAGON) and an attempt was made to check for errors. It is possible that there are still dictated errors within this office note. If so, please bring any significant errors to my attention for an addendum. All efforts were made to ensure that this office note is accurate.

## 2022-06-22 DIAGNOSIS — E78.5 HYPERLIPIDEMIA: ICD-10-CM

## 2022-06-22 RX ORDER — ATORVASTATIN CALCIUM 40 MG/1
TABLET, FILM COATED ORAL
Qty: 90 TABLET | Refills: 3 | Status: SHIPPED | OUTPATIENT
Start: 2022-06-22

## 2022-06-29 ENCOUNTER — TELEPHONE (OUTPATIENT)
Dept: FAMILY MEDICINE CLINIC | Age: 82
End: 2022-06-29

## 2022-06-29 DIAGNOSIS — M25.562 ACUTE PAIN OF LEFT KNEE: Primary | ICD-10-CM

## 2022-06-29 DIAGNOSIS — R53.81 PHYSICAL DECONDITIONING: ICD-10-CM

## 2022-06-29 NOTE — TELEPHONE ENCOUNTER
Do you want me to see if [facility] and do this? PT/OT at home?or wait till you see him on 7/6/22. He is getting released on 7/3/22. With him being a new start, not sure if he will get the help before his appt anyway.

## 2022-06-29 NOTE — TELEPHONE ENCOUNTER
Pt calling needing an order for PT and OT. He is being released from rehab facility on 7. 3.2022 and wants to continue with therapy. Advised we would change his appt on 7.6.2022 to a transition of care and he would speak with Dr. Payam Voss about therapy. Stated that he would like to have something in place so he can continue therapy.     Please Advise  Pt can be reached at [phone]

## 2022-06-30 NOTE — TELEPHONE ENCOUNTER
Make 7/6 a transition of care so we can catch up on things and do the home health documentation. Please confirm he has been in rehab since 6/17/22    Order in EMR. For home health with OT and PT eval and treat.  Let them know how to contact pt

## 2022-07-06 ENCOUNTER — OFFICE VISIT (OUTPATIENT)
Dept: FAMILY MEDICINE CLINIC | Age: 82
End: 2022-07-06
Payer: MEDICARE

## 2022-07-06 VITALS
HEART RATE: 62 BPM | WEIGHT: 256 LBS | SYSTOLIC BLOOD PRESSURE: 110 MMHG | OXYGEN SATURATION: 98 % | DIASTOLIC BLOOD PRESSURE: 60 MMHG | BODY MASS INDEX: 38.8 KG/M2 | HEIGHT: 68 IN

## 2022-07-06 DIAGNOSIS — Z79.4 TYPE 2 DIABETES MELLITUS WITH DIABETIC NEPHROPATHY, WITH LONG-TERM CURRENT USE OF INSULIN (HCC): ICD-10-CM

## 2022-07-06 DIAGNOSIS — M25.562 ACUTE PAIN OF LEFT KNEE: ICD-10-CM

## 2022-07-06 DIAGNOSIS — Z09 HOSPITAL DISCHARGE FOLLOW-UP: Primary | ICD-10-CM

## 2022-07-06 DIAGNOSIS — Z95.810 PRESENCE OF COMBINATION INTERNAL CARDIAC DEFIBRILLATOR (ICD) AND PACEMAKER: ICD-10-CM

## 2022-07-06 DIAGNOSIS — E11.21 TYPE 2 DIABETES MELLITUS WITH DIABETIC NEPHROPATHY, WITH LONG-TERM CURRENT USE OF INSULIN (HCC): ICD-10-CM

## 2022-07-06 DIAGNOSIS — R53.81 PHYSICAL DECONDITIONING: ICD-10-CM

## 2022-07-06 DIAGNOSIS — I10 ESSENTIAL HYPERTENSION: ICD-10-CM

## 2022-07-06 DIAGNOSIS — I25.10 CORONARY ARTERY DISEASE INVOLVING NATIVE CORONARY ARTERY OF NATIVE HEART WITHOUT ANGINA PECTORIS: ICD-10-CM

## 2022-07-06 PROCEDURE — 1111F DSCHRG MED/CURRENT MED MERGE: CPT | Performed by: FAMILY MEDICINE

## 2022-07-06 PROCEDURE — 99213 OFFICE O/P EST LOW 20 MIN: CPT | Performed by: FAMILY MEDICINE

## 2022-07-06 NOTE — PROGRESS NOTES
Post-Discharge Transitional Care Management Services  SUBJECTIVE  Coiln Giang YOB: 1940  Date of Visit:  7/6/2022    Chief Complaint   Patient presents with    Follow-Up from [facility] 6/14/22- 6/18/22 sent to rehab at [facility] for 2 weeks. needs referral for out-patient rehab at [facility]  6/14-6/17 for pacemaker replacement  [facility] 6/17- 7/2 for knee pain    Inpatient course: Discharge summary reviewed- see chart. Knee pain to point of unable to walk started 5 wks ago. Went to [facility] after pacer replacement. Current status: ambulating with walker. Takes tylenol if needed. Continued leg edema. Trouble getting stockings in place,  DVT was ruled out. CHART REVIEW  Health Maintenance   Topic Date Due    Shingles vaccine (2 of 3) 11/28/2012    DTaP/Tdap/Td vaccine (3 - Td or Tdap) 11/28/2021    Lipids  02/25/2022    Annual Wellness Visit (AWV)  05/25/2022    Flu vaccine (1) 09/01/2022    Depression Screen  05/03/2023    Pneumococcal 65+ years Vaccine  Completed    COVID-19 Vaccine  Completed    Hepatitis A vaccine  Aged Out    Hib vaccine  Aged Out    Meningococcal (ACWY) vaccine  Aged Out     The ASCVD Risk score (Flako Guardado., et al., 2013) failed to calculate for the following reasons: The 2013 ASCVD risk score is only valid for ages 36 to 78  Prior to Visit Medications    Medication Sig Taking? Authorizing Provider   atorvastatin (LIPITOR) 40 MG tablet TAKE 1 TABLET BY MOUTH EVERY EVENING Yes Pablito Lopez, APRN - CNP   amiodarone (CORDARONE) 200 MG tablet TAKE 1 TABLET BY MOUTH DAILY Yes Trena Whalen MD   insulin NPH (NOVOLIN N) 100 UNIT/ML injection vial Take 50 units with breakfast and 34 units with dinner.  Yes Trena Whalen MD   gabapentin (NEURONTIN) 600 MG tablet TAKE UP TO 5 TABLETS BY MOUTH OVER 24 HOURS AS DIRECTED Yes Trnea Whalen MD   torsemide (DEMADEX) 20 MG tablet Take 1 tablet by mouth daily Yes Trena Whalen MD omeprazole (PRILOSEC) 20 MG delayed release capsule TAKE 1 CAPSULE BY MOUTH TWICE DAILY Yes Torin Blank MD   albuterol sulfate  (90 Base) MCG/ACT inhaler INHALE 2 PUFFS INTO THE LUNGS EVERY 6 HOURS AS NEEDED FOR WHEEZING Yes Torin Blank MD   FEROSUL 325 (65 Fe) MG tablet TAKE 1 TABLET BY MOUTH TWICE DAILY Yes Torin Blank MD   dapagliflozin (FARXIGA) 5 MG tablet Take 1 tablet by mouth daily LOT [id number] EXP 07/31/2024 4 BOXES Yes Torin Blank MD   potassium chloride (KLOR-CON) 10 MEQ extended release tablet TAKE 1 TABLET BY MOUTH DAILY Yes Torin Blank MD   furosemide (LASIX) 80 MG tablet TAKE 1 TABLET BY MOUTH DAILY Yes Torin Blank MD   Lancets MISC 1 each by Does not apply route daily Use to check glucose twice a day. One Touch brand. DX E11.9 Yes Torin Blank MD   blood glucose monitor kit and supplies Test 2 times a day & as needed for symptoms of irregular blood glucose. Yes Torin Blank MD   blood glucose monitor strips Test 2 times a day & as needed for symptoms of irregular blood glucose.  Yes Torin Blank MD   blood glucose test strips (ASCENSIA AUTODISC VI;ONE TOUCH ULTRA TEST VI) strip four times daily Yes Torin Blank MD   INSULIN SYRINGE .5CC/29G 29G X 1/2\" 0.5 ML MISC INJECT 4 TIMES DAILY AS NEEDED Yes Torin Blank MD   hydrALAZINE (APRESOLINE) 25 MG tablet TAKE 1 TABLET BY MOUTH THREE TIMES DAILY Yes Historical Provider, MD   Cyanocobalamin (B-12) 500 MCG TABS TAKE 1 TABLET BY MOUTH DAILY Yes Torin Blank MD   insulin regular (NOVOLIN R RELION) 100 UNIT/ML injection INJECT 20 TO 30 UNITS SUBCUTANEOUSLY THREE TIMES DAILY BEFORE MEAL(S) Yes Torin Blank MD   Nebulizers (AIRIAL COMPACT MINI NEBULIZER) MISC 1 each by Does not apply route every 6 hours as needed (wheezing or shortness of breath) Yes Torin Blank MD   Respiratory Therapy Supplies (NEBULIZER/TUBING/MOUTHPIECE) KIT 1 kit by Does not apply route every 6 hours as needed (for wheezing or shortness of breath) Yes Adeola Farrar Geraldine Romo MD   vitamin D (ERGOCALCIFEROL) 86802 units CAPS capsule TK 1 C PO WEEKLY Yes Historical Provider, MD   levalbuterol (XOPENEX HFA) 45 MCG/ACT inhaler Inhale 1-2 puffs into the lungs every 4 hours as needed for Wheezing Yes Stephanie Galdamez MD   aspirin 81 MG EC tablet Take 81 mg by mouth daily Yes Historical Provider, MD   apixaban (ELIQUIS) 2.5 MG TABS tablet Take 1 tablet by mouth 2 times daily Yes Stephanie Galdamez MD   metoprolol (LOPRESSOR) 25 MG tablet Take 25 mg by mouth daily  Yes Historical Provider, MD   isosorbide mononitrate (IMDUR) 30 MG CR tablet Take 1 tablet by mouth every morning Dr. Pily Encinas Yes Stephanie Galdamez MD   nitroGLYCERIN (NITROSTAT) 0.3 MG SL tablet Take one sublingual for chest pain. May repeat twice at 5 min intervals. If not getting relief call 911.  Yes Stephanie Galdamez MD   umeclidinium-vilanterol [facility] ELLIPTA) 62.5-25 MCG/INH AEPB inhaler INHALE 1 PUFF INTO THE LUNGS DAILY  Patient not taking: Reported on 5/3/2022  Stephanie Galdamez MD   tiotropium (Edrie Blade) 18 MCG inhalation capsule Inhale 1 capsule into the lungs daily INCLUDE inhaler device  Stephanie Galdamez MD   albuterol (PROVENTIL) (2.5 MG/3ML) 0.083% nebulizer solution Take 3 mLs by nebulization every 6 hours as needed for Wheezing  Stephanie Galdamez MD   mupirocin (BACTROBAN) 2 % ointment Apply 3 times daily as needed  Patient not taking: Reported on 2022  Stephanie Galdamez MD      Family History   Problem Relation Age of Onset    Cancer Mother         breast    Cancer Father         throat     Social History     Tobacco Use    Smoking status: Former Smoker     Packs/day: 3.50     Years: 32.00     Pack years: 112.00     Types: Cigarettes     Quit date: 1985     Years since quittin.5    Smokeless tobacco: Never Used    Tobacco comment: advised not to resume   Substance Use Topics    Alcohol use: No     Alcohol/week: 0.0 standard drinks    Drug use: No      LAST LABS  Cholesterol, Total   Date Value Ref Range Status   02/25/2021 168 0 - 199 mg/dL Final     LDL Calculated   Date Value Ref Range Status   02/25/2021 92 <100 mg/dL Final     HDL   Date Value Ref Range Status   02/25/2021 52 40 - 60 mg/dL Final   03/08/2012 37 (L) 40 - 60 mg/dl Final     Triglycerides   Date Value Ref Range Status   02/25/2021 121 0 - 150 mg/dL Final     Lab Results   Component Value Date    GLUCOSE 400 (H) 05/05/2022     Lab Results   Component Value Date     05/05/2022    K 4.7 05/05/2022    CREATININE 2.1 (H) 05/05/2022     Lab Results   Component Value Date    WBC 9.1 05/05/2022    HGB 12.9 (L) 05/05/2022    HCT 39.1 (L) 05/05/2022    .0 05/05/2022     05/05/2022     Lab Results   Component Value Date    ALT 24 07/16/2021    AST 40 (H) 07/16/2021    ALKPHOS 119 07/16/2021    BILITOT 0.6 07/16/2021     TSH (uIU/mL)   Date Value   02/25/2021 2.44     Lab Results   Component Value Date    LABA1C 8.7 05/05/2022     Objective:   PHYSICAL EXAM  /60 (Site: Left Upper Arm, Position: Sitting, Cuff Size: Large Adult)   Pulse 62   Ht 5' 8\" (1.727 m)   Wt 256 lb (116.1 kg)   SpO2 98%   BMI 38.92 kg/m²   BP Readings from Last 5 Encounters:   07/06/22 110/60   05/26/22 115/61   05/22/22 121/75   05/19/22 124/67   05/16/22 112/65     Wt Readings from Last 5 Encounters:   07/06/22 256 lb (116.1 kg)   06/20/22 280 lb (127 kg)   06/02/22 260 lb (117.9 kg)   05/26/22 260 lb (117.9 kg)   05/22/22 260 lb (117.9 kg)      GENERAL: well-developed, well-nourished, alert, no distress  LUNGS:  Breathing unlabored  · clear to auscultation bilaterally and good air movement  CARDIOVASC: regular rate and rhythm, S1, S2 normal, no murmur, click, rub or gallop   Apical impulse normal   LEGS:  Lower extremity edema: left 2+ ankle, R trace; in leg wraps  SKIN- pacer left chest site heall well, no signs of infection       Assessment and Plan:      Diagnosis Orders   1.  Hospital discharge follow-up  MD DISCHARGE MEDS RECONCILED W/ CURRENT OUTPATIENT MED LIST   2. Acute pain of left knee  Continue PT   3. Physical deconditioning  Continue PT   4. Essential hypertension  Stable with current medications. No adjustments needed. Will continue to monitor. 5. Type 2 diabetes mellitus with diabetic nephropathy, with long-term current use of insulin ([address]  Fair control. Continue to monitor. 6. Coronary artery disease involving native coronary artery of native heart without angina pectoris  Stable with current medications. No adjustments needed. Will continue to monitor. 7. 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 Plan as above and below. Diagnostic test results reviewed. Patient risk of morbidity and mortality: moderate  Medical Decision Making: low complexity    PLEASE TAKE THIS FORM TO CHECK-OUT WINDOW TO SCHEDULE NEXT VISIT. Please schedule check-up in 5 weeks. Return sooner if having any problems. TO DO LIST   PLEASE GET BLOODWORK DRAWN TODAY ON FIRST FLOOR in 170. Lab hours Monday to Friday 7:30 AM for 4 PM.  Take orders with you.  Good idea to get the zipper compression stockings.

## 2022-08-25 ENCOUNTER — TELEPHONE (OUTPATIENT)
Dept: FAMILY MEDICINE CLINIC | Age: 82
End: 2022-08-25

## 2022-08-25 DIAGNOSIS — U07.1 COVID-19 VIRUS INFECTION: Primary | ICD-10-CM

## 2022-08-25 NOTE — TELEPHONE ENCOUNTER
Pt tested positive for covid 3 days ago   Cough, fatigue, fever , congestion     Pt wants to know can something be sent to pharmacy to help with symptoms?

## 2022-09-02 ENCOUNTER — APPOINTMENT (OUTPATIENT)
Dept: CT IMAGING | Age: 82
DRG: 177 | End: 2022-09-02
Payer: MEDICARE

## 2022-09-02 ENCOUNTER — HOSPITAL ENCOUNTER (INPATIENT)
Age: 82
LOS: 3 days | Discharge: HOME | DRG: 177 | End: 2022-09-05
Attending: EMERGENCY MEDICINE | Admitting: INTERNAL MEDICINE
Payer: MEDICARE

## 2022-09-02 ENCOUNTER — APPOINTMENT (OUTPATIENT)
Dept: GENERAL RADIOLOGY | Age: 82
DRG: 177 | End: 2022-09-02
Payer: MEDICARE

## 2022-09-02 DIAGNOSIS — R53.1 GENERAL WEAKNESS: ICD-10-CM

## 2022-09-02 DIAGNOSIS — U07.1 PNEUMONIA DUE TO COVID-19 VIRUS: Primary | ICD-10-CM

## 2022-09-02 DIAGNOSIS — R29.6 FREQUENT FALLS: ICD-10-CM

## 2022-09-02 DIAGNOSIS — J12.82 PNEUMONIA DUE TO COVID-19 VIRUS: Primary | ICD-10-CM

## 2022-09-02 DIAGNOSIS — I10 ESSENTIAL HYPERTENSION: ICD-10-CM

## 2022-09-02 DIAGNOSIS — R06.89 HYPERCARBIA: ICD-10-CM

## 2022-09-02 DIAGNOSIS — R59.1 LYMPHADENOPATHY: ICD-10-CM

## 2022-09-02 PROBLEM — R07.9 CHEST PAIN, RULE OUT ACUTE MYOCARDIAL INFARCTION: Status: ACTIVE | Noted: 2022-09-02

## 2022-09-02 PROBLEM — J96.02 ACUTE HYPERCAPNIC RESPIRATORY FAILURE (HCC): Status: ACTIVE | Noted: 2022-09-02

## 2022-09-02 LAB
ALBUMIN SERPL-MCNC: 3.5 G/DL (ref 3.4–5)
ALP BLD-CCNC: 141 U/L (ref 40–129)
ALT SERPL-CCNC: 13 U/L (ref 10–40)
ANION GAP SERPL CALCULATED.3IONS-SCNC: 9 MMOL/L (ref 3–16)
AST SERPL-CCNC: 23 U/L (ref 15–37)
BASE EXCESS ARTERIAL: 7.8 MMOL/L (ref -3–3)
BASE EXCESS ARTERIAL: 8.3 MMOL/L (ref -3–3)
BASE EXCESS ARTERIAL: 9.6 MMOL/L (ref -3–3)
BASOPHILS ABSOLUTE: 0 K/UL (ref 0–0.2)
BASOPHILS RELATIVE PERCENT: 0.8 %
BILIRUB SERPL-MCNC: 0.8 MG/DL (ref 0–1)
BILIRUBIN DIRECT: 0.3 MG/DL (ref 0–0.3)
BILIRUBIN, INDIRECT: 0.5 MG/DL (ref 0–1)
BUN BLDV-MCNC: 17 MG/DL (ref 7–20)
C-REACTIVE PROTEIN: 14.6 MG/L (ref 0–5.1)
CALCIUM SERPL-MCNC: 9.3 MG/DL (ref 8.3–10.6)
CARBOXYHEMOGLOBIN ARTERIAL: 1.5 % (ref 0–1.5)
CARBOXYHEMOGLOBIN ARTERIAL: 1.7 % (ref 0–1.5)
CARBOXYHEMOGLOBIN ARTERIAL: 2 % (ref 0–1.5)
CHLORIDE BLD-SCNC: 99 MMOL/L (ref 99–110)
CO2: 35 MMOL/L (ref 21–32)
CREAT SERPL-MCNC: 1.8 MG/DL (ref 0.8–1.3)
EKG ATRIAL RATE: 55 BPM
EKG DIAGNOSIS: NORMAL
EKG Q-T INTERVAL: 540 MS
EKG QRS DURATION: 178 MS
EKG QTC CALCULATION (BAZETT): 561 MS
EKG R AXIS: -82 DEGREES
EKG T AXIS: 106 DEGREES
EKG VENTRICULAR RATE: 65 BPM
EOSINOPHILS ABSOLUTE: 0 K/UL (ref 0–0.6)
EOSINOPHILS RELATIVE PERCENT: 0.6 %
GFR AFRICAN AMERICAN: 44
GFR NON-AFRICAN AMERICAN: 36
GLUCOSE BLD-MCNC: 203 MG/DL (ref 70–99)
GLUCOSE BLD-MCNC: 264 MG/DL (ref 70–99)
GLUCOSE BLD-MCNC: 294 MG/DL (ref 70–99)
GLUCOSE BLD-MCNC: 294 MG/DL (ref 70–99)
HCO3 ARTERIAL: 35.6 MMOL/L (ref 21–29)
HCO3 ARTERIAL: 37.1 MMOL/L (ref 21–29)
HCO3 ARTERIAL: 37.6 MMOL/L (ref 21–29)
HCT VFR BLD CALC: 38.7 % (ref 40.5–52.5)
HEMOGLOBIN, ART, EXTENDED: 12 G/DL (ref 13.5–17.5)
HEMOGLOBIN, ART, EXTENDED: 12 G/DL (ref 13.5–17.5)
HEMOGLOBIN, ART, EXTENDED: 13.1 G/DL (ref 13.5–17.5)
HEMOGLOBIN: 12.1 G/DL (ref 13.5–17.5)
INR BLD: 1.25 (ref 0.87–1.14)
LACTIC ACID, SEPSIS: 1.1 MMOL/L (ref 0.4–1.9)
LYMPHOCYTES ABSOLUTE: 0.4 K/UL (ref 1–5.1)
LYMPHOCYTES RELATIVE PERCENT: 9.9 %
MAGNESIUM: 2.4 MG/DL (ref 1.8–2.4)
MCH RBC QN AUTO: 28.1 PG (ref 26–34)
MCHC RBC AUTO-ENTMCNC: 31.3 G/DL (ref 31–36)
MCV RBC AUTO: 89.7 FL (ref 80–100)
METHEMOGLOBIN ARTERIAL: 0 %
METHEMOGLOBIN ARTERIAL: 0.3 %
METHEMOGLOBIN ARTERIAL: 0.4 %
MONOCYTES ABSOLUTE: 0.4 K/UL (ref 0–1.3)
MONOCYTES RELATIVE PERCENT: 9.1 %
NEUTROPHILS ABSOLUTE: 3.1 K/UL (ref 1.7–7.7)
NEUTROPHILS RELATIVE PERCENT: 79.6 %
O2 SAT, ARTERIAL: 95.2 %
O2 SAT, ARTERIAL: 98.2 %
O2 SAT, ARTERIAL: 99.1 %
O2 THERAPY: ABNORMAL
PCO2 ARTERIAL: 61.1 MMHG (ref 35–45)
PCO2 ARTERIAL: 64.2 MMHG (ref 35–45)
PCO2 ARTERIAL: 83.4 MMHG (ref 35–45)
PDW BLD-RTO: 16.9 % (ref 12.4–15.4)
PERFORMED ON: ABNORMAL
PH ARTERIAL: 7.26 (ref 7.35–7.45)
PH ARTERIAL: 7.37 (ref 7.35–7.45)
PH ARTERIAL: 7.37 (ref 7.35–7.45)
PLATELET # BLD: 173 K/UL (ref 135–450)
PMV BLD AUTO: 8.4 FL (ref 5–10.5)
PO2 ARTERIAL: 146 MMHG (ref 75–108)
PO2 ARTERIAL: 76.1 MMHG (ref 75–108)
PO2 ARTERIAL: 99.9 MMHG (ref 75–108)
POTASSIUM SERPL-SCNC: 3.6 MMOL/L (ref 3.5–5.1)
PROCALCITONIN: 0.09 NG/ML (ref 0–0.15)
PROTHROMBIN TIME: 15.7 SEC (ref 11.7–14.5)
RBC # BLD: 4.31 M/UL (ref 4.2–5.9)
SARS-COV-2, NAAT: DETECTED
SODIUM BLD-SCNC: 143 MMOL/L (ref 136–145)
TCO2 ARTERIAL: 84 MMOL/L
TCO2 ARTERIAL: 87.6 MMOL/L
TCO2 ARTERIAL: 90 MMOL/L
TOTAL CK: 80 U/L (ref 39–308)
TOTAL PROTEIN: 6.8 G/DL (ref 6.4–8.2)
TROPONIN: 0.02 NG/ML
TROPONIN: <0.01 NG/ML
WBC # BLD: 3.9 K/UL (ref 4–11)

## 2022-09-02 PROCEDURE — 82550 ASSAY OF CK (CPK): CPT

## 2022-09-02 PROCEDURE — 85610 PROTHROMBIN TIME: CPT

## 2022-09-02 PROCEDURE — 70450 CT HEAD/BRAIN W/O DYE: CPT

## 2022-09-02 PROCEDURE — 2700000000 HC OXYGEN THERAPY PER DAY

## 2022-09-02 PROCEDURE — 86140 C-REACTIVE PROTEIN: CPT

## 2022-09-02 PROCEDURE — 94660 CPAP INITIATION&MGMT: CPT

## 2022-09-02 PROCEDURE — 99285 EMERGENCY DEPT VISIT HI MDM: CPT

## 2022-09-02 PROCEDURE — 99291 CRITICAL CARE FIRST HOUR: CPT | Performed by: INTERNAL MEDICINE

## 2022-09-02 PROCEDURE — 87635 SARS-COV-2 COVID-19 AMP PRB: CPT

## 2022-09-02 PROCEDURE — 6360000004 HC RX CONTRAST MEDICATION: Performed by: EMERGENCY MEDICINE

## 2022-09-02 PROCEDURE — 2580000003 HC RX 258: Performed by: PHYSICIAN ASSISTANT

## 2022-09-02 PROCEDURE — 80076 HEPATIC FUNCTION PANEL: CPT

## 2022-09-02 PROCEDURE — 84145 PROCALCITONIN (PCT): CPT

## 2022-09-02 PROCEDURE — 6370000000 HC RX 637 (ALT 250 FOR IP): Performed by: INTERNAL MEDICINE

## 2022-09-02 PROCEDURE — 6360000002 HC RX W HCPCS: Performed by: PHYSICIAN ASSISTANT

## 2022-09-02 PROCEDURE — 2580000003 HC RX 258: Performed by: INTERNAL MEDICINE

## 2022-09-02 PROCEDURE — 36415 COLL VENOUS BLD VENIPUNCTURE: CPT

## 2022-09-02 PROCEDURE — 84484 ASSAY OF TROPONIN QUANT: CPT

## 2022-09-02 PROCEDURE — 71260 CT THORAX DX C+: CPT | Performed by: EMERGENCY MEDICINE

## 2022-09-02 PROCEDURE — 6360000002 HC RX W HCPCS: Performed by: INTERNAL MEDICINE

## 2022-09-02 PROCEDURE — 94761 N-INVAS EAR/PLS OXIMETRY MLT: CPT

## 2022-09-02 PROCEDURE — 93005 ELECTROCARDIOGRAM TRACING: CPT | Performed by: EMERGENCY MEDICINE

## 2022-09-02 PROCEDURE — 83735 ASSAY OF MAGNESIUM: CPT

## 2022-09-02 PROCEDURE — 93010 ELECTROCARDIOGRAM REPORT: CPT | Performed by: INTERNAL MEDICINE

## 2022-09-02 PROCEDURE — 85025 COMPLETE CBC W/AUTO DIFF WBC: CPT

## 2022-09-02 PROCEDURE — 72125 CT NECK SPINE W/O DYE: CPT

## 2022-09-02 PROCEDURE — 71045 X-RAY EXAM CHEST 1 VIEW: CPT

## 2022-09-02 PROCEDURE — 2000000000 HC ICU R&B

## 2022-09-02 PROCEDURE — 82803 BLOOD GASES ANY COMBINATION: CPT

## 2022-09-02 PROCEDURE — 80048 BASIC METABOLIC PNL TOTAL CA: CPT

## 2022-09-02 PROCEDURE — 83605 ASSAY OF LACTIC ACID: CPT

## 2022-09-02 RX ORDER — INSULIN LISPRO 100 [IU]/ML
0-16 INJECTION, SOLUTION INTRAVENOUS; SUBCUTANEOUS
Status: DISCONTINUED | OUTPATIENT
Start: 2022-09-03 | End: 2022-09-05 | Stop reason: HOSPADM

## 2022-09-02 RX ORDER — 0.9 % SODIUM CHLORIDE 0.9 %
1000 INTRAVENOUS SOLUTION INTRAVENOUS ONCE
Status: COMPLETED | OUTPATIENT
Start: 2022-09-02 | End: 2022-09-02

## 2022-09-02 RX ORDER — HYDRALAZINE HYDROCHLORIDE 20 MG/ML
5 INJECTION INTRAMUSCULAR; INTRAVENOUS EVERY 6 HOURS PRN
Status: DISCONTINUED | OUTPATIENT
Start: 2022-09-02 | End: 2022-09-05 | Stop reason: HOSPADM

## 2022-09-02 RX ORDER — ACETAMINOPHEN 325 MG/1
650 TABLET ORAL EVERY 6 HOURS PRN
Status: DISCONTINUED | OUTPATIENT
Start: 2022-09-02 | End: 2022-09-05 | Stop reason: HOSPADM

## 2022-09-02 RX ORDER — CYANOCOBALAMIN (VITAMIN B-12) 500 MCG
1 TABLET ORAL DAILY
Status: DISCONTINUED | OUTPATIENT
Start: 2022-09-02 | End: 2022-09-02 | Stop reason: ALTCHOICE

## 2022-09-02 RX ORDER — LEVALBUTEROL INHALATION SOLUTION 0.63 MG/3ML
0.63 SOLUTION RESPIRATORY (INHALATION) EVERY 6 HOURS PRN
Status: DISCONTINUED | OUTPATIENT
Start: 2022-09-02 | End: 2022-09-02 | Stop reason: ALTCHOICE

## 2022-09-02 RX ORDER — TORSEMIDE 20 MG/1
20 TABLET ORAL DAILY
Status: DISCONTINUED | OUTPATIENT
Start: 2022-09-02 | End: 2022-09-02 | Stop reason: ALTCHOICE

## 2022-09-02 RX ORDER — POLYETHYLENE GLYCOL 3350 17 G/17G
17 POWDER, FOR SOLUTION ORAL DAILY PRN
Status: DISCONTINUED | OUTPATIENT
Start: 2022-09-02 | End: 2022-09-02 | Stop reason: SDUPTHER

## 2022-09-02 RX ORDER — HYDRALAZINE HYDROCHLORIDE 25 MG/1
25 TABLET, FILM COATED ORAL EVERY 8 HOURS SCHEDULED
Status: DISCONTINUED | OUTPATIENT
Start: 2022-09-02 | End: 2022-09-02 | Stop reason: ALTCHOICE

## 2022-09-02 RX ORDER — SODIUM CHLORIDE 0.9 % (FLUSH) 0.9 %
5-40 SYRINGE (ML) INJECTION EVERY 12 HOURS SCHEDULED
Status: DISCONTINUED | OUTPATIENT
Start: 2022-09-02 | End: 2022-09-05 | Stop reason: HOSPADM

## 2022-09-02 RX ORDER — ASPIRIN 81 MG/1
81 TABLET ORAL DAILY
Status: DISCONTINUED | OUTPATIENT
Start: 2022-09-03 | End: 2022-09-05 | Stop reason: HOSPADM

## 2022-09-02 RX ORDER — SODIUM CHLORIDE 0.9 % (FLUSH) 0.9 %
5-40 SYRINGE (ML) INJECTION EVERY 12 HOURS SCHEDULED
Status: DISCONTINUED | OUTPATIENT
Start: 2022-09-02 | End: 2022-09-02 | Stop reason: SDUPTHER

## 2022-09-02 RX ORDER — NITROGLYCERIN 0.4 MG/1
0.4 TABLET SUBLINGUAL EVERY 5 MIN PRN
Status: DISCONTINUED | OUTPATIENT
Start: 2022-09-02 | End: 2022-09-05 | Stop reason: HOSPADM

## 2022-09-02 RX ORDER — SODIUM CHLORIDE 0.9 % (FLUSH) 0.9 %
5-40 SYRINGE (ML) INJECTION PRN
Status: DISCONTINUED | OUTPATIENT
Start: 2022-09-02 | End: 2022-09-02 | Stop reason: SDUPTHER

## 2022-09-02 RX ORDER — FERROUS SULFATE 325(65) MG
325 TABLET ORAL 2 TIMES DAILY WITH MEALS
Status: DISCONTINUED | OUTPATIENT
Start: 2022-09-02 | End: 2022-09-05 | Stop reason: HOSPADM

## 2022-09-02 RX ORDER — SODIUM CHLORIDE 9 MG/ML
INJECTION, SOLUTION INTRAVENOUS PRN
Status: DISCONTINUED | OUTPATIENT
Start: 2022-09-02 | End: 2022-09-05 | Stop reason: HOSPADM

## 2022-09-02 RX ORDER — GABAPENTIN 600 MG/1
300 TABLET ORAL 2 TIMES DAILY
Status: DISCONTINUED | OUTPATIENT
Start: 2022-09-02 | End: 2022-09-02 | Stop reason: ALTCHOICE

## 2022-09-02 RX ORDER — ENOXAPARIN SODIUM 100 MG/ML
40 INJECTION SUBCUTANEOUS DAILY
Status: DISCONTINUED | OUTPATIENT
Start: 2022-09-02 | End: 2022-09-03

## 2022-09-02 RX ORDER — ACETAMINOPHEN 650 MG/1
650 SUPPOSITORY RECTAL EVERY 6 HOURS PRN
Status: DISCONTINUED | OUTPATIENT
Start: 2022-09-02 | End: 2022-09-05 | Stop reason: HOSPADM

## 2022-09-02 RX ORDER — ONDANSETRON 4 MG/1
4 TABLET, ORALLY DISINTEGRATING ORAL EVERY 8 HOURS PRN
Status: DISCONTINUED | OUTPATIENT
Start: 2022-09-02 | End: 2022-09-02 | Stop reason: SDUPTHER

## 2022-09-02 RX ORDER — ONDANSETRON 4 MG/1
4 TABLET, ORALLY DISINTEGRATING ORAL EVERY 8 HOURS PRN
Status: DISCONTINUED | OUTPATIENT
Start: 2022-09-02 | End: 2022-09-05 | Stop reason: HOSPADM

## 2022-09-02 RX ORDER — ONDANSETRON 2 MG/ML
4 INJECTION INTRAMUSCULAR; INTRAVENOUS EVERY 6 HOURS PRN
Status: DISCONTINUED | OUTPATIENT
Start: 2022-09-02 | End: 2022-09-05 | Stop reason: HOSPADM

## 2022-09-02 RX ORDER — FUROSEMIDE 80 MG
1 TABLET ORAL DAILY
Status: DISCONTINUED | OUTPATIENT
Start: 2022-09-02 | End: 2022-09-02

## 2022-09-02 RX ORDER — POTASSIUM CHLORIDE 750 MG/1
10 TABLET, FILM COATED, EXTENDED RELEASE ORAL DAILY
Status: DISCONTINUED | OUTPATIENT
Start: 2022-09-03 | End: 2022-09-05 | Stop reason: HOSPADM

## 2022-09-02 RX ORDER — ONDANSETRON 2 MG/ML
4 INJECTION INTRAMUSCULAR; INTRAVENOUS EVERY 6 HOURS PRN
Status: DISCONTINUED | OUTPATIENT
Start: 2022-09-02 | End: 2022-09-02 | Stop reason: SDUPTHER

## 2022-09-02 RX ORDER — ERGOCALCIFEROL 1.25 MG/1
50000 CAPSULE ORAL WEEKLY
Status: DISCONTINUED | OUTPATIENT
Start: 2022-09-04 | End: 2022-09-05 | Stop reason: HOSPADM

## 2022-09-02 RX ORDER — ISOSORBIDE MONONITRATE 30 MG/1
30 TABLET, EXTENDED RELEASE ORAL EVERY MORNING
Status: DISCONTINUED | OUTPATIENT
Start: 2022-09-03 | End: 2022-09-05 | Stop reason: HOSPADM

## 2022-09-02 RX ORDER — SODIUM CHLORIDE 9 MG/ML
INJECTION, SOLUTION INTRAVENOUS PRN
Status: DISCONTINUED | OUTPATIENT
Start: 2022-09-02 | End: 2022-09-02 | Stop reason: SDUPTHER

## 2022-09-02 RX ORDER — ATORVASTATIN CALCIUM 80 MG/1
40 TABLET, FILM COATED ORAL NIGHTLY
Status: DISCONTINUED | OUTPATIENT
Start: 2022-09-02 | End: 2022-09-05 | Stop reason: HOSPADM

## 2022-09-02 RX ORDER — ACETAMINOPHEN 650 MG/1
650 SUPPOSITORY RECTAL EVERY 6 HOURS PRN
Status: DISCONTINUED | OUTPATIENT
Start: 2022-09-02 | End: 2022-09-02 | Stop reason: SDUPTHER

## 2022-09-02 RX ORDER — AMIODARONE HYDROCHLORIDE 200 MG/1
200 TABLET ORAL DAILY
Status: DISCONTINUED | OUTPATIENT
Start: 2022-09-03 | End: 2022-09-05 | Stop reason: HOSPADM

## 2022-09-02 RX ORDER — INSULIN GLARGINE 100 [IU]/ML
40 INJECTION, SOLUTION SUBCUTANEOUS NIGHTLY
Status: DISCONTINUED | OUTPATIENT
Start: 2022-09-02 | End: 2022-09-03

## 2022-09-02 RX ORDER — POLYETHYLENE GLYCOL 3350 17 G/17G
17 POWDER, FOR SOLUTION ORAL DAILY PRN
Status: DISCONTINUED | OUTPATIENT
Start: 2022-09-02 | End: 2022-09-05 | Stop reason: HOSPADM

## 2022-09-02 RX ORDER — DEXTROSE MONOHYDRATE 100 MG/ML
INJECTION, SOLUTION INTRAVENOUS CONTINUOUS PRN
Status: DISCONTINUED | OUTPATIENT
Start: 2022-09-02 | End: 2022-09-05 | Stop reason: HOSPADM

## 2022-09-02 RX ORDER — ALBUTEROL SULFATE 90 UG/1
2 AEROSOL, METERED RESPIRATORY (INHALATION) EVERY 6 HOURS PRN
Status: DISCONTINUED | OUTPATIENT
Start: 2022-09-02 | End: 2022-09-05 | Stop reason: HOSPADM

## 2022-09-02 RX ORDER — PANTOPRAZOLE SODIUM 40 MG/1
40 TABLET, DELAYED RELEASE ORAL
Status: DISCONTINUED | OUTPATIENT
Start: 2022-09-03 | End: 2022-09-05 | Stop reason: HOSPADM

## 2022-09-02 RX ORDER — SODIUM CHLORIDE 0.9 % (FLUSH) 0.9 %
5-40 SYRINGE (ML) INJECTION PRN
Status: DISCONTINUED | OUTPATIENT
Start: 2022-09-02 | End: 2022-09-05 | Stop reason: HOSPADM

## 2022-09-02 RX ORDER — ENOXAPARIN SODIUM 100 MG/ML
40 INJECTION SUBCUTANEOUS DAILY
Status: DISCONTINUED | OUTPATIENT
Start: 2022-09-02 | End: 2022-09-02 | Stop reason: SDUPTHER

## 2022-09-02 RX ORDER — ASPIRIN 81 MG/1
81 TABLET, CHEWABLE ORAL DAILY
Status: DISCONTINUED | OUTPATIENT
Start: 2022-09-02 | End: 2022-09-02 | Stop reason: SDUPTHER

## 2022-09-02 RX ORDER — FUROSEMIDE 10 MG/ML
40 INJECTION INTRAMUSCULAR; INTRAVENOUS 2 TIMES DAILY
Status: DISCONTINUED | OUTPATIENT
Start: 2022-09-02 | End: 2022-09-05

## 2022-09-02 RX ORDER — ACETAMINOPHEN 325 MG/1
650 TABLET ORAL EVERY 6 HOURS PRN
Status: DISCONTINUED | OUTPATIENT
Start: 2022-09-02 | End: 2022-09-02 | Stop reason: SDUPTHER

## 2022-09-02 RX ORDER — GUAIFENESIN/DEXTROMETHORPHAN 100-10MG/5
5 SYRUP ORAL EVERY 4 HOURS PRN
Status: DISCONTINUED | OUTPATIENT
Start: 2022-09-02 | End: 2022-09-05 | Stop reason: HOSPADM

## 2022-09-02 RX ORDER — INSULIN LISPRO 100 [IU]/ML
0-4 INJECTION, SOLUTION INTRAVENOUS; SUBCUTANEOUS NIGHTLY
Status: DISCONTINUED | OUTPATIENT
Start: 2022-09-02 | End: 2022-09-05 | Stop reason: HOSPADM

## 2022-09-02 RX ORDER — DEXAMETHASONE SODIUM PHOSPHATE 10 MG/ML
10 INJECTION, SOLUTION INTRAMUSCULAR; INTRAVENOUS ONCE
Status: COMPLETED | OUTPATIENT
Start: 2022-09-02 | End: 2022-09-02

## 2022-09-02 RX ORDER — INSULIN LISPRO 100 [IU]/ML
10 INJECTION, SOLUTION INTRAVENOUS; SUBCUTANEOUS
Status: DISCONTINUED | OUTPATIENT
Start: 2022-09-03 | End: 2022-09-05 | Stop reason: HOSPADM

## 2022-09-02 RX ADMIN — IOPAMIDOL 75 ML: 755 INJECTION, SOLUTION INTRAVENOUS at 11:43

## 2022-09-02 RX ADMIN — INSULIN GLARGINE 40 UNITS: 100 INJECTION, SOLUTION SUBCUTANEOUS at 23:12

## 2022-09-02 RX ADMIN — SODIUM CHLORIDE 1000 ML: 9 INJECTION, SOLUTION INTRAVENOUS at 11:42

## 2022-09-02 RX ADMIN — Medication 10 ML: at 22:57

## 2022-09-02 RX ADMIN — DEXAMETHASONE SODIUM PHOSPHATE 10 MG: 10 INJECTION, SOLUTION INTRAMUSCULAR; INTRAVENOUS at 12:54

## 2022-09-02 RX ADMIN — ENOXAPARIN SODIUM 40 MG: 100 INJECTION SUBCUTANEOUS at 22:56

## 2022-09-02 ASSESSMENT — LIFESTYLE VARIABLES: HOW OFTEN DO YOU HAVE A DRINK CONTAINING ALCOHOL: NEVER

## 2022-09-02 NOTE — ED NOTES
RN to bedside because monitor was alarming, O2 came disconnected, patient on 5L/NC resting with eyes closed O2 93% at this time     Ayanna Treadwell RN  09/02/22 0760

## 2022-09-02 NOTE — ED NOTES
Critical ABG labs reported to Dr Salavdor Gagnon at patients bedside     Sg Lowery RN  09/02/22 9313

## 2022-09-02 NOTE — ED NOTES
Repeat ABG obtained from R radial, bandage applied  Specimen walked over to lab by this JAVIER Soto RN  09/02/22 5825

## 2022-09-02 NOTE — ED NOTES
Labs drawn, covid swab  Pt to ct   Will start fluids when he returns     Marshall Cooper RN  09/02/22 0813

## 2022-09-02 NOTE — PROGRESS NOTES
Pt admitted ICU room 3903,Alert and oriented X3, on BIPAP, b/p stable, call light within reach, will continue to monitor.

## 2022-09-02 NOTE — ED PROVIDER NOTES
In addition to the advanced practice provider, I personally saw Yolanda Maciel and performed a substantive portion of the visit including all aspects of the medical decision making. Medical Decision Making  81-year-old gentleman brought in today by his wife a few days of fatigue mental status changes delirium and a bit of a cough. No nausea running or diarrhea no headaches chest pains dyspnea. Examination well-appearing male no acute distress heart regular lungs coarse. He is hypoxic. Reviewed his imaging studies and he has pneumonitis. [facility] for COVID. [facility]    EKG  EKG reviewed by myself  Dated today, 1036  Paced  No change from prior      SEP-1  Is this patient to be included in the SEP-1 Core Measure due to severe sepsis or septic shock? No   Exclusion criteria - the patient is NOT to be included for SEP-1 Core Measure due to: Infection is not suspected    Screenings     Rebecca Coma Scale  Eye Opening: Spontaneous  Best Verbal Response: Oriented  Best Motor Response: Obeys commands  Rebecca Coma Scale Score: 15             Patient Referrals:  No follow-up provider specified. Discharge Medications:  New Prescriptions    No medications on file       FINAL IMPRESSION  1. Pneumonia due to COVID-19 virus    2. General weakness    3. Lymphadenopathy    4. Frequent falls        Blood pressure (!) 134/108, pulse 66, temperature 97.7 °F (36.5 °C), resp. rate 23, SpO2 99 %. For further details of Brittnee Saldana emergency department encounter, please see documentation by advanced practice provider, Jaki Ornelas.        Nona Miller MD  09/02/22 [address], [place]  09/02/22 0492

## 2022-09-02 NOTE — CONSULTS
PULMONARY AND CRITICAL CARE MEDICINE CONSULT NOTE      Name: Robert Palacio  Sex: male  : 1940  MRN: [id number]  Admission Date: 2022  Admission Diagnosis: COVID-19 virus infection [U07.1]  Acute hypercapnic respiratory failure ([facility] [address] [J96.02]  Date of ICU admission: 2022    Reason for ICU admission: Acute hypercapnic respiratory failure    HPI: Patient is a 81 y.o. male with past medical history significant for congestive heart failure with EF 30 to 35%, inferior migration as well as vomiting s/p permanent pacemaker, CAD, COPD, hyperlipidemia, hypertension, diabetes mellitus type 2, CKD with baseline creatinine of 1.8 who lives with his daughter and was brought into the room because of altered mental status. Patient has been having decreased mental status with fatigue and lethargy going on for almost 2 weeks now. He was tested positive for COVID 14 days ago. Slowly slowly he has been having confusion with hallucinations at home and multiple falls at home. Patient was just laying in the bed and was brought in. In the ER he was noted to be disoriented and nonverbal.  ABG was performed which was suggestive of hypercapnic respiratory failure. Patient has been placed on BiPAP. Repeat COVID test was positive. CRP is elevated at 14 with leukopenia. Procalcitonin negative. Creatinine at baseline. Initial CT head did not show any acute abnormality. I reviewed the chest x-ray from 2022 and my interpretation is as follows. Increased pulmonary vascular congestion. I reviewed the CT chest from 2022 and my interpretation is as follows. Bilateral small effusions with diffuse interstitial pattern. Could be secondary to COVID-pneumonia or pulmonary edema. No PE was noted. 14 point ROS could not be obtained due to patient factors.        Past Medical History:   Diagnosis Date    Acid reflux     Arm wound, left, initial encounter 2022    Skin tears    Atherosclerosis of native arteries of right leg with ulceration of other part of foot ([facility] [address] 2021    Atrial flutter ([facility] [address] 2013    converted    Bleeding stomach ulcer oct 2015    BPH (benign prostatic hyperplasia)     CAD (coronary artery disease)      angio with 2 blocked bypass and 2 patent    Cellulitis of left thigh 5/10/2022    COPD (chronic obstructive pulmonary disease) ([facility] [address]     Diabetes mellitus type II     Edema     chronic left leg    Elevated PSA- nl 11     Hyperlipidemia     Hypertension     Ischemic cardiomyopathy     Lipoma of skin-RIGHT CHEST 2011    Obesity     Osteoarthritis     Peripheral venous insufficiency 2021    Skin tear of left forearm without complication 3908    Significant amount of epidermal loss with bleeding. Spinal stenosis of lumbar region with neurogenic claudication- clinically 2018     Past Surgical History:   Procedure Laterality Date    CATARACT REMOVAL  ,     bilat    COLONOSCOPY      CORONARY ARTERY BYPASS GRAFT  1993    x 4    EYE SURGERY Left     cataract coming back    JOINT REPLACEMENT Right total knee replacement    JOINT REPLACEMENT Left 2016     Social History     Socioeconomic History    Marital status:      Spouse name: Not on file    Number of children: Not on file    Years of education: Not on file    Highest education level: Not on file   Occupational History    Not on file   Tobacco Use    Smoking status: Former     Packs/day: 3.50     Years: 32.00     Pack years: 112.00     Types: Cigarettes     Quit date: 1985     Years since quittin.6    Smokeless tobacco: Never    Tobacco comments:     advised not to resume   Substance and Sexual Activity    Alcohol use: No     Alcohol/week: 0.0 standard drinks    Drug use: No    Sexual activity: Not Currently   Other Topics Concern    Not on file   Social History Narrative    Lives with spouse. Exercise: walking sometimes. Seatbelt use: Always.   Living will: no, additional intake or output data in the 24 hours ending 09/02/22 1131      CONSTITUTIONAL: He is a 81y.o.-year-old who appears his stated age. He is in no acute distress. CARDIOVASCULAR: S1 S2 RRR. Without murmer  RESPIRATORY & CHEST: Lungs are clear to auscultation and percussion. No wheezing, no crackles. Good air movement  GASTROINTESTINAL & ABDOMEN: Soft, nontender, positive bowel sounds in all quadrants, non-distended, without hepatosplenomegaly. GENITOURINARY: Deferred. MUSCULOSKELETAL: No tenderness to palpation of the axial skeleton. There is no clubbing. No cyanosis. No edema of the lower extremities. SKIN OF BODY: No rash or jaundice. PSYCHIATRIC EVALUATION: Could not be assessed. HEMATOLOGIC/LYMPHATIC/ IMMUNOLOGIC: No palpable lymphadenopathy. NEUROLOGIC: Lethargic. Groslly non-focal.     LABS:    Recent Labs     09/02/22  1016   WBC 3.9*   RBC 4.31   HGB 12.1*   HCT 38.7*   MCH 28.1   MCHC 31.3   RDW 16.9*      MPV 8.4   NEUTOPHILPCT 79.6   MONOPCT 9.1   BASOPCT 0.8     Recent Labs     09/02/22  1016      K 3.6   CL 99   ANIONGAP 9   CO2 35*   BUN 17   CREATININE 1.8*   CALCIUM 9.3   PROT 6.8   BILITOT 0.8   ALKPHOS 141*   ALT 13   AST 23   GLUCOSE 264*     Recent Labs     09/02/22  1016 09/02/22  1434   PHART 7.370 7.262*   LLH8PVH 64.2* 83.4*   PO2ART 99.9 146.0*   OEM8PGH 37.1* 37.6*   G6PETBNV 98.2 99.1   BEART 9.6* 7.8*     Recent Labs     09/02/22  1016   INR 1.25*       Recent Labs     09/02/22  1016   CKTOTAL 80   TROPONINI 0.02*     Recent Labs     09/02/22  1016   CRP 14.6*       IMPRESSION:  Acute hypercapnic respiratory failure  Acute metabolic encephalopathy due to hypercarbia  Pneumonia due to [id number]  Systolic heart failure, EF 30 to 35% s/p BiV ICD  CAD s/p CABG and PCI  Atrial fibrillation on Eliquis  CKD  Diabetes mellitus type 2  Morbid obesity      PLAN:  Patient has acute metabolic encephalopathy due to hypercapnia. His mental status is already improving on BiPAP. He is answering a few questions and able to open his eyes spontaneously. I have adjusted the BiPAP settings to 16/8. Patient will have reported volumes on the current settings. Obtain repeat ABG in 2 hours on the new settings. If repeat ABG showed improvement then patient can be given break from the BiPAP. BiPAP needs to be used at night continuously. Patient has edema of lower extremities left more than right. He has systolic heart failure with EF of 30 to 35%. Obtain BNP. Change Lasix to IV as patient will not be able to take p.o. Restricted fluid intake. Patient has CKD, creatinine baseline. Strict input output charting. Patient has a diagnosis of COPD. Last PFTs from 2014 showed restrictive lung disease, no obstruction was noted. No recent PFTs. He is a former smoker. Continue Stiolto. Patient is not in COPD exacerbation. Patient is COVID-positive. Supposedly, he was tested positive 2 weeks ago. CRP elevated at 14. Patient received Decadron 10 mg IV in the ER. Repeat CRP tomorrow. If still elevated, then plan will be to continue Decadron. Current deterioration is likely due to hypercapnia and fluid overload. Obtain D-dimer. Patient is already anticoagulated with Eliquis. Critical Care Time: 39 Minutes  Total critical care time caring for this patient with life threatening illness, including direct patient contact, management of life support systems, review of data including imaging and labs, discussions with other team members and physicians excluding procedures. Electronically signed by Karina Tafoya MD on 9/2/22 at 3:41 PM EDT     This note was completed using dragon medical speech recognition software. Grammatical errors, random word insertions, pronoun errors and incomplete sentences are occasional consequences of this technology due to software limitations.  If there are questions or concerns about the content of this note of information contained within the body of this dictation, they should be addressed with the provider for clarification.

## 2022-09-02 NOTE — ED NOTES
Repeat ABG drawn and sent  RT called for BIPAP  hospitalist at bedside     [facility] [place]  09/02/22 [id number]

## 2022-09-02 NOTE — ED PROVIDER NOTES
[facility]        Pt Name: Justo Luevano  MRN: [id number]  [place] 1940  Date of evaluation: 9/2/2022  Provider: Stefan Barraza PA-C  PCP: Silvia Richter MD  Note Started: 11:13 AM EDT        I have seen and evaluated this patient with my supervising physician Raman Tee. I personally saw the patient and independently provided 35 minutes of non-concurrent critical care time out of the total critical care time provided. This excludes time spent doing separately billable procedures. This includes time at the bedside, data interpretation, medication management, obtaining critical history from collateral sources if the patient is unable to provide it directly, and physician consultation. Specifics of interventions taken and potentially life-threatening diagnostic considerations are listed above in the medical decision making. CHIEF COMPLAINT       Chief Complaint   Patient presents with    Fall    Fatigue     Patient in by [employer] ems from home, denies symptoms, family concerned patient has been laying in bed for over 12 hours, frequent falls. Ems reports o2 50's at home, 96% on RA at this time, noteable cellulitis in bilateral lower extremities       HISTORY OF PRESENT ILLNESS   (Location, Timing/Onset, Context/Setting, Quality, Duration, Modifying Factors, Severity, Associated Signs and Symptoms)  Note limiting factors. Chief Complaint: Weakness    Justo Luevano is a 81 y.o. male who presents to the emergency department with a chief complaint of general weakness. He lives with his daughter and his daughter brought him here. He has had 2 falls over the past week. He denies any pain at this time. Family is concerned because he is just been laying in bed for the past 12 hours and has been having frequent falls. Patient denies really any symptoms.   Denies any known exposure to COVID, nausea, vomiting, chest pain, shortness breath, abdominal pain or any other symptoms. Nursing Notes were all reviewed and agreed with or any disagreements were addressed in the HPI. REVIEW OF SYSTEMS    (2-9 systems for level 4, 10 or more for level 5)     Review of Systems    Positives and Pertinent negatives as per HPI. Except as noted above in the ROS, all other systems were reviewed and negative. PAST MEDICAL HISTORY     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 with ulceration of other part of foot ([address] 9/30/2021    Atrial flutter ([address] 2013    converted    Bleeding stomach ulcer oct 2015    BPH (benign prostatic hyperplasia)     CAD (coronary artery disease)     11/12 angio with 2 blocked bypass and 2 patent    Cellulitis of left thigh 5/10/2022    COPD (chronic obstructive pulmonary disease) ([facility]     Diabetes mellitus type II     Edema     chronic left leg    Elevated PSA- nl 8/12/11     Hyperlipidemia     Hypertension     Ischemic cardiomyopathy     Lipoma of skin-RIGHT CHEST 5/5/2011    Obesity     Osteoarthritis     Peripheral venous insufficiency 9/30/2021    Skin tear of left forearm without complication 6/50/9494    Significant amount of epidermal loss with bleeding.     Spinal stenosis of lumbar region with neurogenic claudication- clinically 7/6/2018         SURGICAL HISTORY     Past Surgical History:   Procedure Laterality Date    CATARACT REMOVAL  2010, 2011    bilat    COLONOSCOPY      CORONARY ARTERY BYPASS GRAFT  1993    x 4    EYE SURGERY Left 2019    cataract coming back    JOINT REPLACEMENT Right total knee replacement    JOINT REPLACEMENT Left 09/14/2016         CURRENTMEDICATIONS       Previous Medications    ALBUTEROL (PROVENTIL) (2.5 MG/3ML) 0.083% NEBULIZER SOLUTION    Take 3 mLs by nebulization every 6 hours as needed for Wheezing    ALBUTEROL SULFATE  (90 BASE) MCG/ACT INHALER    INHALE 2 PUFFS INTO THE LUNGS EVERY 6 HOURS AS NEEDED FOR WHEEZING    AMIODARONE (CORDARONE) 200 MG TABLET    TAKE 1 TABLET BY MOUTH DAILY    APIXABAN (ELIQUIS) 2.5 MG TABS TABLET    Take 1 tablet by mouth 2 times daily    ASPIRIN 81 MG EC TABLET    Take 81 mg by mouth daily    ATORVASTATIN (LIPITOR) 40 MG TABLET    TAKE 1 TABLET BY MOUTH EVERY EVENING    BLOOD GLUCOSE MONITOR KIT AND SUPPLIES    Test 2 times a day & as needed for symptoms of irregular blood glucose. BLOOD GLUCOSE MONITOR STRIPS    Test 2 times a day & as needed for symptoms of irregular blood glucose. BLOOD GLUCOSE TEST STRIPS (ASCENSIA AUTODISC VI;ONE TOUCH ULTRA TEST VI) STRIP    four times daily    CYANOCOBALAMIN (B-12) 500 MCG TABS    TAKE 1 TABLET BY MOUTH DAILY    DAPAGLIFLOZIN (FARXIGA) 5 MG TABLET    Take 1 tablet by mouth daily LOT [id number] EXP 07/31/2024 4 BOXES    FEROSUL 325 (65 FE) MG TABLET    TAKE 1 TABLET BY MOUTH TWICE DAILY    FUROSEMIDE (LASIX) 80 MG TABLET    TAKE 1 TABLET BY MOUTH DAILY    GABAPENTIN (NEURONTIN) 600 MG TABLET    TAKE UP TO 5 TABLETS BY MOUTH OVER 24 HOURS AS DIRECTED    HYDRALAZINE (APRESOLINE) 25 MG TABLET    TAKE 1 TABLET BY MOUTH THREE TIMES DAILY    INSULIN NPH (NOVOLIN N) 100 UNIT/ML INJECTION VIAL    Take 50 units with breakfast and 34 units with dinner. INSULIN REGULAR (NOVOLIN R RELION) 100 UNIT/ML INJECTION    INJECT 20 TO 30 UNITS SUBCUTANEOUSLY THREE TIMES DAILY BEFORE MEAL(S)    INSULIN SYRINGE .5CC/29G 29G X 1/2\" 0.5 ML MISC    INJECT 4 TIMES DAILY AS NEEDED    ISOSORBIDE MONONITRATE (IMDUR) 30 MG CR TABLET    Take 1 tablet by mouth every morning Dr. Norman Gaytan    1 each by Does not apply route daily Use to check glucose twice a day. One Touch brand.   DX E11.9    LEVALBUTEROL (XOPENEX HFA) 45 MCG/ACT INHALER    Inhale 1-2 puffs into the lungs every 4 hours as needed for Wheezing    METOPROLOL (LOPRESSOR) 25 MG TABLET    Take 25 mg by mouth daily MUPIROCIN (BACTROBAN) 2 % OINTMENT    Apply 3 times daily as needed    NEBULIZERS (AIRIAL COMPACT MINI NEBULIZER) MISC    1 each by Does not apply route every 6 hours as needed (wheezing or shortness of breath)    NITROGLYCERIN (NITROSTAT) 0.3 MG SL TABLET    Take one sublingual for chest pain. May repeat twice at 5 min intervals. If not getting relief call 911.     OMEPRAZOLE (PRILOSEC) 20 MG DELAYED RELEASE CAPSULE    TAKE 1 CAPSULE BY MOUTH TWICE DAILY    POTASSIUM CHLORIDE (KLOR-CON) 10 MEQ EXTENDED RELEASE TABLET    TAKE 1 TABLET BY MOUTH DAILY    RESPIRATORY THERAPY SUPPLIES (NEBULIZER/TUBING/MOUTHPIECE) KIT    1 kit by Does not apply route every 6 hours as needed (for wheezing or shortness of breath)    TIOTROPIUM (SPIRIVA HANDIHALER) 18 MCG INHALATION CAPSULE    Inhale 1 capsule into the lungs daily INCLUDE inhaler device    TORSEMIDE (DEMADEX) 20 MG TABLET    Take 1 tablet by mouth daily    UMECLIDINIUM-VILANTEROL (ANORO ELLIPTA) 62.5-25 MCG/INH AEPB INHALER    INHALE 1 PUFF INTO THE LUNGS DAILY    VITAMIN D (ERGOCALCIFEROL) 38183 UNITS CAPS CAPSULE    TK 1 C PO WEEKLY         ALLERGIES     Entresto [sacubitril-valsartan]    FAMILYHISTORY       Family History   Problem Relation Age of Onset    Cancer Mother         breast    Cancer Father         throat          SOCIAL HISTORY       Social History     Tobacco Use    Smoking status: Former     Packs/day: 3.50     Years: 32.00     Pack years: 112.00     Types: Cigarettes     Quit date: 1985     Years since quittin.6    Smokeless tobacco: Never    Tobacco comments:     advised not to resume   Substance Use Topics    Alcohol use: No     Alcohol/week: 0.0 standard drinks    Drug use: No       SCREENINGS    [place] Coma Scale  Eye Opening: Spontaneous  Best Verbal Response: Oriented  Best Motor Response: Obeys commands  [place] Coma Scale Score: 15        PHYSICAL EXAM    (up to 7 for level 4, 8 or more for level 5)     ED Triage Vitals   BP Temp Temp src Heart Rate Resp SpO2 Height Weight   09/02/22 0951 09/02/22 0957 -- 09/02/22 1015 09/02/22 1015 09/02/22 0951 -- --   (!) 141/107 97.7 °F (36.5 °C)  69 22 95 %         Physical Exam  Vitals and nursing note reviewed. Constitutional:       Appearance: He is well-developed. He is ill-appearing. He is not diaphoretic. HENT:      Head: Atraumatic. Nose: Nose normal.      Mouth/Throat:      Mouth: Mucous membranes are dry. Eyes:      General:         Right eye: No discharge. Left eye: No discharge. Cardiovascular:      Rate and Rhythm: Normal rate and regular rhythm. Heart sounds: No murmur heard. No friction rub. No gallop. Pulmonary:      Effort: Pulmonary effort is normal. No respiratory distress. Breath sounds: No stridor. No wheezing, rhonchi or rales. Abdominal:      General: Bowel sounds are normal. There is no distension. Palpations: Abdomen is soft. There is no mass. Tenderness: There is no abdominal tenderness. There is no guarding or rebound. Hernia: No hernia is present. Musculoskeletal:         General: No swelling. Normal range of motion. Cervical back: Normal range of motion. Comments: Gross equal range of motion throughout all 4 extremities. No midline tenderness or step-off in the neck or back. Skin:     General: Skin is warm and dry. Findings: No erythema or rash. Neurological:      Mental Status: He is alert. Cranial Nerves: No cranial nerve deficit. Comments: Alert to person and place.    Psychiatric:         Behavior: Behavior normal.       DIAGNOSTIC RESULTS   LABS:    Labs Reviewed   COVID-19, RAPID - Abnormal; Notable for the following components:       Result Value    SARS-CoV-2, NAAT DETECTED (*)     All other components within normal limits    Narrative:     Kari Kim  [facility] tel. [phone],  Chemistry results called to and read back by [facility] RN, 09/02/2022  12:07, by [facility] WITH AUTO DIFFERENTIAL - Abnormal; Notable for the following components:    WBC 3.9 (*)     Hemoglobin 12.1 (*)     Hematocrit 38.7 (*)     RDW 16.9 (*)     Lymphocytes Absolute 0.4 (*)     All other components within normal limits   BASIC METABOLIC PANEL - Abnormal; Notable for the following components:    CO2 35 (*)     Glucose 264 (*)     Creatinine 1.8 (*)     GFR Non- 36 (*)     GFR  44 (*)     All other components within normal limits   TROPONIN - Abnormal; Notable for the following components:    Troponin 0.02 (*)     All other components within normal limits   PROTIME-INR - Abnormal; Notable for the following components:    Protime 15.7 (*)     INR 1.25 (*)     All other components within normal limits   BLOOD GAS, ARTERIAL - Abnormal; Notable for the following components:    pCO2, Arterial 64.2 (*)     HCO3, Arterial 37.1 (*)     Base Excess, Arterial 9.6 (*)     Hemoglobin, Art, Extended 12.0 (*)     Carboxyhgb, Arterial 2.0 (*)     All other components within normal limits   C-REACTIVE PROTEIN - Abnormal; Notable for the following components:    CRP 14.6 (*)     All other components within normal limits   HEPATIC FUNCTION PANEL - Abnormal; Notable for the following components:    Alkaline Phosphatase 141 (*)     All other components within normal limits   BLOOD GAS, ARTERIAL - Abnormal; Notable for the following components:    pH, Arterial 7.262 (*)     pCO2, Arterial 83.4 (*)     pO2, Arterial 146.0 (*)     HCO3, Arterial 37.6 (*)     Base Excess, Arterial 7.8 (*)     Hemoglobin, Art, Extended 12.0 (*)     All other components within normal limits    Narrative:     CALL  [place]  [facility] tel. [phone],  Chemistry results called to and read back by JAVIER Salazar, 09/02/2022  14:49, by [facility]   POCT GLUCOSE - Abnormal; Notable for the following components:    POC Glucose 203 (*)     All other components within normal limits   STREP PNEUMONIAE ANTIGEN   LEGIONELLA ANTIGEN, URINE   CK LACTATE, SEPSIS   PROCALCITONIN   MAGNESIUM   URINALYSIS WITH REFLEX TO CULTURE   LACTATE, SEPSIS   TROPONIN   TROPONIN   C-REACTIVE PROTEIN   D-DIMER, QUANTITATIVE   BRAIN NATRIURETIC PEPTIDE       When ordered only abnormal lab results are displayed. All other labs were within normal range or not returned as of this dictation. EKG: When ordered, EKG's are interpreted by the Emergency Department Physician in the absence of a cardiologist.  Please see their note for interpretation of EKG. RADIOLOGY:   Non-plain film images such as CT, Ultrasound and MRI are read by the radiologist. Plain radiographic images are visualized and preliminarily interpreted by the ED Provider with the below findings:        Interpretation per the Radiologist below, if available at the time of this note:    CT CHEST PULMONARY EMBOLISM W CONTRAST   Final Result   1. No pulmonary embolism. 2. Small left and trace right pleural effusions with peripheral airspace   opacities, mucoid impaction and more diffuse interstitial opacities   throughout the lungs. Spectrum of findings may represent pulmonary edema,   aspiration or a developing pattern of pneumonitis. 3. Reactive mediastinal and hilar lymph node enlargement. RECOMMENDATIONS:   Unavailable         CT CERVICAL SPINE WO CONTRAST   Final Result   1. No acute finding of the cervical spine. 2. Chronic degenerative changes are noted incidentally. 3. Limited evaluation of a left pleural effusion and scattered airspace   changes in the lung apices. Correlate with any pulmonary symptoms. CT HEAD WO CONTRAST   Final Result   No acute [id number] abnormality. XR CHEST PORTABLE   Final Result   Moderate interstitial congestion with cardiomegaly. No pneumothorax.            XR CHEST PORTABLE    Result Date: 9/2/2022  EXAMINATION: ONE XRAY VIEW OF THE CHEST 9/2/2022 9:54 am COMPARISON: May 3, 2022 HISTORY: ORDERING SYSTEM PROVIDED HISTORY: sob TECHNOLOGIST PROVIDED HISTORY: Reason for exam:->sob Reason for Exam: sob FINDINGS: Moderate cardiomegaly. Moderate interstitial congestion. Cardiac pacemaker leads in stable/satisfactory position with no evidence of pneumothorax. Median sternotomy changes noted. Significant osteopenic changes and degenerative changes noted in the bony structures. Moderate interstitial congestion with cardiomegaly. No pneumothorax.            PROCEDURES   Unless otherwise noted below, none     Procedures    CRITICAL CARE TIME       CONSULTS:  IP CONSULT TO HOSPITALIST      EMERGENCY DEPARTMENT COURSE and DIFFERENTIAL DIAGNOSIS/MDM:   Vitals:    Vitals:    09/02/22 0951 09/02/22 0957 09/02/22 1015 09/02/22 1030   BP: (!) 141/107  (!) 152/106 (!) 134/108   Pulse:   69 66   Resp:   22 23   Temp:  97.7 °F (36.5 °C)     SpO2: 95%  96% 99%       Patient was given the following medications:  Medications   isosorbide mononitrate (IMDUR) extended release tablet 30 mg (has no administration in time range)   metoprolol tartrate (LOPRESSOR) tablet 25 mg (has no administration in time range)   apixaban (ELIQUIS) tablet 2.5 mg (has no administration in time range)   aspirin EC tablet 81 mg (has no administration in time range)   mupirocin (BACTROBAN) 2 % ointment (has no administration in time range)   vitamin D (ERGOCALCIFEROL) capsule 50,000 Units (has no administration in time range)   insulin regular (HUMULIN R;NOVOLIN R) injection 30 Units (has no administration in time range)   B-12 TABS 1 tablet (has no administration in time range)   hydrALAZINE (APRESOLINE) tablet 25 mg (has no administration in time range)   furosemide (LASIX) tablet 80 mg (has no administration in time range)   potassium chloride (KLOR-CON) extended release tablet 10 mEq (has no administration in time range)   ferrous sulfate (IRON 325) tablet 325 mg (has no administration in time range)   albuterol sulfate HFA (PROVENTIL;VENTOLIN;PROAIR) 108 (90 Base) MCG/ACT inhaler 2 puff (has no administration in time range)   torsemide (DEMADEX) tablet 20 mg (has no administration in time range)   gabapentin (NEURONTIN) tablet 300 mg (has no administration in time range)   insulin NPH (HUMULIN N;NOVOLIN N) injection vial 50 Units (has no administration in time range)   amiodarone (CORDARONE) tablet 200 mg (has no administration in time range)   atorvastatin (LIPITOR) tablet 40 mg (has no administration in time range)   nitroGLYCERIN (NITROSTAT) SL tablet 0.4 mg (has no administration in time range)   tiotropium-olodaterol (STIOLTO) 2.5-2.5 MCG/ACT inhaler 2 puff (has no administration in time range)   pantoprazole (PROTONIX) tablet 40 mg (has no administration in time range)   levalbuterol (XOPENEX) nebulization 0.63 mg (has no administration in time range)   sodium chloride flush 0.9 % injection 5-40 mL (has no administration in time range)   sodium chloride flush 0.9 % injection 5-40 mL (has no administration in time range)   0.9 % sodium chloride infusion (has no administration in time range)   enoxaparin (LOVENOX) injection 40 mg (has no administration in time range)   ondansetron (ZOFRAN-ODT) disintegrating tablet 4 mg (has no administration in time range)     Or   ondansetron (ZOFRAN) injection 4 mg (has no administration in time range)   acetaminophen (TYLENOL) tablet 650 mg (has no administration in time range)     Or   acetaminophen (TYLENOL) suppository 650 mg (has no administration in time range)   polyethylene glycol (GLYCOLAX) packet 17 g (has no administration in time range)   aspirin chewable tablet 81 mg (has no administration in time range)   ondansetron (ZOFRAN-ODT) disintegrating tablet 4 mg (has no administration in time range)     Or   ondansetron (ZOFRAN) injection 4 mg (has no administration in time range)   guaiFENesin-dextromethorphan (ROBITUSSIN DM) 100-10 MG/5ML syrup 5 mL (has no administration in time range)   hydrALAZINE (APRESOLINE) injection 5 mg (has no administration in time range)   0.9 % sodium chloride bolus (1,000 mLs IntraVENous New Bag 9/2/22 1142)   iopamidol (ISOVUE-370) 76 % injection 75 mL (75 mLs IntraVENous Given 9/2/22 1143)   dexamethasone (PF) (DECADRON) injection 10 mg (10 mg IntraVENous Given 9/2/22 1254)         Is this patient to be included in the SEP-1 Core Measure due to severe sepsis or septic shock? No   Exclusion criteria - the patient is NOT to be included for SEP-1 Core Measure due to:  Viral etiology found or highly suspected (including COVID-19) without concomitant bacterial infection    Patient presented with general weakness and multiple falls. Reviewing records he tested positive for COVID-19 at home. He is still positive here. Was apparently hypoxic in the 46s when EMS arrived but he is in the 90s when he arrives here. Appears dry and generally weak. CT reveals pneumonia most likely related to his persistent COVID-19. Has a baseline chronic kidney disease. Remainder of other laboratory testing is unremarkable. Patient does falsely. His oxygen saturation drops into the 80s. Due to this he was placed on some nasal cannula oxygen while he was sleeping and started on Decadron. No history of sleep apnea. Will be admitted for his general weakness, multiple falls and COVID-pneumonia. Patient was stable at time of admission. FINAL IMPRESSION      1. Pneumonia due to COVID-19 virus    2. General weakness    3. Lymphadenopathy    4. Frequent falls    5. Hypercarbia          DISPOSITION/PLAN   DISPOSITION Admitted 09/02/2022 12:44:32 PM      PATIENT REFERRED TO:  No follow-up provider specified.     DISCHARGE MEDICATIONS:  New Prescriptions    No medications on file       DISCONTINUED MEDICATIONS:  Discontinued Medications    No medications on file              (Please note that portions of this note were completed with a voice recognition program.  Efforts were made to edit the dictations but occasionally words are mis-transcribed.)    Jacquelin Yung PA-C (electronically signed)           Jacquelin Yung PA-C  09/02/22 1311    I was approached by nursing staff that [phone] that patient was becoming a little more difficult to arouse. He did have some mild hypercarbia on initial ABG but oxygen saturation has been in the low 90s here. Repeat ABG was ordered along with BiPAP as concern is for possible worsening CO2 retention. We called hospitalist who came down to evaluate the patient immediately. His repeat ABG does reveal a CO2 now of 83.4. He is already on BiPAP. Will be admitted to ICU.        Jacquelin Yung PA-C  09/02/22 0647

## 2022-09-02 NOTE — H&P
Hospital Medicine History & Physical      PCP: Silvia Richter MD    Date of Admission: 9/2/2022    Date of Service: Pt seen/examined on 9/2/22 and Admitted to Inpatient with expected LOS greater than two midnights due to medical therapy. Chief Complaint:    Acute confusional state      History Of Present Illness:   Justo Luevano is a 81 y.o. male with a history of CAD,, type 2 diabetes mellitus, COPD, hyperlipidemia, essential hypertension, ischemic cardiomyopathy, obesity and atrial flutter. He is COVID-19 vaccinated. At baseline patient walks with a walker against rehab twice a week no. Tested positive for COVID-19 infection over 2 weeks ago. 48 hours ago the patient started talking gibberish to daughter. He was also noted to be confused. Had an unproductive cough. No fevers or chills. No nausea, vomiting or diarrhea. Yesterday the patient fell whilst getting off a chair to ambulate. After the fall the patient was noted to be twitching-noted tonic clonic movements but rather jerky limb movements. No focal weakness or numbness. No facial asymmetry. Patient was noted to be profoundly disoriented. No fevers or chills. No dysuria or frequency.           Past Medical History:          Diagnosis Date    Acid reflux     Arm wound, left, initial encounter 5/19/2022    Skin tears    Atherosclerosis of native arteries of right leg with ulceration of other part of foot ([address] 9/30/2021    Atrial flutter ([address] 2013    converted    Bleeding stomach ulcer oct 2015    BPH (benign prostatic hyperplasia)     CAD (coronary artery disease)     11/12 angio with 2 blocked bypass and 2 patent    Cellulitis of left thigh 5/10/2022    COPD (chronic obstructive pulmonary disease) ([address]     Diabetes mellitus type II     Edema     chronic left leg    Elevated PSA- nl 8/12/11     Hyperlipidemia     Hypertension     Ischemic cardiomyopathy     Lipoma of skin-RIGHT CHEST 5/5/2011    Obesity     Osteoarthritis Peripheral venous insufficiency 9/30/2021    Skin tear of left forearm without complication 8/65/9858    Significant amount of epidermal loss with bleeding. Spinal stenosis of lumbar region with neurogenic claudication- clinically 7/6/2018       Past Surgical History:          Procedure Laterality Date    CATARACT REMOVAL  2010, 2011    bilat    COLONOSCOPY      CORONARY ARTERY BYPASS GRAFT  1993    x 4    EYE SURGERY Left 2019    cataract coming back    JOINT REPLACEMENT Right total knee replacement    JOINT REPLACEMENT Left 09/14/2016       Medications Prior to Admission:      Prior to Admission medications    Medication Sig Start Date End Date Taking? Authorizing Provider   atorvastatin (LIPITOR) 40 MG tablet TAKE 1 TABLET BY MOUTH EVERY EVENING 6/22/22  Yes RAYMUNDO Huerta - CNP   amiodarone (CORDARONE) 200 MG tablet TAKE 1 TABLET BY MOUTH DAILY 5/16/22  Yes Chuy Peters MD   insulin NPH (NOVOLIN N) 100 UNIT/ML injection vial Take 50 units with breakfast and 34 units with dinner.  5/5/22  Yes Chuy Peters MD   gabapentin (NEURONTIN) 600 MG tablet TAKE UP TO 5 TABLETS BY MOUTH OVER 24 HOURS AS DIRECTED 5/4/22 5/4/23 Yes Chuy Peters MD   torsemide (DEMADEX) 20 MG tablet Take 1 tablet by mouth daily 5/3/22  Yes Chuy Peters MD   omeprazole (PRILOSEC) 20 MG delayed release capsule TAKE 1 CAPSULE BY MOUTH TWICE DAILY 5/2/22  Yes Chuy Peters MD   albuterol sulfate  (90 Base) MCG/ACT inhaler INHALE 2 PUFFS INTO THE LUNGS EVERY 6 HOURS AS NEEDED FOR WHEEZING 4/19/22  Yes Chuy Peters MD   FEROSUL 325 (65 Fe) MG tablet TAKE 1 TABLET BY MOUTH TWICE DAILY 3/21/22  Yes Chuy Peters MD   dapagliflozin (FARXIGA) 5 MG tablet Take 1 tablet by mouth daily LOT [id number] EXP 07/31/2024 4 BOXES 2/5/22  Yes Chuy Peters MD   potassium chloride (KLOR-CON) 10 MEQ extended release tablet TAKE 1 TABLET BY MOUTH DAILY 1/18/22  Yes Chuy Peters MD   furosemide (LASIX) 80 MG tablet TAKE 1 TABLET BY MOUTH DAILY 1/10/22  Yes Zainab Mar MD   Lancets MISC 1 each by Does not apply route daily Use to check glucose twice a day. One Touch brand. DX E11.9 10/28/21  Yes Zainab Mar MD   blood glucose monitor kit and supplies Test 2 times a day & as needed for symptoms of irregular blood glucose. 10/27/21  Yes Zainab Mar MD   blood glucose monitor strips Test 2 times a day & as needed for symptoms of irregular blood glucose.  10/27/21  Yes Zainab Mar MD   blood glucose test strips (ASCENSIA AUTODISC VI;ONE TOUCH ULTRA TEST VI) strip four times daily 10/26/21  Yes Zainab Mar MD   INSULIN SYRINGE .5CC/29G 29G X 1/2\" 0.5 ML MISC INJECT 4 TIMES DAILY AS NEEDED 10/21/21  Yes Zainab Mar MD   hydrALAZINE (APRESOLINE) 25 MG tablet TAKE 1 TABLET BY MOUTH THREE TIMES DAILY 4/12/21  Yes Historical Provider, MD   Cyanocobalamin (B-12) 500 MCG TABS TAKE 1 TABLET BY MOUTH DAILY 4/16/21  Yes Zainab Mar MD   umeclidinium-vilanterol [facility] ELLIPTA) 62.5-25 MCG/INH AEPB inhaler INHALE 1 PUFF INTO THE LUNGS DAILY 3/11/21  Yes Zainab Mar MD   insulin regular (NOVOLIN R RELION) 100 UNIT/ML injection INJECT 20 TO 30 UNITS SUBCUTANEOUSLY THREE TIMES DAILY BEFORE MEAL(S) 11/9/20  Yes Zainab Mar MD   Nebulizers (AIRIAL COMPACT MINI NEBULIZER) MISC 1 each by Does not apply route every 6 hours as needed (wheezing or shortness of breath) 4/8/20  Yes Zainab Mar MD   Respiratory Therapy Supplies (NEBULIZER/TUBING/MOUTHPIECE) KIT 1 kit by Does not apply route every 6 hours as needed (for wheezing or shortness of breath) 4/8/20  Yes Zainab Mar MD   vitamin D (ERGOCALCIFEROL) 96536 units CAPS capsule TK 1 C PO WEEKLY 7/29/19  Yes Historical Provider, MD   mupirocin (BACTROBAN) 2 % ointment Apply 3 times daily as needed 7/1/19  Yes Zainab Mar MD   levalbuterol Kendal Sears HFA) 45 MCG/ACT inhaler Inhale 1-2 puffs into the lungs every 4 hours as needed for Wheezing 2/18/19  Yes Zainab Mar MD   aspirin 81 MG EC tablet Take 81 mg by opacities, mucoid impaction and more diffuse interstitial opacities   throughout the lungs. Spectrum of findings may represent pulmonary edema,   aspiration or a developing pattern of pneumonitis. 3. Reactive mediastinal and hilar lymph node enlargement. RECOMMENDATIONS:   Unavailable         CT CERVICAL SPINE WO CONTRAST   Final Result   1. No acute finding of the cervical spine. 2. Chronic degenerative changes are noted incidentally. 3. Limited evaluation of a left pleural effusion and scattered airspace   changes in the lung apices. Correlate with any pulmonary symptoms. CT HEAD WO CONTRAST   Final Result   No acute hoheoicro0gjd abnormality. XR CHEST PORTABLE   Final Result   Moderate interstitial congestion with cardiomegaly. No pneumothorax. Consults:    IP CONSULT TO HOSPITALIST    ASSESSMENT:    Active Hospital Problems    Diagnosis Date Noted    COVID-19 virus infection [U07.1] 09/02/2022     Priority: Medium     1. Hypercapnic respiratory failure  2. Carbon dioxide narcosis with acute metabolic encephalopathy. CT head nondiagnostic. 3.  COVID-19 infection contributing to #2-does not meet criteria for specific COVID-19 therapy  4. COPD in exacerbation. No evidence of pneumonia. Procalcitonin normal.  5.  Features of congestive heart failure secondary to nonischemic cardiomyopathy + fluid overload  6. Hypertensive urgency with probable component of flash pulmonary edema  7. Hyperlipidemia  8. Type 2 diabetes mellitus  9. CAD    PLAN:    1. ABG  2.  Start BiPAP at 9/5 pressure settings  3. Lasix 40 mg IV daily  4. Check BMP  5. CHF pathway  -Input output charting  -Daily weights  -Lasix for diuresis  -Low-salt diet  -Restrict to 1500 cc fluid today  -Echocardiogram before discharge  4. Admit to ICU  5.   Critical care consult made;    DVT Prophylaxis: Lovenox  Diet: No diet orders on file  Code Status: Prior    PT/OT Eval Status: Ordered    Dispo -pending clinical improvement       Teressa Bhat MD    Thank you Licha Cox MD for the opportunity to be involved in this patient's care. If you have any questions or concerns please feel free to contact me at [phone].

## 2022-09-03 LAB
A/G RATIO: 0.9 (ref 1.1–2.2)
ALBUMIN SERPL-MCNC: 3 G/DL (ref 3.4–5)
ALP BLD-CCNC: 128 U/L (ref 40–129)
ALT SERPL-CCNC: 12 U/L (ref 10–40)
ANION GAP SERPL CALCULATED.3IONS-SCNC: 9 MMOL/L (ref 3–16)
AST SERPL-CCNC: 19 U/L (ref 15–37)
BASOPHILS ABSOLUTE: 0 K/UL (ref 0–0.2)
BASOPHILS RELATIVE PERCENT: 0.7 %
BILIRUB SERPL-MCNC: 0.7 MG/DL (ref 0–1)
BUN BLDV-MCNC: 19 MG/DL (ref 7–20)
C-REACTIVE PROTEIN: 15.7 MG/L (ref 0–5.1)
CALCIUM SERPL-MCNC: 8.8 MG/DL (ref 8.3–10.6)
CHLORIDE BLD-SCNC: 101 MMOL/L (ref 99–110)
CO2: 34 MMOL/L (ref 21–32)
CREAT SERPL-MCNC: 1.5 MG/DL (ref 0.8–1.3)
D DIMER: 1.06 UG/ML FEU (ref 0–0.6)
EOSINOPHILS ABSOLUTE: 0 K/UL (ref 0–0.6)
EOSINOPHILS RELATIVE PERCENT: 0.2 %
FIBRINOGEN: 333 MG/DL (ref 207–509)
GFR AFRICAN AMERICAN: 54
GFR NON-AFRICAN AMERICAN: 45
GLUCOSE BLD-MCNC: 225 MG/DL (ref 70–99)
GLUCOSE BLD-MCNC: 225 MG/DL (ref 70–99)
GLUCOSE BLD-MCNC: 229 MG/DL (ref 70–99)
GLUCOSE BLD-MCNC: 247 MG/DL (ref 70–99)
GLUCOSE BLD-MCNC: 302 MG/DL (ref 70–99)
HCT VFR BLD CALC: 39.3 % (ref 40.5–52.5)
HEMOGLOBIN: 12.3 G/DL (ref 13.5–17.5)
LYMPHOCYTES ABSOLUTE: 0.2 K/UL (ref 1–5.1)
LYMPHOCYTES RELATIVE PERCENT: 10.1 %
MCH RBC QN AUTO: 28.1 PG (ref 26–34)
MCHC RBC AUTO-ENTMCNC: 31.2 G/DL (ref 31–36)
MCV RBC AUTO: 90 FL (ref 80–100)
MONOCYTES ABSOLUTE: 0.1 K/UL (ref 0–1.3)
MONOCYTES RELATIVE PERCENT: 4.2 %
NEUTROPHILS ABSOLUTE: 2.1 K/UL (ref 1.7–7.7)
NEUTROPHILS RELATIVE PERCENT: 84.8 %
PDW BLD-RTO: 17.3 % (ref 12.4–15.4)
PERFORMED ON: ABNORMAL
PLATELET # BLD: 176 K/UL (ref 135–450)
PMV BLD AUTO: 8.7 FL (ref 5–10.5)
POTASSIUM REFLEX MAGNESIUM: 4 MMOL/L (ref 3.5–5.1)
PRO-BNP: 7327 PG/ML (ref 0–449)
RBC # BLD: 4.37 M/UL (ref 4.2–5.9)
SODIUM BLD-SCNC: 144 MMOL/L (ref 136–145)
TOTAL PROTEIN: 6.3 G/DL (ref 6.4–8.2)
TROPONIN: <0.01 NG/ML
VITAMIN D 25-HYDROXY: 60.7 NG/ML
WBC # BLD: 2.4 K/UL (ref 4–11)

## 2022-09-03 PROCEDURE — 36415 COLL VENOUS BLD VENIPUNCTURE: CPT

## 2022-09-03 PROCEDURE — 82306 VITAMIN D 25 HYDROXY: CPT

## 2022-09-03 PROCEDURE — 2580000003 HC RX 258: Performed by: INTERNAL MEDICINE

## 2022-09-03 PROCEDURE — 80053 COMPREHEN METABOLIC PANEL: CPT

## 2022-09-03 PROCEDURE — 6370000000 HC RX 637 (ALT 250 FOR IP): Performed by: INTERNAL MEDICINE

## 2022-09-03 PROCEDURE — 83880 ASSAY OF NATRIURETIC PEPTIDE: CPT

## 2022-09-03 PROCEDURE — 6360000002 HC RX W HCPCS: Performed by: INTERNAL MEDICINE

## 2022-09-03 PROCEDURE — 86140 C-REACTIVE PROTEIN: CPT

## 2022-09-03 PROCEDURE — 85025 COMPLETE CBC W/AUTO DIFF WBC: CPT

## 2022-09-03 PROCEDURE — 1200000000 HC SEMI PRIVATE

## 2022-09-03 PROCEDURE — 85379 FIBRIN DEGRADATION QUANT: CPT

## 2022-09-03 PROCEDURE — 99291 CRITICAL CARE FIRST HOUR: CPT | Performed by: INTERNAL MEDICINE

## 2022-09-03 PROCEDURE — 84484 ASSAY OF TROPONIN QUANT: CPT

## 2022-09-03 PROCEDURE — 94761 N-INVAS EAR/PLS OXIMETRY MLT: CPT

## 2022-09-03 PROCEDURE — 2700000000 HC OXYGEN THERAPY PER DAY

## 2022-09-03 PROCEDURE — 85384 FIBRINOGEN ACTIVITY: CPT

## 2022-09-03 RX ORDER — DEXAMETHASONE SODIUM PHOSPHATE 10 MG/ML
6 INJECTION, SOLUTION INTRAMUSCULAR; INTRAVENOUS DAILY
Status: DISCONTINUED | OUTPATIENT
Start: 2022-09-03 | End: 2022-09-05

## 2022-09-03 RX ORDER — INSULIN GLARGINE 100 [IU]/ML
50 INJECTION, SOLUTION SUBCUTANEOUS NIGHTLY
Status: DISCONTINUED | OUTPATIENT
Start: 2022-09-03 | End: 2022-09-05 | Stop reason: HOSPADM

## 2022-09-03 RX ORDER — INSULIN GLARGINE 100 [IU]/ML
10 INJECTION, SOLUTION SUBCUTANEOUS ONCE
Status: COMPLETED | OUTPATIENT
Start: 2022-09-03 | End: 2022-09-03

## 2022-09-03 RX ADMIN — FUROSEMIDE 40 MG: 10 INJECTION, SOLUTION INTRAMUSCULAR; INTRAVENOUS at 10:28

## 2022-09-03 RX ADMIN — INSULIN LISPRO 4 UNITS: 100 INJECTION, SOLUTION INTRAVENOUS; SUBCUTANEOUS at 14:03

## 2022-09-03 RX ADMIN — INSULIN LISPRO 4 UNITS: 100 INJECTION, SOLUTION INTRAVENOUS; SUBCUTANEOUS at 21:17

## 2022-09-03 RX ADMIN — FERROUS SULFATE TAB 325 MG (65 MG ELEMENTAL FE) 325 MG: 325 (65 FE) TAB at 18:52

## 2022-09-03 RX ADMIN — AMIODARONE HYDROCHLORIDE 200 MG: 200 TABLET ORAL at 10:28

## 2022-09-03 RX ADMIN — ATORVASTATIN CALCIUM 40 MG: 80 TABLET, FILM COATED ORAL at 21:17

## 2022-09-03 RX ADMIN — INSULIN LISPRO 4 UNITS: 100 INJECTION, SOLUTION INTRAVENOUS; SUBCUTANEOUS at 18:52

## 2022-09-03 RX ADMIN — DEXAMETHASONE SODIUM PHOSPHATE 6 MG: 10 INJECTION, SOLUTION INTRAMUSCULAR; INTRAVENOUS at 10:28

## 2022-09-03 RX ADMIN — APIXABAN 5 MG: 5 TABLET, FILM COATED ORAL at 21:17

## 2022-09-03 RX ADMIN — INSULIN LISPRO 10 UNITS: 100 INJECTION, SOLUTION INTRAVENOUS; SUBCUTANEOUS at 10:31

## 2022-09-03 RX ADMIN — FERROUS SULFATE TAB 325 MG (65 MG ELEMENTAL FE) 325 MG: 325 (65 FE) TAB at 10:29

## 2022-09-03 RX ADMIN — INSULIN LISPRO 10 UNITS: 100 INJECTION, SOLUTION INTRAVENOUS; SUBCUTANEOUS at 18:52

## 2022-09-03 RX ADMIN — FUROSEMIDE 40 MG: 10 INJECTION, SOLUTION INTRAMUSCULAR; INTRAVENOUS at 18:52

## 2022-09-03 RX ADMIN — INSULIN LISPRO 4 UNITS: 100 INJECTION, SOLUTION INTRAVENOUS; SUBCUTANEOUS at 10:30

## 2022-09-03 RX ADMIN — APIXABAN 5 MG: 5 TABLET, FILM COATED ORAL at 10:28

## 2022-09-03 RX ADMIN — INSULIN GLARGINE 10 UNITS: 100 INJECTION, SOLUTION SUBCUTANEOUS at 14:04

## 2022-09-03 RX ADMIN — INSULIN GLARGINE 50 UNITS: 100 INJECTION, SOLUTION SUBCUTANEOUS at 21:17

## 2022-09-03 RX ADMIN — ASPIRIN 81 MG: 81 TABLET, COATED ORAL at 10:29

## 2022-09-03 RX ADMIN — Medication 10 ML: at 10:30

## 2022-09-03 RX ADMIN — POTASSIUM CHLORIDE 10 MEQ: 750 TABLET, FILM COATED, EXTENDED RELEASE ORAL at 10:29

## 2022-09-03 RX ADMIN — ISOSORBIDE MONONITRATE 30 MG: 30 TABLET, EXTENDED RELEASE ORAL at 10:28

## 2022-09-03 RX ADMIN — INSULIN LISPRO 10 UNITS: 100 INJECTION, SOLUTION INTRAVENOUS; SUBCUTANEOUS at 14:03

## 2022-09-03 NOTE — PROGRESS NOTES
Pt To ICU shortly before shift change. Pt oriented and settled in room. Pt initially was alert and cooperative on bipap. He became lethargic and difficult to arouse for a few hours following, before becoming responsive and cooperative once again. VSS throughout the shift. Admission completed with help of numerous family members. Multiple family members updated on condition. Family found documentation of DNR-CC. MD alerted/code status updated. Pt bathed and assessed per protocol. Pt resting through the night without acute incident.

## 2022-09-03 NOTE — PLAN OF CARE
thought disturbance, including medications, impaired vision or hearing, underlying metabolic abnormalities, dehydration, psychiatric diagnoses, notify LIP

## 2022-09-03 NOTE — PROGRESS NOTES
PULMONARY AND CRITICAL CARE MEDICINE PROGRESS NOTE    Subjective: Patient still on BiPAP overnight. Blood gas has improved. Creatinine is improving. Good diuresis. Patient is alert and oriented. REVIEW OF SYSTEMS:   Constitutional symptoms: The patient denies fever, fatigue, night sweats, weight loss or weight gain. HEENT: No vision changes. No tinnitus, Denies sinus pain. No hoarseness, or dysphagia. Neck: Patient denies swelling in the neck. Cardiovascular: Denies chest pain, palpitation, syncope. Respiratory: Denies shortness of breath or cough. Gastrointestinal: Denies nausea, abdominal pain or change in bowel function. Genitourinary: Denies obstructive symptoms. No history of incontinence. Skin: No rashes or itching. Muskuloskeletal: Denies weakness or bone pain. Neurological: No headaches or seizures. Psychiatric: Denies mood swings or depression.      MEDICATIONS:     Scheduled Meds:   dexamethasone  6 mg IntraVENous Daily    apixaban  5 mg Oral BID    insulin glargine  50 Units SubCUTAneous Nightly    insulin glargine  10 Units SubCUTAneous Once    isosorbide mononitrate  30 mg Oral QAM    aspirin  81 mg Oral Daily    [START ON 9/4/2022] vitamin D  50,000 Units Oral Weekly    potassium chloride  10 mEq Oral Daily    ferrous sulfate  325 mg Oral BID WC    amiodarone  200 mg Oral Daily    atorvastatin  40 mg Oral Nightly    pantoprazole  40 mg Oral QAM AC    sodium chloride flush  5-40 mL IntraVENous 2 times per day    furosemide  40 mg IntraVENous BID    insulin lispro  10 Units SubCUTAneous TID WC    insulin lispro  0-16 Units SubCUTAneous TID WC    insulin lispro  0-4 Units SubCUTAneous Nightly       Current Infusions:    sodium chloride      dextrose         PRN meds:  albuterol sulfate HFA, nitroGLYCERIN, sodium chloride flush, acetaminophen **OR** acetaminophen, polyethylene glycol, ondansetron **OR** ondansetron, guaiFENesin-dextromethorphan, hydrALAZINE, sodium chloride, dextrose bolus **OR** dextrose bolus, glucagon (rDNA), dextrose    PHYSICAL EXAM:  BP (!) 127/56   Pulse 64   Temp 97.5 °F (36.4 °C) (Temporal)   Resp 16   Ht 5' 9\" (1.753 m)   Wt 241 lb 4.8 oz (109.5 kg)   SpO2 97%   BMI 35.63 kg/m²  I/O last 3 completed shifts: In: 1006.8 [IV Piggyback:1006.8]  Out: 800 [Urine:800] No intake/output data recorded. Intake/Output Summary (Last 24 hours) at 9/3/2022 1228  Last data filed at 9/3/2022 0200  Gross per 24 hour   Intake 1006.81 ml   Output 800 ml   Net 206.81 ml       CONSTITUTIONAL: He is a 81y.o.-year-old who appears his stated age. He is alert and oriented and in no acute distress. CARDIOVASCULAR: S1 S2 RRR. Without murmer  RESPIRATORY & CHEST: Lungs are clear to auscultation and percussion. No wheezing, no crackles. Good air movement  GASTROINTESTINAL & ABDOMEN: Soft, nontender, positive bowel sounds in all quadrants, non-distended, without hepatosplenomegaly. GENITOURINARY: Deferred. MUSCULOSKELETAL: No tenderness to palpation of the axial skeleton. There is no clubbing. No cyanosis. No edema of the lower extremities. SKIN OF BODY: No rash or jaundice. PSYCHIATRIC EVALUATION: Normal affect. Patient answers questions appropriately. HEMATOLOGIC/LYMPHATIC/ IMMUNOLOGIC: No palpable lymphadenopathy. NEUROLOGIC: Awake and answers questions. Groslly non-focal. Motor strength is 5+/5 in all muscle groups. The patient has a normal sensorium globally.      LABS:    Recent Labs     09/02/22  1016 09/03/22  0429   WBC 3.9* 2.4*   RBC 4.31 4.37   HGB 12.1* 12.3*   HCT 38.7* 39.3*   MCH 28.1 28.1   MCHC 31.3 31.2   RDW 16.9* 17.3*    176   MPV 8.4 8.7   NEUTOPHILPCT 79.6 84.8   MONOPCT 9.1 4.2   BASOPCT 0.8 0.7     Recent Labs     09/02/22  1016 09/03/22  0429    144   K 3.6 4.0   CL 99 101   ANIONGAP 9 9   CO2 35* 34*   BUN 17 19   CREATININE 1.8* 1.5*   CALCIUM 9.3 8.8   PROT 6.8 6.3*   BILITOT 0.8 0.7   ALKPHOS 141* 128   ALT 13 12   AST 23 19 12:28 PM    This note was completed using dragon medical speech recognition software. Grammatical errors, random word insertions, pronoun errors and incomplete sentences are occasional consequences of this technology due to software limitations. If there are questions or concerns about the content of this note of information contained within the body of this dictation they should be addressed with the provider for clarification.

## 2022-09-03 NOTE — PROGRESS NOTES
Hospitalist Progress Note      PCP: Nataliya Blair MD    Date of Admission: 9/2/2022    Chief Complaint: Dyspnea    Hospital Course:   Feels much better  No nausea vomiting  No headache  Breathing improved  No acute events on telemetry    Even balance although output not accurately recorded  Saturating 97 on 3 L    Medications:  Reviewed      Exam:    BP (!) 127/56   Pulse 64   Temp 97.5 °F (36.4 °C) (Temporal)   Resp 16   Ht 5' 9\" (1.753 m)   Wt 241 lb 4.8 oz (109.5 kg)   SpO2 97%   BMI 35.63 kg/m²     General appearance: No apparent distress, appears stated age and cooperative. HEENT: Pupils equal, round, and reactive to light. Conjunctivae/corneas clear. Neck: Supple, with full range of motion. No jugular venous distention. Trachea midline. Respiratory:  Normal respiratory effort. Clear to auscultation, bilaterally without RALES/WHEEZES/Rhonchi. Cardiovascular: Regular rate and rhythm with normal S1/S2 without MURMURS, rubs or gallops. Abdomen: Soft, non-tender, non-distended with normal bowel sounds. Musculoskeletal: No clubbing, cyanosis or EDEMA bilaterally. Full range of motion without deformity. Skin: Skin color, texture, turgor normal.  No rashes or lesions. Neurologic:  Neurovascularly intact without any focal sensory/motor deficits.  Cranial nerves: II-XII intact, grossly non-focal.        Labs:   Recent Labs     09/02/22  1016 09/03/22 0429   WBC 3.9* 2.4*   HGB 12.1* 12.3*   HCT 38.7* 39.3*    176     Recent Labs     09/02/22  1016 09/03/22  0429    144   K 3.6 4.0   CL 99 101   CO2 35* 34*   BUN 17 19   CREATININE 1.8* 1.5*   CALCIUM 9.3 8.8     Recent Labs     09/02/22  1016 09/03/22  0429   AST 23 19   ALT 13 12   BILIDIR 0.3  --    BILITOT 0.8 0.7   ALKPHOS 141* 128     Recent Labs     09/02/22  1016   INR 1.25*     Recent Labs     09/02/22  1016 09/02/22  2256 09/03/22 0429   CKTOTAL 80  --   --    TROPONINI 0.02* <0.01 <0.01       Urinalysis:      Lab Results Component Value Date/Time    NITRU Negative 06/29/2021 11:22 AM    WBCUA 2 06/29/2021 11:22 AM    RBCUA 10 06/29/2021 11:22 AM    BLOODU LARGE 06/29/2021 11:22 AM    SPECGRAV >1.030 06/29/2021 11:22 AM    GLUCOSEU 500 06/29/2021 11:22 AM       Radiology:  CT CHEST PULMONARY EMBOLISM W CONTRAST   Final Result   1. No pulmonary embolism. 2. Small left and trace right pleural effusions with peripheral airspace   opacities, mucoid impaction and more diffuse interstitial opacities   throughout the lungs. Spectrum of findings may represent pulmonary edema,   aspiration or a developing pattern of pneumonitis. 3. Reactive mediastinal and hilar lymph node enlargement. RECOMMENDATIONS:   Unavailable         CT CERVICAL SPINE WO CONTRAST   Final Result   1. No acute finding of the cervical spine. 2. Chronic degenerative changes are noted incidentally. 3. Limited evaluation of a left pleural effusion and scattered airspace   changes in the lung apices. Correlate with any pulmonary symptoms. CT HEAD WO CONTRAST   Final Result   No acute hzatkijwa2pww abnormality. XR CHEST PORTABLE   Final Result   Moderate interstitial congestion with cardiomegaly. No pneumothorax.              Assessment/Plan:    Active Hospital Problems    Diagnosis Date Noted    COVID-19 virus infection [U07.1] 09/02/2022     Priority: Medium    Acute hypercapnic respiratory failure (HCC) [J96.02] 09/02/2022     Priority: Medium    Chest pain, rule out acute myocardial infarction [R07.9] 09/02/2022     Priority: Medium       Acute Medical Issues Being Addressed:    81year-old admitted with dyspnea    Acute hypoxic and hypercapnic respiratory failure secondary to acute on chronic severe systolic heart failure  Was on BiPAP  Started on Lasix 40 IV twice daily  Clinically much improved  Off BiPAP  Blood gases improved  Currently on 3 L oxygen  Wean to maintain sats between 88 and 90  Seen by pulmonary  Overall doing significantly well  I do not think he has a COPD exacerbation or pneumonia    Acute encephalopathy metabolic secondary to hypercapnia  Currently awake alert oriented    Acute on chronic severe systolic heart failure  Continue Lasix 40 IV twice daily  Strict I's and O's    Recent COVID-positive  CRP elevated  On Decadron 6 IV    Type 2 diabetes mellitus  Blood sugars uncontrolled worsened by steroids  Give 1 dose of Lantus increase night Lantus to 50 units and add mealtime Humalog    Known COPD  No recent PFTs  Former smoker  No evidence of exacerbation  Home oxygen eval on discharge    Atrial fibrillation on Eliquis  Continue Eliquis    Morbid obesity    Coronary artery disease s/p CABG          DVT Prophylaxis: On apixaban  Diet: ADULT DIET;  Regular; 3 carb choices (45 gm/meal)  Code Status: DNR-CC      Dispo - once acute medical processes have resolved    Mirna Sanchez MD

## 2022-09-04 LAB
ANION GAP SERPL CALCULATED.3IONS-SCNC: 8 MMOL/L (ref 3–16)
BACTERIA: ABNORMAL /HPF
BASOPHILS ABSOLUTE: 0 K/UL (ref 0–0.2)
BASOPHILS RELATIVE PERCENT: 0.4 %
BILIRUBIN URINE: NEGATIVE
BLOOD, URINE: ABNORMAL
BUN BLDV-MCNC: 32 MG/DL (ref 7–20)
C-REACTIVE PROTEIN: 8 MG/L (ref 0–5.1)
CALCIUM SERPL-MCNC: 9.1 MG/DL (ref 8.3–10.6)
CHLORIDE BLD-SCNC: 100 MMOL/L (ref 99–110)
CLARITY: CLEAR
CO2: 32 MMOL/L (ref 21–32)
COLOR: YELLOW
CREAT SERPL-MCNC: 1.8 MG/DL (ref 0.8–1.3)
D DIMER: 1.18 UG/ML FEU (ref 0–0.6)
EOSINOPHILS ABSOLUTE: 0 K/UL (ref 0–0.6)
EOSINOPHILS RELATIVE PERCENT: 0.5 %
EPITHELIAL CELLS, UA: 0 /HPF (ref 0–5)
GFR AFRICAN AMERICAN: 44
GFR NON-AFRICAN AMERICAN: 36
GLUCOSE BLD-MCNC: 151 MG/DL (ref 70–99)
GLUCOSE BLD-MCNC: 161 MG/DL (ref 70–99)
GLUCOSE BLD-MCNC: 184 MG/DL (ref 70–99)
GLUCOSE BLD-MCNC: 199 MG/DL (ref 70–99)
GLUCOSE BLD-MCNC: 250 MG/DL (ref 70–99)
GLUCOSE URINE: 250 MG/DL
HCT VFR BLD CALC: 35.9 % (ref 40.5–52.5)
HEMOGLOBIN: 11.4 G/DL (ref 13.5–17.5)
HYALINE CASTS: 5 /LPF (ref 0–8)
KETONES, URINE: NEGATIVE MG/DL
LEUKOCYTE ESTERASE, URINE: NEGATIVE
LYMPHOCYTES ABSOLUTE: 0.4 K/UL (ref 1–5.1)
LYMPHOCYTES RELATIVE PERCENT: 8.2 %
MAGNESIUM: 2.4 MG/DL (ref 1.8–2.4)
MCH RBC QN AUTO: 28.3 PG (ref 26–34)
MCHC RBC AUTO-ENTMCNC: 31.7 G/DL (ref 31–36)
MCV RBC AUTO: 89.1 FL (ref 80–100)
MICROSCOPIC EXAMINATION: YES
MONOCYTES ABSOLUTE: 0.4 K/UL (ref 0–1.3)
MONOCYTES RELATIVE PERCENT: 7.6 %
NEUTROPHILS ABSOLUTE: 4.6 K/UL (ref 1.7–7.7)
NEUTROPHILS RELATIVE PERCENT: 83.3 %
NITRITE, URINE: NEGATIVE
PDW BLD-RTO: 17.5 % (ref 12.4–15.4)
PERFORMED ON: ABNORMAL
PH UA: 5 (ref 5–8)
PLATELET # BLD: 166 K/UL (ref 135–450)
PMV BLD AUTO: 8.7 FL (ref 5–10.5)
POTASSIUM SERPL-SCNC: 4.7 MMOL/L (ref 3.5–5.1)
PROCALCITONIN: 0.07 NG/ML (ref 0–0.15)
PROTEIN UA: NEGATIVE MG/DL
RBC # BLD: 4.03 M/UL (ref 4.2–5.9)
RBC UA: 3 /HPF (ref 0–4)
SODIUM BLD-SCNC: 140 MMOL/L (ref 136–145)
SPECIFIC GRAVITY UA: 1.01 (ref 1–1.03)
URIC ACID CRYSTALS: PRESENT
URINE REFLEX TO CULTURE: ABNORMAL
URINE TYPE: ABNORMAL
UROBILINOGEN, URINE: 0.2 E.U./DL
WBC # BLD: 5.5 K/UL (ref 4–11)
WBC UA: 1 /HPF (ref 0–5)

## 2022-09-04 PROCEDURE — 36415 COLL VENOUS BLD VENIPUNCTURE: CPT

## 2022-09-04 PROCEDURE — 6370000000 HC RX 637 (ALT 250 FOR IP): Performed by: INTERNAL MEDICINE

## 2022-09-04 PROCEDURE — 2580000003 HC RX 258: Performed by: INTERNAL MEDICINE

## 2022-09-04 PROCEDURE — 85379 FIBRIN DEGRADATION QUANT: CPT

## 2022-09-04 PROCEDURE — 87449 NOS EACH ORGANISM AG IA: CPT

## 2022-09-04 PROCEDURE — 94762 N-INVAS EAR/PLS OXIMTRY CONT: CPT

## 2022-09-04 PROCEDURE — 6360000002 HC RX W HCPCS: Performed by: INTERNAL MEDICINE

## 2022-09-04 PROCEDURE — 94761 N-INVAS EAR/PLS OXIMETRY MLT: CPT

## 2022-09-04 PROCEDURE — 94660 CPAP INITIATION&MGMT: CPT

## 2022-09-04 PROCEDURE — 81001 URINALYSIS AUTO W/SCOPE: CPT

## 2022-09-04 PROCEDURE — 80048 BASIC METABOLIC PNL TOTAL CA: CPT

## 2022-09-04 PROCEDURE — 85025 COMPLETE CBC W/AUTO DIFF WBC: CPT

## 2022-09-04 PROCEDURE — 84145 PROCALCITONIN (PCT): CPT

## 2022-09-04 PROCEDURE — 99233 SBSQ HOSP IP/OBS HIGH 50: CPT | Performed by: INTERNAL MEDICINE

## 2022-09-04 PROCEDURE — 1200000000 HC SEMI PRIVATE

## 2022-09-04 PROCEDURE — 83735 ASSAY OF MAGNESIUM: CPT

## 2022-09-04 PROCEDURE — 86140 C-REACTIVE PROTEIN: CPT

## 2022-09-04 RX ADMIN — FERROUS SULFATE TAB 325 MG (65 MG ELEMENTAL FE) 325 MG: 325 (65 FE) TAB at 09:09

## 2022-09-04 RX ADMIN — ATORVASTATIN CALCIUM 40 MG: 80 TABLET, FILM COATED ORAL at 20:58

## 2022-09-04 RX ADMIN — INSULIN GLARGINE 50 UNITS: 100 INJECTION, SOLUTION SUBCUTANEOUS at 21:01

## 2022-09-04 RX ADMIN — FERROUS SULFATE TAB 325 MG (65 MG ELEMENTAL FE) 325 MG: 325 (65 FE) TAB at 17:55

## 2022-09-04 RX ADMIN — ERGOCALCIFEROL 50000 UNITS: 1.25 CAPSULE ORAL at 13:09

## 2022-09-04 RX ADMIN — PANTOPRAZOLE SODIUM 40 MG: 40 TABLET, DELAYED RELEASE ORAL at 05:38

## 2022-09-04 RX ADMIN — APIXABAN 5 MG: 5 TABLET, FILM COATED ORAL at 20:58

## 2022-09-04 RX ADMIN — ISOSORBIDE MONONITRATE 30 MG: 30 TABLET, EXTENDED RELEASE ORAL at 09:09

## 2022-09-04 RX ADMIN — FUROSEMIDE 40 MG: 10 INJECTION, SOLUTION INTRAMUSCULAR; INTRAVENOUS at 09:09

## 2022-09-04 RX ADMIN — APIXABAN 5 MG: 5 TABLET, FILM COATED ORAL at 09:09

## 2022-09-04 RX ADMIN — AMIODARONE HYDROCHLORIDE 200 MG: 200 TABLET ORAL at 09:09

## 2022-09-04 RX ADMIN — INSULIN LISPRO 8 UNITS: 100 INJECTION, SOLUTION INTRAVENOUS; SUBCUTANEOUS at 21:01

## 2022-09-04 RX ADMIN — FUROSEMIDE 40 MG: 10 INJECTION, SOLUTION INTRAMUSCULAR; INTRAVENOUS at 17:55

## 2022-09-04 RX ADMIN — DEXAMETHASONE SODIUM PHOSPHATE 6 MG: 10 INJECTION, SOLUTION INTRAMUSCULAR; INTRAVENOUS at 09:09

## 2022-09-04 RX ADMIN — ASPIRIN 81 MG: 81 TABLET, COATED ORAL at 09:09

## 2022-09-04 RX ADMIN — INSULIN LISPRO 10 UNITS: 100 INJECTION, SOLUTION INTRAVENOUS; SUBCUTANEOUS at 09:10

## 2022-09-04 RX ADMIN — INSULIN LISPRO 10 UNITS: 100 INJECTION, SOLUTION INTRAVENOUS; SUBCUTANEOUS at 13:09

## 2022-09-04 RX ADMIN — Medication 10 ML: at 09:10

## 2022-09-04 RX ADMIN — INSULIN LISPRO 10 UNITS: 100 INJECTION, SOLUTION INTRAVENOUS; SUBCUTANEOUS at 17:55

## 2022-09-04 RX ADMIN — Medication 10 ML: at 20:58

## 2022-09-04 RX ADMIN — POTASSIUM CHLORIDE 10 MEQ: 750 TABLET, FILM COATED, EXTENDED RELEASE ORAL at 09:09

## 2022-09-04 ASSESSMENT — PAIN SCALES - GENERAL: PAINLEVEL_OUTOF10: 0

## 2022-09-04 NOTE — PROGRESS NOTES
Hospitalist Progress Note      PCP: Antoinette Asher MD    Date of Admission: 9/2/2022    Chief Complaint: Dyspnea    Hospital Course:     Doing well  Saturating 100 on 2 L  Have discontinued the oxygen and is still saturating around 98  -1 L in the last 24 hours  No headache  Breathing significantly improved  Medications:  Reviewed      Exam:    BP (!) 92/43   Pulse 64   Temp 97.8 °F (36.6 °C) (Temporal)   Resp 16   Ht 5' 9\" (1.753 m)   Wt 243 lb 3.2 oz (110.3 kg)   SpO2 100%   BMI 35.91 kg/m²     General appearance: No apparent distress, appears stated age and cooperative. HEENT: Pupils equal, round, and reactive to light. Conjunctivae/corneas clear. Neck: Supple, with full range of motion. No jugular venous distention. Trachea midline. Respiratory:  Normal respiratory effort. Clear to auscultation, bilaterally without RALES/WHEEZES/Rhonchi. Cardiovascular: Regular rate and rhythm with normal S1/S2 without MURMURS, rubs or gallops. Abdomen: Soft, non-tender, non-distended with normal bowel sounds. Musculoskeletal: No clubbing, 1+ pitting bilateral lower extremity edema with chronic venous changes and skin thickening  full range of motion without deformity. Skin: Skin color, texture, turgor normal.  No rashes or lesions. Neurologic:  Neurovascularly intact without any focal sensory/motor deficits.  Cranial nerves: II-XII intact, grossly non-focal.        Labs:   Recent Labs     09/02/22  1016 09/03/22  0429 09/04/22  0452   WBC 3.9* 2.4* 5.5   HGB 12.1* 12.3* 11.4*   HCT 38.7* 39.3* 35.9*    176 166       Recent Labs     09/02/22  1016 09/03/22  0429 09/04/22  0452    144 140   K 3.6 4.0 4.7   CL 99 101 100   CO2 35* 34* 32   BUN 17 19 32*   CREATININE 1.8* 1.5* 1.8*   CALCIUM 9.3 8.8 9.1       Recent Labs     09/02/22  1016 09/03/22  0429   AST 23 19   ALT 13 12   BILIDIR 0.3  --    BILITOT 0.8 0.7   ALKPHOS 141* 128       Recent Labs     09/02/22  1016   INR 1.25*       Recent Labs 09/02/22  1016 09/02/22  2256 09/03/22  0429   CKTOTAL 80  --   --    TROPONINI 0.02* <0.01 <0.01         Urinalysis:      Lab Results   Component Value Date/Time    NITRU Negative 09/04/2022 12:50 PM    WBCUA 2 06/29/2021 11:22 AM    RBCUA 10 06/29/2021 11:22 AM    BLOODU MODERATE 09/04/2022 12:50 PM    SPECGRAV 1.015 09/04/2022 12:50 PM    GLUCOSEU 250 09/04/2022 12:50 PM       Radiology:  CT CHEST PULMONARY EMBOLISM W CONTRAST   Final Result   1. No pulmonary embolism. 2. Small left and trace right pleural effusions with peripheral airspace   opacities, mucoid impaction and more diffuse interstitial opacities   throughout the lungs. Spectrum of findings may represent pulmonary edema,   aspiration or a developing pattern of pneumonitis. 3. Reactive mediastinal and hilar lymph node enlargement. RECOMMENDATIONS:   Unavailable         CT CERVICAL SPINE WO CONTRAST   Final Result   1. No acute finding of the cervical spine. 2. Chronic degenerative changes are noted incidentally. 3. Limited evaluation of a left pleural effusion and scattered airspace   changes in the lung apices. Correlate with any pulmonary symptoms. CT HEAD WO CONTRAST   Final Result   No acute aipitsksp1tsd abnormality. XR CHEST PORTABLE   Final Result   Moderate interstitial congestion with cardiomegaly. No pneumothorax.              Assessment/Plan:    Active Hospital Problems    Diagnosis Date Noted    COVID-19 virus infection [U07.1] 09/02/2022     Priority: Medium    Acute hypercapnic respiratory failure (HCC) [J96.02] 09/02/2022     Priority: Medium    Chest pain, rule out acute myocardial infarction [R07.9] 09/02/2022     Priority: Medium       Acute Medical Issues Being Addressed:    81year-old admitted with dyspnea    Acute hypoxic and hypercapnic respiratory failure secondary to acute on chronic severe systolic heart failure  Was on BiPAP  Started on Lasix 40 IV twice daily  Continues to diurese well  Off BiPAP  On 3 L which have stopped and he still saturating 100  Had blood gases as an inpatient  We will continue diuresis for another 24 hours and then change over to Lasix 80 twice daily  Does not have a pneumonia or COPD exacerbation    Acute encephalopathy metabolic secondary to hypercapnia  Currently awake alert oriented    Acute on chronic severe systolic heart failure  Continue Lasix 40 IV twice daily  Strict I's and O's  Change to Lasix oral 80 twice daily tomorrow    Recent COVID-positive  CRP elevated  On Decadron 6 IV will give a 10-day course    Type 2 diabetes mellitus  Blood sugars better controlled on Lantus 50 units and current Humalog scale may need to go down once he is off steroids    Known COPD  No recent PFTs  Former smoker  No evidence of exacerbation  Home oxygen eval on discharge  Patient would benefit from BiPAP/NIV at home for chronic respiratory failure with hypercapnia due to COPD  Discussed with   Will order overnight pulse oximetry and bedside spirometry    Atrial fibrillation on Eliquis  Continue Eliquis    Chronic kidney disease stage III  Baseline creatinine seems to vary from 1.5-1.8    Morbid obesity    Coronary artery disease s/p CABG    If BiPAP arranged for and seen by PT OT possible discharge Monday or Tuesday based on when the equipment can be arranged for at home  Can be transferred out of ICU to [place] [id number] telemetry bed        DVT Prophylaxis: On apixaban  Diet: ADULT DIET;  Regular; 3 carb choices (45 gm/meal)  Code Status: DNR-CC      Dispo - once acute medical processes have resolved    Ani Swain MD

## 2022-09-04 NOTE — PROGRESS NOTES
PULMONARY AND CRITICAL CARE MEDICINE PROGRESS NOTE    Subjective: Patient has done well. He wore BiPAP overnight. During the daytime he has been okay with 1 to 2 L nasal cannula. Good diuresis with Lasix. CRP down to 8. REVIEW OF SYSTEMS:   Constitutional symptoms: The patient denies fever, fatigue, night sweats, weight loss or weight gain. HEENT: No vision changes. No tinnitus, Denies sinus pain. No hoarseness, or dysphagia. Neck: Patient denies swelling in the neck. Cardiovascular: Denies chest pain, palpitation, syncope. Respiratory: Improved shortness of breath or cough. Gastrointestinal: Denies nausea, abdominal pain or change in bowel function. Genitourinary: Denies obstructive symptoms. No history of incontinence. Skin: No rashes or itching. Muskuloskeletal: Denies weakness or bone pain. Neurological: No headaches or seizures. Psychiatric: Denies mood swings or depression.      MEDICATIONS:     Scheduled Meds:   dexamethasone  6 mg IntraVENous Daily    apixaban  5 mg Oral BID    insulin glargine  50 Units SubCUTAneous Nightly    isosorbide mononitrate  30 mg Oral QAM    aspirin  81 mg Oral Daily    vitamin D  50,000 Units Oral Weekly    potassium chloride  10 mEq Oral Daily    ferrous sulfate  325 mg Oral BID WC    amiodarone  200 mg Oral Daily    atorvastatin  40 mg Oral Nightly    pantoprazole  40 mg Oral QAM AC    sodium chloride flush  5-40 mL IntraVENous 2 times per day    furosemide  40 mg IntraVENous BID    insulin lispro  10 Units SubCUTAneous TID WC    insulin lispro  0-16 Units SubCUTAneous TID WC    insulin lispro  0-4 Units SubCUTAneous Nightly       Current Infusions:    sodium chloride      dextrose         PRN meds:  albuterol sulfate HFA, nitroGLYCERIN, sodium chloride flush, acetaminophen **OR** acetaminophen, polyethylene glycol, ondansetron **OR** ondansetron, guaiFENesin-dextromethorphan, hydrALAZINE, sodium chloride, dextrose bolus **OR** dextrose bolus, glucagon (rDNA), dextrose    PHYSICAL EXAM:  BP (!) 92/43   Pulse 64   Temp 97.8 °F (36.6 °C) (Temporal)   Resp 16   Ht 5' 9\" (1.753 m)   Wt 243 lb 3.2 oz (110.3 kg)   SpO2 100%   BMI 35.91 kg/m²  I/O last 3 completed shifts: In: 1006.8 [IV Piggyback:1006.8]  Out: 1850 [Urine:1850] No intake/output data recorded. Intake/Output Summary (Last 24 hours) at 9/4/2022 1118  Last data filed at 9/3/2022 2120  Gross per 24 hour   Intake --   Output 1050 ml   Net -1050 ml       CONSTITUTIONAL: He is a 81y.o.-year-old who appears his stated age. He is alert and oriented x 3 and in no acute distress. CARDIOVASCULAR: S1 S2 RRR. Without murmer  RESPIRATORY & CHEST: Lungs are clear to auscultation and percussion. No wheezing, no crackles. Good air movement  GASTROINTESTINAL & ABDOMEN: Soft, nontender, positive bowel sounds in all quadrants, non-distended, without hepatosplenomegaly. GENITOURINARY: Deferred. MUSCULOSKELETAL: No tenderness to palpation of the axial skeleton. There is no clubbing. No cyanosis. Improved edema of the lower extremities. SKIN OF BODY: No rash or jaundice. PSYCHIATRIC EVALUATION: Normal affect. Patient answers questions appropriately. HEMATOLOGIC/LYMPHATIC/ IMMUNOLOGIC: No palpable lymphadenopathy. NEUROLOGIC: Alert and oriented x 3. Groslly non-focal. Motor strength is 5+/5 in all muscle groups. The patient has a normal sensorium globally.      LABS:    Recent Labs     09/02/22  1016 09/03/22  0429 09/04/22  0452   WBC 3.9* 2.4* 5.5   RBC 4.31 4.37 4.03*   HGB 12.1* 12.3* 11.4*   HCT 38.7* 39.3* 35.9*   MCH 28.1 28.1 28.3   MCHC 31.3 31.2 31.7   RDW 16.9* 17.3* 17.5*    176 166   MPV 8.4 8.7 8.7   NEUTOPHILPCT 79.6 84.8 83.3   MONOPCT 9.1 4.2 7.6   BASOPCT 0.8 0.7 0.4     Recent Labs     09/02/22  1016 09/03/22  0429 09/04/22  0452    144 140   K 3.6 4.0 4.7   CL 99 101 100   ANIONGAP 9 9 8   CO2 35* 34* 32   BUN 17 19 32*   CREATININE 1.8* 1.5* 1.8*   CALCIUM 9.3 8.8 information contained within the body of this dictation they should be addressed with the provider for clarification.

## 2022-09-04 NOTE — PLAN OF CARE
Problem: Discharge Planning  Goal: Discharge to home or other facility with appropriate resources  Outcome: Progressing     Problem: Skin/Tissue Integrity  Goal: Absence of new skin breakdown  Description: 1. Monitor for areas of redness and/or skin breakdown  2. Assess vascular access sites hourly  3. Every 4-6 hours minimum:  Change oxygen saturation probe site  4. Every 4-6 hours:  If on nasal continuous positive airway pressure, respiratory therapy assess nares and determine need for appliance change or resting period. Outcome: Progressing     Problem: Safety - Adult  Goal: Free from fall injury  Outcome: Progressing     Problem: ABCDS Injury Assessment  Goal: Absence of physical injury  Outcome: Progressing     Problem: Confusion  Goal: Confusion, delirium, dementia, or psychosis is improved or at baseline  Description: INTERVENTIONS:  1. Assess for possible contributors to thought disturbance, including medications, impaired vision or hearing, underlying metabolic abnormalities, dehydration, psychiatric diagnoses, and notify attending LIP  2. [place] high risk fall precautions, as indicated  3. Provide frequent short contacts to provide reality reorientation, refocusing and direction  4. Decrease environmental stimuli, including noise as appropriate  5. Monitor and intervene to maintain adequate nutrition, hydration, elimination, sleep and activity  6. If unable to ensure safety without constant attention obtain sitter and review sitter guidelines with assigned personnel  7.  Initiate Psychosocial CNS and Spiritual Care consult, as indicated  Outcome: Progressing

## 2022-09-05 VITALS
TEMPERATURE: 97.7 F | BODY MASS INDEX: 35.81 KG/M2 | SYSTOLIC BLOOD PRESSURE: 121 MMHG | RESPIRATION RATE: 15 BRPM | HEART RATE: 69 BPM | HEIGHT: 69 IN | DIASTOLIC BLOOD PRESSURE: 63 MMHG | WEIGHT: 241.8 LBS | OXYGEN SATURATION: 93 %

## 2022-09-05 LAB
ANION GAP SERPL CALCULATED.3IONS-SCNC: 10 MMOL/L (ref 3–16)
ANISOCYTOSIS: ABNORMAL
BASOPHILS ABSOLUTE: 0 K/UL (ref 0–0.2)
BASOPHILS RELATIVE PERCENT: 0 %
BUN BLDV-MCNC: 33 MG/DL (ref 7–20)
CALCIUM SERPL-MCNC: 8.7 MG/DL (ref 8.3–10.6)
CHLORIDE BLD-SCNC: 100 MMOL/L (ref 99–110)
CO2: 31 MMOL/L (ref 21–32)
CREAT SERPL-MCNC: 1.5 MG/DL (ref 0.8–1.3)
EOSINOPHILS ABSOLUTE: 0 K/UL (ref 0–0.6)
EOSINOPHILS RELATIVE PERCENT: 0 %
GFR AFRICAN AMERICAN: 54
GFR NON-AFRICAN AMERICAN: 45
GLUCOSE BLD-MCNC: 117 MG/DL (ref 70–99)
GLUCOSE BLD-MCNC: 154 MG/DL (ref 70–99)
GLUCOSE BLD-MCNC: 163 MG/DL (ref 70–99)
GLUCOSE BLD-MCNC: 193 MG/DL (ref 70–99)
HCT VFR BLD CALC: 37.4 % (ref 40.5–52.5)
HEMOGLOBIN: 11.8 G/DL (ref 13.5–17.5)
L. PNEUMOPHILA SEROGP 1 UR AG: NORMAL
LYMPHOCYTES ABSOLUTE: 0.4 K/UL (ref 1–5.1)
LYMPHOCYTES RELATIVE PERCENT: 7 %
MCH RBC QN AUTO: 27.8 PG (ref 26–34)
MCHC RBC AUTO-ENTMCNC: 31.5 G/DL (ref 31–36)
MCV RBC AUTO: 88.2 FL (ref 80–100)
MONOCYTES ABSOLUTE: 0.5 K/UL (ref 0–1.3)
MONOCYTES RELATIVE PERCENT: 9 %
NEUTROPHILS ABSOLUTE: 4.3 K/UL (ref 1.7–7.7)
NEUTROPHILS RELATIVE PERCENT: 84 %
PDW BLD-RTO: 17.9 % (ref 12.4–15.4)
PERFORMED ON: ABNORMAL
PLATELET # BLD: 156 K/UL (ref 135–450)
PLATELET SLIDE REVIEW: ADEQUATE
PMV BLD AUTO: 8.3 FL (ref 5–10.5)
POLYCHROMASIA: ABNORMAL
POTASSIUM SERPL-SCNC: 3.5 MMOL/L (ref 3.5–5.1)
PRO-BNP: 7410 PG/ML (ref 0–449)
RBC # BLD: 4.24 M/UL (ref 4.2–5.9)
SLIDE REVIEW: ABNORMAL
SODIUM BLD-SCNC: 141 MMOL/L (ref 136–145)
STREP PNEUMONIAE ANTIGEN, URINE: NORMAL
WBC # BLD: 5.1 K/UL (ref 4–11)

## 2022-09-05 PROCEDURE — 97166 OT EVAL MOD COMPLEX 45 MIN: CPT

## 2022-09-05 PROCEDURE — 6360000002 HC RX W HCPCS: Performed by: INTERNAL MEDICINE

## 2022-09-05 PROCEDURE — 6370000000 HC RX 637 (ALT 250 FOR IP): Performed by: INTERNAL MEDICINE

## 2022-09-05 PROCEDURE — 85025 COMPLETE CBC W/AUTO DIFF WBC: CPT

## 2022-09-05 PROCEDURE — 94680 O2 UPTK RST&XERS DIR SIMPLE: CPT

## 2022-09-05 PROCEDURE — 97162 PT EVAL MOD COMPLEX 30 MIN: CPT

## 2022-09-05 PROCEDURE — 83880 ASSAY OF NATRIURETIC PEPTIDE: CPT

## 2022-09-05 PROCEDURE — 97530 THERAPEUTIC ACTIVITIES: CPT

## 2022-09-05 PROCEDURE — 36415 COLL VENOUS BLD VENIPUNCTURE: CPT

## 2022-09-05 PROCEDURE — 2580000003 HC RX 258: Performed by: INTERNAL MEDICINE

## 2022-09-05 PROCEDURE — 99233 SBSQ HOSP IP/OBS HIGH 50: CPT | Performed by: INTERNAL MEDICINE

## 2022-09-05 PROCEDURE — 97535 SELF CARE MNGMENT TRAINING: CPT

## 2022-09-05 PROCEDURE — 97116 GAIT TRAINING THERAPY: CPT

## 2022-09-05 PROCEDURE — 80048 BASIC METABOLIC PNL TOTAL CA: CPT

## 2022-09-05 RX ORDER — HYDRALAZINE HYDROCHLORIDE 10 MG/1
10 TABLET, FILM COATED ORAL 3 TIMES DAILY
Qty: 30 TABLET | Refills: 2 | Status: SHIPPED | OUTPATIENT
Start: 2022-09-05

## 2022-09-05 RX ORDER — POTASSIUM CHLORIDE 20 MEQ/1
20 TABLET, EXTENDED RELEASE ORAL DAILY
Qty: 90 TABLET | Refills: 1 | Status: SHIPPED | OUTPATIENT
Start: 2022-09-05

## 2022-09-05 RX ORDER — TORSEMIDE 20 MG/1
40 TABLET ORAL 2 TIMES DAILY
Status: DISCONTINUED | OUTPATIENT
Start: 2022-09-05 | End: 2022-09-05 | Stop reason: HOSPADM

## 2022-09-05 RX ADMIN — FUROSEMIDE 40 MG: 10 INJECTION, SOLUTION INTRAMUSCULAR; INTRAVENOUS at 09:34

## 2022-09-05 RX ADMIN — DEXAMETHASONE SODIUM PHOSPHATE 6 MG: 10 INJECTION, SOLUTION INTRAMUSCULAR; INTRAVENOUS at 09:34

## 2022-09-05 RX ADMIN — TORSEMIDE 40 MG: 20 TABLET ORAL at 15:15

## 2022-09-05 RX ADMIN — ISOSORBIDE MONONITRATE 30 MG: 30 TABLET, EXTENDED RELEASE ORAL at 09:33

## 2022-09-05 RX ADMIN — AMIODARONE HYDROCHLORIDE 200 MG: 200 TABLET ORAL at 09:34

## 2022-09-05 RX ADMIN — POTASSIUM CHLORIDE 10 MEQ: 750 TABLET, FILM COATED, EXTENDED RELEASE ORAL at 09:34

## 2022-09-05 RX ADMIN — INSULIN LISPRO 10 UNITS: 100 INJECTION, SOLUTION INTRAVENOUS; SUBCUTANEOUS at 12:59

## 2022-09-05 RX ADMIN — APIXABAN 5 MG: 5 TABLET, FILM COATED ORAL at 09:34

## 2022-09-05 RX ADMIN — Medication 10 ML: at 09:35

## 2022-09-05 RX ADMIN — FERROUS SULFATE TAB 325 MG (65 MG ELEMENTAL FE) 325 MG: 325 (65 FE) TAB at 09:34

## 2022-09-05 RX ADMIN — ASPIRIN 81 MG: 81 TABLET, COATED ORAL at 09:34

## 2022-09-05 RX ADMIN — PANTOPRAZOLE SODIUM 40 MG: 40 TABLET, DELAYED RELEASE ORAL at 09:34

## 2022-09-05 RX ADMIN — INSULIN LISPRO 10 UNITS: 100 INJECTION, SOLUTION INTRAVENOUS; SUBCUTANEOUS at 09:35

## 2022-09-05 NOTE — PROGRESS NOTES
8/12/11, Hyperlipidemia, Hypertension, Ischemic cardiomyopathy, Lipoma of skin-RIGHT CHEST, Obesity, Osteoarthritis, Peripheral venous insufficiency, Skin tear of left forearm without complication, and Spinal stenosis of lumbar region with neurogenic claudication- clinically. Past Surgical History:  has a past surgical history that includes Coronary artery bypass graft (1993); Cataract removal (2010, 2011); Colonoscopy; joint replacement (Right, total knee replacement); joint replacement (Left, 09/14/2016); and Eye surgery (Left, 2019). Discharge Recommendations: Patricia Pope scored a 20/24 on the AM-PAC ADL Inpatient form. Current research shows that an AM-PAC score of 18 or greater is typically associated with a discharge to the patient's home setting. Based on the patient's AM-PAC score, and their current ADL deficits, it is recommended that the patient have 2-3 sessions per week of Occupational Therapy at d/c to increase the patient's independence. At this time, this patient demonstrates the endurance and safety to discharge home with home OT (home vs OP services) and a follow up treatment frequency of 2-3x/wk. Please see assessment section for further patient specific details. Patient stated is currently going to outpatient PT at [address] [place] [address] for LE weakness and falls. If patient discharges prior to next session this note will serve as a discharge summary. Please see below for the latest assessment towards goals. HOME HEALTH CARE: LEVEL 1 STANDARD    - Initial home health evaluation to occur within 24-48 hours, in patient home   - Therapy to evaluate with goal of regaining prior level of functioning   - Therapy to evaluate if patient has [address] needs for personal care   DME Required For Discharge: other: Patient is being evaluated for home O2 and possible need for C-pap. Also recommend a finger SpO2 monitor.      Precautions/Restrictions: high fall risk, Isolation precautions, pacemaker, . Comment: COVID isolation  Weight Bearing Restrictions: no restrictions  [] Right Upper Extremity  [] Left Upper Extremity [] Right Lower Extremity  [] Left Lower Extremity     Required Braces/Orthotics: no braces required   [] Right  [] Left  Positional Restrictions:no positional restrictions    Pre-Admission Information   Lives With: spouse                  Type of Home: house  Home Layout: one level  Home Access:  1  step to enter without rails   Bathroom Layout: tub/shower unit  Bathroom Equipment: grab bars in shower, shower chair  Toilet Height: elevated height  Home Equipment: rolling walker, reacher, sock aide  Transfer Assistance: modified independent with use of RW  Ambulation Assistance:modified independent with use of RW  ADL Assistance: independent with all ADL's  IADL Assistance: independent with homemaking tasks  Active :        [x] Yes                 [] No  Hand Dominance: [] Left                 [x] Right  Current Employment: retired. Occupation: [employer]  Hobbies: working in [place], [employer], [employer] range  Recent Falls: Pt reporting several falls in past 6 mo. Pt reporting latest fall just before coming to hospital.         Examination   Vision:   Vision Corrective Device: wears glasses for reading  Hearing:   hard of hearing  Perception:   WFL  Observation:   General Observation:  edema on left LE (patient stated is chronic and has pressure stocking). Posture:   Cues for full upright posture at RW. No c/o dizziness even when O2 dropped to mid 80s. Sensation:   reports numbness and tingling in all extremities  Proprioception:    WFL  Tone:   Normotonic  Coordination Testing:   Coordination and Movement Description: (R) UE, (L) UE, decreased speed, decreased accuracy. Patient with tight intrinsic hand muscles, keeps hands slightly flexed. Able to fully extend fingers but appears tight.    ROM:   (B) UE AROM WFL  Strength:   (B) UE strength grossly [facility]    Decision Making: medium complexity  Clinical Presentation: evolving      Subjective  General: Patient is pleasant and cooperative. Patient is VERY FOCUSED on going home today due to daughter has to leave today to return to [place]. 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 Patient stated has 2 sons in the area but is still wanting to leave today. Patient on 2L of O2 at rest upon entering room, sats 100%  Pain: 0/10  Pain Interventions: not applicable        Activities of Daily Living  Basic Activities of Daily Living  Feeding: Independent  Grooming: setup assistance  Grooming Comments: stood at sink to wash hands  Toileting: setup assistance. Toileting Comments: urinated sitting on commode  General Comments: Declined bathing and dressing, uses reacher and sock aide to don/doff socks and shoes. Instrumental Activities of Daily Living  No IADL completed on this date. Functional Mobility  Bed Mobility  Bed mobility not completed on this date. Comments:  Transfers  Sit to stand transfer:stand by assistance  Stand to sit transfer: stand by assistance  Bed / Chair transfer: stand by assistance. Toilet transfer: stand by assistance  Comments: With RW. Monitored O2. Dropped to 84% during ambulation without any symptoms while on room air. Replaced to 2L of O2 and able to maintain sats above 90% during sitting and mobility. Ambulated @ 10 feet bed to commode, @ 15 feet from commode to chair and @ 70 feet within room. Monitored sats with and without O2. Dropped to 84% with ambulation without supplemental O2. Functional Mobility:  Standing Balance: stand by assistance. Functional Mobility: .  stand by assistance  Functional Mobility Comment: SBA with RW within room.      Other Therapeutic Interventions      Functional Outcomes  AM-PAC Inpatient Daily Activity Raw Score: 20    Cognition  WFL  Orientation:    alert and oriented x 4  Command Following:   [facility]     Education  Barriers To Learning: hearing  Patient Education: patient educated on goals, OT role and benefits, transfer training, discharge recommendations, discussed recommendation for increased services at home. Learning Assessment:  patient verbalizes and demonstrates understanding    Assessment  Activity Tolerance: Fair, O2 dropped to 84% during ambulation of 70 feet within room with RW. Impairments Requiring Therapeutic Intervention: decreased functional mobility, decreased ADL status, decreased endurance, decreased IADL  Prognosis: good and fair  Clinical Assessment: Patient is very indep prior to admission. However patient reports increased falls. Patient also is primary caregiver for wife who has Alzheimer's. Patient drives, does own grocery shopping and goes to outpatient PT at [address]. Recommend increased services for home, home OT and PT and possibly need home O2. Recommend SpO2 reader.    Safety Interventions: patient left in chair, chair alarm in place, call light within reach, and nurse notified    Plan  Frequency: 3-5 x/per week  Current Treatment Recommendations: strengthening, balance training, functional mobility training, transfer training, ADL/self-care training, IADL training, home management training, and equipment evaluation/education    Goals  Patient Goals: Go home today   Short Term Goals:  Time Frame: until discharge  Patient will complete dressing with modified independent   Patient will complete toileting at Independent   Patient will complete functional transfers at modified independent   Patient will complete functional mobility at modified independent   Patient will increase [facility] score = to or > than 22/24    Therapy Session Time     Individual Group Co-treatment   Time In    0813   Time Out    0907   Minutes    54        Timed Code Treatment Minutes: 39     Total Treatment Minutes:  54       Electronically Signed By: CHARISSE Posada/PRANAV [phone]

## 2022-09-05 NOTE — PROGRESS NOTES
PULMONARY AND CRITICAL CARE MEDICINE PROGRESS NOTE    Subjective: No acute events overnight. Patient slept with BiPAP overnight without any complaints. Good diuresis with diuretics. REVIEW OF SYSTEMS:   Constitutional symptoms: The patient denies fever, fatigue, night sweats, weight loss or weight gain. HEENT: No vision changes. No tinnitus, Denies sinus pain. No hoarseness, or dysphagia. Neck: Patient denies swelling in the neck. Cardiovascular: Denies chest pain, palpitation, syncope. Respiratory: Denies shortness of breath or cough. Gastrointestinal: Denies nausea, abdominal pain or change in bowel function. Genitourinary: Denies obstructive symptoms. No history of incontinence. Skin: No rashes or itching. Muskuloskeletal: Denies weakness or bone pain. Neurological: No headaches or seizures. Psychiatric: Denies mood swings or depression.      MEDICATIONS:     Scheduled Meds:   torsemide  40 mg Oral BID    apixaban  5 mg Oral BID    insulin glargine  50 Units SubCUTAneous Nightly    isosorbide mononitrate  30 mg Oral QAM    aspirin  81 mg Oral Daily    vitamin D  50,000 Units Oral Weekly    potassium chloride  10 mEq Oral Daily    ferrous sulfate  325 mg Oral BID WC    amiodarone  200 mg Oral Daily    atorvastatin  40 mg Oral Nightly    pantoprazole  40 mg Oral QAM AC    sodium chloride flush  5-40 mL IntraVENous 2 times per day    insulin lispro  10 Units SubCUTAneous TID WC    insulin lispro  0-16 Units SubCUTAneous TID WC    insulin lispro  0-4 Units SubCUTAneous Nightly       Current Infusions:    sodium chloride      dextrose         PRN meds:  albuterol sulfate HFA, nitroGLYCERIN, sodium chloride flush, acetaminophen **OR** acetaminophen, polyethylene glycol, ondansetron **OR** ondansetron, guaiFENesin-dextromethorphan, hydrALAZINE, sodium chloride, dextrose bolus **OR** dextrose bolus, glucagon (rDNA), dextrose    PHYSICAL EXAM:  /65   Pulse 61   Temp 97.5 °F (36.4 °C) (Temporal)   Resp 17   Ht 5' 9\" (1.753 m)   Wt 241 lb 12.8 oz (109.7 kg)   SpO2 98%   BMI 35.71 kg/m²  I/O last 3 completed shifts: In: 1440 [P.O.:1440]  Out: 1600 [Urine:1600] No intake/output data recorded. Intake/Output Summary (Last 24 hours) at 9/5/2022 1333  Last data filed at 9/4/2022 1715  Gross per 24 hour   Intake 360 ml   Output --   Net 360 ml       CONSTITUTIONAL: He is a 81y.o.-year-old who appears his stated age. He is alert and oriented x 3 and in no acute distress. CARDIOVASCULAR: S1 S2 RRR. Without murmer  RESPIRATORY & CHEST: Lungs are clear to auscultation and percussion. No wheezing, no crackles. Good air movement  GASTROINTESTINAL & ABDOMEN: Soft, nontender, positive bowel sounds in all quadrants, non-distended, without hepatosplenomegaly. GENITOURINARY: Deferred. MUSCULOSKELETAL: No tenderness to palpation of the axial skeleton. There is no clubbing. No cyanosis. No edema of the lower extremities. SKIN OF BODY: No rash or jaundice. PSYCHIATRIC EVALUATION: Normal affect. Patient answers questions appropriately. HEMATOLOGIC/LYMPHATIC/ IMMUNOLOGIC: No palpable lymphadenopathy. NEUROLOGIC: Alert and oriented x 3. Groslly non-focal. Motor strength is 5+/5 in all muscle groups. The patient has a normal sensorium globally.      LABS:    Recent Labs     09/03/22 0429 09/04/22 0452 09/05/22 0427   WBC 2.4* 5.5 5.1   RBC 4.37 4.03* 4.24   HGB 12.3* 11.4* 11.8*   HCT 39.3* 35.9* 37.4*   MCH 28.1 28.3 27.8   MCHC 31.2 31.7 31.5   RDW 17.3* 17.5* 17.9*    166 156   MPV 8.7 8.7 8.3   NEUTOPHILPCT 84.8 83.3 84.0   MONOPCT 4.2 7.6 9.0   BASOPCT 0.7 0.4 0.0     Recent Labs     09/03/22  0429 09/04/22  0452 09/05/22  0427    140 141   K 4.0 4.7 3.5    100 100   ANIONGAP 9 8 10   CO2 34* 32 31   BUN 19 32* 33*   CREATININE 1.5* 1.8* 1.5*   CALCIUM 8.8 9.1 8.7   PROT 6.3*  --   --    BILITOT 0.7  --   --    ALKPHOS 128  --   --    ALT 12  --   --    AST 19  --   -- GLUCOSE 302* 184* 154*     Recent Labs     09/02/22  1434 09/02/22  1759   PHART 7.262* 7.374   [id number] 83.4* 61.1*   PO2ART 146.0* 76.1   VWD2MXO 37.6* 35.6*   [id number] 99.1 95.2   BEART 7.8* 8.3*       IMPRESSION:  Acute hypercapnic respiratory failure  Acute metabolic encephalopathy due to hypercarbia  Pneumonia due to [id number]  Systolic heart failure, EF 30 to 35% s/p BiV ICD  CAD s/p CABG and PCI  Atrial fibrillation on Eliquis  CKD  Diabetes mellitus type 2  Morbid obesity        PLAN:  Patient presented with acute metabolic encephalopathy due to hypercapnia. Mental status has normalized. BiPAP at night and intermittently during the day. Patient has edema of lower extremities left more than right. He has systolic heart failure with EF of 30 to 35%. BNP elevated at 7227. Diuresing well with diuretics. Restricted fluid intake. Patient has CKD, creatinine at baseline. Strict input output charting. Patient has a diagnosis of COPD. Last PFTs from 2014 showed restrictive lung disease, no obstruction was noted. No recent PFTs. He is a former smoker. Continue Stiolto. Patient really is insisting on going home today. Okay for OT evaluation, if qualifies then discharged on home oxygen. Patient can have a short-term follow-up with pulmonary as outpatient. For hypercapnic respiratory failure due to COPD-  Patient has severe COPD. He gets short of breath on performing daily activities of life. ABG  indicates chronic respiratory failure with hypercapnia. Patient needs ventilatory support. After reviewing patient's current respiratory status, I believe patient would benefit from NIV. BiPAP ruled out due to patient's severe COPD. Without NIV, patient will experience further clinical deterioration. Patient is COVID-positive. Supposedly, he was tested positive 2 weeks ago. CRP down to 8. Decadron can be discontinued. Elevated D-dimer. Continue Eliquis 5 mg daily.           Roro Camacho Hunter Briceño MD  Pulmonary Critical Care and Sleep Medicine  9/5/2022, 1:33 PM    This note was completed using dragon medical speech recognition software. Grammatical errors, random word insertions, pronoun errors and incomplete sentences are occasional consequences of this technology due to software limitations. If there are questions or concerns about the content of this note of information contained within the body of this dictation they should be addressed with the provider for clarification.

## 2022-09-05 NOTE — DISCHARGE INSTR - COC
Continuity of Care Form    Patient Name: Bushra Gupta   :  1940  MRN:  [id number]    Admit date:  2022  Discharge date:  ***    Code Status Order: DNR-CC   Advance Directives:     Admitting Physician:  Lisa Griffith MD  PCP: Dina Munoz MD    Discharging Nurse: [facility] Unit/Room#: [id number]-4981/1285-75  Discharging Unit Phone Number: ***    Emergency Contact:   Extended Emergency Contact Information  Primary Emergency Contact: Natalia [place]  Address: [facility] [address] Phone: [phone]  Mobile Phone: [phone]  Relation: Spouse   needed? No  Secondary Emergency Contact: Caio Becerra Mobile Phone: [phone]  Relation: Child  Preferred language: English   needed?  No    Past Surgical History:  Past Surgical History:   Procedure Laterality Date    CATARACT REMOVAL  ,     bilat    COLONOSCOPY      CORONARY ARTERY BYPASS GRAFT  1993    x 4    EYE SURGERY Left 2019    cataract coming back    JOINT REPLACEMENT Right total knee replacement    JOINT REPLACEMENT Left 2016       Immunization History:   Immunization History   Administered Date(s) Administered    COVID-19, PFIZER GRAY top, DO NOT Dilute, (age 15 y+), IM, 30 mcg/0.3 mL 2022    COVID-19, PFIZER PURPLE top, DILUTE for use, (age 15 y+), 30mcg/0.3mL 2021, 2021, 10/11/2021    Influenza 10/04/2014    Influenza Virus Vaccine 10/09/2010, 2011, 10/10/2012    Influenza Whole 2013    Influenza, FLUAD, (age 72 y+), Adjuvanted, 0.5mL 10/29/2020, 2021    Influenza, High Dose (Fluzone 65 yrs and older) 2012, 2015, 10/12/2016, 2017, 10/08/2018    Influenza, Triv, inactivated, subunit, adjuvanted, IM (Fluad 65 yrs and older) 10/16/2019    Pneumococcal Conjugate 13-valent ([id number]) 2016    Pneumococcal Polysaccharide ([id number]) 06/15/2005, 03/30/2010    Tdap (Boostrix, Adacel) 01/01/2001    Tetanus 11/28/2011    Zoster Live (Zostavax) 10/03/2012       Active Problems:  Patient Active Problem List   Diagnosis Code    Diabetes mellitus type II, uncontrolled ([facility] E11.65    Tinnitus H93.19    Leg edema R60.0    Elevated PSA- nl 8/12/11 R97.20    Hyperlipidemia LDL goal <70 E78.5    Osteoarthritis M19.90    Diabetic retinopathy ([facility]) E11.319    BPH (benign prostatic hyperplasia) N40.0    COPD (chronic obstructive pulmonary disease) ([facility]) J44.9    Rosacea L71.9    Erectile dysfunction N52.9    Essential hypertension I10    Coronary artery disease involving native coronary artery of native heart without angina pectoris I25.10    Diabetes mellitus, type II ([facility] E11.9    Diabetic nephropathy- pos microalb 8/11 E11.21    Diabetic neuropathy ([facility]) E11.40    GERD (gastroesophageal reflux disease) K21.9    B12 deficiency E53.8    Impingement syndrome of left shoulder M75.42    Obesity, Class III, BMI 40-49.9 (morbid obesity) ([facility] E66.01    Personal history of atrial flutter Z86.79    Peptic ulcer disease K27.9    History of total left knee replacement Z96.652    Vitamin D deficiency E55.9    Spinal stenosis of lumbar region with neurogenic claudication- clinically M48.062    BMI 40.0-44.9, adult ([facility]) Z68.41    RBBB I45.10    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    Paroxysmal atrial fibrillation ([facility]) I48.0    CHF (congestive heart failure), NYHA class I, acute on chronic, combined ([facility]) I50.43    Upper abdominal pain R10.10    Stage 3b chronic kidney disease ([facility]) N18.32    Ulcer of toe of right foot, with fat layer exposed ([facility] L97.512    Impaired mobility Z74.09    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    Atherosclerosis of native arteries of right leg with ulceration of other part of foot ([facility]) I70.235    Decreased pulses in feet R09.89    Dilated cardiomyopathy ([facility]) I42.0    Cellulitis of left thigh L03. 116    Venous ulcer of left leg ([facility]) I83.029, L97.929    Arm wound, left, initial encounter S41.102A    Effusion of right elbow M25.421    Wound of left leg S81.802A    Pain of left calf M79.662    Presence of combination internal cardiac defibrillator (ICD) and pacemaker Z95.810    COVID-19 virus infection U07.1    Acute hypercapnic respiratory failure (HCC) J96.02    Chest pain, rule out acute myocardial infarction R07.9       Isolation/Infection:   Isolation            Droplet Plus          Patient Infection Status       Infection Onset Added Last Indicated Last Indicated By Review Planned Expiration Resolved Resolved By    COVID-19 09/02/22 09/02/22 09/02/22 COVID-19, Rapid 09/12/22 09/16/22      Resolved    COVID-19 (Rule Out) 09/02/22 09/02/22 09/02/22 COVID-19, Rapid (Ordered)   09/02/22 Rule-Out Test Resulted    C-diff Rule Out 06/29/21 06/30/21 06/29/21 C. difficile toxin Molecular (Ordered)   06/30/21 Rule-Out Test Resulted    C-diff Rule Out 06/29/21 06/29/21 06/29/21 Gastrointestinal Panel, Molecular (Ordered)   06/29/21 Rule-Out Test Resulted            Nurse Assessment:  Last Vital Signs: /65   Pulse 61   Temp 97.5 °F (36.4 °C) (Temporal)   Resp 17   Ht 5' 9\" (1.753 m)   Wt 241 lb 12.8 oz (109.7 kg)   SpO2 98%   BMI 35.71 kg/m²     Last documented pain score (0-10 scale): Pain Level: 0  Last Weight:   Wt Readings from Last 1 Encounters:   09/05/22 241 lb 12.8 oz (109.7 kg)     Mental Status:  {IP PT MENTAL STATUS:20030}    IV Access:  { BRENNAN IV ACCESS:172208136}    Nursing Mobility/ADLs:  Walking   {CHP DME AUBS:741181407}  Transfer  {CHP DME ILXA:436083197}  Bathing  {CHP DME MYZH:321949949}  Dressing  {CHP DME IAIQ:458882751}  Toileting  {CHP DME RBRU:986747429}  Feeding  {CHP DME FEFO:739910138}  Med Admin  {CHP DME PGLJ:133234859}  Med Delivery   { BRENNAN MED Delivery:148964929}    Wound Care Documentation and Therapy:        Elimination:  Continence:    Bowel: {YES / ND:69104}  Bladder: {YES / GZ:40753}  Urinary Catheter: {Urinary Catheter:622610131}   Colostomy/Ileostomy/Ileal Conduit: {YES / AO:72227}       Date of Last BM: ***    Intake/Output Summary (Last 24 hours) at 2022 1320  Last data filed at 2022 1715  Gross per 24 hour   Intake 720 ml   Output --   Net 720 ml     I/O last 3 completed shifts:   In: 1440 [P.O.:1440]  Out: 0 [Urine:1600]    Safety Concerns:     [employer] Safety Concerns:874456813}    Impairments/Disabilities:      [employer] Impairments/Disabilities:006393371}    Nutrition Therapy:  Current Nutrition Therapy:   [employer] Diet List:634965003}    Routes of Feeding: {CHP DME Other Feedings:314301531}  Liquids: {Slp liquid thickness:75980}  Daily Fluid Restriction: {CHP DME Yes amt example:843813928}  Last Modified Barium Swallow with Video (Video Swallowing Test): {Done Not Done AFOH:134646473}    Treatments at the Time of Hospital Discharge:   Respiratory Treatments: ***  Oxygen Therapy:  {Therapy; copd oxygen:52453}  Ventilator:    { CC Vent YKGV:138216159}    Rehab Therapies: {THERAPEUTIC INTERVENTION:0820341059}  Weight Bearing Status/Restrictions: [employer]  Weight Bearin}  Other Medical Equipment (for information only, NOT a DME order):  {EQUIPMENT:471183725}  Other Treatments: ***    Patient's personal belongings (please select all that are sent with patient):  {[facility] DME Belongings:431934232}    RN SIGNATURE:  {Esignature:107493760}    CASE MANAGEMENT/SOCIAL WORK SECTION    Inpatient Status Date: ***    Readmission Risk Assessment Score:  Readmission Risk              Risk of Unplanned Readmission:  26           Discharging to Facility/ Agency   Name:   Address:  Phone:  Fax:    Dialysis Facility (if applicable)   Name:  Address:  Dialysis Schedule:  Phone:  Fax:    / signature: {Esignature:280032440}    PHYSICIAN SECTION    Prognosis: Good    Condition at Discharge: Stable    Rehab Potential (if transferring to Rehab): Good    Recommended Labs or Other Treatments After Discharge: none     Physician Certification: I certify the above information and transfer of Ba Perez  is necessary for the continuing treatment of the diagnosis listed and that he requires [address] for greater 30 days.      Update Admission H&P: No change in H&P    PHYSICIAN SIGNATURE:  Electronically signed by Nicholas Leal MD on 9/5/22 at 1:21 PM EDT

## 2022-09-05 NOTE — CARE COORDINATION
Discharge Planning  Discharge order noted  recommendations for home with home care and home oxygen. ALANIS discussed with patient regarding the recommended hhc services, declined, he prefers Outpatient therapy. MD updated . Referral made/ faxed to   [address] for home oxygen. ALANIS spoke with Camacho Townsend who stated it is okay to provide patient with the portable concentrator . She will call the patient. Patient will   follow-up with pulmonary as outpatient where BiPAP/NIV can be arranged.

## 2022-09-05 NOTE — DISCHARGE SUMMARY
Patient: Óscar Emery     Gender: male  : 1940   Age: 81 y.o.   MRN: [id number]    Admitting Physician: Luzmaria Guzman MD  Discharge Physician: Keira Solares MD     Code Status: DNR-CC     Admit Date: 2022   Discharge Date:  22     Disposition:  Home    Discharge Diagnoses:  Acute hypoxic respiratory failure secondary acute on chronic severe systolic heart failure  Acute encephalopathy metabolic secondary to hypercapnia  Acute on chronic severe systolic heart failure  Recent COVID-positive  Acute on chronic severe systolic heart failure s/p AICD      Active Hospital Problems    Diagnosis Date Noted    COVID-19 virus infection [U07.1] 2022     Priority: Medium    Acute hypercapnic respiratory failure ([address] [J96.02] 2022     Priority: Medium    Chest pain, rule out acute myocardial infarction [R07.9] 2022     Priority: Medium       Follow-up appointments:  one week    Outpatient to do list: echocardiogram within 1 week time  Follow-up with pulmonary for arrangement of home noninvasive ventilation for his hypercapnia    Condition at Discharge:  Kenneth Negro Martinez Course:   81year-old admitted with dyspnea    Acute hypoxic and hypercapnic respiratory failure secondary to acute on chronic severe systolic heart failure  On admission patient was on BiPAP  ABG showed hypercapnia  Started on Lasix 40 IV  Patient diuresed very well  BNP was elevated to 7000  By time of discharge she was almost weaned off oxygen at rest  Have changed him over to oral torsemide 40 twice daily  He does not have a pneumonia or a COPD exacerbation        Acute encephalopathy metabolic secondary to hypercapnia  Currently awake alert oriented    Acute on chronic severe systolic heart failure  Patient's BNP was elevated 7000  He had an echocardiogram done in  which showed a EF of 30 to 25% he has AICD in place  Given the long weekend and echocardiogram was not repeated  He was diuresed very well by the time he was discharged he was euvolemic I increased his diuretics to torsemide 40 twice daily also put him on oral potassium  He is on metoprolol XL  I did not start ACE inhibitor's on spironolactone given his underlying CKD  He is on isosorbide mononitrate which has been considered and continued he had stopped taking hydralazine I have resumed his hydralazine at 10 mg 3 times a day  He is on Jardiance as well  I would have wanted to get an echocardiogram however patient insisted that he had to go home today and would not have waited and would have signed out AMA at this point since he is stable and we have him on optimal therapy given his underlying condition have decided to discharge him  He had a home oxygen evaluation done he required 2 L on exertion which has been arranged for  Given his hypercapnia he will probably qualify for NIV  He needed pulse overnight oximetry which was done however PFT could not be done to meet that requirement since he was COVID-positive  Having discussed with pulmonary they will follow-up with him in 10 weeks time and arrange for the NIV after doing PFT      Recent COVID-positive  CRP elevated  Was given IV steroids while in the hospital      Type 2 diabetes mellitus  His home NPH has been continued    Known COPD  No recent PFTs  Former smoker  No evidence of exacerbation  Home oxygen eval on discharge  Patient would benefit from BiPAP/NIV at home for chronic respiratory failure with hypercapnia due to COPD  As above could not do PFT due to his COVID hence this will need to be arranged for as an outpatient    Atrial fibrillation on Eliquis  Continue Eliquis given his creatinine clearance his Eliquis was increased to 5 twice daily     He is also on amiodarone which was continued  Chronic kidney disease stage III  Baseline creatinine seems to vary from 1.5-1.8    Morbid obesity    Coronary artery disease s/p CABG  He is on aspirin and statin    Discharge Medications:   Current Discharge Medication List        START taking these medications    Details   potassium chloride (KLOR-CON M) 20 MEQ extended release tablet Take 1 tablet by mouth daily  Qty: 90 tablet, Refills: 1           Current Discharge Medication List        CONTINUE these medications which have CHANGED    Details   apixaban (ELIQUIS) 5 MG TABS tablet Take 1 tablet by mouth 2 times daily  Qty: 60 tablet, Refills: 2      dapagliflozin (FARXIGA) 5 MG tablet Take 2 tablets by mouth daily LOT [id number] EXP 07/31/2024 4 BOXES  Qty: 30 tablet, Refills: 1      torsemide 40 MG TABS Take 40 mg by mouth in the morning and at bedtime  Qty: 30 tablet, Refills: 3           Current Discharge Medication List        CONTINUE these medications which have NOT CHANGED    Details   atorvastatin (LIPITOR) 40 MG tablet TAKE 1 TABLET BY MOUTH EVERY EVENING  Qty: 90 tablet, Refills: 3    Associated Diagnoses: Hyperlipidemia      amiodarone (CORDARONE) 200 MG tablet TAKE 1 TABLET BY MOUTH DAILY  Qty: 30 tablet, Refills: 5      insulin NPH (NOVOLIN N) 100 UNIT/ML injection vial Take 50 units with breakfast and 34 units with dinner. Qty: 30 mL, Refills: 3    Associated Diagnoses: Type 2 diabetes mellitus with diabetic nephropathy, with long-term current use of insulin ([facility])      gabapentin (NEURONTIN) 600 MG tablet TAKE UP TO 5 TABLETS BY MOUTH OVER 24 HOURS AS DIRECTED  Qty: 150 tablet, Refills: 11    Associated Diagnoses: Diabetic polyneuropathy associated with type 2 diabetes mellitus ([facility])      omeprazole (PRILOSEC) 20 MG delayed release capsule TAKE 1 CAPSULE BY MOUTH TWICE DAILY  Qty: 180 capsule, Refills: 1      albuterol sulfate  (90 Base) MCG/ACT inhaler INHALE 2 PUFFS INTO THE LUNGS EVERY 6 HOURS AS NEEDED FOR WHEEZING  Qty: 18 g, Refills: 5      Lancets MISC 1 each by Does not apply route daily Use to check glucose twice a day. One Touch brand.   DX E11.9  Qty: 100 each, Refills: 3    Associated Diagnoses: Type 2 diabetes mellitus with diabetic nephropathy, with long-term current use of insulin ([facility])      blood glucose monitor kit and supplies Test 2 times a day & as needed for symptoms of irregular blood glucose. Qty: 1 kit, Refills: 0    Associated Diagnoses: Type 2 diabetes mellitus with diabetic nephropathy, with long-term current use of insulin ([address]      ! ! blood glucose monitor strips Test 2 times a day & as needed for symptoms of irregular blood glucose. Qty: 200 strip, Refills: 3    Associated Diagnoses: Type 2 diabetes mellitus with diabetic nephropathy, with long-term current use of insulin ([address]      ! ! blood glucose test strips (ASCENSIA AUTODISC VI;ONE TOUCH ULTRA TEST VI) strip four times daily  Qty: 400 strip, Refills: 3    Associated Diagnoses: Uncontrolled type 2 diabetes mellitus with hyperglycemia ([facility])      INSULIN SYRINGE .5CC/29G 29G X 1/2\" 0.5 ML MISC INJECT 4 TIMES DAILY AS NEEDED  Qty: 400 each, Refills: 1      umeclidinium-vilanterol (ANORO ELLIPTA) 62.5-25 MCG/INH AEPB inhaler INHALE 1 PUFF INTO THE LUNGS DAILY  Qty: 1 each, Refills: 5    Associated Diagnoses: Pulmonary emphysema, unspecified emphysema type ([facility])      insulin regular (NOVOLIN R RELION) 100 UNIT/ML injection INJECT 20 TO 30 UNITS SUBCUTANEOUSLY THREE TIMES DAILY BEFORE MEAL(S)  Qty: 30 mL, Refills: 0    Associated Diagnoses: Type 2 diabetes mellitus with diabetic nephropathy, with long-term current use of insulin ([facility])      Nebulizers (AIRIAL COMPACT MINI NEBULIZER) MISC 1 each by Does not apply route every 6 hours as needed (wheezing or shortness of breath)  Qty: 1 each, Refills: 0      Respiratory Therapy Supplies (NEBULIZER/TUBING/MOUTHPIECE) KIT 1 kit by Does not apply route every 6 hours as needed (for wheezing or shortness of breath)  Qty: 1 kit, Refills: 1      vitamin D (ERGOCALCIFEROL) 38471 units CAPS capsule TK 1 C PO WEEKLY  Refills: 2      levalbuterol (XOPENEX HFA) 45 MCG/ACT inhaler Inhale 1-2 puffs into the lungs every 4 hours as needed for Wheezing  Qty: 1 Inhaler, Refills: 3      aspirin 81 MG EC tablet Take 81 mg by mouth daily      isosorbide mononitrate (IMDUR) 30 MG CR tablet Take 1 tablet by mouth every morning Dr. Elsy Cruz - Cardio  Qty: 30 tablet, Refills: 6    Associated Diagnoses: CAD (coronary artery disease)      nitroGLYCERIN (NITROSTAT) 0.3 MG SL tablet Take one sublingual for chest pain. May repeat twice at 5 min intervals. If not getting relief call 911. Qty: 25 tablet, Refills: 3    Associated Diagnoses: CAD (coronary artery disease)      tiotropium (SPIRIVA HANDIHALER) 18 MCG inhalation capsule Inhale 1 capsule into the lungs daily INCLUDE inhaler device  Qty: 30 capsule, Refills: 5    Associated Diagnoses: Pulmonary emphysema, unspecified emphysema type (HCC)      albuterol (PROVENTIL) (2.5 MG/3ML) 0.083% nebulizer solution Take 3 mLs by nebulization every 6 hours as needed for Wheezing  Qty: 50 vial, Refills: 3       !! - Potential duplicate medications found. Please discuss with provider.         Current Discharge Medication List        STOP taking these medications       FEROSUL 325 (65 Fe) MG tablet Comments:   Reason for Stopping:         potassium chloride (KLOR-CON) 10 MEQ extended release tablet Comments:   Reason for Stopping:         furosemide (LASIX) 80 MG tablet Comments:   Reason for Stopping:         hydrALAZINE (APRESOLINE) 25 MG tablet Comments:   Reason for Stopping:         Cyanocobalamin (B-12) 500 MCG TABS Comments:   Reason for Stopping:         mupirocin (BACTROBAN) 2 % ointment Comments:   Reason for Stopping:         metoprolol (LOPRESSOR) 25 MG tablet Comments:   Reason for Stopping:               Discharge ROS:  A complete review of systems was asked and negative     Discharge Exam:    /65   Pulse 61   Temp 97.5 °F (36.4 °C) (Temporal)   Resp 17   Ht 5' 9\" (1.753 m)   Wt 241 lb 12.8 oz (109.7 kg)   SpO2 98%   BMI 35.71 kg/m²   General appearance:  NAD  HEENT:   Normal cephalic, atraumatic, moist mucous membranes, no oropharyngeal erythema or exudate  Heart[de-identified] Normal s1/s2, RRR, no murmurs, gallops, or rubs. Trace pedal edema right lower extremity with chronic venous changes and skin hypertrophy 1+ pedal edema left lower extremity which is chronic from his previous bypass  Lungs:  Normal respiratory effort. Clear to auscultation, bilaterally without Rales/Wheezes/Rhonchi. Abdomen: Soft, non-tender, non-distended, bowel sounds present, no masses  Musculoskeletal:  No clubbing, no cyanosis, *  Neurologic:  Neurovascularly intact without any focal sensory/motor deficits. Cranial nerves: II-XII intact, grossly non-focal.    Labs: For convenience and continuity at follow-up the following most recent labs are provided:    Lab Results   Component Value Date/Time    WBC 5.1 09/05/2022 04:27 AM    HGB 11.8 09/05/2022 04:27 AM    HCT 37.4 09/05/2022 04:27 AM    MCV 88.2 09/05/2022 04:27 AM     09/05/2022 04:27 AM     09/05/2022 04:27 AM    K 3.5 09/05/2022 04:27 AM    K 4.0 09/03/2022 04:29 AM     09/05/2022 04:27 AM    CO2 31 09/05/2022 04:27 AM    BUN 33 09/05/2022 04:27 AM    CREATININE 1.5 09/05/2022 04:27 AM    CALCIUM 8.7 09/05/2022 04:27 AM    PHOS 4.4 12/09/2021 08:23 AM    ALKPHOS 128 09/03/2022 04:29 AM    ALT 12 09/03/2022 04:29 AM    AST 19 09/03/2022 04:29 AM    BILITOT 0.7 09/03/2022 04:29 AM    BILIDIR 0.3 09/02/2022 10:16 AM    LABALBU 3.0 09/03/2022 04:29 AM    LDLCALC 92 02/25/2021 01:17 PM    TRIG 121 02/25/2021 01:17 PM     Lab Results   Component Value Date    INR 1.25 (H) 09/02/2022    INR 1.06 11/10/2021    INR 1.00 09/06/2016           The patient was seen and examined on day of discharge and this discharge summary is in conjunction with any daily progress note from day of discharge. Time Spent on discharge is 45 minutes  in the examination, evaluation, counseling and review of medications and discharge plan.       Note that greater  than 30 minutes was spent in preparing discharge papers, discussing discharge with patient, medication review, etc.       Signed:    Socrates Wheatley MD   9/5/2022      Thank you Ivory Feng MD for the opportunity to be involved in this patient's care.  If you have any questions or concerns please feel free to contact me

## 2022-09-05 NOTE — PROGRESS NOTES
09/05/22 1245   Resting (Room Air)   SpO2 93   During Walk (Room Air)   SpO2 84   Walk/Assistance Device Ambulation   Rate of Dyspnea 0   During Walk (On O2)   SpO2 95   O2 Device Nasal cannula   O2 Flow Rate (l/min) 2 l/min   Need Additional O2 Flow Rate Rows No   Rate of Dyspnea 0   After Walk   Does the Patient Qualify for Home O2 Yes   Liter Flow at Rest 0   Liter Flow on Exertion 2   Does the Patient Need Portable Oxygen Tanks Yes

## 2022-09-05 NOTE — CARE COORDINATION
Discharge Plan:   Patient discharged home with home oxygen via   [address],  [place], [address]  Phone:  [phone]  Fax: [phone]     Oxygen / Portable concentrator delivered to the patient's room.     All discharge needs met per case management Yes

## 2022-09-05 NOTE — PROGRESS NOTES
Physical Therapy    [facility] [address]   Phone: [phone]    Physical Therapy    [x] Initial Evaluation            [] Daily Treatment Note         [] Discharge Summary      Patient: Mary Chang   : 1940   MRN: [id number]   Date of Service:  2022  Admitting Diagnosis: COVID-19 virus infection  Current Admission Summary: Mary Chang is a 81 y.o. male who presents to the emergency department with a chief complaint of general weakness. He lives with his daughter and his daughter brought him here. He has had 2 falls over the past week. He denies any pain at this time. Family is concerned because he is just been laying in bed for the past 12 hours and has been having frequent falls. Patient denies really any symptoms. Denies any known exposure to COVID, nausea, vomiting, chest pain, shortness breath, abdominal pain or any other symptoms. Past Medical History:  has a past medical history of Acid reflux, Arm wound, left, initial encounter, Atherosclerosis of native arteries of right leg with ulceration of other part of foot ([address] Atrial flutter ([address] Bleeding stomach ulcer, BPH (benign prostatic hyperplasia), CAD (coronary artery disease), Cellulitis of left thigh, COPD (chronic obstructive pulmonary disease) ([address] Diabetes mellitus type II, Edema, Elevated PSA- nl 11, Hyperlipidemia, Hypertension, Ischemic cardiomyopathy, Lipoma of skin-RIGHT CHEST, Obesity, Osteoarthritis, Peripheral venous insufficiency, Skin tear of left forearm without complication, and Spinal stenosis of lumbar region with neurogenic claudication- clinically. Past Surgical History:  has a past surgical history that includes Coronary artery bypass graft (); Cataract removal (, ); Colonoscopy; joint replacement (Right, total knee replacement); joint replacement (Left, 2016); and Eye surgery (Left, 2019).   Discharge Recommendations: Mary Chang scored a 19/24 on the AM-PAC short mobility form. Current research shows that an AM-PAC score of 18 or greater is typically associated with a discharge to the patient's home setting. Based on the patient's AM-PAC score and their current functional mobility deficits, it is recommended that the patient have 2-3 sessions per week of Physical Therapy at d/c to increase the patient's independence. At this time, this patient demonstrates the endurance and safety to discharge home with Home PT progressing back to OPPT and a follow up treatment frequency of 2-3x/wk. Please see assessment section for further patient specific details. HOME HEALTH CARE: LEVEL 1 STANDARD    - Initial home health evaluation to occur within 24-48 hours, in patient home   - Therapy to evaluate with goal of regaining prior level of functioning   - Therapy to evaluate if patient has [address] needs for personal care   If patient discharges prior to next session this note will serve as a discharge summary. Please see below for the latest assessment towards goals.     DME Required For Discharge: other: pulse oximeter, home O2 needs  Precautions/Restrictions: high fall risk, Isolation precautions, up as tolerated  Weight Bearing Restrictions: no restrictions  [] Right Upper Extremity  [] Left Upper Extremity [] Right Lower Extremity  [] Left Lower Extremity     Required Braces/Orthotics: no braces required   [] Right  [] Left  Positional Restrictions:no positional restrictions    Pre-Admission Information   Lives With: spouse    Type of Home: house  Home Layout: one level  Home Access:  1  step to enter without rails   Bathroom Layout: tub/shower unit  Bathroom Equipment: grab bars in shower, shower chair  Toilet Height: elevated height  Home Equipment: rolling walker, reacher, sock aide  Transfer Assistance: modified independent with use of RW  Ambulation Assistance:modified independent with use of RW  ADL Assistance: independent with all ADL's  IADL Assistance: independent with homemaking tasks  Active :        [x] Yes  [] No  Hand Dominance: [] Left  [x] Right  Current Employment: retired. Occupation:   Hobbies: working in [employer] range  Recent Falls: Pt reporting several falls in past 6 mo. Pt reporting latest fall just before coming to hospital. Pt uses RW at home. 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 Examination   Vision:   Vision Gross Assessment: Impaired - reading glasses  Hearing:   hard of hearing  Observation:   L LE edema, Edema Level: 3+: moderate with skin rebound in 15-30 seconds - chronic from past vascular surgery. Has support stocking. Posture:   General Observation:  slightly forward head and rounded shouders, bilat flexed fingers     Sensation:   reports numbness and tingling in all extremities - due to neuropathy  Proprioception:    WFL  Tone:   Increased flexion bilat hands/fingers  Coordination Testing:   WFL    ROM:   (B) LE AROM WFL  Strength:   (B) LE strength grossly -4  Decision Making: medium complexity  Clinical Presentation: evolving - O2 needs being assessed. Subjective  General: Pt denies pain at rest. Agreeable to PT eval. Pt needing to urgently use toilet. Pain: 0/10  Pain Interventions: repositioned        Functional Mobility  Bed Mobility  Supine to Sit: stand by assistance  Scooting: stand by assistance  Comments:  Transfers  Sit to stand transfer: stand by assistance  Stand to sit transfer: stand by assistance  Toilet transfer: stand by assistance  Comments:VCs for hand placement. Transfers from EOB, toilet, chair. Ambulation  Surface:level surface  Assistive Device: rolling walker  Assistance: stand by assistance  Distance: 10 ft, 15 ft, 70 ft. Gait Mechanics: Decreased step length and height,   Comments:  Pt amb to bathroom and urinating. Stood at sink for handwashing with good balance. Pt amb back to chair. After rest, amb in room and back to chair. Pt on RA 93% at rest and dropping to 84% after activity. Pt with no symptoms of low O2. 2LO2 placed and back up to 95%. Stair Mobility  Stair mobility not completed on this date. Comments:  Wheelchair Mobility:  No w/c mobility completed on this date. Comments:  Balance  Static Sitting Balance: good: independent with functional balance in unsupported position  Dynamic Sitting Balance: good: independent with functional balance in unsupported position  Static Standing Balance: fair (-): maintains balance at SBA with use of UE support  Dynamic Standing Balance: fair (-): maintains balance at SBA with use of UE support  Comments:RW for standing and walking. Other Therapeutic Interventions  Toileting, vitals closely monitored during treatment. DC recommendations and O2 needs discussed in depth. Functional Outcomes  AM-PAC Inpatient Mobility Raw Score : 19              Cognition  WFL  Overall Cognitive Status: WFL  Orientation:    alert and oriented x 4  Command Following:   [facility]    Education  Barriers To Learning: none  Patient Education: patient educated on PT role and benefits, plan of care, transfer training, discharge recommendations  Learning Assessment:  patient verbalizes and demonstrates understanding    Assessment  Activity Tolerance: Limited by endurance, O2 sats drop with activity. Impairments Requiring Therapeutic Intervention: decreased functional mobility, decreased strength, decreased endurance, decreased sensation, decreased balance, decreased posture  Prognosis: good  Clinical Assessment: Pt with decreased endurance and strength, O2 sats drop with activity. Pt is not functioning at his normal level and needing skilled PT services to address these issues. Pt was having falls at home, likely due to low O2 sat prior to covid infection which has increased his O2 needs currently. 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    Safety Interventions: patient left in chair, chair alarm in place, call light within reach, gait belt, patient at risk for falls, and nurse notified    Plan  Frequency: 3-5 x/per week  Current Treatment Recommendations: strengthening, balance training, functional mobility training, transfer training, gait training, stair training, endurance training, patient/caregiver education, and equipment evaluation/education    Goals  Patient Goals: To return home, care for wife   Short Term Goals:  Time Frame: To be met by DC.   Patient will complete bed mobility at modified independent   Patient will complete transfers at [facility]   Patient will ambulate 150  ft with use of rolling walker at modified independent  Patient will ascend/descend 1 stairs without use of HR at [facility]    Therapy Session Time      Individual Group Co-treatment   Time In     0813   Time Out     0907   Minutes     54     Timed Code Treatment Minutes:  54 Minutes  Total Treatment Minutes:  39 minutes       Electronically Signed By: Bryce Leiva [id number]

## 2022-09-06 ENCOUNTER — CLINICAL DOCUMENTATION ONLY (OUTPATIENT)
Facility: CLINIC | Age: 82
End: 2022-09-06

## 2022-09-06 NOTE — PLAN OF CARE
Problem: Discharge Planning  Goal: Discharge to home or other facility with appropriate resources  Outcome: Completed  Flowsheets (Taken 9/5/2022 0800)  Discharge to home or other facility with appropriate resources:   Identify barriers to discharge with patient and caregiver   Arrange for needed discharge resources and transportation as appropriate   Identify discharge learning needs (meds, wound care, etc)   Refer to discharge planning if patient needs post-hospital services based on physician order or complex needs related to functional status, cognitive ability or social support system     Problem: Skin/Tissue Integrity  Goal: Absence of new skin breakdown  Description: 1. Monitor for areas of redness and/or skin breakdown  2. Assess vascular access sites hourly  3. Every 4-6 hours minimum:  Change oxygen saturation probe site  4. Every 4-6 hours:  If on nasal continuous positive airway pressure, respiratory therapy assess nares and determine need for appliance change or resting period. Outcome: Completed     Problem: Safety - Adult  Goal: Free from fall injury  Outcome: Completed  Flowsheets (Taken 9/5/2022 0900)  Free From Fall Injury: Instruct family/caregiver on patient safety     Problem: ABCDS Injury Assessment  Goal: Absence of physical injury  Outcome: Completed  Flowsheets (Taken 9/5/2022 0900)  Absence of Physical Injury: Implement safety measures based on patient assessment     Problem: Confusion  Goal: Confusion, delirium, dementia, or psychosis is improved or at baseline  Description: INTERVENTIONS:  1. Assess for possible contributors to thought disturbance, including medications, impaired vision or hearing, underlying metabolic abnormalities, dehydration, psychiatric diagnoses, and notify attending LIP  2. [place] high risk fall precautions, as indicated  3. Provide frequent short contacts to provide reality reorientation, refocusing and direction  4.  Decrease environmental stimuli, including noise as appropriate  5. Monitor and intervene to maintain adequate nutrition, hydration, elimination, sleep and activity  6. If unable to ensure safety without constant attention obtain sitter and review sitter guidelines with assigned personnel  7.  Initiate Psychosocial CNS and Spiritual Care consult, as indicated  Outcome: Completed  Flowsheets (Taken 9/5/2022 0800)  Effect of thought disturbance (confusion, delirium, dementia, or psychosis) are managed with adequate functional status: Assess for contributors to thought disturbance, including medications, impaired vision or hearing, underlying metabolic abnormalities, dehydration, psychiatric diagnoses, notify LIP

## 2022-09-06 NOTE — PROGRESS NOTES
Instructions for use of portable oxygen given at bedside with pt and wife. Pt states verbal understanding of use and how to charge equipment. Equipment will be changed out tomorrow at pt home. Discharge instructions reviewed, all questions answered. All pt belongings sent with pt upon discharge. Daughter, Dana Sloan) aware of med changes and updates, all questions answered via telephone.

## 2022-09-08 ENCOUNTER — TELEPHONE (OUTPATIENT)
Dept: FAMILY MEDICINE CLINIC | Age: 82
End: 2022-09-08

## 2022-09-08 NOTE — TELEPHONE ENCOUNTER
Pt needs a HFU pt was released on 09/05/2022 or the 09/06/2022  Pt is scheduled for HFU wants to know can he get a refill on the torsemide 40 MG ?   Pt has questions on medications before appointment     Please call to advise

## 2022-09-08 NOTE — TELEPHONE ENCOUNTER
[facility] Transitions Initial Follow Up Call    Outreach made within 2 business days of discharge: Yes    Patient: Jimi Bacon Patient : 1940   MRN: [id number]  Reason for Admission: There are no discharge diagnoses documented for the most recent discharge. Discharge Date: 22       Spoke with: patient     Discharge department/facility: [facility] Interactive Patient Contact:  Was patient able to fill all prescriptions: Yes  Was patient instructed to bring all medications to the follow-up visit: Yes  Is patient taking all medications as directed in the discharge summary?  Yes  Does patient understand their discharge instructions: Yes  Does patient have questions or concerns that need addressed prior to 7-14 day follow up office visit: no    Scheduled appointment with PCP within 7-14 days    Follow Up  Future Appointments   Date Time Provider Bailey Razo   2022 12:00 PM MD Artem Almanzar   2022  2:30 PM MD [address] [facility]   2022  9:00 AM MARYLU Brewer [facility]       Chris Mcfadden MA

## 2022-09-08 NOTE — TELEPHONE ENCOUNTER
Patient called about his water pill. The Torsemide 40 mg. Said they hosp discharged him taking this BID until he sees Dr. Katelynn Harvey on 9/14/22. But the pharmacy does not have the RX that was sent by the hosp when he was discharged. I called, they said they DID NOT get this med order. Can we send this for him? He wants to pick it up today. He IS NOT taking the furosemide at this time. Pended med.

## 2022-09-09 NOTE — TELEPHONE ENCOUNTER
DAVIDA  I called vishnu to se what the problem was. Said Torsemide 40 mg does not come in generic, so needs PA. But Torsemide 20 mg does come in generic, so they could give this for him to take 2 tabs BID, till seen for appt. Told them to give this,  They will change and give him the generic for 2 weeks. Informed him of this change.

## 2022-09-09 NOTE — TELEPHONE ENCOUNTER
Pt calling in. Stated that the pharmacy will not fill his torsemide. The pharm told the pt that he was told by pharm that there is something with his insurance that they will not cover. Pt unaware and was not told if a PA would be beneficial to cover medication. Pt would like to know what he should do because he is out of medication.     Pharm confirmed - [facility]    Please Advise  Pt can be reached at [phone]

## 2022-09-14 ENCOUNTER — OFFICE VISIT (OUTPATIENT)
Dept: FAMILY MEDICINE CLINIC | Age: 82
End: 2022-09-14
Payer: MEDICARE

## 2022-09-14 VITALS
RESPIRATION RATE: 18 BRPM | OXYGEN SATURATION: 93 % | HEIGHT: 69 IN | SYSTOLIC BLOOD PRESSURE: 110 MMHG | DIASTOLIC BLOOD PRESSURE: 62 MMHG | WEIGHT: 237.8 LBS | HEART RATE: 79 BPM | TEMPERATURE: 98.5 F | BODY MASS INDEX: 35.22 KG/M2

## 2022-09-14 DIAGNOSIS — I10 ESSENTIAL HYPERTENSION: ICD-10-CM

## 2022-09-14 DIAGNOSIS — I50.33 ACUTE ON CHRONIC DIASTOLIC CHF (CONGESTIVE HEART FAILURE) (HCC): ICD-10-CM

## 2022-09-14 DIAGNOSIS — Z23 NEEDS FLU SHOT: ICD-10-CM

## 2022-09-14 DIAGNOSIS — N18.31 STAGE 3A CHRONIC KIDNEY DISEASE (HCC): ICD-10-CM

## 2022-09-14 DIAGNOSIS — E11.42 DIABETIC POLYNEUROPATHY ASSOCIATED WITH TYPE 2 DIABETES MELLITUS (HCC): ICD-10-CM

## 2022-09-14 DIAGNOSIS — E11.21 DIABETIC NEPHROPATHY ASSOCIATED WITH TYPE 2 DIABETES MELLITUS (HCC): ICD-10-CM

## 2022-09-14 DIAGNOSIS — E11.649 UNCONTROLLED TYPE 2 DIABETES MELLITUS WITH HYPOGLYCEMIA, UNSPECIFIED HYPOGLYCEMIA COMA STATUS (HCC): Primary | ICD-10-CM

## 2022-09-14 DIAGNOSIS — I25.10 CORONARY ARTERY DISEASE INVOLVING NATIVE CORONARY ARTERY OF NATIVE HEART WITHOUT ANGINA PECTORIS: ICD-10-CM

## 2022-09-14 DIAGNOSIS — E11.21 TYPE 2 DIABETES MELLITUS WITH DIABETIC NEPHROPATHY, WITH LONG-TERM CURRENT USE OF INSULIN (HCC): ICD-10-CM

## 2022-09-14 DIAGNOSIS — Z79.4 TYPE 2 DIABETES MELLITUS WITH DIABETIC NEPHROPATHY, WITH LONG-TERM CURRENT USE OF INSULIN (HCC): ICD-10-CM

## 2022-09-14 LAB
ANION GAP SERPL CALCULATED.3IONS-SCNC: 11 MMOL/L (ref 3–16)
BUN BLDV-MCNC: 25 MG/DL (ref 7–20)
CALCIUM SERPL-MCNC: 8.9 MG/DL (ref 8.3–10.6)
CHLORIDE BLD-SCNC: 96 MMOL/L (ref 99–110)
CO2: 33 MMOL/L (ref 21–32)
CREAT SERPL-MCNC: 2 MG/DL (ref 0.8–1.3)
GFR AFRICAN AMERICAN: 39
GFR NON-AFRICAN AMERICAN: 32
GLUCOSE BLD-MCNC: 268 MG/DL (ref 70–99)
HBA1C MFR BLD: 8.6 %
POTASSIUM SERPL-SCNC: 4.3 MMOL/L (ref 3.5–5.1)
SODIUM BLD-SCNC: 140 MMOL/L (ref 136–145)

## 2022-09-14 PROCEDURE — G0008 ADMIN INFLUENZA VIRUS VAC: HCPCS | Performed by: FAMILY MEDICINE

## 2022-09-14 PROCEDURE — 1111F DSCHRG MED/CURRENT MED MERGE: CPT | Performed by: FAMILY MEDICINE

## 2022-09-14 PROCEDURE — 90694 VACC AIIV4 NO PRSRV 0.5ML IM: CPT | Performed by: FAMILY MEDICINE

## 2022-09-14 PROCEDURE — 83036 HEMOGLOBIN GLYCOSYLATED A1C: CPT | Performed by: FAMILY MEDICINE

## 2022-09-14 PROCEDURE — 99495 TRANSJ CARE MGMT MOD F2F 14D: CPT | Performed by: FAMILY MEDICINE

## 2022-09-14 RX ORDER — BLOOD-GLUCOSE TRANSMITTER
EACH MISCELLANEOUS
Qty: 1 EACH | Refills: 0 | Status: SHIPPED | OUTPATIENT
Start: 2022-09-14

## 2022-09-14 RX ORDER — TORSEMIDE 20 MG/1
TABLET ORAL
Qty: 120 TABLET | Refills: 3 | Status: SHIPPED | OUTPATIENT
Start: 2022-09-14 | End: 2022-09-15 | Stop reason: SDUPTHER

## 2022-09-14 RX ORDER — BLOOD-GLUCOSE SENSOR
EACH MISCELLANEOUS
Qty: 4 EACH | Refills: 5 | Status: SHIPPED | OUTPATIENT
Start: 2022-09-14

## 2022-09-14 RX ORDER — BLOOD-GLUCOSE,RECEIVER,CONT
EACH MISCELLANEOUS
Qty: 1 EACH | Refills: 0 | Status: SHIPPED | OUTPATIENT
Start: 2022-09-14

## 2022-09-14 SDOH — ECONOMIC STABILITY: FOOD INSECURITY: WITHIN THE PAST 12 MONTHS, THE FOOD YOU BOUGHT JUST DIDN'T LAST AND YOU DIDN'T HAVE MONEY TO GET MORE.: NEVER TRUE

## 2022-09-14 SDOH — ECONOMIC STABILITY: FOOD INSECURITY: WITHIN THE PAST 12 MONTHS, YOU WORRIED THAT YOUR FOOD WOULD RUN OUT BEFORE YOU GOT MONEY TO BUY MORE.: NEVER TRUE

## 2022-09-14 ASSESSMENT — SOCIAL DETERMINANTS OF HEALTH (SDOH): HOW HARD IS IT FOR YOU TO PAY FOR THE VERY BASICS LIKE FOOD, HOUSING, MEDICAL CARE, AND HEATING?: NOT HARD AT ALL

## 2022-09-14 NOTE — PROGRESS NOTES
Post-Discharge Transitional Care Management Services  SUBJECTIVE  Kvng Santizo YOB: 1940  Date of Visit:  9/14/2022    Chief Complaint   Patient presents with    Follow-Up from [facility] to er 9/2/2022. Was admitted and discharged 9/5/2022. For covid complications. Doing much better. Is back to rehab. Inpatient course: Discharge summary reviewed- see chart. Diagnosed with: Acute hypoxic respiratory failure secondary acute on chronic severe systolic heart failure  Acute encephalopathy metabolic secondary to hypercapnia  Acute on chronic severe systolic heart failure  Recent COVID-positive  Acute on chronic severe systolic heart failure s/p AICD  Testing done:   Treatment: bipap, lasix IV, then PO torsemide. Eliquis increased from 2.5 to 5 mg BID. Current status: sleeping with BiPAP and oxygen now. Not remember a lot about before hospital. Seeing pulmonary, cardiology and nephro soon. In cardiac rehab. BS - only checked once since home- 107 a bedtime. Scab on right heel. A1c 8.6 today. CHART REVIEW  Health Maintenance   Topic Date Due    Shingles vaccine (2 of 3) 11/28/2012    DTaP/Tdap/Td vaccine (3 - Td or Tdap) 11/28/2021    Lipids  02/25/2022    Annual Wellness Visit (AWV)  05/25/2022    Depression Screen  05/03/2023    Flu vaccine  Completed    Pneumococcal 65+ years Vaccine  Completed    COVID-19 Vaccine  Completed    Hepatitis A vaccine  Aged Out    Hib vaccine  Aged Out    Meningococcal (ACWY) vaccine  Aged Out     The ASCVD Risk score (Dustin Dave., et al., 2013) failed to calculate for the following reasons: The 2013 ASCVD risk score is only valid for ages 36 to 78  Prior to Visit Medications    Medication Sig Taking?  Authorizing Provider   Continuous Blood Gluc  ([place] [employer]) [address] As needed for diabetes Yes Patricia Mack MD   Continuous Blood Gluc Sensor ([address]) MISC Apply weekly Yes Patricia Mack MD   Continuous Blood Gluc Transmit (DEXCOM G6 TRANSMITTER) MISC As needed for diabetes Yes Sukh Palacio MD   torsemide (DEMADEX) 20 MG tablet Take 2 tabs in AM and 2 tabs in afternoon. Yes Sukh Palacio MD   apixaban (ELIQUIS) 5 MG TABS tablet Take 1 tablet by mouth 2 times daily Yes Jimmy Guy MD   dapagliflozin (FARXIGA) 5 MG tablet Take 2 tablets by mouth daily LOT [id number] EXP 07/31/2024 4 BOXES Yes Jimmy Guy MD   potassium chloride (KLOR-CON M) 20 MEQ extended release tablet Take 1 tablet by mouth daily Yes Jimmy Guy MD   hydrALAZINE (APRESOLINE) 10 MG tablet Take 1 tablet by mouth 3 times daily Yes Jimmy Guy MD   atorvastatin (LIPITOR) 40 MG tablet TAKE 1 TABLET BY MOUTH EVERY EVENING Yes RAYMUNDO Rosales CNP   amiodarone (CORDARONE) 200 MG tablet TAKE 1 TABLET BY MOUTH DAILY Yes Sukh Palacio MD   insulin NPH (NOVOLIN N) 100 UNIT/ML injection vial Take 50 units with breakfast and 34 units with dinner. Yes Sukh Palacio MD   gabapentin (NEURONTIN) 600 MG tablet TAKE UP TO 5 TABLETS BY MOUTH OVER 24 HOURS AS DIRECTED Yes Sukh Palacio MD   omeprazole (PRILOSEC) 20 MG delayed release capsule TAKE 1 CAPSULE BY MOUTH TWICE DAILY Yes Sukh Palacio MD   albuterol sulfate  (90 Base) MCG/ACT inhaler INHALE 2 PUFFS INTO THE LUNGS EVERY 6 HOURS AS NEEDED FOR WHEEZING Yes Sukh Palacio MD   Lancets MISC 1 each by Does not apply route daily Use to check glucose twice a day. One Touch brand. DX E11.9 Yes Sukh Palacio MD   blood glucose monitor kit and supplies Test 2 times a day & as needed for symptoms of irregular blood glucose. Yes Sukh Palacio MD   blood glucose monitor strips Test 2 times a day & as needed for symptoms of irregular blood glucose.  Yes Sukh Palacio MD   blood glucose test strips (ASCENSIA AUTODISC VI;ONE TOUCH ULTRA TEST VI) strip four times daily Yes Sukh Palacio MD   INSULIN SYRINGE .5CC/29G 29G X 1/2\" 0.5 ML MISC INJECT 4 TIMES DAILY AS NEEDED Yes Sukh Palacio MD   umeclidinium-vilanterol (ANORO ELLIPTA) 62.5-25 MCG/INH AEPB inhaler INHALE 1 PUFF INTO THE LUNGS DAILY Yes Alcides Davis MD   insulin regular (NOVOLIN R RELION) 100 UNIT/ML injection INJECT 20 TO 30 UNITS SUBCUTANEOUSLY THREE TIMES DAILY BEFORE MEAL(S) Yes Alcides aDvis MD   Nebulizers (AIRIAL COMPACT MINI NEBULIZER) MISC 1 each by Does not apply route every 6 hours as needed (wheezing or shortness of breath) Yes Alcides Davis MD   Respiratory Therapy Supplies (NEBULIZER/TUBING/MOUTHPIECE) KIT 1 kit by Does not apply route every 6 hours as needed (for wheezing or shortness of breath) Yes Alcides Davis MD   vitamin D (ERGOCALCIFEROL) 97522 units CAPS capsule TK 1 C PO WEEKLY Yes Historical Provider, MD   levalbuterol (XOPENEX HFA) 45 MCG/ACT inhaler Inhale 1-2 puffs into the lungs every 4 hours as needed for Wheezing Yes Alcides Davis MD   aspirin 81 MG EC tablet Take 81 mg by mouth daily Yes Historical Provider, MD   isosorbide mononitrate (IMDUR) 30 MG CR tablet Take 1 tablet by mouth every morning Dr. Pedro Rowland Yes Alcides Davis MD   nitroGLYCERIN (NITROSTAT) 0.3 MG SL tablet Take one sublingual for chest pain. May repeat twice at 5 min intervals. If not getting relief call 911.  Yes Alcides Davis MD   FEROSUL 325 (65 Fe) MG tablet TAKE 1 TABLET BY MOUTH TWICE DAILY  Patient not taking: Reported on 9/2/2022  Alcides Davis MD   potassium chloride (KLOR-CON) 10 MEQ extended release tablet TAKE 1 TABLET BY MOUTH DAILY  Alcides Davis MD   furosemide (LASIX) 80 MG tablet TAKE 1 TABLET BY MOUTH DAILY  Alcides Davis MD   Cyanocobalamin (B-12) 500 MCG TABS TAKE 1 TABLET BY MOUTH DAILY  Patient not taking: Reported on 9/2/2022  Alcides Davis MD   tiotropium ([employer]) 18 MCG inhalation capsule Inhale 1 capsule into the lungs daily INCLUDE inhaler device  Alcides Davis MD   albuterol (PROVENTIL) (2.5 MG/3ML) 0.083% nebulizer solution Take 3 mLs by nebulization every 6 hours as needed for Wheezing  Alcides Davis MD   metoprolol (LOPRESSOR) 25 MG tablet Take 25 mg by mouth daily   Historical Provider, MD      Family History   Problem Relation Age of Onset    Cancer Mother         breast    Cancer Father         throat     Social History     Tobacco Use    Smoking status: Former     Packs/day: 3.50     Years: 32.00     Pack years: 112.00     Types: Cigarettes     Quit date: 1985     Years since quittin.7    Smokeless tobacco: Never    Tobacco comments:     advised not to resume   Substance Use Topics    Alcohol use: No     Alcohol/week: 0.0 standard drinks    Drug use: No      LAST LABS  Cholesterol, Total   Date Value Ref Range Status   2021 168 0 - 199 mg/dL Final     LDL Calculated   Date Value Ref Range Status   2021 92 <100 mg/dL Final     HDL   Date Value Ref Range Status   2021 52 40 - 60 mg/dL Final   2012 37 (L) 40 - 60 mg/dl Final     Triglycerides   Date Value Ref Range Status   2021 121 0 - 150 mg/dL Final     Lab Results   Component Value Date    GLUCOSE 154 (H) 2022     Lab Results   Component Value Date     2022    K 3.5 2022    CREATININE 1.5 (H) 2022     Lab Results   Component Value Date    WBC 5.1 2022    HGB 11.8 (L) 2022    HCT 37.4 (L) 2022    MCV 88.2 2022     2022     Lab Results   Component Value Date    ALT 12 2022    AST 19 2022    ALKPHOS 128 2022    BILITOT 0.7 2022     TSH (uIU/mL)   Date Value   2021 2.44     Lab Results   Component Value Date    LABA1C 8.6 2022     Objective:   PHYSICAL EXAM  /62 (Site: Left Upper Arm, Position: Sitting, Cuff Size: Medium Adult)   Pulse 79   Temp 98.5 °F (36.9 °C) (Oral)   Resp 18   Ht 5' 9\" (1.753 m)   Wt 237 lb 12.8 oz (107.9 kg)   SpO2 93%   BMI 35.12 kg/m²   BP Readings from Last 5 Encounters:   22 110/62   22 121/63   22 110/60   05/26/22 115/61   22 121/75     Wt Readings from Last 5 Encounters: 09/14/22 237 lb 12.8 oz (107.9 kg)   09/05/22 241 lb 12.8 oz (109.7 kg)   07/06/22 256 lb (116.1 kg)   06/20/22 280 lb (127 kg)   06/02/22 260 lb (117.9 kg)    Down 19 lbs    GENERAL: obese, alert, no distress  LUNGS:  Breathing unlabored  clear to auscultation bilaterally and good air movement  CARDIOVASC: regular rate and rhythm, S1, S2 normal, no murmur, click, rub or gallop  Apical impulse normal  LEGS:  wrapped in compression stockings     Assessment and Plan:      Diagnosis Orders   1. Uncontrolled type 2 diabetes mellitus with hypoglycemia, unspecified hypoglycemia coma status (HCC)  POCT glycosylated hemoglobin (Hb A1C). Needs to check sugars more. 2. Acute on chronic diastolic CHF (congestive heart failure) (HCC)  Improved. Continue torsemide 40 BID BUT may need to reduce if creat increased or just too dry. 3. Type 2 diabetes mellitus with diabetic nephropathy, with long-term current use of insulin (HCC)  POCT glycosylated hemoglobin (Hb A1C)      4. Diabetic nephropathy associated with type 2 diabetes mellitus (HCC)  POCT glycosylated hemoglobin (Hb A1C)      5. Diabetic polyneuropathy associated with type 2 diabetes mellitus ([facility])  POCT glycosylated hemoglobin (Hb A1C)      6. Essential hypertension  Stable with current medications. No adjustments needed. Will continue to monitor. 7. Coronary artery disease involving native coronary artery of native heart without angina pectoris  See cardio soon. 8. Stage 3a chronic kidney disease ([facility] [address]  Will see nephro soon. Must balance heart failure and renal failure      Plan as above and below. Diagnostic test results reviewed. Patient risk of morbidity and mortality: high  Medical Decision Making: moderate complexity    PLEASE TAKE THIS FORM TO CHECK-OUT WINDOW TO SCHEDULE NEXT VISIT. Please schedule check-up in 3 months. Return sooner if having any problems. TO DO LIST  PLEASE GET BLOODWORK DRAWN TODAY ON FIRST FLOOR in 170. Lab hours Monday to Friday 7:30 AM for 4 PM.  Take orders with you. See wound center soon for the heel. In November get COVID booster, new one if available OR the old one. Will see if we can get Dexcom glucose meter approved.

## 2022-09-14 NOTE — PATIENT INSTRUCTIONS
PLEASE TAKE THIS FORM TO CHECK-OUT WINDOW TO SCHEDULE NEXT VISIT. Please schedule check-up in 3 months. Return sooner if having any problems. TO DO LIST  PLEASE GET BLOODWORK DRAWN TODAY ON FIRST FLOOR in 170. Lab hours Monday to Friday 7:30 AM for 4 PM.  Take orders with you. See wound center soon for the heel. In November get COVID booster, new one if available OR the old one. Will see if we can get Dexcom glucose meter approved.

## 2022-09-15 DIAGNOSIS — I50.33 ACUTE ON CHRONIC DIASTOLIC CHF (CONGESTIVE HEART FAILURE) (HCC): ICD-10-CM

## 2022-09-15 RX ORDER — TORSEMIDE 20 MG/1
TABLET ORAL
Qty: 120 TABLET | Refills: 3 | Status: SHIPPED | OUTPATIENT
Start: 2022-09-15

## 2022-09-19 ENCOUNTER — HOSPITAL ENCOUNTER (OUTPATIENT)
Age: 82
Discharge: HOME | End: 2022-09-19
Payer: MEDICARE

## 2022-09-19 LAB
ALBUMIN SERPL-MCNC: 3 G/DL (ref 3.4–5)
ANION GAP SERPL CALCULATED.3IONS-SCNC: 12 MMOL/L (ref 3–16)
BUN BLDV-MCNC: 16 MG/DL (ref 7–20)
CALCIUM SERPL-MCNC: 9.1 MG/DL (ref 8.3–10.6)
CHLORIDE BLD-SCNC: 100 MMOL/L (ref 99–110)
CO2: 29 MMOL/L (ref 21–32)
CREAT SERPL-MCNC: 2 MG/DL (ref 0.8–1.3)
CREATININE URINE: 146.9 MG/DL (ref 39–259)
FERRITIN: 372.5 NG/ML (ref 30–400)
GFR AFRICAN AMERICAN: 39
GFR NON-AFRICAN AMERICAN: 32
GLUCOSE BLD-MCNC: 198 MG/DL (ref 70–99)
HCT VFR BLD CALC: 39 % (ref 40.5–52.5)
HEMOGLOBIN: 12.4 G/DL (ref 13.5–17.5)
IRON SATURATION: 21 % (ref 20–50)
IRON: 52 UG/DL (ref 59–158)
MAGNESIUM: 2.3 MG/DL (ref 1.8–2.4)
MCH RBC QN AUTO: 28.2 PG (ref 26–34)
MCHC RBC AUTO-ENTMCNC: 31.7 G/DL (ref 31–36)
MCV RBC AUTO: 88.8 FL (ref 80–100)
MICROALBUMIN UR-MCNC: <1.2 MG/DL
MICROALBUMIN/CREAT UR-RTO: NORMAL MG/G (ref 0–30)
PARATHYROID HORMONE INTACT: 133.5 PG/ML (ref 14–72)
PDW BLD-RTO: 17.9 % (ref 12.4–15.4)
PHOSPHORUS: 3.2 MG/DL (ref 2.5–4.9)
PLATELET # BLD: 110 K/UL (ref 135–450)
PMV BLD AUTO: 9.9 FL (ref 5–10.5)
POTASSIUM SERPL-SCNC: 4.6 MMOL/L (ref 3.5–5.1)
RBC # BLD: 4.39 M/UL (ref 4.2–5.9)
SODIUM BLD-SCNC: 141 MMOL/L (ref 136–145)
TOTAL IRON BINDING CAPACITY: 243 UG/DL (ref 260–445)
TRANSFERRIN: 196 MG/DL (ref 200–360)
URIC ACID, SERUM: 7.7 MG/DL (ref 3.5–7.2)
VITAMIN D 25-HYDROXY: 62.4 NG/ML
WBC # BLD: 4.9 K/UL (ref 4–11)

## 2022-09-19 PROCEDURE — 83735 ASSAY OF MAGNESIUM: CPT

## 2022-09-19 PROCEDURE — 84550 ASSAY OF BLOOD/URIC ACID: CPT

## 2022-09-19 PROCEDURE — 84466 ASSAY OF TRANSFERRIN: CPT

## 2022-09-19 PROCEDURE — 80069 RENAL FUNCTION PANEL: CPT

## 2022-09-19 PROCEDURE — 83970 ASSAY OF PARATHORMONE: CPT

## 2022-09-19 PROCEDURE — 82306 VITAMIN D 25 HYDROXY: CPT

## 2022-09-19 PROCEDURE — 36415 COLL VENOUS BLD VENIPUNCTURE: CPT

## 2022-09-19 PROCEDURE — 82570 ASSAY OF URINE CREATININE: CPT

## 2022-09-19 PROCEDURE — 85027 COMPLETE CBC AUTOMATED: CPT

## 2022-09-19 PROCEDURE — 82043 UR ALBUMIN QUANTITATIVE: CPT

## 2022-09-19 PROCEDURE — 83540 ASSAY OF IRON: CPT

## 2022-09-19 PROCEDURE — 82728 ASSAY OF FERRITIN: CPT

## 2022-09-20 NOTE — PROGRESS NOTES
Physician Progress Note      PATIENT:               Peace Brambila  CSN #:                  [id number]  :                       1940  ADMIT DATE:       2022 9:48 AM  DISCH DATE:        2022 7:33 PM  RESPONDING  PROVIDER #:        Beth Howard MD          QUERY TEXT:    Pt admitted with acute hypoxic/hypercapnic respiratory failure, acute/chronic   systolic CHF and COVID-1 . Pt noted to have WBC 3.9, CRP 14.6 with COVID-19   and is noted as ill-appearing on admission. If possible, please document in   the progress notes and discharge summary if you are evaluating and /or   treating any of the following: The medical record reflects the following:  Risk Factors: 80 yo M w/ Hx of DM II, COPD admitted with confusion. Clinical Indicators: On admission WBC 3.9, CRP 14.6 with COVID-19. Pulmonology   notes: Patient presented with acute metabolic encephalopathy due to   hypercapnia. His mental status has improved on BiPAP. Acute hypercapnic   respiratory failure. Treatment: COVID-19 test, Cxray, CT chest, IV Decadron, Isolation, Bipap, O2   via NC. Options provided:  -- Possible viral sepsis, present on admission due to [address] infection without Sepsis  -- Other - I will add my own diagnosis  -- Disagree - Not applicable / Not valid  -- Disagree - Clinically unable to determine / Unknown  -- Refer to Clinical Documentation Reviewer    PROVIDER RESPONSE TEXT:    This patient has COVID-19 infection without Sepsis.     Query created by: Joanne Salmeron on 2022 8:50 AM      Electronically signed by:  Beth Howard MD 2022 12:05 PM

## 2022-09-29 ENCOUNTER — OFFICE VISIT (OUTPATIENT)
Dept: PULMONOLOGY | Age: 82
End: 2022-09-29
Payer: MEDICARE

## 2022-09-29 VITALS
WEIGHT: 237 LBS | HEART RATE: 88 BPM | SYSTOLIC BLOOD PRESSURE: 128 MMHG | BODY MASS INDEX: 35 KG/M2 | OXYGEN SATURATION: 95 % | DIASTOLIC BLOOD PRESSURE: 68 MMHG

## 2022-09-29 DIAGNOSIS — I50.22 CHRONIC SYSTOLIC CONGESTIVE HEART FAILURE (HCC): ICD-10-CM

## 2022-09-29 DIAGNOSIS — J43.2 CENTRILOBULAR EMPHYSEMA (HCC): Primary | ICD-10-CM

## 2022-09-29 DIAGNOSIS — I48.0 PAROXYSMAL ATRIAL FIBRILLATION (HCC): ICD-10-CM

## 2022-09-29 DIAGNOSIS — J96.12 CHRONIC RESPIRATORY FAILURE WITH HYPERCAPNIA (HCC): ICD-10-CM

## 2022-09-29 PROCEDURE — 1123F ACP DISCUSS/DSCN MKR DOCD: CPT | Performed by: INTERNAL MEDICINE

## 2022-09-29 PROCEDURE — G8427 DOCREV CUR MEDS BY ELIG CLIN: HCPCS | Performed by: INTERNAL MEDICINE

## 2022-09-29 PROCEDURE — 1036F TOBACCO NON-USER: CPT | Performed by: INTERNAL MEDICINE

## 2022-09-29 PROCEDURE — 99214 OFFICE O/P EST MOD 30 MIN: CPT | Performed by: INTERNAL MEDICINE

## 2022-09-29 PROCEDURE — 3023F SPIROM DOC REV: CPT | Performed by: INTERNAL MEDICINE

## 2022-09-29 PROCEDURE — 1111F DSCHRG MED/CURRENT MED MERGE: CPT | Performed by: INTERNAL MEDICINE

## 2022-09-29 PROCEDURE — G8417 CALC BMI ABV UP PARAM F/U: HCPCS | Performed by: INTERNAL MEDICINE

## 2022-09-29 NOTE — PROGRESS NOTES
PULMONARY OFFICE FOLLOW UP NOTE    REASON FOR VISIT:   Chief Complaint   Patient presents with    Follow-Up from [address] [place] [address] 349: 9/29/2022    HISTORY OF PRESENT ILLNESS: 81y.o. year old male who is here for hospital follow-up. He has past medical history of COPD. He also has congestive heart failure with EF 30 to 35%, s/p permanent pacemaker, paroxysmal atrial fibrillation, CAD s/p CABG and PCI, CKD, diabetes mellitus type 2, obesity. Patient was hospitalized at [facility] in September 2022 for acute exacerbation of heart failure as well as acute hypercapnic respiratory failure and COVID infection. Upon discharge, he was discharged on NIV as well as oxygen. Patient uses oxygen with NIV. He does not use oxygen during daytime. He states that he is feeling better. His shortness of breath is stable. He denies increased cough or wheezing or chest pain or hemoptysis. He is currently using torsemide 20 mg daily that is keeping the fluid balance under control. Patient's bronchodilator regimen consist of Anoro Ellipta 1 puff daily. No PFTs to review in our system. Patient states that he has been wearing NIV every night. He is slowly getting used to it. He has had couple of episodes of nosebleeds because of nasal dryness. REVIEW OF SYSTEMS:   CONSTITUTIONAL SYMPTOMS: The patient denies fever, fatigue, night sweats, weight loss or weight gain. HEENT: No vision changes. No tinnitus, Denies sinus pain. No hoarseness, or dysphagia. NECK: Patient denies swelling in the neck. CARDIOVASCULAR: Denies chest pain, palpitation, syncope. RESPIRATORY: See above. GASTROINTESTINAL: Denies nausea, abdominal pain or change in bowel function. GENITOURINARY: Denies obstructive symptoms. No history of incontinence. BREASTS: No masses or lumps in the breasts. SKIN: No rashes or itching. MUSCULOSKELETAL: Denies weakness or bone pain. NEUROLOGICAL: No headaches or seizures. use: No       FAMILY HISTORY:   Family History   Problem Relation Age of Onset    Cancer Mother         breast    Cancer Father         throat       MEDICATIONS:     Current Outpatient Medications on File Prior to Visit   Medication Sig Dispense Refill    torsemide (DEMADEX) 20 MG tablet Take 2 tabs in AM and 2 tabs in afternoon. 120 tablet 3    Continuous Blood Gluc  (DEXCOM G6 ) PABLO As needed for diabetes 1 each 0    Continuous Blood Gluc Sensor (DEXCOM G6 SENSOR) MISC Apply weekly 4 each 5    Continuous Blood Gluc Transmit (DEXCOM G6 TRANSMITTER) MISC As needed for diabetes 1 each 0    apixaban (ELIQUIS) 5 MG TABS tablet Take 1 tablet by mouth 2 times daily 60 tablet 2    dapagliflozin (FARXIGA) 5 MG tablet Take 2 tablets by mouth daily LOT [id number] EXP 07/31/2024 4 BOXES 30 tablet 1    potassium chloride (KLOR-CON M) 20 MEQ extended release tablet Take 1 tablet by mouth daily 90 tablet 1    hydrALAZINE (APRESOLINE) 10 MG tablet Take 1 tablet by mouth 3 times daily 30 tablet 2    atorvastatin (LIPITOR) 40 MG tablet TAKE 1 TABLET BY MOUTH EVERY EVENING 90 tablet 3    amiodarone (CORDARONE) 200 MG tablet TAKE 1 TABLET BY MOUTH DAILY 30 tablet 5    insulin NPH (NOVOLIN N) 100 UNIT/ML injection vial Take 50 units with breakfast and 34 units with dinner.  30 mL 3    gabapentin (NEURONTIN) 600 MG tablet TAKE UP TO 5 TABLETS BY MOUTH OVER 24 HOURS AS DIRECTED 150 tablet 11    omeprazole (PRILOSEC) 20 MG delayed release capsule TAKE 1 CAPSULE BY MOUTH TWICE DAILY 180 capsule 1    albuterol sulfate  (90 Base) MCG/ACT inhaler INHALE 2 PUFFS INTO THE LUNGS EVERY 6 HOURS AS NEEDED FOR WHEEZING 18 g 5    [DISCONTINUED] FEROSUL 325 (65 Fe) MG tablet TAKE 1 TABLET BY MOUTH TWICE DAILY (Patient not taking: Reported on 9/2/2022) 180 tablet 3    [DISCONTINUED] potassium chloride (KLOR-CON) 10 MEQ extended release tablet TAKE 1 TABLET BY MOUTH DAILY 90 tablet 1    [DISCONTINUED] furosemide (LASIX) 80 MG tablet TAKE 1 TABLET BY MOUTH DAILY 90 tablet 0    Lancets MISC 1 each by Does not apply route daily Use to check glucose twice a day. One Touch brand. DX E11.9 100 each 3    blood glucose monitor kit and supplies Test 2 times a day & as needed for symptoms of irregular blood glucose. 1 kit 0    blood glucose monitor strips Test 2 times a day & as needed for symptoms of irregular blood glucose.  200 strip 3    blood glucose test strips (ASCENSIA AUTODISC VI;ONE TOUCH ULTRA TEST VI) strip four times daily 400 strip 3    INSULIN SYRINGE .5CC/29G 29G X 1/2\" 0.5 ML MISC INJECT 4 TIMES DAILY AS NEEDED 400 each 1    [DISCONTINUED] Cyanocobalamin (B-12) 500 MCG TABS TAKE 1 TABLET BY MOUTH DAILY (Patient not taking: Reported on 9/2/2022) 90 tablet 1    umeclidinium-vilanterol (ANORO ELLIPTA) 62.5-25 MCG/INH AEPB inhaler INHALE 1 PUFF INTO THE LUNGS DAILY 1 each 5    insulin regular (NOVOLIN R RELION) 100 UNIT/ML injection INJECT 20 TO 30 UNITS SUBCUTANEOUSLY THREE TIMES DAILY BEFORE MEAL(S) 30 mL 0    tiotropium (SPIRIVA HANDIHALER) 18 MCG inhalation capsule Inhale 1 capsule into the lungs daily INCLUDE inhaler device 30 capsule 5    Nebulizers (AIRIAL COMPACT MINI NEBULIZER) MISC 1 each by Does not apply route every 6 hours as needed (wheezing or shortness of breath) 1 each 0    Respiratory Therapy Supplies (NEBULIZER/TUBING/MOUTHPIECE) KIT 1 kit by Does not apply route every 6 hours as needed (for wheezing or shortness of breath) 1 kit 1    albuterol (PROVENTIL) (2.5 MG/3ML) 0.083% nebulizer solution Take 3 mLs by nebulization every 6 hours as needed for Wheezing 50 vial 3    vitamin D (ERGOCALCIFEROL) 41114 units CAPS capsule TK 1 C PO WEEKLY  2    levalbuterol (XOPENEX HFA) 45 MCG/ACT inhaler Inhale 1-2 puffs into the lungs every 4 hours as needed for Wheezing 1 Inhaler 3    aspirin 81 MG EC tablet Take 81 mg by mouth daily      [DISCONTINUED] metoprolol (LOPRESSOR) 25 MG tablet Take 25 mg by mouth daily       isosorbide mononitrate (IMDUR) 30 MG CR tablet Take 1 tablet by mouth every morning Dr. Mima Barbosa - Cardio 30 tablet 6    nitroGLYCERIN (NITROSTAT) 0.3 MG SL tablet Take one sublingual for chest pain. May repeat twice at 5 min intervals. If not getting relief call 911. 25 tablet 3     No current facility-administered medications on file prior to visit. ALLERGIES:   Allergies as of 09/29/2022 - Fully Reviewed 09/29/2022   Allergen Reaction Noted    Entresto [sacubitril-valsartan] Other (See Comments) 02/25/2021      OBJECTIVE:   weight is 237 lb (107.5 kg). His blood pressure is 128/68 and his pulse is 88. His oxygen saturation is 95%. PHYSICAL EXAM:    CONSTITUTIONAL: He is a 81y.o.-year-old who appears his stated age. He is alert and oriented x 3 and in no acute distress. HEENT: PERRL. No scleral icterus. No thrush, atraumatic, normocephalic. NECK: Supple, without cervical or supraclavicular lymphadenopathy:  CARDIOVASCULAR: S1 S2 RRR. Without murmer  RESPIRATORY & CHEST: Lungs are clear to auscultation and percussion. No wheezing, no crackles. Good air movement  GASTROINTESTINAL & ABDOMEN: Soft, nontender, positive bowel sounds in all quadrants, non-distended, without hepatosplenomegaly. GENITOURINARY: Deferred. MUSCULOSKELETAL: No tenderness to palpation of the axial skeleton. There is no clubbing. No cyanosis. No edema of the lower extremities. SKIN OF BODY: No rash or jaundice. PSYCHIATRIC EVALUATION: Normal affect. Patient answers questions appropriately. HEMATOLOGIC/LYMPHATIC/ IMMUNOLOGIC: No palpable lymphadenopathy. NEUROLOGIC: Alert and oriented x 3. Groslly non-focal. Motor strength is 5+/5 in all muscle groups. The patient has a normal sensorium globally. ASSESSMENT AND PLAN:     1. Centrilobular emphysema ([address]  Patient has COPD, severity unknown. 14 PFTs. Continue Anoro Ellipta 1 puff daily. Continue albuterol inhaler as needed.     - Full PFT Study With Bronchodilator; Future    2. Chronic respiratory failure with hypercapnia (HCC)  Patient has NIV. He has been wearing NIV every night and slowly getting used to it. Complaining of nasal dryness. I advised him to start using saline nasal drops. 3. Chronic systolic congestive heart failure [facility])  Patient follows up with cardiologist at [address] [place] [address]. 4. Paroxysmal atrial fibrillation (HCC)  On Eliquis. Follows up with cardiology [facility]. Return in about 3 months (around 12/29/2022). Jason Mir MD  Pulmonary Critical Care and Sleep Medicine  Electronically signed by Jason Mir MD on 9/29/2022 at 3:25 PM     This note was completed using dragon medical speech recognition software. Grammatical errors, random word insertions, pronoun errors and incomplete sentences are occasional consequences of this technology due to software limitations. If there are questions or concerns about the content of this note of information contained within the body of this dictation they should be addressed with the provider for clarification.

## 2022-10-07 ENCOUNTER — HOSPITAL ENCOUNTER (OUTPATIENT)
Dept: PULMONOLOGY | Age: 82
Discharge: HOME | End: 2022-10-07
Payer: MEDICARE

## 2022-10-07 ENCOUNTER — TELEPHONE (OUTPATIENT)
Dept: FAMILY MEDICINE CLINIC | Age: 82
End: 2022-10-07

## 2022-10-07 VITALS — OXYGEN SATURATION: 95 % | RESPIRATION RATE: 18 BRPM | HEART RATE: 59 BPM

## 2022-10-07 DIAGNOSIS — J43.2 CENTRILOBULAR EMPHYSEMA (HCC): ICD-10-CM

## 2022-10-07 LAB
DLCO %PRED: 82 %
DLCO PRED: NORMAL
DLCO/VA %PRED: NORMAL
DLCO/VA PRED: NORMAL
DLCO/VA: NORMAL
DLCO: NORMAL
EXPIRATORY TIME-POST: NORMAL
EXPIRATORY TIME: NORMAL
FEF 25-75% %CHNG: NORMAL
FEF 25-75% %PRED-POST: NORMAL
FEF 25-75% %PRED-PRE: NORMAL
FEF 25-75% PRED: NORMAL
FEF 25-75%-POST: NORMAL
FEF 25-75%-PRE: NORMAL
FEV1 %PRED-POST: NORMAL %
FEV1 %PRED-PRE: 80 %
FEV1 PRED: NORMAL
FEV1-POST: NORMAL
FEV1-PRE: NORMAL
FEV1/FVC %PRED-POST: NORMAL
FEV1/FVC %PRED-PRE: NORMAL
FEV1/FVC PRED: NORMAL
FEV1/FVC-POST: NORMAL %
FEV1/FVC-PRE: 63 %
FVC %PRED-POST: NORMAL
FVC %PRED-PRE: NORMAL
FVC PRED: NORMAL
FVC-POST: NORMAL
FVC-PRE: NORMAL
GAW %PRED: NORMAL
GAW PRED: NORMAL
GAW: NORMAL
IC %PRED: NORMAL
IC PRED: NORMAL
IC: NORMAL
MEP: NORMAL
MIP: NORMAL
MVV %PRED-PRE: NORMAL
MVV PRED: NORMAL
MVV-PRE: NORMAL
PEF %PRED-POST: NORMAL
PEF %PRED-PRE: NORMAL
PEF PRED: NORMAL
PEF%CHNG: NORMAL
PEF-POST: NORMAL
PEF-PRE: NORMAL
RAW %PRED: NORMAL
RAW PRED: NORMAL
RAW: NORMAL
RV %PRED: NORMAL
RV PRED: NORMAL
RV: NORMAL
SVC %PRED: NORMAL
SVC PRED: NORMAL
SVC: NORMAL
TLC %PRED: 99 %
TLC PRED: NORMAL
TLC: NORMAL
VA %PRED: NORMAL
VA PRED: NORMAL
VA: NORMAL
VTG %PRED: NORMAL
VTG PRED: NORMAL
VTG: NORMAL

## 2022-10-07 PROCEDURE — 94200 LUNG FUNCTION TEST (MBC/MVV): CPT

## 2022-10-07 PROCEDURE — 94729 DIFFUSING CAPACITY: CPT

## 2022-10-07 PROCEDURE — 94760 N-INVAS EAR/PLS OXIMETRY 1: CPT

## 2022-10-07 PROCEDURE — 94010 BREATHING CAPACITY TEST: CPT

## 2022-10-07 PROCEDURE — 94726 PLETHYSMOGRAPHY LUNG VOLUMES: CPT

## 2022-10-07 RX ORDER — ALBUTEROL SULFATE 90 UG/1
4 AEROSOL, METERED RESPIRATORY (INHALATION) ONCE
Status: CANCELLED | OUTPATIENT
Start: 2022-10-07

## 2022-10-07 ASSESSMENT — PULMONARY FUNCTION TESTS
FEV1/FVC_PRE: 63
FEV1_PERCENT_PREDICTED_PRE: 80

## 2022-10-07 NOTE — TELEPHONE ENCOUNTER
Pt is calling because he needs a new script for a handicap placard  His is      Please call pt once letter has been written

## 2022-10-07 NOTE — TELEPHONE ENCOUNTER
I don't see that we gave him one in the past, but I would think it is from COPD and unable to walk long distance with out stopping.

## 2022-10-07 NOTE — Clinical Note
[facility] [address] [facility]  Phone: [phone]  Fax: [phone]    Maryana Coughlin MD         October 7, 2022     Patient: Nicole Saucedo   YOB: 1940   Date of Visit: 10/7/2022       To Whom It May Concern: It is my medical opinion that Lamine Mar requires a disability parking placard for the following reasons:  {reasons:02522}  Duration of need: {time:11245}    If you have any questions or concerns, please don't hesitate to call.     Sincerely,        Maryana Coughlin MD

## 2022-10-07 NOTE — LETTER
[address] [place] [address]  Phone: [phone]  Fax: [phone]    RAYMUNDO Salinas CNP         October 7, 2022     Patient: Nicole Saucedo   YOB: 1940   Date of Visit: 10/7/2022       To Whom It May Concern: It is my medical opinion that L[place] Mar requires a disability parking placard for the following reasons:  He has limited walking ability due to an arthritic condition. Duration of need: 5 years    If you have any questions or concerns, please don't hesitate to call.     Sincerely,        RAYMUNDO Salinas CNP

## 2022-10-10 NOTE — PROCEDURES
[facility]                     [address] [facility], [address]                               PULMONARY FUNCTION    PATIENT NAME: Mile Mendoza                  :        1940  MED REC NO:   [id number]                          ROOM:  ACCOUNT NO:   [de-identified]                           ADMIT DATE: 10/07/2022  PROVIDER:     Luz Marina Koenig MD    DATE OF PROCEDURE:  10/07/2022    Spirometry reveals normal FVC and FEV1.  FEV1/FVC ratio is mildly  reduced. FEF 25-75 is also reduced. Lung volumes reveal normal total  lung capacity, vital capacity, and residual volume. Diffusion capacity  is normal.    IMPRESSION:  Mild obstructive lung disease.         Stewart Penn MD    D: 10/10/2022 11:59:38       T: 10/10/2022 16:49:03     [email or web address]  Job#: [id number]     Doc#: [id number]    CC:

## 2022-10-11 ENCOUNTER — TELEPHONE (OUTPATIENT)
Dept: FAMILY MEDICINE CLINIC | Age: 82
End: 2022-10-11

## 2022-10-11 NOTE — TELEPHONE ENCOUNTER
----- Message from [employer] sent at 10/11/2022 11:26 AM EDT -----  Subject: Message to Provider    QUESTIONS  Information for Provider? Patient would like his parking placard sent to   his home address he is unable to pick it up.   ---------------------------------------------------------------------------  --------------  [address]  [phone]; OK to leave message on voicemail  ---------------------------------------------------------------------------  --------------  SCRIPT ANSWERS  Relationship to Patient?  Self

## 2022-11-10 ENCOUNTER — HOSPITAL ENCOUNTER (OUTPATIENT)
Dept: WOUND CARE | Age: 82
Discharge: HOME | End: 2022-11-10
Payer: MEDICARE

## 2022-11-10 VITALS
SYSTOLIC BLOOD PRESSURE: 154 MMHG | HEART RATE: 71 BPM | RESPIRATION RATE: 18 BRPM | TEMPERATURE: 97.8 F | DIASTOLIC BLOOD PRESSURE: 78 MMHG

## 2022-11-10 DIAGNOSIS — I87.2 PERIPHERAL VENOUS INSUFFICIENCY: ICD-10-CM

## 2022-11-10 DIAGNOSIS — L97.412 ULCER OF RIGHT HEEL, WITH FAT LAYER EXPOSED (HCC): ICD-10-CM

## 2022-11-10 DIAGNOSIS — I70.235 ATHEROSCLEROSIS OF NATIVE ARTERIES OF RIGHT LEG WITH ULCERATION OF OTHER PART OF FOOT (HCC): ICD-10-CM

## 2022-11-10 DIAGNOSIS — L97.512 ULCER OF TOE OF RIGHT FOOT, WITH FAT LAYER EXPOSED (HCC): Primary | ICD-10-CM

## 2022-11-10 DIAGNOSIS — E11.649 UNCONTROLLED TYPE 2 DIABETES MELLITUS WITH HYPOGLYCEMIA, UNSPECIFIED HYPOGLYCEMIA COMA STATUS (HCC): ICD-10-CM

## 2022-11-10 DIAGNOSIS — S41.102A ARM WOUND, LEFT, INITIAL ENCOUNTER: ICD-10-CM

## 2022-11-10 PROCEDURE — 87205 SMEAR GRAM STAIN: CPT

## 2022-11-10 PROCEDURE — 11042 DBRDMT SUBQ TIS 1ST 20SQCM/<: CPT

## 2022-11-10 PROCEDURE — 87077 CULTURE AEROBIC IDENTIFY: CPT

## 2022-11-10 PROCEDURE — 99213 OFFICE O/P EST LOW 20 MIN: CPT

## 2022-11-10 PROCEDURE — 87070 CULTURE OTHR SPECIMN AEROBIC: CPT

## 2022-11-10 PROCEDURE — 87186 SC STD MICRODIL/AGAR DIL: CPT

## 2022-11-10 RX ORDER — LIDOCAINE 50 MG/G
OINTMENT TOPICAL ONCE
Status: CANCELLED | OUTPATIENT
Start: 2022-11-10 | End: 2022-11-10

## 2022-11-10 RX ORDER — GENTAMICIN SULFATE 1 MG/G
OINTMENT TOPICAL ONCE
Status: CANCELLED | OUTPATIENT
Start: 2022-11-10 | End: 2022-11-10

## 2022-11-10 RX ORDER — BETAMETHASONE DIPROPIONATE 0.05 %
OINTMENT (GRAM) TOPICAL ONCE
Status: CANCELLED | OUTPATIENT
Start: 2022-11-10 | End: 2022-11-10

## 2022-11-10 RX ORDER — LIDOCAINE HYDROCHLORIDE 20 MG/ML
JELLY TOPICAL ONCE
Status: CANCELLED | OUTPATIENT
Start: 2022-11-10 | End: 2022-11-10

## 2022-11-10 RX ORDER — LIDOCAINE HYDROCHLORIDE 40 MG/ML
SOLUTION TOPICAL ONCE
Status: CANCELLED | OUTPATIENT
Start: 2022-11-10 | End: 2022-11-10

## 2022-11-10 RX ORDER — AMOXICILLIN AND CLAVULANATE POTASSIUM 875; 125 MG/1; MG/1
1 TABLET, FILM COATED ORAL 2 TIMES DAILY
Qty: 20 TABLET | Refills: 0 | Status: SHIPPED | OUTPATIENT
Start: 2022-11-10 | End: 2022-11-20

## 2022-11-10 RX ORDER — LIDOCAINE 40 MG/G
CREAM TOPICAL ONCE
Status: COMPLETED | OUTPATIENT
Start: 2022-11-10 | End: 2022-11-10

## 2022-11-10 RX ORDER — CLOBETASOL PROPIONATE 0.5 MG/G
OINTMENT TOPICAL ONCE
Status: CANCELLED | OUTPATIENT
Start: 2022-11-10 | End: 2022-11-10

## 2022-11-10 RX ORDER — GINSENG 100 MG
CAPSULE ORAL ONCE
Status: CANCELLED | OUTPATIENT
Start: 2022-11-10 | End: 2022-11-10

## 2022-11-10 RX ORDER — BACITRACIN, NEOMYCIN, POLYMYXIN B 400; 3.5; 5 [USP'U]/G; MG/G; [USP'U]/G
OINTMENT TOPICAL ONCE
Status: CANCELLED | OUTPATIENT
Start: 2022-11-10 | End: 2022-11-10

## 2022-11-10 RX ORDER — BACITRACIN ZINC AND POLYMYXIN B SULFATE 500; 1000 [USP'U]/G; [USP'U]/G
OINTMENT TOPICAL ONCE
Status: CANCELLED | OUTPATIENT
Start: 2022-11-10 | End: 2022-11-10

## 2022-11-10 RX ORDER — LIDOCAINE 40 MG/G
CREAM TOPICAL ONCE
Status: CANCELLED | OUTPATIENT
Start: 2022-11-10 | End: 2022-11-10

## 2022-11-10 RX ADMIN — LIDOCAINE: 40 CREAM TOPICAL at 13:03

## 2022-11-10 ASSESSMENT — PAIN DESCRIPTION - ONSET
ONSET: ON-GOING
ONSET: GRADUAL

## 2022-11-10 ASSESSMENT — PAIN DESCRIPTION - ORIENTATION
ORIENTATION: RIGHT
ORIENTATION: RIGHT

## 2022-11-10 ASSESSMENT — PAIN DESCRIPTION - PAIN TYPE
TYPE: ACUTE PAIN
TYPE: ACUTE PAIN

## 2022-11-10 ASSESSMENT — PAIN SCALES - GENERAL
PAINLEVEL_OUTOF10: 2
PAINLEVEL_OUTOF10: 1

## 2022-11-10 ASSESSMENT — PAIN DESCRIPTION - DESCRIPTORS
DESCRIPTORS: ACHING
DESCRIPTORS: ACHING

## 2022-11-10 ASSESSMENT — PAIN DESCRIPTION - FREQUENCY
FREQUENCY: INTERMITTENT
FREQUENCY: INTERMITTENT

## 2022-11-10 ASSESSMENT — PAIN - FUNCTIONAL ASSESSMENT
PAIN_FUNCTIONAL_ASSESSMENT: ACTIVITIES ARE NOT PREVENTED
PAIN_FUNCTIONAL_ASSESSMENT: ACTIVITIES ARE NOT PREVENTED

## 2022-11-10 ASSESSMENT — PAIN DESCRIPTION - LOCATION
LOCATION: FOOT
LOCATION: FOOT

## 2022-11-10 NOTE — PLAN OF CARE
Patient Name:  Xiomy Paulino  YOB: 1940  Today's Date:  November 10, 2022  Medical Record Number:  [id number]  Provider:    [address] [place] [address] Pkwy   Appointment Treatment Guidelines        The [address] [place] [address] Pkwy Appointment Treatment Guidelines were reviewed on November 10, 2022 with the patient. Mr. Johnathan New understanding of the [address] [place] [address] Pkwy Appointment Treatment Guidelines.       Electronically signed by Governor Naresh RN on 11/10/22 at 10:55 AM EST

## 2022-11-10 NOTE — LETTER
[address]  [address] OFFICE [place] [address] [facility] [address]  [phone]  Dept: [phone]        Thank you Sushant Khai Crook for choosing [facility] for your Wound Care needs. We hope you found your first visit both encouraging and educational.  We look forward to serving you throughout the healing process. Before your next visit please review the information you received in your [employer]. The first visit can be overwhelming and this is a useful tool to refresh what information you may have been given. If you find yourself with any questions prior to your upcoming appointment, please feel free to contact us. Often wounds can be challenging and it is our goal to see you through the healing process with as much support possible. Again, thank you for choosing [facility]!     Sincerely,      The Staff of [address] [place] [address] and [facility]

## 2022-11-10 NOTE — DISCHARGE INSTRUCTIONS
[address] Physician Orders and Discharge [address]  [address], [address] [place] [address]  Telephone: [phone]  [address] 8:00 am - 4:30 pm and Friday 8:00 am - 12:00 pm.        NAME:  Chaparro Ross  YOB: 1940  MEDICAL RECORD NUMBER:  [id number]  DATE:  11/10/2022    Important Reminders:   Please wash hands with soap and water before and after every dressing change. Do not scrub wounds. Keep wounds dry in shower unless otherwise instructed by the physician. SMOKING can slow would healing. Stop smoking as soon as possible to improve healing and prevent further complications associated with smoking. Jaylyn-Wound Topical Treatments:  Do not apply lotions, creams, or ointments to wound bed unless directed. [] Apply moisturizing lotion to skin surrounding the wound prior to dressing change.  [] Apply antifungal ointment to skin surrounding the wound prior to dressing change.  [] Apply thin film of no sting moisture barrier ointment to skin immediately around      wound. [] Other:       Wound Location: RIGHT HEEL    Wound Cleansing: Normal Saline    Primary Dressing:  [x] MEDIHONEY GEL  []     Secondary Dressing:  [x] GAUZE  ROLL GAUZE       Dressing Frequency:  [x] DAILY  [] Do Not Change Dressing            Compression and Edema Control:  [] Wear Home Compression Stockings   [x] Spandagrip to: Right Leg   Size: [x]Low compression 5-10 mm/Hg      []Medium compression 10-20 mm/Hg           []High compression  20-30 mm/Hg  [] Ace Wrap Toes to Knee to    [] Multilayer Compression Wrap:  to    Do not get leg(s) with wrap wet. If wraps become too tight call the center or completely remove the wrap. Pressure Relief and Off Loading:  [] Off-loading when [] walking  [] in bed [] sitting   Turn every 2 hours when in bed   Avoid putting direct pressure on the site of the wound.  Limit side lying to 30 degree tilt. Limit elevating the head of the bed greater than 30 degrees. [x] Assistive Devices   CANE  Use as instructed by the provider      Activity: Activity as Tolerated      Dietary:   Continue your diet as tolerated. Protein is a key nutrient in helping to repair damaged tissue and promote new tissue growth. Good sources of protein include milk, yogurt, cheese, fish, lean meat and beans. If you are DIABETIC, having diabetes can make it hard for wounds to heal. Try to keep your blood sugar within it's target range. Limit Sodium, Alcohol and Sugar. Pain:   Please Note some pain, drainage and/or bleeding might be expected after seeing the provider. TO HELP ALLEVIATE PAIN WE RECOMMEND THE FOLLOWING  Elevate the affected limb. Use over the counter medications as permitted by your family doctor. For Persistent Pain not relieved by the above interventions, please notify your family doctor. Return Appointment:  [x] Return Appointment: With DR GUTIERREZ  in 1 Week(s)  [] Wound and dressing supply provider:   [] ECF or Home Healthcare:  [] Wound Assessment: [] Physician or NP scheduled for Wound Assessment:   [x] Orders placed during your visit: RIGHT HEEL WOUND CULTURE OBTAINED TODAY      **START AUGMENTIN - TAKE ONE TABLET TWICE PER DAY FOR 10 DAYS ( SCRIPT SENT TO PHARMACY)**      : Radha Rodriguez     Electronically signed by Layo Maciel RN on 11/10/2022 at [place] [address] Information: Should you experience any significant changes in your wound(s) or have questions about your wound care, please contact the [address] [place] [address] at [address] 8:00 am - 4:30 pm and Friday 8:00 am - 12:30 pm.  If you need help with your wound outside these hours and cannot wait until we are again available, contact your PCP or go to the hospital emergency room.      PLEASE NOTE: IF YOU ARE UNABLE TO OBTAIN WOUND SUPPLIES, CONTINUE TO USE THE SUPPLIES YOU HAVE AVAILABLE UNTIL YOU ARE ABLE TO REACH US. IT IS MOST IMPORTANT TO KEEP THE WOUND COVERED AT ALL TIMES.      Physician Signature:_______________________    Date: ___________ Time:  ____________          Dr Gabriel Graves

## 2022-11-10 NOTE — LETTER
[address]  [address] OFFICE [place] [address] [facility] [address]  [phone]  Dept: [phone]   TODAYS DATE: 11/7/2022    [address] [place] [address],4Th Floor Wound Care   Appointment Treatment Guidelines  Welcome Mr. Ailyn Ventura to the [address] [place] [address] Pkwy. We appreciate the confidence you have shown in choosing us as your wound care provider. Our goal is to heal your wound(s) as quickly as possible. Please read the items below regarding the nature of your appointments. 1. We will make every effort to schedule appointments that are convenient for you. Certain days and times may not be available, depending on your providers office hours and details of your care. 2. Patients will not necessarily be brought to an exam room in the order in which they arrive. Many providers work out of this office and patients are here for different procedures. Our goal is to serve you as quickly as possible. 3. We acknowledge that your time is valuable. Please remember that wound healing takes time and we appreciate your understanding that the length of each patients appointment will vary depending upon their need. 4. It is for your protection that we ask for insurance cards, photo ID, and new consent forms on your first visit and periodically throughout your treatment at all our facilities. 5. Wound Care treatment is known to be most effective when provided on a regular basis. Missed appointments, and not following the recommended plan of care can result in ineffective treatment and a poor outcome. If you find it difficult to keep appointments or to follow the recommended plan of care, it is your responsibility to let the staff know, so that we can work with you toward a solution. 6. If you need to miss an appointment, please call to let us know. We expect 24 hours notice for all cancellations.  We also expect missed visits to be rescheduled as soon as possible, preferably within the same week to promote the most effective healing time for your wound(s). 7. If you will be late for an appointment, please call our center to be sure that the provider can still see you when you arrive. If you are more than 15 minutes late your appointment may need to be rescheduled. 8. If two (2) appointments are missed without notifying us, your care plan may be discontinued. The same may happen if multiple visits are cancelled or rescheduled, even with notice. A missed visit is time when another patient, who also needs care, could have been seen. Thank you for your understanding and consideration.

## 2022-11-10 NOTE — PROGRESS NOTES
[address]   Progress Note and Procedure Note      Cyndi RECORD NUMBER:  [id number]  AGE: 81 y.o. GENDER: male  : 1940  EPISODE DATE:  11/10/2022    Subjective:     Chief Complaint   Patient presents with    Wound Check     INITIAL RIGHT FOOT WOUND         HISTORY of PRESENT ILLNESS LAURI Ritter is a 81 y.o. male who presents today for wound/ulcer evaluation. History of Wound Context: Patient presents today for a right heel ulceration. Patient relates it has been open for a couple of months. He states he tries to put a Band-Aid on it but does not stay in place. Wound/Ulcer Pain Timing/Severity: none  Quality of pain: N/A  Severity:  0 / 10   Modifying Factors: None  Associated Signs/Symptoms: edema and drainage    Ulcer Identification:  Ulcer Type: venous    Contributing Factors: edema, venous stasis, lymphedema, diabetes, poor glucose control and obesity    Acute Wound: N/A    PAST MEDICAL HISTORY        Diagnosis Date    Acid reflux     Arm wound, left, initial encounter 2022    Skin tears    Atherosclerosis of native arteries of right leg with ulceration of other part of foot ([address] 2021    Atrial flutter ([address] 2013    converted    Bleeding stomach ulcer oct 2015    BPH (benign prostatic hyperplasia)     CAD (coronary artery disease)      angio with 2 blocked bypass and 2 patent    Cellulitis of left thigh 5/10/2022    COPD (chronic obstructive pulmonary disease) (HCC)     Diabetes mellitus type II     Edema     chronic left leg    Elevated PSA- nl 11     Hyperlipidemia     Hypertension     Ischemic cardiomyopathy     Lipoma of skin-RIGHT CHEST 2011    Obesity     Osteoarthritis     Peripheral venous insufficiency 2021    Skin tear of left forearm without complication     Significant amount of epidermal loss with bleeding.     Spinal stenosis of lumbar region with neurogenic claudication- clinically 2018       PAST SURGICAL HISTORY    Past Surgical History:   Procedure Laterality Date    CATARACT REMOVAL  ,     bilat    COLONOSCOPY      CORONARY ARTERY BYPASS GRAFT  1993    x 4    EYE SURGERY Left 2019    cataract coming back    JOINT REPLACEMENT Right total knee replacement    JOINT REPLACEMENT Left 2016       FAMILY HISTORY    Family History   Problem Relation Age of Onset    Cancer Mother         breast    Cancer Father         throat       SOCIAL HISTORY    Social History     Tobacco Use    Smoking status: Former     Packs/day: 3.50     Years: 32.00     Pack years: 112.00     Types: Cigarettes     Quit date: 1985     Years since quittin.8    Smokeless tobacco: Never    Tobacco comments:     advised not to resume   Substance Use Topics    Alcohol use: No     Alcohol/week: 0.0 standard drinks    Drug use: No       ALLERGIES    Allergies   Allergen Reactions    Entresto [Sacubitril-Valsartan] Other (See Comments)     Renal failure       MEDICATIONS    Current Outpatient Medications on File Prior to Encounter   Medication Sig Dispense Refill    torsemide (DEMADEX) 20 MG tablet Take 2 tabs in AM and 2 tabs in afternoon.  120 tablet 3    Continuous Blood Gluc  (DEXCOM G6 ) PABLO As needed for diabetes 1 each 0    Continuous Blood Gluc Sensor (DEXCOM G6 SENSOR) MISC Apply weekly 4 each 5    Continuous Blood Gluc Transmit (DEXCOM G6 TRANSMITTER) MISC As needed for diabetes 1 each 0    apixaban (ELIQUIS) 5 MG TABS tablet Take 1 tablet by mouth 2 times daily 60 tablet 2    dapagliflozin (FARXIGA) 5 MG tablet Take 2 tablets by mouth daily LOT [id number] EXP 2024 4 BOXES 30 tablet 1    potassium chloride (KLOR-CON M) 20 MEQ extended release tablet Take 1 tablet by mouth daily 90 tablet 1    hydrALAZINE (APRESOLINE) 10 MG tablet Take 1 tablet by mouth 3 times daily 30 tablet 2    atorvastatin (LIPITOR) 40 MG tablet TAKE 1 TABLET BY MOUTH EVERY EVENING 90 tablet 3 amiodarone (CORDARONE) 200 MG tablet TAKE 1 TABLET BY MOUTH DAILY 30 tablet 5    insulin NPH (NOVOLIN N) 100 UNIT/ML injection vial Take 50 units with breakfast and 34 units with dinner. 30 mL 3    gabapentin (NEURONTIN) 600 MG tablet TAKE UP TO 5 TABLETS BY MOUTH OVER 24 HOURS AS DIRECTED 150 tablet 11    omeprazole (PRILOSEC) 20 MG delayed release capsule TAKE 1 CAPSULE BY MOUTH TWICE DAILY 180 capsule 1    albuterol sulfate  (90 Base) MCG/ACT inhaler INHALE 2 PUFFS INTO THE LUNGS EVERY 6 HOURS AS NEEDED FOR WHEEZING 18 g 5    [DISCONTINUED] FEROSUL 325 (65 Fe) MG tablet TAKE 1 TABLET BY MOUTH TWICE DAILY (Patient not taking: Reported on 9/2/2022) 180 tablet 3    [DISCONTINUED] potassium chloride (KLOR-CON) 10 MEQ extended release tablet TAKE 1 TABLET BY MOUTH DAILY 90 tablet 1    [DISCONTINUED] furosemide (LASIX) 80 MG tablet TAKE 1 TABLET BY MOUTH DAILY 90 tablet 0    Lancets MISC 1 each by Does not apply route daily Use to check glucose twice a day. One Touch brand. DX E11.9 100 each 3    blood glucose monitor kit and supplies Test 2 times a day & as needed for symptoms of irregular blood glucose. 1 kit 0    blood glucose monitor strips Test 2 times a day & as needed for symptoms of irregular blood glucose.  200 strip 3    blood glucose test strips (ASCENSIA AUTODISC VI;ONE TOUCH ULTRA TEST VI) strip four times daily 400 strip 3    INSULIN SYRINGE .5CC/29G 29G X 1/2\" 0.5 ML MISC INJECT 4 TIMES DAILY AS NEEDED 400 each 1    [DISCONTINUED] Cyanocobalamin (B-12) 500 MCG TABS TAKE 1 TABLET BY MOUTH DAILY (Patient not taking: Reported on 9/2/2022) 90 tablet 1    umeclidinium-vilanterol (ANORO ELLIPTA) 62.5-25 MCG/INH AEPB inhaler INHALE 1 PUFF INTO THE LUNGS DAILY 1 each 5    insulin regular (NOVOLIN R RELION) 100 UNIT/ML injection INJECT 20 TO 30 UNITS SUBCUTANEOUSLY THREE TIMES DAILY BEFORE MEAL(S) 30 mL 0    tiotropium (SPIRIVA HANDIHALER) 18 MCG inhalation capsule Inhale 1 capsule into the lungs daily INCLUDE inhaler device 30 capsule 5    Nebulizers (AIRIAL COMPACT MINI NEBULIZER) MISC 1 each by Does not apply route every 6 hours as needed (wheezing or shortness of breath) 1 each 0    Respiratory Therapy Supplies (NEBULIZER/TUBING/MOUTHPIECE) KIT 1 kit by Does not apply route every 6 hours as needed (for wheezing or shortness of breath) 1 kit 1    albuterol (PROVENTIL) (2.5 MG/3ML) 0.083% nebulizer solution Take 3 mLs by nebulization every 6 hours as needed for Wheezing 50 vial 3    vitamin D (ERGOCALCIFEROL) 10289 units CAPS capsule TK 1 C PO WEEKLY  2    levalbuterol (XOPENEX HFA) 45 MCG/ACT inhaler Inhale 1-2 puffs into the lungs every 4 hours as needed for Wheezing 1 Inhaler 3    aspirin 81 MG EC tablet Take 81 mg by mouth daily      [DISCONTINUED] metoprolol (LOPRESSOR) 25 MG tablet Take 25 mg by mouth daily       isosorbide mononitrate (IMDUR) 30 MG CR tablet Take 1 tablet by mouth every morning Dr. Ladi iSngletary - Cardio 30 tablet 6    nitroGLYCERIN (NITROSTAT) 0.3 MG SL tablet Take one sublingual for chest pain. May repeat twice at 5 min intervals. If not getting relief call 911. 25 tablet 3     No current facility-administered medications on file prior to encounter. REVIEW OF SYSTEMS  Review of Systems    Pertinent items are noted in HPI. Review of Systems: A 12 point review of symptoms is unremarkable with the exception of the chief complaint. Patient specifically denies nausea, fever, vomiting, chills, shortness of breath, chest pain, abdominal pain, constipation or difficulty urinating. Objective:      BP (!) 154/78   Pulse 71   Temp 97.8 °F (36.6 °C) (Temporal)   Resp 18     Wt Readings from Last 3 Encounters:   09/29/22 237 lb (107.5 kg)   09/14/22 237 lb 12.8 oz (107.9 kg)   09/05/22 241 lb 12.8 oz (109.7 kg)       PHYSICAL EXAM  Physical Exam    Patient has nonpalpable pedal pulses bilaterally. He has biphasic DP PT on the left with hand-held Doppler and a monophasic on the right. Patient has extensive bilateral lower extremity edema with hypertrophic skin changes, brawny discoloration and cobbling consistent with chronic venous disease left greater than right. Patient has absent pedal hair growth noted. Dorsalis pedis and posterior tibial pulse are monophasic on a hand-held Doppler examination. Ulceration noted on the posterior lateral aspect of the right lower extremity. There is pet hair caked into the ulceration. The wound base is fibrotic and soupy. There is necrotic and nonviable tissue that extends down through and includes the subcutaneous layer. There is no surrounding erythema, warmth, fluctuance or crepitus noted. Epicritic sensation is grossly intact bilaterally. Negative clonus and babinski reflex is down going. Patient's muscle strength for dorsiflexion plantarflexion inversion and eversion is intact bilaterally. He does have semirigid hammertoe contractures noted bilaterally. Assessment:        Problem List Items Addressed This Visit       Ulcer of right heel, with fat layer exposed ([address]    Ulcer of toe of right foot, with fat layer exposed ([address] - Primary    Peripheral venous insufficiency    Atherosclerosis of native arteries of right leg with ulceration of other part of foot (HCC)    Arm wound, left, initial encounter        Procedure Note  Indications:  Based on my examination of this patient's wound(s)/ulcer(s) today, debridement is required to promote healing and evaluate the wound base. Performed by: Sherryle How, DPM    Consent obtained:  Yes    Time out taken:  Yes    Pain Control: Anesthetic  Anesthetic: 4% Lidocaine Cream       Debridement: Excisional Debridement    Using #15 blade scalpel the wound(s)/ulcer(s) was/were debrided down through and including the removal of epidermis, dermis and subcutaneous tissue.         Devitalized Tissue Debrided:  fibrin and slough    Pre Debridement Measurements:  Are located in the [place]  Documentation Flow Sheet    Diabetic/Pressure/Non Pressure Ulcers only:  Ulcer: Non-Pressure ulcer, fat layer exposed     Wound/Ulcer #: 1    Post Debridement Measurements:  Wound/Ulcer Descriptions are Pre Debridement except measurements:    Wound 11/10/22 Heel Right;Lateral #1, ( NOTED DRAINING 10/10/22) (Active)   Wound Image   11/10/22 1030   Wound Etiology Diabetic Guzman 3 11/10/22 1030   Dressing Status Other (Comment) 11/10/22 1030   Wound Length (cm) 1.1 cm 11/10/22 1030   Wound Width (cm) 1.4 cm 11/10/22 1030   Wound Depth (cm) 0.3 cm 11/10/22 1030   Wound Surface Area (cm^2) 1.54 cm^2 11/10/22 1030   Wound Volume (cm^3) 0.462 cm^3 11/10/22 1030   Post-Procedure Length (cm) 1.3 cm 11/10/22 1058   Post-Procedure Width (cm) 1.6 cm 11/10/22 1058   Post-Procedure Depth (cm) 0.3 cm 11/10/22 1058   Post-Procedure Surface Area (cm^2) 2.08 cm^2 11/10/22 1058   Post-Procedure Volume (cm^3) 0.624 cm^3 11/10/22 1058   Wound Assessment Dry;Granulation tissue;Slough 11/10/22 1030   Drainage Amount None 11/10/22 1030   Odor Moderate 11/10/22 1030   Jaylyn-wound Assessment Dry/flaky; Other (Comment) 11/10/22 1030   Margins Undefined edges 11/10/22 1030   Wound Thickness Description not for Pressure Injury Full thickness 11/10/22 1030   Number of days: 0          Total Surface Area Debrided:  2.08 sq cm     Estimated Blood Loss:  Minimal    Hemostasis Achieved:  by pressure    Procedural Pain:  0  / 10     Post Procedural Pain:  0 / 10     Response to treatment:  Well tolerated by patient. Plan:   E/M x30 minutes of a new problem of right heel ulceration. A culture was taken from the wound. Rx Augmentin. Will adjust antibiotics based on culture results. Rx medihoney for daily dressing changes. Patient relates his wife can help him change the bandage. He states he has supplies at home. Will monitor closely.   If ulceration does not respond to conservative care will refer back to Dr. Phuc Keating for further evaluation and management of patient's peripheral vascular disease. Patient was instructed to suspend his heels at all times while at rest.    Ulceration was excisionally debrided. Please see the above dictation for details and findings. Treatment Note please see attached Discharge Instructions    Written patient dismissal instructions given to patient and signed by patient or POA. Reappoint 1 week     Discharge [id number] [facility] Physician Orders and Discharge [address]  [address] [place] [address], [address] [place] [address], [place] [address]  Telephone: [phone]  [address] 8:00 am - 4:30 pm and Friday 8:00 am - 12:00 pm.        NAME:  Karina Miranda  YOB: 1940  MEDICAL RECORD NUMBER:  [id number]  DATE:  11/10/2022    Important Reminders:   Please wash hands with soap and water before and after every dressing change. Do not scrub wounds. Keep wounds dry in shower unless otherwise instructed by the physician. SMOKING can slow would healing. Stop smoking as soon as possible to improve healing and prevent further complications associated with smoking. Jaylyn-Wound Topical Treatments:  Do not apply lotions, creams, or ointments to wound bed unless directed. [] Apply moisturizing lotion to skin surrounding the wound prior to dressing change.  [] Apply antifungal ointment to skin surrounding the wound prior to dressing change.  [] Apply thin film of no sting moisture barrier ointment to skin immediately around      wound.   [] Other:       Wound Location: RIGHT HEEL    Wound Cleansing: Normal Saline    Primary Dressing:  [x] MEDIHONEY GEL  []     Secondary Dressing:  [x] GAUZE  ROLL GAUZE       Dressing Frequency:  [x] DAILY  [] Do Not Change Dressing            Compression and Edema Control:  [] Wear Home Compression Stockings   [x] Spandagrip to: Right Leg   Size: [x]Low compression 5-10 mm/Hg      []Medium compression 10-20 mm/Hg           []High compression  20-30 mm/Hg  [] Ace Wrap Toes to Knee to    [] Multilayer Compression Wrap:  to    Do not get leg(s) with wrap wet. If wraps become too tight call the center or completely remove the wrap. Pressure Relief and Off Loading:  [] Off-loading when [] walking  [] in bed [] sitting   Turn every 2 hours when in bed   Avoid putting direct pressure on the site of the wound. Limit side lying to 30 degree tilt. Limit elevating the head of the bed greater than 30 degrees. [x] Assistive Devices   CANE  Use as instructed by the provider      Activity: Activity as Tolerated      Dietary:   Continue your diet as tolerated. Protein is a key nutrient in helping to repair damaged tissue and promote new tissue growth. Good sources of protein include milk, yogurt, cheese, fish, lean meat and beans. If you are DIABETIC, having diabetes can make it hard for wounds to heal. Try to keep your blood sugar within it's target range. Limit Sodium, Alcohol and Sugar. Pain:   Please Note some pain, drainage and/or bleeding might be expected after seeing the provider. TO HELP ALLEVIATE PAIN WE RECOMMEND THE FOLLOWING  Elevate the affected limb. Use over the counter medications as permitted by your family doctor. For Persistent Pain not relieved by the above interventions, please notify your family doctor.     Return Appointment:  [x] Return Appointment: With DR GUTIERREZ  in 1 Week(s)  [] Wound and dressing supply provider:   [] ECF or Home Healthcare:  [] Wound Assessment: [] Physician or NP scheduled for Wound Assessment:   [x] Orders placed during your visit: RIGHT HEEL WOUND CULTURE OBTAINED TODAY      **START AUGMENTIN - TAKE ONE TABLET TWICE PER DAY FOR 10 DAYS ( SCRIPT SENT TO PHARMACY)**      : Amalia Ray     Electronically signed by Nellie Valle RN on 11/10/2022 at 11:10 AM       [address] Information: Should you experience any significant changes in your wound(s) or have questions about your wound care, please contact the [address] [place] [address] at [address] 8:00 am - 4:30 pm and Friday 8:00 am - 12:30 pm.  If you need help with your wound outside these hours and cannot wait until we are again available, contact your PCP or go to the hospital emergency room. PLEASE NOTE: IF YOU ARE UNABLE TO OBTAIN WOUND SUPPLIES, CONTINUE TO USE THE SUPPLIES YOU HAVE AVAILABLE UNTIL YOU ARE ABLE TO REACH US. IT IS MOST IMPORTANT TO KEEP THE WOUND COVERED AT ALL TIMES.      Physician Signature:_______________________    Date: ___________ Time:  ____________          Dr Silvino Barr                  Electronically signed by Silvino Barr DPM on 11/10/2022 at 12:35 PM

## 2022-11-11 NOTE — DISCHARGE INSTRUCTIONS
[address] Physician Orders and Discharge [address], [address], [address]  Telephone: [phone]  [address] 8:00 am - 4:30 pm and Friday 8:00 am - 12:00 pm.          NAME:  Jeanette Hill  YOB: 1940  MEDICAL RECORD NUMBER:  [id number]  DATE:  11/17/2022     Important Reminders:   Please wash hands with soap and water before and after every dressing change. Do not scrub wounds. Keep wounds dry in shower unless otherwise instructed by the physician. SMOKING can slow would healing. Stop smoking as soon as possible to improve healing and prevent further complications associated with smoking. Jaylyn-Wound Topical Treatments:  Do not apply lotions, creams, or ointments to wound bed unless directed. [] Apply moisturizing lotion to skin surrounding the wound prior to dressing change.  [] Apply antifungal ointment to skin surrounding the wound prior to dressing change.  [] Apply thin film of no sting moisture barrier ointment to skin immediately around      wound. [] Other:         Wound Location: RIGHT HEEL     Wound Cleansing: Normal Saline     Primary Dressing:  [x] MEDIHONEY GEL  []      Secondary Dressing:  [x] GAUZE  ROLL GAUZE         Dressing Frequency:  [x] DAILY  [] Do Not Change Dressing                 Compression and Edema Control:  [] Wear Home Compression Stockings   [x] Spandagrip to: Right Leg   Size: [x]Low compression 5-10 mm/Hg                 []Medium compression 10-20 mm/Hg           []High compression  20-30 mm/Hg  [] Ace Wrap Toes to Knee to    [] Multilayer Compression Wrap:  to               Do not get leg(s) with wrap wet. If wraps become too tight call the center or completely remove the wrap.                Pressure Relief and Off Loading:  [] Off-loading when   [] walking       [] in bed         [] sitting   Turn every 2 hours when in bed Avoid putting direct pressure on the site of the wound. Limit side lying to 30 degree tilt. Limit elevating the head of the bed greater than 30 degrees. [x] Assistive Devices   CANE  Use as instructed by the provider        Activity: Activity as Tolerated        Dietary:   Continue your diet as tolerated. Protein is a key nutrient in helping to repair damaged tissue and promote new tissue growth. Good sources of protein include milk, yogurt, cheese, fish, lean meat and beans. If you are DIABETIC, having diabetes can make it hard for wounds to heal. Try to keep your blood sugar within it's target range. Limit Sodium, Alcohol and Sugar. Pain:   Please Note some pain, drainage and/or bleeding might be expected after seeing the provider. TO HELP ALLEVIATE PAIN WE RECOMMEND THE FOLLOWING  Elevate the affected limb. Use over the counter medications as permitted by your family doctor. For Persistent Pain not relieved by the above interventions, please notify your family doctor. Return Appointment:  [x] Return Appointment: With DR Eduardo  in 2 Week(s)  [] Wound and dressing supply provider:   [] ECF or Home Healthcare:  [] Wound Assessment:         [] Physician or NP scheduled for Wound Assessment:   [] Orders placed during your visit:         ** AUGMENTIN - TAKE ONE TABLET TWICE PER DAY FOR 10 DAYS ( COMPLETE PRESCRIPTION)**        : Vince Arzola      Electronically signed by Lauren Reid RN on 11/17/2022 at [address] [place] [address].: Should you experience any significant changes in your wound(s) or have questions about your wound care, please contact the [address] [place] [address] at [address] [facility] 8:00 am - 4:30 pm and Friday 8:00 am - 12:30 pm.  If you need help with your wound outside these hours and cannot wait until we are again available, contact your PCP or go to the hospital emergency room.       PLEASE NOTE: IF YOU ARE UNABLE TO OBTAIN WOUND SUPPLIES, CONTINUE TO USE THE SUPPLIES YOU HAVE AVAILABLE UNTIL YOU ARE ABLE TO REACH US. IT IS MOST IMPORTANT TO KEEP THE WOUND COVERED AT ALL TIMES.      Physician Signature:_______________________     Date: ___________ Time:  ____________                                  Dr Tony Copeland

## 2022-11-14 LAB
GRAM STAIN RESULT: ABNORMAL
ORGANISM: ABNORMAL
WOUND/ABSCESS: ABNORMAL

## 2022-11-17 ENCOUNTER — HOSPITAL ENCOUNTER (OUTPATIENT)
Dept: WOUND CARE | Age: 82
Discharge: HOME | End: 2022-11-17
Payer: MEDICARE

## 2022-11-17 VITALS
TEMPERATURE: 98.1 F | DIASTOLIC BLOOD PRESSURE: 75 MMHG | RESPIRATION RATE: 18 BRPM | SYSTOLIC BLOOD PRESSURE: 169 MMHG | HEART RATE: 103 BPM

## 2022-11-17 DIAGNOSIS — L97.512 ULCER OF TOE OF RIGHT FOOT, WITH FAT LAYER EXPOSED (HCC): ICD-10-CM

## 2022-11-17 DIAGNOSIS — S41.102A ARM WOUND, LEFT, INITIAL ENCOUNTER: ICD-10-CM

## 2022-11-17 DIAGNOSIS — I70.235 ATHEROSCLEROSIS OF NATIVE ARTERIES OF RIGHT LEG WITH ULCERATION OF OTHER PART OF FOOT (HCC): ICD-10-CM

## 2022-11-17 DIAGNOSIS — E11.649 UNCONTROLLED TYPE 2 DIABETES MELLITUS WITH HYPOGLYCEMIA, UNSPECIFIED HYPOGLYCEMIA COMA STATUS (HCC): ICD-10-CM

## 2022-11-17 DIAGNOSIS — I87.2 PERIPHERAL VENOUS INSUFFICIENCY: ICD-10-CM

## 2022-11-17 DIAGNOSIS — L97.412 ULCER OF RIGHT HEEL, WITH FAT LAYER EXPOSED (HCC): Primary | ICD-10-CM

## 2022-11-17 PROCEDURE — 11042 DBRDMT SUBQ TIS 1ST 20SQCM/<: CPT

## 2022-11-17 RX ORDER — BACITRACIN, NEOMYCIN, POLYMYXIN B 400; 3.5; 5 [USP'U]/G; MG/G; [USP'U]/G
OINTMENT TOPICAL ONCE
OUTPATIENT
Start: 2022-11-17 | End: 2022-11-17

## 2022-11-17 RX ORDER — LIDOCAINE 50 MG/G
OINTMENT TOPICAL ONCE
Status: CANCELLED | OUTPATIENT
Start: 2022-11-17 | End: 2022-11-17

## 2022-11-17 RX ORDER — GINSENG 100 MG
CAPSULE ORAL ONCE
OUTPATIENT
Start: 2022-11-17 | End: 2022-11-17

## 2022-11-17 RX ORDER — LIDOCAINE HYDROCHLORIDE 20 MG/ML
JELLY TOPICAL ONCE
OUTPATIENT
Start: 2022-11-17 | End: 2022-11-17

## 2022-11-17 RX ORDER — BACITRACIN ZINC AND POLYMYXIN B SULFATE 500; 1000 [USP'U]/G; [USP'U]/G
OINTMENT TOPICAL ONCE
OUTPATIENT
Start: 2022-11-17 | End: 2022-11-17

## 2022-11-17 RX ORDER — LIDOCAINE 40 MG/G
CREAM TOPICAL ONCE
OUTPATIENT
Start: 2022-11-17 | End: 2022-11-17

## 2022-11-17 RX ORDER — LIDOCAINE 50 MG/G
OINTMENT TOPICAL ONCE
Status: COMPLETED | OUTPATIENT
Start: 2022-11-17 | End: 2022-11-17

## 2022-11-17 RX ORDER — LIDOCAINE HYDROCHLORIDE 40 MG/ML
SOLUTION TOPICAL ONCE
OUTPATIENT
Start: 2022-11-17 | End: 2022-11-17

## 2022-11-17 RX ORDER — CLOBETASOL PROPIONATE 0.5 MG/G
OINTMENT TOPICAL ONCE
OUTPATIENT
Start: 2022-11-17 | End: 2022-11-17

## 2022-11-17 RX ORDER — BETAMETHASONE DIPROPIONATE 0.05 %
OINTMENT (GRAM) TOPICAL ONCE
OUTPATIENT
Start: 2022-11-17 | End: 2022-11-17

## 2022-11-17 RX ORDER — GENTAMICIN SULFATE 1 MG/G
OINTMENT TOPICAL ONCE
OUTPATIENT
Start: 2022-11-17 | End: 2022-11-17

## 2022-11-17 RX ADMIN — LIDOCAINE: 50 OINTMENT TOPICAL at 10:25

## 2022-11-17 ASSESSMENT — PAIN SCALES - GENERAL
PAINLEVEL_OUTOF10: 0
PAINLEVEL_OUTOF10: 0

## 2022-11-17 NOTE — PROGRESS NOTES
[address]   Progress Note and Procedure Note      Cyndi RECORD NUMBER:  [id number]  AGE: 81 y.o. GENDER: male  : 1940  EPISODE DATE:  2022    Subjective:     Chief Complaint   Patient presents with    Wound Check     Follow up for right foot wound. HISTORY of PRESENT ILLNESS LAURI Dunham Asa is a 81 y.o. male who presents today for wound/ulcer evaluation. History of Wound Context: Patient presents today for a right heel ulceration. Relates he has been taking his antibiotics as prescribed. Wound/Ulcer Pain Timing/Severity: none  Quality of pain: N/A  Severity:  0 / 10   Modifying Factors: None  Associated Signs/Symptoms: edema and drainage    Ulcer Identification:  Ulcer Type: venous    Contributing Factors: edema, venous stasis, lymphedema, diabetes, poor glucose control and obesity    Acute Wound: N/A    PAST MEDICAL HISTORY        Diagnosis Date    Acid reflux     Arm wound, left, initial encounter 2022    Skin tears    Atherosclerosis of native arteries of right leg with ulceration of other part of foot ([address] 2021    Atrial flutter ([address] 2013    converted    Bleeding stomach ulcer oct 2015    BPH (benign prostatic hyperplasia)     CAD (coronary artery disease)      angio with 2 blocked bypass and 2 patent    Cellulitis of left thigh 5/10/2022    COPD (chronic obstructive pulmonary disease) (HCC)     Diabetes mellitus type II     Edema     chronic left leg    Elevated PSA- nl 11     Hyperlipidemia     Hypertension     Ischemic cardiomyopathy     Lipoma of skin-RIGHT CHEST 2011    Obesity     Osteoarthritis     Peripheral venous insufficiency 2021    Skin tear of left forearm without complication     Significant amount of epidermal loss with bleeding.     Spinal stenosis of lumbar region with neurogenic claudication- clinically 2018       PAST SURGICAL HISTORY    Past Surgical History:   Procedure Laterality Date    CATARACT REMOVAL  ,     bilat    COLONOSCOPY      CORONARY ARTERY BYPASS GRAFT  1993    x 4    EYE SURGERY Left 2019    cataract coming back    JOINT REPLACEMENT Right total knee replacement    JOINT REPLACEMENT Left 2016       FAMILY HISTORY    Family History   Problem Relation Age of Onset    Cancer Mother         breast    Cancer Father         throat       SOCIAL HISTORY    Social History     Tobacco Use    Smoking status: Former     Packs/day: 3.50     Years: 32.00     Pack years: 112.00     Types: Cigarettes     Quit date: 1985     Years since quittin.9    Smokeless tobacco: Never    Tobacco comments:     advised not to resume   Substance Use Topics    Alcohol use: No     Alcohol/week: 0.0 standard drinks    Drug use: No       ALLERGIES    Allergies   Allergen Reactions    Entresto [Sacubitril-Valsartan] Other (See Comments)     Renal failure       MEDICATIONS    Current Outpatient Medications on File Prior to Encounter   Medication Sig Dispense Refill    Wound Dressings (MEDIHONEY) GEL gel Apply 1 each topically daily 30 each 5    amoxicillin-clavulanate (AUGMENTIN) 875-125 MG per tablet Take 1 tablet by mouth 2 times daily for 10 days 20 tablet 0    torsemide (DEMADEX) 20 MG tablet Take 2 tabs in AM and 2 tabs in afternoon.  120 tablet 3    Continuous Blood Gluc  (DEXCOM G6 ) PABLO As needed for diabetes 1 each 0    Continuous Blood Gluc Sensor (DEXCOM G6 SENSOR) MISC Apply weekly 4 each 5    Continuous Blood Gluc Transmit (DEXCOM G6 TRANSMITTER) MISC As needed for diabetes 1 each 0    apixaban (ELIQUIS) 5 MG TABS tablet Take 1 tablet by mouth 2 times daily 60 tablet 2    dapagliflozin (FARXIGA) 5 MG tablet Take 2 tablets by mouth daily LOT [id number] EXP 2024 4 BOXES 30 tablet 1    potassium chloride (KLOR-CON M) 20 MEQ extended release tablet Take 1 tablet by mouth daily 90 tablet 1    hydrALAZINE (APRESOLINE) 10 MG tablet Take 1 tablet by mouth 3 times daily 30 tablet 2    atorvastatin (LIPITOR) 40 MG tablet TAKE 1 TABLET BY MOUTH EVERY EVENING 90 tablet 3    amiodarone (CORDARONE) 200 MG tablet TAKE 1 TABLET BY MOUTH DAILY 30 tablet 5    insulin NPH (NOVOLIN N) 100 UNIT/ML injection vial Take 50 units with breakfast and 34 units with dinner. 30 mL 3    gabapentin (NEURONTIN) 600 MG tablet TAKE UP TO 5 TABLETS BY MOUTH OVER 24 HOURS AS DIRECTED 150 tablet 11    omeprazole (PRILOSEC) 20 MG delayed release capsule TAKE 1 CAPSULE BY MOUTH TWICE DAILY 180 capsule 1    albuterol sulfate  (90 Base) MCG/ACT inhaler INHALE 2 PUFFS INTO THE LUNGS EVERY 6 HOURS AS NEEDED FOR WHEEZING 18 g 5    [DISCONTINUED] FEROSUL 325 (65 Fe) MG tablet TAKE 1 TABLET BY MOUTH TWICE DAILY (Patient not taking: Reported on 9/2/2022) 180 tablet 3    [DISCONTINUED] potassium chloride (KLOR-CON) 10 MEQ extended release tablet TAKE 1 TABLET BY MOUTH DAILY 90 tablet 1    [DISCONTINUED] furosemide (LASIX) 80 MG tablet TAKE 1 TABLET BY MOUTH DAILY 90 tablet 0    Lancets MISC 1 each by Does not apply route daily Use to check glucose twice a day. One Touch brand. DX E11.9 100 each 3    blood glucose monitor kit and supplies Test 2 times a day & as needed for symptoms of irregular blood glucose. 1 kit 0    blood glucose monitor strips Test 2 times a day & as needed for symptoms of irregular blood glucose.  200 strip 3    blood glucose test strips (ASCENSIA AUTODISC VI;ONE TOUCH ULTRA TEST VI) strip four times daily 400 strip 3    INSULIN SYRINGE .5CC/29G 29G X 1/2\" 0.5 ML MISC INJECT 4 TIMES DAILY AS NEEDED 400 each 1    [DISCONTINUED] Cyanocobalamin (B-12) 500 MCG TABS TAKE 1 TABLET BY MOUTH DAILY (Patient not taking: Reported on 9/2/2022) 90 tablet 1    umeclidinium-vilanterol (ANORO ELLIPTA) 62.5-25 MCG/INH AEPB inhaler INHALE 1 PUFF INTO THE LUNGS DAILY 1 each 5    insulin regular (NOVOLIN R RELION) 100 UNIT/ML injection INJECT 20 TO 30 UNITS SUBCUTANEOUSLY THREE TIMES DAILY BEFORE MEAL(S) 30 mL 0    tiotropium (SPIRIVA HANDIHALER) 18 MCG inhalation capsule Inhale 1 capsule into the lungs daily INCLUDE inhaler device 30 capsule 5    Nebulizers (AIRIAL COMPACT MINI NEBULIZER) MISC 1 each by Does not apply route every 6 hours as needed (wheezing or shortness of breath) 1 each 0    Respiratory Therapy Supplies (NEBULIZER/TUBING/MOUTHPIECE) KIT 1 kit by Does not apply route every 6 hours as needed (for wheezing or shortness of breath) 1 kit 1    albuterol (PROVENTIL) (2.5 MG/3ML) 0.083% nebulizer solution Take 3 mLs by nebulization every 6 hours as needed for Wheezing 50 vial 3    vitamin D (ERGOCALCIFEROL) 53728 units CAPS capsule TK 1 C PO WEEKLY  2    levalbuterol (XOPENEX HFA) 45 MCG/ACT inhaler Inhale 1-2 puffs into the lungs every 4 hours as needed for Wheezing 1 Inhaler 3    aspirin 81 MG EC tablet Take 81 mg by mouth daily      [DISCONTINUED] metoprolol (LOPRESSOR) 25 MG tablet Take 25 mg by mouth daily       isosorbide mononitrate (IMDUR) 30 MG CR tablet Take 1 tablet by mouth every morning Dr. Marita García - Cardio 30 tablet 6    nitroGLYCERIN (NITROSTAT) 0.3 MG SL tablet Take one sublingual for chest pain. May repeat twice at 5 min intervals. If not getting relief call 911. 25 tablet 3     No current facility-administered medications on file prior to encounter. REVIEW OF SYSTEMS  Review of Systems    Pertinent items are noted in HPI. Review of Systems: A 12 point review of symptoms is unremarkable with the exception of the chief complaint. Patient specifically denies nausea, fever, vomiting, chills, shortness of breath, chest pain, abdominal pain, constipation or difficulty urinating.       Objective:      BP (!) 169/75   Pulse (!) 103   Temp 98.1 °F (36.7 °C) (Infrared)   Resp 18     Wt Readings from Last 3 Encounters:   09/29/22 237 lb (107.5 kg)   09/14/22 237 lb 12.8 oz (107.9 kg)   09/05/22 241 lb 12.8 oz (109.7 kg)       PHYSICAL EXAM  Physical Exam    Patient has nonpalpable pedal pulses bilaterally. He has biphasic DP PT on the left with hand-held Doppler and a monophasic on the right. Patient has extensive bilateral lower extremity edema with hypertrophic skin changes, brawny discoloration and cobbling consistent with chronic venous disease left greater than right. Patient has absent pedal hair growth noted. Dorsalis pedis and posterior tibial pulse are monophasic on a hand-held Doppler examination. Ulceration noted on the posterior lateral aspect of the right lower extremity. Wound has fibrotic and nonviable tissue that extends down through and includes the subcutaneous tissue. After debridement the wound has a granular base. There is no surrounding erythema, edema, warmth or malodor noted. The wound does not probe or track to bone. Wound bed has significantly improved since previous examination  Epicritic sensation is grossly intact bilaterally. Negative clonus and babinski reflex is down going. Patient's muscle strength for dorsiflexion plantarflexion inversion and eversion is intact bilaterally. He does have semirigid hammertoe contractures noted bilaterally.       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 Performed by: Gabriel Graves DPM    Consent obtained:  Yes    Time out taken:  Yes    Pain Control: Anesthetic  Anesthetic: 5% Lidocaine Ointment Topical       Debridement: Excisional Debridement    Using #15 blade scalpel the wound(s)/ulcer(s) was/were debrided down through and including the removal of epidermis, dermis and subcutaneous tissue.         Devitalized Tissue Debrided:  fibrin and slough    Pre Debridement Measurements:  Are located in the [place]  Documentation Flow Sheet    Diabetic/Pressure/Non Pressure Ulcers only:  Ulcer: Non-Pressure ulcer, fat layer exposed     Wound/Ulcer #: 1    Post Debridement Measurements:  Wound/Ulcer Descriptions are Pre Debridement except measurements:    Wound 11/10/22 Heel Right;Lateral #1, ( NOTED DRAINING 10/10/22) (Active)   Wound Image   11/10/22 1030   Wound Etiology Diabetic Guzman 3 11/10/22 1030   Dressing Status Old drainage noted 11/17/22 1020   Dressing/Treatment Honey gel/honey paste;Gauze dressing/dressing sponge;Roll gauze 11/17/22 1124   Wound Length (cm) 2 cm 11/17/22 1020   Wound Width (cm) 2 cm 11/17/22 1020   Wound Depth (cm) 0.2 cm 11/17/22 1020   Wound Surface Area (cm^2) 4 cm^2 11/17/22 1020   Change in Wound Size % (l*w) -159.74 11/17/22 1020   Wound Volume (cm^3) 0.8 cm^3 11/17/22 1020   Wound Healing % -73 11/17/22 1020   Post-Procedure Length (cm) 2.2 cm 11/17/22 1111   Post-Procedure Width (cm) 2.2 cm 11/17/22 1111   Post-Procedure Depth (cm) 0.2 cm 11/17/22 1111   Post-Procedure Surface Area (cm^2) 4.84 cm^2 11/17/22 1111   Post-Procedure Volume (cm^3) 0.968 cm^3 11/17/22 1111   Wound Assessment Dry;Granulation tissue;Slough 11/17/22 1020   Drainage Amount Small 11/17/22 1020   Drainage Description Serosanguinous 11/17/22 1020   Odor None 11/17/22 1020   Jaylyn-wound Assessment Dry/flaky 11/17/22 1020   Margins Undefined edges 11/17/22 1020   Wound Thickness Description not for Pressure Injury Full thickness 11/17/22 1020   Number of days: 7          Total Surface Area Debrided:  4.84 sq cm     Estimated Blood Loss:  Minimal    Hemostasis Achieved:  by pressure    Procedural Pain:  0  / 10     Post Procedural Pain:  0 / 10     Response to treatment:  Well tolerated by patient. Plan:   E/M x30 minutes     Continue antibiotics until gone    Continue daily dressing changes with Medihoney. Will monitor closely. If ulceration does not respond to conservative care will refer back to Dr. Ita Johnson for further evaluation and management of patient's peripheral vascular disease. Patient was instructed to suspend his heels at all times while at rest.    Ulceration was excisionally debrided. Please see the above dictation for details and findings. Treatment Note please see attached Discharge Instructions    Written patient dismissal instructions given to patient and signed by patient or POA. Reappoint 2 weeks due to to the Thanksgiving holiday next week  Discharge [id number] [facility] Physician Orders and Discharge [address],  Gino Adams [address] [place] [address]  Telephone: [phone] [address] 8:00 am - 4:30 pm and Friday 8:00 am - 12:00 pm.          NAME:  Gunnar Dyer  YOB: 1940  MEDICAL RECORD NUMBER:  [id number]  DATE:  11/17/2022     Important Reminders:   Please wash hands with soap and water before and after every dressing change. Do not scrub wounds. Keep wounds dry in shower unless otherwise instructed by the physician. SMOKING can slow would healing. Stop smoking as soon as possible to improve healing and prevent further complications associated with smoking.         Jaylyn-Wound Topical Treatments:  Do not apply lotions, creams, or ointments to wound bed unless directed. [] Apply moisturizing lotion to skin surrounding the wound prior to dressing change.  [] Apply antifungal ointment to skin surrounding the wound prior to dressing change.  [] Apply thin film of no sting moisture barrier ointment to skin immediately around      wound. [] Other:         Wound Location: RIGHT HEEL     Wound Cleansing: Normal Saline     Primary Dressing:  [x] MEDIHONEY GEL  []      Secondary Dressing:  [x] GAUZE  ROLL GAUZE         Dressing Frequency:  [x] DAILY  [] Do Not Change Dressing                 Compression and Edema Control:  [] Wear Home Compression Stockings   [x] Spandagrip to: Right Leg   Size: [x]Low compression 5-10 mm/Hg                 []Medium compression 10-20 mm/Hg           []High compression  20-30 mm/Hg  [] Ace Wrap Toes to Knee to    [] Multilayer Compression Wrap:  to               Do not get leg(s) with wrap wet. If wraps become too tight call the center or completely remove the wrap. Pressure Relief and Off Loading:  [] Off-loading when   [] walking       [] in bed         [] sitting   Turn every 2 hours when in bed             Avoid putting direct pressure on the site of the wound. Limit side lying to 30 degree tilt. Limit elevating the head of the bed greater than 30 degrees. [x] Assistive Devices   CANE  Use as instructed by the provider        Activity: Activity as Tolerated        Dietary:   Continue your diet as tolerated. Protein is a key nutrient in helping to repair damaged tissue and promote new tissue growth. Good sources of protein include milk, yogurt, cheese, fish, lean meat and beans. If you are DIABETIC, having diabetes can make it hard for wounds to heal. Try to keep your blood sugar within it's target range. Limit Sodium, Alcohol and Sugar. Pain:   Please Note some pain, drainage and/or bleeding might be expected after seeing the provider.  TO HELP ALLEVIATE PAIN WE RECOMMEND THE FOLLOWING  Elevate the affected limb. Use over the counter medications as permitted by your family doctor. For Persistent Pain not relieved by the above interventions, please notify your family doctor. Return Appointment:  [x] Return Appointment: With DR Amelie Ortega  in 2 Week(s)  [] Wound and dressing supply provider:   [] ECF or Home Healthcare:  [] Wound Assessment:         [] Physician or NP scheduled for Wound Assessment:   [] Orders placed during your visit:         ** AUGMENTIN - TAKE ONE TABLET TWICE PER DAY FOR 10 DAYS ( COMPLETE PRESCRIPTION)**        : Jose Zaragoza      Electronically signed by Ita Michel RN on 11/17/2022 at [facility].: Should you experience any significant changes in your wound(s) or have questions about your wound care, please contact the [address] [place] [address] at [address] [facility] 8:00 am - 4:30 pm and Friday 8:00 am - 12:30 pm.  If you need help with your wound outside these hours and cannot wait until we are again available, contact your PCP or go to the hospital emergency room. PLEASE NOTE: IF YOU ARE UNABLE TO OBTAIN WOUND SUPPLIES, CONTINUE TO USE THE SUPPLIES YOU HAVE AVAILABLE UNTIL YOU ARE ABLE TO REACH US. IT IS MOST IMPORTANT TO KEEP THE WOUND COVERED AT ALL TIMES.      Physician Signature:_______________________     Date: ___________ Time:  ____________                                  Dr Pricilla Rutherford         Electronically signed by Pricilla Rutherford DPM on 11/17/2022 at 2:59 PM

## 2022-11-23 NOTE — DISCHARGE INSTRUCTIONS
[address] Physician Orders and Discharge [facility]  [address], [address]  Telephone: [phone]  [address] 8:00 am - 4:30 pm and Friday 8:00 am - 12:00 pm.          NAME:  Lauri Trivedi  YOB: 1940  MEDICAL RECORD NUMBER:  [id number]  DATE:  12/1/2022     Important Reminders:   Please wash hands with soap and water before and after every dressing change. Do not scrub wounds. Keep wounds dry in shower unless otherwise instructed by the physician. SMOKING can slow would healing. Stop smoking as soon as possible to improve healing and prevent further complications associated with smoking. Jaylyn-Wound Topical Treatments:  Do not apply lotions, creams, or ointments to wound bed unless directed. [] Apply moisturizing lotion to skin surrounding the wound prior to dressing change.  [] Apply antifungal ointment to skin surrounding the wound prior to dressing change.  [] Apply thin film of no sting moisture barrier ointment to skin immediately around      wound. [] Other:         Wound Location: RIGHT HEEL   **NUSHIELD #1 APPLIED 12/1/2022**     Wound Cleansing: Normal Saline     Primary Dressing:  [x] NUSHIELD MOISTENED WITH SALINE, MEPITEL AND STERI STRIPS APPLIED PER DR GUTIERREZ  []      Secondary Dressing:  [x] GAUZE, CAST PADDING, KERLIX, COBAN ( FOOTBALL DRESSING)  ROLL GAUZE         Dressing Frequency:  []   [x] Do Not Change Dressing        YOU HAVE HAD A NUSHIELD PLACED ON YOUR WOUND TODAY. FOR BEST RESULTS, WE RECOMMEND YOU LIMIT YOUR ACTIVITY FOR THE NEXT WEEK AS MUCH AS POSSIBLE. AVOID LONG PERIODS OF TIME ON YOUR FEET.  THIS ALSO INCLUDES ELEVATING YOUR LEGS AND FEET 3-4 TIMES DAILY FOR AT LEAST 20-30 MINUTES ON PILLOWS AND AT NIGHT SO THAT THEY ARE HIGHER THAN THE LEVEL OF YOUR HEART     Electronically signed by Dai Chew RN on 12/1/2022 at 11:15 AM Compression and Edema Control:  [] Wear Home Compression Stockings   [x] Spandagrip to: Right Leg   Size: [x]Low compression 5-10 mm/Hg                 []Medium compression 10-20 mm/Hg           []High compression  20-30 mm/Hg  [] Ace Wrap Toes to Knee to    [] Multilayer Compression Wrap:  to               Do not get leg(s) with wrap wet. If wraps become too tight call the center or completely remove the wrap. Pressure Relief and Off Loading:  [] Off-loading when   [] walking       [] in bed         [] sitting   Turn every 2 hours when in bed             Avoid putting direct pressure on the site of the wound. Limit side lying to 30 degree tilt. Limit elevating the head of the bed greater than 30 degrees. [x] Assistive Devices   CANE  Use as instructed by the provider        Activity: Activity as Tolerated        Dietary:   Continue your diet as tolerated. Protein is a key nutrient in helping to repair damaged tissue and promote new tissue growth. Good sources of protein include milk, yogurt, cheese, fish, lean meat and beans. If you are DIABETIC, having diabetes can make it hard for wounds to heal. Try to keep your blood sugar within it's target range. Limit Sodium, Alcohol and Sugar. Pain:   Please Note some pain, drainage and/or bleeding might be expected after seeing the provider. TO HELP ALLEVIATE PAIN WE RECOMMEND THE FOLLOWING  Elevate the affected limb. Use over the counter medications as permitted by your family doctor. For Persistent Pain not relieved by the above interventions, please notify your family doctor.      Return Appointment:  [x] Return Appointment: With DR GUTIERREZ  in 2 Week(s)  [] Wound and dressing supply provider:   [] ECF or Home Healthcare:  [] Wound Assessment:         [] Physician or NP scheduled for Wound Assessment:   [] Orders placed during your visit:            : [facility]      Electronically signed by Yoanna Byrnes RN on 12/1/2022 at 11:16 AM             [facility] Information: Should you experience any significant changes in your wound(s) or have questions about your wound care, please contact the [address] [place] [address] at [address] 8:00 am - 4:30 pm and Friday 8:00 am - 12:30 pm.  If you need help with your wound outside these hours and cannot wait until we are again available, contact your PCP or go to the hospital emergency room. PLEASE NOTE: IF YOU ARE UNABLE TO OBTAIN WOUND SUPPLIES, CONTINUE TO USE THE SUPPLIES YOU HAVE AVAILABLE UNTIL YOU ARE ABLE TO REACH US. IT IS MOST IMPORTANT TO KEEP THE WOUND COVERED AT ALL TIMES.      Physician Signature:_______________________     Date: ___________ Time:  ____________                                  Dr Silvino Barr

## 2022-12-01 ENCOUNTER — HOSPITAL ENCOUNTER (OUTPATIENT)
Dept: WOUND CARE | Age: 82
Discharge: HOME | End: 2022-12-01
Payer: MEDICARE

## 2022-12-01 VITALS
SYSTOLIC BLOOD PRESSURE: 147 MMHG | HEART RATE: 73 BPM | RESPIRATION RATE: 18 BRPM | DIASTOLIC BLOOD PRESSURE: 77 MMHG | TEMPERATURE: 97.7 F

## 2022-12-01 DIAGNOSIS — L97.512 ULCER OF TOE OF RIGHT FOOT, WITH FAT LAYER EXPOSED (HCC): ICD-10-CM

## 2022-12-01 DIAGNOSIS — S41.102A ARM WOUND, LEFT, INITIAL ENCOUNTER: ICD-10-CM

## 2022-12-01 DIAGNOSIS — L97.412 ULCER OF RIGHT HEEL, WITH FAT LAYER EXPOSED (HCC): Primary | ICD-10-CM

## 2022-12-01 DIAGNOSIS — E11.649 UNCONTROLLED TYPE 2 DIABETES MELLITUS WITH HYPOGLYCEMIA, UNSPECIFIED HYPOGLYCEMIA COMA STATUS (HCC): ICD-10-CM

## 2022-12-01 DIAGNOSIS — I70.235 ATHEROSCLEROSIS OF NATIVE ARTERIES OF RIGHT LEG WITH ULCERATION OF OTHER PART OF FOOT (HCC): ICD-10-CM

## 2022-12-01 DIAGNOSIS — I87.2 PERIPHERAL VENOUS INSUFFICIENCY: ICD-10-CM

## 2022-12-01 PROCEDURE — 15275 SKIN SUB GRAFT FACE/NK/HF/G: CPT

## 2022-12-01 RX ORDER — BACITRACIN, NEOMYCIN, POLYMYXIN B 400; 3.5; 5 [USP'U]/G; MG/G; [USP'U]/G
OINTMENT TOPICAL ONCE
OUTPATIENT
Start: 2022-12-01 | End: 2022-12-01

## 2022-12-01 RX ORDER — GENTAMICIN SULFATE 1 MG/G
OINTMENT TOPICAL ONCE
OUTPATIENT
Start: 2022-12-01 | End: 2022-12-01

## 2022-12-01 RX ORDER — BETAMETHASONE DIPROPIONATE 0.05 %
OINTMENT (GRAM) TOPICAL ONCE
OUTPATIENT
Start: 2022-12-01 | End: 2022-12-01

## 2022-12-01 RX ORDER — LIDOCAINE 40 MG/G
CREAM TOPICAL ONCE
OUTPATIENT
Start: 2022-12-01 | End: 2022-12-01

## 2022-12-01 RX ORDER — LIDOCAINE 50 MG/G
OINTMENT TOPICAL ONCE
OUTPATIENT
Start: 2022-12-01 | End: 2022-12-01

## 2022-12-01 RX ORDER — LIDOCAINE HYDROCHLORIDE 20 MG/ML
JELLY TOPICAL ONCE
OUTPATIENT
Start: 2022-12-01 | End: 2022-12-01

## 2022-12-01 RX ORDER — CLOBETASOL PROPIONATE 0.5 MG/G
OINTMENT TOPICAL ONCE
OUTPATIENT
Start: 2022-12-01 | End: 2022-12-01

## 2022-12-01 RX ORDER — BACITRACIN ZINC AND POLYMYXIN B SULFATE 500; 1000 [USP'U]/G; [USP'U]/G
OINTMENT TOPICAL ONCE
OUTPATIENT
Start: 2022-12-01 | End: 2022-12-01

## 2022-12-01 RX ORDER — GINSENG 100 MG
CAPSULE ORAL ONCE
OUTPATIENT
Start: 2022-12-01 | End: 2022-12-01

## 2022-12-01 RX ORDER — LIDOCAINE 50 MG/G
OINTMENT TOPICAL ONCE
Status: COMPLETED | OUTPATIENT
Start: 2022-12-01 | End: 2022-12-01

## 2022-12-01 RX ORDER — LIDOCAINE HYDROCHLORIDE 40 MG/ML
SOLUTION TOPICAL ONCE
OUTPATIENT
Start: 2022-12-01 | End: 2022-12-01

## 2022-12-01 RX ADMIN — LIDOCAINE: 50 OINTMENT TOPICAL at 10:32

## 2022-12-01 ASSESSMENT — PAIN SCALES - GENERAL
PAINLEVEL_OUTOF10: 0
PAINLEVEL_OUTOF10: 0

## 2022-12-01 NOTE — PLAN OF CARE
NuShield Treatment Note    NAME:  Dacia Gaffney OF BIRTH:  1940  MEDICAL RECORD NUMBER:  [id number]  DATE:  12/1/2022    Goal:  Patient will receive safe and proper application of skin substitute. Patient will comply with caring for dressing, offloading and reporting complications. Expiration date checked immediately prior to use. Package intact prior to use and no damage noted. Nushield was removed from protective sterile packaging by provider and applied to prepared ulcer bed. NuShield applied with notch to Top Upper Right Corner. NuShield was hydrated with sterile normal saline per provider. NuShield was applied to RIGHT HEEL and affixed with steri-strips by the provider. Applied nonadherent and OPTILOCK (Secondary Dressing)   Coban or ace wrap was applied to secure graft and decrease edema. Patient/caregiver was instructed not to remove dressing and to keep it clean and dry. NuShield may be applied a total of 10 times per wound over a 12 week period. Additionally NuShield may only be used every 12 months per wound. Date of first application of NuShield for this current wound is December 1, 2022.       Application # 1 out of 10           Guidelines followed    Electronically signed by Cesar Hyde RN on 12/1/2022 at 12:07 PM

## 2022-12-01 NOTE — PROGRESS NOTES
[address]   Progress Note and Procedure Note      Cyndi RECORD NUMBER:  [id number]  AGE: 81 y.o. GENDER: male  : 1940  EPISODE DATE:  2022    Subjective:     Chief Complaint   Patient presents with    Wound Check     Follow up for right foot wound. HISTORY of PRESENT ILLNESS HPI     Xiomy Paulino is a 81 y.o. male who presents today for wound/ulcer evaluation. History of Wound Context: Patient presents today for a right heel ulceration. Patient has been performing local wound care as recommended. Wound/Ulcer Pain Timing/Severity: none  Quality of pain: N/A  Severity:  0 / 10   Modifying Factors: None  Associated Signs/Symptoms: edema and drainage    Ulcer Identification:  Ulcer Type: venous    Contributing Factors: edema, venous stasis, lymphedema, diabetes, poor glucose control and obesity    Acute Wound: N/A    PAST MEDICAL HISTORY        Diagnosis Date    Acid reflux     Arm wound, left, initial encounter 2022    Skin tears    Atherosclerosis of native arteries of right leg with ulceration of other part of foot ([address] 2021    Atrial flutter ([address] 2013    converted    Bleeding stomach ulcer oct 2015    BPH (benign prostatic hyperplasia)     CAD (coronary artery disease)      angio with 2 blocked bypass and 2 patent    Cellulitis of left thigh 5/10/2022    COPD (chronic obstructive pulmonary disease) (HCC)     Diabetes mellitus type II     Edema     chronic left leg    Elevated PSA- nl 11     Hyperlipidemia     Hypertension     Ischemic cardiomyopathy     Lipoma of skin-RIGHT CHEST 2011    Obesity     Osteoarthritis     Peripheral venous insufficiency 2021    Skin tear of left forearm without complication     Significant amount of epidermal loss with bleeding.     Spinal stenosis of lumbar region with neurogenic claudication- clinically 2018       PAST SURGICAL HISTORY    Past Surgical History:   Procedure Laterality Date    CATARACT REMOVAL  ,     bilat    COLONOSCOPY      CORONARY ARTERY BYPASS GRAFT  1993    x 4    EYE SURGERY Left 2019    cataract coming back    JOINT REPLACEMENT Right total knee replacement    JOINT REPLACEMENT Left 2016       FAMILY HISTORY    Family History   Problem Relation Age of Onset    Cancer Mother         breast    Cancer Father         throat       SOCIAL HISTORY    Social History     Tobacco Use    Smoking status: Former     Packs/day: 3.50     Years: 32.00     Pack years: 112.00     Types: Cigarettes     Quit date: 1985     Years since quittin.9    Smokeless tobacco: Never    Tobacco comments:     advised not to resume   Substance Use Topics    Alcohol use: No     Alcohol/week: 0.0 standard drinks    Drug use: No       ALLERGIES    Allergies   Allergen Reactions    Entresto [Sacubitril-Valsartan] Other (See Comments)     Renal failure       MEDICATIONS    Current Outpatient Medications on File Prior to Encounter   Medication Sig Dispense Refill    Wound Dressings (MEDIHONEY) GEL gel Apply 1 each topically daily 30 each 5    torsemide (DEMADEX) 20 MG tablet Take 2 tabs in AM and 2 tabs in afternoon.  120 tablet 3    Continuous Blood Gluc  (DEXCOM G6 ) PABLO As needed for diabetes 1 each 0    Continuous Blood Gluc Sensor (DEXCOM G6 SENSOR) MISC Apply weekly 4 each 5    Continuous Blood Gluc Transmit (DEXCOM G6 TRANSMITTER) MISC As needed for diabetes 1 each 0    apixaban (ELIQUIS) 5 MG TABS tablet Take 1 tablet by mouth 2 times daily 60 tablet 2    dapagliflozin (FARXIGA) 5 MG tablet Take 2 tablets by mouth daily LOT [id number] EXP 2024 4 BOXES 30 tablet 1    potassium chloride (KLOR-CON M) 20 MEQ extended release tablet Take 1 tablet by mouth daily 90 tablet 1    hydrALAZINE (APRESOLINE) 10 MG tablet Take 1 tablet by mouth 3 times daily 30 tablet 2    atorvastatin (LIPITOR) 40 MG tablet TAKE 1 TABLET BY MOUTH EVERY EVENING 90 tablet 3    amiodarone (CORDARONE) 200 MG tablet TAKE 1 TABLET BY MOUTH DAILY 30 tablet 5    insulin NPH (NOVOLIN N) 100 UNIT/ML injection vial Take 50 units with breakfast and 34 units with dinner. 30 mL 3    gabapentin (NEURONTIN) 600 MG tablet TAKE UP TO 5 TABLETS BY MOUTH OVER 24 HOURS AS DIRECTED 150 tablet 11    omeprazole (PRILOSEC) 20 MG delayed release capsule TAKE 1 CAPSULE BY MOUTH TWICE DAILY 180 capsule 1    albuterol sulfate  (90 Base) MCG/ACT inhaler INHALE 2 PUFFS INTO THE LUNGS EVERY 6 HOURS AS NEEDED FOR WHEEZING 18 g 5    [DISCONTINUED] FEROSUL 325 (65 Fe) MG tablet TAKE 1 TABLET BY MOUTH TWICE DAILY (Patient not taking: Reported on 9/2/2022) 180 tablet 3    [DISCONTINUED] potassium chloride (KLOR-CON) 10 MEQ extended release tablet TAKE 1 TABLET BY MOUTH DAILY 90 tablet 1    [DISCONTINUED] furosemide (LASIX) 80 MG tablet TAKE 1 TABLET BY MOUTH DAILY 90 tablet 0    Lancets MISC 1 each by Does not apply route daily Use to check glucose twice a day. One Touch brand. DX E11.9 100 each 3    blood glucose monitor kit and supplies Test 2 times a day & as needed for symptoms of irregular blood glucose. 1 kit 0    blood glucose monitor strips Test 2 times a day & as needed for symptoms of irregular blood glucose.  200 strip 3    blood glucose test strips (ASCENSIA AUTODISC VI;ONE TOUCH ULTRA TEST VI) strip four times daily 400 strip 3    INSULIN SYRINGE .5CC/29G 29G X 1/2\" 0.5 ML MISC INJECT 4 TIMES DAILY AS NEEDED 400 each 1    [DISCONTINUED] Cyanocobalamin (B-12) 500 MCG TABS TAKE 1 TABLET BY MOUTH DAILY (Patient not taking: Reported on 9/2/2022) 90 tablet 1    umeclidinium-vilanterol (ANORO ELLIPTA) 62.5-25 MCG/INH AEPB inhaler INHALE 1 PUFF INTO THE LUNGS DAILY 1 each 5    insulin regular (NOVOLIN R RELION) 100 UNIT/ML injection INJECT 20 TO 30 UNITS SUBCUTANEOUSLY THREE TIMES DAILY BEFORE MEAL(S) 30 mL 0    tiotropium (SPIRIVA HANDIHALER) 18 MCG inhalation capsule Inhale 1 capsule into the lungs daily INCLUDE inhaler device 30 capsule 5    Nebulizers (AIRIAL COMPACT MINI NEBULIZER) MISC 1 each by Does not apply route every 6 hours as needed (wheezing or shortness of breath) 1 each 0    Respiratory Therapy Supplies (NEBULIZER/TUBING/MOUTHPIECE) KIT 1 kit by Does not apply route every 6 hours as needed (for wheezing or shortness of breath) 1 kit 1    albuterol (PROVENTIL) (2.5 MG/3ML) 0.083% nebulizer solution Take 3 mLs by nebulization every 6 hours as needed for Wheezing 50 vial 3    vitamin D (ERGOCALCIFEROL) 32768 units CAPS capsule TK 1 C PO WEEKLY  2    levalbuterol (XOPENEX HFA) 45 MCG/ACT inhaler Inhale 1-2 puffs into the lungs every 4 hours as needed for Wheezing 1 Inhaler 3    aspirin 81 MG EC tablet Take 81 mg by mouth daily      [DISCONTINUED] metoprolol (LOPRESSOR) 25 MG tablet Take 25 mg by mouth daily       isosorbide mononitrate (IMDUR) 30 MG CR tablet Take 1 tablet by mouth every morning Dr. Sienna Tam - Cardio 30 tablet 6    nitroGLYCERIN (NITROSTAT) 0.3 MG SL tablet Take one sublingual for chest pain. May repeat twice at 5 min intervals. If not getting relief call 911. 25 tablet 3     No current facility-administered medications on file prior to encounter. REVIEW OF SYSTEMS  Review of Systems    Pertinent items are noted in HPI. Review of Systems: A 12 point review of symptoms is unremarkable with the exception of the chief complaint. Patient specifically denies nausea, fever, vomiting, chills, shortness of breath, chest pain, abdominal pain, constipation or difficulty urinating. Objective:      BP (!) 147/77   Pulse 73   Temp 97.7 °F (36.5 °C) (Infrared)   Resp 18     Wt Readings from Last 3 Encounters:   09/29/22 237 lb (107.5 kg)   09/14/22 237 lb 12.8 oz (107.9 kg)   09/05/22 241 lb 12.8 oz (109.7 kg)       PHYSICAL EXAM  Physical Exam    Patient has nonpalpable pedal pulses bilaterally.   He has biphasic DP PT on the left with hand-held Doppler and a monophasic on the right. Patient has extensive bilateral lower extremity edema with hypertrophic skin changes, brawny discoloration and cobbling consistent with chronic venous disease left greater than right. Patient has absent pedal hair growth noted. Dorsalis pedis and posterior tibial pulse are monophasic on a hand-held Doppler examination. Ulceration noted on the posterior lateral aspect of the right lower extremity. Wound has fibrotic and nonviable tissue that extends down through and includes the subcutaneous tissue. After debridement the wound has a granular base. There is no surrounding erythema, edema, warmth or malodor noted. The wound does not probe or track to bone. Wound bed has significantly improved since previous examination  Epicritic sensation is grossly intact bilaterally. Negative clonus and babinski reflex is down going. Patient's muscle strength for dorsiflexion plantarflexion inversion and eversion is intact bilaterally. He does have semirigid hammertoe contractures noted bilaterally.       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 Performed by: Sherryle How, DPM    Consent obtained:  Yes    Time out taken:  Yes    Pain Control: Anesthetic  Anesthetic: 5% Lidocaine Ointment Topical       Debridement: Excisional Debridement    Using #15 blade scalpel the wound(s)/ulcer(s) was/were debrided down through and including the removal of epidermis, dermis and subcutaneous tissue to prepare the wound bed for application of new shield.          Devitalized Tissue Debrided:  fibrin and slough    Pre Debridement Measurements:  Are located in the [place]  Documentation Flow Sheet    Diabetic/Pressure/Non Pressure Ulcers only:  Ulcer: Non-Pressure ulcer, fat layer exposed     Wound/Ulcer #: 1    Post Debridement Measurements:  Wound/Ulcer Descriptions are Pre Debridement except measurements:    Wound 11/10/22 Heel Right;Lateral #1, ( NOTED DRAINING 10/10/22) (Active)   Wound Image   12/01/22 1028   Wound Etiology Diabetic Guzman 3 11/10/22 1030   Dressing Status New dressing applied 12/01/22 1119   Wound Cleansed Cleansed with saline 12/01/22 1119   Dressing/Treatment Other (comment) 12/01/22 1119   Offloading for Diabetic Foot Ulcers Post op shoe 12/01/22 1119   Wound Length (cm) 1.7 cm 12/01/22 1028   Wound Width (cm) 1.7 cm 12/01/22 1028   Wound Depth (cm) 0.2 cm 12/01/22 1028   Wound Surface Area (cm^2) 2.89 cm^2 12/01/22 1028   Change in Wound Size % (l*w) -87.66 12/01/22 1028   Wound Volume (cm^3) 0.578 cm^3 12/01/22 1028   Wound Healing % -25 12/01/22 1028   Post-Procedure Length (cm) 1.9 cm 12/01/22 1107   Post-Procedure Width (cm) 1.9 cm 12/01/22 1107   Post-Procedure Depth (cm) 0.2 cm 12/01/22 1107   Post-Procedure Surface Area (cm^2) 3.61 cm^2 12/01/22 1107   Post-Procedure Volume (cm^3) 0.722 cm^3 12/01/22 1107   Wound Assessment Dry;Granulation tissue;Slough 12/01/22 1028   Drainage Amount Small 12/01/22 1028   Drainage Description Yellow 12/01/22 1028   Odor None 12/01/22 1028   Jaylyn-wound Assessment Dry/flaky 12/01/22 1028 Margins Undefined edges 12/01/22 1028   Wound Thickness Description not for Pressure Injury Full thickness 12/01/22 1028   Number of days: 21          Total Surface Area Debrided:  3.61 sq cm     Estimated Blood Loss:  Minimal    Hemostasis Achieved:  by pressure    Procedural Pain:  0  / 10     Post Procedural Pain:  0 / 10     Response to treatment:  Well tolerated by patient.            Procedure:  Skin Substitute Application    Performed by: Larry Palmer DPM    Ulcer Type: diabetic    Consent obtained: Yes    Time out taken: Yes    Product Utilized:    NuShield 2 sq/cm     Fenestrated: No    Mesher Utilized: No    Instrument(s) #15 blade scalpel    Skin Substitute was Applied to Ulcer Number(s):    Ulcer #: 1    Wound 11/10/22 Heel Right;Lateral #1, ( NOTED DRAINING 10/10/22) (Active)   Wound Image   12/01/22 1028   Wound Etiology Diabetic Guzman 3 11/10/22 1030   Dressing Status New dressing applied 12/01/22 1119   Wound Cleansed Cleansed with saline 12/01/22 1119   Dressing/Treatment Other (comment) 12/01/22 1119   Offloading for Diabetic Foot Ulcers Post op shoe 12/01/22 1119   Wound Length (cm) 1.7 cm 12/01/22 1028   Wound Width (cm) 1.7 cm 12/01/22 1028   Wound Depth (cm) 0.2 cm 12/01/22 1028   Wound Surface Area (cm^2) 2.89 cm^2 12/01/22 1028   Change in Wound Size % (l*w) -87.66 12/01/22 1028   Wound Volume (cm^3) 0.578 cm^3 12/01/22 1028   Wound Healing % -25 12/01/22 1028   Post-Procedure Length (cm) 1.9 cm 12/01/22 1107   Post-Procedure Width (cm) 1.9 cm 12/01/22 1107   Post-Procedure Depth (cm) 0.2 cm 12/01/22 1107   Post-Procedure Surface Area (cm^2) 3.61 cm^2 12/01/22 1107   Post-Procedure Volume (cm^3) 0.722 cm^3 12/01/22 1107   Wound Assessment Dry;Granulation tissue;Slough 12/01/22 1028   Drainage Amount Small 12/01/22 1028   Drainage Description Yellow 12/01/22 1028   Odor None 12/01/22 1028   Jaylyn-wound Assessment Dry/flaky 12/01/22 1028   Margins Undefined edges 12/01/22 1028   Wound Thickness Description not for Pressure Injury Full thickness 12/01/22 1028   Number of days: 21          Diabetic/Pressure/Non Pressure Ulcers:  Ulcer:   Diabetic ulcer, fat layer exposed      Total Surface Area of Ulcer(s) Covered 3.61 sq/cm    Was the Product Layered  No     Amount of Product Applied 2 sq/cm     Amount of Product Wasted 0 sq/cm     Reason for Waste: N/A     Surgically Fixated: Yes    Secured With: Steri Strips and Silicone Dressing     Procedural Pain: 0/10     Post Procedural Pain: 0 / 10    Response to Treatment: Well tolerated by patient. This was the first application of Nushield to the right heel ulceration. Plan:   E/M x30 minutes     Patient was instructed to keep his dressing clean dry and intact. Patient was dispensed a surgical shoe to offload the area    Patient was instructed to suspend his heels at all times while at rest.    Ulceration was excisionally debrided. Please see the above dictation for details and findings. Treatment Note please see attached Discharge Instructions    Written patient dismissal instructions given to patient and signed by patient or POA. Reappoint 2 weeks due to to the Thanksgiving holiday next week  Discharge [id number] [facility] Physician Orders and Discharge [address]  [address] [facility],  [address] , [place] [address]  Telephone: [phone]  [address] 8:00 am - 4:30 pm and Friday 8:00 am - 12:00 pm.          NAME:  Xiomy Paulino  YOB: 1940  MEDICAL RECORD NUMBER:  [id number]  DATE:  12/1/2022     Important Reminders:   Please wash hands with soap and water before and after every dressing change. Do not scrub wounds. Keep wounds dry in shower unless otherwise instructed by the physician. SMOKING can slow would healing.  Stop smoking as soon as possible to improve healing and prevent further complications associated with smoking. Jaylyn-Wound Topical Treatments:  Do not apply lotions, creams, or ointments to wound bed unless directed. [] Apply moisturizing lotion to skin surrounding the wound prior to dressing change.  [] Apply antifungal ointment to skin surrounding the wound prior to dressing change.  [] Apply thin film of no sting moisture barrier ointment to skin immediately around      wound. [] Other:         Wound Location: RIGHT HEEL   **NUSHIELD #1 APPLIED 12/1/2022**     Wound Cleansing: Normal Saline     Primary Dressing:  [x] NUSHIELD MOISTENED WITH SALINE, MEPITEL AND STERI STRIPS APPLIED PER DR GUTIERREZ  []      Secondary Dressing:  [x] GAUZE, CAST PADDING, KERLIX, COBAN ( FOOTBALL DRESSING)  ROLL GAUZE         Dressing Frequency:  []   [x] Do Not Change Dressing        YOU HAVE HAD A NUSHIELD PLACED ON YOUR WOUND TODAY. FOR BEST RESULTS, WE RECOMMEND YOU LIMIT YOUR ACTIVITY FOR THE NEXT WEEK AS MUCH AS POSSIBLE. AVOID LONG PERIODS OF TIME ON YOUR FEET. THIS ALSO INCLUDES ELEVATING YOUR LEGS AND FEET 3-4 TIMES DAILY FOR AT LEAST 20-30 MINUTES ON PILLOWS AND AT NIGHT SO THAT THEY ARE HIGHER THAN THE LEVEL OF YOUR HEART     Electronically signed by Yasmine Saul RN on 12/1/2022 at 11:15 AM                  Compression and Edema Control:  [] Wear Home Compression Stockings   [x] Spandagrip to: Right Leg   Size: [x]Low compression 5-10 mm/Hg                 []Medium compression 10-20 mm/Hg           []High compression  20-30 mm/Hg  [] Ace Wrap Toes to Knee to    [] Multilayer Compression Wrap:  to               Do not get leg(s) with wrap wet. If wraps become too tight call the center or completely remove the wrap. Pressure Relief and Off Loading:  [] Off-loading when   [] walking       [] in bed         [] sitting   Turn every 2 hours when in bed             Avoid putting direct pressure on the site of the wound. Limit side lying to 30 degree tilt.  Limit elevating the head of the bed greater than 30 degrees. [x] Assistive Devices   CANE  Use as instructed by the provider        Activity: Activity as Tolerated        Dietary:   Continue your diet as tolerated. Protein is a key nutrient in helping to repair damaged tissue and promote new tissue growth. Good sources of protein include milk, yogurt, cheese, fish, lean meat and beans. If you are DIABETIC, having diabetes can make it hard for wounds to heal. Try to keep your blood sugar within it's target range. Limit Sodium, Alcohol and Sugar. Pain:   Please Note some pain, drainage and/or bleeding might be expected after seeing the provider. TO HELP ALLEVIATE PAIN WE RECOMMEND THE FOLLOWING  Elevate the affected limb. Use over the counter medications as permitted by your family doctor. For Persistent Pain not relieved by the above interventions, please notify your family doctor. Return Appointment:  [x] Return Appointment: With DR Eduardo  in 2 Week(s)  [] Wound and dressing supply provider:   [] ECF or Home Healthcare:  [] Wound Assessment:         [] Physician or NP scheduled for Wound Assessment:   [] Orders placed during your visit:            : Bal Lloyd      Electronically signed by Amee Valencia RN on 12/1/2022 at 11:16 AM             [address]: Should you experience any significant changes in your wound(s) or have questions about your wound care, please contact the [address] [place] [address] at [address] 8:00 am - 4:30 pm and Friday 8:00 am - 12:30 pm.  If you need help with your wound outside these hours and cannot wait until we are again available, contact your PCP or go to the hospital emergency room. PLEASE NOTE: IF YOU ARE UNABLE TO OBTAIN WOUND SUPPLIES, CONTINUE TO USE THE SUPPLIES YOU HAVE AVAILABLE UNTIL YOU ARE ABLE TO REACH US. IT IS MOST IMPORTANT TO KEEP THE WOUND COVERED AT ALL TIMES.      Physician Signature:_______________________     Date: ___________ Time:  ____________                                  Dr Marvin Voss         Electronically signed by Marvin Voss DPM on 12/1/2022 at 12:59 PM

## 2022-12-09 NOTE — DISCHARGE INSTRUCTIONS
[address] Physician Orders and Discharge [facility] 91  [address] [facility], [address] [place] [address], [place] [address]  Telephone: [phone]  [address] 8:00 am - 4:30 pm and Friday 8:00 am - 12:00 pm.          NAME:  Federica Thomas  YOB: 1940  MEDICAL RECORD NUMBER:  [id number]  DATE:  12/15/2022     Important Reminders:   Please wash hands with soap and water before and after every dressing change. Do not scrub wounds. Keep wounds dry in shower unless otherwise instructed by the physician. SMOKING can slow would healing. Stop smoking as soon as possible to improve healing and prevent further complications associated with smoking. Jaylyn-Wound Topical Treatments:  Do not apply lotions, creams, or ointments to wound bed unless directed. [] Apply moisturizing lotion to skin surrounding the wound prior to dressing change.  [] Apply antifungal ointment to skin surrounding the wound prior to dressing change.  [] Apply thin film of no sting moisture barrier ointment to skin immediately around      wound. [] Other:         Wound Location: RIGHT HEEL   **NUSHIELD #2 APPLIED 12/15/2022**     Wound Cleansing: Normal Saline     Primary Dressing:  [x] NUSHIELD MOISTENED WITH SALINE, MEPITEL AND STERI STRIPS APPLIED PER DR GUTIERREZ  []      Secondary Dressing:  [x] GAUZE, CAST PADDING, KERLIX, COBAN ( FOOTBALL DRESSING)  ROLL GAUZE         Dressing Frequency:  []   [x] Do Not Change Dressing           YOU HAVE HAD A NUSHIELD PLACED ON YOUR WOUND TODAY. FOR BEST RESULTS, WE RECOMMEND YOU LIMIT YOUR ACTIVITY FOR THE NEXT WEEK AS MUCH AS POSSIBLE. AVOID LONG PERIODS OF TIME ON YOUR FEET.  THIS ALSO INCLUDES ELEVATING YOUR LEGS AND FEET 3-4 TIMES DAILY FOR AT LEAST 20-30 MINUTES ON PILLOWS AND AT NIGHT SO THAT THEY ARE HIGHER THAN THE LEVEL OF YOUR HEART      Electronically signed by Berry Valladares RN on 12/15/2022 at 8:56 AM                   Compression and Edema Control:  [] Wear Home Compression Stockings   [x] Spandagrip to: Right Leg   Size: [x]Low compression 5-10 mm/Hg                 []Medium compression 10-20 mm/Hg           []High compression  20-30 mm/Hg  [] Ace Wrap Toes to Knee to    [] Multilayer Compression Wrap:  to               Do not get leg(s) with wrap wet. If wraps become too tight call the center or completely remove the wrap. Pressure Relief and Off Loading:  [] Off-loading when   [] walking       [] in bed         [] sitting   Turn every 2 hours when in bed             Avoid putting direct pressure on the site of the wound. Limit side lying to 30 degree tilt. Limit elevating the head of the bed greater than 30 degrees. [x] Assistive Devices   CANE  Use as instructed by the provider        Activity: Activity as Tolerated        Dietary:   Continue your diet as tolerated. Protein is a key nutrient in helping to repair damaged tissue and promote new tissue growth. Good sources of protein include milk, yogurt, cheese, fish, lean meat and beans. If you are DIABETIC, having diabetes can make it hard for wounds to heal. Try to keep your blood sugar within it's target range. Limit Sodium, Alcohol and Sugar. Pain:   Please Note some pain, drainage and/or bleeding might be expected after seeing the provider. TO HELP ALLEVIATE PAIN WE RECOMMEND THE FOLLOWING  Elevate the affected limb. Use over the counter medications as permitted by your family doctor. For Persistent Pain not relieved by the above interventions, please notify your family doctor.      Return Appointment:  [x] Return Appointment: With DR GUTIERREZ  in 1 Week(s)  [] Wound and dressing supply provider:   [] ECF or Home Healthcare:  [] Wound Assessment:         [] Physician or NP scheduled for Wound Assessment:   [] Orders placed during your visit:            : Pranav Regan Electronically signed by Geri Caldwell RN on 12/15/2022 at 8:56 [facility] Information: Should you experience any significant changes in your wound(s) or have questions about your wound care, please contact the [address] [place] [address] at [address] 8:00 am - 4:30 pm and Friday 8:00 am - 12:30 pm.  If you need help with your wound outside these hours and cannot wait until we are again available, contact your PCP or go to the hospital emergency room. PLEASE NOTE: IF YOU ARE UNABLE TO OBTAIN WOUND SUPPLIES, CONTINUE TO USE THE SUPPLIES YOU HAVE AVAILABLE UNTIL YOU ARE ABLE TO REACH US. IT IS MOST IMPORTANT TO KEEP THE WOUND COVERED AT ALL TIMES.      Physician Signature:_______________________     Date: ___________ Time:  ____________                                  Dr Gabriel Graves

## 2022-12-14 RX ORDER — OMEPRAZOLE 20 MG/1
CAPSULE, DELAYED RELEASE ORAL
Qty: 180 CAPSULE | Refills: 1 | Status: SHIPPED | OUTPATIENT
Start: 2022-12-14

## 2022-12-15 ENCOUNTER — HOSPITAL ENCOUNTER (OUTPATIENT)
Dept: WOUND CARE | Age: 82
Discharge: HOME | End: 2022-12-15
Payer: MEDICARE

## 2022-12-15 VITALS
SYSTOLIC BLOOD PRESSURE: 137 MMHG | DIASTOLIC BLOOD PRESSURE: 73 MMHG | TEMPERATURE: 97.3 F | RESPIRATION RATE: 18 BRPM | HEART RATE: 71 BPM

## 2022-12-15 DIAGNOSIS — S41.102A ARM WOUND, LEFT, INITIAL ENCOUNTER: ICD-10-CM

## 2022-12-15 DIAGNOSIS — E11.649 UNCONTROLLED TYPE 2 DIABETES MELLITUS WITH HYPOGLYCEMIA, UNSPECIFIED HYPOGLYCEMIA COMA STATUS (HCC): ICD-10-CM

## 2022-12-15 DIAGNOSIS — L97.512 ULCER OF TOE OF RIGHT FOOT, WITH FAT LAYER EXPOSED (HCC): ICD-10-CM

## 2022-12-15 DIAGNOSIS — I87.2 PERIPHERAL VENOUS INSUFFICIENCY: ICD-10-CM

## 2022-12-15 DIAGNOSIS — I70.235 ATHEROSCLEROSIS OF NATIVE ARTERIES OF RIGHT LEG WITH ULCERATION OF OTHER PART OF FOOT (HCC): ICD-10-CM

## 2022-12-15 DIAGNOSIS — L97.412 ULCER OF RIGHT HEEL, WITH FAT LAYER EXPOSED (HCC): Primary | ICD-10-CM

## 2022-12-15 PROCEDURE — 15271 SKIN SUB GRAFT TRNK/ARM/LEG: CPT

## 2022-12-15 RX ORDER — LIDOCAINE 50 MG/G
OINTMENT TOPICAL ONCE
OUTPATIENT
Start: 2022-12-15 | End: 2022-12-15

## 2022-12-15 RX ORDER — LIDOCAINE HYDROCHLORIDE 20 MG/ML
JELLY TOPICAL ONCE
OUTPATIENT
Start: 2022-12-15 | End: 2022-12-15

## 2022-12-15 RX ORDER — GENTAMICIN SULFATE 1 MG/G
OINTMENT TOPICAL ONCE
OUTPATIENT
Start: 2022-12-15 | End: 2022-12-15

## 2022-12-15 RX ORDER — CLOBETASOL PROPIONATE 0.5 MG/G
OINTMENT TOPICAL ONCE
OUTPATIENT
Start: 2022-12-15 | End: 2022-12-15

## 2022-12-15 RX ORDER — BACITRACIN, NEOMYCIN, POLYMYXIN B 400; 3.5; 5 [USP'U]/G; MG/G; [USP'U]/G
OINTMENT TOPICAL ONCE
OUTPATIENT
Start: 2022-12-15 | End: 2022-12-15

## 2022-12-15 RX ORDER — LIDOCAINE 40 MG/G
CREAM TOPICAL ONCE
OUTPATIENT
Start: 2022-12-15 | End: 2022-12-15

## 2022-12-15 RX ORDER — BACITRACIN ZINC AND POLYMYXIN B SULFATE 500; 1000 [USP'U]/G; [USP'U]/G
OINTMENT TOPICAL ONCE
OUTPATIENT
Start: 2022-12-15 | End: 2022-12-15

## 2022-12-15 RX ORDER — BETAMETHASONE DIPROPIONATE 0.05 %
OINTMENT (GRAM) TOPICAL ONCE
OUTPATIENT
Start: 2022-12-15 | End: 2022-12-15

## 2022-12-15 RX ORDER — LIDOCAINE 50 MG/G
OINTMENT TOPICAL ONCE
Status: COMPLETED | OUTPATIENT
Start: 2022-12-15 | End: 2022-12-15

## 2022-12-15 RX ORDER — GINSENG 100 MG
CAPSULE ORAL ONCE
OUTPATIENT
Start: 2022-12-15 | End: 2022-12-15

## 2022-12-15 RX ORDER — LIDOCAINE HYDROCHLORIDE 40 MG/ML
SOLUTION TOPICAL ONCE
OUTPATIENT
Start: 2022-12-15 | End: 2022-12-15

## 2022-12-15 RX ADMIN — LIDOCAINE 1 G: 50 OINTMENT TOPICAL at 08:31

## 2022-12-15 ASSESSMENT — PAIN SCALES - GENERAL
PAINLEVEL_OUTOF10: 0
PAINLEVEL_OUTOF10: 0

## 2022-12-15 NOTE — PROGRESS NOTES
[address]   Progress Note and Procedure Note      Cyndi RECORD NUMBER:  [id number]  AGE: 81 y.o. GENDER: male  : 1940  EPISODE DATE:  12/15/2022    Subjective:     Chief Complaint   Patient presents with    Wound Check     Follow up visit right heel wound         HISTORY of PRESENT ILLNESS HPI     Michelle Monreal is a 81 y.o. male who presents today for wound/ulcer evaluation. History of Wound Context: Patient presents today for a right heel ulceration. Patient has been performing local wound care as recommended. Wound/Ulcer Pain Timing/Severity: none  Quality of pain: N/A  Severity:  0 / 10   Modifying Factors: None  Associated Signs/Symptoms: edema and drainage    Ulcer Identification:  Ulcer Type: venous    Contributing Factors: edema, venous stasis, lymphedema, diabetes, poor glucose control and obesity    Acute Wound: N/A    PAST MEDICAL HISTORY        Diagnosis Date    Acid reflux     Arm wound, left, initial encounter 2022    Skin tears    Atherosclerosis of native arteries of right leg with ulceration of other part of foot ([address] 2021    Atrial flutter ([address]     converted    Bleeding stomach ulcer oct 2015    BPH (benign prostatic hyperplasia)     CAD (coronary artery disease)      angio with 2 blocked bypass and 2 patent    Cellulitis of left thigh 5/10/2022    COPD (chronic obstructive pulmonary disease) (HCC)     Diabetes mellitus type II     Edema     chronic left leg    Elevated PSA- nl 11     Hyperlipidemia     Hypertension     Ischemic cardiomyopathy     Lipoma of skin-RIGHT CHEST 2011    Obesity     Osteoarthritis     Peripheral venous insufficiency 2021    Skin tear of left forearm without complication 3836    Significant amount of epidermal loss with bleeding.     Spinal stenosis of lumbar region with neurogenic claudication- clinically 2018       PAST SURGICAL HISTORY    Past Surgical History:   Procedure Laterality Date    CATARACT REMOVAL  ,     bilat    COLONOSCOPY      CORONARY ARTERY BYPASS GRAFT  1993    x 4    EYE SURGERY Left 2019    cataract coming back    JOINT REPLACEMENT Right total knee replacement    JOINT REPLACEMENT Left 2016       FAMILY HISTORY    Family History   Problem Relation Age of Onset    Cancer Mother         breast    Cancer Father         throat       SOCIAL HISTORY    Social History     Tobacco Use    Smoking status: Former     Packs/day: 3.50     Years: 32.00     Pack years: 112.00     Types: Cigarettes     Quit date: 1985     Years since quittin.9    Smokeless tobacco: Never    Tobacco comments:     advised not to resume   Substance Use Topics    Alcohol use: No     Alcohol/week: 0.0 standard drinks    Drug use: No       ALLERGIES    Allergies   Allergen Reactions    Entresto [Sacubitril-Valsartan] Other (See Comments)     Renal failure       MEDICATIONS    Current Outpatient Medications on File Prior to Encounter   Medication Sig Dispense Refill    omeprazole (PRILOSEC) 20 MG delayed release capsule TAKE 1 CAPSULE BY MOUTH TWICE DAILY 180 capsule 1    torsemide (DEMADEX) 20 MG tablet Take 2 tabs in AM and 2 tabs in afternoon.  120 tablet 3    Continuous Blood Gluc  (DEXCOM G6 ) PABLO As needed for diabetes 1 each 0    Continuous Blood Gluc Sensor (DEXCOM G6 SENSOR) MISC Apply weekly 4 each 5    Continuous Blood Gluc Transmit (DEXCOM G6 TRANSMITTER) MISC As needed for diabetes 1 each 0    apixaban (ELIQUIS) 5 MG TABS tablet Take 1 tablet by mouth 2 times daily 60 tablet 2    dapagliflozin (FARXIGA) 5 MG tablet Take 2 tablets by mouth daily LOT [id number] EXP 2024 4 BOXES 30 tablet 1    potassium chloride (KLOR-CON M) 20 MEQ extended release tablet Take 1 tablet by mouth daily 90 tablet 1    hydrALAZINE (APRESOLINE) 10 MG tablet Take 1 tablet by mouth 3 times daily 30 tablet 2    atorvastatin (LIPITOR) 40 MG tablet TAKE 1 TABLET BY MOUTH EVERY EVENING 90 tablet 3    amiodarone (CORDARONE) 200 MG tablet TAKE 1 TABLET BY MOUTH DAILY 30 tablet 5    insulin NPH (NOVOLIN N) 100 UNIT/ML injection vial Take 50 units with breakfast and 34 units with dinner. 30 mL 3    gabapentin (NEURONTIN) 600 MG tablet TAKE UP TO 5 TABLETS BY MOUTH OVER 24 HOURS AS DIRECTED 150 tablet 11    albuterol sulfate  (90 Base) MCG/ACT inhaler INHALE 2 PUFFS INTO THE LUNGS EVERY 6 HOURS AS NEEDED FOR WHEEZING 18 g 5    [DISCONTINUED] FEROSUL 325 (65 Fe) MG tablet TAKE 1 TABLET BY MOUTH TWICE DAILY (Patient not taking: Reported on 9/2/2022) 180 tablet 3    [DISCONTINUED] potassium chloride (KLOR-CON) 10 MEQ extended release tablet TAKE 1 TABLET BY MOUTH DAILY 90 tablet 1    [DISCONTINUED] furosemide (LASIX) 80 MG tablet TAKE 1 TABLET BY MOUTH DAILY 90 tablet 0    Lancets MISC 1 each by Does not apply route daily Use to check glucose twice a day. One Touch brand. DX E11.9 100 each 3    blood glucose monitor kit and supplies Test 2 times a day & as needed for symptoms of irregular blood glucose. 1 kit 0    blood glucose monitor strips Test 2 times a day & as needed for symptoms of irregular blood glucose.  200 strip 3    blood glucose test strips (ASCENSIA AUTODISC VI;ONE TOUCH ULTRA TEST VI) strip four times daily 400 strip 3    INSULIN SYRINGE .5CC/29G 29G X 1/2\" 0.5 ML MISC INJECT 4 TIMES DAILY AS NEEDED 400 each 1    [DISCONTINUED] Cyanocobalamin (B-12) 500 MCG TABS TAKE 1 TABLET BY MOUTH DAILY (Patient not taking: Reported on 9/2/2022) 90 tablet 1    umeclidinium-vilanterol (ANORO ELLIPTA) 62.5-25 MCG/INH AEPB inhaler INHALE 1 PUFF INTO THE LUNGS DAILY 1 each 5    insulin regular (NOVOLIN R RELION) 100 UNIT/ML injection INJECT 20 TO 30 UNITS SUBCUTANEOUSLY THREE TIMES DAILY BEFORE MEAL(S) 30 mL 0    tiotropium (SPIRIVA HANDIHALER) 18 MCG inhalation capsule Inhale 1 capsule into the lungs daily INCLUDE inhaler device 30 capsule 5    Nebulizers (AIRIAL COMPACT MINI NEBULIZER) MISC 1 each by Does not apply route every 6 hours as needed (wheezing or shortness of breath) 1 each 0    Respiratory Therapy Supplies (NEBULIZER/TUBING/MOUTHPIECE) KIT 1 kit by Does not apply route every 6 hours as needed (for wheezing or shortness of breath) 1 kit 1    albuterol (PROVENTIL) (2.5 MG/3ML) 0.083% nebulizer solution Take 3 mLs by nebulization every 6 hours as needed for Wheezing 50 vial 3    vitamin D (ERGOCALCIFEROL) 92705 units CAPS capsule TK 1 C PO WEEKLY  2    levalbuterol (XOPENEX HFA) 45 MCG/ACT inhaler Inhale 1-2 puffs into the lungs every 4 hours as needed for Wheezing 1 Inhaler 3    aspirin 81 MG EC tablet Take 81 mg by mouth daily      [DISCONTINUED] metoprolol (LOPRESSOR) 25 MG tablet Take 25 mg by mouth daily       isosorbide mononitrate (IMDUR) 30 MG CR tablet Take 1 tablet by mouth every morning Dr. Krissy Williamson - Cardio 30 tablet 6    nitroGLYCERIN (NITROSTAT) 0.3 MG SL tablet Take one sublingual for chest pain. May repeat twice at 5 min intervals. If not getting relief call 911. 25 tablet 3     No current facility-administered medications on file prior to encounter. REVIEW OF SYSTEMS  Review of Systems    Pertinent items are noted in HPI. Review of Systems: A 12 point review of symptoms is unremarkable with the exception of the chief complaint. Patient specifically denies nausea, fever, vomiting, chills, shortness of breath, chest pain, abdominal pain, constipation or difficulty urinating. Objective:      /73   Pulse 71   Temp 97.3 °F (36.3 °C) (Infrared)   Resp 18     Wt Readings from Last 3 Encounters:   09/29/22 237 lb (107.5 kg)   09/14/22 237 lb 12.8 oz (107.9 kg)   09/05/22 241 lb 12.8 oz (109.7 kg)       PHYSICAL EXAM  Physical Exam    Patient has nonpalpable pedal pulses bilaterally. He has biphasic DP PT on the left with hand-held Doppler and a monophasic on the right.   Patient has extensive bilateral lower extremity edema with hypertrophic skin changes, brawny discoloration and cobbling consistent with chronic venous disease left greater than right. Patient has absent pedal hair growth noted. Dorsalis pedis and posterior tibial pulse are monophasic on a hand-held Doppler examination. Ulceration noted on the posterior lateral aspect of the right lower extremity. Wound has fibrotic and nonviable tissue that extends down through and includes the subcutaneous tissue. After debridement the wound has a granular base. There is no surrounding erythema, edema, warmth or malodor noted. The wound does not probe or track to bone. Wound bed has significantly improved since previous examination  Epicritic sensation is grossly intact bilaterally. Negative clonus and babinski reflex is down going. Patient's muscle strength for dorsiflexion plantarflexion inversion and eversion is intact bilaterally. He does have semirigid hammertoe contractures noted bilaterally. 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     Performed by: Tony Copeland DPM    Consent obtained:  Yes    Time out taken:  Yes    Pain Control: Anesthetic  Anesthetic: 5% Lidocaine Ointment Topical       Debridement: Excisional Debridement    Using #15 blade scalpel the wound(s)/ulcer(s) was/were debrided down through and including the removal of epidermis, dermis and subcutaneous tissue to prepare the wound bed for application of new shield. Devitalized Tissue Debrided:  fibrin and slough    Pre Debridement Measurements:  Are located in the [place]  Documentation Flow Sheet    Diabetic/Pressure/Non Pressure Ulcers only:  Ulcer: Non-Pressure ulcer, fat layer exposed     Wound/Ulcer #: 1    Post Debridement Measurements:  Wound/Ulcer Descriptions are Pre Debridement except measurements:    Wound 11/10/22 Heel Right;Lateral #1, ( NOTED DRAINING 10/10/22) (Active)   Wound Image   12/01/22 1028   Wound Etiology Diabetic Guzman 3 11/10/22 1030   Dressing Status New dressing applied 12/15/22 0900   Wound Cleansed Cleansed with saline 12/01/22 1119   Dressing/Treatment Other (comment);Gauze dressing/dressing sponge; Cast padding;Roll gauze;Coban/self-adherent bandages 12/15/22 0900   Offloading for Diabetic Foot Ulcers Post op shoe 12/15/22 0900   Wound Length (cm) 1.2 cm 12/15/22 0823   Wound Width (cm) 1.3 cm 12/15/22 0823   Wound Depth (cm) 0.2 cm 12/15/22 0823   Wound Surface Area (cm^2) 1.56 cm^2 12/15/22 0823   Change in Wound Size % (l*w) -1.3 12/15/22 0823   Wound Volume (cm^3) 0.312 cm^3 12/15/22 0823   Wound Healing % 32 12/15/22 0823   Post-Procedure Length (cm) 1.4 cm 12/15/22 0852   Post-Procedure Width (cm) 1.5 cm 12/15/22 0852   Post-Procedure Depth (cm) 0.2 cm 12/15/22 0852   Post-Procedure Surface Area (cm^2) 2.1 cm^2 12/15/22 0852   Post-Procedure Volume (cm^3) 0.42 cm^3 12/15/22 0852   Wound Assessment Dry;Granulation tissue;Slough 12/15/22 0823   Drainage Amount Scant 12/15/22 0823   Drainage Description Yellow 12/15/22 0823   Odor None 12/15/22 0823   Jaylyn-wound Assessment Dry/flaky 12/15/22 0823   Margins Undefined edges 12/15/22 0823   Wound Thickness Description not for Pressure Injury Full thickness 12/15/22 0823   Number of days: 35          Total Surface Area Debrided:  2.1 sq cm     Estimated Blood Loss:  Minimal    Hemostasis Achieved:  by pressure    Procedural Pain:  0  / 10     Post Procedural Pain:  0 / 10     Response to treatment:  Well tolerated by patient. Procedure:  Skin Substitute Application    Performed by: Gin Feldman DPM    Ulcer Type: diabetic    Consent obtained: Yes    Time out taken: Yes    Product Utilized:    NuShield 2 sq/cm     Fenestrated: No    Mesher Utilized: No    Instrument(s) #15 blade scalpel    Skin Substitute was Applied to Ulcer Number(s):    Ulcer #: 1    Wound 11/10/22 Heel Right;Lateral #1, ( NOTED DRAINING 10/10/22) (Active)   Wound Image   12/01/22 1028   Wound Etiology Diabetic Guzman 3 11/10/22 1030   Dressing Status New dressing applied 12/15/22 0900   Wound Cleansed Cleansed with saline 12/01/22 1119   Dressing/Treatment Other (comment);Gauze dressing/dressing sponge; Cast padding;Roll gauze;Coban/self-adherent bandages 12/15/22 0900   Offloading for Diabetic Foot Ulcers Post op shoe 12/15/22 0900   Wound Length (cm) 1.2 cm 12/15/22 0823   Wound Width (cm) 1.3 cm 12/15/22 0823   Wound Depth (cm) 0.2 cm 12/15/22 0823   Wound Surface Area (cm^2) 1.56 cm^2 12/15/22 0823   Change in Wound Size % (l*w) -1.3 12/15/22 0823   Wound Volume (cm^3) 0.312 cm^3 12/15/22 0823   Wound Healing % 32 12/15/22 0823   Post-Procedure Length (cm) 1.4 cm 12/15/22 0852   Post-Procedure Width (cm) 1.5 cm 12/15/22 0852   Post-Procedure Depth (cm) 0.2 cm 12/15/22 0852   Post-Procedure Surface Area (cm^2) 2.1 cm^2 12/15/22 0852   Post-Procedure Volume (cm^3) 0.42 cm^3 12/15/22 0852   Wound Assessment Dry;Granulation tissue;Slough 12/15/22 0823   Drainage Amount Scant 12/15/22 0823   Drainage Description Yellow 12/15/22 0823   Odor None 12/15/22 0823 Jaylyn-wound Assessment Dry/flaky 12/15/22 0823   Margins Undefined edges 12/15/22 0823   Wound Thickness Description not for Pressure Injury Full thickness 12/15/22 0823   Number of days: 35          Diabetic/Pressure/Non Pressure Ulcers:  Ulcer:   Diabetic ulcer, fat layer exposed      Total Surface Area of Ulcer(s) Covered 2.1 sq/cm    Was the Product Layered  No     Amount of Product Applied 2 sq/cm     Amount of Product Wasted 0 sq/cm     Reason for Waste: N/A     Surgically Fixated: Yes    Secured With: Steri Strips and Silicone Dressing     Procedural Pain: 0/10     Post Procedural Pain: 0 / 10    Response to Treatment: Well tolerated by patient. This was the second application of Nushield to the right heel ulceration. Plan:   E/M x30 minutes     Patient was instructed to keep his dressing clean dry and intact. Patient was dispensed a surgical shoe to offload the area    Patient was instructed to suspend his heels at all times while at rest.    Ulceration was excisionally debrided. Please see the above dictation for details and findings. Treatment Note please see attached Discharge Instructions    Written patient dismissal instructions given to patient and signed by patient or POA. Reappoint 2 weeks due to to the Thanksgiving holiday next week  Discharge [id number] Formerly named [facility] Physician Orders and Discharge [facility]  [address] [place] [address] Drive, [address] [place] [address]  Telephone: [phone]  [address] 8:00 am - 4:30 pm and Friday 8:00 am - 12:00 pm.          NAME:  Zee Pride  YOB: 1940  MEDICAL RECORD NUMBER:  [id number]  DATE:  12/15/2022     Important Reminders:   Please wash hands with soap and water before and after every dressing change. Do not scrub wounds. Keep wounds dry in shower unless otherwise instructed by the physician.   SMOKING can slow would healing. Stop smoking as soon as possible to improve healing and prevent further complications associated with smoking. Jaylyn-Wound Topical Treatments:  Do not apply lotions, creams, or ointments to wound bed unless directed. [] Apply moisturizing lotion to skin surrounding the wound prior to dressing change.  [] Apply antifungal ointment to skin surrounding the wound prior to dressing change.  [] Apply thin film of no sting moisture barrier ointment to skin immediately around      wound. [] Other:         Wound Location: RIGHT HEEL   **NUSHIELD #2 APPLIED 12/15/2022**     Wound Cleansing: Normal Saline     Primary Dressing:  [x] NUSHIELD MOISTENED WITH SALINE, MEPITEL AND STERI STRIPS APPLIED PER DR GUTIERREZ  []      Secondary Dressing:  [x] GAUZE, CAST PADDING, KERLIX, COBAN ( FOOTBALL DRESSING)  ROLL GAUZE         Dressing Frequency:  []   [x] Do Not Change Dressing           YOU HAVE HAD A NUSHIELD PLACED ON YOUR WOUND TODAY. FOR BEST RESULTS, WE RECOMMEND YOU LIMIT YOUR ACTIVITY FOR THE NEXT WEEK AS MUCH AS POSSIBLE. AVOID LONG PERIODS OF TIME ON YOUR FEET. THIS ALSO INCLUDES ELEVATING YOUR LEGS AND FEET 3-4 TIMES DAILY FOR AT LEAST 20-30 MINUTES ON PILLOWS AND AT NIGHT SO THAT THEY ARE HIGHER THAN THE LEVEL OF YOUR HEART      Electronically signed by Marian Shaikh RN on 12/15/2022 at 8:56 AM                   Compression and Edema Control:  [] Wear Home Compression Stockings   [x] Spandagrip to: Right Leg   Size: [x]Low compression 5-10 mm/Hg                 []Medium compression 10-20 mm/Hg           []High compression  20-30 mm/Hg  [] Ace Wrap Toes to Knee to    [] Multilayer Compression Wrap:  to               Do not get leg(s) with wrap wet. If wraps become too tight call the center or completely remove the wrap.                Pressure Relief and Off Loading:  [] Off-loading when   [] walking       [] in bed         [] sitting   Turn every 2 hours when in bed             Avoid putting direct pressure on the site of the wound. Limit side lying to 30 degree tilt. Limit elevating the head of the bed greater than 30 degrees. [x] Assistive Devices   CANE  Use as instructed by the provider        Activity: Activity as Tolerated        Dietary:   Continue your diet as tolerated. Protein is a key nutrient in helping to repair damaged tissue and promote new tissue growth. Good sources of protein include milk, yogurt, cheese, fish, lean meat and beans. If you are DIABETIC, having diabetes can make it hard for wounds to heal. Try to keep your blood sugar within it's target range. Limit Sodium, Alcohol and Sugar. Pain:   Please Note some pain, drainage and/or bleeding might be expected after seeing the provider. TO HELP ALLEVIATE PAIN WE RECOMMEND THE FOLLOWING  Elevate the affected limb. Use over the counter medications as permitted by your family doctor. For Persistent Pain not relieved by the above interventions, please notify your family doctor. Return Appointment:  [x] Return Appointment: With DR GUTIERREZ  in 1 Week(s)  [] Wound and dressing supply provider:   [] ECF or Home Healthcare:  [] Wound Assessment:         [] Physician or NP scheduled for Wound Assessment:   [] Orders placed during your visit:            : Amalia Ray      Electronically signed by Nellie Valle RN on 12/15/2022 at 8:56 AM                [address] Information: Should you experience any significant changes in your wound(s) or have questions about your wound care, please contact the [address] [place] [address] at [address] 8:00 am - 4:30 pm and Friday 8:00 am - 12:30 pm.  If you need help with your wound outside these hours and cannot wait until we are again available, contact your PCP or go to the hospital emergency room.       PLEASE NOTE: IF YOU ARE UNABLE TO OBTAIN WOUND SUPPLIES, CONTINUE TO USE THE SUPPLIES YOU HAVE AVAILABLE UNTIL YOU ARE ABLE TO REACH US. IT IS MOST IMPORTANT TO KEEP THE WOUND COVERED AT ALL TIMES.      Physician Signature:_______________________     Date: ___________ Time:  ____________                                  Dr Evelyn Ibrahim         Electronically signed by Evelyn Ibrahim DPM on 12/15/2022 at 12:09 PM

## 2022-12-15 NOTE — PLAN OF CARE
TheraSkin Treatment Note    NAME:  Monik Solares OF BIRTH:  1940  MEDICAL RECORD NUMBER:  [id number]  DATE:  12/15/2022    Goal:  Patient will receive safe and proper application of skin substitute. Patient will comply with caring for dressing, offloading and reporting complications. Expiration date of TheraSkin checked immediately prior to use. Package intact prior to use and no damage noted. Transport temperature controlled and acceptable. TheraSkin was prepared for application by removing from package. TheraSkin was rinsed 2 times in room temperature normal saline for 2 minutes each time. A 2nd saline rinse was left on the TheraSkin until the physician was ready to apply it within 120 minutes of thawing. White side goes against ulcer bed. TheraSkin was applied to RIGHT HEEL and affixed with steri-strips by the physician. OPTILOCK was applied on top of non-adherent dressing. Coban or ace wrap was applied to secure graft and decrease edema. Patient/caregiver was instructed not to remove dressing and to keep it clean and dry. Pt/family/caregiver was instructed on signs and symptoms of complications to report such as draining through, falling down/slipping, getting wet, or severe pain or tingling in toes. Pt/family/caregiver was instructed on need for offloading and elevation of affected extremity (if applicable) and on use of prescribed offloading device. Thera Skin may be applied a total of 10 times per wound over a 12 week period. Date of first application of Thera Skin for this current wound is December 1, 2022.                   Guidelines followed      Electronically signed by Geri Caldwell RN on 12/15/2022 at 12:07 PM

## 2022-12-16 NOTE — DISCHARGE INSTRUCTIONS
[address] Physician Orders and Discharge Harry [address]  [address], [address] [address]  Telephone: [phone]  [address] 8:00 am - 4:30 pm and Friday 8:00 am - 12:00 pm.          NAME:  Karina Miranda  YOB: 1940  MEDICAL RECORD NUMBER:  [id number]  DATE:  12/22/2022     Important Reminders:   Please wash hands with soap and water before and after every dressing change. Do not scrub wounds. Keep wounds dry in shower unless otherwise instructed by the physician. SMOKING can slow would healing. Stop smoking as soon as possible to improve healing and prevent further complications associated with smoking. Jaylyn-Wound Topical Treatments:  Do not apply lotions, creams, or ointments to wound bed unless directed. [] Apply moisturizing lotion to skin surrounding the wound prior to dressing change.  [] Apply antifungal ointment to skin surrounding the wound prior to dressing change.  [] Apply thin film of no sting moisture barrier ointment to skin immediately around      wound. [] Other:         Wound Location: RIGHT HEEL   **NUSHIELD #3 APPLIED 12/22/2022**     Wound Cleansing: Normal Saline     Primary Dressing:  [x] [place] AND STERI STRIPS APPLIED PER DR GUTIERREZ  []      Secondary Dressing:  [x] GAUZE, CAST PADDING, KERLIX, COBAN ( FOOTBALL DRESSING)  ROLL GAUZE         Dressing Frequency:  []   [x] Do Not Change Dressing           YOU HAVE HAD A NUSHIELD PLACED ON YOUR WOUND TODAY. FOR BEST RESULTS, WE RECOMMEND YOU LIMIT YOUR ACTIVITY FOR THE NEXT WEEK AS MUCH AS POSSIBLE. AVOID LONG PERIODS OF TIME ON YOUR FEET.  THIS ALSO INCLUDES ELEVATING YOUR LEGS AND FEET 3-4 TIMES DAILY FOR AT LEAST 20-30 MINUTES ON PILLOWS AND AT NIGHT SO THAT THEY ARE HIGHER THAN THE LEVEL OF YOUR HEART      Electronically signed by Ariane Luevano RN on 12/22/2022 at 10:43 AM Compression and Edema Control:  [] Wear Home Compression Stockings   [x] Spandagrip to: Right Leg   Size: [x]Low compression 5-10 mm/Hg                 []Medium compression 10-20 mm/Hg           []High compression  20-30 mm/Hg  [] Ace Wrap Toes to Knee to    [] Multilayer Compression Wrap:  to               Do not get leg(s) with wrap wet. If wraps become too tight call the center or completely remove the wrap. Pressure Relief and Off Loading:  [] Off-loading when   [] walking       [] in bed         [] sitting   Turn every 2 hours when in bed             Avoid putting direct pressure on the site of the wound. Limit side lying to 30 degree tilt. Limit elevating the head of the bed greater than 30 degrees. [x] Assistive Devices   CANE  Use as instructed by the provider        Activity: Activity as Tolerated        Dietary:   Continue your diet as tolerated. Protein is a key nutrient in helping to repair damaged tissue and promote new tissue growth. Good sources of protein include milk, yogurt, cheese, fish, lean meat and beans. If you are DIABETIC, having diabetes can make it hard for wounds to heal. Try to keep your blood sugar within it's target range. Limit Sodium, Alcohol and Sugar. Pain:   Please Note some pain, drainage and/or bleeding might be expected after seeing the provider. TO HELP ALLEVIATE PAIN WE RECOMMEND THE FOLLOWING  Elevate the affected limb. Use over the counter medications as permitted by your family doctor. For Persistent Pain not relieved by the above interventions, please notify your family doctor.      Return Appointment:  [x] Return Appointment: With DR GUTIERREZ  in 1 Week(s)  [] Wound and dressing supply provider:   [] ECF or Home Healthcare:  [] Wound Assessment:         [] Physician or NP scheduled for Wound Assessment:   [] Orders placed during your visit:            : Pranav Regan      Electronically signed by Booker Gilliam RN on 12/22/2022 at 10:42 AM          [facility] Information: Should you experience any significant changes in your wound(s) or have questions about your wound care, please contact the [address] [place] [address] at [address] 8:00 am - 4:30 pm and Friday 8:00 am - 12:30 pm.  If you need help with your wound outside these hours and cannot wait until we are again available, contact your PCP or go to the hospital emergency room. PLEASE NOTE: IF YOU ARE UNABLE TO OBTAIN WOUND SUPPLIES, CONTINUE TO USE THE SUPPLIES YOU HAVE AVAILABLE UNTIL YOU ARE ABLE TO REACH US. IT IS MOST IMPORTANT TO KEEP THE WOUND COVERED AT ALL TIMES.      Physician Signature:_______________________     Date: ___________ Time:  ____________                                  Dr Russell Sullivan

## 2022-12-20 ENCOUNTER — OFFICE VISIT (OUTPATIENT)
Dept: ORTHOPEDIC SURGERY | Age: 82
End: 2022-12-20
Payer: MEDICARE

## 2022-12-20 VITALS — WEIGHT: 280 LBS | HEIGHT: 68 IN | BODY MASS INDEX: 42.44 KG/M2

## 2022-12-20 DIAGNOSIS — Z96.652 S/P TOTAL KNEE ARTHROPLASTY, LEFT: ICD-10-CM

## 2022-12-20 DIAGNOSIS — Z96.651 S/P TOTAL KNEE ARTHROPLASTY, RIGHT: Primary | ICD-10-CM

## 2022-12-20 PROCEDURE — 1123F ACP DISCUSS/DSCN MKR DOCD: CPT | Performed by: PHYSICIAN ASSISTANT

## 2022-12-20 PROCEDURE — 1036F TOBACCO NON-USER: CPT | Performed by: PHYSICIAN ASSISTANT

## 2022-12-20 PROCEDURE — 99212 OFFICE O/P EST SF 10 MIN: CPT | Performed by: PHYSICIAN ASSISTANT

## 2022-12-20 PROCEDURE — G8417 CALC BMI ABV UP PARAM F/U: HCPCS | Performed by: PHYSICIAN ASSISTANT

## 2022-12-20 PROCEDURE — G8427 DOCREV CUR MEDS BY ELIG CLIN: HCPCS | Performed by: PHYSICIAN ASSISTANT

## 2022-12-20 PROCEDURE — G8484 FLU IMMUNIZE NO ADMIN: HCPCS | Performed by: PHYSICIAN ASSISTANT

## 2022-12-20 NOTE — PROGRESS NOTES
Subjective:      Patient ID: Alisha Liriano is a 81 y.o.  male. Here for annual follow up visit. S/P right and left knee arthroplasty. The date of procedure-right knee 3/21/2016, left knee 9/14/2016. .    Surgeon: Gabino Weathers  Issues or complaints: No issues or complaints. About 6 months ago he was having some left knee discomfort which has resolved. Review of Systems:  Constitutional: denies fever, chills, weight loss. MSK: denies pain in other joints, muscle aches. Neurological: denies numbness, tingling, weakness. 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 with ulceration of other part of foot ([address] 9/30/2021    Atrial flutter ([address] 2013    converted    Bleeding stomach ulcer oct 2015    BPH (benign prostatic hyperplasia)     CAD (coronary artery disease)     11/12 angio with 2 blocked bypass and 2 patent    Cellulitis of left thigh 5/10/2022    COPD (chronic obstructive pulmonary disease) ([address]     Diabetes mellitus type II     Edema     chronic left leg    Elevated PSA- nl 8/12/11     Hyperlipidemia     Hypertension     Ischemic cardiomyopathy     Lipoma of skin-RIGHT CHEST 5/5/2011    Obesity     Osteoarthritis     Peripheral venous insufficiency 9/30/2021    Skin tear of left forearm without complication 2/44/1548    Significant amount of epidermal loss with bleeding.     Spinal stenosis of lumbar region with neurogenic claudication- clinically 7/6/2018       Family History   Problem Relation Age of Onset    Cancer Mother         breast    Cancer Father         throat       Past Surgical History:   Procedure Laterality Date    CATARACT REMOVAL  2010, 2011    bilat    COLONOSCOPY      CORONARY ARTERY BYPASS GRAFT  1993    x 4    EYE SURGERY Left 2019    cataract coming back    JOINT REPLACEMENT Right total knee replacement    JOINT REPLACEMENT Left 09/14/2016       Social History     Occupational History    Not on file   Tobacco Use    Smoking status: Former     Packs/day: 3.50     Years: 32.00     Pack years: 112.00     Types: Cigarettes     Quit date: 1985     Years since quittin.9    Smokeless tobacco: Never    Tobacco comments:     advised not to resume   Substance and Sexual Activity    Alcohol use: No     Alcohol/week: 0.0 standard drinks    Drug use: No    Sexual activity: Not Currently       Current Outpatient Medications   Medication Sig Dispense Refill    omeprazole (PRILOSEC) 20 MG delayed release capsule TAKE 1 CAPSULE BY MOUTH TWICE DAILY 180 capsule 1    torsemide (DEMADEX) 20 MG tablet Take 2 tabs in AM and 2 tabs in afternoon. 120 tablet 3    Continuous Blood Gluc  (DEXCOM G6 ) PABLO As needed for diabetes 1 each 0    Continuous Blood Gluc Sensor (DEXCOM G6 SENSOR) MISC Apply weekly 4 each 5    Continuous Blood Gluc Transmit (DEXCOM G6 TRANSMITTER) MISC As needed for diabetes 1 each 0    apixaban (ELIQUIS) 5 MG TABS tablet Take 1 tablet by mouth 2 times daily 60 tablet 2    dapagliflozin (FARXIGA) 5 MG tablet Take 2 tablets by mouth daily LOT [id number] EXP 2024 4 BOXES 30 tablet 1    potassium chloride (KLOR-CON M) 20 MEQ extended release tablet Take 1 tablet by mouth daily 90 tablet 1    hydrALAZINE (APRESOLINE) 10 MG tablet Take 1 tablet by mouth 3 times daily 30 tablet 2    atorvastatin (LIPITOR) 40 MG tablet TAKE 1 TABLET BY MOUTH EVERY EVENING 90 tablet 3    amiodarone (CORDARONE) 200 MG tablet TAKE 1 TABLET BY MOUTH DAILY 30 tablet 5    insulin NPH (NOVOLIN N) 100 UNIT/ML injection vial Take 50 units with breakfast and 34 units with dinner.  30 mL 3    gabapentin (NEURONTIN) 600 MG tablet TAKE UP TO 5 TABLETS BY MOUTH OVER 24 HOURS AS DIRECTED 150 tablet 11    albuterol sulfate  (90 Base) MCG/ACT inhaler INHALE 2 PUFFS INTO THE LUNGS EVERY 6 HOURS AS NEEDED FOR WHEEZING 18 g 5    Lancets MISC 1 each by Does not apply route daily Use to check glucose twice a day. One Touch brand. DX E11.9 100 each 3    blood glucose monitor kit and supplies Test 2 times a day & as needed for symptoms of irregular blood glucose. 1 kit 0    blood glucose monitor strips Test 2 times a day & as needed for symptoms of irregular blood glucose. 200 strip 3    blood glucose test strips (ASCENSIA AUTODISC VI;ONE TOUCH ULTRA TEST VI) strip four times daily 400 strip 3    INSULIN SYRINGE .5CC/29G 29G X 1/2\" 0.5 ML MISC INJECT 4 TIMES DAILY AS NEEDED 400 each 1    umeclidinium-vilanterol (ANORO ELLIPTA) 62.5-25 MCG/INH AEPB inhaler INHALE 1 PUFF INTO THE LUNGS DAILY 1 each 5    insulin regular (NOVOLIN R RELION) 100 UNIT/ML injection INJECT 20 TO 30 UNITS SUBCUTANEOUSLY THREE TIMES DAILY BEFORE MEAL(S) 30 mL 0    Nebulizers (AIRIAL COMPACT MINI NEBULIZER) MISC 1 each by Does not apply route every 6 hours as needed (wheezing or shortness of breath) 1 each 0    Respiratory Therapy Supplies (NEBULIZER/TUBING/MOUTHPIECE) KIT 1 kit by Does not apply route every 6 hours as needed (for wheezing or shortness of breath) 1 kit 1    vitamin D (ERGOCALCIFEROL) 81311 units CAPS capsule TK 1 C PO WEEKLY  2    levalbuterol (XOPENEX HFA) 45 MCG/ACT inhaler Inhale 1-2 puffs into the lungs every 4 hours as needed for Wheezing 1 Inhaler 3    aspirin 81 MG EC tablet Take 81 mg by mouth daily      isosorbide mononitrate (IMDUR) 30 MG CR tablet Take 1 tablet by mouth every morning Dr. Kate Pitt - Cardio 30 tablet 6    nitroGLYCERIN (NITROSTAT) 0.3 MG SL tablet Take one sublingual for chest pain. May repeat twice at 5 min intervals.  If not getting relief call 911. 25 tablet 3    tiotropium (SPIRIVA HANDIHALER) 18 MCG inhalation capsule Inhale 1 capsule into the lungs daily INCLUDE inhaler device 30 capsule 5    albuterol (PROVENTIL) (2.5 MG/3ML) 0.083% nebulizer solution Take 3 mLs by nebulization every 6 hours as needed for Wheezing 50 vial 3     No current facility-administered medications for this visit. Objective:   He is alert, oriented x 3, pleasant, well nourished, developed and in no acute distress. Ht 5' 8\" (1.727 m)   Wt 280 lb (127 kg)   BMI 42.57 kg/m²      Examination of the right and left knee: Inspection of the skin demonstrates a well-healed midline incision. Inspection of the soft tissues without significant swelling or erythema. The overall alignment of the knee is neutral.  There is minimal intra-articular effusion. AROM     Extension 0     Flexion  115   There no pain associated with ROM testing. Pes anserine bursa non-tender to palpation. Patellar tendon non-tender to palpation. Quadriceps tendon non-tender to palpation. Collateral ligaments non-tender to palpation. Popliteal fossa non-tender to palpation. Retro patellar crepitus is not present. There is no instability with varus/valgus stress testing in full extension or 90 degrees of flexion. There is no instability with anterior drawer testing. Extensor Mechanism is intact. Intact skin without lacerations or abrasions, no significant erythema, rashes or skin lesions. X Rays: were performed in the office today:   AP Standing, Lateral and Sunrise Left Knee: There is a left cemented knee arthroplasty present. The alignment is satisfactory. There may be some lysis around the bone cement interface of the lateral tibial plateau. AP Standing, Lateral and Sunrise Right Knee: There is a right cemented total knee arthroplasty present. The alignment is satisfactory. There are no signs of failure or loosening. Diagnosis:        ICD-10-CM    1. S/P total knee arthroplasty, right  Z96.651 XR KNEE BILATERAL STANDING     XR KNEE RIGHT (1-2 VIEWS)      2. S/P total knee arthroplasty, left  Z96.652 XR KNEE BILATERAL STANDING     XR KNEE LEFT (1-2 VIEWS)             Assessment/ Plan:     Assessment:    Clinically and radiographically stable left and right knee arthroplasty.   There may be some early loosening of his left knee tibial component. This will need to be watched closely with annual x-rays. Return sooner if symptoms develop in his left knee. Plan:  Medications-discussed the risk and benefits of prophylactic antibiotics for dental and surgical procedures. PT- A home exercise program was instructed today including ROM exercises and strengthening exercises. The patient verbalized understanding of these exercises as well as the importance of the exercise program to promote return of normal function. If pain intensifies or other problems arise you are to notify the office. Follow up- 6-12 months. Call or return to clinic if these symptoms worsen or fail to improve as anticipated.

## 2022-12-22 ENCOUNTER — OFFICE VISIT (OUTPATIENT)
Dept: FAMILY MEDICINE CLINIC | Age: 82
End: 2022-12-22
Payer: MEDICARE

## 2022-12-22 ENCOUNTER — HOSPITAL ENCOUNTER (OUTPATIENT)
Dept: WOUND CARE | Age: 82
Discharge: HOME | End: 2022-12-22

## 2022-12-22 VITALS
SYSTOLIC BLOOD PRESSURE: 150 MMHG | HEART RATE: 90 BPM | DIASTOLIC BLOOD PRESSURE: 66 MMHG | RESPIRATION RATE: 18 BRPM | TEMPERATURE: 98 F

## 2022-12-22 VITALS
HEART RATE: 71 BPM | OXYGEN SATURATION: 95 % | SYSTOLIC BLOOD PRESSURE: 134 MMHG | DIASTOLIC BLOOD PRESSURE: 72 MMHG | WEIGHT: 247 LBS | HEIGHT: 68 IN | BODY MASS INDEX: 37.44 KG/M2

## 2022-12-22 DIAGNOSIS — I70.235 ATHEROSCLEROSIS OF NATIVE ARTERIES OF RIGHT LEG WITH ULCERATION OF OTHER PART OF FOOT (HCC): ICD-10-CM

## 2022-12-22 DIAGNOSIS — G47.33 OBSTRUCTIVE SLEEP APNEA SYNDROME: ICD-10-CM

## 2022-12-22 DIAGNOSIS — L97.512 ULCER OF TOE OF RIGHT FOOT, WITH FAT LAYER EXPOSED (HCC): ICD-10-CM

## 2022-12-22 DIAGNOSIS — I42.0 DILATED CARDIOMYOPATHY (HCC): ICD-10-CM

## 2022-12-22 DIAGNOSIS — Z79.4 TYPE 2 DIABETES MELLITUS WITH DIABETIC NEPHROPATHY, WITH LONG-TERM CURRENT USE OF INSULIN (HCC): Primary | ICD-10-CM

## 2022-12-22 DIAGNOSIS — I87.2 PERIPHERAL VENOUS INSUFFICIENCY: ICD-10-CM

## 2022-12-22 DIAGNOSIS — J44.1 CHRONIC OBSTRUCTIVE PULMONARY DISEASE WITH ACUTE EXACERBATION (HCC): ICD-10-CM

## 2022-12-22 DIAGNOSIS — E11.649 UNCONTROLLED TYPE 2 DIABETES MELLITUS WITH HYPOGLYCEMIA, UNSPECIFIED HYPOGLYCEMIA COMA STATUS (HCC): ICD-10-CM

## 2022-12-22 DIAGNOSIS — S41.102A ARM WOUND, LEFT, INITIAL ENCOUNTER: ICD-10-CM

## 2022-12-22 DIAGNOSIS — E11.21 TYPE 2 DIABETES MELLITUS WITH DIABETIC NEPHROPATHY, WITH LONG-TERM CURRENT USE OF INSULIN (HCC): Primary | ICD-10-CM

## 2022-12-22 DIAGNOSIS — E78.5 HYPERLIPIDEMIA LDL GOAL <70: ICD-10-CM

## 2022-12-22 DIAGNOSIS — I10 ESSENTIAL HYPERTENSION: ICD-10-CM

## 2022-12-22 DIAGNOSIS — I25.10 CORONARY ARTERY DISEASE INVOLVING NATIVE CORONARY ARTERY OF NATIVE HEART WITHOUT ANGINA PECTORIS: ICD-10-CM

## 2022-12-22 DIAGNOSIS — L97.412 ULCER OF RIGHT HEEL, WITH FAT LAYER EXPOSED (HCC): Primary | ICD-10-CM

## 2022-12-22 PROBLEM — I44.4 LEFT ANTERIOR FASCICULAR HEMIBLOCK: Status: RESOLVED | Noted: 2019-10-16 | Resolved: 2022-12-22

## 2022-12-22 PROBLEM — I50.43 CHF (CONGESTIVE HEART FAILURE), NYHA CLASS I, ACUTE ON CHRONIC, COMBINED (HCC): Status: RESOLVED | Noted: 2021-06-29 | Resolved: 2022-12-22

## 2022-12-22 PROBLEM — U07.1 COVID-19 VIRUS INFECTION: Status: RESOLVED | Noted: 2022-09-02 | Resolved: 2022-12-22

## 2022-12-22 PROBLEM — M79.662 PAIN OF LEFT CALF: Status: RESOLVED | Noted: 2022-06-21 | Resolved: 2022-12-22

## 2022-12-22 PROBLEM — L03.116 CELLULITIS OF LEFT THIGH: Status: RESOLVED | Noted: 2022-05-10 | Resolved: 2022-12-22

## 2022-12-22 PROBLEM — R07.9 CHEST PAIN, RULE OUT ACUTE MYOCARDIAL INFARCTION: Status: RESOLVED | Noted: 2022-09-02 | Resolved: 2022-12-22

## 2022-12-22 PROBLEM — S81.802A WOUND OF LEFT LEG: Status: RESOLVED | Noted: 2022-06-21 | Resolved: 2022-12-22

## 2022-12-22 PROBLEM — N18.31 STAGE 3A CHRONIC KIDNEY DISEASE (HCC): Status: RESOLVED | Noted: 2022-09-14 | Resolved: 2022-12-22

## 2022-12-22 PROBLEM — Z74.09 IMPAIRED MOBILITY: Status: RESOLVED | Noted: 2021-09-30 | Resolved: 2022-12-22

## 2022-12-22 PROBLEM — M25.421 EFFUSION OF RIGHT ELBOW: Status: RESOLVED | Noted: 2022-05-26 | Resolved: 2022-12-22

## 2022-12-22 PROBLEM — J96.02 ACUTE HYPERCAPNIC RESPIRATORY FAILURE (HCC): Status: RESOLVED | Noted: 2022-09-02 | Resolved: 2022-12-22

## 2022-12-22 PROBLEM — I45.10 RBBB: Status: RESOLVED | Noted: 2019-10-16 | Resolved: 2022-12-22

## 2022-12-22 LAB — HBA1C MFR BLD: 6.9 %

## 2022-12-22 PROCEDURE — 99213 OFFICE O/P EST LOW 20 MIN: CPT | Performed by: FAMILY MEDICINE

## 2022-12-22 PROCEDURE — 83036 HEMOGLOBIN GLYCOSYLATED A1C: CPT | Performed by: FAMILY MEDICINE

## 2022-12-22 PROCEDURE — 1123F ACP DISCUSS/DSCN MKR DOCD: CPT | Performed by: FAMILY MEDICINE

## 2022-12-22 PROCEDURE — 3044F HG A1C LEVEL LT 7.0%: CPT | Performed by: FAMILY MEDICINE

## 2022-12-22 PROCEDURE — 3023F SPIROM DOC REV: CPT | Performed by: FAMILY MEDICINE

## 2022-12-22 PROCEDURE — 3074F SYST BP LT 130 MM HG: CPT | Performed by: FAMILY MEDICINE

## 2022-12-22 PROCEDURE — 3078F DIAST BP <80 MM HG: CPT | Performed by: FAMILY MEDICINE

## 2022-12-22 PROCEDURE — 1036F TOBACCO NON-USER: CPT | Performed by: FAMILY MEDICINE

## 2022-12-22 PROCEDURE — G8484 FLU IMMUNIZE NO ADMIN: HCPCS | Performed by: FAMILY MEDICINE

## 2022-12-22 PROCEDURE — G8427 DOCREV CUR MEDS BY ELIG CLIN: HCPCS | Performed by: FAMILY MEDICINE

## 2022-12-22 PROCEDURE — G8417 CALC BMI ABV UP PARAM F/U: HCPCS | Performed by: FAMILY MEDICINE

## 2022-12-22 RX ORDER — LIDOCAINE HYDROCHLORIDE 20 MG/ML
JELLY TOPICAL ONCE
OUTPATIENT
Start: 2022-12-22 | End: 2022-12-22

## 2022-12-22 RX ORDER — BACITRACIN ZINC AND POLYMYXIN B SULFATE 500; 1000 [USP'U]/G; [USP'U]/G
OINTMENT TOPICAL ONCE
OUTPATIENT
Start: 2022-12-22 | End: 2022-12-22

## 2022-12-22 RX ORDER — BETAMETHASONE DIPROPIONATE 0.05 %
OINTMENT (GRAM) TOPICAL ONCE
OUTPATIENT
Start: 2022-12-22 | End: 2022-12-22

## 2022-12-22 RX ORDER — GENTAMICIN SULFATE 1 MG/G
OINTMENT TOPICAL ONCE
OUTPATIENT
Start: 2022-12-22 | End: 2022-12-22

## 2022-12-22 RX ORDER — CLOBETASOL PROPIONATE 0.5 MG/G
OINTMENT TOPICAL ONCE
OUTPATIENT
Start: 2022-12-22 | End: 2022-12-22

## 2022-12-22 RX ORDER — BLOOD-GLUCOSE SENSOR
EACH MISCELLANEOUS
Qty: 120 EACH | Refills: 5 | Status: SHIPPED | OUTPATIENT
Start: 2022-12-22

## 2022-12-22 RX ORDER — LIDOCAINE 40 MG/G
CREAM TOPICAL ONCE
OUTPATIENT
Start: 2022-12-22 | End: 2022-12-22

## 2022-12-22 RX ORDER — LIDOCAINE 50 MG/G
OINTMENT TOPICAL ONCE
Status: COMPLETED | OUTPATIENT
Start: 2022-12-22 | End: 2022-12-22

## 2022-12-22 RX ORDER — GINSENG 100 MG
CAPSULE ORAL ONCE
OUTPATIENT
Start: 2022-12-22 | End: 2022-12-22

## 2022-12-22 RX ORDER — LIDOCAINE 50 MG/G
OINTMENT TOPICAL ONCE
OUTPATIENT
Start: 2022-12-22 | End: 2022-12-22

## 2022-12-22 RX ORDER — BACITRACIN, NEOMYCIN, POLYMYXIN B 400; 3.5; 5 [USP'U]/G; MG/G; [USP'U]/G
OINTMENT TOPICAL ONCE
OUTPATIENT
Start: 2022-12-22 | End: 2022-12-22

## 2022-12-22 RX ORDER — LIDOCAINE HYDROCHLORIDE 40 MG/ML
SOLUTION TOPICAL ONCE
OUTPATIENT
Start: 2022-12-22 | End: 2022-12-22

## 2022-12-22 RX ADMIN — LIDOCAINE: 50 OINTMENT TOPICAL at 10:13

## 2022-12-22 ASSESSMENT — PAIN SCALES - GENERAL
PAINLEVEL_OUTOF10: 0
PAINLEVEL_OUTOF10: 0

## 2022-12-22 NOTE — PLAN OF CARE
NuShield Treatment Note    NAME:  Maciej Ayala OF BIRTH:  1940  MEDICAL RECORD NUMBER:  [id number]  DATE:  12/22/2022    Goal:  Patient will receive safe and proper application of skin substitute. Patient will comply with caring for dressing, offloading and reporting complications. Expiration date checked immediately prior to use. Package intact prior to use and no damage noted. Nushield was removed from protective sterile packaging by provider and applied to prepared ulcer bed. NuShield applied with notch to Top Upper Right Corner. NuShield was applied to RIGHT HEEL and affixed with steri-strips by the provider. Applied nonadherent and OPTILOCK (Secondary Dressing)   Coban or ace wrap was applied to secure graft and decrease edema. Patient/caregiver was instructed not to remove dressing and to keep it clean and dry. NuShield may be applied a total of 10 times per wound over a 12 week period. Additionally NuShield may only be used every 12 months per wound. Date of first application of NuShield for this current wound is December 1, 2022.       Application # 3 out of 10           Guidelines followed    Electronically signed by Jamas Lennox, RN on 12/22/2022 at 12:43 PM

## 2022-12-22 NOTE — PROGRESS NOTES
CHRONIC CONDITION FOLLOW-UP     Assessment and Plan:      Diagnosis Orders   1. Type 2 diabetes mellitus with diabetic nephropathy, with long-term current use of insulin (HCC)  POCT glycosylated hemoglobin (Hb A1C)    CBC with Auto Differential    Continuous Blood Gluc Sensor (DEXCOM G6 SENSOR) [facility]      2. Dilated cardiomyopathy ([address]        3. Chronic obstructive pulmonary disease with acute exacerbation ([facility]        4. Coronary artery disease involving native coronary artery of native heart without angina pectoris        5. Hyperlipidemia LDL goal <70  Lipid Panel      6. Essential hypertension  Basic Metabolic Panel      7. Uncontrolled type 2 diabetes mellitus with hypoglycemia, unspecified hypoglycemia coma status (HCC)  Continuous Blood Gluc Sensor (DEXCOM G6 SENSOR) MISC      Stable     Continue current Tx plan. Any changes marked below. INSTRUCTIONS  NEXT APPOINTMENT: Please schedule annual complete physical (30 minutes) in 4 months with Dr. George Henriquez or her NP, Ese Santos. PLEASE TAKE THIS FORM TO CHECK-OUT WINDOW TO SCHEDULE NEXT VISIT. PLEASE GET FASTING BLOODWORK DRAWN with next blood draw. Lab is on first floor in [address]. Hours Monday to Friday 6:30 AM to 4 PM. Can get with next blood work for nephrology. Subjective:      Chief Complaint   Patient presents with    Diabetes     F/u dm check     Ray Asa is an 81 y.o. male who presents for follow up    Complaints:   Lost wt, watching sugars with the Dexcom      CHART REVIEW   reports that he quit smoking about 37 years ago. His smoking use included cigarettes. He has a 112.00 pack-year smoking history.  He has never used smokeless tobacco.  Health Maintenance Due   Topic Date Due    Shingles vaccine (2 of 3) 11/28/2012    DTaP/Tdap/Td vaccine (3 - Td or Tdap) 11/28/2021    Lipids  02/25/2022    Annual Wellness Visit (AWV)  05/25/2022     Current Outpatient Medications   Medication Instructions    albuterol (PROVENTIL) 2.5 mg, Nebulization, EVERY 6 HOURS PRN    albuterol sulfate  (90 Base) MCG/ACT inhaler 2 puffs, Inhalation, EVERY 6 HOURS PRN    amiodarone (CORDARONE) 200 MG tablet TAKE 1 TABLET BY MOUTH DAILY    apixaban (ELIQUIS) 5 mg, Oral, 2 TIMES DAILY    aspirin 81 mg, Oral, DAILY    atorvastatin (LIPITOR) 40 MG tablet TAKE 1 TABLET BY MOUTH EVERY EVENING    blood glucose monitor kit and supplies Test 2 times a day & as needed for symptoms of irregular blood glucose. blood glucose monitor strips Test 2 times a day & as needed for symptoms of irregular blood glucose. blood glucose test strips (ASCENSIA AUTODISC VI;ONE TOUCH ULTRA TEST VI) strip four times daily    Continuous Blood Gluc  (DEXCOM G6 ) PABLO As needed for diabetes    Continuous Blood Gluc Sensor (DEXCOM G6 SENSOR) MISC Apply weekly    Continuous Blood Gluc Transmit (DEXCOM G6 TRANSMITTER) MISC As needed for diabetes    dapagliflozin (FARXIGA) 10 mg, Oral, DAILY, LOT [id number] EXP 07/31/2024 4 BOXES    gabapentin (NEURONTIN) 600 MG tablet TAKE UP TO 5 TABLETS BY MOUTH OVER 24 HOURS AS DIRECTED    hydrALAZINE (APRESOLINE) 10 mg, Oral, 3 TIMES DAILY    insulin NPH (NOVOLIN N) 100 UNIT/ML injection vial Take 50 units with breakfast and 34 units with dinner. insulin regular (NOVOLIN R RELION) 100 UNIT/ML injection INJECT 20 TO 30 UNITS SUBCUTANEOUSLY THREE TIMES DAILY BEFORE MEAL(S)    INSULIN SYRINGE .5CC/29G 29G X 1/2\" 0.5 ML MISC INJECT 4 TIMES DAILY AS NEEDED    isosorbide mononitrate (IMDUR) 30 mg, Oral, EVERY MORNING, Dr. Jennifer Knight - Cardio    Lancets MISC 1 each, Does not apply, DAILY, Use to check glucose twice a day. One Touch brand. DX E11.9    levalbuterol (XOPENEX HFA) 45 MCG/ACT inhaler 1-2 puffs, Inhalation, EVERY 4 HOURS PRN    Nebulizers (AIRIAL COMPACT MINI NEBULIZER) MISC 1 each, Does not apply, EVERY 6 HOURS PRN    nitroGLYCERIN (NITROSTAT) 0.3 MG SL tablet Take one sublingual for chest pain. May repeat twice at 5 min intervals. If not getting relief call 911. omeprazole (PRILOSEC) 20 MG delayed release capsule TAKE 1 CAPSULE BY MOUTH TWICE DAILY    potassium chloride (KLOR-CON M) 20 MEQ extended release tablet 20 mEq, Oral, DAILY    Respiratory Therapy Supplies (NEBULIZER/TUBING/MOUTHPIECE) KIT 1 kit, Does not apply, EVERY 6 HOURS PRN    torsemide (DEMADEX) 20 MG tablet Take 2 tabs in AM and 2 tabs in afternoon.     umeclidinium-vilanterol (ANORO ELLIPTA) 62.5-25 MCG/INH AEPB inhaler INHALE 1 PUFF INTO THE LUNGS DAILY    vitamin D (ERGOCALCIFEROL) 39700 units CAPS capsule TK 1 C PO WEEKLY     LAST LABS  Lab Results   Component Value Date    LDLCALC 92 02/25/2021     Lab Results   Component Value Date    HDL 52 02/25/2021     Lab Results   Component Value Date    TRIG 121 02/25/2021     Lab Results   Component Value Date    ALT 12 09/03/2022    AST 19 09/03/2022    ALKPHOS 128 09/03/2022    BILITOT 0.7 09/03/2022     Lab Results   Component Value Date     09/19/2022    K 4.6 09/19/2022    CREATININE 2.0 (H) 09/19/2022     Lab Results   Component Value Date    LABGLOM 32 (A) 09/19/2022    LABGLOM 32 (A) 09/14/2022    LABGLOM 45 (A) 09/05/2022     Lab Results   Component Value Date    WBC 4.9 09/19/2022    HGB 12.4 (L) 09/19/2022     (L) 09/19/2022     TSH (uIU/mL)   Date Value   02/25/2021 2.44     Lab Results   Component Value Date    GLUCOSE 198 (H) 09/19/2022     Lab Results   Component Value Date    LABA1C 8.6 09/14/2022    LABA1C 8.7 05/05/2022    LABA1C 8.1 02/01/2022     Objective:   PHYSICAL EXAM   /72 (Site: Left Upper Arm, Position: Sitting, Cuff Size: Large Adult)   Pulse 71   Ht 5' 8\" (1.727 m)   Wt 247 lb (112 kg)   SpO2 95%   BMI 37.56 kg/m²   BP Readings from Last 5 Encounters:   12/22/22 134/72   12/15/22 137/73   12/01/22 (!) 147/77   11/17/22 (!) 169/75   11/10/22 (!) 154/78     Wt Readings from Last 20 Encounters:   12/22/22 247 lb (112 kg)   12/20/22 280 lb (127 kg)   09/29/22 237 lb (107.5 kg) 09/14/22 237 lb 12.8 oz (107.9 kg)   09/05/22 241 lb 12.8 oz (109.7 kg)   07/06/22 256 lb (116.1 kg)   06/20/22 280 lb (127 kg)   06/02/22 260 lb (117.9 kg)   05/26/22 260 lb (117.9 kg)   05/22/22 260 lb (117.9 kg)   05/05/22 270 lb 15.1 oz (122.9 kg)   05/05/22 269 lb 9.6 oz (122.3 kg)   05/03/22 278 lb (126.1 kg)   02/01/22 268 lb (121.6 kg)   01/12/22 260 lb (117.9 kg)   12/20/21 280 lb (127 kg)   12/15/21 280 lb (127 kg)   11/10/21 273 lb (123.8 kg)   09/30/21 265 lb (120.2 kg)   09/17/21 270 lb (122.5 kg)      GENERAL:   well-developed, well-nourished, alert, no distress.      LUNGS:    Breathing unlabored  clear to auscultation bilaterally and good air movement  CARDIOVASC:   regular rate and rhythm  SKIN: warm and dry

## 2022-12-22 NOTE — PROGRESS NOTES
[address]   Progress Note and Procedure Note      Cyndi RECORD NUMBER:  [id number]  AGE: 81 y.o. GENDER: male  : 1940  EPISODE DATE:  2022    Subjective:     Chief Complaint   Patient presents with    Wound Check     Follow-up visit for a wound to the right foot. HISTORY of PRESENT ILLNESS HPI     Dary Briseno is a 81 y.o. male who presents today for wound/ulcer evaluation. History of Wound Context: Patient presents today for a right heel ulceration. Patient has been performing local wound care as recommended. Wound/Ulcer Pain Timing/Severity: none  Quality of pain: N/A  Severity:  0 / 10   Modifying Factors: None  Associated Signs/Symptoms: edema and drainage    Ulcer Identification:  Ulcer Type: venous    Contributing Factors: edema, venous stasis, lymphedema, diabetes, poor glucose control and obesity    Acute Wound: N/A    PAST MEDICAL HISTORY        Diagnosis Date    Acid reflux     Arm wound, left, initial encounter 2022    Skin tears    Atherosclerosis of native arteries of right leg with ulceration of other part of foot ([address] 2021    Atrial flutter ([address] 2013    converted    Bleeding stomach ulcer oct 2015    BPH (benign prostatic hyperplasia)     CAD (coronary artery disease)      angio with 2 blocked bypass and 2 patent    Cellulitis of left thigh 5/10/2022    COPD (chronic obstructive pulmonary disease) (HCC)     Diabetes mellitus type II     Edema     chronic left leg    Elevated PSA- nl 11     Hyperlipidemia     Hypertension     Ischemic cardiomyopathy     Lipoma of skin-RIGHT CHEST 2011    Obesity     Osteoarthritis     Peripheral venous insufficiency 2021    Skin tear of left forearm without complication     Significant amount of epidermal loss with bleeding.     Spinal stenosis of lumbar region with neurogenic claudication- clinically 2018       PAST SURGICAL HISTORY    Past Surgical History:   Procedure Laterality Date    CATARACT REMOVAL  ,     bilat    COLONOSCOPY      CORONARY ARTERY BYPASS GRAFT  1993    x 4    EYE SURGERY Left 2019    cataract coming back    JOINT REPLACEMENT Right total knee replacement    JOINT REPLACEMENT Left 2016       FAMILY HISTORY    Family History   Problem Relation Age of Onset    Cancer Mother         breast    Cancer Father         throat       SOCIAL HISTORY    Social History     Tobacco Use    Smoking status: Former     Packs/day: 3.50     Years: 32.00     Pack years: 112.00     Types: Cigarettes     Quit date: 1985     Years since quittin.9    Smokeless tobacco: Never    Tobacco comments:     advised not to resume   Substance Use Topics    Alcohol use: No     Alcohol/week: 0.0 standard drinks    Drug use: No       ALLERGIES    Allergies   Allergen Reactions    Entresto [Sacubitril-Valsartan] Other (See Comments)     Renal failure       MEDICATIONS    Current Outpatient Medications on File Prior to Encounter   Medication Sig Dispense Refill    Continuous Blood Gluc Sensor (DEXCOM G6 SENSOR) MISC Apply weekly 120 each 5    omeprazole (PRILOSEC) 20 MG delayed release capsule TAKE 1 CAPSULE BY MOUTH TWICE DAILY 180 capsule 1    torsemide (DEMADEX) 20 MG tablet Take 2 tabs in AM and 2 tabs in afternoon.  120 tablet 3    Continuous Blood Gluc  (DEXCOM G6 ) PABLO As needed for diabetes 1 each 0    Continuous Blood Gluc Transmit (DEXCOM G6 TRANSMITTER) MISC As needed for diabetes 1 each 0    apixaban (ELIQUIS) 5 MG TABS tablet Take 1 tablet by mouth 2 times daily 60 tablet 2    dapagliflozin (FARXIGA) 5 MG tablet Take 2 tablets by mouth daily LOT [id number] EXP 2024 4 BOXES 30 tablet 1    potassium chloride (KLOR-CON M) 20 MEQ extended release tablet Take 1 tablet by mouth daily 90 tablet 1    hydrALAZINE (APRESOLINE) 10 MG tablet Take 1 tablet by mouth 3 times daily 30 tablet 2    atorvastatin (LIPITOR) 40 MG tablet TAKE 1 TABLET BY MOUTH EVERY EVENING 90 tablet 3    amiodarone (CORDARONE) 200 MG tablet TAKE 1 TABLET BY MOUTH DAILY 30 tablet 5    insulin NPH (NOVOLIN N) 100 UNIT/ML injection vial Take 50 units with breakfast and 34 units with dinner. 30 mL 3    gabapentin (NEURONTIN) 600 MG tablet TAKE UP TO 5 TABLETS BY MOUTH OVER 24 HOURS AS DIRECTED 150 tablet 11    albuterol sulfate  (90 Base) MCG/ACT inhaler INHALE 2 PUFFS INTO THE LUNGS EVERY 6 HOURS AS NEEDED FOR WHEEZING 18 g 5    [DISCONTINUED] FEROSUL 325 (65 Fe) MG tablet TAKE 1 TABLET BY MOUTH TWICE DAILY (Patient not taking: Reported on 9/2/2022) 180 tablet 3    [DISCONTINUED] potassium chloride (KLOR-CON) 10 MEQ extended release tablet TAKE 1 TABLET BY MOUTH DAILY 90 tablet 1    [DISCONTINUED] furosemide (LASIX) 80 MG tablet TAKE 1 TABLET BY MOUTH DAILY 90 tablet 0    Lancets MISC 1 each by Does not apply route daily Use to check glucose twice a day. One Touch brand. DX E11.9 100 each 3    blood glucose monitor kit and supplies Test 2 times a day & as needed for symptoms of irregular blood glucose. 1 kit 0    blood glucose monitor strips Test 2 times a day & as needed for symptoms of irregular blood glucose.  200 strip 3    blood glucose test strips (ASCENSIA AUTODISC VI;ONE TOUCH ULTRA TEST VI) strip four times daily 400 strip 3    INSULIN SYRINGE .5CC/29G 29G X 1/2\" 0.5 ML MISC INJECT 4 TIMES DAILY AS NEEDED 400 each 1    [DISCONTINUED] Cyanocobalamin (B-12) 500 MCG TABS TAKE 1 TABLET BY MOUTH DAILY (Patient not taking: Reported on 9/2/2022) 90 tablet 1    umeclidinium-vilanterol (ANORO ELLIPTA) 62.5-25 MCG/INH AEPB inhaler INHALE 1 PUFF INTO THE LUNGS DAILY 1 each 5    insulin regular (NOVOLIN R RELION) 100 UNIT/ML injection INJECT 20 TO 30 UNITS SUBCUTANEOUSLY THREE TIMES DAILY BEFORE MEAL(S) 30 mL 0    Nebulizers (AIRIAL COMPACT MINI NEBULIZER) MISC 1 each by Does not apply route every 6 hours as needed (wheezing or shortness of breath) 1 each 0    Respiratory Therapy Supplies (NEBULIZER/TUBING/MOUTHPIECE) KIT 1 kit by Does not apply route every 6 hours as needed (for wheezing or shortness of breath) 1 kit 1    albuterol (PROVENTIL) (2.5 MG/3ML) 0.083% nebulizer solution Take 3 mLs by nebulization every 6 hours as needed for Wheezing 50 vial 3    vitamin D (ERGOCALCIFEROL) 79704 units CAPS capsule TK 1 C PO WEEKLY  2    levalbuterol (XOPENEX HFA) 45 MCG/ACT inhaler Inhale 1-2 puffs into the lungs every 4 hours as needed for Wheezing 1 Inhaler 3    aspirin 81 MG EC tablet Take 81 mg by mouth daily      [DISCONTINUED] metoprolol (LOPRESSOR) 25 MG tablet Take 25 mg by mouth daily       isosorbide mononitrate (IMDUR) 30 MG CR tablet Take 1 tablet by mouth every morning Dr. Raegan Ibrahim - Cardio 30 tablet 6    nitroGLYCERIN (NITROSTAT) 0.3 MG SL tablet Take one sublingual for chest pain. May repeat twice at 5 min intervals. If not getting relief call 911. 25 tablet 3     No current facility-administered medications on file prior to encounter. REVIEW OF SYSTEMS  Review of Systems    Pertinent items are noted in HPI. Review of Systems: A 12 point review of symptoms is unremarkable with the exception of the chief complaint. Patient specifically denies nausea, fever, vomiting, chills, shortness of breath, chest pain, abdominal pain, constipation or difficulty urinating. Objective:      BP (!) 150/66   Pulse 90   Temp 98 °F (36.7 °C) (Temporal)   Resp 18     Wt Readings from Last 3 Encounters:   12/22/22 247 lb (112 kg)   12/20/22 280 lb (127 kg)   09/29/22 237 lb (107.5 kg)       PHYSICAL EXAM  Physical Exam    Patient has nonpalpable pedal pulses bilaterally. He has biphasic DP PT on the left with hand-held Doppler and a monophasic on the right. Patient has extensive bilateral lower extremity edema with hypertrophic skin changes, brawny discoloration and cobbling consistent with chronic venous disease left greater than right.   Patient has absent pedal hair growth noted. Dorsalis pedis and posterior tibial pulse are monophasic on a hand-held Doppler examination. Ulceration noted on the posterior lateral aspect of the right lower extremity. Wound has fibrotic and nonviable tissue that extends down through and includes the subcutaneous tissue. After debridement the wound has a granular base. There is no surrounding erythema, edema, warmth or malodor noted. The wound does not probe or track to bone. Wound bed has significantly improved since previous examination  Epicritic sensation is grossly intact bilaterally. Negative clonus and babinski reflex is down going. Patient's muscle strength for dorsiflexion plantarflexion inversion and eversion is intact bilaterally. He does have semirigid hammertoe contractures noted bilaterally. Assessment:        Problem List Items Addressed This Visit       Ulcer of right heel, with fat layer exposed ([address] - Primary    Relevant Orders    Initiate Outpatient Wound Care Protocol    Ulcer of toe of right foot, with fat layer exposed ([address]    Relevant Orders    Initiate Outpatient Wound Care Protocol    Peripheral venous insufficiency    Relevant Orders    Initiate Outpatient Wound Care Protocol    Atherosclerosis of native arteries of right leg with ulceration of other part of foot [facility])    Relevant Orders    Initiate Outpatient Wound Care Protocol        Procedure Note  Indications:  Based on my examination of this patient's wound(s)/ulcer(s) today, debridement is required to promote healing and evaluate the wound base.     Performed by: Brayan Shipley DPM    Consent obtained:  Yes    Time out taken:  Yes    Pain Control: Anesthetic  Anesthetic: 5% Lidocaine Ointment Topical (1 cm)       Debridement: Excisional Debridement    Using #15 blade scalpel the wound(s)/ulcer(s) was/were debrided down through and including the removal of epidermis, dermis and subcutaneous tissue to prepare the wound bed for application of new shield. Devitalized Tissue Debrided:  fibrin and slough    Pre Debridement Measurements:  Are located in the [place]  Documentation Flow Sheet    Diabetic/Pressure/Non Pressure Ulcers only:  Ulcer: Non-Pressure ulcer, fat layer exposed     Wound/Ulcer #: 1    Post Debridement Measurements:  Wound/Ulcer Descriptions are Pre Debridement except measurements:    Wound 11/10/22 Heel Right;Lateral #1, ( NOTED DRAINING 10/10/22) (Active)   Wound Image   12/01/22 1028   Wound Etiology Diabetic Guzman 3 11/10/22 1030   Dressing Status New dressing applied 12/22/22 1049   Wound Cleansed Soap and water 12/22/22 1003   Dressing/Treatment Gauze dressing/dressing sponge; Cast padding;Roll gauze;Coban/self-adherent bandages 12/22/22 1049   Offloading for Diabetic Foot Ulcers Post op shoe 12/22/22 1049   Wound Length (cm) 1.6 cm 12/22/22 1003   Wound Width (cm) 1.5 cm 12/22/22 1003   Wound Depth (cm) 0.2 cm 12/22/22 1003   Wound Surface Area (cm^2) 2.4 cm^2 12/22/22 1003   Change in Wound Size % (l*w) -55.84 12/22/22 1003   Wound Volume (cm^3) 0.48 cm^3 12/22/22 1003   Wound Healing % -4 12/22/22 1003   Post-Procedure Length (cm) 1.8 cm 12/22/22 1038   Post-Procedure Width (cm) 1.7 cm 12/22/22 1038   Post-Procedure Depth (cm) 0.2 cm 12/22/22 1038   Post-Procedure Surface Area (cm^2) 3.06 cm^2 12/22/22 1038   Post-Procedure Volume (cm^3) 0.612 cm^3 12/22/22 1038   Wound Assessment Granulation tissue;Slough;Dry 12/22/22 1003   Drainage Amount Scant 12/22/22 1003   Drainage Description Brown 12/22/22 1003   Odor None 12/22/22 1003   Jaylyn-wound Assessment Maceration 12/22/22 1003   Margins Undefined edges 12/22/22 1003   Wound Thickness Description not for Pressure Injury Full thickness 12/22/22 1003   Number of days: 42          Total Surface Area Debrided:  3.06 sq cm     Estimated Blood Loss:  Minimal    Hemostasis Achieved:  by pressure    Procedural Pain:  0  / 10     Post Procedural Pain:  0 / 10 Response to treatment:  Well tolerated by patient. Procedure:  Skin Substitute Application    Performed by: Yamila Mckeon DPM    Ulcer Type: diabetic    Consent obtained: Yes    Time out taken: Yes    Product Utilized:    NuShield 2 sq/cm     Fenestrated: No    Mesher Utilized: No    Instrument(s) #15 blade scalpel    Skin Substitute was Applied to Ulcer Number(s):    Ulcer #: 1    Wound 11/10/22 Heel Right;Lateral #1, ( NOTED DRAINING 10/10/22) (Active)   Wound Image   12/01/22 1028   Wound Etiology Diabetic Guzman 3 11/10/22 1030   Dressing Status New dressing applied 12/22/22 1049   Wound Cleansed Soap and water 12/22/22 1003   Dressing/Treatment Gauze dressing/dressing sponge; Cast padding;Roll gauze;Coban/self-adherent bandages 12/22/22 1049   Offloading for Diabetic Foot Ulcers Post op shoe 12/22/22 1049   Wound Length (cm) 1.6 cm 12/22/22 1003   Wound Width (cm) 1.5 cm 12/22/22 1003   Wound Depth (cm) 0.2 cm 12/22/22 1003   Wound Surface Area (cm^2) 2.4 cm^2 12/22/22 1003   Change in Wound Size % (l*w) -55.84 12/22/22 1003   Wound Volume (cm^3) 0.48 cm^3 12/22/22 1003   Wound Healing % -4 12/22/22 1003   Post-Procedure Length (cm) 1.8 cm 12/22/22 1038   Post-Procedure Width (cm) 1.7 cm 12/22/22 1038   Post-Procedure Depth (cm) 0.2 cm 12/22/22 1038   Post-Procedure Surface Area (cm^2) 3.06 cm^2 12/22/22 1038   Post-Procedure Volume (cm^3) 0.612 cm^3 12/22/22 1038   Wound Assessment Granulation tissue;Slough;Dry 12/22/22 1003   Drainage Amount Scant 12/22/22 1003   Drainage Description Brown 12/22/22 1003   Odor None 12/22/22 1003   Jaylyn-wound Assessment Maceration 12/22/22 1003   Margins Undefined edges 12/22/22 1003   Wound Thickness Description not for Pressure Injury Full thickness 12/22/22 1003   Number of days: 42          Diabetic/Pressure/Non Pressure Ulcers:  Ulcer:   Diabetic ulcer, fat layer exposed      Total Surface Area of Ulcer(s) Covered 3.06 sq/cm    Was the Product Layered  No Amount of Product Applied 2 sq/cm     Amount of Product Wasted 0 sq/cm     Reason for Waste: N/A     Surgically Fixated: Yes    Secured With: Steri Strips and Silicone Dressing     Procedural Pain: 0/10     Post Procedural Pain: 0 / 10    Response to Treatment: Well tolerated by patient. This was the third application of Nushield to the right heel ulceration. Plan:   E/M x30 minutes     Patient was instructed to keep his dressing clean dry and intact. Patient was dispensed a surgical shoe to offload the area    Patient was instructed to suspend his heels at all times while at rest.    Ulceration was excisionally debrided. Please see the above dictation for details and findings. Treatment Note please see attached Discharge Instructions    Written patient dismissal instructions given to patient and signed by patient or POA. Reappoint 1 week  Discharge [id number] [facility] Physician Orders and Discharge [address]  [address] [facility], [address] [place] [address]  Telephone: [phone]   [address] 8:00 am - 4:30 pm and Friday 8:00 am - 12:00 pm.          NAME:  Marli Liang  YOB: 1940  MEDICAL RECORD NUMBER:  [id number]  DATE:  12/22/2022     Important Reminders:   Please wash hands with soap and water before and after every dressing change. Do not scrub wounds. Keep wounds dry in shower unless otherwise instructed by the physician. SMOKING can slow would healing. Stop smoking as soon as possible to improve healing and prevent further complications associated with smoking. Jaylyn-Wound Topical Treatments:  Do not apply lotions, creams, or ointments to wound bed unless directed.    [] Apply moisturizing lotion to skin surrounding the wound prior to dressing change.  [] Apply antifungal ointment to skin surrounding the wound prior to dressing change.  [] Apply thin include milk, yogurt, cheese, fish, lean meat and beans. If you are DIABETIC, having diabetes can make it hard for wounds to heal. Try to keep your blood sugar within it's target range. Limit Sodium, Alcohol and Sugar. Pain:   Please Note some pain, drainage and/or bleeding might be expected after seeing the provider. TO HELP ALLEVIATE PAIN WE RECOMMEND THE FOLLOWING  Elevate the affected limb. Use over the counter medications as permitted by your family doctor. For Persistent Pain not relieved by the above interventions, please notify your family doctor. Return Appointment:  [x] Return Appointment: With DR GUTIERREZ  in 1 Week(s)  [] Wound and dressing supply provider:   [] ECF or Home Healthcare:  [] Wound Assessment:         [] Physician or NP scheduled for Wound Assessment:   [] Orders placed during your visit:            : [facility]      Electronically signed by Crow Clay RN on 12/22/2022 at 10:42 AM          [address] [facility] Information: Should you experience any significant changes in your wound(s) or have questions about your wound care, please contact the [address] [place] [address] at [address] 8:00 am - 4:30 pm and Friday 8:00 am - 12:30 pm.  If you need help with your wound outside these hours and cannot wait until we are again available, contact your PCP or go to the hospital emergency room. PLEASE NOTE: IF YOU ARE UNABLE TO OBTAIN WOUND SUPPLIES, CONTINUE TO USE THE SUPPLIES YOU HAVE AVAILABLE UNTIL YOU ARE ABLE TO REACH US. IT IS MOST IMPORTANT TO KEEP THE WOUND COVERED AT ALL TIMES.      Physician Signature:_______________________     Date: ___________ Time:  ____________                                  Dr Tammy Tijerina         Electronically signed by Tammy Tijerina DPM on 12/22/2022 at 4:48 PM

## 2022-12-22 NOTE — PATIENT INSTRUCTIONS
INSTRUCTIONS  NEXT APPOINTMENT: Please schedule annual complete physical (30 minutes) in 4 months with Dr. Maricruz Sommer or her NP, Chayito Jimenez. PLEASE TAKE THIS FORM TO CHECK-OUT WINDOW TO SCHEDULE NEXT VISIT. PLEASE GET FASTING BLOODWORK DRAWN with next blood draw. Lab is on first floor in [address]. Hours Monday to Friday 6:30 AM to 4 PM. Can get with next blood work for nephrology.   Patient Education

## 2022-12-27 NOTE — DISCHARGE INSTRUCTIONS
[address] Physician Orders and Discharge [address]  [address], [address] [address]  Telephone: [phone]  [address] 8:00 am - 4:30 pm and Friday 8:00 am - 12:00 pm.          NAME:  Mary Penn  YOB: 1940  MEDICAL RECORD NUMBER:  [id number]  DATE:  12/29/2022     Important Reminders:   Please wash hands with soap and water before and after every dressing change. Do not scrub wounds. Keep wounds dry in shower unless otherwise instructed by the physician. SMOKING can slow would healing. Stop smoking as soon as possible to improve healing and prevent further complications associated with smoking. Jaylyn-Wound Topical Treatments:  Do not apply lotions, creams, or ointments to wound bed unless directed. [] Apply moisturizing lotion to skin surrounding the wound prior to dressing change.  [] Apply antifungal ointment to skin surrounding the wound prior to dressing change.  [] Apply thin film of no sting moisture barrier ointment to skin immediately around      wound. [] Other:         Wound Location: RIGHT HEEL   **NUSHIELD #4 APPLIED 12/29/2022**     Wound Cleansing: Normal Saline     Primary Dressing:  [x] India Sole AND STERI STRIPS APPLIED PER DR GUTIERREZ  []      Secondary Dressing:  [x] GAUZE, CAST PADDING, KERLIX, COBAN ( FOOTBALL DRESSING)  ROLL GAUZE         Dressing Frequency:  []   [x] Do Not Change Dressing           YOU HAVE HAD A NUSHIELD PLACED ON YOUR WOUND TODAY. FOR BEST RESULTS, WE RECOMMEND YOU LIMIT YOUR ACTIVITY FOR THE NEXT WEEK AS MUCH AS POSSIBLE. AVOID LONG PERIODS OF TIME ON YOUR FEET.  THIS ALSO INCLUDES ELEVATING YOUR LEGS AND FEET 3-4 TIMES DAILY FOR AT LEAST 20-30 MINUTES ON PILLOWS AND AT NIGHT SO THAT THEY ARE HIGHER THAN THE LEVEL OF YOUR HEART      Electronically signed by Cesar Hyde RN on 12/29/2022 at 10:58 AM         Compression JAVIER Molina on 12/29/2022 at 10:58 AM             [address] [facility] Information: Should you experience any significant changes in your wound(s) or have questions about your wound care, please contact the [address] [place] [address] at [address] 8:00 am - 4:30 pm and Friday 8:00 am - 12:30 pm.  If you need help with your wound outside these hours and cannot wait until we are again available, contact your PCP or go to the hospital emergency room. PLEASE NOTE: IF YOU ARE UNABLE TO OBTAIN WOUND SUPPLIES, CONTINUE TO USE THE SUPPLIES YOU HAVE AVAILABLE UNTIL YOU ARE ABLE TO REACH US. IT IS MOST IMPORTANT TO KEEP THE WOUND COVERED AT ALL TIMES.      Physician Signature:_______________________     Date: ___________ Time:  ____________                                  Dr Paulino Kohli

## 2022-12-29 ENCOUNTER — HOSPITAL ENCOUNTER (OUTPATIENT)
Dept: WOUND CARE | Age: 82
Discharge: HOME | End: 2022-12-29
Payer: MEDICARE

## 2022-12-29 VITALS
SYSTOLIC BLOOD PRESSURE: 112 MMHG | TEMPERATURE: 97.8 F | DIASTOLIC BLOOD PRESSURE: 57 MMHG | RESPIRATION RATE: 16 BRPM | HEART RATE: 77 BPM

## 2022-12-29 DIAGNOSIS — L97.412 ULCER OF RIGHT HEEL, WITH FAT LAYER EXPOSED (HCC): Primary | ICD-10-CM

## 2022-12-29 DIAGNOSIS — I70.235 ATHEROSCLEROSIS OF NATIVE ARTERIES OF RIGHT LEG WITH ULCERATION OF OTHER PART OF FOOT (HCC): ICD-10-CM

## 2022-12-29 DIAGNOSIS — I87.2 PERIPHERAL VENOUS INSUFFICIENCY: ICD-10-CM

## 2022-12-29 DIAGNOSIS — L97.512 ULCER OF TOE OF RIGHT FOOT, WITH FAT LAYER EXPOSED (HCC): ICD-10-CM

## 2022-12-29 PROCEDURE — 15275 SKIN SUB GRAFT FACE/NK/HF/G: CPT

## 2022-12-29 RX ORDER — LIDOCAINE HYDROCHLORIDE 20 MG/ML
JELLY TOPICAL ONCE
OUTPATIENT
Start: 2022-12-29 | End: 2022-12-29

## 2022-12-29 RX ORDER — GINSENG 100 MG
CAPSULE ORAL ONCE
OUTPATIENT
Start: 2022-12-29 | End: 2022-12-29

## 2022-12-29 RX ORDER — CLOBETASOL PROPIONATE 0.5 MG/G
OINTMENT TOPICAL ONCE
OUTPATIENT
Start: 2022-12-29 | End: 2022-12-29

## 2022-12-29 RX ORDER — BACITRACIN ZINC AND POLYMYXIN B SULFATE 500; 1000 [USP'U]/G; [USP'U]/G
OINTMENT TOPICAL ONCE
OUTPATIENT
Start: 2022-12-29 | End: 2022-12-29

## 2022-12-29 RX ORDER — LIDOCAINE HYDROCHLORIDE 40 MG/ML
SOLUTION TOPICAL ONCE
OUTPATIENT
Start: 2022-12-29 | End: 2022-12-29

## 2022-12-29 RX ORDER — GENTAMICIN SULFATE 1 MG/G
OINTMENT TOPICAL ONCE
OUTPATIENT
Start: 2022-12-29 | End: 2022-12-29

## 2022-12-29 RX ORDER — BETAMETHASONE DIPROPIONATE 0.05 %
OINTMENT (GRAM) TOPICAL ONCE
OUTPATIENT
Start: 2022-12-29 | End: 2022-12-29

## 2022-12-29 RX ORDER — LIDOCAINE 50 MG/G
OINTMENT TOPICAL ONCE
OUTPATIENT
Start: 2022-12-29 | End: 2022-12-29

## 2022-12-29 RX ORDER — LIDOCAINE 40 MG/G
CREAM TOPICAL ONCE
OUTPATIENT
Start: 2022-12-29 | End: 2022-12-29

## 2022-12-29 RX ORDER — LIDOCAINE 50 MG/G
OINTMENT TOPICAL ONCE
Status: COMPLETED | OUTPATIENT
Start: 2022-12-29 | End: 2022-12-29

## 2022-12-29 RX ORDER — BACITRACIN, NEOMYCIN, POLYMYXIN B 400; 3.5; 5 [USP'U]/G; MG/G; [USP'U]/G
OINTMENT TOPICAL ONCE
OUTPATIENT
Start: 2022-12-29 | End: 2022-12-29

## 2022-12-29 RX ADMIN — LIDOCAINE: 50 OINTMENT TOPICAL at 10:37

## 2022-12-29 ASSESSMENT — PAIN SCALES - GENERAL
PAINLEVEL_OUTOF10: 0
PAINLEVEL_OUTOF10: 0

## 2022-12-29 NOTE — PLAN OF CARE
NuShield Treatment Note    NAME:  Mansi Cohen OF BIRTH:  1940  MEDICAL RECORD NUMBER:  [id number]  DATE:  12/29/2022    Goal:  Patient will receive safe and proper application of skin substitute. Patient will comply with caring for dressing, offloading and reporting complications. Expiration date checked immediately prior to use. Package intact prior to use and no damage noted. Nushield was removed from protective sterile packaging by provider and applied to prepared ulcer bed. NuShield applied with notch to Top Upper Right Corner. NuShield was hydrated with sterile normal saline per provider. NuShield was applied to RIGHT HEEL and affixed with steri-strips by the provider. Applied nonadherent and OPTILOCK (Secondary Dressing)   Coban or ace wrap was applied to secure graft and decrease edema. Patient/caregiver was instructed not to remove dressing and to keep it clean and dry. NuShield may be applied a total of 10 times per wound over a 12 week period. Additionally NuShield may only be used every 12 months per wound. Date of first application of NuShield for this current wound is December 1, 2022.       Application # 4 out of 10           Guidelines followed    Electronically signed by Dai Chew RN on 12/29/2022 at 5:30 PM

## 2022-12-30 NOTE — DISCHARGE INSTRUCTIONS
[address]  Physician Orders   [facility]  [address], [address]  Telephone: [phone]    NAME:  Yannick Jones OF BIRTH:  1940  MEDICAL RECORD NUMBER:  [id number]  DATE:  1/5/2023    Congratulations! You have completed your treatment. Return to your Primary Care Physician for all your health issues. Resume your ordinary activities as tolerated. Take your medications as prescribed by your primary care physician. Check your skin daily for cracks, bruises, sores, or dryness. Use a moisturizer as needed. Clean and dry your skin, using mild soap and warm water (not hot). Avoid alcohol and caffeine and do not smoke. Maintain a nutritious diet. Avoid pressure on your wound site. Keep your legs elevated above the level of the heart whenever possible.       Physician Signature:______________________    Date: ___________ Time:  ____________    Dr Violet Montemayor             Electronically signed by Kate Hernandez RN on 1/5/2023 at 10:33 AM

## 2022-12-30 NOTE — PROGRESS NOTES
[address]   Progress Note and Procedure Note      C[facility] RECORD NUMBER:  [id number]  AGE: 81 y.o. GENDER: male  : 1940  EPISODE DATE:  2022    Subjective:     Chief Complaint   Patient presents with    Wound Check     F/U VISIT - RIGHT HEEL WOUND         HISTORY of PRESENT ILLNESS HPI     Mark Hodgkin is a 81 y.o. male who presents today for wound/ulcer evaluation. History of Wound Context: Patient presents today for a right heel ulceration. Patient has kept his dressing clean and intact. Wound/Ulcer Pain Timing/Severity: none  Quality of pain: N/A  Severity:  0 / 10   Modifying Factors: None  Associated Signs/Symptoms: edema and drainage    Ulcer Identification:  Ulcer Type: venous    Contributing Factors: edema, venous stasis, lymphedema, diabetes, poor glucose control and obesity    Acute Wound: N/A    PAST MEDICAL HISTORY        Diagnosis Date    Acid reflux     Arm wound, left, initial encounter 2022    Skin tears    Atherosclerosis of native arteries of right leg with ulceration of other part of foot ([address] 2021    Atrial flutter ([address]     converted    Bleeding stomach ulcer oct 2015    BPH (benign prostatic hyperplasia)     CAD (coronary artery disease)      angio with 2 blocked bypass and 2 patent    Cellulitis of left thigh 5/10/2022    COPD (chronic obstructive pulmonary disease) (HCC)     Diabetes mellitus type II     Edema     chronic left leg    Elevated PSA- nl 11     Hyperlipidemia     Hypertension     Ischemic cardiomyopathy     Lipoma of skin-RIGHT CHEST 2011    Obesity     Osteoarthritis     Peripheral venous insufficiency 2021    Skin tear of left forearm without complication 7721    Significant amount of epidermal loss with bleeding.     Spinal stenosis of lumbar region with neurogenic claudication- clinically 2018       PAST SURGICAL HISTORY    Past Surgical History: Procedure Laterality Date    CATARACT REMOVAL  2011    bilat    COLONOSCOPY      CORONARY ARTERY BYPASS GRAFT  1993    x 4    EYE SURGERY Left 2019    cataract coming back    JOINT REPLACEMENT Right total knee replacement    JOINT REPLACEMENT Left 2016       FAMILY HISTORY    Family History   Problem Relation Age of Onset    Cancer Mother         breast    Cancer Father         throat       SOCIAL HISTORY    Social History     Tobacco Use    Smoking status: Former     Packs/day: 3.50     Years: 32.00     Pack years: 112.00     Types: Cigarettes     Quit date: 1985     Years since quittin.0    Smokeless tobacco: Never    Tobacco comments:     advised not to resume   Substance Use Topics    Alcohol use: No     Alcohol/week: 0.0 standard drinks    Drug use: No       ALLERGIES    Allergies   Allergen Reactions    Entresto [Sacubitril-Valsartan] Other (See Comments)     Renal failure       MEDICATIONS    Current Outpatient Medications on File Prior to Encounter   Medication Sig Dispense Refill    Continuous Blood Gluc Sensor (DEXCOM G6 SENSOR) MISC Apply weekly 120 each 5    omeprazole (PRILOSEC) 20 MG delayed release capsule TAKE 1 CAPSULE BY MOUTH TWICE DAILY 180 capsule 1    torsemide (DEMADEX) 20 MG tablet Take 2 tabs in AM and 2 tabs in afternoon.  120 tablet 3    Continuous Blood Gluc  (DEXCOM G6 ) PABLO As needed for diabetes 1 each 0    Continuous Blood Gluc Transmit (DEXCOM G6 TRANSMITTER) MISC As needed for diabetes 1 each 0    apixaban (ELIQUIS) 5 MG TABS tablet Take 1 tablet by mouth 2 times daily 60 tablet 2    dapagliflozin (FARXIGA) 5 MG tablet Take 2 tablets by mouth daily LOT EH1940 EXP 2024 4 BOXES 30 tablet 1    potassium chloride (KLOR-CON M) 20 MEQ extended release tablet Take 1 tablet by mouth daily 90 tablet 1    hydrALAZINE (APRESOLINE) 10 MG tablet Take 1 tablet by mouth 3 times daily 30 tablet 2    atorvastatin (LIPITOR) 40 MG tablet TAKE 1 TABLET BY MOUTH EVERY EVENING 90 tablet 3    amiodarone (CORDARONE) 200 MG tablet TAKE 1 TABLET BY MOUTH DAILY 30 tablet 5    insulin NPH (NOVOLIN N) 100 UNIT/ML injection vial Take 50 units with breakfast and 34 units with dinner. 30 mL 3    gabapentin (NEURONTIN) 600 MG tablet TAKE UP TO 5 TABLETS BY MOUTH OVER 24 HOURS AS DIRECTED 150 tablet 11    albuterol sulfate  (90 Base) MCG/ACT inhaler INHALE 2 PUFFS INTO THE LUNGS EVERY 6 HOURS AS NEEDED FOR WHEEZING 18 g 5    [DISCONTINUED] FEROSUL 325 (65 Fe) MG tablet TAKE 1 TABLET BY MOUTH TWICE DAILY (Patient not taking: Reported on 9/2/2022) 180 tablet 3    [DISCONTINUED] potassium chloride (KLOR-CON) 10 MEQ extended release tablet TAKE 1 TABLET BY MOUTH DAILY 90 tablet 1    [DISCONTINUED] furosemide (LASIX) 80 MG tablet TAKE 1 TABLET BY MOUTH DAILY 90 tablet 0    Lancets MISC 1 each by Does not apply route daily Use to check glucose twice a day. One Touch brand. DX E11.9 100 each 3    blood glucose monitor kit and supplies Test 2 times a day & as needed for symptoms of irregular blood glucose. 1 kit 0    blood glucose monitor strips Test 2 times a day & as needed for symptoms of irregular blood glucose.  200 strip 3    blood glucose test strips (ASCENSIA AUTODISC VI;ONE TOUCH ULTRA TEST VI) strip four times daily 400 strip 3    INSULIN SYRINGE .5CC/29G 29G X 1/2\" 0.5 ML MISC INJECT 4 TIMES DAILY AS NEEDED 400 each 1    [DISCONTINUED] Cyanocobalamin (B-12) 500 MCG TABS TAKE 1 TABLET BY MOUTH DAILY (Patient not taking: Reported on 9/2/2022) 90 tablet 1    umeclidinium-vilanterol (ANORO ELLIPTA) 62.5-25 MCG/INH AEPB inhaler INHALE 1 PUFF INTO THE LUNGS DAILY 1 each 5    insulin regular (NOVOLIN R RELION) 100 UNIT/ML injection INJECT 20 TO 30 UNITS SUBCUTANEOUSLY THREE TIMES DAILY BEFORE MEAL(S) 30 mL 0    Nebulizers (AIRIAL COMPACT MINI NEBULIZER) MISC 1 each by Does not apply route every 6 hours as needed (wheezing or shortness of breath) 1 each 0    Respiratory Therapy Supplies (NEBULIZER/TUBING/MOUTHPIECE) KIT 1 kit by Does not apply route every 6 hours as needed (for wheezing or shortness of breath) 1 kit 1    albuterol (PROVENTIL) (2.5 MG/3ML) 0.083% nebulizer solution Take 3 mLs by nebulization every 6 hours as needed for Wheezing 50 vial 3    vitamin D (ERGOCALCIFEROL) 42892 units CAPS capsule TK 1 C PO WEEKLY  2    levalbuterol (XOPENEX HFA) 45 MCG/ACT inhaler Inhale 1-2 puffs into the lungs every 4 hours as needed for Wheezing 1 Inhaler 3    aspirin 81 MG EC tablet Take 81 mg by mouth daily      [DISCONTINUED] metoprolol (LOPRESSOR) 25 MG tablet Take 25 mg by mouth daily       isosorbide mononitrate (IMDUR) 30 MG CR tablet Take 1 tablet by mouth every morning Dr. Peter Cota - Cardio 30 tablet 6    nitroGLYCERIN (NITROSTAT) 0.3 MG SL tablet Take one sublingual for chest pain. May repeat twice at 5 min intervals. If not getting relief call 911. 25 tablet 3     No current facility-administered medications on file prior to encounter. REVIEW OF SYSTEMS  Review of Systems    Pertinent items are noted in HPI. Review of Systems: A 12 point review of symptoms is unremarkable with the exception of the chief complaint. Patient specifically denies nausea, fever, vomiting, chills, shortness of breath, chest pain, abdominal pain, constipation or difficulty urinating. Objective:      BP (!) 112/57   Pulse 77   Temp 97.8 °F (36.6 °C) (Temporal)   Resp 16     Wt Readings from Last 3 Encounters:   12/22/22 247 lb (112 kg)   12/20/22 280 lb (127 kg)   09/29/22 237 lb (107.5 kg)       PHYSICAL EXAM  Physical Exam    Patient has nonpalpable pedal pulses bilaterally. He has biphasic DP PT on the left with hand-held Doppler and a monophasic on the right. Patient has extensive bilateral lower extremity edema with hypertrophic skin changes, brawny discoloration and cobbling consistent with chronic venous disease left greater than right.   Patient has absent pedal hair growth noted.  Dorsalis pedis and posterior tibial pulse are monophasic on a hand-held Doppler examination. Ulceration noted on the posterior lateral aspect of the right lower extremity. Wound has fibrotic and nonviable tissue that extends down through and includes the subcutaneous tissue. After debridement the wound has a granular base. There is no surrounding erythema, edema, warmth or malodor noted. The wound does not probe or track to bone. Wound bed has significantly improved since previous examination  Epicritic sensation is grossly intact bilaterally. Negative clonus and babinski reflex is down going. Patient's muscle strength for dorsiflexion plantarflexion inversion and eversion is intact bilaterally. He does have semirigid hammertoe contractures noted bilaterally. Assessment:        Problem List Items Addressed This Visit       Ulcer of right heel, with fat layer exposed ([address] - Primary    Relevant Orders    Initiate Outpatient Wound Care Protocol    Ulcer of toe of right foot, with fat layer exposed ([address]    Relevant Orders    Initiate Outpatient Wound Care Protocol    Peripheral venous insufficiency    Relevant Orders    Initiate Outpatient Wound Care Protocol    Atherosclerosis of native arteries of right leg with ulceration of other part of foot [facility])    Relevant Orders    Initiate Outpatient Wound Care Protocol        Procedure Note  Indications:  Based on my examination of this patient's wound(s)/ulcer(s) today, debridement is required to promote healing and evaluate the wound base. Performed by: Sherryle How, DPM    Consent obtained:  Yes    Time out taken:  Yes    Pain Control: Anesthetic  Anesthetic: 5% Lidocaine Ointment Topical (0.5 cm)       Debridement: Excisional Debridement    Using #15 blade scalpel the wound(s)/ulcer(s) was/were debrided down through and including the removal of epidermis, dermis and subcutaneous tissue to prepare the wound bed for application of new shield. Devitalized Tissue Debrided:  fibrin and slough    Pre Debridement Measurements:  Are located in the [place]  Documentation Flow Sheet    Diabetic/Pressure/Non Pressure Ulcers only:  Ulcer: Non-Pressure ulcer, fat layer exposed     Wound/Ulcer #: 1    Post Debridement Measurements:  Wound/Ulcer Descriptions are Pre Debridement except measurements:    Wound 11/10/22 Heel Right;Lateral #1, ( NOTED DRAINING 10/10/22) (Active)   Wound Image   12/01/22 1028   Wound Etiology Diabetic Guzman 3 11/10/22 1030   Dressing Status Old drainage noted 12/29/22 1023   Wound Cleansed Soap and water 12/29/22 1119   Dressing/Treatment Other (comment);Gauze dressing/dressing sponge; Cast padding;Roll gauze;Coban/self-adherent bandages 12/29/22 1119   Offloading for Diabetic Foot Ulcers Other (comment) 12/29/22 1119   Wound Length (cm) 1 cm 12/29/22 1023   Wound Width (cm) 1.3 cm 12/29/22 1023   Wound Depth (cm) 0.1 cm 12/29/22 1023   Wound Surface Area (cm^2) 1.3 cm^2 12/29/22 1023   Change in Wound Size % (l*w) 15.58 12/29/22 1023   Wound Volume (cm^3) 0.13 cm^3 12/29/22 1023   Wound Healing % 72 12/29/22 1023   Post-Procedure Length (cm) 1.2 cm 12/29/22 1056   Post-Procedure Width (cm) 1.5 cm 12/29/22 1056   Post-Procedure Depth (cm) 0.1 cm 12/29/22 1056   Post-Procedure Surface Area (cm^2) 1.8 cm^2 12/29/22 1056   Post-Procedure Volume (cm^3) 0.18 cm^3 12/29/22 1056   Wound Assessment Eschar dry 12/29/22 1023   Drainage Amount Scant 12/29/22 1023   Drainage Description Serosanguinous 12/29/22 1023   Odor None 12/29/22 1023   Jaylyn-wound Assessment Dry/flaky 12/29/22 1023   Margins Undefined edges 12/29/22 1023   Wound Thickness Description not for Pressure Injury Full thickness 12/29/22 1023   Number of days: 49          Total Surface Area Debrided:  1.8 sq cm     Estimated Blood Loss:  Minimal    Hemostasis Achieved:  by pressure    Procedural Pain:  0  / 10     Post Procedural Pain:  0 / 10     Response to treatment:  Well tolerated by patient. Procedure:  Skin Substitute Application    Performed by: Marvin Voss DPM    Ulcer Type: diabetic    Consent obtained: Yes    Time out taken: Yes    Product Utilized:    NuShield 2 sq/cm     Fenestrated: No    Mesher Utilized: No    Instrument(s) #15 blade scalpel    Skin Substitute was Applied to Ulcer Number(s):    Ulcer #: 1    Wound 11/10/22 Heel Right;Lateral #1, ( NOTED DRAINING 10/10/22) (Active)   Wound Image   12/01/22 1028   Wound Etiology Diabetic Guzman 3 11/10/22 1030   Dressing Status Old drainage noted 12/29/22 1023   Wound Cleansed Soap and water 12/29/22 1119   Dressing/Treatment Other (comment);Gauze dressing/dressing sponge; Cast padding;Roll gauze;Coban/self-adherent bandages 12/29/22 1119   Offloading for Diabetic Foot Ulcers Other (comment) 12/29/22 1119   Wound Length (cm) 1 cm 12/29/22 1023   Wound Width (cm) 1.3 cm 12/29/22 1023   Wound Depth (cm) 0.1 cm 12/29/22 1023   Wound Surface Area (cm^2) 1.3 cm^2 12/29/22 1023   Change in Wound Size % (l*w) 15.58 12/29/22 1023   Wound Volume (cm^3) 0.13 cm^3 12/29/22 1023   Wound Healing % 72 12/29/22 1023   Post-Procedure Length (cm) 1.2 cm 12/29/22 1056   Post-Procedure Width (cm) 1.5 cm 12/29/22 1056   Post-Procedure Depth (cm) 0.1 cm 12/29/22 1056   Post-Procedure Surface Area (cm^2) 1.8 cm^2 12/29/22 1056   Post-Procedure Volume (cm^3) 0.18 cm^3 12/29/22 1056   Wound Assessment Eschar dry 12/29/22 1023   Drainage Amount Scant 12/29/22 1023   Drainage Description Serosanguinous 12/29/22 1023   Odor None 12/29/22 1023   Jaylyn-wound Assessment Dry/flaky 12/29/22 1023   Margins Undefined edges 12/29/22 1023   Wound Thickness Description not for Pressure Injury Full thickness 12/29/22 1023   Number of days: 49          Diabetic/Pressure/Non Pressure Ulcers:  Ulcer:   Diabetic ulcer, fat layer exposed      Total Surface Area of Ulcer(s) Covered 1.8 sq/cm    Was the Product Layered  No     Amount of Product Applied 2 sq/cm Amount of Product Wasted 0 sq/cm     Reason for Waste: N/A     Surgically Fixated: Yes    Secured With: Steri Strips and Silicone Dressing     Procedural Pain: 0/10     Post Procedural Pain: 0 / 10    Response to Treatment: Well tolerated by patient. This was the third application of Nushield to the right heel ulceration. Plan:   E/M x30 minutes     Patient was instructed to keep his dressing clean dry and intact. Patient was dispensed a surgical shoe to offload the area    Patient was instructed to suspend his heels at all times while at rest.    Ulceration was excisionally debrided. Please see the above dictation for details and findings. Treatment Note please see attached Discharge Instructions    Written patient dismissal instructions given to patient and signed by patient or POA. Reappoint 1 week  Discharge [address] [place] [address] Physician Orders and Discharge [place] [address]  [address] [address]  Telephone: [phone]  [address] 8:00 am - 4:30 pm and Friday 8:00 am - 12:00 pm.          NAME:  Ju Ritter  YOB: 1940  MEDICAL RECORD NUMBER:  [id number]  DATE:  12/29/2022     Important Reminders:   Please wash hands with soap and water before and after every dressing change. Do not scrub wounds. Keep wounds dry in shower unless otherwise instructed by the physician. SMOKING can slow would healing. Stop smoking as soon as possible to improve healing and prevent further complications associated with smoking. Jaylyn-Wound Topical Treatments:  Do not apply lotions, creams, or ointments to wound bed unless directed.    [] Apply moisturizing lotion to skin surrounding the wound prior to dressing change.  [] Apply antifungal ointment to skin surrounding the wound prior to dressing change.  [] Apply thin film of no sting moisture barrier ointment to skin immediately around      wound. [] Other:         Wound Location: RIGHT HEEL   **NUSHIELD #4 APPLIED 12/29/2022**     Wound Cleansing: Normal Saline     Primary Dressing:  [x] Laddie Jacinta AND STERI STRIPS APPLIED PER DR GUTIERREZ  []      Secondary Dressing:  [x] GAUZE, CAST PADDING, KERLIX, COBAN ( FOOTBALL DRESSING)  ROLL GAUZE         Dressing Frequency:  []   [x] Do Not Change Dressing           YOU HAVE HAD A NUSHIELD PLACED ON YOUR WOUND TODAY. FOR BEST RESULTS, WE RECOMMEND YOU LIMIT YOUR ACTIVITY FOR THE NEXT WEEK AS MUCH AS POSSIBLE. AVOID LONG PERIODS OF TIME ON YOUR FEET. THIS ALSO INCLUDES ELEVATING YOUR LEGS AND FEET 3-4 TIMES DAILY FOR AT LEAST 20-30 MINUTES ON PILLOWS AND AT NIGHT SO THAT THEY ARE HIGHER THAN THE LEVEL OF YOUR HEART      Electronically signed by Meggan Arambula RN on 12/29/2022 at 10:58 AM         Compression and Edema Control:  [] Wear Home Compression Stockings   [x] Spandagrip to: Right Leg   Size: [x]Low compression 5-10 mm/Hg                 []Medium compression 10-20 mm/Hg           []High compression  20-30 mm/Hg  [] Ace Wrap Toes to Knee to    [] Multilayer Compression Wrap:  to               Do not get leg(s) with wrap wet. If wraps become too tight call the center or completely remove the wrap. Pressure Relief and Off Loading:  [] Off-loading when   [] walking       [] in bed         [] sitting   Turn every 2 hours when in bed             Avoid putting direct pressure on the site of the wound. Limit side lying to 30 degree tilt. Limit elevating the head of the bed greater than 30 degrees. [x] Assistive Devices   CANE  Use as instructed by the provider        Activity: Activity as Tolerated        Dietary:   Continue your diet as tolerated. Protein is a key nutrient in helping to repair damaged tissue and promote new tissue growth.  Good sources of protein include milk, yogurt, cheese, fish, lean meat and beans. If you are DIABETIC, having diabetes can make it hard for wounds to heal. Try to keep your blood sugar within it's target range. Limit Sodium, Alcohol and Sugar. Pain:   Please Note some pain, drainage and/or bleeding might be expected after seeing the provider. TO HELP ALLEVIATE PAIN WE RECOMMEND THE FOLLOWING  Elevate the affected limb. Use over the counter medications as permitted by your family doctor. For Persistent Pain not relieved by the above interventions, please notify your family doctor. Return Appointment:  [x] Return Appointment: With DR GUTIERREZ  in 1 Week(s)  [] Wound and dressing supply provider:   [] ECF or Home Healthcare:  [] Wound Assessment:         [] Physician or NP scheduled for Wound Assessment:   [] Orders placed during your visit:            : Donna Hendrix      Electronically signed by Booker Gilliam RN on 12/29/2022 at 10:58 AM             [facility] Information: Should you experience any significant changes in your wound(s) or have questions about your wound care, please contact the [address] [place] [address] at [address] 8:00 am - 4:30 pm and Friday 8:00 am - 12:30 pm.  If you need help with your wound outside these hours and cannot wait until we are again available, contact your PCP or go to the hospital emergency room. PLEASE NOTE: IF YOU ARE UNABLE TO OBTAIN WOUND SUPPLIES, CONTINUE TO USE THE SUPPLIES YOU HAVE AVAILABLE UNTIL YOU ARE ABLE TO REACH US. IT IS MOST IMPORTANT TO KEEP THE WOUND COVERED AT ALL TIMES.      Physician Signature:_______________________     Date: ___________ Time:  ____________                                  Dr Russell Sullivan         Electronically signed by Russell Sullivan DPM on 12/29/2022 at 7:03 PM

## 2023-01-01 ENCOUNTER — HOSPITAL ENCOUNTER (INPATIENT)
Age: 83
LOS: 2 days | Discharge: HOME | DRG: 291 | End: 2023-01-03
Attending: FAMILY MEDICINE | Admitting: FAMILY MEDICINE
Payer: MEDICARE

## 2023-01-01 ENCOUNTER — APPOINTMENT (OUTPATIENT)
Dept: GENERAL RADIOLOGY | Age: 83
DRG: 291 | End: 2023-01-01
Payer: MEDICARE

## 2023-01-01 DIAGNOSIS — R09.02 HYPOXEMIA: ICD-10-CM

## 2023-01-01 DIAGNOSIS — I50.9 CHRONIC CONGESTIVE HEART FAILURE, UNSPECIFIED HEART FAILURE TYPE (HCC): ICD-10-CM

## 2023-01-01 DIAGNOSIS — J44.1 COPD EXACERBATION (HCC): Primary | ICD-10-CM

## 2023-01-01 LAB
A/G RATIO: 1.2 (ref 1.1–2.2)
ALBUMIN SERPL-MCNC: 3.4 G/DL (ref 3.4–5)
ALP BLD-CCNC: 109 U/L (ref 40–129)
ALT SERPL-CCNC: 15 U/L (ref 10–40)
ANION GAP SERPL CALCULATED.3IONS-SCNC: 6 MMOL/L (ref 3–16)
ANISOCYTOSIS: ABNORMAL
AST SERPL-CCNC: 30 U/L (ref 15–37)
BASOPHILS ABSOLUTE: 0 K/UL (ref 0–0.2)
BASOPHILS RELATIVE PERCENT: 0.8 %
BILIRUB SERPL-MCNC: 0.5 MG/DL (ref 0–1)
BUN BLDV-MCNC: 34 MG/DL (ref 7–20)
CALCIUM SERPL-MCNC: 8.8 MG/DL (ref 8.3–10.6)
CHLORIDE BLD-SCNC: 98 MMOL/L (ref 99–110)
CO2: 33 MMOL/L (ref 21–32)
CREAT SERPL-MCNC: 2.1 MG/DL (ref 0.8–1.3)
EOSINOPHILS ABSOLUTE: 0 K/UL (ref 0–0.6)
EOSINOPHILS RELATIVE PERCENT: 0.8 %
GFR SERPL CREATININE-BSD FRML MDRD: 31 ML/MIN/{1.73_M2}
GLUCOSE BLD-MCNC: 264 MG/DL (ref 70–99)
GLUCOSE BLD-MCNC: 284 MG/DL (ref 70–99)
GLUCOSE BLD-MCNC: 297 MG/DL (ref 70–99)
HCT VFR BLD CALC: 37.6 % (ref 40.5–52.5)
HEMOGLOBIN: 12.4 G/DL (ref 13.5–17.5)
LYMPHOCYTES ABSOLUTE: 0.7 K/UL (ref 1–5.1)
LYMPHOCYTES RELATIVE PERCENT: 18.6 %
MCH RBC QN AUTO: 32 PG (ref 26–34)
MCHC RBC AUTO-ENTMCNC: 33 G/DL (ref 31–36)
MCV RBC AUTO: 96.9 FL (ref 80–100)
MONOCYTES ABSOLUTE: 0.5 K/UL (ref 0–1.3)
MONOCYTES RELATIVE PERCENT: 12.9 %
NEUTROPHILS ABSOLUTE: 2.4 K/UL (ref 1.7–7.7)
NEUTROPHILS RELATIVE PERCENT: 66.9 %
PDW BLD-RTO: 16.9 % (ref 12.4–15.4)
PERFORMED ON: ABNORMAL
PERFORMED ON: ABNORMAL
PLATELET # BLD: 97 K/UL (ref 135–450)
PLATELET SLIDE REVIEW: ABNORMAL
PMV BLD AUTO: 9 FL (ref 5–10.5)
POLYCHROMASIA: ABNORMAL
POTASSIUM REFLEX MAGNESIUM: 4.2 MMOL/L (ref 3.5–5.1)
PRO-BNP: 2734 PG/ML (ref 0–449)
RAPID INFLUENZA  B AGN: NEGATIVE
RAPID INFLUENZA A AGN: NEGATIVE
RBC # BLD: 3.89 M/UL (ref 4.2–5.9)
SARS-COV-2, NAAT: NOT DETECTED
SLIDE REVIEW: ABNORMAL
SODIUM BLD-SCNC: 137 MMOL/L (ref 136–145)
TOTAL PROTEIN: 6.3 G/DL (ref 6.4–8.2)
TROPONIN: 0.02 NG/ML
WBC # BLD: 3.6 K/UL (ref 4–11)

## 2023-01-01 PROCEDURE — 6370000000 HC RX 637 (ALT 250 FOR IP): Performed by: PHYSICIAN ASSISTANT

## 2023-01-01 PROCEDURE — 6360000002 HC RX W HCPCS: Performed by: FAMILY MEDICINE

## 2023-01-01 PROCEDURE — 36415 COLL VENOUS BLD VENIPUNCTURE: CPT

## 2023-01-01 PROCEDURE — 83036 HEMOGLOBIN GLYCOSYLATED A1C: CPT

## 2023-01-01 PROCEDURE — 99285 EMERGENCY DEPT VISIT HI MDM: CPT

## 2023-01-01 PROCEDURE — 84484 ASSAY OF TROPONIN QUANT: CPT

## 2023-01-01 PROCEDURE — 83880 ASSAY OF NATRIURETIC PEPTIDE: CPT

## 2023-01-01 PROCEDURE — 6360000002 HC RX W HCPCS: Performed by: PHYSICIAN ASSISTANT

## 2023-01-01 PROCEDURE — 6370000000 HC RX 637 (ALT 250 FOR IP): Performed by: FAMILY MEDICINE

## 2023-01-01 PROCEDURE — 2580000003 HC RX 258: Performed by: FAMILY MEDICINE

## 2023-01-01 PROCEDURE — 1200000000 HC SEMI PRIVATE

## 2023-01-01 PROCEDURE — 93005 ELECTROCARDIOGRAM TRACING: CPT | Performed by: FAMILY MEDICINE

## 2023-01-01 PROCEDURE — 85025 COMPLETE CBC W/AUTO DIFF WBC: CPT

## 2023-01-01 PROCEDURE — 87804 INFLUENZA ASSAY W/OPTIC: CPT

## 2023-01-01 PROCEDURE — 94640 AIRWAY INHALATION TREATMENT: CPT

## 2023-01-01 PROCEDURE — 2700000000 HC OXYGEN THERAPY PER DAY

## 2023-01-01 PROCEDURE — 87635 SARS-COV-2 COVID-19 AMP PRB: CPT

## 2023-01-01 PROCEDURE — 80053 COMPREHEN METABOLIC PANEL: CPT

## 2023-01-01 PROCEDURE — 71046 X-RAY EXAM CHEST 2 VIEWS: CPT

## 2023-01-01 PROCEDURE — 94760 N-INVAS EAR/PLS OXIMETRY 1: CPT

## 2023-01-01 RX ORDER — GABAPENTIN 300 MG/1
1200 CAPSULE ORAL NIGHTLY
Status: DISCONTINUED | OUTPATIENT
Start: 2023-01-01 | End: 2023-01-03 | Stop reason: HOSPADM

## 2023-01-01 RX ORDER — INSULIN LISPRO 100 [IU]/ML
0-16 INJECTION, SOLUTION INTRAVENOUS; SUBCUTANEOUS
Status: DISCONTINUED | OUTPATIENT
Start: 2023-01-02 | End: 2023-01-03 | Stop reason: HOSPADM

## 2023-01-01 RX ORDER — ONDANSETRON 2 MG/ML
4 INJECTION INTRAMUSCULAR; INTRAVENOUS EVERY 6 HOURS PRN
Status: DISCONTINUED | OUTPATIENT
Start: 2023-01-01 | End: 2023-01-03 | Stop reason: HOSPADM

## 2023-01-01 RX ORDER — SODIUM CHLORIDE 0.9 % (FLUSH) 0.9 %
5-40 SYRINGE (ML) INJECTION EVERY 12 HOURS SCHEDULED
Status: DISCONTINUED | OUTPATIENT
Start: 2023-01-01 | End: 2023-01-03 | Stop reason: HOSPADM

## 2023-01-01 RX ORDER — INSULIN LISPRO 100 [IU]/ML
0-4 INJECTION, SOLUTION INTRAVENOUS; SUBCUTANEOUS NIGHTLY
Status: DISCONTINUED | OUTPATIENT
Start: 2023-01-02 | End: 2023-01-03 | Stop reason: HOSPADM

## 2023-01-01 RX ORDER — SODIUM CHLORIDE 0.9 % (FLUSH) 0.9 %
5-40 SYRINGE (ML) INJECTION PRN
Status: DISCONTINUED | OUTPATIENT
Start: 2023-01-01 | End: 2023-01-03 | Stop reason: HOSPADM

## 2023-01-01 RX ORDER — ASPIRIN 81 MG/1
81 TABLET ORAL DAILY
Status: DISCONTINUED | OUTPATIENT
Start: 2023-01-01 | End: 2023-01-03 | Stop reason: HOSPADM

## 2023-01-01 RX ORDER — DEXTROSE MONOHYDRATE 100 MG/ML
INJECTION, SOLUTION INTRAVENOUS CONTINUOUS PRN
Status: DISCONTINUED | OUTPATIENT
Start: 2023-01-01 | End: 2023-01-03 | Stop reason: HOSPADM

## 2023-01-01 RX ORDER — NITROGLYCERIN 0.4 MG/1
0.4 TABLET SUBLINGUAL EVERY 5 MIN PRN
Status: DISCONTINUED | OUTPATIENT
Start: 2023-01-01 | End: 2023-01-03 | Stop reason: HOSPADM

## 2023-01-01 RX ORDER — TORSEMIDE 20 MG/1
20 TABLET ORAL DAILY
Status: DISCONTINUED | OUTPATIENT
Start: 2023-01-01 | End: 2023-01-01

## 2023-01-01 RX ORDER — ISOSORBIDE MONONITRATE 30 MG/1
30 TABLET, EXTENDED RELEASE ORAL EVERY MORNING
Status: DISCONTINUED | OUTPATIENT
Start: 2023-01-02 | End: 2023-01-03 | Stop reason: HOSPADM

## 2023-01-01 RX ORDER — IPRATROPIUM BROMIDE AND ALBUTEROL SULFATE 2.5; .5 MG/3ML; MG/3ML
1 SOLUTION RESPIRATORY (INHALATION) ONCE
Status: COMPLETED | OUTPATIENT
Start: 2023-01-01 | End: 2023-01-01

## 2023-01-01 RX ORDER — METHYLPREDNISOLONE SODIUM SUCCINATE 40 MG/ML
40 INJECTION, POWDER, LYOPHILIZED, FOR SOLUTION INTRAMUSCULAR; INTRAVENOUS EVERY 12 HOURS
Status: DISCONTINUED | OUTPATIENT
Start: 2023-01-01 | End: 2023-01-03 | Stop reason: HOSPADM

## 2023-01-01 RX ORDER — POLYETHYLENE GLYCOL 3350 17 G/17G
17 POWDER, FOR SOLUTION ORAL DAILY PRN
Status: DISCONTINUED | OUTPATIENT
Start: 2023-01-01 | End: 2023-01-03 | Stop reason: HOSPADM

## 2023-01-01 RX ORDER — INSULIN LISPRO 100 [IU]/ML
4 INJECTION, SOLUTION INTRAVENOUS; SUBCUTANEOUS ONCE
Status: COMPLETED | OUTPATIENT
Start: 2023-01-01 | End: 2023-01-01

## 2023-01-01 RX ORDER — FUROSEMIDE 80 MG
80 TABLET ORAL 2 TIMES DAILY
Status: ON HOLD | COMMUNITY
End: 2023-01-02

## 2023-01-01 RX ORDER — AMIODARONE HYDROCHLORIDE 200 MG/1
200 TABLET ORAL DAILY
Status: DISCONTINUED | OUTPATIENT
Start: 2023-01-01 | End: 2023-01-03 | Stop reason: HOSPADM

## 2023-01-01 RX ORDER — HYDRALAZINE HYDROCHLORIDE 10 MG/1
10 TABLET, FILM COATED ORAL 3 TIMES DAILY
Status: DISCONTINUED | OUTPATIENT
Start: 2023-01-01 | End: 2023-01-03 | Stop reason: HOSPADM

## 2023-01-01 RX ORDER — PANTOPRAZOLE SODIUM 40 MG/1
40 TABLET, DELAYED RELEASE ORAL
Status: DISCONTINUED | OUTPATIENT
Start: 2023-01-02 | End: 2023-01-03 | Stop reason: HOSPADM

## 2023-01-01 RX ORDER — FUROSEMIDE 10 MG/ML
20 INJECTION INTRAMUSCULAR; INTRAVENOUS ONCE
Status: COMPLETED | OUTPATIENT
Start: 2023-01-01 | End: 2023-01-01

## 2023-01-01 RX ORDER — FUROSEMIDE 10 MG/ML
40 INJECTION INTRAMUSCULAR; INTRAVENOUS DAILY
Status: DISCONTINUED | OUTPATIENT
Start: 2023-01-02 | End: 2023-01-03 | Stop reason: HOSPADM

## 2023-01-01 RX ORDER — PREDNISONE 20 MG/1
60 TABLET ORAL ONCE
Status: COMPLETED | OUTPATIENT
Start: 2023-01-01 | End: 2023-01-01

## 2023-01-01 RX ORDER — ACETAMINOPHEN 325 MG/1
650 TABLET ORAL EVERY 6 HOURS PRN
Status: DISCONTINUED | OUTPATIENT
Start: 2023-01-01 | End: 2023-01-03 | Stop reason: HOSPADM

## 2023-01-01 RX ORDER — OMEPRAZOLE 10 MG/1
20 CAPSULE, DELAYED RELEASE ORAL
Status: DISCONTINUED | OUTPATIENT
Start: 2023-01-01 | End: 2023-01-01

## 2023-01-01 RX ORDER — ATORVASTATIN CALCIUM 40 MG/1
40 TABLET, FILM COATED ORAL NIGHTLY
Status: DISCONTINUED | OUTPATIENT
Start: 2023-01-01 | End: 2023-01-03 | Stop reason: HOSPADM

## 2023-01-01 RX ORDER — ACETAMINOPHEN 650 MG/1
650 SUPPOSITORY RECTAL EVERY 6 HOURS PRN
Status: DISCONTINUED | OUTPATIENT
Start: 2023-01-01 | End: 2023-01-03 | Stop reason: HOSPADM

## 2023-01-01 RX ORDER — IPRATROPIUM BROMIDE AND ALBUTEROL SULFATE 2.5; .5 MG/3ML; MG/3ML
1 SOLUTION RESPIRATORY (INHALATION)
Status: DISCONTINUED | OUTPATIENT
Start: 2023-01-01 | End: 2023-01-03 | Stop reason: HOSPADM

## 2023-01-01 RX ORDER — ONDANSETRON 4 MG/1
4 TABLET, ORALLY DISINTEGRATING ORAL EVERY 8 HOURS PRN
Status: DISCONTINUED | OUTPATIENT
Start: 2023-01-01 | End: 2023-01-03 | Stop reason: HOSPADM

## 2023-01-01 RX ORDER — INSULIN LISPRO 100 [IU]/ML
0-8 INJECTION, SOLUTION INTRAVENOUS; SUBCUTANEOUS
Status: DISCONTINUED | OUTPATIENT
Start: 2023-01-01 | End: 2023-01-01

## 2023-01-01 RX ORDER — INSULIN LISPRO 100 [IU]/ML
0-4 INJECTION, SOLUTION INTRAVENOUS; SUBCUTANEOUS NIGHTLY
Status: DISCONTINUED | OUTPATIENT
Start: 2023-01-01 | End: 2023-01-02 | Stop reason: SDUPTHER

## 2023-01-01 RX ORDER — SODIUM CHLORIDE 9 MG/ML
INJECTION, SOLUTION INTRAVENOUS PRN
Status: DISCONTINUED | OUTPATIENT
Start: 2023-01-01 | End: 2023-01-03 | Stop reason: HOSPADM

## 2023-01-01 RX ORDER — INSULIN GLARGINE 100 [IU]/ML
50 INJECTION, SOLUTION SUBCUTANEOUS NIGHTLY
Status: DISCONTINUED | OUTPATIENT
Start: 2023-01-01 | End: 2023-01-03 | Stop reason: HOSPADM

## 2023-01-01 RX ADMIN — FUROSEMIDE 20 MG: 10 INJECTION, SOLUTION INTRAMUSCULAR; INTRAVENOUS at 15:03

## 2023-01-01 RX ADMIN — ASPIRIN 81 MG: 81 TABLET, COATED ORAL at 20:35

## 2023-01-01 RX ADMIN — INSULIN GLARGINE 50 UNITS: 100 INJECTION, SOLUTION SUBCUTANEOUS at 22:17

## 2023-01-01 RX ADMIN — IPRATROPIUM BROMIDE AND ALBUTEROL SULFATE 1 AMPULE: .5; 3 SOLUTION RESPIRATORY (INHALATION) at 14:00

## 2023-01-01 RX ADMIN — INSULIN LISPRO 4 UNITS: 100 INJECTION, SOLUTION INTRAVENOUS; SUBCUTANEOUS at 22:17

## 2023-01-01 RX ADMIN — METHYLPREDNISOLONE SODIUM SUCCINATE 40 MG: 40 INJECTION, POWDER, FOR SOLUTION INTRAMUSCULAR; INTRAVENOUS at 20:34

## 2023-01-01 RX ADMIN — ATORVASTATIN CALCIUM 40 MG: 40 TABLET, FILM COATED ORAL at 20:36

## 2023-01-01 RX ADMIN — APIXABAN 5 MG: 5 TABLET, FILM COATED ORAL at 20:35

## 2023-01-01 RX ADMIN — GABAPENTIN 1200 MG: 300 CAPSULE ORAL at 20:36

## 2023-01-01 RX ADMIN — PREDNISONE 60 MG: 20 TABLET ORAL at 13:00

## 2023-01-01 RX ADMIN — Medication 2 PUFF: at 18:26

## 2023-01-01 RX ADMIN — Medication 10 ML: at 20:33

## 2023-01-01 RX ADMIN — HYDRALAZINE HYDROCHLORIDE 10 MG: 10 TABLET, FILM COATED ORAL at 20:35

## 2023-01-01 RX ADMIN — AMIODARONE HYDROCHLORIDE 200 MG: 200 TABLET ORAL at 20:35

## 2023-01-01 RX ADMIN — IPRATROPIUM BROMIDE AND ALBUTEROL SULFATE 1 AMPULE: .5; 3 SOLUTION RESPIRATORY (INHALATION) at 18:26

## 2023-01-01 ASSESSMENT — ENCOUNTER SYMPTOMS
COUGH: 1
SHORTNESS OF BREATH: 1

## 2023-01-01 ASSESSMENT — PAIN - FUNCTIONAL ASSESSMENT: PAIN_FUNCTIONAL_ASSESSMENT: NONE - DENIES PAIN

## 2023-01-01 NOTE — H&P
HOSPITALISTS HISTORY AND PHYSICAL    1/1/2023 6:53 PM    Patient Information:  Naveen Baptiste is a 81 y.o. male [id number]  PCP:  Venkata Sanchez MD (Tel: [phone] )    Chief complaint:    Chief Complaint   Patient presents with    Shortness of Breath     Sob increased wheezing          History of Present Illness:  Kun Morse is a 81 y.o. male with h/o COPD , CHF, CAD, CABG, Pacemaker in place, TL, Bipap, DM, HTN , presented with c/o dyspnea, chest congestion and leg edema. Reports compliance with medications. Follows up with Pulmonology dr. Jama Wells. Also has pacemaker in place. Follows up with [facility] cardiology Kannan mendosa. Last ECHO in 09/22. He does not wear O2 at baseline. Placed on 2 L here  ED work up today showed   Elevated pro bnp 2700  Chest xray suggestive of congestive heart failure  He was given breathig treatments, solumedrol and received a dose of IV Lasix       REVIEW OF SYSTEMS:   Constitutional: Negative for fever,chills or night sweats  ENT: Negative for rhinorrhea, epistaxis, hoarseness, sore throat. Respiratory: Negative for shortness of breath,wheezing  Cardiovascular: Negative for chest pain, palpitations   Gastrointestinal: Negative for nausea, vomiting, diarrhea  Genitourinary: Negative for polyuria, dysuria   Hematologic/Lymphatic: Negative for bleeding tendency, easy bruising  Musculoskeletal: Negative for myalgias and arthralgias  Neurologic: Negative for confusion,dysarthria. Skin: Negative for itching,rash  Psychiatric: Negative for depression,anxiety, agitation. Endocrine: Negative for polydipsia,polyuria,heat /cold intolerance.     Past Medical History:   has a past medical history of Acid reflux, Arm wound, left, initial encounter, Atherosclerosis of native arteries of right leg with ulceration of other part of foot ([address] Atrial flutter ([address] Bleeding stomach ulcer, BPH (benign prostatic hyperplasia), CAD (coronary artery disease), Cellulitis of left thigh, COPD (chronic obstructive pulmonary disease) ([facility] [address] Diabetes mellitus type II, Edema, Elevated PSA- nl 8/12/11, Hyperlipidemia, Hypertension, Ischemic cardiomyopathy, Lipoma of skin-RIGHT CHEST, Obesity, Osteoarthritis, Peripheral venous insufficiency, Skin tear of left forearm without complication, and Spinal stenosis of lumbar region with neurogenic claudication- clinically. Past Surgical History:   has a past surgical history that includes Coronary artery bypass graft (1993); Cataract removal (2010, 2011); Colonoscopy; joint replacement (Right, total knee replacement); joint replacement (Left, 09/14/2016); and Eye surgery (Left, 2019). Medications:  No current facility-administered medications on file prior to encounter. Current Outpatient Medications on File Prior to Encounter   Medication Sig Dispense Refill    furosemide (LASIX) 80 MG tablet Take 80 mg by mouth 2 times daily      Continuous Blood Gluc Sensor (DEXCOM G6 SENSOR) MISC Apply weekly 120 each 5    omeprazole (PRILOSEC) 20 MG delayed release capsule TAKE 1 CAPSULE BY MOUTH TWICE DAILY 180 capsule 1    torsemide (DEMADEX) 20 MG tablet Take 2 tabs in AM and 2 tabs in afternoon.  120 tablet 3    Continuous Blood Gluc  (DEXCOM G6 ) PABLO As needed for diabetes 1 each 0    Continuous Blood Gluc Transmit (DEXCOM G6 TRANSMITTER) MISC As needed for diabetes 1 each 0    apixaban (ELIQUIS) 5 MG TABS tablet Take 1 tablet by mouth 2 times daily 60 tablet 2    dapagliflozin (FARXIGA) 5 MG tablet Take 2 tablets by mouth daily LOT [id number] EXP 07/31/2024 4 BOXES 30 tablet 1    potassium chloride (KLOR-CON M) 20 MEQ extended release tablet Take 1 tablet by mouth daily 90 tablet 1    hydrALAZINE (APRESOLINE) 10 MG tablet Take 1 tablet by mouth 3 times daily 30 tablet 2    atorvastatin (LIPITOR) 40 MG tablet TAKE 1 TABLET BY MOUTH EVERY EVENING 90 tablet 3    amiodarone (CORDARONE) 200 MG tablet TAKE 1 TABLET BY MOUTH DAILY 30 tablet 5    insulin NPH (NOVOLIN N) 100 UNIT/ML injection vial Take 50 units with breakfast and 34 units with dinner. (Patient taking differently: 20 Units Take 20 units with breakfast) 30 mL 3    gabapentin (NEURONTIN) 600 MG tablet TAKE UP TO 5 TABLETS BY MOUTH OVER 24 HOURS AS DIRECTED (Patient taking differently: 1,200 mg nightly.) 150 tablet 11    albuterol sulfate  (90 Base) MCG/ACT inhaler INHALE 2 PUFFS INTO THE LUNGS EVERY 6 HOURS AS NEEDED FOR WHEEZING 18 g 5    [DISCONTINUED] FEROSUL 325 (65 Fe) MG tablet TAKE 1 TABLET BY MOUTH TWICE DAILY (Patient not taking: Reported on 9/2/2022) 180 tablet 3    [DISCONTINUED] potassium chloride (KLOR-CON) 10 MEQ extended release tablet TAKE 1 TABLET BY MOUTH DAILY 90 tablet 1    Lancets MISC 1 each by Does not apply route daily Use to check glucose twice a day. One Touch brand. DX E11.9 100 each 3    blood glucose monitor kit and supplies Test 2 times a day & as needed for symptoms of irregular blood glucose. 1 kit 0    blood glucose monitor strips Test 2 times a day & as needed for symptoms of irregular blood glucose.  200 strip 3    blood glucose test strips (ASCENSIA AUTODISC VI;ONE TOUCH ULTRA TEST VI) strip four times daily 400 strip 3    INSULIN SYRINGE .5CC/29G 29G X 1/2\" 0.5 ML MISC INJECT 4 TIMES DAILY AS NEEDED 400 each 1    [DISCONTINUED] Cyanocobalamin (B-12) 500 MCG TABS TAKE 1 TABLET BY MOUTH DAILY (Patient not taking: Reported on 9/2/2022) 90 tablet 1    insulin regular (NOVOLIN R RELION) 100 UNIT/ML injection INJECT 20 TO 30 UNITS SUBCUTANEOUSLY THREE TIMES DAILY BEFORE MEAL(S) (Patient taking differently: 30 Units INJECT 20 TO 30 UNITS with dinner) 30 mL 0    Nebulizers (AIRIAL COMPACT MINI NEBULIZER) MISC 1 each by Does not apply route every 6 hours as needed (wheezing or shortness of breath) 1 each 0    Respiratory Therapy Supplies (NEBULIZER/TUBING/MOUTHPIECE) KIT 1 kit by Does not apply route every 6 hours as needed (for wheezing or shortness of breath) 1 kit 1    albuterol (PROVENTIL) (2.5 MG/3ML) 0.083% nebulizer solution Take 3 mLs by nebulization every 6 hours as needed for Wheezing 50 vial 3    vitamin D (ERGOCALCIFEROL) 98107 units CAPS capsule TK 1 C PO WEEKLY  2    aspirin 81 MG EC tablet Take 81 mg by mouth daily      [DISCONTINUED] metoprolol (LOPRESSOR) 25 MG tablet Take 25 mg by mouth daily       isosorbide mononitrate (IMDUR) 30 MG CR tablet Take 1 tablet by mouth every morning Dr. Lyssa Figueroa - Cardio 30 tablet 6    nitroGLYCERIN (NITROSTAT) 0.3 MG SL tablet Take one sublingual for chest pain. May repeat twice at 5 min intervals.  If not getting relief call 911. 25 tablet 3     Current Facility-Administered Medications   Medication Dose Route Frequency Provider Last Rate Last Admin    ipratropium-albuterol (DUONEB) nebulizer solution 1 ampule  1 ampule Inhalation Q4H WA Flavio Jacobson MD   1 ampule at 01/01/23 1826    mometasone-formoterol (DULERA) 200-5 MCG/ACT inhaler 2 puff  2 puff Inhalation BID Flavio Jacobson MD   2 puff at 01/01/23 1826    methylPREDNISolone sodium (SOLU-MEDROL) injection 40 mg  40 mg IntraVENous Q12H Saskia Kumar MD        amiodarone (CORDARONE) tablet 200 mg  200 mg Oral Daily Flavio Jacobson MD        apixaban (ELIQUIS) tablet 5 mg  5 mg Oral BID Flavio Jacobson MD        aspirin EC tablet 81 mg  81 mg Oral Daily Saskia Kumar MD        atorvastatin (LIPITOR) tablet 40 mg  40 mg Oral Nightly Flavio Jacobson MD        hydrALAZINE (APRESOLINE) tablet 10 mg  10 mg Oral TID Flavio Jacobson MD        gabapentin (NEURONTIN) capsule 1,200 mg  1,200 mg Oral Nightly MD Antoinette Cutler ON 1/2/2023] isosorbide mononitrate (IMDUR) extended release tablet 30 mg  30 mg Oral QAM Flavio Jacobson MD        insulin NPH (HumuLIN N;NovoLIN N) injection vial 50 Units  50 Units SubCUTAneous Nightly Flavio Jacobson MD nitroGLYCERIN (NITROSTAT) SL tablet 0.4 mg  0.4 mg SubLINGual Q5 Min PRN Tena Early MD        torsemide (DEMADEX) tablet 20 mg  20 mg Oral Daily Saskia Kumar MD        dextrose bolus 10% 125 mL  125 mL IntraVENous PRN Tena Early MD        Or    dextrose bolus 10% 250 mL  250 mL IntraVENous PRN Tena Early MD        glucagon (rDNA) injection 1 mg  1 mg SubCUTAneous PRN Tena Early MD        dextrose 10 % infusion   IntraVENous Continuous PRN Saskia Kumar MD        sodium chloride flush 0.9 % injection 5-40 mL  5-40 mL IntraVENous 2 times per day Tena Early MD        sodium chloride flush 0.9 % injection 5-40 mL  5-40 mL IntraVENous PRN Tena Early MD        0.9 % sodium chloride infusion   IntraVENous PRN Tena Early MD        ondansetron (ZOFRAN-ODT) disintegrating tablet 4 mg  4 mg Oral Q8H PRN Tena Early MD        Or    ondansetron (ZOFRAN) injection 4 mg  4 mg IntraVENous Q6H PRN Tena Early MD        polyethylene glycol (GLYCOLAX) packet 17 g  17 g Oral Daily PRN Tena Earyl MD        acetaminophen (TYLENOL) tablet 650 mg  650 mg Oral Q6H PRN Tena Early MD        Or    acetaminophen (TYLENOL) suppository 650 mg  650 mg Rectal Q6H PRN Tena Early MD        insulin lispro (HUMALOG) injection vial 0-8 Units  0-8 Units SubCUTAneous TID WC Saskia Kumar MD        insulin lispro (HUMALOG) injection vial 0-4 Units  0-4 Units SubCUTAneous Nightly MD Gabe Mantilla ON 1/2/2023] pantoprazole (PROTONIX) tablet 40 mg  40 mg Oral BID AC MD Gabe Mantilla ON 1/2/2023] furosemide (LASIX) injection 40 mg  40 mg IntraVENous Daily Tena Early MD          Allergies: Allergies   Allergen Reactions    Entresto [Sacubitril-Valsartan] Other (See Comments)     Renal failure        Social History:  Patient Lives    reports that he quit smoking about 38 years ago. His smoking use included cigarettes. He has a 112.00 pack-year smoking history.  He has never used smokeless tobacco. He reports that he does not drink alcohol and does not use drugs. Family History:  family history includes Cancer in his father and mother. ,     Physical Exam:  /68   Pulse 63   Temp 97.9 °F (36.6 °C) (Oral)   Resp 20   Wt 235 lb 14.3 oz (107 kg)   SpO2 95%   BMI 35.87 kg/m²     General appearance:  Appears comfortable. Well nourished  Eyes: Sclera clear, pupils equal  ENT: Moist mucus membranes, no thrush. Trachea midline. Cardiovascular: Regular rhythm, normal S1, S2. No murmur, gallop, rub. No edema in lower extremities  Respiratory: Clear to auscultation bilaterally, no wheeze, good inspiratory effort  Gastrointestinal: Abdomen soft, non-tender, not distended, normal bowel sounds  Musculoskeletal: No cyanosis in digits, neck supple  Neurology: Cranial nerves grossly intact. Alert and oriented in time, place and person. No speech or motor deficits  Psychiatry: Appropriate affect.  Not agitated  Skin: Warm, dry, normal turgor, no rash  Brisk capillary refill, peripheral pulses palpable   Labs:  CBC:   Lab Results   Component Value Date/Time    WBC 3.6 01/01/2023 01:14 PM    RBC 3.89 01/01/2023 01:14 PM    HGB 12.4 01/01/2023 01:14 PM    HCT 37.6 01/01/2023 01:14 PM    MCV 96.9 01/01/2023 01:14 PM    MCH 32.0 01/01/2023 01:14 PM    MCHC 33.0 01/01/2023 01:14 PM    RDW 16.9 01/01/2023 01:14 PM    PLT 97 01/01/2023 01:14 PM    MPV 9.0 01/01/2023 01:14 PM     BMP:    Lab Results   Component Value Date/Time     01/01/2023 01:14 PM    K 4.2 01/01/2023 01:14 PM    CL 98 01/01/2023 01:14 PM    CO2 33 01/01/2023 01:14 PM    BUN 34 01/01/2023 01:14 PM    CREATININE 2.1 01/01/2023 01:14 PM    CALCIUM 8.8 01/01/2023 01:14 PM    GFRAA 39 09/19/2022 11:39 AM    GFRAA >60 03/12/2013 07:55 AM    LABGLOM 31 01/01/2023 01:14 PM    GLUCOSE 284 01/01/2023 01:14 PM    GLUCOSE 151 05/09/2011 08:30 AM     XR CHEST (2 VW)   Final Result   Findings suggest mild congestive heart failure           Chest Xray:   EKG: I visualized CXR images and EKG strips    Discussed case  with     Problem List  Principal Problem:    COPD exacerbation ([address]  Resolved Problems:    * No resolved hospital problems. *        Assessment/Plan:   CHF - acute on chronic systolic   - A 2D Echocardiogram on 09/22 showed EF 30-35 % . - Diureses with IV Lasix ( monitor Creatinine level)   - Low salt diet Monitoring strict I&Os and daily weights.   -pacemaker in place     COPD (acute exacerbation)   -  continue Nebulizer treatment  - IV  Solumedrol ,   taper as the patient improves. Acute respiratory failure   On 2 L O2 via NC     Type 2 DM   Cont on carb control diet   On home dose of NPH and sliding scale    TL   On Bipap    Obese BMI 36        DVT prophylaxis   Code status   Diet   IV access   Mar Catheter    Admit as inpatient/. I anticipate hospitalization spanning more than two midnights for investigation and treatment of the above medically necessary diagnoses. Please note that some part of this chart was generated using Dragon dictation software. Although every effort was made to ensure the accuracy of this automated transcription, some errors in transcription may have occurred inadvertently. If you may need any clarification, please do not hesitate to contact me through [facility].        José Robles MD    1/1/2023 6:53 PM

## 2023-01-01 NOTE — ED PROVIDER NOTES
I was not asked to see this patient. I was available for consultation if deemed necessary. I was only asked to interpret the EKG. Please see MARYLU Montemayor's note for care of the patient while in the emergency department and for final disposition. The Ekg interpreted by me shows  normal pacemaker rhythm with a rate of 64  Axis is   Left axis deviation  QTc is  within an acceptable range  Intervals and Durations are unremarkable.       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

## 2023-01-01 NOTE — ED PROVIDER NOTES
[address]        Pt Name: Particia Pope  MRN: [id number]  [place] 1940  Date of evaluation: 1/1/2023  Provider: MARYLU Dominguez  PCP: Yesenia Ojeda MD  Note Started: 1:02 PM EST 1/1/23      SANDRA. I have evaluated this patient. My supervising physician was available for consultation. CHIEF COMPLAINT       Chief Complaint   Patient presents with    Shortness of Breath     Sob increased wheezing         HISTORY OF PRESENT ILLNESS: 1 or more Elements     History from : Patient    Limitations to history : None    Patricia Pope is a 81 y.o. male who presents shortness of breath for the past 3 days. Patient does have a history of COPD and states this feels like a COPD exacerbation that he has had in the past.  Patient has also had some subjective fevers and chills but never took his temperature. He is also noticed some more fatigue lately. Patient states that he has not had any of his COVID-19 vaccines. Patient states that he has been using his COPD medication as prescribed with little relief. He notices that he has more short of breath with exertion. Patient states that he only wears oxygen at night but when he came into the emergency department today his pulse ox saturation was about 88% and was placed on nasal cannula. Patient denies any chest pain, lower leg swelling or calf pain. Patient takes Eliquis with a history of paroxysmal A. fib, stage III kidney disease, hypertension, hyperlipidemia, dilated cardiomyopathy. Patient states he is also noticed a dry cough over the last 3 days. Nursing Notes were all reviewed and agreed with or any disagreements were addressed in the HPI. REVIEW OF SYSTEMS :      Review of Systems   Constitutional:  Positive for chills, fatigue and fever. Respiratory:  Positive for cough and shortness of breath. Positives and Pertinent negatives as per HPI.      SURGICAL HISTORY     Past Surgical History:   Procedure Laterality Date    CATARACT REMOVAL  2010, 2011    [address] [facility]    x 4    EYE SURGERY Left 2019    cataract coming back    JOINT REPLACEMENT Right total knee replacement    JOINT REPLACEMENT Left 09/14/2016       CURRENTMEDICATIONS       Current Discharge Medication List        CONTINUE these medications which have NOT CHANGED    Details   furosemide (LASIX) 80 MG tablet Take 80 mg by mouth 2 times daily      Continuous Blood Gluc Sensor (DEXCOM G6 SENSOR) MISC Apply weekly  Qty: 120 each, Refills: 5    Associated Diagnoses: Type 2 diabetes mellitus with diabetic nephropathy, with long-term current use of insulin ([address] Uncontrolled type 2 diabetes mellitus with hypoglycemia, unspecified hypoglycemia coma status (HCC)      omeprazole (PRILOSEC) 20 MG delayed release capsule TAKE 1 CAPSULE BY MOUTH TWICE DAILY  Qty: 180 capsule, Refills: 1      torsemide (DEMADEX) 20 MG tablet Take 2 tabs in AM and 2 tabs in afternoon. Qty: 120 tablet, Refills: 3    Associated Diagnoses: Acute on chronic diastolic CHF (congestive heart failure) (HCC)      Continuous Blood Gluc  ([address]) PABLO As needed for diabetes  Qty: 1 each, Refills: 0    Associated Diagnoses: Type 2 diabetes mellitus with diabetic nephropathy, with long-term current use of insulin ([address] Uncontrolled type 2 diabetes mellitus with hypoglycemia, unspecified hypoglycemia coma status (HCC)      Continuous Blood Gluc Transmit (DEXCOM G6 TRANSMITTER) MISC As needed for diabetes  Qty: 1 each, Refills: 0    Associated Diagnoses: Type 2 diabetes mellitus with diabetic nephropathy, with long-term current use of insulin ([address]  Uncontrolled type 2 diabetes mellitus with hypoglycemia, unspecified hypoglycemia coma status (HCC)      apixaban (ELIQUIS) 5 MG TABS tablet Take 1 tablet by mouth 2 times daily  Qty: 60 tablet, Refills: 2      dapagliflozin (FARXIGA) 5 MG tablet Take 2 tablets by mouth daily LOT [id number] EXP 07/31/2024 4 BOXES  Qty: 30 tablet, Refills: 1      potassium chloride (KLOR-CON M) 20 MEQ extended release tablet Take 1 tablet by mouth daily  Qty: 90 tablet, Refills: 1      hydrALAZINE (APRESOLINE) 10 MG tablet Take 1 tablet by mouth 3 times daily  Qty: 30 tablet, Refills: 2      atorvastatin (LIPITOR) 40 MG tablet TAKE 1 TABLET BY MOUTH EVERY EVENING  Qty: 90 tablet, Refills: 3    Associated Diagnoses: Hyperlipidemia      amiodarone (CORDARONE) 200 MG tablet TAKE 1 TABLET BY MOUTH DAILY  Qty: 30 tablet, Refills: 5      insulin NPH (NOVOLIN N) 100 UNIT/ML injection vial Take 50 units with breakfast and 34 units with dinner. Qty: 30 mL, Refills: 3    Associated Diagnoses: Type 2 diabetes mellitus with diabetic nephropathy, with long-term current use of insulin ([facility])      gabapentin (NEURONTIN) 600 MG tablet TAKE UP TO 5 TABLETS BY MOUTH OVER 24 HOURS AS DIRECTED  Qty: 150 tablet, Refills: 11    Associated Diagnoses: Diabetic polyneuropathy associated with type 2 diabetes mellitus ([facility])      albuterol sulfate  (90 Base) MCG/ACT inhaler INHALE 2 PUFFS INTO THE LUNGS EVERY 6 HOURS AS NEEDED FOR WHEEZING  Qty: 18 g, Refills: 5      Lancets MISC 1 each by Does not apply route daily Use to check glucose twice a day. One Touch brand. DX E11.9  Qty: 100 each, Refills: 3    Associated Diagnoses: Type 2 diabetes mellitus with diabetic nephropathy, with long-term current use of insulin ([facility])      blood glucose monitor kit and supplies Test 2 times a day & as needed for symptoms of irregular blood glucose. Qty: 1 kit, Refills: 0    Associated Diagnoses: Type 2 diabetes mellitus with diabetic nephropathy, with long-term current use of insulin ([facility]      ! ! blood glucose monitor strips Test 2 times a day & as needed for symptoms of irregular blood glucose.   Qty: 200 strip, Refills: 3    Associated Diagnoses: Type 2 diabetes mellitus with diabetic nephropathy, with long-term current use of insulin ([facility]      ! ! blood glucose test strips (ASCENSIA AUTODISC VI;ONE TOUCH ULTRA TEST VI) strip four times daily  Qty: 400 strip, Refills: 3    Associated Diagnoses: Uncontrolled type 2 diabetes mellitus with hyperglycemia ([facility])      INSULIN SYRINGE .5CC/29G 29G X 1/2\" 0.5 ML MISC INJECT 4 TIMES DAILY AS NEEDED  Qty: 400 each, Refills: 1      insulin regular (NOVOLIN R RELION) 100 UNIT/ML injection INJECT 20 TO 30 UNITS SUBCUTANEOUSLY THREE TIMES DAILY BEFORE MEAL(S)  Qty: 30 mL, Refills: 0    Associated Diagnoses: Type 2 diabetes mellitus with diabetic nephropathy, with long-term current use of insulin ([facility])      Nebulizers (AIRIAL COMPACT MINI NEBULIZER) MISC 1 each by Does not apply route every 6 hours as needed (wheezing or shortness of breath)  Qty: 1 each, Refills: 0      Respiratory Therapy Supplies (NEBULIZER/TUBING/MOUTHPIECE) KIT 1 kit by Does not apply route every 6 hours as needed (for wheezing or shortness of breath)  Qty: 1 kit, Refills: 1      albuterol (PROVENTIL) (2.5 MG/3ML) 0.083% nebulizer solution Take 3 mLs by nebulization every 6 hours as needed for Wheezing  Qty: 50 vial, Refills: 3      vitamin D (ERGOCALCIFEROL) 70803 units CAPS capsule TK 1 C PO WEEKLY  Refills: 2      aspirin 81 MG EC tablet Take 81 mg by mouth daily      isosorbide mononitrate (IMDUR) 30 MG CR tablet Take 1 tablet by mouth every morning Dr. Maris Mac - Cardio  Qty: 30 tablet, Refills: 6    Associated Diagnoses: CAD (coronary artery disease)      nitroGLYCERIN (NITROSTAT) 0.3 MG SL tablet Take one sublingual for chest pain. May repeat twice at 5 min intervals. If not getting relief call 911. Qty: 25 tablet, Refills: 3    Associated Diagnoses: CAD (coronary artery disease)       ! ! - Potential duplicate medications found. Please discuss with provider.           ALLERGIES     Entresto [sacubitril-valsartan]    FAMILYHISTORY       Family History   Problem Relation Age of Onset    Cancer Mother         breast    Cancer Father         throat        SOCIAL HISTORY       Social History     Tobacco Use    Smoking status: Former     Packs/day: 3.50     Years: 32.00     Pack years: 112.00     Types: Cigarettes     Quit date: 1985     Years since quittin.0    Smokeless tobacco: Never    Tobacco comments:     advised not to resume   Substance Use Topics    Alcohol use: No     Alcohol/week: 0.0 standard drinks    Drug use: No       SCREENINGS        Rebecca Coma Scale  Eye Opening: Spontaneous  Best Verbal Response: Oriented  Best Motor Response: Obeys commands  Rebecca Coma Scale Score: 15                CIWA Assessment  BP: 137/68  Heart Rate: 63           PHYSICAL EXAM  1 or more Elements     ED Triage Vitals [23 1236]   BP Temp Temp Source Heart Rate Resp SpO2 Height Weight   (!) 146/71 98.3 °F (36.8 °C) Oral 66 18 95 % -- --       Physical Exam  Vitals and nursing note reviewed. Constitutional:       General: He is not in acute distress. Appearance: He is well-developed. He is obese. He is not ill-appearing. HENT:      Head: Normocephalic. Mouth/Throat:      Pharynx: Oropharynx is clear. Cardiovascular:      Rate and Rhythm: Normal rate and regular rhythm. Heart sounds: No murmur heard. No gallop. Pulmonary:      Effort: Pulmonary effort is normal.      Breath sounds: Decreased breath sounds, wheezing, rhonchi and rales present. Abdominal:      General: Bowel sounds are normal.      Palpations: Abdomen is soft. There is no mass. Tenderness: There is no abdominal tenderness. Musculoskeletal:      Comments: Left lower leg with baseline edema due to vein stripping that he had done in the past.  Right lower leg does not appear with pitting edema but slightly swollen per patient. Neurological:      Mental Status: He is alert and oriented to person, place, and time.    Psychiatric:         Mood and Affect: Mood normal.         Behavior: Behavior normal.           DIAGNOSTIC RESULTS LABS:    Labs Reviewed   CBC WITH AUTO DIFFERENTIAL - Abnormal; Notable for the following components:       Result Value    WBC 3.6 (*)     RBC 3.89 (*)     Hemoglobin 12.4 (*)     Hematocrit 37.6 (*)     RDW 16.9 (*)     Platelets 97 (*)     Lymphocytes Absolute 0.7 (*)     Anisocytosis Occasional (*)     Polychromasia Occasional (*)     All other components within normal limits   COMPREHENSIVE METABOLIC PANEL W/ REFLEX TO MG FOR LOW K - Abnormal; Notable for the following components:    Chloride 98 (*)     CO2 33 (*)     Glucose 284 (*)     BUN 34 (*)     Creatinine 2.1 (*)     Est, Glom Filt Rate 31 (*)     Total Protein 6.3 (*)     All other components within normal limits   TROPONIN - Abnormal; Notable for the following components:    Troponin 0.02 (*)     All other components within normal limits   BRAIN NATRIURETIC PEPTIDE - Abnormal; Notable for the following components:    Pro-BNP 2,734 (*)     All other components within normal limits   RAPID INFLUENZA A/B ANTIGENS   COVID-19, RAPID   HEMOGLOBIN A1C   CBC   BASIC METABOLIC PANEL   POCT GLUCOSE       When ordered only abnormal lab results are displayed. All other labs were within normal range or not returned as of this dictation. EKG: When ordered, EKG's are interpreted by the Emergency Department Physician in the absence of a cardiologist.  Please see their note for interpretation of EKG. RADIOLOGY:   Non-plain film images such as CT, Ultrasound and MRI are read by the radiologist. Plain radiographic images are visualized and preliminarily interpreted by the ED Provider with the below findings:        Interpretation per the Radiologist below, if available at the time of this note:    XR CHEST (2 VW)   Final Result   Findings suggest mild congestive heart failure           No results found. No results found.     PROCEDURES   Unless otherwise noted below, none     Procedures    CRITICAL CARE TIME (.cctime)   I personally saw the patient and independently provided 33 minutes of non-concurrent critical care time out of the total critical care time provided. This excludes time spent doing separately billable procedures. This includes time at the bedside, data interpretation, medication management, obtaining critical history from collateral sources if the patient is unable to provide it directly, and physician consultation. Specifics of interventions taken and potentially life-threatening diagnostic considerations are listed above in the medical decision making. 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 with ulceration of other part of foot ([facility] (9/30/2021), Atrial flutter ([facility] (2013), Bleeding stomach ulcer (oct 2015), BPH (benign prostatic hyperplasia), CAD (coronary artery disease), Cellulitis of left thigh (5/10/2022), COPD (chronic obstructive pulmonary disease) ([facility] Diabetes mellitus type II, Edema, Elevated PSA- nl 8/12/11, Hyperlipidemia, Hypertension, Ischemic cardiomyopathy, Lipoma of skin-RIGHT CHEST (5/5/2011), Obesity, Osteoarthritis, Peripheral venous insufficiency (9/30/2021), Skin tear of left forearm without complication (0/51/2226), and Spinal stenosis of lumbar region with neurogenic claudication- clinically (7/6/2018).      Chronic Conditions affecting Care:     EMERGENCY DEPARTMENT COURSE and DIFFERENTIAL DIAGNOSIS/MDM:   Vitals:    Vitals:    01/01/23 1429 01/01/23 1545 01/01/23 1623 01/01/23 1827   BP: 131/69  137/68    Pulse: 62  63    Resp: 16  19 20   Temp:   97.9 °F (36.6 °C)    TempSrc:   Oral    SpO2: 95%  90% 95%   Weight:  235 lb 14.3 oz (107 kg)         Patient was given the following medications:  Medications   ipratropium-albuterol (DUONEB) nebulizer solution 1 ampule (1 ampule Inhalation Given 1/1/23 1826)   mometasone-formoterol (DULERA) 200-5 MCG/ACT inhaler 2 puff (2 puffs Inhalation Given 1/1/23 1826)   methylPREDNISolone sodium (SOLU-MEDROL) injection 40 mg (has no administration in time range)   amiodarone (CORDARONE) tablet 200 mg (has no administration in time range)   apixaban (ELIQUIS) tablet 5 mg (has no administration in time range)   aspirin EC tablet 81 mg (has no administration in time range)   atorvastatin (LIPITOR) tablet 40 mg (has no administration in time range)   hydrALAZINE (APRESOLINE) tablet 10 mg (has no administration in time range)   gabapentin (NEURONTIN) capsule 1,200 mg (has no administration in time range)   isosorbide mononitrate (IMDUR) extended release tablet 30 mg (has no administration in time range)   insulin NPH (HumuLIN N;NovoLIN N) injection vial 50 Units (has no administration in time range)   nitroGLYCERIN (NITROSTAT) SL tablet 0.4 mg (has no administration in time range)   torsemide (DEMADEX) tablet 20 mg (has no administration in time range)   dextrose bolus 10% 125 mL (has no administration in time range)     Or   dextrose bolus 10% 250 mL (has no administration in time range)   glucagon (rDNA) injection 1 mg (has no administration in time range)   dextrose 10 % infusion (has no administration in time range)   sodium chloride flush 0.9 % injection 5-40 mL (has no administration in time range)   sodium chloride flush 0.9 % injection 5-40 mL (has no administration in time range)   0.9 % sodium chloride infusion (has no administration in time range)   ondansetron (ZOFRAN-ODT) disintegrating tablet 4 mg (has no administration in time range)     Or   ondansetron (ZOFRAN) injection 4 mg (has no administration in time range)   polyethylene glycol (GLYCOLAX) packet 17 g (has no administration in time range)   acetaminophen (TYLENOL) tablet 650 mg (has no administration in time range)     Or   acetaminophen (TYLENOL) suppository 650 mg (has no administration in time range)   insulin lispro (HUMALOG) injection vial 0-8 Units (has no administration in time range) insulin lispro (HUMALOG) injection vial 0-4 Units (has no administration in time range)   pantoprazole (PROTONIX) tablet 40 mg (has no administration in time range)   predniSONE (DELTASONE) tablet 60 mg (60 mg Oral Given 1/1/23 1300)   ipratropium-albuterol (DUONEB) nebulizer solution 1 ampule (1 ampule Inhalation Given 1/1/23 1400)   furosemide (LASIX) injection 20 mg (20 mg IntraVENous Given 1/1/23 1503)             Is this patient to be included in the SEP-1 Core Measure due to severe sepsis or septic shock? No   Exclusion criteria - the patient is NOT to be included for SEP-1 Core Measure due to:  2+ SIRS criteria are not met    CONSULTS: (Who and What was discussed)  None  Discussion with Other Profesionals : None    Social Determinants : None    Records Reviewed : None    CC/HPI Summary, DDx, ED Course, and Reassessment: Presents emergency room due to the last 3 days of having shortness of breath. History of COPD. Also worsening dyspnea on exertion. Patient states that he uses oxygen at night but not during the day on arrival he was noted to be at 88% on room air placed on nasal cannula. On exam patient is noted to be slightly short of breath and is on nasal cannula. When he speaks in full sentences he does get winded slightly. Lung exam with wheezing rhonchi and rales. Left lower leg swollen more so than the right with a history of venous stripping of the left lower leg and patient states this is at his baseline he does not feel that this is more swollen than normal.  Right lower leg slightly edematous but nonpitting. Heart exam unremarkable. Patient does not appear in acute distress and does not appear ill    Patient denies any chest pain. Patient also has chronic wound to the right foot following up with wound care    CBC with decreased white count 3.6.  Hemoglobin 12.4, platelets 97    Glucose elevated to 84 creatinine 2.1 history of chronic kidney disease stage III at baseline  BNP elevated [address] and flu were negative chest x-ray showing mild congestive heart failure     Patient will be admitted to the hospital for further evaluation and treatment of CHF, COPD exacerbation, and hypoxemia    Disposition Considerations (include 1 Tests not done, Shared Decision Making, Pt Expectation of Test or Tx.): Consider CT scan of the chest but given the patient's symptoms and history felt that CT scan was not necessary as his presentation is consistent with COPD and CHF presentation. Admitted to hospital for COPD, CHF and Hypoxemia      I am the Primary Clinician of Record. FINAL IMPRESSION      1. COPD exacerbation ([address]    2. Hypoxemia    3. Chronic congestive heart failure, unspecified heart failure type [facility])          DISPOSITION/PLAN     DISPOSITION Admitted 01/01/2023 02:54:15 PM      PATIENT REFERRED TO:  No follow-up provider specified.     DISCHARGE MEDICATIONS:  Current Discharge Medication List          DISCONTINUED MEDICATIONS:  Current Discharge Medication List        STOP taking these medications       FEROSUL 325 (65 Fe) MG tablet Comments:   Reason for Stopping:         potassium chloride (KLOR-CON) 10 MEQ extended release tablet Comments:   Reason for Stopping:         Cyanocobalamin (B-12) 500 MCG TABS Comments:   Reason for Stopping:         umeclidinium-vilanterol (ANORO ELLIPTA) 62.5-25 MCG/INH AEPB inhaler Comments:   Reason for Stopping:         levalbuterol (XOPENEX HFA) 45 MCG/ACT inhaler Comments:   Reason for Stopping:         metoprolol (LOPRESSOR) 25 MG tablet Comments:   Reason for Stopping:                      (Please note that portions of this note were completed with a voice recognition program.  Efforts were made to edit the dictations but occasionally words are mis-transcribed.)    MARYLU Dominguez (electronically signed)            MARYLU Dominguez  01/01/23 5881

## 2023-01-02 LAB
ANION GAP SERPL CALCULATED.3IONS-SCNC: 12 MMOL/L (ref 3–16)
BUN BLDV-MCNC: 37 MG/DL (ref 7–20)
CALCIUM SERPL-MCNC: 9.4 MG/DL (ref 8.3–10.6)
CHLORIDE BLD-SCNC: 98 MMOL/L (ref 99–110)
CO2: 31 MMOL/L (ref 21–32)
CREAT SERPL-MCNC: 1.9 MG/DL (ref 0.8–1.3)
EKG ATRIAL RATE: 64 BPM
EKG DIAGNOSIS: NORMAL
EKG P AXIS: 50 DEGREES
EKG P-R INTERVAL: 168 MS
EKG Q-T INTERVAL: 460 MS
EKG QRS DURATION: 170 MS
EKG QTC CALCULATION (BAZETT): 474 MS
EKG R AXIS: -80 DEGREES
EKG T AXIS: 94 DEGREES
EKG VENTRICULAR RATE: 64 BPM
ESTIMATED AVERAGE GLUCOSE: 154.2 MG/DL
GFR SERPL CREATININE-BSD FRML MDRD: 35 ML/MIN/{1.73_M2}
GLUCOSE BLD-MCNC: 289 MG/DL (ref 70–99)
GLUCOSE BLD-MCNC: 298 MG/DL (ref 70–99)
GLUCOSE BLD-MCNC: 315 MG/DL (ref 70–99)
GLUCOSE BLD-MCNC: 338 MG/DL (ref 70–99)
GLUCOSE BLD-MCNC: 363 MG/DL (ref 70–99)
HBA1C MFR BLD: 7 %
HCT VFR BLD CALC: 39.4 % (ref 40.5–52.5)
HEMOGLOBIN: 13.1 G/DL (ref 13.5–17.5)
MCH RBC QN AUTO: 31.9 PG (ref 26–34)
MCHC RBC AUTO-ENTMCNC: 33.2 G/DL (ref 31–36)
MCV RBC AUTO: 96.1 FL (ref 80–100)
PDW BLD-RTO: 16.6 % (ref 12.4–15.4)
PERFORMED ON: ABNORMAL
PLATELET # BLD: 93 K/UL (ref 135–450)
PMV BLD AUTO: 8.9 FL (ref 5–10.5)
POTASSIUM SERPL-SCNC: 4.1 MMOL/L (ref 3.5–5.1)
RBC # BLD: 4.1 M/UL (ref 4.2–5.9)
SODIUM BLD-SCNC: 141 MMOL/L (ref 136–145)
WBC # BLD: 3.4 K/UL (ref 4–11)

## 2023-01-02 PROCEDURE — 6370000000 HC RX 637 (ALT 250 FOR IP): Performed by: PHYSICIAN ASSISTANT

## 2023-01-02 PROCEDURE — 94761 N-INVAS EAR/PLS OXIMETRY MLT: CPT

## 2023-01-02 PROCEDURE — 94640 AIRWAY INHALATION TREATMENT: CPT

## 2023-01-02 PROCEDURE — 6370000000 HC RX 637 (ALT 250 FOR IP): Performed by: FAMILY MEDICINE

## 2023-01-02 PROCEDURE — 94660 CPAP INITIATION&MGMT: CPT

## 2023-01-02 PROCEDURE — 2700000000 HC OXYGEN THERAPY PER DAY

## 2023-01-02 PROCEDURE — 1200000000 HC SEMI PRIVATE

## 2023-01-02 PROCEDURE — 80048 BASIC METABOLIC PNL TOTAL CA: CPT

## 2023-01-02 PROCEDURE — 6370000000 HC RX 637 (ALT 250 FOR IP): Performed by: INTERNAL MEDICINE

## 2023-01-02 PROCEDURE — 85027 COMPLETE CBC AUTOMATED: CPT

## 2023-01-02 PROCEDURE — 6360000002 HC RX W HCPCS: Performed by: FAMILY MEDICINE

## 2023-01-02 PROCEDURE — 2580000003 HC RX 258: Performed by: FAMILY MEDICINE

## 2023-01-02 PROCEDURE — 93010 ELECTROCARDIOGRAM REPORT: CPT | Performed by: INTERNAL MEDICINE

## 2023-01-02 PROCEDURE — 36415 COLL VENOUS BLD VENIPUNCTURE: CPT

## 2023-01-02 RX ORDER — CARVEDILOL 6.25 MG/1
6.25 TABLET ORAL 2 TIMES DAILY
COMMUNITY

## 2023-01-02 RX ORDER — INSULIN LISPRO 100 [IU]/ML
5 INJECTION, SOLUTION INTRAVENOUS; SUBCUTANEOUS
Status: DISCONTINUED | OUTPATIENT
Start: 2023-01-02 | End: 2023-01-03 | Stop reason: HOSPADM

## 2023-01-02 RX ADMIN — SODIUM CHLORIDE, PRESERVATIVE FREE 10 ML: 5 INJECTION INTRAVENOUS at 18:16

## 2023-01-02 RX ADMIN — Medication 2 PUFF: at 08:14

## 2023-01-02 RX ADMIN — INSULIN LISPRO 12 UNITS: 100 INJECTION, SOLUTION INTRAVENOUS; SUBCUTANEOUS at 10:10

## 2023-01-02 RX ADMIN — HYDRALAZINE HYDROCHLORIDE 10 MG: 10 TABLET, FILM COATED ORAL at 21:23

## 2023-01-02 RX ADMIN — ISOSORBIDE MONONITRATE 30 MG: 30 TABLET, EXTENDED RELEASE ORAL at 10:08

## 2023-01-02 RX ADMIN — GABAPENTIN 1200 MG: 300 CAPSULE ORAL at 21:22

## 2023-01-02 RX ADMIN — Medication 10 ML: at 09:51

## 2023-01-02 RX ADMIN — ATORVASTATIN CALCIUM 40 MG: 40 TABLET, FILM COATED ORAL at 21:22

## 2023-01-02 RX ADMIN — INSULIN LISPRO 5 UNITS: 100 INJECTION, SOLUTION INTRAVENOUS; SUBCUTANEOUS at 18:00

## 2023-01-02 RX ADMIN — IPRATROPIUM BROMIDE AND ALBUTEROL SULFATE 1 AMPULE: .5; 3 SOLUTION RESPIRATORY (INHALATION) at 15:50

## 2023-01-02 RX ADMIN — INSULIN LISPRO 5 UNITS: 100 INJECTION, SOLUTION INTRAVENOUS; SUBCUTANEOUS at 13:38

## 2023-01-02 RX ADMIN — HYDRALAZINE HYDROCHLORIDE 10 MG: 10 TABLET, FILM COATED ORAL at 10:08

## 2023-01-02 RX ADMIN — IPRATROPIUM BROMIDE AND ALBUTEROL SULFATE 1 AMPULE: .5; 3 SOLUTION RESPIRATORY (INHALATION) at 12:20

## 2023-01-02 RX ADMIN — METHYLPREDNISOLONE SODIUM SUCCINATE 40 MG: 40 INJECTION, POWDER, FOR SOLUTION INTRAMUSCULAR; INTRAVENOUS at 07:15

## 2023-01-02 RX ADMIN — INSULIN GLARGINE 50 UNITS: 100 INJECTION, SOLUTION SUBCUTANEOUS at 21:23

## 2023-01-02 RX ADMIN — IPRATROPIUM BROMIDE AND ALBUTEROL SULFATE 1 AMPULE: .5; 3 SOLUTION RESPIRATORY (INHALATION) at 08:14

## 2023-01-02 RX ADMIN — ASPIRIN 81 MG: 81 TABLET, COATED ORAL at 10:08

## 2023-01-02 RX ADMIN — FUROSEMIDE 40 MG: 10 INJECTION, SOLUTION INTRAMUSCULAR; INTRAVENOUS at 09:52

## 2023-01-02 RX ADMIN — PANTOPRAZOLE SODIUM 40 MG: 40 TABLET, DELAYED RELEASE ORAL at 14:51

## 2023-01-02 RX ADMIN — APIXABAN 5 MG: 5 TABLET, FILM COATED ORAL at 21:23

## 2023-01-02 RX ADMIN — AMIODARONE HYDROCHLORIDE 200 MG: 200 TABLET ORAL at 10:08

## 2023-01-02 RX ADMIN — INSULIN LISPRO 16 UNITS: 100 INJECTION, SOLUTION INTRAVENOUS; SUBCUTANEOUS at 13:38

## 2023-01-02 RX ADMIN — IPRATROPIUM BROMIDE AND ALBUTEROL SULFATE 1 AMPULE: .5; 3 SOLUTION RESPIRATORY (INHALATION) at 21:36

## 2023-01-02 RX ADMIN — Medication 2 PUFF: at 21:44

## 2023-01-02 RX ADMIN — PANTOPRAZOLE SODIUM 40 MG: 40 TABLET, DELAYED RELEASE ORAL at 07:11

## 2023-01-02 RX ADMIN — INSULIN LISPRO 4 UNITS: 100 INJECTION, SOLUTION INTRAVENOUS; SUBCUTANEOUS at 21:23

## 2023-01-02 RX ADMIN — Medication 10 ML: at 21:24

## 2023-01-02 RX ADMIN — METHYLPREDNISOLONE SODIUM SUCCINATE 40 MG: 40 INJECTION, POWDER, FOR SOLUTION INTRAMUSCULAR; INTRAVENOUS at 18:16

## 2023-01-02 RX ADMIN — HYDRALAZINE HYDROCHLORIDE 10 MG: 10 TABLET, FILM COATED ORAL at 14:51

## 2023-01-02 RX ADMIN — APIXABAN 5 MG: 5 TABLET, FILM COATED ORAL at 10:08

## 2023-01-02 RX ADMIN — INSULIN LISPRO 8 UNITS: 100 INJECTION, SOLUTION INTRAVENOUS; SUBCUTANEOUS at 17:57

## 2023-01-02 NOTE — PROGRESS NOTES
Shift assessment completed. Routine vitals obtained. Scheduled medications given. Patient is awake, alert and oriented. Respirations are easy and unlabored. Patient does not appear to be in distress, resting comfortably at this time. Call light within reach. No further needs expressed.

## 2023-01-02 NOTE — PROGRESS NOTES
HOSPITALISTS PROGRESS NOTE    1/2/2023 12:34 PM        Name: Katina Stevenson . Admitted: 1/1/2023  Primary Care Provider: Katharina Mary MD (Tel: [phone])      Problem List  Principal Problem:    COPD exacerbation [facility])  Resolved Problems:    * No resolved hospital problems. *       Assessment & Plan:   COPD exacerbation  IV steroids to continue, improving DuoNebs    Steroid-induced hyperglycemia with diabetes mellitus  Premeal insulin and Lantus along with high-dose sliding scale    Congestive heart failure chronic systolic with history of CKD stage III  On once a day IV Lasix, monitor closely renal functions    Mention of atrial flutter  On amiodarone and Eliquis  IV Access: Peripheral  Mar: No  Diet: ADULT DIET; Regular; 4 carb choices (60 gm/meal); Low Sodium (2 gm)  Code:Full Code  DVT PPX Eliquis  Disposition hopefully home tomorrow      Brief Course:  Katina Stevenson is a 81 y.o. male with h/o COPD , CHF, CAD, CABG, Pacemaker in place, TL, Bipap, DM, HTN , presented with c/o dyspnea, chest congestion and leg edema. Reports compliance with medications. Follows up with Pulmonology dr. Debo Canseco. Also has pacemaker in place. Follows up with [facility] cardiology dr, Kevin Ruiz. Last ECHO in 09/22. He does not wear O2 at baseline.  Placed on 2 L here  ED work up today showed   Elevated pro bnp 2700  Chest xray suggestive of congestive heart failure  He was given breathig treatments, solumedrol and received a dose of IV Lasix          CC: Shortness of breath     Subjective:  .    patient is feeling better as compared to yesterday, patient blood sugars are in 300s range, patient mentioned that he is not wheezing that much,    Reviewed interval ancillary notes    Current Medications  insulin lispro (HUMALOG) injection vial 5 Units, TID WC  ipratropium-albuterol (DUONEB) nebulizer solution 1 ampule, Q4H WA  mometasone-formoterol (DULERA) 200-5 MCG/ACT inhaler 2 puff, BID  methylPREDNISolone sodium (SOLU-MEDROL) injection 40 mg, Q12H  amiodarone (CORDARONE) tablet 200 mg, Daily  apixaban (ELIQUIS) tablet 5 mg, BID  aspirin EC tablet 81 mg, Daily  atorvastatin (LIPITOR) tablet 40 mg, Nightly  hydrALAZINE (APRESOLINE) tablet 10 mg, TID  gabapentin (NEURONTIN) capsule 1,200 mg, Nightly  isosorbide mononitrate (IMDUR) extended release tablet 30 mg, QAM  nitroGLYCERIN (NITROSTAT) SL tablet 0.4 mg, Q5 Min PRN  dextrose bolus 10% 125 mL, PRN   Or  dextrose bolus 10% 250 mL, PRN  glucagon (rDNA) injection 1 mg, PRN  dextrose 10 % infusion, Continuous PRN  sodium chloride flush 0.9 % injection 5-40 mL, 2 times per day  sodium chloride flush 0.9 % injection 5-40 mL, PRN  0.9 % sodium chloride infusion, PRN  ondansetron (ZOFRAN-ODT) disintegrating tablet 4 mg, Q8H PRN   Or  ondansetron (ZOFRAN) injection 4 mg, Q6H PRN  polyethylene glycol (GLYCOLAX) packet 17 g, Daily PRN  acetaminophen (TYLENOL) tablet 650 mg, Q6H PRN   Or  acetaminophen (TYLENOL) suppository 650 mg, Q6H PRN  pantoprazole (PROTONIX) tablet 40 mg, BID AC  furosemide (LASIX) injection 40 mg, Daily  insulin glargine (LANTUS) injection vial 50 Units, Nightly  insulin lispro (HUMALOG) injection vial 0-16 Units, TID WC  insulin lispro (HUMALOG) injection vial 0-4 Units, Nightly        Objective:  /88   Pulse 64   Temp 97.5 °F (36.4 °C) (Oral)   Resp 18   Ht 5' 8\" (1.727 m)   Wt 235 lb (106.6 kg)   SpO2 93%   BMI 35.73 kg/m²     Intake/Output Summary (Last 24 hours) at 1/2/2023 1234  Last data filed at 1/2/2023 0945  Gross per 24 hour   Intake 240 ml   Output --   Net 240 ml      Wt Readings from Last 3 Encounters:   01/01/23 235 lb (106.6 kg)   12/22/22 247 lb (112 kg)   12/20/22 280 lb (127 kg)       General appearance:  Appears comfortable  Eyes: Sclera clear. Pupils equal.  ENT: Moist oral mucosa. Trachea midline, no adenopathy.   Cardiovascular: Regular rhythm, normal S1, S2. No murmur. No edema in lower extremities  Respiratory: Not using accessory muscles. Good inspiratory effort. Clear to auscultation bilaterally, no wheeze or crackles. GI: Abdomen soft, no tenderness, not distended, normal bowel sounds  Musculoskeletal: No cyanosis in digits, neck supple  Neurology: CN 2-12 grossly intact. No speech or motor deficits  Psych: Normal affect. Alert and oriented in time, place and person  Skin: Warm, dry, normal turgor  Extremity exam shows brisk capillary refill.   Peripheral pulses are palpable in lower extremities     Labs and Tests:  CBC:   Recent Labs     01/01/23  1314 01/02/23  0444   WBC 3.6* 3.4*   HGB 12.4* 13.1*   PLT 97* 93*     BMP:    Recent Labs     01/01/23  1314 01/02/23  0444    141   K 4.2 4.1   CL 98* 98*   CO2 33* 31   BUN 34* 37*   CREATININE 2.1* 1.9*   GLUCOSE 284* 289*     Hepatic:   Recent Labs     01/01/23  1314   AST 30   ALT 15   BILITOT 0.5   ALKPHOS 109     XR CHEST (2 VW)   Final Result   Findings suggest mild congestive heart failure               Chad Lam MD   1/2/2023 12:34 PM

## 2023-01-03 VITALS
SYSTOLIC BLOOD PRESSURE: 141 MMHG | RESPIRATION RATE: 18 BRPM | DIASTOLIC BLOOD PRESSURE: 69 MMHG | HEIGHT: 68 IN | TEMPERATURE: 97.7 F | BODY MASS INDEX: 35.61 KG/M2 | HEART RATE: 77 BPM | OXYGEN SATURATION: 93 % | WEIGHT: 235 LBS

## 2023-01-03 LAB
ANION GAP SERPL CALCULATED.3IONS-SCNC: 9 MMOL/L (ref 3–16)
BUN BLDV-MCNC: 42 MG/DL (ref 7–20)
CALCIUM SERPL-MCNC: 9.1 MG/DL (ref 8.3–10.6)
CHLORIDE BLD-SCNC: 98 MMOL/L (ref 99–110)
CO2: 31 MMOL/L (ref 21–32)
CREAT SERPL-MCNC: 1.8 MG/DL (ref 0.8–1.3)
GFR SERPL CREATININE-BSD FRML MDRD: 37 ML/MIN/{1.73_M2}
GLUCOSE BLD-MCNC: 278 MG/DL (ref 70–99)
GLUCOSE BLD-MCNC: 290 MG/DL (ref 70–99)
GLUCOSE BLD-MCNC: 351 MG/DL (ref 70–99)
HCT VFR BLD CALC: 37.6 % (ref 40.5–52.5)
HEMOGLOBIN: 12.3 G/DL (ref 13.5–17.5)
MCH RBC QN AUTO: 31.3 PG (ref 26–34)
MCHC RBC AUTO-ENTMCNC: 32.6 G/DL (ref 31–36)
MCV RBC AUTO: 95.9 FL (ref 80–100)
PDW BLD-RTO: 16.5 % (ref 12.4–15.4)
PERFORMED ON: ABNORMAL
PERFORMED ON: ABNORMAL
PLATELET # BLD: 118 K/UL (ref 135–450)
PMV BLD AUTO: 9.1 FL (ref 5–10.5)
POTASSIUM SERPL-SCNC: 4.1 MMOL/L (ref 3.5–5.1)
RBC # BLD: 3.92 M/UL (ref 4.2–5.9)
SODIUM BLD-SCNC: 138 MMOL/L (ref 136–145)
WBC # BLD: 6.2 K/UL (ref 4–11)

## 2023-01-03 PROCEDURE — 6370000000 HC RX 637 (ALT 250 FOR IP): Performed by: INTERNAL MEDICINE

## 2023-01-03 PROCEDURE — 6370000000 HC RX 637 (ALT 250 FOR IP): Performed by: FAMILY MEDICINE

## 2023-01-03 PROCEDURE — 6370000000 HC RX 637 (ALT 250 FOR IP): Performed by: PHYSICIAN ASSISTANT

## 2023-01-03 PROCEDURE — 2700000000 HC OXYGEN THERAPY PER DAY

## 2023-01-03 PROCEDURE — 2580000003 HC RX 258: Performed by: FAMILY MEDICINE

## 2023-01-03 PROCEDURE — 94640 AIRWAY INHALATION TREATMENT: CPT

## 2023-01-03 PROCEDURE — 85027 COMPLETE CBC AUTOMATED: CPT

## 2023-01-03 PROCEDURE — 6360000002 HC RX W HCPCS: Performed by: FAMILY MEDICINE

## 2023-01-03 PROCEDURE — 94660 CPAP INITIATION&MGMT: CPT

## 2023-01-03 PROCEDURE — 80048 BASIC METABOLIC PNL TOTAL CA: CPT

## 2023-01-03 PROCEDURE — 94761 N-INVAS EAR/PLS OXIMETRY MLT: CPT

## 2023-01-03 PROCEDURE — 36415 COLL VENOUS BLD VENIPUNCTURE: CPT

## 2023-01-03 RX ORDER — PREDNISONE 20 MG/1
20 TABLET ORAL DAILY
Qty: 5 TABLET | Refills: 0 | Status: SHIPPED | OUTPATIENT
Start: 2023-01-03 | End: 2023-01-08

## 2023-01-03 RX ADMIN — AMIODARONE HYDROCHLORIDE 200 MG: 200 TABLET ORAL at 11:14

## 2023-01-03 RX ADMIN — HYDRALAZINE HYDROCHLORIDE 10 MG: 10 TABLET, FILM COATED ORAL at 15:22

## 2023-01-03 RX ADMIN — PANTOPRAZOLE SODIUM 40 MG: 40 TABLET, DELAYED RELEASE ORAL at 05:26

## 2023-01-03 RX ADMIN — Medication 2 PUFF: at 08:09

## 2023-01-03 RX ADMIN — Medication 5 ML: at 09:00

## 2023-01-03 RX ADMIN — METHYLPREDNISOLONE SODIUM SUCCINATE 40 MG: 40 INJECTION, POWDER, FOR SOLUTION INTRAMUSCULAR; INTRAVENOUS at 07:05

## 2023-01-03 RX ADMIN — INSULIN LISPRO 16 UNITS: 100 INJECTION, SOLUTION INTRAVENOUS; SUBCUTANEOUS at 13:27

## 2023-01-03 RX ADMIN — INSULIN LISPRO 5 UNITS: 100 INJECTION, SOLUTION INTRAVENOUS; SUBCUTANEOUS at 11:33

## 2023-01-03 RX ADMIN — IPRATROPIUM BROMIDE AND ALBUTEROL SULFATE 1 AMPULE: .5; 3 SOLUTION RESPIRATORY (INHALATION) at 12:15

## 2023-01-03 RX ADMIN — FUROSEMIDE 40 MG: 10 INJECTION, SOLUTION INTRAMUSCULAR; INTRAVENOUS at 10:30

## 2023-01-03 RX ADMIN — INSULIN LISPRO 5 UNITS: 100 INJECTION, SOLUTION INTRAVENOUS; SUBCUTANEOUS at 13:29

## 2023-01-03 RX ADMIN — APIXABAN 5 MG: 5 TABLET, FILM COATED ORAL at 11:14

## 2023-01-03 RX ADMIN — ASPIRIN 81 MG: 81 TABLET, COATED ORAL at 11:15

## 2023-01-03 RX ADMIN — HYDRALAZINE HYDROCHLORIDE 10 MG: 10 TABLET, FILM COATED ORAL at 11:15

## 2023-01-03 RX ADMIN — IPRATROPIUM BROMIDE AND ALBUTEROL SULFATE 1 AMPULE: .5; 3 SOLUTION RESPIRATORY (INHALATION) at 08:09

## 2023-01-03 RX ADMIN — ISOSORBIDE MONONITRATE 30 MG: 30 TABLET, EXTENDED RELEASE ORAL at 11:14

## 2023-01-03 RX ADMIN — INSULIN LISPRO 8 UNITS: 100 INJECTION, SOLUTION INTRAVENOUS; SUBCUTANEOUS at 11:30

## 2023-01-03 NOTE — PROGRESS NOTES
CLINICAL PHARMACY NOTE: MEDS TO BEDS    Total # of Prescriptions Filled: 1   The following medications were delivered to the patient:  prednisone    Additional Documentation:  Patient signed at car

## 2023-01-03 NOTE — DISCHARGE SUMMARY
[facility]    Patient Demographics    Elva. Wei Payment  Date of Birth. 1940  MRN. [id number]     Primary care provider. Licha Cox MD  (Tel: [phone])    Admit date: 1/1/2023    Discharge date 1/3/2023  Note Date: 1/3/2023     Reason for Hospitalization. Chief Complaint   Patient presents with    Shortness of Breath     Sob increased wheezing           Significant Findings. Principal Problem:    COPD exacerbation ([address]  Resolved Problems:    * No resolved hospital problems. *       Problems and results from this hospitalization that need follow up. CHF:  pt has follow up with Cardiology      Significant test results and incidental findings. AIC 7 %   BNP 2700 upon admission   CKD:  creat 1.8 at time of d/c home     Invasive procedures and treatments. None     [facility] Course. The patient sought care in the ed due to increased shortness of breath with wheezing likely due to copd exacerbation and fluid overload. ( Pt had missed a few doses of his diuretics. )       He was admitted and diuresed with IV lasix and also treated with nebs, oxygen, steroids and supportive care. At the time of d/c he was at his baseline oxygen requirement and was eager to be released. Consults. None    Physical examination on discharge day. BP (!) 146/70   Pulse 76   Temp 97.5 °F (36.4 °C) (Oral)   Resp 17   Ht 5' 8\" (1.727 m)   Wt 235 lb (106.6 kg)   SpO2 95%   BMI 35.73 kg/m²   General appearance. Alert. Looks comfortable, but looks chronically ill   HEENT. Sclera clear. Moist mucus membranes. Cardiovascular. Regular rate and rhythm, normal S1, S2. No murmur. Respiratory. Not using accessory muscles. Clear to auscultation bilaterally, no wheeze. Gastrointestinal. Abdomen soft, non-tender, not distended, normal bowel sounds  Neurology. Facial symmetry. No speech deficits. Moving all extremities equally.  Extremities. Trace edema in lower extremities. ( Left leg > right chronically)  Skin. Warm, dry, normal turgor    Condition at time of discharge stable     Medication instructions provided to patient at discharge.     Medication List        START taking these medications      predniSONE 20 MG tablet  Commonly known as: DELTASONE  Take 1 tablet by mouth daily for 5 days            Continue these meds:      gabapentin 600 MG tablet  Commonly known as: NEURONTIN  TAKE UP TO 5 TABLETS BY MOUTH OVER 24 HOURS AS DIRECTED  What changed: See the new instructions.     insulin  UNIT/ML injection vial  Commonly known as: NovoLIN N  Take 50 units with breakfast and 34 units with dinner.  What changed:   how much to take  additional instructions     insulin regular 100 UNIT/ML injection  Commonly known as: NovoLIN R ReliOn  INJECT 20 TO 30 UNITS SUBCUTANEOUSLY THREE TIMES DAILY BEFORE MEAL(S)  What changed:   how much to take  additional instructions                 Airial Compact Mini Nebulizer Misc  1 each by Does not apply route every 6 hours as needed (wheezing or shortness of breath)     * albuterol (2.5 MG/3ML) 0.083% nebulizer solution  Commonly known as: PROVENTIL  Take 3 mLs by nebulization every 6 hours as needed for Wheezing     * albuterol sulfate  (90 Base) MCG/ACT inhaler  Commonly known as: PROVENTIL;VENTOLIN;PROAIR  INHALE 2 PUFFS INTO THE LUNGS EVERY 6 HOURS AS NEEDED FOR WHEEZING     amiodarone 200 MG tablet  Commonly known as: CORDARONE  TAKE 1 TABLET BY MOUTH DAILY     apixaban 5 MG Tabs tablet  Commonly known as: ELIQUIS  Take 1 tablet by mouth 2 times daily     aspirin 81 MG EC tablet     atorvastatin 40 MG tablet  Commonly known as: LIPITOR  TAKE 1 TABLET BY MOUTH EVERY EVENING     blood glucose monitor kit and supplies  Test 2 times a day & as needed for symptoms of irregular blood glucose.     * blood glucose test strips strip  Commonly  known as: ASCENSIA AUTODISC VI;ONE TOUCH ULTRA TEST VI  four times daily     * blood glucose test strips  Test 2 times a day & as needed for symptoms of irregular blood glucose. carvedilol 6.25 MG tablet  Commonly known as: COREG     dapagliflozin 5 MG tablet  Commonly known as: Farxiga  Take 2 tablets by mouth daily LOT [id number] EXP 07/31/2024 4 BOXES     Dexcom G6  Ana Luisa  As needed for diabetes     Dexcom G6 Sensor Misc  Apply weekly     Dexcom G6 Transmitter Misc  As needed for diabetes     hydrALAZINE 10 MG tablet  Commonly known as: APRESOLINE  Take 1 tablet by mouth 3 times daily     INSULIN SYRINGE .5CC/29G 29G X 1/2\" 0.5 ML Misc  INJECT 4 TIMES DAILY AS NEEDED     isosorbide mononitrate 30 MG extended release tablet  Commonly known as: IMDUR     Lancets Misc  1 each by Does not apply route daily Use to check glucose twice a day. One Touch brand. DX E11.9     Nebulizer/Tubing/Mouthpiece Kit  1 kit by Does not apply route every 6 hours as needed (for wheezing or shortness of breath)     nitroGLYCERIN 0.3 MG SL tablet  Commonly known as: NITROSTAT  Take one sublingual for chest pain. May repeat twice at 5 min intervals. If not getting relief call 911. omeprazole 20 MG delayed release capsule  Commonly known as: PRILOSEC  TAKE 1 CAPSULE BY MOUTH TWICE DAILY     potassium chloride 20 MEQ extended release tablet  Commonly known as: KLOR-CON M  Take 1 tablet by mouth daily     torsemide 20 MG tablet  Commonly known as: DEMADEX  Take 2 tabs in AM and 2 tabs in afternoon. vitamin D 1.25 MG (07771 UT) Caps capsule  Commonly known as: ERGOCALCIFEROL           * This list has 4 medication(s) that are the same as other medications prescribed for you. Read the directions carefully, and ask your doctor or other care provider to review them with you.                 Patient was NOT taking these meds      B-12 500 MCG Tabs     FeroSul 325 (65 Fe) MG tablet  Generic drug: ferrous sulfate     furosemide 80 MG tablet  Commonly known as: LASIX     metoprolol tartrate 25 MG tablet  Commonly known as: LOPRESSOR     potassium chloride 10 MEQ extended release tablet  Commonly known as: KLOR-CON               Where to Get Your Medications        These medications were sent to [facility], [address] [place] [address]      Phone: [phone]   predniSONE 20 MG tablet         Discharge recommendations given to patient. Follow Up. in 1 week   Disposition. home  Activity. activity as tolerated  Diet: ADULT DIET; Regular; 4 carb choices (60 gm/meal); Low Sodium (2 gm)      Spent 35 minutes in discharge process.     Signed:  RAYMUNDO Ruffin CNP     1/3/2023 8:34 AM

## 2023-01-03 NOTE — PROGRESS NOTES
01/03/23 0049   Oxygen Therapy/Pulse Ox   O2 Therapy Oxygen   O2 Device Nasal cannula  (pt. refused V60 cpap, pt. placed back on o2 per nc)   Heart Rate 61   Resp 17   SpO2 95 %   Pulse Oximeter Device Mode Continuous

## 2023-01-03 NOTE — CARE COORDINATION
Discharge Planning:      (CM) reviewed patient's medical discharge plan for today. No needs anticipated for CM team. Medical staff to inform CM team if needs arise.       No CM needs     Electronically signed by Clemente Patel RN on 1/3/2023 at 4:36 PM

## 2023-01-03 NOTE — PROGRESS NOTES
Shift assessment completed. Routine vitals obtained. Scheduled medications given. Patient is awake, alert and oriented. Respirations are easy and unlabored. Patient does not appear to be in distress, resting comfortably at this time. Call light within reach. Falls precautions in place.

## 2023-01-05 ENCOUNTER — CLINICAL DOCUMENTATION ONLY (OUTPATIENT)
Facility: CLINIC | Age: 83
End: 2023-01-05

## 2023-01-05 ENCOUNTER — HOSPITAL ENCOUNTER (OUTPATIENT)
Dept: WOUND CARE | Age: 83
Discharge: HOME | End: 2023-01-05
Payer: MEDICARE

## 2023-01-05 VITALS
SYSTOLIC BLOOD PRESSURE: 155 MMHG | DIASTOLIC BLOOD PRESSURE: 82 MMHG | TEMPERATURE: 97.2 F | HEART RATE: 83 BPM | RESPIRATION RATE: 18 BRPM

## 2023-01-05 DIAGNOSIS — L97.412 ULCER OF RIGHT HEEL, WITH FAT LAYER EXPOSED (HCC): Primary | ICD-10-CM

## 2023-01-05 DIAGNOSIS — I70.235 ATHEROSCLEROSIS OF NATIVE ARTERIES OF RIGHT LEG WITH ULCERATION OF OTHER PART OF FOOT (HCC): ICD-10-CM

## 2023-01-05 DIAGNOSIS — I10 ESSENTIAL HYPERTENSION: ICD-10-CM

## 2023-01-05 DIAGNOSIS — E11.21 TYPE 2 DIABETES MELLITUS WITH DIABETIC NEPHROPATHY, WITH LONG-TERM CURRENT USE OF INSULIN (HCC): ICD-10-CM

## 2023-01-05 DIAGNOSIS — Z79.4 TYPE 2 DIABETES MELLITUS WITH DIABETIC NEPHROPATHY, WITH LONG-TERM CURRENT USE OF INSULIN (HCC): ICD-10-CM

## 2023-01-05 DIAGNOSIS — L97.512 ULCER OF TOE OF RIGHT FOOT, WITH FAT LAYER EXPOSED (HCC): ICD-10-CM

## 2023-01-05 DIAGNOSIS — E78.5 HYPERLIPIDEMIA LDL GOAL <70: ICD-10-CM

## 2023-01-05 DIAGNOSIS — I87.2 PERIPHERAL VENOUS INSUFFICIENCY: ICD-10-CM

## 2023-01-05 LAB
ANION GAP SERPL CALCULATED.3IONS-SCNC: 15 MMOL/L (ref 3–16)
BASOPHILS ABSOLUTE: 0 K/UL (ref 0–0.2)
BASOPHILS RELATIVE PERCENT: 0.1 %
BUN BLDV-MCNC: 59 MG/DL (ref 7–20)
CALCIUM SERPL-MCNC: 9.7 MG/DL (ref 8.3–10.6)
CHLORIDE BLD-SCNC: 98 MMOL/L (ref 99–110)
CHOLESTEROL, TOTAL: 149 MG/DL (ref 0–199)
CO2: 30 MMOL/L (ref 21–32)
CREAT SERPL-MCNC: 2.3 MG/DL (ref 0.8–1.3)
EOSINOPHILS ABSOLUTE: 0 K/UL (ref 0–0.6)
EOSINOPHILS RELATIVE PERCENT: 0 %
GFR SERPL CREATININE-BSD FRML MDRD: 28 ML/MIN/{1.73_M2}
GLUCOSE BLD-MCNC: 137 MG/DL (ref 70–99)
HCT VFR BLD CALC: 43.5 % (ref 40.5–52.5)
HDLC SERPL-MCNC: 47 MG/DL (ref 40–60)
HEMOGLOBIN: 14.2 G/DL (ref 13.5–17.5)
LDL CHOLESTEROL CALCULATED: 74 MG/DL
LYMPHOCYTES ABSOLUTE: 1.1 K/UL (ref 1–5.1)
LYMPHOCYTES RELATIVE PERCENT: 19.6 %
MCH RBC QN AUTO: 31.2 PG (ref 26–34)
MCHC RBC AUTO-ENTMCNC: 32.6 G/DL (ref 31–36)
MCV RBC AUTO: 96 FL (ref 80–100)
MONOCYTES ABSOLUTE: 0.6 K/UL (ref 0–1.3)
MONOCYTES RELATIVE PERCENT: 11.4 %
NEUTROPHILS ABSOLUTE: 3.7 K/UL (ref 1.7–7.7)
NEUTROPHILS RELATIVE PERCENT: 68.9 %
PDW BLD-RTO: 16.7 % (ref 12.4–15.4)
PLATELET # BLD: 133 K/UL (ref 135–450)
PMV BLD AUTO: 9.7 FL (ref 5–10.5)
POTASSIUM SERPL-SCNC: 3.8 MMOL/L (ref 3.5–5.1)
RBC # BLD: 4.54 M/UL (ref 4.2–5.9)
SODIUM BLD-SCNC: 143 MMOL/L (ref 136–145)
TRIGL SERPL-MCNC: 141 MG/DL (ref 0–150)
VLDLC SERPL CALC-MCNC: 28 MG/DL
WBC # BLD: 5.4 K/UL (ref 4–11)

## 2023-01-05 PROCEDURE — 99211 OFF/OP EST MAY X REQ PHY/QHP: CPT

## 2023-01-05 RX ORDER — LIDOCAINE 40 MG/G
CREAM TOPICAL ONCE
Status: CANCELLED | OUTPATIENT
Start: 2023-01-05 | End: 2023-01-05

## 2023-01-05 RX ORDER — BACITRACIN, NEOMYCIN, POLYMYXIN B 400; 3.5; 5 [USP'U]/G; MG/G; [USP'U]/G
OINTMENT TOPICAL ONCE
Status: CANCELLED | OUTPATIENT
Start: 2023-01-05 | End: 2023-01-05

## 2023-01-05 RX ORDER — LIDOCAINE HYDROCHLORIDE 20 MG/ML
JELLY TOPICAL ONCE
Status: CANCELLED | OUTPATIENT
Start: 2023-01-05 | End: 2023-01-05

## 2023-01-05 RX ORDER — CLOBETASOL PROPIONATE 0.5 MG/G
OINTMENT TOPICAL ONCE
Status: CANCELLED | OUTPATIENT
Start: 2023-01-05 | End: 2023-01-05

## 2023-01-05 RX ORDER — LIDOCAINE HYDROCHLORIDE 40 MG/ML
SOLUTION TOPICAL ONCE
Status: CANCELLED | OUTPATIENT
Start: 2023-01-05 | End: 2023-01-05

## 2023-01-05 RX ORDER — BACITRACIN ZINC AND POLYMYXIN B SULFATE 500; 1000 [USP'U]/G; [USP'U]/G
OINTMENT TOPICAL ONCE
Status: CANCELLED | OUTPATIENT
Start: 2023-01-05 | End: 2023-01-05

## 2023-01-05 RX ORDER — GINSENG 100 MG
CAPSULE ORAL ONCE
Status: CANCELLED | OUTPATIENT
Start: 2023-01-05 | End: 2023-01-05

## 2023-01-05 RX ORDER — GENTAMICIN SULFATE 1 MG/G
OINTMENT TOPICAL ONCE
Status: CANCELLED | OUTPATIENT
Start: 2023-01-05 | End: 2023-01-05

## 2023-01-05 RX ORDER — LIDOCAINE 50 MG/G
OINTMENT TOPICAL ONCE
Status: CANCELLED | OUTPATIENT
Start: 2023-01-05 | End: 2023-01-05

## 2023-01-05 RX ORDER — BETAMETHASONE DIPROPIONATE 0.05 %
OINTMENT (GRAM) TOPICAL ONCE
Status: CANCELLED | OUTPATIENT
Start: 2023-01-05 | End: 2023-01-05

## 2023-01-05 ASSESSMENT — PAIN SCALES - GENERAL
PAINLEVEL_OUTOF10: 0
PAINLEVEL_OUTOF10: 0

## 2023-01-05 NOTE — PROGRESS NOTES
[address]   Progress Note and Procedure Note      Cyndi RECORD NUMBER:  [id number]  AGE: 81 y.o. GENDER: male  : 1940  EPISODE DATE:  2023    Subjective:     Chief Complaint   Patient presents with    Wound Check     Follow up visit right heel wound         HISTORY of PRESENT ILLNESS LAURI Tomlinson Age is a 81 y.o. male who presents today for wound/ulcer evaluation. History of Wound Context: Patient presents today for a right heel ulceration. Patient has kept his dressing clean and intact. Wound/Ulcer Pain Timing/Severity: none  Quality of pain: N/A  Severity:  0 / 10   Modifying Factors: None  Associated Signs/Symptoms: edema and drainage    Ulcer Identification:  Ulcer Type: venous    Contributing Factors: edema, venous stasis, lymphedema, diabetes, poor glucose control and obesity    Acute Wound: N/A    PAST MEDICAL HISTORY        Diagnosis Date    Acid reflux     Arm wound, left, initial encounter 2022    Skin tears    Atherosclerosis of native arteries of right leg with ulceration of other part of foot ([address] 2021    Atrial flutter ([address]     converted    Bleeding stomach ulcer oct 2015    BPH (benign prostatic hyperplasia)     CAD (coronary artery disease)      angio with 2 blocked bypass and 2 patent    Cellulitis of left thigh 5/10/2022    COPD (chronic obstructive pulmonary disease) (HCC)     Diabetes mellitus type II     Edema     chronic left leg    Elevated PSA- nl 11     Hyperlipidemia     Hypertension     Ischemic cardiomyopathy     Lipoma of skin-RIGHT CHEST 2011    Obesity     Osteoarthritis     Peripheral venous insufficiency 2021    Skin tear of left forearm without complication 3/19/1957    Significant amount of epidermal loss with bleeding.     Spinal stenosis of lumbar region with neurogenic claudication- clinically 2018       PAST SURGICAL HISTORY    Past Surgical History: Procedure Laterality Date    CATARACT REMOVAL  2011    bilat    COLONOSCOPY      CORONARY ARTERY BYPASS GRAFT  1993    x 4    EYE SURGERY Left 2019    cataract coming back    JOINT REPLACEMENT Right total knee replacement    JOINT REPLACEMENT Left 2016       FAMILY HISTORY    Family History   Problem Relation Age of Onset    Cancer Mother         breast    Cancer Father         throat       SOCIAL HISTORY    Social History     Tobacco Use    Smoking status: Former     Packs/day: 3.50     Years: 32.00     Pack years: 112.00     Types: Cigarettes     Quit date: 1985     Years since quittin.0    Smokeless tobacco: Never    Tobacco comments:     advised not to resume   Substance Use Topics    Alcohol use: No     Alcohol/week: 0.0 standard drinks    Drug use: No       ALLERGIES    Allergies   Allergen Reactions    Entresto [Sacubitril-Valsartan] Other (See Comments)     Renal failure       MEDICATIONS    Current Outpatient Medications on File Prior to Encounter   Medication Sig Dispense Refill    predniSONE (DELTASONE) 20 MG tablet Take 1 tablet by mouth daily for 5 days 5 tablet 0    carvedilol (COREG) 6.25 MG tablet Take 6.25 mg by mouth 2 times daily      Continuous Blood Gluc Sensor (DEXCOM G6 SENSOR) MISC Apply weekly 120 each 5    omeprazole (PRILOSEC) 20 MG delayed release capsule TAKE 1 CAPSULE BY MOUTH TWICE DAILY 180 capsule 1    torsemide (DEMADEX) 20 MG tablet Take 2 tabs in AM and 2 tabs in afternoon.  (Patient taking differently: 40 mg Take 2 tabs (80mg)in AM and 2 tabs(80mg) in afternoon.) 120 tablet 3    Continuous Blood Gluc  (DEXCOM G6 ) PABLO As needed for diabetes 1 each 0    Continuous Blood Gluc Transmit (DEXCOM G6 TRANSMITTER) MISC As needed for diabetes 1 each 0    apixaban (ELIQUIS) 5 MG TABS tablet Take 1 tablet by mouth 2 times daily 60 tablet 2    dapagliflozin (FARXIGA) 5 MG tablet Take 2 tablets by mouth daily LOT [id number] EXP 2024 4 BOXES 30 tablet 1 potassium chloride (KLOR-CON M) 20 MEQ extended release tablet Take 1 tablet by mouth daily 90 tablet 1    hydrALAZINE (APRESOLINE) 10 MG tablet Take 1 tablet by mouth 3 times daily 30 tablet 2    atorvastatin (LIPITOR) 40 MG tablet TAKE 1 TABLET BY MOUTH EVERY EVENING 90 tablet 3    amiodarone (CORDARONE) 200 MG tablet TAKE 1 TABLET BY MOUTH DAILY 30 tablet 5    insulin NPH (NOVOLIN N) 100 UNIT/ML injection vial Take 50 units with breakfast and 34 units with dinner. (Patient taking differently: 20 Units Take 20 units with breakfast) 30 mL 3    gabapentin (NEURONTIN) 600 MG tablet TAKE UP TO 5 TABLETS BY MOUTH OVER 24 HOURS AS DIRECTED (Patient taking differently: 1,200 mg nightly.) 150 tablet 11    albuterol sulfate  (90 Base) MCG/ACT inhaler INHALE 2 PUFFS INTO THE LUNGS EVERY 6 HOURS AS NEEDED FOR WHEEZING 18 g 5    [DISCONTINUED] FEROSUL 325 (65 Fe) MG tablet TAKE 1 TABLET BY MOUTH TWICE DAILY (Patient not taking: Reported on 9/2/2022) 180 tablet 3    [DISCONTINUED] potassium chloride (KLOR-CON) 10 MEQ extended release tablet TAKE 1 TABLET BY MOUTH DAILY 90 tablet 1    Lancets MISC 1 each by Does not apply route daily Use to check glucose twice a day. One Touch brand. DX E11.9 100 each 3    blood glucose monitor kit and supplies Test 2 times a day & as needed for symptoms of irregular blood glucose. 1 kit 0    blood glucose monitor strips Test 2 times a day & as needed for symptoms of irregular blood glucose.  200 strip 3    blood glucose test strips (ASCENSIA AUTODISC VI;ONE TOUCH ULTRA TEST VI) strip four times daily 400 strip 3    INSULIN SYRINGE .5CC/29G 29G X 1/2\" 0.5 ML MISC INJECT 4 TIMES DAILY AS NEEDED 400 each 1    [DISCONTINUED] Cyanocobalamin (B-12) 500 MCG TABS TAKE 1 TABLET BY MOUTH DAILY (Patient not taking: Reported on 9/2/2022) 90 tablet 1    insulin regular (NOVOLIN R RELION) 100 UNIT/ML injection INJECT 20 TO 30 UNITS SUBCUTANEOUSLY THREE TIMES DAILY BEFORE MEAL(S) (Patient taking differently: 30 Units INJECT 20 TO 30 UNITS with dinner) 30 mL 0    Nebulizers (AIRIAL COMPACT MINI NEBULIZER) MISC 1 each by Does not apply route every 6 hours as needed (wheezing or shortness of breath) 1 each 0    Respiratory Therapy Supplies (NEBULIZER/TUBING/MOUTHPIECE) KIT 1 kit by Does not apply route every 6 hours as needed (for wheezing or shortness of breath) 1 kit 1    albuterol (PROVENTIL) (2.5 MG/3ML) 0.083% nebulizer solution Take 3 mLs by nebulization every 6 hours as needed for Wheezing 50 vial 3    vitamin D (ERGOCALCIFEROL) 04186 units CAPS capsule TK 1 C PO WEEKLY  2    aspirin 81 MG EC tablet Take 81 mg by mouth daily      [DISCONTINUED] metoprolol (LOPRESSOR) 25 MG tablet Take 25 mg by mouth daily       isosorbide mononitrate (IMDUR) 30 MG CR tablet Take 1 tablet by mouth every morning Dr. Garrison - Cardio 30 tablet 6    nitroGLYCERIN (NITROSTAT) 0.3 MG SL tablet Take one sublingual for chest pain.  May repeat twice at 5 min intervals. If not getting relief call 911. 25 tablet 3     No current facility-administered medications on file prior to encounter.       REVIEW OF SYSTEMS  Review of Systems    Pertinent items are noted in HPI.   Review of Systems: A 12 point review of symptoms is unremarkable with the exception of the chief complaint.  Patient specifically denies nausea, fever, vomiting, chills, shortness of breath, chest pain, abdominal pain, constipation or difficulty urinating.      Objective:      BP (!) 155/82   Pulse 83   Temp 97.2 °F (36.2 °C) (Infrared)   Resp 18     Wt Readings from Last 3 Encounters:   01/01/23 235 lb (106.6 kg)   12/22/22 247 lb (112 kg)   12/20/22 280 lb (127 kg)       PHYSICAL EXAM  Physical Exam    Patient has nonpalpable pedal pulses bilaterally.  He has biphasic DP PT on the left with hand-held Doppler and a monophasic on the right.  Patient has extensive bilateral lower extremity edema with hypertrophic skin changes, brawny discoloration and cobbling  consistent with chronic venous disease left greater than right. Patient has absent pedal hair growth noted. Dorsalis pedis and posterior tibial pulse are monophasic on a hand-held Doppler examination. Ulceration previously noted on the posterior aspect of the right heel has completely epithelialized. There is no surrounding erythema, edema, warmth or malodor noted. Wound bed has significantly improved since previous examination  Epicritic sensation is grossly intact bilaterally. Negative clonus and babinski reflex is down going. Patient's muscle strength for dorsiflexion plantarflexion inversion and eversion is intact bilaterally. He does have semirigid hammertoe contractures noted bilaterally. 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    Patient was encouraged to follow-up with podiatry for routine diabetic foot care to prevent complications such as infection, ulceration, and/or amputation. Treatment Note please see attached Discharge Instructions    Written patient dismissal instructions given to patient and signed by patient or POA.          Reappoint prn  Discharge Instructions           [address]  Physician Orders   [facility] AT [place] [address], [place] [address]  Telephone: [phone]    NAME:  Geoff Marie OF BIRTH:  1940  MEDICAL RECORD NUMBER:  [id number]  DATE:  1/5/2023    Congratulations! You have completed your treatment. Return to your Primary Care Physician for all your health issues. Resume your ordinary activities as tolerated. Take your medications as prescribed by your primary care physician. Check your skin daily for cracks, bruises, sores, or dryness. Use a moisturizer as needed. Clean and dry your skin, using mild soap and warm water (not hot). Avoid alcohol and caffeine and do not smoke. Maintain a nutritious diet. Avoid pressure on your wound site. Keep your legs elevated above the level of the heart whenever possible.       Physician Signature:______________________    Date: ___________ Time:  ____________    Dr Ramin Gilmore             Electronically signed by Jacquelyn Hopson RN on 1/5/2023 at 10:33 AM                           Electronically signed by Ramin Gilmore DPM on 1/5/2023 at 4:09 PM

## 2023-01-06 NOTE — PROGRESS NOTES
OFFICE VISIT      Patient: Dianne Parrish Date of Service: 2023   : 1964 MRN: [id number]     SUBJECTIVE:     Chief Complaint   Patient presents with   • Physical       HISTORY OF PRESENT ILLNESS:  Dianne Parrish is a 58 year old female who presents today for complete physical examination.    Patient has given consent to record this visit for documentation in their clinical record.    HTN (hypertension), benign  Blood pressure is running high today.  Is on Lisinopril 20 mg.  Blood work done recently shows.    Malaise and fatigue ; Recurrent major depressive disorder, remission status unspecified (CMS/HCC) ; Chronic insomnia ; Anxiety  She wants some medication to help her sleep.  Was recommended to try Melatonin, etc which did not work.  Has very severe anxiety issues (tearful while narrating her history) and feels lack of sleep is due to anxiety.  Was on Wellbutrin 300 mg for depression about a year ago; however, she stopped it cold turkey due to high blood pressure. She felt that the high blood pressure was due to the medication. According to the patient, she was told by us that Wellbutrin can cause high blood pressure.  States that she did notice mild improvement in her mood with Wellbutrin. Last time it was refilled in 2021, before this she was on it since . Getting off the Wellbutrin worsened her condition.  Inquires about going on Nuvigil.  Complains of tiredness throughout the day, but when she goes to bed her mind keeps on overthinking.  She tried Zoloft which caused more depression.  Currently, she feels more sad and tired.  She did a lot of shift work for about 20 years and had a poor sleep pattern, at times, she was not sleeping at all.  Needs some medication to help her focus as she has focus issues and feels very distracted which will also help her to help her sleep at night.  Years ago she tried mood stabilizer which helped her but had side effects of hair loss.  She is awake at night,  walking around the house.  No history of bipolar depression or schizophrenia or hallucinations.    Well adult exam  Labs: blood work was done recently.  Colon cancer screening: Had colonoscopy in 2016.  Denies any hearing issues.  Denies any stomach pain except has pain from hunger. Have not eaten yet.    Screening for cervical cancer  Due for pap smear.    Recent PHQ 2/9 Score    PHQ 2:  Date Adult PHQ 2 Score Adult PHQ 2 Interpretation   1/5/2023 2 No further screening needed       PHQ 9:         PAST MEDICAL HISTORY:  Patient Active Problem List   Diagnosis   • Recurrent cold sores   • Depression, major   • Diverticulosis   • Hyperlipidemia   • HTN (hypertension), benign   • Chronic insomnia   • Anxiety     Immunization History   Administered Date(s) Administered   • COVID-19 12Y+ Pfizer Bivalent Booster Vaccine 11/10/2022   • COVID-19 12Y+ Pfizer-D[place] - Requires Dilution 12/16/2021   • COVID-19 Moderna Vaccine 03/21/2021, 04/18/2021   • Influenza, quadrivalent, MDCK, preserve-free (FLUCELVAX) 10/20/2020   • Influenza, quadrivalent, preserve-free 10/14/2021, 11/10/2022   • Shingrix (Shingles Zoster) 10/14/2021, 12/16/2021   • Tdap 12/10/2013       MEDICATIONS:  Patient's Medications   New Prescriptions    BUPROPION XL (WELLBUTRIN XL) 150 MG 24 HR TABLET    Take 1 tablet by mouth daily.    ESCITALOPRAM (LEXAPRO) 5 MG TABLET    Take 1 tablet by mouth daily.   Previous Medications    ACYCLOVIR (ZOVIRAX) 400 MG TABLET    Take 1 tablet by mouth in the morning and 1 tablet in the evening.   Modified Medications    Modified Medication Previous Medication    LISINOPRIL (ZESTRIL) 40 MG TABLET lisinopril (ZESTRIL) 20 MG tablet       Take 1 tablet by mouth daily.    Take 1 tablet by mouth daily.   Discontinued Medications    No medications on file       ALLERGIES:  ALLERGIES:   Allergen Reactions   • Sertraline Other (See Comments)     Felt more anxious       PAST SURGICAL HISTORY:  Past Surgical History:   Procedure  Laterality Date   • Colonoscopy  4/21/2016 due 2026   • Knee surgery Right in 9th grade    R knee fracture and repair       FAMILY HISTORY:  Family History   Problem Relation Age of Onset   • Patient is unaware of any medical problems Mother    • Hypertension Father    • Diabetes Father    • Cancer Father         stomach/esophageal   • Heart disease Father    • High cholesterol Father    • Liver Disease Father    • Hypertension Sister    • Cancer, Breast Sister 41   • Non-Hodgkin's Lymphoma Sister    • Cancer, Ovarian Sister    • Cancer, Lung Sister    • HIV Brother    • Myocardial Infarction Brother    • Patient is unaware of any medical problems Brother    • Patient is unaware of any medical problems Brother    • Patient is unaware of any medical problems Brother    • Patient is unaware of any medical problems Maternal Grandmother    • Alcohol Abuse Maternal Grandfather    • Myocardial Infarction Paternal Grandmother    • Patient is unaware of any medical problems Paternal Grandfather        SOCIAL HISTORY:  Social History     Socioeconomic History   • Marital status: Single     Spouse name: Not on file   • Number of children: 0   • Years of education: Not on file   • Highest education level: Not on file   Occupational History   • Occupation: [employer]   Tobacco Use   • Smoking status: Never   • Smokeless tobacco: Never   Substance and Sexual Activity   • Alcohol use: Yes     Alcohol/week: 2.0 standard drinks     Types: 2 Shots of liquor per week   • Drug use: Yes     Types: Marijuana     Comment: 4 days per week   • Sexual activity: Not on file   Other Topics Concern   • Not on file   Social History Narrative   • Not on file     Social Determinants of Health     Financial Resource Strain: Not on file   Food Insecurity: Not on file   Transportation Needs: Not on file   Physical Activity: Not on file   Stress: Not on file   Social Connections: Not on file   Intimate Partner Violence: Not on file      Employer: [employer]  Social History     Tobacco Use   Smoking Status Never   Smokeless Tobacco Never     Social History     Substance and Sexual Activity   Alcohol Use Yes   • Alcohol/week: 2.0 standard drinks   • Types: 2 Shots of liquor per week       Review of Systems    Constitutional: as per HPI.  Cardiovascular: as per HPI.   Hematological: as per HPI.   Psychiatric/Behavioral: as per HPI.    All other systems were reviewed and negative.    OBJECTIVE:     Visit Vitals  BP (!) 142/90 (BP Location: RUE - Right upper extremity, Patient Position: Sitting, Cuff Size: Regular)   Pulse 88   Temp 97.7 °F (36.5 °C)   Ht 5' 5\"   Wt 55.8 kg (123 lb)   LMP 10/10/2013   BMI 20.47 kg/m²         Physical Exam      Is able to ambulate on her own without any assistive devices.  Constitutional: alert, in no acute distress.  Eyes: no discharge, normal conjunctiva, no eyelid swelling, no ptosis and the sclerae were normal. Pupils equal, round and reactive to light and accommodation, conjugate gaze and extraocular movements were intact.  ENT: normal appearing outer ear, normal appearing nose. examination of the tympanic membrane showed normal landmarks, normal appearing external canal. nasal mucosa moist and pink, No nasal discharge. Normal lips. Oral mucosa pink and moist, No oral lesions.  Neck: normal appearing, supple neck and no mass was seen. Thyroid not enlarged and no thyroid nodules. No lymphadenopathy.  Pulmonary: no respiratory distress, normal respiratory rate and effort and no accessory muscle use. Breath sounds clear to auscultation bilaterally.  Cardiovascular: normal rate, no murmurs,, regular rhythm, normal S1 and normal S2. Edema was not present in the lower extremities.  Abdomen: soft, nontender, nondistended, normal bowel sounds and no abdominal mass. No  hepatomegaly and no splenomegaly. No umbilical hernia was seen.  Musculoskeletal: normal gait. No musculoskeletal erythema was seen, no joint  01/25/2019    K 5.0 01/25/2019    CREATININE 1.5 (H) 01/25/2019     Lab Results   Component Value Date    WBC 4.0 11/05/2018    HGB 13.7 11/05/2018    HCT 42.2 11/05/2018    MCV 91.2 11/05/2018     11/05/2018     Lab Results   Component Value Date    ALT 15 01/15/2019    AST 18 01/15/2019    ALKPHOS 91 01/15/2019    BILITOT 0.8 01/15/2019     TSH (uIU/mL)   Date Value   01/15/2019 3.14     Lab Results   Component Value Date    LABA1C 8.8 01/15/2019      Objective:   PHYSICAL EXAM   /74 (Site: Right Upper Arm, Position: Sitting, Cuff Size: Large Adult)   Pulse 52   Temp 98.4 °F (36.9 °C) (Oral)   Resp 16   Ht 5' 8\" (1.727 m)   Wt 275 lb (124.7 kg)   BMI 41.81 kg/m²   BP Readings from Last 5 Encounters:   04/15/19 134/74   01/15/19 124/72   10/08/18 118/78   07/06/18 112/60   06/20/18 (!) 151/72     Wt Readings from Last 5 Encounters:   04/15/19 275 lb (124.7 kg)   01/15/19 271 lb (122.9 kg)   10/08/18 288 lb (130.6 kg)   07/06/18 288 lb (130.6 kg)   06/20/18 287 lb (130.2 kg)      GENERAL:   · well-developed, well-nourished, alert, no distress. EYES:   · External findings: lids and lashes normal and conjunctivae and sclerae normal  LUNGS:    · Breathing unlabored  · clear to auscultation bilaterally and good air movement  CARDIOVASC:   · regular rate and rhythm, S1, S2 normal. No murmur, click, rub or gallop  · Apical impulse normal  · LEGS:  Lower extremity edema: none    SKIN: warm and dry  PSYCH:    · Alert and oriented  · Normal reasoning, insight good  · Facial expressions full, mood appropriate  · No memory disturbance noted  MUSCULOSKEL:    · No significant finger or nail findings  NEURO:   · CN 2-12 intact  Diabetic Foot Exam  · Foot exam reveals normal cap refill  · Feet normal skin color, no callouses, no ulcers, no venous stasis  · Feet- no deformities  · sensation grossly normal to light touch in feet.   FEET:Monofilament Sensation:  normal      Assessment and Plan:      Diagnosis Orders   1. Type 2 diabetes mellitus with diabetic nephropathy, with long-term current use of insulin (HCC)  POCT glycosylated hemoglobin (Hb A1C)   2. Hyperlipidemia LDL goal <70     3. Obesity, Class III, BMI 40-49.9 (morbid obesity) ([facility] [address]     4. Pulmonary emphysema, unspecified emphysema type ([facility] [address]     5. Coronary artery disease involving native coronary artery of native heart without angina pectoris     6. Essential hypertension     Stable. Continue current Tx plan. Any changes marked below. MA- sample Anoro inhaler if we have  INSTRUCTIONS  NEXT APPOINTMENT: Please schedule annual complete physical (30 minutes) in 3 months. · PLEASE TAKE THIS FORM TO CHECK-OUT WINDOW TO SCHEDULE NEXT VISIT.    · Watch out for the weight gain swelling seen and no joint  tenderness was elicited. No scoliosis. Normal range of motion. There was no joint instability noted. Muscle  strength and tone were normal.  Neurological: cranial nerves II-XII are grossly intact. Reflexes are normal. No sensory changes.  Psychiatric: oriented to person, oriented to place and oriented to time. Interactive and mood appears depressed and anxious in the clinic.  appropriate. judgement not impaired. Normal attention span. Short term memory intact.  Skin, Hair, Nails: normal skin color and pigmentation and no rash. no foot ulcers and no skin ulcer was  seen. normal skin turgor. no clubbing or cyanosis of the fingernails.  Breasts:  No masses, nipple discharge, or axillary adenopathy.     Rectal: normal tone, no masses.  : Pelvic exam: Normal external genitalia.  Bartholin, urethral, and skene glands appear normal.  Bladder and urethra appear normal.  No adnexal masses or tenderness. Cervix appears normal. No cervical motion tenderness.  She does have a somewhat small intrados which makes it difficult to do the pap smear. I had some difficulty in visualizing the cervix completely, but the pelvic exam was otherwise normal.  Was able to obtain pap smear today which is still pending.  No vaginal discharge noted, no bleeding.          ASSESSMENT AND PLAN:   This is a 58 year old year-old female who presents with     1. Hyperlipidemia, unspecified hyperlipidemia type  Continue current medications.  - COMPREHENSIVE METABOLIC PANEL; Future  - LIPID PANEL WITH REFLEX; Future    2. HTN (hypertension), benign  Under suboptimal control.  Will try increasing the dose of Lisinopril up to 40 mg daily instead of 20 mg.  Side effects reviewed.  Cut back on Lisinopril if blood pressure is very low.  Goal blood pressure is 120/80 mmHg.  Medication dose adjustment is done when blood pressure is above 140/90 mmHg.  Can consider other medications if needed.  Recheck blood pressure in a month.  -  lisinopril (ZESTRIL) 40 MG tablet; Take 1 tablet by mouth daily.  Dispense: 30 tablet; Refill: 11    3. Malaise and fatigue  Was on Bupropion in the past, but is no longer taking it. She stopped it because she felt it was causing high blood pressure.  It did not seem to make a difference in her blood pressure when she went off the Wellbutrin, but her mood worsened, and she was feeling better when she was taking it.  Has had some trouble focusing and concentrating.  Also has chronic insomnia problems.  Feels that her mind races at night when she is trying to fall asleep.  At this point, will have to go back on the Bupropion, but at a lower dose of 150 mg daily instead of 300 mg.  Will add in some Lexapro 5 mg daily to help with mood and anxiety. Significance discussed.  If we can improve anxiety symptoms, she likely will sleep better and then have more energy during the day.   Will also check her labs today to look for other causes of her symptoms, such as checking thyroid and CBC. Discussed in detail.  If she does not respond favorably to the above combination of Wellbutrin and Lexapro, consider adding in Trazodone at bedtime for insomnia.  Side effects reviewed and discussed. Patient will notify us for any side effects.  Consider increasing the dose of bupropion back to 300 mg daily. Side effects of anxiety reviewed.  Consider adding in a low dose of Seroquel at bedtime.   Patient did question, possibly going on Nuvigil, but I told her that is more for people that have narcolepsy, and it may cause even more insomnia problems and would not recommend being on that particular medicine. Mode of mechanism discussed in detail.  Can consider referring her to a sleep specialist for their opinion, given her history of chronic insomnia.  Goal is to feel better and sleep better after a few weeks.  Blood test to monitor liver and kidney function are not needed as the medication does not cause any liver or kidney problems.  Reassured patient.  - CBC WITH DIFFERENTIAL  - THYROID STIMULATING HORMONE  - FREE T4    4. Recurrent major depressive disorder, remission status unspecified (CMS/HCC)  As above.  - buPROPion XL (WELLBUTRIN XL) 150 MG 24 hr tablet; Take 1 tablet by mouth daily.  Dispense: 30 tablet; Refill: 1  - escitalopram (Lexapro) 5 MG tablet; Take 1 tablet by mouth daily.  Dispense: 30 tablet; Refill: 11    5. Chronic insomnia  As above.    6. Well adult exam  Reviewed and discussed family, personal, social and past surgical history.    7. Screening for cervical cancer  Ordered pap smear, refer to orders.  - PAP ORDER    8. Anxiety  As above.  - buPROPion XL (WELLBUTRIN XL) 150 MG 24 hr tablet; Take 1 tablet by mouth daily.  Dispense: 30 tablet; Refill: 1  - escitalopram (Lexapro) 5 MG tablet; Take 1 tablet by mouth daily.  Dispense: 30 tablet; Refill: 11      Patient Instructions   Strategies for a Good Night’s Sleep  Sleep disruption is common, especially during times when you feel emotionally overwhelmed. Anxiety, relentless replaying of the day's events, and heightened emotions may significantly interfere with your sleep. Lack of sleep robs you of needed rest, making management of illness more difficult.    The most common cause of insomnia is a change in our daily routine. For example traveling, change in work hours, & relationship conflicts may cause sleep problems.    It's best to look for the cause of insomnia before starting treatment with medicine. Keeping a sleep diary for one to two weeks is a smart way to start.  Tracking sleep times, caffeine and alcohol intake, etc. can provide clues. Changing these behaviors might be all that's needed to help you sleep better. By maintaining good sleep habits (sleep hygiene), you may be able to avoid taking medicine.  Sticking with a regular sleep schedule and a calming bedtime routine can help.    DO:  · Go to bed and get up at the same time each day- even on weekends.  · Get  regular exercise each day, preferably in the morning. There is good evidence that regular exercise improves restful sleep. This includes stretching and aerobic exercise.  · Get regular exposure to outdoor or bright lights during the day.  · Keep the temperature in your bedroom comfortable.  · Keep the bedroom quiet when sleeping.  · Keep the bedroom dark.  Use dark shades on the windows.  · Avoid large meals just before bedtime.  · Use your bedroom only for sleep and sexual activity.  · Use a relaxation exercise just before going to sleep such has muscle relaxation, imagery, massage, warm bath etc.  · Keep your feet and hands warm. Wear warm socks and or mittens or gloves to bed.  · Avoid bright lights from the TV, computers, video games, etc. before bed.  · Melatonin 5mg before bed can help as a natural sleep aid.    DO NOT:  · Exercise just before going to bed.  · Engage in stimulating activity just before bed, such as playing a competitive game, watching an exciting program on television or a movie, or having an important discussion with a loved one.  · Have caffeine in the evening (coffee, many teas, chocolate, soda, etc.)  · Read or watch television in bed.  · Use alcohol to help you sleep.  · Do not nap during the day.  If you must nap, do so only for 30 minutes in the early afternoon.  · Command yourself to go to sleep. This only makes your mind and body more alert.    If you are unable to fall asleep within 30 minutes, get up, go to a different room (or different part of the bedroom), participate in a quiet activity (e.g. non-excitable reading), then return to bed when you feel sleepy.  Do this as many times during the night as needed.        Instructions provided as documented in the AVS.  Medication use,effects and side effects discussed in detail with patient.  The patient indicated understanding of the diagnosis and agreed with the plan of care.  Medical compliance with plan discussed and risks of  non-compliance reviewed.    Patient education completed on disease process, etiology & prognosis.    Patient expresses understanding of the plan.    Proper usage and side effects of medications reviewed & discussed.    Refer to orders.    Return to clinic as clinically indicated as discussed with patient who verbalized understanding of & agreement with the plan.    Return in about 6 weeks (around 2/16/2023) for Recheck.    I,  Dr. Amber Polo, have created a visit summary document based on the audio recording between Dr. James Gavin MD and this patient for the physician to review, edit as needed, and authenticate.  Creation Date: 1/6/2023 Time: 4:06 AM      I have reviewed and edited the visit summary above and attest that it is accurate.   -Sterling Gavin MD

## 2023-01-06 NOTE — PROGRESS NOTES
Physician Progress Note      PATIENT:               BASIA BELTRAN  CSN #:                  [id number]  :                       1940  ADMIT DATE:       2023 12:21 PM  DISCH DATE:        1/3/2023 4:12 PM  RESPONDING  PROVIDER #:        Julia Coe MD          QUERY TEXT:    Patient admitted with CHF and COPD exacerbation. Documentation reflects acute   on chronic systolic CHF in HP.  If possible, please document in the progress   notes and discharge summary if acute on chronic systolic CHF was:    The medical record reflects the following:  Risk Factors: 83yo with CHF and COPD    Clinical Indicators:  - BNP 2734,  Chest xray, Findings suggest mild   congestive heart failure    HP, CHF - acute on chronic systolic- Diureses with IV Lasix ( monitor   Creatinine level) - Low salt diet Monitoring strict I&Os and daily weights.    IM note , Congestive heart failure chronic systolic    Treatment: IV Lasix, daily wts, strict I&Os, supportive care  Options provided:  -- acute on chronic systolic CHF confirmed after study  -- acute on chronic systolic CHF treated and resolved  -- acute on chronic systolic CHF ruled out after study  -- Other - I will add my own diagnosis  -- Disagree - Not applicable / Not valid  -- Disagree - Clinically unable to determine / Unknown  -- Refer to Clinical Documentation Reviewer    PROVIDER RESPONSE TEXT:    acute on chronic systolic CHF treated and resolved.    Query created by: Dalila Gil on 1/3/2023 11:49 AM      QUERY TEXT:    Patient admitted with COPD exacerbation and CHF.   Noted documentation of   acute respiratory failure in HP and Hypoxemia in ED note.  If possible, please document in progress notes and discharge summary if you   are evaluating and /or treating any of the following:    The medical record reflects the following:  Risk Factors: 83 yo with COPD and CHF    Clinical Indicators: No abgs drawn.  ED, Patient does not appear in acute distress.  Pulmonary effort is normal.   Noted to be slightly short of breath and is on nasal cannula. When he speaks   in full sentences he does get winded slightly. HP, Acute respiratory failure On 2 L O2 via NC. He does not wear O2 at   baseline. Placed on 2 L here    Treatment: 2L O2, Solumedrol, IV lasix  Options provided:  -- Acute respiratory failure confirmed and Hypoxemia ruled out as evidence by,   please specify. -- Hypoxemia confirmed and acute respiratory failure ruled out  -- Other - I will add my own diagnosis  -- Disagree - Not applicable / Not valid  -- Disagree - Clinically unable to determine / Unknown  -- Refer to Clinical Documentation Reviewer    PROVIDER RESPONSE TEXT:    After study, Hypoxemia confirmed and acute respiratory failure ruled out. Query created by:  Edin Randolph on 1/3/2023 12:00 PM      Electronically signed by:  Nikos Mejía MD 1/6/2023 10:02 AM

## 2023-01-09 RX ORDER — IBUPROFEN 200 MG
TABLET ORAL
Qty: 400 EACH | Refills: 1 | Status: SHIPPED | OUTPATIENT
Start: 2023-01-09

## 2023-01-09 NOTE — TELEPHONE ENCOUNTER
----- Message from Elsy Carias sent at 1/9/2023  3:26 PM EST -----  Subject: Message to Provider    QUESTIONS  Information for Provider? Pt called needed his insulin syringes script   sent to [facility] in [place]. Their Phone number is [phone]. He   states that he is completely out and is requesting this script be sent   over as soon as possible. Please return call when script has been sent. He   states he is unable to eat until he gets his syringes. Please advise.   ---------------------------------------------------------------------------  --------------  Ángel MONAHAN  [phone]; OK to leave message on voicemail  ---------------------------------------------------------------------------  --------------  SCRIPT ANSWERS  Relationship to Patient?  Self

## 2023-01-11 ENCOUNTER — OFFICE VISIT (OUTPATIENT)
Dept: FAMILY MEDICINE CLINIC | Age: 83
End: 2023-01-11

## 2023-01-11 VITALS
SYSTOLIC BLOOD PRESSURE: 120 MMHG | TEMPERATURE: 98.3 F | OXYGEN SATURATION: 98 % | DIASTOLIC BLOOD PRESSURE: 64 MMHG | HEIGHT: 68 IN | HEART RATE: 77 BPM | BODY MASS INDEX: 35.04 KG/M2 | RESPIRATION RATE: 18 BRPM | WEIGHT: 231.2 LBS

## 2023-01-11 DIAGNOSIS — E11.21 TYPE 2 DIABETES MELLITUS WITH DIABETIC NEPHROPATHY, WITH LONG-TERM CURRENT USE OF INSULIN (HCC): ICD-10-CM

## 2023-01-11 DIAGNOSIS — Z79.4 TYPE 2 DIABETES MELLITUS WITH DIABETIC NEPHROPATHY, WITH LONG-TERM CURRENT USE OF INSULIN (HCC): ICD-10-CM

## 2023-01-11 DIAGNOSIS — I48.0 PAROXYSMAL ATRIAL FIBRILLATION (HCC): ICD-10-CM

## 2023-01-11 DIAGNOSIS — J44.1 CHRONIC OBSTRUCTIVE PULMONARY DISEASE WITH ACUTE EXACERBATION (HCC): Primary | ICD-10-CM

## 2023-01-11 DIAGNOSIS — I10 ESSENTIAL HYPERTENSION: ICD-10-CM

## 2023-01-11 DIAGNOSIS — E66.01 SEVERE OBESITY (BMI 35.0-39.9) WITH COMORBIDITY (HCC): ICD-10-CM

## 2023-01-11 DIAGNOSIS — I50.33 ACUTE ON CHRONIC DIASTOLIC CHF (CONGESTIVE HEART FAILURE) (HCC): ICD-10-CM

## 2023-01-11 DIAGNOSIS — N18.4 CHRONIC RENAL DISEASE, STAGE 4, SEVERELY DECREASED GLOMERULAR FILTRATION RATE BETWEEN 15-29 ML/MIN/1.73 SQUARE METER (HCC): ICD-10-CM

## 2023-01-11 DIAGNOSIS — I42.0 DILATED CARDIOMYOPATHY (HCC): ICD-10-CM

## 2023-01-11 PROBLEM — N18.32 STAGE 3B CHRONIC KIDNEY DISEASE (HCC): Status: RESOLVED | Noted: 2021-09-17 | Resolved: 2023-01-11

## 2023-01-11 PROBLEM — I70.235 ATHEROSCLEROSIS OF NATIVE ARTERIES OF RIGHT LEG WITH ULCERATION OF OTHER PART OF FOOT (HCC): Status: RESOLVED | Noted: 2021-09-30 | Resolved: 2023-01-11

## 2023-01-11 PROBLEM — L97.412 ULCER OF RIGHT HEEL, WITH FAT LAYER EXPOSED (HCC): Status: RESOLVED | Noted: 2022-11-10 | Resolved: 2023-01-11

## 2023-01-11 PROBLEM — I83.029 VENOUS ULCER OF LEFT LEG (HCC): Status: RESOLVED | Noted: 2022-05-15 | Resolved: 2023-01-11

## 2023-01-11 PROBLEM — L97.512 ULCER OF TOE OF RIGHT FOOT, WITH FAT LAYER EXPOSED (HCC): Status: RESOLVED | Noted: 2021-09-30 | Resolved: 2023-01-11

## 2023-01-11 PROBLEM — L97.929 VENOUS ULCER OF LEFT LEG (HCC): Status: RESOLVED | Noted: 2022-05-15 | Resolved: 2023-01-11

## 2023-01-11 RX ORDER — BLOOD-GLUCOSE SENSOR
EACH MISCELLANEOUS
Qty: 12 EACH | Refills: 5 | Status: SHIPPED | OUTPATIENT
Start: 2023-01-11

## 2023-01-11 RX ORDER — TORSEMIDE 20 MG/1
40 TABLET ORAL 2 TIMES DAILY
Status: SHIPPED | COMMUNITY
Start: 2023-01-11

## 2023-01-11 ASSESSMENT — PATIENT HEALTH QUESTIONNAIRE - PHQ9
SUM OF ALL RESPONSES TO PHQ QUESTIONS 1-9: 0
1. LITTLE INTEREST OR PLEASURE IN DOING THINGS: 0
SUM OF ALL RESPONSES TO PHQ QUESTIONS 1-9: 0
SUM OF ALL RESPONSES TO PHQ9 QUESTIONS 1 & 2: 0
SUM OF ALL RESPONSES TO PHQ QUESTIONS 1-9: 0
2. FEELING DOWN, DEPRESSED OR HOPELESS: 0
SUM OF ALL RESPONSES TO PHQ QUESTIONS 1-9: 0

## 2023-01-11 NOTE — PROGRESS NOTES
Post-Discharge Transitional Care Management Services  SUBJECTIVE  Mica Workman YOB: 1940  Date of Visit:  1/11/2023    Chief Complaint   Patient presents with    Follow-Up from [facility] to er 1/1/2023 for sob and swelling. Was admitted and discharged 1/3/2023. Doing much better. Inpatient course: Discharge summary reviewed- see chart. Diagnosed with: COPD exac, CHF  Had COPD exac with HF- Tx with Lasix and steroids  Prednisone done, breathing OK  Daily weights been stable  Wounds healed, no longer going to wound center    Isaías A [address] Maintenance   Topic Date Due    Shingles vaccine (2 of 3) 11/28/2012    DTaP/Tdap/Td vaccine (3 - Td or Tdap) 11/28/2021    Annual Wellness Visit (AWV)  05/25/2022    Depression Screen  05/03/2023    Lipids  01/05/2024    Flu vaccine  Completed    Pneumococcal 65+ years Vaccine  Completed    COVID-19 Vaccine  Completed    Hepatitis A vaccine  Aged Out    Hib vaccine  Aged Out    Meningococcal (ACWY) vaccine  Aged Out     The ASCVD Risk score (Edwin TORRES, et al., 2019) failed to calculate for the following reasons: The 2019 ASCVD risk score is only valid for ages 36 to 78  Prior to Visit Medications    Medication Sig Taking? Authorizing Provider   Continuous Blood Gluc Sensor (DEXCOM G6 SENSOR) MISC Apply weekly Yes Mariposa Rice MD   torsemide (DEMADEX) 20 MG tablet 2 tablets  in the morning and 2 tablets in the evening. Take 2 tabs (80mg)in AM and 2 tabs(80mg) in afternoon. Derek Charles MD   INSULIN SYRINGE .5CC/29G 29G X 1/2\" 0.5 ML MISC INJECT 4 TIMES DAILY AS NEEDED Yes Blanca Wiley Day, MD   carvedilol (COREG) 6.25 MG tablet Take 6.25 mg by mouth 2 times daily Yes Historical Provider, MD   omeprazole (PRILOSEC) 20 MG delayed release capsule TAKE 1 CAPSULE BY MOUTH TWICE DAILY Yes Mariposa Rice MD   Continuous Blood Gluc  ([address]) [address] As needed for diabetes Yes Mariposa Rice MD   Continuous Blood Gluc Transmit (DEXCOM G6 TRANSMITTER) MISC As needed for diabetes Yes Christiana Elizabeth MD   apixaban (ELIQUIS) 5 MG TABS tablet Take 1 tablet by mouth 2 times daily Yes Emilie Grady MD   dapagliflozin (FARXIGA) 5 MG tablet Take 2 tablets by mouth daily LOT [id number] EXP 07/31/2024 4 BOXES Yes Emilie Grady MD   potassium chloride (KLOR-CON M) 20 MEQ extended release tablet Take 1 tablet by mouth daily Yes Emilie Grady MD   hydrALAZINE (APRESOLINE) 10 MG tablet Take 1 tablet by mouth 3 times daily Yes Emilie Grady MD   atorvastatin (LIPITOR) 40 MG tablet TAKE 1 TABLET BY MOUTH EVERY EVENING Yes RAYMUNDO Penn - CNP   amiodarone (CORDARONE) 200 MG tablet TAKE 1 TABLET BY MOUTH DAILY Yes Christiana Elizabeth MD   insulin NPH (NOVOLIN N) 100 UNIT/ML injection vial Take 50 units with breakfast and 34 units with dinner. Patient taking differently: 20 Units Take 20 units with breakfast Yes Christiana Elizabeth MD   gabapentin (NEURONTIN) 600 MG tablet TAKE UP TO 5 TABLETS BY MOUTH OVER 24 HOURS AS DIRECTED  Patient taking differently: 1,200 mg nightly. Yes Christiana Elizabeth MD   albuterol sulfate  (90 Base) MCG/ACT inhaler INHALE 2 PUFFS INTO THE LUNGS EVERY 6 HOURS AS NEEDED FOR WHEEZING Yes Christiana Elizabeth MD   Lancets MISC 1 each by Does not apply route daily Use to check glucose twice a day. One Touch brand. DX E11.9 Yes Christiana Elizabeth MD   blood glucose monitor kit and supplies Test 2 times a day & as needed for symptoms of irregular blood glucose. Yes Christiana Elizabeth MD   blood glucose monitor strips Test 2 times a day & as needed for symptoms of irregular blood glucose.  Yes Christiana Elizabeth MD   blood glucose test strips (ASCENSIA AUTODISC VI;ONE TOUCH ULTRA TEST VI) strip four times daily Yes Christiana Elizabeth MD   insulin regular (NOVOLIN R RELION) 100 UNIT/ML injection INJECT 20 TO 30 UNITS SUBCUTANEOUSLY THREE TIMES DAILY BEFORE MEAL(S)  Patient taking differently: 30 Units INJECT 20 TO 30 UNITS with dinner Yes Christiana Elizabeth MD   Nebulizers (AIRIAL COMPACT MINI NEBULIZER) MISC 1 each by Does not apply route every 6 hours as needed (wheezing or shortness of breath) Yes Tiago Solis MD   Respiratory Therapy Supplies (NEBULIZER/TUBING/MOUTHPIECE) KIT 1 kit by Does not apply route every 6 hours as needed (for wheezing or shortness of breath) Yes Tiago Solis MD   vitamin D (ERGOCALCIFEROL) 75383 units CAPS capsule TK 1 C PO WEEKLY Yes Historical Provider, MD   aspirin 81 MG EC tablet Take 81 mg by mouth daily Yes Historical Provider, MD   isosorbide mononitrate (IMDUR) 30 MG CR tablet Take 1 tablet by mouth every morning Dr. Lc Garcia Yes Tiago Solis MD   nitroGLYCERIN (NITROSTAT) 0.3 MG SL tablet Take one sublingual for chest pain. May repeat twice at 5 min intervals. If not getting relief call 911.  Yes Tiago Solis MD   FEROSUL 325 (65 Fe) MG tablet TAKE 1 TABLET BY MOUTH TWICE DAILY  Patient not taking: Reported on 2022  Tiago Solis MD   potassium chloride (KLOR-CON) 10 MEQ extended release tablet TAKE 1 TABLET BY MOUTH DAILY  Tiago Solis MD   Cyanocobalamin (B-12) 500 MCG TABS TAKE 1 TABLET BY MOUTH DAILY  Patient not taking: Reported on 2022  Tiago Solis MD   albuterol (PROVENTIL) (2.5 MG/3ML) 0.083% nebulizer solution Take 3 mLs by nebulization every 6 hours as needed for Wheezing  Tiago Solis MD   metoprolol (LOPRESSOR) 25 MG tablet Take 25 mg by mouth daily   Historical Provider, MD      Family History   Problem Relation Age of Onset    Cancer Mother         breast    Cancer Father         throat     Social History     Tobacco Use    Smoking status: Former     Packs/day: 3.50     Years: 32.00     Pack years: 112.00     Types: Cigarettes     Quit date: 1985     Years since quittin.0    Smokeless tobacco: Never    Tobacco comments:     advised not to resume   Substance Use Topics    Alcohol use: No     Alcohol/week: 0.0 standard drinks    Drug use: No      LAST LABS  Cholesterol, Total   Date Value Ref Range Status 01/05/2023 149 0 - 199 mg/dL Final     LDL Calculated   Date Value Ref Range Status   01/05/2023 74 <100 mg/dL Final     HDL   Date Value Ref Range Status   01/05/2023 47 40 - 60 mg/dL Final     Comment:     An HDL cholesterol less than 40 mg/dL is low and  constitutes a coronary heart disease risk factor. An HDL cholesterol greater than 60 mg/dL is a  negative risk factor for coronary heart disease.      03/08/2012 37 (L) 40 - 60 mg/dl Final     Triglycerides   Date Value Ref Range Status   01/05/2023 141 0 - 150 mg/dL Final     Lab Results   Component Value Date    GLUCOSE 137 (H) 01/05/2023     Lab Results   Component Value Date     01/05/2023    K 3.8 01/05/2023    CREATININE 2.3 (H) 01/05/2023     Lab Results   Component Value Date    WBC 5.4 01/05/2023    HGB 14.2 01/05/2023    HCT 43.5 01/05/2023    MCV 96.0 01/05/2023     (L) 01/05/2023     Lab Results   Component Value Date    ALT 15 01/01/2023    AST 30 01/01/2023    ALKPHOS 109 01/01/2023    BILITOT 0.5 01/01/2023     TSH (uIU/mL)   Date Value   02/25/2021 2.44     Lab Results   Component Value Date    LABA1C 7.0 01/01/2023     Objective:   PHYSICAL EXAM  /64 (Site: Left Upper Arm, Position: Sitting, Cuff Size: Medium Adult)   Pulse 77   Temp 98.3 °F (36.8 °C) (Oral)   Resp 18   Ht 5' 8\" (1.727 m)   Wt 231 lb 3.2 oz (104.9 kg)   SpO2 98%   BMI 35.15 kg/m²   BP Readings from Last 5 Encounters:   01/11/23 120/64   01/05/23 (!) 155/82   01/03/23 (!) 141/69   12/29/22 (!) 112/57   12/22/22 (!) 150/66     Wt Readings from Last 5 Encounters:   01/11/23 231 lb 3.2 oz (104.9 kg)   01/01/23 235 lb (106.6 kg)   12/22/22 247 lb (112 kg)   12/20/22 280 lb (127 kg)   09/29/22 237 lb (107.5 kg)      GENERAL: well-developed, well-nourished, alert, no distress  LUNGS:  Breathing unlabored  clear to auscultation bilaterally and good air movement  CARDIOVASC: regular rate and rhythm, S1, S2 normal, no murmur, click, rub or gallop  Apical impulse normal  LEGS:  Lower extremity edema: none       Assessment and Plan:      Diagnosis Orders   1. Chronic obstructive pulmonary disease with acute exacerbation ([facility]        2. Type 2 diabetes mellitus with diabetic nephropathy, with long-term current use of insulin ([facility])  Continuous Blood Gluc Sensor (DEXCOM G6 SENSOR) MISC      3. Essential hypertension  Basic Metabolic Panel      4. Severe obesity (BMI 35.0-39. 9) with comorbidity ([facility])        5. Paroxysmal atrial fibrillation ([facility]        6. Chronic renal disease, stage 4, severely decreased glomerular filtration rate between 15-29 mL/min/1.73 square meter ([facility])  Basic Metabolic Panel      7. Dilated cardiomyopathy ([facility]        8. Acute on chronic diastolic CHF (congestive heart failure) ([facility])  torsemide (DEMADEX) 20 MG tablet      improved. Plan as above and below. Needs to balance renal functions and CHF    Diagnostic test results reviewed. Patient risk of morbidity and mortality: moderate  Medical Decision Making: low complexity    TO DO LIST  PLEASE GET BLOODWORK DRAWN in about 4 weeks ON FIRST FLOOR in 170. Keep appt in April.

## 2023-01-13 ENCOUNTER — TELEPHONE (OUTPATIENT)
Dept: FAMILY MEDICINE CLINIC | Age: 83
End: 2023-01-13

## 2023-01-17 ENCOUNTER — TELEPHONE (OUTPATIENT)
Dept: FAMILY MEDICINE CLINIC | Age: 83
End: 2023-01-17

## 2023-01-17 DIAGNOSIS — I50.33 ACUTE ON CHRONIC DIASTOLIC CHF (CONGESTIVE HEART FAILURE) (HCC): ICD-10-CM

## 2023-01-17 RX ORDER — TORSEMIDE 20 MG/1
TABLET ORAL
Qty: 120 TABLET | Refills: 0 | Status: SHIPPED | OUTPATIENT
Start: 2023-01-17

## 2023-01-17 NOTE — TELEPHONE ENCOUNTER
The med was sent for 2 of the 80 mg torsemide BID. It should only be 40 mg BID. Should we call to just have him take 1 on the 40 mg BID?

## 2023-01-17 NOTE — TELEPHONE ENCOUNTER
Pt calling stating that he told Dr. Salcido incorrectly about Torsemide at his visit. Stated that he takes 2-20mg tablets in the morning and 2-20 mg tablets at night. Total of 40 mg in the morning and 40 mg at night.    Please advise if script can be changed and resent to the pharmacy  Pharm Confirmed- [facility] on [address]

## 2023-01-17 NOTE — TELEPHONE ENCOUNTER
I adjusted the prescription.   If he got 40 mg pills, have him just take one in AM and one in PM until he needs a refill

## 2023-01-18 RX ORDER — TORSEMIDE 20 MG/1
TABLET ORAL
Qty: 360 TABLET | OUTPATIENT
Start: 2023-01-18

## 2023-01-18 NOTE — TELEPHONE ENCOUNTER
Informed him of this on Tuesday evening but Epic crashed and could not put in message till this morning.

## 2023-01-19 ENCOUNTER — OFFICE VISIT (OUTPATIENT)
Dept: PULMONOLOGY | Age: 83
End: 2023-01-19
Payer: MEDICARE

## 2023-01-19 VITALS
HEART RATE: 80 BPM | WEIGHT: 237.2 LBS | DIASTOLIC BLOOD PRESSURE: 75 MMHG | OXYGEN SATURATION: 95 % | SYSTOLIC BLOOD PRESSURE: 120 MMHG | BODY MASS INDEX: 36.07 KG/M2

## 2023-01-19 DIAGNOSIS — I48.0 PAROXYSMAL ATRIAL FIBRILLATION (HCC): ICD-10-CM

## 2023-01-19 DIAGNOSIS — I50.22 CHRONIC SYSTOLIC CONGESTIVE HEART FAILURE (HCC): ICD-10-CM

## 2023-01-19 DIAGNOSIS — J96.12 CHRONIC RESPIRATORY FAILURE WITH HYPERCAPNIA (HCC): ICD-10-CM

## 2023-01-19 DIAGNOSIS — J44.9 COPD, MILD (HCC): Primary | ICD-10-CM

## 2023-01-19 PROCEDURE — G8484 FLU IMMUNIZE NO ADMIN: HCPCS | Performed by: INTERNAL MEDICINE

## 2023-01-19 PROCEDURE — G8417 CALC BMI ABV UP PARAM F/U: HCPCS | Performed by: INTERNAL MEDICINE

## 2023-01-19 PROCEDURE — 1123F ACP DISCUSS/DSCN MKR DOCD: CPT | Performed by: INTERNAL MEDICINE

## 2023-01-19 PROCEDURE — 1111F DSCHRG MED/CURRENT MED MERGE: CPT | Performed by: INTERNAL MEDICINE

## 2023-01-19 PROCEDURE — 3023F SPIROM DOC REV: CPT | Performed by: INTERNAL MEDICINE

## 2023-01-19 PROCEDURE — 99214 OFFICE O/P EST MOD 30 MIN: CPT | Performed by: INTERNAL MEDICINE

## 2023-01-19 PROCEDURE — 3074F SYST BP LT 130 MM HG: CPT | Performed by: INTERNAL MEDICINE

## 2023-01-19 PROCEDURE — G8427 DOCREV CUR MEDS BY ELIG CLIN: HCPCS | Performed by: INTERNAL MEDICINE

## 2023-01-19 PROCEDURE — 3078F DIAST BP <80 MM HG: CPT | Performed by: INTERNAL MEDICINE

## 2023-01-19 PROCEDURE — 1036F TOBACCO NON-USER: CPT | Performed by: INTERNAL MEDICINE

## 2023-01-19 NOTE — PROGRESS NOTES
PULMONARY OFFICE FOLLOW UP NOTE    REASON FOR VISIT:   Chief Complaint   Patient presents with    3 Month Follow-Up       DATE OF VISIT: 1/19/2023    HISTORY OF PRESENT ILLNESS: 81y.o. year old male is here for follow-up of COPD, chronic hypoxic and hypercapnic respiratory failure. Patient has past medical history of congestive heart failure with EF 30 to 35%, s/p permanent pacemaker, paroxysmal atrial fibrillation, CAD s/p CABG and PCI, CKD, diabetes mellitus type 2, obesity. Patient is doing well clinically. He denies any shortness of breath or cough or wheezing or chest pain or hemoptysis. His bronchodilator regimen consist of albuterol inhaler and albuterol nebulization. He uses albuterol nebs twice a day and albuterol inhaler as needed. His insurance did not cover an oral Ellipta. PFTs are suggestive of mild obstruction with FEV1 80%. Patient has been wearing NIV every night. He has noticed improvement in his sleep quality. He uses oxygen with NIV. Does not use oxygen during daytime. Patient follows up with cardiologist at [address] [place] [address] for congestive heart failure. He was hospitalized in early January for acute exacerbation of congestive heart failure. He states that now he is watching his fluid intake and watching his weight every day. Diagnostic test reviewed:  I reviewed the PFT from 10/7/2022 and my interpretation is as follows. Mild obstructive airway disease. FEV1/FVC 63  FEV1 80%, 1.77 L  FVC 89%, 2.81 L  TLC 99%, 5.09 L  DLCO 82%      REVIEW OF SYSTEMS:   CONSTITUTIONAL SYMPTOMS: The patient denies fever, fatigue, night sweats, weight loss or weight gain. HEENT: No vision changes. No tinnitus, Denies sinus pain. No hoarseness, or dysphagia. NECK: Patient denies swelling in the neck. CARDIOVASCULAR: Denies chest pain, palpitation, syncope. RESPIRATORY: See above. GASTROINTESTINAL: Denies nausea, abdominal pain or change in bowel function.   GENITOURINARY: Denies obstructive symptoms. No history of incontinence. BREASTS: No masses or lumps in the breasts. SKIN: No rashes or itching. MUSCULOSKELETAL: Denies weakness or bone pain. NEUROLOGICAL: No headaches or seizures. PSYCHIATRIC: Denies mood swings or depression. ENDOCRINE: Denies heat or cold intolerance or excessive thirst.  HEMATOLOGIC/LYMPHATIC: Denies easy bruising or lymph node swelling. ALLERGIC/IMMUNOLOGIC: No environmental allergies. PAST MEDICAL HISTORY:   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 with ulceration of other part of foot ([address] 9/30/2021    Atrial flutter ([address] 2013    converted    Bleeding stomach ulcer oct 2015    BPH (benign prostatic hyperplasia)     CAD (coronary artery disease)     11/12 angio with 2 blocked bypass and 2 patent    Cellulitis of left thigh 5/10/2022    COPD (chronic obstructive pulmonary disease) ([address]     Diabetes mellitus type II     Edema     chronic left leg    Elevated PSA- nl 8/12/11     Hyperlipidemia     Hypertension     Ischemic cardiomyopathy     Lipoma of skin-RIGHT CHEST 5/5/2011    Obesity     Osteoarthritis     Peripheral venous insufficiency 9/30/2021    Skin tear of left forearm without complication 3/25/2460    Significant amount of epidermal loss with bleeding.     Spinal stenosis of lumbar region with neurogenic claudication- clinically 7/6/2018       PAST SURGICAL HISTORY:   Past Surgical History:   Procedure Laterality Date    CATARACT REMOVAL  2010, 2011    bilat    COLONOSCOPY      CORONARY ARTERY BYPASS GRAFT  1993    x 4    EYE SURGERY Left 2019    cataract coming back    JOINT REPLACEMENT Right total knee replacement    JOINT REPLACEMENT Left 09/14/2016       SOCIAL HISTORY:   Social History     Tobacco Use    Smoking status: Former     Packs/day: 3.50     Years: 32.00     Pack years: 112.00     Types: Cigarettes Quit date: 1985     Years since quittin.0    Smokeless tobacco: Never    Tobacco comments:     advised not to resume   Substance Use Topics    Alcohol use: No     Alcohol/week: 0.0 standard drinks    Drug use: No       FAMILY HISTORY:   Family History   Problem Relation Age of Onset    Cancer Mother         breast    Cancer Father         throat       MEDICATIONS:     Current Outpatient Medications on File Prior to Visit   Medication Sig Dispense Refill    torsemide (DEMADEX) 20 MG tablet Take 2 tabs (40mg)in AM and 2 tabs(40mg) in afternoon. 120 tablet 0    Continuous Blood Gluc Sensor (DEXCOM G6 SENSOR) MISC Apply weekly 12 each 5    INSULIN SYRINGE .5CC/29G 29G X 1/2\" 0.5 ML MISC INJECT 4 TIMES DAILY AS NEEDED 400 each 1    carvedilol (COREG) 6.25 MG tablet Take 6.25 mg by mouth 2 times daily      omeprazole (PRILOSEC) 20 MG delayed release capsule TAKE 1 CAPSULE BY MOUTH TWICE DAILY 180 capsule 1    apixaban (ELIQUIS) 5 MG TABS tablet Take 1 tablet by mouth 2 times daily 60 tablet 2    dapagliflozin (FARXIGA) 5 MG tablet Take 2 tablets by mouth daily LOT [id number] EXP 2024 4 BOXES 30 tablet 1    potassium chloride (KLOR-CON M) 20 MEQ extended release tablet Take 1 tablet by mouth daily 90 tablet 1    hydrALAZINE (APRESOLINE) 10 MG tablet Take 1 tablet by mouth 3 times daily 30 tablet 2    atorvastatin (LIPITOR) 40 MG tablet TAKE 1 TABLET BY MOUTH EVERY EVENING 90 tablet 3    amiodarone (CORDARONE) 200 MG tablet TAKE 1 TABLET BY MOUTH DAILY 30 tablet 5    insulin NPH (NOVOLIN N) 100 UNIT/ML injection vial Take 50 units with breakfast and 34 units with dinner.  (Patient taking differently: 20 Units Take 20 units with breakfast) 30 mL 3    gabapentin (NEURONTIN) 600 MG tablet TAKE UP TO 5 TABLETS BY MOUTH OVER 24 HOURS AS DIRECTED (Patient taking differently: 1,200 mg nightly.) 150 tablet 11    albuterol sulfate  (90 Base) MCG/ACT inhaler INHALE 2 PUFFS INTO THE LUNGS EVERY 6 HOURS AS NEEDED FOR WHEEZING 18 g 5    Lancets MISC 1 each by Does not apply route daily Use to check glucose twice a day. One Touch brand. DX E11.9 100 each 3    insulin regular (NOVOLIN R RELION) 100 UNIT/ML injection INJECT 20 TO 30 UNITS SUBCUTANEOUSLY THREE TIMES DAILY BEFORE MEAL(S) (Patient taking differently: 30 Units INJECT 20 TO 30 UNITS with dinner) 30 mL 0    Nebulizers (AIRIAL COMPACT MINI NEBULIZER) MISC 1 each by Does not apply route every 6 hours as needed (wheezing or shortness of breath) 1 each 0    Respiratory Therapy Supplies (NEBULIZER/TUBING/MOUTHPIECE) KIT 1 kit by Does not apply route every 6 hours as needed (for wheezing or shortness of breath) 1 kit 1    vitamin D (ERGOCALCIFEROL) 32191 units CAPS capsule TK 1 C PO WEEKLY  2    aspirin 81 MG EC tablet Take 81 mg by mouth daily      isosorbide mononitrate (IMDUR) 30 MG CR tablet Take 1 tablet by mouth every morning Dr. Lord Sample - Cardio 30 tablet 6    nitroGLYCERIN (NITROSTAT) 0.3 MG SL tablet Take one sublingual for chest pain. May repeat twice at 5 min intervals. If not getting relief call 911. 25 tablet 3    Continuous Blood Gluc  (DEXCOM G6 ) PABLO As needed for diabetes 1 each 0    Continuous Blood Gluc Transmit (DEXCOM G6 TRANSMITTER) MISC As needed for diabetes 1 each 0    [DISCONTINUED] FEROSUL 325 (65 Fe) MG tablet TAKE 1 TABLET BY MOUTH TWICE DAILY (Patient not taking: Reported on 9/2/2022) 180 tablet 3    [DISCONTINUED] potassium chloride (KLOR-CON) 10 MEQ extended release tablet TAKE 1 TABLET BY MOUTH DAILY 90 tablet 1    blood glucose monitor kit and supplies Test 2 times a day & as needed for symptoms of irregular blood glucose. 1 kit 0    blood glucose monitor strips Test 2 times a day & as needed for symptoms of irregular blood glucose.  200 strip 3    blood glucose test strips (ASCENSIA AUTODISC VI;ONE TOUCH ULTRA TEST VI) strip four times daily 400 strip 3    [DISCONTINUED] Cyanocobalamin (B-12) 500 MCG TABS TAKE 1 TABLET BY MOUTH DAILY (Patient not taking: Reported on 9/2/2022) 90 tablet 1    albuterol (PROVENTIL) (2.5 MG/3ML) 0.083% nebulizer solution Take 3 mLs by nebulization every 6 hours as needed for Wheezing 50 vial 3    [DISCONTINUED] metoprolol (LOPRESSOR) 25 MG tablet Take 25 mg by mouth daily        No current facility-administered medications on file prior to visit. ALLERGIES:   Allergies as of 01/19/2023 - Fully Reviewed 01/19/2023   Allergen Reaction Noted    Entresto [sacubitril-valsartan] Other (See Comments) 02/25/2021      OBJECTIVE:   weight is 237 lb 3.2 oz (107.6 kg). His blood pressure is 120/75 and his pulse is 80. His oxygen saturation is 95%. PHYSICAL EXAM:    CONSTITUTIONAL: He is a 81y.o.-year-old who appears his stated age. He is alert and oriented x 3 and in no acute distress. HEENT: PERRL. No scleral icterus. No thrush, atraumatic, normocephalic. NECK: Supple, without cervical or supraclavicular lymphadenopathy:  CARDIOVASCULAR: S1 S2 RRR. Without murmer  RESPIRATORY & CHEST: Lungs are clear to auscultation and percussion. No wheezing, no crackles. Good air movement  GASTROINTESTINAL & ABDOMEN: Soft, nontender, positive bowel sounds in all quadrants, non-distended, without hepatosplenomegaly. GENITOURINARY: Deferred. MUSCULOSKELETAL: No tenderness to palpation of the axial skeleton. There is no clubbing. No cyanosis. No edema of the lower extremities. SKIN OF BODY: No rash or jaundice. PSYCHIATRIC EVALUATION: Normal affect. Patient answers questions appropriately. HEMATOLOGIC/LYMPHATIC/ IMMUNOLOGIC: No palpable lymphadenopathy. NEUROLOGIC: Alert and oriented x 3. Groslly non-focal. Motor strength is 5+/5 in all muscle groups. The patient has a normal sensorium globally. ASSESSMENT AND PLAN:     1. COPD, mild ([address]  PFT shows mild COPD with FEV1 80%. Patient could not afford Anoro Ellipta inhaler. He is using albuterol inhaler as needed and albuterol nebs twice daily. This is keeping symptoms under control. Continue the same. 2. Chronic respiratory failure with hypercapnia (HCC)  Continue NIV every night. Patient is very compliant with NIV. 3. Chronic systolic congestive heart failure [facility])  Patient follows up with cardiologist at [address] [place] [address]. Recent admission in early January for acute exacerbation of congestive heart failure. Patient is on Demadex 40 twice daily. 4. Paroxysmal atrial fibrillation (HCC)  On Eliquis. Return in about 6 months (around 7/19/2023). Lana Lo MD  Pulmonary Critical Care and Sleep Medicine  Electronically signed by Lana Lo MD on 1/19/2023 at 8:47 AM     This note was completed using dragon medical speech recognition software. Grammatical errors, random word insertions, pronoun errors and incomplete sentences are occasional consequences of this technology due to software limitations. If there are questions or concerns about the content of this note of information contained within the body of this dictation they should be addressed with the provider for clarification.

## 2023-02-09 RX ORDER — AMIODARONE HYDROCHLORIDE 200 MG/1
TABLET ORAL
Qty: 30 TABLET | Refills: 5 | Status: SHIPPED | OUTPATIENT
Start: 2023-02-09

## 2023-02-20 ENCOUNTER — HOSPITAL ENCOUNTER (OUTPATIENT)
Age: 83
Discharge: HOME | End: 2023-02-20
Payer: MEDICARE

## 2023-02-20 DIAGNOSIS — I10 ESSENTIAL HYPERTENSION: ICD-10-CM

## 2023-02-20 DIAGNOSIS — N18.4 CHRONIC RENAL DISEASE, STAGE 4, SEVERELY DECREASED GLOMERULAR FILTRATION RATE BETWEEN 15-29 ML/MIN/1.73 SQUARE METER (HCC): ICD-10-CM

## 2023-02-20 LAB
ANION GAP SERPL CALCULATED.3IONS-SCNC: 11 MMOL/L (ref 3–16)
BUN BLDV-MCNC: 33 MG/DL (ref 7–20)
CALCIUM SERPL-MCNC: 9 MG/DL (ref 8.3–10.6)
CHLORIDE BLD-SCNC: 98 MMOL/L (ref 99–110)
CO2: 29 MMOL/L (ref 21–32)
CREAT SERPL-MCNC: 2 MG/DL (ref 0.8–1.3)
GFR SERPL CREATININE-BSD FRML MDRD: 33 ML/MIN/{1.73_M2}
GLUCOSE BLD-MCNC: 134 MG/DL (ref 70–99)
POTASSIUM SERPL-SCNC: 4.2 MMOL/L (ref 3.5–5.1)
SODIUM BLD-SCNC: 138 MMOL/L (ref 136–145)

## 2023-02-20 PROCEDURE — 36415 COLL VENOUS BLD VENIPUNCTURE: CPT

## 2023-02-20 PROCEDURE — 80048 BASIC METABOLIC PNL TOTAL CA: CPT

## 2023-03-01 DIAGNOSIS — E78.5 HYPERLIPIDEMIA: ICD-10-CM

## 2023-03-01 DIAGNOSIS — I50.33 ACUTE ON CHRONIC DIASTOLIC CHF (CONGESTIVE HEART FAILURE) (HCC): ICD-10-CM

## 2023-03-01 RX ORDER — TORSEMIDE 20 MG/1
TABLET ORAL
Qty: 120 TABLET | Refills: 0 | Status: SHIPPED | OUTPATIENT
Start: 2023-03-01

## 2023-03-01 RX ORDER — TORSEMIDE 20 MG/1
TABLET ORAL
Qty: 360 TABLET | OUTPATIENT
Start: 2023-03-01

## 2023-03-01 RX ORDER — ATORVASTATIN CALCIUM 40 MG/1
TABLET, FILM COATED ORAL
Qty: 90 TABLET | Refills: 3 | Status: SHIPPED | OUTPATIENT
Start: 2023-03-01

## 2023-03-09 RX ORDER — POTASSIUM CHLORIDE 20 MEQ/1
20 TABLET, EXTENDED RELEASE ORAL DAILY
Qty: 90 TABLET | Refills: 1 | Status: SHIPPED | OUTPATIENT
Start: 2023-03-09

## 2023-03-15 ENCOUNTER — HOSPITAL ENCOUNTER (OUTPATIENT)
Age: 83
Discharge: HOME | End: 2023-03-15
Payer: MEDICARE

## 2023-03-15 LAB
25(OH)D3 SERPL-MCNC: 67.6 NG/ML
ALBUMIN SERPL-MCNC: 3.8 G/DL (ref 3.4–5)
ANION GAP SERPL CALCULATED.3IONS-SCNC: 16 MMOL/L (ref 3–16)
BUN SERPL-MCNC: 26 MG/DL (ref 7–20)
CALCIUM SERPL-MCNC: 9.2 MG/DL (ref 8.3–10.6)
CHLORIDE SERPL-SCNC: 98 MMOL/L (ref 99–110)
CO2 SERPL-SCNC: 28 MMOL/L (ref 21–32)
CREAT SERPL-MCNC: 1.9 MG/DL (ref 0.8–1.3)
CREAT UR-MCNC: 50.1 MG/DL (ref 39–259)
DEPRECATED RDW RBC AUTO: 15.5 % (ref 12.4–15.4)
FERRITIN SERPL IA-MCNC: 353.9 NG/ML (ref 30–400)
GFR SERPLBLD CREATININE-BSD FMLA CKD-EPI: 35 ML/MIN/{1.73_M2}
GLUCOSE SERPL-MCNC: 155 MG/DL (ref 70–99)
HCT VFR BLD AUTO: 38.7 % (ref 40.5–52.5)
HGB BLD-MCNC: 12.6 G/DL (ref 13.5–17.5)
IRON SATN MFR SERPL: 20 % (ref 20–50)
IRON SERPL-MCNC: 55 UG/DL (ref 59–158)
MCH RBC QN AUTO: 31.4 PG (ref 26–34)
MCHC RBC AUTO-ENTMCNC: 32.6 G/DL (ref 31–36)
MCV RBC AUTO: 96.4 FL (ref 80–100)
MICROALBUMIN UR DL<=1MG/L-MCNC: <1.2 MG/DL
MICROALBUMIN/CREAT UR: NORMAL MG/G (ref 0–30)
PHOSPHATE SERPL-MCNC: 4.4 MG/DL (ref 2.5–4.9)
PLATELET # BLD AUTO: 139 K/UL (ref 135–450)
PMV BLD AUTO: 9.2 FL (ref 5–10.5)
POTASSIUM SERPL-SCNC: 4.2 MMOL/L (ref 3.5–5.1)
PTH-INTACT SERPL-MCNC: 105.6 PG/ML (ref 14–72)
RBC # BLD AUTO: 4.02 M/UL (ref 4.2–5.9)
SODIUM SERPL-SCNC: 142 MMOL/L (ref 136–145)
TIBC SERPL-MCNC: 271 UG/DL (ref 260–445)
TRANSFERRIN SERPL-MCNC: 233 MG/DL (ref 200–360)
WBC # BLD AUTO: 4.8 K/UL (ref 4–11)

## 2023-03-15 PROCEDURE — 80069 RENAL FUNCTION PANEL: CPT

## 2023-03-15 PROCEDURE — 82570 ASSAY OF URINE CREATININE: CPT

## 2023-03-15 PROCEDURE — 83540 ASSAY OF IRON: CPT

## 2023-03-15 PROCEDURE — 85027 COMPLETE CBC AUTOMATED: CPT

## 2023-03-15 PROCEDURE — 82306 VITAMIN D 25 HYDROXY: CPT

## 2023-03-15 PROCEDURE — 36415 COLL VENOUS BLD VENIPUNCTURE: CPT

## 2023-03-15 PROCEDURE — 82043 UR ALBUMIN QUANTITATIVE: CPT

## 2023-03-15 PROCEDURE — 84466 ASSAY OF TRANSFERRIN: CPT

## 2023-03-15 PROCEDURE — 82728 ASSAY OF FERRITIN: CPT

## 2023-03-15 PROCEDURE — 83970 ASSAY OF PARATHORMONE: CPT

## 2023-03-23 ENCOUNTER — OFFICE VISIT (OUTPATIENT)
Dept: FAMILY MEDICINE CLINIC | Age: 83
End: 2023-03-23

## 2023-03-23 VITALS
HEART RATE: 76 BPM | HEIGHT: 68 IN | BODY MASS INDEX: 37.89 KG/M2 | SYSTOLIC BLOOD PRESSURE: 130 MMHG | DIASTOLIC BLOOD PRESSURE: 68 MMHG | WEIGHT: 250 LBS | OXYGEN SATURATION: 96 %

## 2023-03-23 DIAGNOSIS — E78.5 HYPERLIPIDEMIA LDL GOAL <70: ICD-10-CM

## 2023-03-23 DIAGNOSIS — I25.10 CORONARY ARTERY DISEASE INVOLVING NATIVE CORONARY ARTERY OF NATIVE HEART WITHOUT ANGINA PECTORIS: ICD-10-CM

## 2023-03-23 DIAGNOSIS — Z79.4 TYPE 2 DIABETES MELLITUS WITH DIABETIC NEPHROPATHY, WITH LONG-TERM CURRENT USE OF INSULIN (HCC): Primary | ICD-10-CM

## 2023-03-23 DIAGNOSIS — E11.21 TYPE 2 DIABETES MELLITUS WITH DIABETIC NEPHROPATHY, WITH LONG-TERM CURRENT USE OF INSULIN (HCC): Primary | ICD-10-CM

## 2023-03-23 DIAGNOSIS — I10 ESSENTIAL HYPERTENSION: ICD-10-CM

## 2023-03-23 DIAGNOSIS — I50.22 CHRONIC SYSTOLIC (CONGESTIVE) HEART FAILURE (HCC): ICD-10-CM

## 2023-03-23 RX ORDER — NITROGLYCERIN 0.3 MG/1
TABLET SUBLINGUAL
Qty: 25 TABLET | Refills: 3 | Status: SHIPPED | OUTPATIENT
Start: 2023-03-23

## 2023-03-23 SDOH — ECONOMIC STABILITY: FOOD INSECURITY: WITHIN THE PAST 12 MONTHS, YOU WORRIED THAT YOUR FOOD WOULD RUN OUT BEFORE YOU GOT MONEY TO BUY MORE.: NEVER TRUE

## 2023-03-23 SDOH — ECONOMIC STABILITY: INCOME INSECURITY: HOW HARD IS IT FOR YOU TO PAY FOR THE VERY BASICS LIKE FOOD, HOUSING, MEDICAL CARE, AND HEATING?: NOT VERY HARD

## 2023-03-23 SDOH — ECONOMIC STABILITY: FOOD INSECURITY: WITHIN THE PAST 12 MONTHS, THE FOOD YOU BOUGHT JUST DIDN'T LAST AND YOU DIDN'T HAVE MONEY TO GET MORE.: NEVER TRUE

## 2023-03-23 SDOH — ECONOMIC STABILITY: HOUSING INSECURITY
IN THE LAST 12 MONTHS, WAS THERE A TIME WHEN YOU DID NOT HAVE A STEADY PLACE TO SLEEP OR SLEPT IN A SHELTER (INCLUDING NOW)?: NO

## 2023-03-23 NOTE — PROGRESS NOTES
CHRONIC CONDITION FOLLOW-UP     Assessment and Plan:      Diagnosis Orders   1. Type 2 diabetes mellitus with diabetic nephropathy, with long-term current use of insulin (HCC)  Hemoglobin A1C      2. Coronary artery disease involving native coronary artery of native heart without angina pectoris  nitroGLYCERIN (NITROSTAT) 0.3 MG SL tablet      3. Chronic systolic (congestive) heart failure        4. Hyperlipidemia LDL goal <70        5. Essential hypertension        Stable     Continue current Tx plan. Any changes marked below. INSTRUCTIONS  NEXT APPOINTMENT: Please schedule annual complete physical (30 minutes) in 4 months  with Dr. Herson Gilbert or her NP, Rustam Crawley. PLEASE GET FASTING BLOODWORK DRAWN AFTER 4/1. Lab is on first floor in [address]. Hours Monday to Friday 6:30 AM to 4 PM.      Subjective:      Chief Complaint   Patient presents with    Diabetes     3 mo f/u DM check     Yolanda Win is an 81 y.o. male who presents for follow up    Complaints:   none    CHART REVIEW   reports that he quit smoking about 38 years ago. His smoking use included cigarettes. He has a 112.00 pack-year smoking history. He has never used smokeless tobacco.  Health Maintenance Due   Topic Date Due    Shingles vaccine (1 of 2) 11/28/2012    DTaP/Tdap/Td vaccine (3 - Td or Tdap) 11/28/2021    Annual Wellness Visit (AWV)  05/25/2022     Current Outpatient Medications   Medication Instructions    albuterol (PROVENTIL) 2.5 mg, Nebulization, EVERY 6 HOURS PRN    albuterol sulfate  (90 Base) MCG/ACT inhaler 2 puffs, Inhalation, EVERY 6 HOURS PRN    amiodarone (CORDARONE) 200 MG tablet TAKE 1 TABLET BY MOUTH DAILY    apixaban (ELIQUIS) 5 mg, Oral, 2 TIMES DAILY    aspirin 81 mg, Oral, DAILY    atorvastatin (LIPITOR) 40 MG tablet TAKE 1 TABLET BY MOUTH EVERY EVENING    blood glucose monitor kit and supplies Test 2 times a day & as needed for symptoms of irregular blood glucose.     blood glucose monitor strips Test 2

## 2023-03-23 NOTE — PATIENT INSTRUCTIONS
INSTRUCTIONS  NEXT APPOINTMENT: Please schedule annual complete physical (30 minutes) in 4 months  with Dr. Heather Jeff or her NP, Ayo Khan. PLEASE GET FASTING BLOODWORK DRAWN AFTER 4/1. Lab is on first floor in [address].  Hours Monday to Friday 6:30 AM to 4 PM.

## 2023-04-04 ENCOUNTER — HOSPITAL ENCOUNTER (OUTPATIENT)
Age: 83
Discharge: HOME | End: 2023-04-04
Payer: MEDICARE

## 2023-04-04 DIAGNOSIS — Z79.4 TYPE 2 DIABETES MELLITUS WITH DIABETIC NEPHROPATHY, WITH LONG-TERM CURRENT USE OF INSULIN (HCC): ICD-10-CM

## 2023-04-04 DIAGNOSIS — E11.21 TYPE 2 DIABETES MELLITUS WITH DIABETIC NEPHROPATHY, WITH LONG-TERM CURRENT USE OF INSULIN (HCC): ICD-10-CM

## 2023-04-04 LAB
EST. AVERAGE GLUCOSE BLD GHB EST-MCNC: 177.2 MG/DL
HBA1C MFR BLD: 7.8 %

## 2023-04-04 PROCEDURE — 83036 HEMOGLOBIN GLYCOSYLATED A1C: CPT

## 2023-04-04 PROCEDURE — 36415 COLL VENOUS BLD VENIPUNCTURE: CPT

## 2023-04-05 DIAGNOSIS — I50.33 ACUTE ON CHRONIC DIASTOLIC CHF (CONGESTIVE HEART FAILURE) (HCC): ICD-10-CM

## 2023-04-06 RX ORDER — TORSEMIDE 20 MG/1
TABLET ORAL
Qty: 360 TABLET | Refills: 3 | Status: SHIPPED | OUTPATIENT
Start: 2023-04-06

## 2023-04-14 ENCOUNTER — TELEPHONE (OUTPATIENT)
Dept: FAMILY MEDICINE CLINIC | Age: 83
End: 2023-04-14

## 2023-04-14 DIAGNOSIS — E11.21 TYPE 2 DIABETES MELLITUS WITH DIABETIC NEPHROPATHY, WITH LONG-TERM CURRENT USE OF INSULIN (HCC): ICD-10-CM

## 2023-04-14 DIAGNOSIS — E11.649 UNCONTROLLED TYPE 2 DIABETES MELLITUS WITH HYPOGLYCEMIA, UNSPECIFIED HYPOGLYCEMIA COMA STATUS (HCC): ICD-10-CM

## 2023-04-14 DIAGNOSIS — Z79.4 TYPE 2 DIABETES MELLITUS WITH DIABETIC NEPHROPATHY, WITH LONG-TERM CURRENT USE OF INSULIN (HCC): ICD-10-CM

## 2023-04-18 RX ORDER — PROCHLORPERAZINE 25 MG/1
SUPPOSITORY RECTAL
Qty: 12 EACH | Refills: 5 | Status: SHIPPED | OUTPATIENT
Start: 2023-04-18 | End: 2023-04-20

## 2023-04-20 RX ORDER — PROCHLORPERAZINE 25 MG/1
SUPPOSITORY RECTAL
Qty: 12 EACH | Refills: 3 | Status: SHIPPED | OUTPATIENT
Start: 2023-04-20

## 2023-04-27 ENCOUNTER — OFFICE VISIT (OUTPATIENT)
Dept: FAMILY MEDICINE CLINIC | Age: 83
End: 2023-04-27

## 2023-04-27 VITALS
SYSTOLIC BLOOD PRESSURE: 134 MMHG | HEIGHT: 68 IN | BODY MASS INDEX: 38.19 KG/M2 | DIASTOLIC BLOOD PRESSURE: 70 MMHG | HEART RATE: 61 BPM | OXYGEN SATURATION: 94 % | WEIGHT: 252 LBS

## 2023-04-27 DIAGNOSIS — N18.4 CHRONIC RENAL DISEASE, STAGE 4, SEVERELY DECREASED GLOMERULAR FILTRATION RATE BETWEEN 15-29 ML/MIN/1.73 SQUARE METER (HCC): ICD-10-CM

## 2023-04-27 DIAGNOSIS — I48.0 PAROXYSMAL ATRIAL FIBRILLATION (HCC): ICD-10-CM

## 2023-04-27 DIAGNOSIS — Z00.00 MEDICARE ANNUAL WELLNESS VISIT, SUBSEQUENT: Primary | ICD-10-CM

## 2023-04-27 DIAGNOSIS — I25.10 CORONARY ARTERY DISEASE INVOLVING NATIVE CORONARY ARTERY OF NATIVE HEART WITHOUT ANGINA PECTORIS: ICD-10-CM

## 2023-04-27 DIAGNOSIS — D68.69 SECONDARY HYPERCOAGULABLE STATE (HCC): ICD-10-CM

## 2023-04-27 DIAGNOSIS — J44.9 CHRONIC OBSTRUCTIVE PULMONARY DISEASE, UNSPECIFIED COPD TYPE (HCC): ICD-10-CM

## 2023-04-27 DIAGNOSIS — Z79.4 TYPE 2 DIABETES MELLITUS WITH DIABETIC NEPHROPATHY, WITH LONG-TERM CURRENT USE OF INSULIN (HCC): ICD-10-CM

## 2023-04-27 DIAGNOSIS — E11.21 TYPE 2 DIABETES MELLITUS WITH DIABETIC NEPHROPATHY, WITH LONG-TERM CURRENT USE OF INSULIN (HCC): ICD-10-CM

## 2023-04-27 PROBLEM — J44.1 COPD EXACERBATION (HCC): Status: RESOLVED | Noted: 2023-01-01 | Resolved: 2023-04-27

## 2023-04-27 RX ORDER — IBUPROFEN 200 MG
TABLET ORAL
Qty: 400 EACH | Refills: 5 | Status: SHIPPED | OUTPATIENT
Start: 2023-04-27

## 2023-04-27 ASSESSMENT — PATIENT HEALTH QUESTIONNAIRE - PHQ9
SUM OF ALL RESPONSES TO PHQ QUESTIONS 1-9: 0
SUM OF ALL RESPONSES TO PHQ9 QUESTIONS 1 & 2: 0
1. LITTLE INTEREST OR PLEASURE IN DOING THINGS: 0
SUM OF ALL RESPONSES TO PHQ QUESTIONS 1-9: 0
2. FEELING DOWN, DEPRESSED OR HOPELESS: 0

## 2023-04-27 ASSESSMENT — LIFESTYLE VARIABLES
HOW MANY STANDARD DRINKS CONTAINING ALCOHOL DO YOU HAVE ON A TYPICAL DAY: 1 OR 2
HOW OFTEN DO YOU HAVE A DRINK CONTAINING ALCOHOL: 2-4 TIMES A MONTH

## 2023-04-27 NOTE — PROGRESS NOTES
Medicare Annual Wellness Visit     Assessment and Plan:      Diagnosis Orders   1. Medicare annual wellness visit, subsequent        2. Chronic obstructive pulmonary disease, unspecified COPD type [facility])  Ambulatory Referral to Care Management with Device (Remote Patient Monitoring)      3. Coronary artery disease involving native coronary artery of native heart without angina pectoris  Ambulatory Referral to Care Management with Device (Remote Patient Monitoring)      4. Type 2 diabetes mellitus with diabetic nephropathy, with long-term current use of insulin (HCC)  INSULIN SYRINGE .5CC/29G 29G X 1/2\" 0.5 ML MISC    Ambulatory Referral to Care Management with Device (Remote Patient Monitoring)      5. Chronic renal disease, stage 4, severely decreased glomerular filtration rate between 15-29 mL/min/1.73 square meter [facility])  Ambulatory Referral to Care Management with Device (Remote Patient Monitoring)      6. Paroxysmal atrial fibrillation ([facility])        7. Secondary hypercoagulable state ([facility]        Stable. Plan as above and below. Good control. Current treatment plan is effective, continue same. INSTRUCTIONS  NEXT APPOINTMENT: Please schedule check-up in 4 months with Dr. Toby Kenny or her NP, Eva Wallace. Read sleep handout. Subjective:   Name: Karina Miranda  YOB: 1940  Age: 81 y.o. Sex: male  MRN: [id number]     Date of Service:  4/27/2023    Chief Complaint:   Karina Miranda is a 81 y.o. male who presents for Medicare Annual Wellness Visit and check-up for:  1. Medicare annual wellness visit, subsequent    2. Chronic obstructive pulmonary disease, unspecified COPD type ([facility]    3. Coronary artery disease involving native coronary artery of native heart without angina pectoris    4. Type 2 diabetes mellitus with diabetic nephropathy, with long-term current use of insulin ([address]    5.  Chronic renal disease, stage 4, severely decreased glomerular filtration rate between 15-29

## 2023-04-27 NOTE — PATIENT INSTRUCTIONS
referrals for you. A list of these orders (if applicable) as well as your Preventive Care list are included within your After Visit Summary for your review. Other Preventive Recommendations:    A preventive eye exam performed by an eye specialist is recommended every 1-2 years to screen for glaucoma; cataracts, macular degeneration, and other eye disorders. A preventive dental visit is recommended every 6 months. Try to get at least 150 minutes of exercise per week or 10,000 steps per day on a pedometer . Order or download the FREE \"Exercise & Physical Activity: Your Everyday Guide\" from [employer]. Call 5-775.944.3330 or search [employer] online. You need 8186-0481 mg of calcium and 7297-1787 IU of vitamin D per day. It is possible to meet your calcium requirement with diet alone, but a vitamin D supplement is usually necessary to meet this goal.  When exposed to the sun, use a sunscreen that protects against both UVA and UVB radiation with an SPF of 30 or greater. Reapply every 2 to 3 hours or after sweating, drying off with a towel, or swimming. Always wear a seat belt when traveling in a car. Always wear a helmet when riding a bicycle or motorcycle.

## 2023-06-07 ENCOUNTER — NURSE ONLY (OUTPATIENT)
Dept: FAMILY MEDICINE CLINIC | Age: 83
End: 2023-06-07
Payer: MEDICARE

## 2023-06-07 DIAGNOSIS — R05.9 COUGH, UNSPECIFIED TYPE: ICD-10-CM

## 2023-06-07 DIAGNOSIS — R09.81 NASAL CONGESTION: Primary | ICD-10-CM

## 2023-06-07 LAB
Lab: NORMAL
PERFORMING INSTRUMENT: NORMAL
QC PASS/FAIL: NORMAL
SARS-COV-2, POC: NORMAL

## 2023-06-07 PROCEDURE — 87426 SARSCOV CORONAVIRUS AG IA: CPT | Performed by: INTERNAL MEDICINE

## 2023-06-14 ENCOUNTER — TELEPHONE (OUTPATIENT)
Dept: FAMILY MEDICINE CLINIC | Age: 83
End: 2023-06-14

## 2023-06-14 RX ORDER — CEFUROXIME AXETIL 500 MG/1
500 TABLET ORAL 2 TIMES DAILY
Qty: 14 TABLET | Refills: 0 | Status: SHIPPED | OUTPATIENT
Start: 2023-06-14 | End: 2023-06-21

## 2023-06-14 NOTE — TELEPHONE ENCOUNTER
[facility] calling stating that z-pac and amioderone the interact with each other. Pharm would like a verbal to confirm PCP is away and still wants to proceed with z-pac.     Please Advise  Pharm can be reached at [phone]

## 2023-06-28 RX ORDER — OMEPRAZOLE 20 MG/1
CAPSULE, DELAYED RELEASE ORAL
Qty: 180 CAPSULE | Refills: 1 | Status: SHIPPED | OUTPATIENT
Start: 2023-06-28

## 2023-07-08 ENCOUNTER — TELEPHONE (OUTPATIENT)
Dept: FAMILY MEDICINE CLINIC | Age: 83
End: 2023-07-08

## 2023-07-08 DIAGNOSIS — I50.33 ACUTE ON CHRONIC DIASTOLIC CHF (CONGESTIVE HEART FAILURE) (HCC): ICD-10-CM

## 2023-07-08 DIAGNOSIS — E11.42 DIABETIC POLYNEUROPATHY ASSOCIATED WITH TYPE 2 DIABETES MELLITUS (HCC): ICD-10-CM

## 2023-07-10 RX ORDER — AMIODARONE HYDROCHLORIDE 200 MG/1
TABLET ORAL
Qty: 30 TABLET | Refills: 5 | Status: SHIPPED | OUTPATIENT
Start: 2023-07-10

## 2023-07-10 RX ORDER — TORSEMIDE 20 MG/1
TABLET ORAL
Qty: 360 TABLET | Refills: 3 | Status: SHIPPED | OUTPATIENT
Start: 2023-07-10

## 2023-07-10 RX ORDER — POTASSIUM CHLORIDE 20 MEQ/1
20 TABLET, EXTENDED RELEASE ORAL DAILY
Qty: 90 TABLET | Refills: 1 | Status: SHIPPED | OUTPATIENT
Start: 2023-07-10

## 2023-07-10 RX ORDER — AMIODARONE HYDROCHLORIDE 200 MG/1
TABLET ORAL
Qty: 30 TABLET | Refills: 5 | Status: SHIPPED | OUTPATIENT
Start: 2023-07-10 | End: 2023-07-10

## 2023-07-10 RX ORDER — POTASSIUM CHLORIDE 20 MEQ/1
20 TABLET, EXTENDED RELEASE ORAL DAILY
Qty: 90 TABLET | Refills: 1 | Status: SHIPPED | OUTPATIENT
Start: 2023-07-10 | End: 2023-07-10

## 2023-07-12 DIAGNOSIS — Z79.4 TYPE 2 DIABETES MELLITUS WITH DIABETIC NEPHROPATHY, WITH LONG-TERM CURRENT USE OF INSULIN (HCC): ICD-10-CM

## 2023-07-12 DIAGNOSIS — E11.21 TYPE 2 DIABETES MELLITUS WITH DIABETIC NEPHROPATHY, WITH LONG-TERM CURRENT USE OF INSULIN (HCC): ICD-10-CM

## 2023-07-12 RX ORDER — PROCHLORPERAZINE 25 MG/1
SUPPOSITORY RECTAL
Qty: 12 EACH | Refills: 3 | Status: SHIPPED | OUTPATIENT
Start: 2023-07-12

## 2023-07-12 RX ORDER — GABAPENTIN 600 MG/1
1200 TABLET ORAL NIGHTLY
Qty: 180 TABLET | Refills: 1 | Status: SHIPPED | OUTPATIENT
Start: 2023-07-12 | End: 2024-01-08

## 2023-07-17 DIAGNOSIS — I50.33 ACUTE ON CHRONIC DIASTOLIC CHF (CONGESTIVE HEART FAILURE) (HCC): ICD-10-CM

## 2023-07-17 RX ORDER — TORSEMIDE 20 MG/1
TABLET ORAL
Qty: 360 TABLET | Refills: 3 | OUTPATIENT
Start: 2023-07-17

## 2023-07-20 ENCOUNTER — OFFICE VISIT (OUTPATIENT)
Dept: FAMILY MEDICINE CLINIC | Age: 83
End: 2023-07-20

## 2023-07-20 VITALS
HEART RATE: 61 BPM | DIASTOLIC BLOOD PRESSURE: 68 MMHG | OXYGEN SATURATION: 97 % | WEIGHT: 242 LBS | HEIGHT: 68 IN | BODY MASS INDEX: 36.68 KG/M2 | SYSTOLIC BLOOD PRESSURE: 122 MMHG

## 2023-07-20 DIAGNOSIS — E11.21 TYPE 2 DIABETES MELLITUS WITH DIABETIC NEPHROPATHY, WITH LONG-TERM CURRENT USE OF INSULIN (HCC): Primary | ICD-10-CM

## 2023-07-20 DIAGNOSIS — Z79.4 TYPE 2 DIABETES MELLITUS WITH DIABETIC NEPHROPATHY, WITH LONG-TERM CURRENT USE OF INSULIN (HCC): Primary | ICD-10-CM

## 2023-07-20 DIAGNOSIS — I25.10 CORONARY ARTERY DISEASE INVOLVING NATIVE CORONARY ARTERY OF NATIVE HEART WITHOUT ANGINA PECTORIS: ICD-10-CM

## 2023-07-20 DIAGNOSIS — S39.012A STRAIN OF LUMBAR REGION, INITIAL ENCOUNTER: ICD-10-CM

## 2023-07-20 DIAGNOSIS — I10 ESSENTIAL HYPERTENSION: ICD-10-CM

## 2023-07-20 LAB — HBA1C MFR BLD: 8.2 %

## 2023-07-20 NOTE — PATIENT INSTRUCTIONS
flat on the floor. Tighten your stomach muscles and flatten your back against the floor. Tuck your chin to your chest. With your hands stretched out in front of you, curl your upper body forward until your shoulders clear the floor. Hold this position for 3 seconds. Don't hold your breath. It helps to breathe out as you lift your shoulders up. Relax. Repeat 10 times. Build to 3 sets of 10. To challenge yourself, clasp your hands behind your head and keep your elbows out to the side. Lower trunk rotation: Lie on your back with your knees bent and your feet flat on the floor. Tighten your abdominal muscles and push your lower back into the floor. Keeping your shoulders down flat, gently rotate your legs to one side, then the other as far as you can. Repeat 10 to 20 times. Single knee to chest stretch: Lie on your back with your legs straight out in front of you. Bring one knee up to your chest and grasp the back of your thigh. Pull your knee toward your chest, stretching your buttock muscle. Hold this position for 15 to 30 seconds and return to the starting position. Repeat 3 times on each side. Double knee to chest: Lie on your back with your knees bent and your feet flat on the floor. Tighten your abdominal muscles and push your lower back into the floor. Pull both knees up to your chest. Hold for 5 seconds and repeat 10 to 20 times. How can I take care of myself? In addition to the treatment described above, keep in mind these suggestions:   Use an electric heating pad on a low setting (or a hot water bottle wrapped in a towel to avoid burning yourself) for 20 to 30 minutes. Don't let the heating pad get too hot, and don't fall asleep with it. You could get a burn. Try putting an ice pack wrapped in a towel on your back for 20 minutes, one to four times a day. Set an alarm to avoid frostbite from using the ice pack too long. Put a pillow under your knees when you are lying down.    Sleep without a

## 2023-07-20 NOTE — PROGRESS NOTES
CHRONIC CONDITION FOLLOW-UP     Assessment and Plan:      Diagnosis Orders   1. Type 2 diabetes mellitus with diabetic nephropathy, with long-term current use of insulin ([facility])  POCT glycosylated hemoglobin (Hb A1C)      2. Coronary artery disease involving native coronary artery of native heart without angina pectoris        3. Essential hypertension        4. Strain of lumbar region, initial encounter        worse     Continue current Tx plan. Any changes marked below. A1c 8.2 today    INSTRUCTIONS  NEXT APPOINTMENT: Please schedule check-up in 3 months with Dr. Margie Neumann or her NP, Leann Ferguson. PLEASE TAKE THIS FORM TO CHECK-OUT WINDOW TO SCHEDULE NEXT VISIT. See Dr. Dee Martin in September. Please get flu vaccine when available in fall. Can get either at this office or at stores such as [employer] and [employer]. Don't forget to take insulin  Use heat 20 minutes on painful joint/muscle. Then do stretches. May ice any sore spots or for swelling afterwards. Focus on the ones you can do laying in bed. OTC Lidocaine patch 12 hours a day to spine as needed. Will refer to PT and get x-ray in 1-2 weeks if not better. Subjective:      Chief Complaint   Patient presents with    Diabetes     4 mo dm f/u     Pramod Mueller is an 81 y.o. male who presents for follow up    Complaints:   Low back pain x 5 days after trying to lift wife from floor. Most pain with walking, little better if uses walker. Pain lower spine near tailbone, central region. No radicular. Missed some insulin when on vacation. Already taking tylenol    CHART REVIEW   reports that he quit smoking about 38 years ago. His smoking use included cigarettes. He has a 112.00 pack-year smoking history.  He has never used smokeless tobacco.  Health Maintenance Due   Topic Date Due    Shingles vaccine (1 of 2) 11/28/2012    DTaP/Tdap/Td vaccine (3 - Td or Tdap) 11/28/2021     Current Outpatient Medications   Medication Instructions    albuterol

## 2023-07-25 ENCOUNTER — OFFICE VISIT (OUTPATIENT)
Dept: PULMONOLOGY | Age: 83
End: 2023-07-25
Payer: MEDICARE

## 2023-07-25 VITALS
SYSTOLIC BLOOD PRESSURE: 125 MMHG | HEART RATE: 84 BPM | OXYGEN SATURATION: 99 % | BODY MASS INDEX: 37.25 KG/M2 | WEIGHT: 245 LBS | DIASTOLIC BLOOD PRESSURE: 70 MMHG

## 2023-07-25 DIAGNOSIS — I48.0 PAROXYSMAL ATRIAL FIBRILLATION (HCC): ICD-10-CM

## 2023-07-25 DIAGNOSIS — J96.12 CHRONIC RESPIRATORY FAILURE WITH HYPERCAPNIA (HCC): ICD-10-CM

## 2023-07-25 DIAGNOSIS — J44.9 COPD, MILD (HCC): Primary | ICD-10-CM

## 2023-07-25 DIAGNOSIS — I50.22 CHRONIC SYSTOLIC CONGESTIVE HEART FAILURE (HCC): ICD-10-CM

## 2023-07-25 PROCEDURE — G8417 CALC BMI ABV UP PARAM F/U: HCPCS | Performed by: INTERNAL MEDICINE

## 2023-07-25 PROCEDURE — 1036F TOBACCO NON-USER: CPT | Performed by: INTERNAL MEDICINE

## 2023-07-25 PROCEDURE — 3078F DIAST BP <80 MM HG: CPT | Performed by: INTERNAL MEDICINE

## 2023-07-25 PROCEDURE — 1123F ACP DISCUSS/DSCN MKR DOCD: CPT | Performed by: INTERNAL MEDICINE

## 2023-07-25 PROCEDURE — G8427 DOCREV CUR MEDS BY ELIG CLIN: HCPCS | Performed by: INTERNAL MEDICINE

## 2023-07-25 PROCEDURE — 99214 OFFICE O/P EST MOD 30 MIN: CPT | Performed by: INTERNAL MEDICINE

## 2023-07-25 PROCEDURE — 3074F SYST BP LT 130 MM HG: CPT | Performed by: INTERNAL MEDICINE

## 2023-07-25 PROCEDURE — 3023F SPIROM DOC REV: CPT | Performed by: INTERNAL MEDICINE

## 2023-07-25 NOTE — PROGRESS NOTES
PULMONARY OFFICE FOLLOW UP NOTE    REASON FOR VISIT:   Chief Complaint   Patient presents with    6 Month Follow-Up       DATE OF VISIT: 7/25/2023    HISTORY OF PRESENT ILLNESS: 81y.o. year old male  is here for follow-up of COPD, chronic hypoxic and hypercapnic respiratory failure. Patient has past medical history of congestive heart failure with EF 30 to 35%, s/p permanent pacemaker, paroxysmal atrial fibrillation, CAD s/p CABG and PCI, CKD, diabetes mellitus type 2, obesity. Patient denies any shortness of breath. He denies any cough or wheezing or chest pain or hemoptysis. He takes albuterol inhaler and albuterol nebulization as needed. Recently had a cold and was using his albuterol more than usual.  Otherwise, he rarely feels the need to take albuterol. Patient has been wearing NIV every night. He has noticed improvement in his sleep quality. He uses oxygen with NIV. Does not use oxygen during daytime. Patient follows up with cardiologist at [address] [place] [address] for congestive heart failure. He was hospitalized in early January for acute exacerbation of congestive heart failure. He states that now he is watching his fluid intake and watching his weight every day. Diagnostic test reviewed:  I reviewed the PFT from 10/7/2022 and my interpretation is as follows. Mild obstructive airway disease. FEV1/FVC 63  FEV1 80%, 1.77 L  FVC 89%, 2.81 L  TLC 99%, 5.09 L  DLCO 82%      REVIEW OF SYSTEMS:  CONSTITUTIONAL SYMPTOMS: The patient denies fever, fatigue, night sweats, weight loss or weight gain. HEENT: No vision changes. No tinnitus, Denies sinus pain. No hoarseness, or dysphagia. NECK: Patient denies swelling in the neck. CARDIOVASCULAR: Denies chest pain, palpitation, syncope. RESPIRATORY: see above. GASTROINTESTINAL: Denies nausea, abdominal pain or change in bowel function. GENITOURINARY: Denies obstructive symptoms. No history of incontinence.   BREASTS: No masses or lumps in the

## 2023-09-28 ENCOUNTER — TELEPHONE (OUTPATIENT)
Dept: FAMILY MEDICINE CLINIC | Age: 83
End: 2023-09-28

## 2023-09-28 DIAGNOSIS — Z79.4 TYPE 2 DIABETES MELLITUS WITH DIABETIC NEPHROPATHY, WITH LONG-TERM CURRENT USE OF INSULIN (HCC): ICD-10-CM

## 2023-09-28 DIAGNOSIS — E11.21 TYPE 2 DIABETES MELLITUS WITH DIABETIC NEPHROPATHY, WITH LONG-TERM CURRENT USE OF INSULIN (HCC): ICD-10-CM

## 2023-09-28 RX ORDER — PROCHLORPERAZINE 25 MG/1
SUPPOSITORY RECTAL
Qty: 12 EACH | Refills: 3 | Status: SHIPPED | OUTPATIENT
Start: 2023-09-28

## 2023-10-17 DIAGNOSIS — E78.5 HYPERLIPIDEMIA: ICD-10-CM

## 2023-10-17 RX ORDER — ATORVASTATIN CALCIUM 40 MG/1
TABLET, FILM COATED ORAL
Qty: 90 TABLET | Refills: 3 | Status: SHIPPED | OUTPATIENT
Start: 2023-10-17

## 2023-10-20 ENCOUNTER — TELEPHONE (OUTPATIENT)
Dept: FAMILY MEDICINE CLINIC | Age: 83
End: 2023-10-20

## 2023-10-20 DIAGNOSIS — E11.21 TYPE 2 DIABETES MELLITUS WITH DIABETIC NEPHROPATHY, WITH LONG-TERM CURRENT USE OF INSULIN (HCC): ICD-10-CM

## 2023-10-20 DIAGNOSIS — Z79.4 TYPE 2 DIABETES MELLITUS WITH DIABETIC NEPHROPATHY, WITH LONG-TERM CURRENT USE OF INSULIN (HCC): ICD-10-CM

## 2023-10-20 RX ORDER — PROCHLORPERAZINE 25 MG/1
SUPPOSITORY RECTAL
Qty: 12 EACH | Refills: 3 | Status: SHIPPED | OUTPATIENT
Start: 2023-10-20

## 2023-10-20 NOTE — TELEPHONE ENCOUNTER
Pt calling in, he is out of dexcom sensors and would like more sent in. They last set was mailed to him but he is not aware of where they came from.  Stated that Lyndsey Perez would know where they came from    Please advise

## 2023-10-25 ENCOUNTER — TELEPHONE (OUTPATIENT)
Dept: FAMILY MEDICINE CLINIC | Age: 83
End: 2023-10-25

## 2023-10-25 NOTE — TELEPHONE ENCOUNTER
Pt called in wants to speak to [facility] in regards to his sensors ?   He stated he is down to his last box he has some questions

## 2023-10-26 ENCOUNTER — OFFICE VISIT (OUTPATIENT)
Dept: FAMILY MEDICINE CLINIC | Age: 83
End: 2023-10-26

## 2023-10-26 VITALS
HEIGHT: 68 IN | BODY MASS INDEX: 38.34 KG/M2 | OXYGEN SATURATION: 96 % | DIASTOLIC BLOOD PRESSURE: 64 MMHG | WEIGHT: 253 LBS | SYSTOLIC BLOOD PRESSURE: 130 MMHG | HEART RATE: 68 BPM

## 2023-10-26 DIAGNOSIS — E78.5 HYPERLIPIDEMIA LDL GOAL <70: ICD-10-CM

## 2023-10-26 DIAGNOSIS — L57.0 AK (ACTINIC KERATOSIS): ICD-10-CM

## 2023-10-26 DIAGNOSIS — I10 ESSENTIAL HYPERTENSION: ICD-10-CM

## 2023-10-26 DIAGNOSIS — E11.21 TYPE 2 DIABETES MELLITUS WITH DIABETIC NEPHROPATHY, WITH LONG-TERM CURRENT USE OF INSULIN (HCC): Primary | ICD-10-CM

## 2023-10-26 DIAGNOSIS — Z79.4 TYPE 2 DIABETES MELLITUS WITH DIABETIC NEPHROPATHY, WITH LONG-TERM CURRENT USE OF INSULIN (HCC): Primary | ICD-10-CM

## 2023-10-26 DIAGNOSIS — N18.4 CHRONIC RENAL DISEASE, STAGE 4, SEVERELY DECREASED GLOMERULAR FILTRATION RATE BETWEEN 15-29 ML/MIN/1.73 SQUARE METER (HCC): ICD-10-CM

## 2023-10-26 DIAGNOSIS — I25.10 CORONARY ARTERY DISEASE INVOLVING NATIVE CORONARY ARTERY OF NATIVE HEART WITHOUT ANGINA PECTORIS: ICD-10-CM

## 2023-10-26 LAB — HBA1C MFR BLD: 7.5 %

## 2023-10-26 NOTE — PROGRESS NOTES
(90 Base) MCG/ACT inhaler 2 puffs, Inhalation, EVERY 6 HOURS PRN    amiodarone (CORDARONE) 200 MG tablet TAKE 1 TABLET BY MOUTH DAILY    apixaban (ELIQUIS) 5 mg, Oral, 2 TIMES DAILY    aspirin 81 mg, Oral, DAILY    atorvastatin (LIPITOR) 40 MG tablet TAKE 1 TABLET BY MOUTH EVERY EVENING    blood glucose monitor kit and supplies Test 2 times a day & as needed for symptoms of irregular blood glucose. blood glucose monitor strips Test 2 times a day & as needed for symptoms of irregular blood glucose. blood glucose test strips (ASCENSIA AUTODISC VI;ONE TOUCH ULTRA TEST VI) strip four times daily    carvedilol (COREG) 6.25 mg, Oral, 2 TIMES DAILY    Continuous Blood Gluc  (DEXCOM G6 ) PABLO As needed for diabetes    Continuous Blood Gluc Sensor (DEXCOM G6 SENSOR) MISC Apply weekly    Continuous Blood Gluc Transmit (DEXCOM G6 TRANSMITTER) MISC As needed for diabetes    dapagliflozin (FARXIGA) 10 mg, Oral, DAILY, LOT [id number] EXP 07/31/2024 4 BOXES    gabapentin (NEURONTIN) 1,200 mg, Oral, NIGHTLY    hydrALAZINE (APRESOLINE) 10 mg, Oral, 3 TIMES DAILY    insulin NPH (NOVOLIN N) 100 UNIT/ML injection vial Take 25 units bedtime    insulin regular (NOVOLIN R RELION) 100 UNIT/ML injection INJECT 20 TO 30 UNITS SUBCUTANEOUSLY THREE TIMES DAILY BEFORE MEAL(S)    INSULIN SYRINGE .5CC/29G 29G X 1/2\" 0.5 ML MISC INJECT 4 TIMES DAILY AS NEEDED    isosorbide mononitrate (IMDUR) 30 mg, Oral, EVERY MORNING, Dr. Acosta Motaneesh - Cardio    Lancets MISC 1 each, Does not apply, DAILY, Use to check glucose twice a day. One Touch brand. DX E11.9    Nebulizers (AIRIAL COMPACT MINI NEBULIZER) MISC 1 each, Does not apply, EVERY 6 HOURS PRN    nitroGLYCERIN (NITROSTAT) 0.3 MG SL tablet Take one sublingual for chest pain. May repeat twice at 5 min intervals. If not getting relief call 911.     omeprazole (PRILOSEC) 20 MG delayed release capsule TAKE 1 CAPSULE BY MOUTH TWICE DAILY    potassium chloride (KLOR-CON M) 20 MEQ

## 2023-10-26 NOTE — PATIENT INSTRUCTIONS
your doctor may recommend:   A laser treatment called photodynamic therapy   Chemical peels   Skin creams such as 5-fluorouracil (5-FU) and imiquimod     Purdon (Prognosis)  A small number of these skin growths turn into skin cancer. Possible Complications  Irritation and discomfort of the skin growth   Scarring from the treatment   Squamous cell carcinoma    When to Contact a Medical Professional  Call your health care provider if you see or feel a rough or scaly spot on your skin, or if you notice any other skin changes. Prevention  You can prevent this condition by protecting your skin from sunlight. Wear protective clothing such as hats, long-sleeved shirts, long skirts, or pants. Try to avoid being in the sun during midday, when ultraviolet light is most intense. Use high-quality sunscreens, preferably with a sun protection factor (SPF) rating of at least 15. Pick a sunscreen that blocks both UVA and UVB light. Apply sunscreen before going out into the sun, and reapply often. Use sunscreen year-round, including in the winter. Avoid sun lamps, tanning beds, and tanning salons. Other important sun safety facts:  Rolena Rein exposure is stronger in or near surfaces that reflect light, such as water, sand, concrete, and areas painted white. Rolena Rein exposure is more intense at the beginning of the summer. Skin burns faster at higher altitudes.      Alternative Names  Solar keratosis; Sun-induced skin changes - keratosis; Keratosis - actinic (solar)

## 2023-11-21 ENCOUNTER — HOSPITAL ENCOUNTER (EMERGENCY)
Age: 83
Discharge: HOME | End: 2023-11-21
Payer: MEDICARE

## 2023-11-21 VITALS
RESPIRATION RATE: 18 BRPM | TEMPERATURE: 98.2 F | HEART RATE: 67 BPM | BODY MASS INDEX: 38.01 KG/M2 | SYSTOLIC BLOOD PRESSURE: 100 MMHG | DIASTOLIC BLOOD PRESSURE: 87 MMHG | WEIGHT: 250 LBS | OXYGEN SATURATION: 98 %

## 2023-11-21 DIAGNOSIS — S51.811A SKIN TEAR OF RIGHT FOREARM WITHOUT COMPLICATION, INITIAL ENCOUNTER: Primary | ICD-10-CM

## 2023-11-21 DIAGNOSIS — S61.412A LACERATION OF LEFT HAND WITHOUT FOREIGN BODY, INITIAL ENCOUNTER: ICD-10-CM

## 2023-11-21 PROCEDURE — 90471 IMMUNIZATION ADMIN: CPT | Performed by: PHYSICIAN ASSISTANT

## 2023-11-21 PROCEDURE — 99284 EMERGENCY DEPT VISIT MOD MDM: CPT

## 2023-11-21 PROCEDURE — 6360000002 HC RX W HCPCS: Performed by: PHYSICIAN ASSISTANT

## 2023-11-21 PROCEDURE — 90714 TD VACC NO PRESV 7 YRS+ IM: CPT | Performed by: PHYSICIAN ASSISTANT

## 2023-11-21 RX ADMIN — CLOSTRIDIUM TETANI TOXOID ANTIGEN (FORMALDEHYDE INACTIVATED) AND CORYNEBACTERIUM DIPHTHERIAE TOXOID ANTIGEN (FORMALDEHYDE INACTIVATED) 0.5 ML: 5; 2 INJECTION, SUSPENSION INTRAMUSCULAR at 10:22

## 2023-11-21 ASSESSMENT — ENCOUNTER SYMPTOMS
SHORTNESS OF BREATH: 0
ABDOMINAL PAIN: 0
DIARRHEA: 0
CHEST TIGHTNESS: 0
NAUSEA: 0
VOMITING: 0

## 2023-11-21 NOTE — ED PROVIDER NOTES
[facility]        Pt Name: Ave Bonilla  MRN: [id number]  [address] 1940  Date of evaluation: 11/21/2023  Provider: Maye Trevino PA-C  PCP: Crow Bonilla MD  Note Started: 12:58 PM EST 11/21/23      SANDRA. I have evaluated this patient. CHIEF COMPLAINT       Chief Complaint   Patient presents with    Laceration    Arm Injury     Pt had mechanical fall on Sunday causing skin tear to R forearm. Also has lac to L hand occurred last night at 10pm from kitchen knife, dropped on hand       HISTORY OF PRESENT ILLNESS: 1 or more Elements     History from : Patient    Limitations to history : None    Ave Bonilla is a 81 y.o. male who presents to the emergency department today stating he had a mechanical fall on Sunday and scraped his right dorsal forearm on a cardboard box and sustained several small skin tears. He states he has been keeping the skin tears clean and has an appointment coming up with the [facility] wound care [place]. He also states he was unloading his  yesterday when a small kitchen knife fell and hit him on the left hand between his thumb and index finger. Patient denies numbness or tingling in either arm. He has no other complaints. He is not up-to-date on tetanus      Nursing Notes were all reviewed and agreed with or any disagreements were addressed in the HPI. REVIEW OF SYSTEMS :      Review of Systems   Constitutional:  Negative for chills and fever. Respiratory:  Negative for chest tightness and shortness of breath. Cardiovascular:  Negative for chest pain. Gastrointestinal:  Negative for abdominal pain, diarrhea, nausea and vomiting. Genitourinary:  Negative for dysuria. Skin:  Positive for wound. Neurological:  Negative for numbness. All other systems reviewed and are negative. Positives and Pertinent negatives as per HPI.      SURGICAL HISTORY     Past Surgical

## 2023-11-21 NOTE — ED NOTES
Discharge and education instructions reviewed. Patient verbalized understanding, teach-back successful. Patient denied questions at this time. No acute distress noted. Patient instructed to follow-up as noted - return to emergency department if symptoms worsen. Patient verbalized understanding. Discharged per EDMD with discharged instructions.        Briana Aparicio RN  11/21/23 0255

## 2023-11-22 ENCOUNTER — OFFICE VISIT (OUTPATIENT)
Dept: FAMILY MEDICINE CLINIC | Age: 83
End: 2023-11-22

## 2023-11-22 VITALS
SYSTOLIC BLOOD PRESSURE: 124 MMHG | HEIGHT: 68 IN | HEART RATE: 64 BPM | WEIGHT: 250 LBS | BODY MASS INDEX: 37.89 KG/M2 | DIASTOLIC BLOOD PRESSURE: 66 MMHG | OXYGEN SATURATION: 95 %

## 2023-11-22 DIAGNOSIS — Z23 NEED FOR INFLUENZA VACCINATION: ICD-10-CM

## 2023-11-22 DIAGNOSIS — S51.811A SKIN TEAR OF RIGHT FOREARM WITHOUT COMPLICATION, INITIAL ENCOUNTER: Primary | ICD-10-CM

## 2023-11-22 NOTE — PROGRESS NOTES
Chief Complaint   Patient presents with    Follow-up     ED f/u was at [facility] yesterday, fell and hurt right forearm      Subjective:    Patient here for follow-up from ER. From ER note:  mechanical fall on 3 d ago and scraped his right dorsal forearm sustaining several small skin tears. Unloading his  2 d ago a small kitchen knife fell and hit him on the left hand between his thumb and index finger. Diagnosis: skin tear, superficial lac  Treatment: bandaging, tetanus  At present: still has dressing in place from ER yesterday. To leave on until tomorrow. Legs just gave out walking down walker. No tingle or numb. No back pain. CHART REVIEW  Health Maintenance   Topic Date Due    Hepatitis B vaccine (1 of 3 - Risk 3-dose series) Never done    Shingles vaccine (2 of 3) 11/28/2012    Flu vaccine (1) 08/01/2023    COVID-19 Vaccine (7 - 2023-24 season) 12/19/2023    Lipids  01/05/2024    Depression Screen  04/27/2024    Annual Wellness Visit (AWV)  04/27/2024    DTaP/Tdap/Td vaccine (4 - Td or Tdap) 11/21/2033    Pneumococcal 65+ years Vaccine  Completed    Hepatitis A vaccine  Aged Out    Hib vaccine  Aged Out    Meningococcal (ACWY) vaccine  Aged Out     The ASCVD Risk score (Edwin TORRES, et al., 2019) failed to calculate for the following reasons: The 2019 ASCVD risk score is only valid for ages 36 to 78  Prior to Visit Medications    Medication Sig Taking? Authorizing Provider   insulin NPH (NOVOLIN N) 100 UNIT/ML injection vial Take 25 units bedtime Yes Teressa Burleson MD   Continuous Blood Gluc Transmit (DEXCOM G6 TRANSMITTER) MISC As needed for diabetes Yes RAYMUNDO Stinson CNP   Continuous Blood Gluc Sensor (DEXCOM G6 SENSOR) MISC Apply weekly Yes Teressa Burleson MD   atorvastatin (LIPITOR) 40 MG tablet TAKE 1 TABLET BY MOUTH EVERY EVENING Yes Samantha Peters APRN - CNP   gabapentin (NEURONTIN) 600 MG tablet Take 2 tablets by mouth nightly for 180 days.  Yes Teressa Burleson MD

## 2023-11-29 ENCOUNTER — HOSPITAL ENCOUNTER (OUTPATIENT)
Dept: WOUND CARE | Age: 83
Discharge: HOME | End: 2023-11-29
Attending: SURGERY
Payer: MEDICARE

## 2023-11-29 VITALS
SYSTOLIC BLOOD PRESSURE: 161 MMHG | RESPIRATION RATE: 18 BRPM | TEMPERATURE: 96.6 F | DIASTOLIC BLOOD PRESSURE: 82 MMHG | HEART RATE: 72 BPM

## 2023-11-29 DIAGNOSIS — S41.101A OPEN WOUND OF RIGHT UPPER ARM, INITIAL ENCOUNTER: Primary | ICD-10-CM

## 2023-11-29 PROCEDURE — 99213 OFFICE O/P EST LOW 20 MIN: CPT

## 2023-11-29 PROCEDURE — 11042 DBRDMT SUBQ TIS 1ST 20SQCM/<: CPT

## 2023-11-29 PROCEDURE — 11042 DBRDMT SUBQ TIS 1ST 20SQCM/<: CPT | Performed by: SURGERY

## 2023-11-29 RX ORDER — SODIUM CHLOR/HYPOCHLOROUS ACID 0.033 %
SOLUTION, IRRIGATION IRRIGATION ONCE
OUTPATIENT
Start: 2023-11-29 | End: 2023-11-29

## 2023-11-29 RX ORDER — BACITRACIN ZINC AND POLYMYXIN B SULFATE 500; 1000 [USP'U]/G; [USP'U]/G
OINTMENT TOPICAL ONCE
OUTPATIENT
Start: 2023-11-29 | End: 2023-11-29

## 2023-11-29 RX ORDER — LIDOCAINE HYDROCHLORIDE 40 MG/ML
SOLUTION TOPICAL ONCE
OUTPATIENT
Start: 2023-11-29 | End: 2023-11-29

## 2023-11-29 RX ORDER — LIDOCAINE HYDROCHLORIDE 20 MG/ML
JELLY TOPICAL ONCE
OUTPATIENT
Start: 2023-11-29 | End: 2023-11-29

## 2023-11-29 RX ORDER — LIDOCAINE 40 MG/G
CREAM TOPICAL ONCE
Status: DISCONTINUED | OUTPATIENT
Start: 2023-11-29 | End: 2023-11-30 | Stop reason: HOSPADM

## 2023-11-29 RX ORDER — GINSENG 100 MG
CAPSULE ORAL ONCE
OUTPATIENT
Start: 2023-11-29 | End: 2023-11-29

## 2023-11-29 RX ORDER — CLOBETASOL PROPIONATE 0.5 MG/G
OINTMENT TOPICAL ONCE
OUTPATIENT
Start: 2023-11-29 | End: 2023-11-29

## 2023-11-29 RX ORDER — GENTAMICIN SULFATE 1 MG/G
OINTMENT TOPICAL ONCE
OUTPATIENT
Start: 2023-11-29 | End: 2023-11-29

## 2023-11-29 RX ORDER — IBUPROFEN 200 MG
TABLET ORAL ONCE
OUTPATIENT
Start: 2023-11-29 | End: 2023-11-29

## 2023-11-29 RX ORDER — BETAMETHASONE DIPROPIONATE 0.5 MG/G
CREAM TOPICAL ONCE
OUTPATIENT
Start: 2023-11-29 | End: 2023-11-29

## 2023-11-29 RX ORDER — LIDOCAINE 50 MG/G
OINTMENT TOPICAL ONCE
OUTPATIENT
Start: 2023-11-29 | End: 2023-11-29

## 2023-11-29 RX ORDER — LIDOCAINE 40 MG/G
CREAM TOPICAL ONCE
OUTPATIENT
Start: 2023-11-29 | End: 2023-11-29

## 2023-11-29 RX ORDER — TRIAMCINOLONE ACETONIDE 1 MG/G
OINTMENT TOPICAL ONCE
OUTPATIENT
Start: 2023-11-29 | End: 2023-11-29

## 2023-11-29 NOTE — PATIENT INSTRUCTIONS
[address], [address] [facility]  Telephone: [phone]    Discharge Instructions    Important reminders:    **If you have any signs and symptoms of illness (Cough, fever, congestion, nausea, vomiting, diarrhea, etc.) please call the wound care center prior to your appointment. 1. Increase Protein intake for optimal wound healing  2. No added salt to reduce any swelling  3. If diabetic, maintain good glucose control  4. If you smoke, smoking prohibits wound healing, we ask that you refrain from smoking. 5. When taking antibiotics take the entire prescription as ordered. Do not stop taking until medication is all gone unless otherwise instructed. 6. Exercise as tolerated. 7. Keep weight off wounds and reposition every 2 hours if applicable. 8. If wound(s) is on your lower extremity, elevate legs to the level of the heart or above for 30 minutes 4-5 times a day and/or when sitting. Avoid standing for long periods of time. 9. Do not get wounds wet in bath or shower unless otherwise instructed by your physician. If your wound is on your foot or leg, you may purchase a cast bag. Please ask at the pharmacy. If Vascular testing is ordered, please call [address] [place] [address] [phone]) to schedule. Vascular tests ordered by Wound Care Physicians may take up to 2 hours to complete. Please keep that in mind when scheduling. If Vascular testing is scheduled, please bring supplies to replace your dressing after testing is done. The vascular department does not stock supplies. Wound: Right upper extremity    With each dressing change, rinse wounds with 0.9% Saline. (May use wound wash or soft contact solution. Both can be purchased at a local drug store). If unable to obtain saline, may use a gentle soap and water. Dressing care: Adaptic, dry dressing, 4 inch ace- change daily.  You may remove the dressing prior to showering and place new one after

## 2023-11-29 NOTE — PLAN OF CARE
Discharge instructions given. Patient verbalized understanding. Return to [facility] in 1 week(s).   Pt states has supplies at home

## 2023-11-29 NOTE — PROGRESS NOTES
your feedback. We would appreciate it if you took a few minutes to share your experience. Your input is very valuable to us. Brandan Barber MD, FACS  11/29/2023  11:29 AM

## 2023-11-30 ENCOUNTER — HOSPITAL ENCOUNTER (OUTPATIENT)
Age: 83
Discharge: HOME | End: 2023-11-30
Payer: COMMERCIAL

## 2023-11-30 LAB
25(OH)D3 SERPL-MCNC: 65.4 NG/ML
ALBUMIN SERPL-MCNC: 4 G/DL (ref 3.4–5)
ANION GAP SERPL CALCULATED.3IONS-SCNC: 13 MMOL/L (ref 3–16)
BUN SERPL-MCNC: 35 MG/DL (ref 7–20)
CALCIUM SERPL-MCNC: 9.2 MG/DL (ref 8.3–10.6)
CHLORIDE SERPL-SCNC: 101 MMOL/L (ref 99–110)
CO2 SERPL-SCNC: 28 MMOL/L (ref 21–32)
CREAT SERPL-MCNC: 2.3 MG/DL (ref 0.8–1.3)
CREAT UR-MCNC: 85.3 MG/DL (ref 39–259)
DEPRECATED RDW RBC AUTO: 15.2 % (ref 12.4–15.4)
FERRITIN SERPL IA-MCNC: 198.9 NG/ML (ref 30–400)
GFR SERPLBLD CREATININE-BSD FMLA CKD-EPI: 28 ML/MIN/{1.73_M2}
GLUCOSE SERPL-MCNC: 183 MG/DL (ref 70–99)
HCT VFR BLD AUTO: 38.5 % (ref 40.5–52.5)
HGB BLD-MCNC: 12.8 G/DL (ref 13.5–17.5)
IRON SATN MFR SERPL: 24 % (ref 20–50)
IRON SERPL-MCNC: 75 UG/DL (ref 59–158)
MCH RBC QN AUTO: 33.5 PG (ref 26–34)
MCHC RBC AUTO-ENTMCNC: 33.2 G/DL (ref 31–36)
MCV RBC AUTO: 100.9 FL (ref 80–100)
MICROALBUMIN UR DL<=1MG/L-MCNC: 1.3 MG/DL
MICROALBUMIN/CREAT UR: 15.2 MG/G (ref 0–30)
PHOSPHATE SERPL-MCNC: 3.8 MG/DL (ref 2.5–4.9)
PLATELET # BLD AUTO: 121 K/UL (ref 135–450)
PMV BLD AUTO: 9.2 FL (ref 5–10.5)
POTASSIUM SERPL-SCNC: 4.5 MMOL/L (ref 3.5–5.1)
PTH-INTACT SERPL-MCNC: 108.5 PG/ML (ref 14–72)
RBC # BLD AUTO: 3.81 M/UL (ref 4.2–5.9)
SODIUM SERPL-SCNC: 142 MMOL/L (ref 136–145)
TIBC SERPL-MCNC: 318 UG/DL (ref 260–445)
TRANSFERRIN SERPL-MCNC: 270 MG/DL (ref 200–360)
WBC # BLD AUTO: 3.9 K/UL (ref 4–11)

## 2023-11-30 PROCEDURE — 36415 COLL VENOUS BLD VENIPUNCTURE: CPT

## 2023-11-30 PROCEDURE — 82570 ASSAY OF URINE CREATININE: CPT

## 2023-11-30 PROCEDURE — 83540 ASSAY OF IRON: CPT

## 2023-11-30 PROCEDURE — 82306 VITAMIN D 25 HYDROXY: CPT

## 2023-11-30 PROCEDURE — 84466 ASSAY OF TRANSFERRIN: CPT

## 2023-11-30 PROCEDURE — 85027 COMPLETE CBC AUTOMATED: CPT

## 2023-11-30 PROCEDURE — 82043 UR ALBUMIN QUANTITATIVE: CPT

## 2023-11-30 PROCEDURE — 80069 RENAL FUNCTION PANEL: CPT

## 2023-11-30 PROCEDURE — 82728 ASSAY OF FERRITIN: CPT

## 2023-11-30 PROCEDURE — 83970 ASSAY OF PARATHORMONE: CPT

## 2023-12-05 ENCOUNTER — HOSPITAL ENCOUNTER (OUTPATIENT)
Dept: WOUND CARE | Age: 83
Discharge: HOME | End: 2023-12-05
Attending: SURGERY
Payer: MEDICARE

## 2023-12-05 VITALS — HEART RATE: 86 BPM | RESPIRATION RATE: 16 BRPM | DIASTOLIC BLOOD PRESSURE: 67 MMHG | SYSTOLIC BLOOD PRESSURE: 128 MMHG

## 2023-12-05 DIAGNOSIS — S41.101A OPEN WOUND OF RIGHT UPPER ARM, INITIAL ENCOUNTER: Primary | ICD-10-CM

## 2023-12-05 PROCEDURE — 99213 OFFICE O/P EST LOW 20 MIN: CPT

## 2023-12-05 PROCEDURE — 99213 OFFICE O/P EST LOW 20 MIN: CPT | Performed by: SURGERY

## 2023-12-05 RX ORDER — BETAMETHASONE DIPROPIONATE 0.5 MG/G
CREAM TOPICAL ONCE
OUTPATIENT
Start: 2023-12-05 | End: 2023-12-05

## 2023-12-05 RX ORDER — IBUPROFEN 200 MG
TABLET ORAL ONCE
OUTPATIENT
Start: 2023-12-05 | End: 2023-12-05

## 2023-12-05 RX ORDER — GINSENG 100 MG
CAPSULE ORAL ONCE
OUTPATIENT
Start: 2023-12-05 | End: 2023-12-05

## 2023-12-05 RX ORDER — LIDOCAINE HYDROCHLORIDE 20 MG/ML
JELLY TOPICAL ONCE
OUTPATIENT
Start: 2023-12-05 | End: 2023-12-05

## 2023-12-05 RX ORDER — LIDOCAINE 40 MG/G
CREAM TOPICAL ONCE
OUTPATIENT
Start: 2023-12-05 | End: 2023-12-05

## 2023-12-05 RX ORDER — LIDOCAINE HYDROCHLORIDE 40 MG/ML
SOLUTION TOPICAL ONCE
OUTPATIENT
Start: 2023-12-05 | End: 2023-12-05

## 2023-12-05 RX ORDER — CLOBETASOL PROPIONATE 0.5 MG/G
OINTMENT TOPICAL ONCE
OUTPATIENT
Start: 2023-12-05 | End: 2023-12-05

## 2023-12-05 RX ORDER — LIDOCAINE 40 MG/G
CREAM TOPICAL ONCE
Status: DISCONTINUED | OUTPATIENT
Start: 2023-12-05 | End: 2023-12-06 | Stop reason: HOSPADM

## 2023-12-05 RX ORDER — BACITRACIN ZINC AND POLYMYXIN B SULFATE 500; 1000 [USP'U]/G; [USP'U]/G
OINTMENT TOPICAL ONCE
OUTPATIENT
Start: 2023-12-05 | End: 2023-12-05

## 2023-12-05 RX ORDER — TRIAMCINOLONE ACETONIDE 1 MG/G
OINTMENT TOPICAL ONCE
OUTPATIENT
Start: 2023-12-05 | End: 2023-12-05

## 2023-12-05 RX ORDER — GENTAMICIN SULFATE 1 MG/G
OINTMENT TOPICAL ONCE
OUTPATIENT
Start: 2023-12-05 | End: 2023-12-05

## 2023-12-05 RX ORDER — SODIUM CHLOR/HYPOCHLOROUS ACID 0.033 %
SOLUTION, IRRIGATION IRRIGATION ONCE
OUTPATIENT
Start: 2023-12-05 | End: 2023-12-05

## 2023-12-05 RX ORDER — LIDOCAINE 50 MG/G
OINTMENT TOPICAL ONCE
OUTPATIENT
Start: 2023-12-05 | End: 2023-12-05

## 2023-12-05 NOTE — PLAN OF CARE
Discharge instructions given. Patient verbalized understanding. Return to [address] [place] [address] in 2 week(s).

## 2023-12-05 NOTE — PATIENT INSTRUCTIONS
[address], [address] [facility]  Telephone: [phone]    Discharge Instructions    Important reminders:    **If you have any signs and symptoms of illness (Cough, fever, congestion, nausea, vomiting, diarrhea, etc.) please call the wound care center prior to your appointment. 1. Increase Protein intake for optimal wound healing  2. No added salt to reduce any swelling  3. If diabetic, maintain good glucose control  4. If you smoke, smoking prohibits wound healing, we ask that you refrain from smoking. 5. When taking antibiotics take the entire prescription as ordered. Do not stop taking until medication is all gone unless otherwise instructed. 6. Exercise as tolerated. 7. Keep weight off wounds and reposition every 2 hours if applicable. 8. If wound(s) is on your lower extremity, elevate legs to the level of the heart or above for 30 minutes 4-5 times a day and/or when sitting. Avoid standing for long periods of time. 9. Do not get wounds wet in bath or shower unless otherwise instructed by your physician. If your wound is on your foot or leg, you may purchase a cast bag. Please ask at the pharmacy. If Vascular testing is ordered, please call [address] [place] [address] [phone]) to schedule. Vascular tests ordered by Wound Care Physicians may take up to 2 hours to complete. Please keep that in mind when scheduling. If Vascular testing is scheduled, please bring supplies to replace your dressing after testing is done. The vascular department does not stock supplies. Wound: Right upper extremity    With each dressing change, rinse wounds with 0.9% Saline. (May use wound wash or soft contact solution. Both can be purchased at a local drug store). If unable to obtain saline, may use a gentle soap and water. Dressing care: To open areas: Adaptic, dry dressing, 4 inch ace- change daily.  You may remove the dressing prior to showering and

## 2023-12-05 NOTE — PROGRESS NOTES
[address] [facility]  Progress Note       Bard Rubio  AGE: 81 y.o. GENDER: male  : 1940  TODAY'S DATE:  2023    Subjective:     Chief Complaint   Patient presents with    Wound Check     Follow up arm wound         HISTORY of PRESENT ILLNESS HPI     Bard Rubio is a 81 y.o. male who presents today for wound evaluation. History of Wound: Right forearm wound    Wound Pain:  mild  Severity:  2 / 10   Wound Type:  traumatic  Modifying Factors:  none  Associated Signs/Symptoms:  none        PAST MEDICAL HISTORY        Diagnosis Date    Acid reflux     Arm wound, left, initial encounter 2022    Skin tears    Atherosclerosis of native arteries of right leg with ulceration of other part of foot ([address]) 2021    Atrial flutter ([address])     converted    Bleeding stomach ulcer oct 2015    BPH (benign prostatic hyperplasia)     CAD (coronary artery disease)      angio with 2 blocked bypass and 2 patent    Cellulitis of left thigh 5/10/2022    COPD (chronic obstructive pulmonary disease) ([address])     Diabetes mellitus type II     Edema     chronic left leg    Elevated PSA- nl 11     Hyperlipidemia     Hypertension     Ischemic cardiomyopathy     Lipoma of skin-RIGHT CHEST 2011    Obesity     Osteoarthritis     Peripheral venous insufficiency 2021    Skin tear of left forearm without complication     Significant amount of epidermal loss with bleeding.     Spinal stenosis of lumbar region with neurogenic claudication- clinically 2018       PAST SURGICAL HISTORY    Past Surgical History:   Procedure Laterality Date    CATARACT REMOVAL  ,     bilat    COLONOSCOPY      CORONARY ARTERY BYPASS GRAFT  1993    x 4    EYE SURGERY Left 2019    cataract coming back    JOINT REPLACEMENT Right total knee replacement    JOINT REPLACEMENT Left 2016       FAMILY HISTORY    Family History   Problem Relation Age of Onset    Cancer Mother         breast

## 2023-12-18 PROBLEM — Z96.651 S/P TOTAL KNEE ARTHROPLASTY, RIGHT: Status: ACTIVE | Noted: 2023-12-18

## 2023-12-26 ENCOUNTER — TELEPHONE (OUTPATIENT)
Dept: FAMILY MEDICINE CLINIC | Age: 83
End: 2023-12-26

## 2024-01-17 ENCOUNTER — TELEPHONE (OUTPATIENT)
Dept: FAMILY MEDICINE CLINIC | Age: 84
End: 2024-01-17

## 2024-01-17 DIAGNOSIS — E11.21 TYPE 2 DIABETES MELLITUS WITH DIABETIC NEPHROPATHY, WITH LONG-TERM CURRENT USE OF INSULIN (HCC): ICD-10-CM

## 2024-01-17 DIAGNOSIS — Z79.4 TYPE 2 DIABETES MELLITUS WITH DIABETIC NEPHROPATHY, WITH LONG-TERM CURRENT USE OF INSULIN (HCC): ICD-10-CM

## 2024-01-17 RX ORDER — PROCHLORPERAZINE 25 MG/1
SUPPOSITORY RECTAL
Qty: 12 EACH | Refills: 3 | Status: SHIPPED | OUTPATIENT
Start: 2024-01-17

## 2024-01-17 NOTE — TELEPHONE ENCOUNTER
Patient is calling to get a refill for his DEXCOM G6 SENSORS but he says he gets them through medicare from a distributor company but didn't know the name of it     Please Advise

## 2024-01-23 RX ORDER — OMEPRAZOLE 20 MG/1
CAPSULE, DELAYED RELEASE ORAL
Qty: 180 CAPSULE | Refills: 1 | Status: SHIPPED | OUTPATIENT
Start: 2024-01-23

## 2024-01-24 ENCOUNTER — OFFICE VISIT (OUTPATIENT)
Dept: PULMONOLOGY | Age: 84
End: 2024-01-24
Payer: MEDICARE

## 2024-01-24 VITALS
DIASTOLIC BLOOD PRESSURE: 70 MMHG | BODY MASS INDEX: 40.35 KG/M2 | SYSTOLIC BLOOD PRESSURE: 130 MMHG | OXYGEN SATURATION: 97 % | HEART RATE: 90 BPM | WEIGHT: 250 LBS

## 2024-01-24 DIAGNOSIS — E66.01 CLASS 3 SEVERE OBESITY DUE TO EXCESS CALORIES WITHOUT SERIOUS COMORBIDITY WITH BODY MASS INDEX (BMI) OF 40.0 TO 44.9 IN ADULT (HCC): ICD-10-CM

## 2024-01-24 DIAGNOSIS — I50.22 CHRONIC SYSTOLIC CONGESTIVE HEART FAILURE (HCC): ICD-10-CM

## 2024-01-24 DIAGNOSIS — J44.9 COPD, MILD (HCC): Primary | ICD-10-CM

## 2024-01-24 DIAGNOSIS — I48.0 PAROXYSMAL ATRIAL FIBRILLATION (HCC): ICD-10-CM

## 2024-01-24 DIAGNOSIS — J96.12 CHRONIC RESPIRATORY FAILURE WITH HYPERCAPNIA (HCC): ICD-10-CM

## 2024-01-24 PROCEDURE — 1036F TOBACCO NON-USER: CPT | Performed by: INTERNAL MEDICINE

## 2024-01-24 PROCEDURE — G8484 FLU IMMUNIZE NO ADMIN: HCPCS | Performed by: INTERNAL MEDICINE

## 2024-01-24 PROCEDURE — 1123F ACP DISCUSS/DSCN MKR DOCD: CPT | Performed by: INTERNAL MEDICINE

## 2024-01-24 PROCEDURE — G8417 CALC BMI ABV UP PARAM F/U: HCPCS | Performed by: INTERNAL MEDICINE

## 2024-01-24 PROCEDURE — 3075F SYST BP GE 130 - 139MM HG: CPT | Performed by: INTERNAL MEDICINE

## 2024-01-24 PROCEDURE — 99214 OFFICE O/P EST MOD 30 MIN: CPT | Performed by: INTERNAL MEDICINE

## 2024-01-24 PROCEDURE — 3023F SPIROM DOC REV: CPT | Performed by: INTERNAL MEDICINE

## 2024-01-24 PROCEDURE — G8427 DOCREV CUR MEDS BY ELIG CLIN: HCPCS | Performed by: INTERNAL MEDICINE

## 2024-01-24 PROCEDURE — 3078F DIAST BP <80 MM HG: CPT | Performed by: INTERNAL MEDICINE

## 2024-01-24 NOTE — PROGRESS NOTES
(HCC)  Patient would benefit from weight loss.    4. Chronic systolic congestive heart failure (HCC)  Follows up with cardiologist at [facility]    5. Paroxysmal atrial fibrillation (HCC)  On Eliquis        Return in about 6 months (around 7/24/2024).       Jennifer Muñoz MD  Pulmonary Critical Care and Sleep Medicine  Electronically signed by Jennifer Muñoz MD on 1/24/2024 at 9:37 AM     This note was completed using dragon medical speech recognition software. Grammatical errors, random word insertions, pronoun errors and incomplete sentences are occasional consequences of this technology due to software limitations. If there are questions or concerns about the content of this note of information contained within the body of this dictation they should be addressed with the provider for clarification.

## 2024-01-30 DIAGNOSIS — E11.42 DIABETIC POLYNEUROPATHY ASSOCIATED WITH TYPE 2 DIABETES MELLITUS (HCC): ICD-10-CM

## 2024-01-30 DIAGNOSIS — Z79.4 TYPE 2 DIABETES MELLITUS WITH DIABETIC NEPHROPATHY, WITH LONG-TERM CURRENT USE OF INSULIN (HCC): ICD-10-CM

## 2024-01-30 DIAGNOSIS — E11.21 TYPE 2 DIABETES MELLITUS WITH DIABETIC NEPHROPATHY, WITH LONG-TERM CURRENT USE OF INSULIN (HCC): ICD-10-CM

## 2024-02-01 RX ORDER — PROCHLORPERAZINE 25 MG/1
SUPPOSITORY RECTAL
Qty: 12 EACH | Refills: 3 | Status: SHIPPED | OUTPATIENT
Start: 2024-02-01

## 2024-02-01 RX ORDER — GABAPENTIN 600 MG/1
1200 TABLET ORAL NIGHTLY
Qty: 180 TABLET | Refills: 1 | Status: SHIPPED | OUTPATIENT
Start: 2024-02-01 | End: 2024-07-30

## 2024-02-19 ENCOUNTER — HOSPITAL ENCOUNTER (OUTPATIENT)
Age: 84
Discharge: HOME | End: 2024-02-19
Payer: MEDICARE

## 2024-02-19 LAB
ALBUMIN SERPL-MCNC: 3.9 G/DL (ref 3.4–5)
ANION GAP SERPL CALCULATED.3IONS-SCNC: 14 MMOL/L (ref 3–16)
BUN SERPL-MCNC: 26 MG/DL (ref 7–20)
CALCIUM SERPL-MCNC: 9.4 MG/DL (ref 8.3–10.6)
CHLORIDE SERPL-SCNC: 97 MMOL/L (ref 99–110)
CO2 SERPL-SCNC: 28 MMOL/L (ref 21–32)
CREAT SERPL-MCNC: 1.9 MG/DL (ref 0.8–1.3)
CREAT UR-MCNC: 89 MG/DL (ref 39–259)
DEPRECATED RDW RBC AUTO: 15.2 % (ref 12.4–15.4)
FERRITIN SERPL IA-MCNC: 150.2 NG/ML (ref 30–400)
GFR SERPLBLD CREATININE-BSD FMLA CKD-EPI: 35 ML/MIN/{1.73_M2}
GLUCOSE SERPL-MCNC: 145 MG/DL (ref 70–99)
HCT VFR BLD AUTO: 38.7 % (ref 40.5–52.5)
HGB BLD-MCNC: 12.8 G/DL (ref 13.5–17.5)
IRON SATN MFR SERPL: 19 % (ref 20–50)
IRON SERPL-MCNC: 69 UG/DL (ref 59–158)
MCH RBC QN AUTO: 33.1 PG (ref 26–34)
MCHC RBC AUTO-ENTMCNC: 33.2 G/DL (ref 31–36)
MCV RBC AUTO: 99.9 FL (ref 80–100)
MICROALBUMIN UR DL<=1MG/L-MCNC: 1.8 MG/DL
MICROALBUMIN/CREAT UR: 20.2 MG/G (ref 0–30)
PHOSPHATE SERPL-MCNC: 3.4 MG/DL (ref 2.5–4.9)
PLATELET # BLD AUTO: 112 K/UL (ref 135–450)
PMV BLD AUTO: 10 FL (ref 5–10.5)
POTASSIUM SERPL-SCNC: 4.3 MMOL/L (ref 3.5–5.1)
PTH-INTACT SERPL-MCNC: 77.7 PG/ML (ref 14–72)
RBC # BLD AUTO: 3.88 M/UL (ref 4.2–5.9)
SODIUM SERPL-SCNC: 139 MMOL/L (ref 136–145)
TIBC SERPL-MCNC: 354 UG/DL (ref 260–445)
TRANSFERRIN SERPL-MCNC: 297 MG/DL (ref 200–360)
WBC # BLD AUTO: 3.2 K/UL (ref 4–11)

## 2024-02-19 PROCEDURE — 82728 ASSAY OF FERRITIN: CPT

## 2024-02-19 PROCEDURE — 83970 ASSAY OF PARATHORMONE: CPT

## 2024-02-19 PROCEDURE — 80069 RENAL FUNCTION PANEL: CPT

## 2024-02-19 PROCEDURE — 36415 COLL VENOUS BLD VENIPUNCTURE: CPT

## 2024-02-19 PROCEDURE — 82570 ASSAY OF URINE CREATININE: CPT

## 2024-02-19 PROCEDURE — 85027 COMPLETE CBC AUTOMATED: CPT

## 2024-02-19 PROCEDURE — 84466 ASSAY OF TRANSFERRIN: CPT

## 2024-02-19 PROCEDURE — 82306 VITAMIN D 25 HYDROXY: CPT

## 2024-02-19 PROCEDURE — 82043 UR ALBUMIN QUANTITATIVE: CPT

## 2024-02-19 PROCEDURE — 83540 ASSAY OF IRON: CPT

## 2024-02-20 LAB — 25(OH)D3 SERPL-MCNC: 70.9 NG/ML

## 2024-02-22 ENCOUNTER — TELEPHONE (OUTPATIENT)
Dept: FAMILY MEDICINE CLINIC | Age: 84
End: 2024-02-22

## 2024-02-22 NOTE — TELEPHONE ENCOUNTER
Called the patient and informed him he could get the patches thru [employer] or a medical supply pharmacy.  No further questions.

## 2024-03-18 DIAGNOSIS — E11.21 TYPE 2 DIABETES MELLITUS WITH DIABETIC NEPHROPATHY, WITH LONG-TERM CURRENT USE OF INSULIN (HCC): ICD-10-CM

## 2024-03-18 DIAGNOSIS — Z79.4 TYPE 2 DIABETES MELLITUS WITH DIABETIC NEPHROPATHY, WITH LONG-TERM CURRENT USE OF INSULIN (HCC): ICD-10-CM

## 2024-03-18 RX ORDER — PROCHLORPERAZINE 25 MG/1
SUPPOSITORY RECTAL
Qty: 12 EACH | Refills: 3 | Status: SHIPPED | OUTPATIENT
Start: 2024-03-18

## 2024-03-19 RX ORDER — AMIODARONE HYDROCHLORIDE 200 MG/1
TABLET ORAL
Qty: 30 TABLET | Refills: 5 | Status: SHIPPED | OUTPATIENT
Start: 2024-03-19

## 2024-03-19 RX ORDER — POTASSIUM CHLORIDE 20 MEQ/1
20 TABLET, EXTENDED RELEASE ORAL DAILY
Qty: 90 TABLET | Refills: 1 | Status: SHIPPED | OUTPATIENT
Start: 2024-03-19

## 2024-04-25 ENCOUNTER — APPOINTMENT (OUTPATIENT)
Dept: CT IMAGING | Age: 84
End: 2024-04-25
Payer: MEDICARE

## 2024-04-25 ENCOUNTER — APPOINTMENT (OUTPATIENT)
Dept: GENERAL RADIOLOGY | Age: 84
End: 2024-04-25
Payer: MEDICARE

## 2024-04-25 ENCOUNTER — HOSPITAL ENCOUNTER (EMERGENCY)
Age: 84
Discharge: ANOTHER HOSPITAL | End: 2024-04-25
Attending: EMERGENCY MEDICINE
Payer: MEDICARE

## 2024-04-25 VITALS
WEIGHT: 250 LBS | HEART RATE: 70 BPM | TEMPERATURE: 97.8 F | RESPIRATION RATE: 15 BRPM | BODY MASS INDEX: 40.18 KG/M2 | DIASTOLIC BLOOD PRESSURE: 81 MMHG | SYSTOLIC BLOOD PRESSURE: 109 MMHG | OXYGEN SATURATION: 95 % | HEIGHT: 66 IN

## 2024-04-25 DIAGNOSIS — S02.31XA CLOSED FRACTURE OF RIGHT ORBITAL FLOOR, INITIAL ENCOUNTER (HCC): ICD-10-CM
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
## 2024-04-25 DIAGNOSIS — J81.0 ACUTE PULMONARY EDEMA (HCC): ICD-10-CM

## 2024-04-25 DIAGNOSIS — S06.5XAA SUBDURAL HEMATOMA (HCC): Primary | ICD-10-CM

## 2024-04-25 LAB
ALBUMIN SERPL-MCNC: 3.7 G/DL (ref 3.4–5)
ALBUMIN/GLOB SERPL: 1.2 {RATIO} (ref 1.1–2.2)
ALP SERPL-CCNC: 104 U/L (ref 40–129)
ALT SERPL-CCNC: 24 U/L (ref 10–40)
ANION GAP SERPL CALCULATED.3IONS-SCNC: 15 MMOL/L (ref 3–16)
AST SERPL-CCNC: 42 U/L (ref 15–37)
BASOPHILS # BLD: 0.1 K/UL (ref 0–0.2)
BASOPHILS NFR BLD: 1.3 %
BILIRUB SERPL-MCNC: 0.9 MG/DL (ref 0–1)
BUN SERPL-MCNC: 29 MG/DL (ref 7–20)
CALCIUM SERPL-MCNC: 9.1 MG/DL (ref 8.3–10.6)
CHLORIDE SERPL-SCNC: 99 MMOL/L (ref 99–110)
CO2 SERPL-SCNC: 23 MMOL/L (ref 21–32)
CREAT SERPL-MCNC: 1.8 MG/DL (ref 0.8–1.3)
DEPRECATED RDW RBC AUTO: 14.8 % (ref 12.4–15.4)
EOSINOPHIL # BLD: 0.1 K/UL (ref 0–0.6)
EOSINOPHIL NFR BLD: 1.1 %
GFR SERPLBLD CREATININE-BSD FMLA CKD-EPI: 37 ML/MIN/{1.73_M2}
GLUCOSE SERPL-MCNC: 201 MG/DL (ref 70–99)
HCT VFR BLD AUTO: 37.4 % (ref 40.5–52.5)
HGB BLD-MCNC: 12.1 G/DL (ref 13.5–17.5)
LYMPHOCYTES # BLD: 0.6 K/UL (ref 1–5.1)
LYMPHOCYTES NFR BLD: 11.5 %
MCH RBC QN AUTO: 31.6 PG (ref 26–34)
MCHC RBC AUTO-ENTMCNC: 32.3 G/DL (ref 31–36)
MCV RBC AUTO: 97.8 FL (ref 80–100)
MONOCYTES # BLD: 0.7 K/UL (ref 0–1.3)
MONOCYTES NFR BLD: 12.4 %
NEUTROPHILS # BLD: 4.1 K/UL (ref 1.7–7.7)
NEUTROPHILS NFR BLD: 73.7 %
NT-PROBNP SERPL-MCNC: 2530 PG/ML (ref 0–449)
PLATELET # BLD AUTO: 170 K/UL (ref 135–450)
PMV BLD AUTO: 9.4 FL (ref 5–10.5)
POTASSIUM SERPL-SCNC: 4.7 MMOL/L (ref 3.5–5.1)
PROT SERPL-MCNC: 6.8 G/DL (ref 6.4–8.2)
RBC # BLD AUTO: 3.83 M/UL (ref 4.2–5.9)
REASON FOR REJECTION: NORMAL
REJECTED TEST: NORMAL
SODIUM SERPL-SCNC: 137 MMOL/L (ref 136–145)
TROPONIN, HIGH SENSITIVITY: 39 NG/L (ref 0–22)
TROPONIN, HIGH SENSITIVITY: 42 NG/L (ref 0–22)
WBC # BLD AUTO: 5.5 K/UL (ref 4–11)

## 2024-04-25 PROCEDURE — 85025 COMPLETE CBC W/AUTO DIFF WBC: CPT

## 2024-04-25 PROCEDURE — 80053 COMPREHEN METABOLIC PANEL: CPT

## 2024-04-25 PROCEDURE — 71045 X-RAY EXAM CHEST 1 VIEW: CPT

## 2024-04-25 PROCEDURE — 73030 X-RAY EXAM OF SHOULDER: CPT

## 2024-04-25 PROCEDURE — 99285 EMERGENCY DEPT VISIT HI MDM: CPT

## 2024-04-25 PROCEDURE — 84484 ASSAY OF TROPONIN QUANT: CPT

## 2024-04-25 PROCEDURE — 93005 ELECTROCARDIOGRAM TRACING: CPT | Performed by: PHYSICIAN ASSISTANT

## 2024-04-25 PROCEDURE — 70450 CT HEAD/BRAIN W/O DYE: CPT

## 2024-04-25 PROCEDURE — 70486 CT MAXILLOFACIAL W/O DYE: CPT

## 2024-04-25 PROCEDURE — 6370000000 HC RX 637 (ALT 250 FOR IP): Performed by: PHYSICIAN ASSISTANT

## 2024-04-25 PROCEDURE — 36415 COLL VENOUS BLD VENIPUNCTURE: CPT

## 2024-04-25 PROCEDURE — 83880 ASSAY OF NATRIURETIC PEPTIDE: CPT

## 2024-04-25 PROCEDURE — 72125 CT NECK SPINE W/O DYE: CPT

## 2024-04-25 RX ORDER — HYDROCODONE BITARTRATE AND ACETAMINOPHEN 5; 325 MG/1; MG/1
1 TABLET ORAL ONCE
Status: COMPLETED | OUTPATIENT
Start: 2024-04-25 | End: 2024-04-25

## 2024-04-25 RX ADMIN — HYDROCODONE BITARTRATE AND ACETAMINOPHEN 1 TABLET: 5; 325 TABLET ORAL at 16:46

## 2024-04-25 ASSESSMENT — PAIN DESCRIPTION - LOCATION
LOCATION: ARM
LOCATION: ARM

## 2024-04-25 ASSESSMENT — ENCOUNTER SYMPTOMS
EYE PAIN: 1
CHEST TIGHTNESS: 0
RHINORRHEA: 0
SHORTNESS OF BREATH: 0
PHOTOPHOBIA: 0
ABDOMINAL PAIN: 0
ABDOMINAL DISTENTION: 0
WHEEZING: 0
BACK PAIN: 0
DIARRHEA: 0
COUGH: 0
NAUSEA: 0
EYE REDNESS: 1
VOMITING: 0

## 2024-04-25 ASSESSMENT — PAIN DESCRIPTION - ORIENTATION
ORIENTATION: RIGHT;LEFT
ORIENTATION: LEFT;RIGHT

## 2024-04-25 ASSESSMENT — PAIN SCALES - GENERAL
PAINLEVEL_OUTOF10: 8
PAINLEVEL_OUTOF10: 8

## 2024-04-25 ASSESSMENT — PAIN - FUNCTIONAL ASSESSMENT: PAIN_FUNCTIONAL_ASSESSMENT: 0-10

## 2024-04-25 ASSESSMENT — LIFESTYLE VARIABLES
HOW OFTEN DO YOU HAVE A DRINK CONTAINING ALCOHOL: NEVER
HOW MANY STANDARD DRINKS CONTAINING ALCOHOL DO YOU HAVE ON A TYPICAL DAY: PATIENT DOES NOT DRINK

## 2024-04-25 NOTE — ED PROVIDER NOTES
This patient was seen by the Mid-Level Provider. I have seen and examined the patient, agree with the workup, evaluation, management and diagnosis. Care plan has been discussed. My assessment reveals an 81-year-old male who presents after a mechanical fall.  This is an 84-year-old male on anticoagulants who tripped and fell out of his house hitting the ground with his face.  There is no loss of consciousness.  He denies any neck pain.  The patient presents with an obvious hematoma to his right orbit and lip.  Patient has a history of some chronic kidney disease and congestive heart failure.          Radiology results:    CT FACIAL BONES WO CONTRAST   Final Result   1.  Small focal subdural hematoma along the left anterior falx.  No midline   shift.      2.  Mildly displaced medial right orbital wall fracture with associated soft   tissue gas and right periorbital soft tissue swelling.      3.  No acute osseous abnormality identified in the cervical spine.      Findings were discussed with Katerine VALERO at 5:08 pm on 4/25/2024.         CT CERVICAL SPINE WO CONTRAST   Final Result   1.  Small focal subdural hematoma along the left anterior falx.  No midline   shift.      2.  Mildly displaced medial right orbital wall fracture with associated soft   tissue gas and right periorbital soft tissue swelling.      3.  No acute osseous abnormality identified in the cervical spine.      Findings were discussed with Katerine VALERO at 5:08 pm on 4/25/2024.         CT HEAD WO CONTRAST   Final Result   1.  Small focal subdural hematoma along the left anterior falx.  No midline   shift.      2.  Mildly displaced medial right orbital wall fracture with associated soft   tissue gas and right periorbital soft tissue swelling.      3.  No acute osseous abnormality identified in the cervical spine.      Findings were discussed with Katerine VALERO at 5:08 pm on 4/25/2024.         XR SHOULDER LEFT (MIN 2 VIEWS)   Final Result   No

## 2024-04-25 NOTE — ED PROVIDER NOTES
[facility] EMERGENCY DEPARTMENT  EMERGENCY DEPARTMENT ENCOUNTER        Pt Name: Gareth Giang  MRN: [id number]  Birthdate 1940  Date of evaluation: 4/25/2024  Provider: Katerine Curtis PA-C  PCP: Bear Salcido MD  Note Started: 4:13 PM EDT 4/25/24      SANDRA. I have evaluated this patient.        CHIEF COMPLAINT       Chief Complaint   Patient presents with    Fall     Per EMS, pt comes from home. Patient was in a hurry to go to the grocery store and fell forward. Patient hit face but denies LOC. Pt has hematoma on right eye and lip. Patient on eliquis and has a pacemaker. Pt T2DM, CKD, and CHF       HISTORY OF PRESENT ILLNESS: 1 or more Elements     History From: Patient   Limitations to history : None    Gareth Giang is a 84 y.o. male who presents after fall coming out of his house to go to the grocery store.  States he was coming out the door when he tripped over his feet and landed face first in the grass, tried to catch himself with arms, complaining of bilateral shoulder pain.  Patient has a pacemaker and defibrillator, history of CHF, is on blood thinners.  Denies consciousness.  No significant headache.  No dizziness/lightheadedness, weakness or visual disturbances.  No chest pain or shortness of breath.  Patient does report increased weakness and bilateral lower extremity swelling, states that he forgot to take his diuretics this morning.  Not sure how much weight he is up.  He has no other complaints or concerns at this time.    Nursing Notes were all reviewed and agreed with or any disagreements were addressed in the HPI.    REVIEW OF SYSTEMS :      Review of Systems   Constitutional:  Positive for fatigue. Negative for appetite change, chills and fever.   HENT:  Negative for congestion and rhinorrhea.    Eyes:  Positive for pain and redness. Negative for photophobia and visual disturbance.   Respiratory:  Negative for cough, chest tightness, shortness of breath and

## 2024-04-26 LAB
EKG ATRIAL RATE: 98 BPM
EKG DIAGNOSIS: NORMAL
EKG P AXIS: 26 DEGREES
EKG Q-T INTERVAL: 540 MS
EKG QRS DURATION: 164 MS
EKG QTC CALCULATION (BAZETT): 578 MS
EKG R AXIS: 175 DEGREES
EKG T AXIS: 27 DEGREES
EKG VENTRICULAR RATE: 69 BPM

## 2024-05-03 DIAGNOSIS — Z79.4 TYPE 2 DIABETES MELLITUS WITH DIABETIC NEPHROPATHY, WITH LONG-TERM CURRENT USE OF INSULIN (HCC): ICD-10-CM

## 2024-05-03 DIAGNOSIS — E11.649 UNCONTROLLED TYPE 2 DIABETES MELLITUS WITH HYPOGLYCEMIA, UNSPECIFIED HYPOGLYCEMIA COMA STATUS (HCC): ICD-10-CM

## 2024-05-03 DIAGNOSIS — E11.21 TYPE 2 DIABETES MELLITUS WITH DIABETIC NEPHROPATHY, WITH LONG-TERM CURRENT USE OF INSULIN (HCC): ICD-10-CM

## 2024-05-03 RX ORDER — PROCHLORPERAZINE 25 MG/1
SUPPOSITORY RECTAL
Qty: 1 EACH | Refills: 0 | Status: SHIPPED | OUTPATIENT
Start: 2024-05-03

## 2024-05-03 RX ORDER — PROCHLORPERAZINE 25 MG/1
SUPPOSITORY RECTAL
Qty: 12 EACH | Refills: 3 | Status: SHIPPED | OUTPATIENT
Start: 2024-05-03

## 2024-05-07 ENCOUNTER — TELEPHONE (OUTPATIENT)
Dept: FAMILY MEDICINE CLINIC | Age: 84
End: 2024-05-07

## 2024-05-07 NOTE — TELEPHONE ENCOUNTER
Shanon from [employer] calling in, she is needing to know is pt tests sugar at least 3 times daily, is on insulin, and she is needing insurance information for Dexcom.     Shanon can be reached at [phone]  Fax demographics/snap shot to [phone]

## 2024-05-10 ENCOUNTER — OFFICE VISIT (OUTPATIENT)
Dept: FAMILY MEDICINE CLINIC | Age: 84
End: 2024-05-10

## 2024-05-10 VITALS
HEIGHT: 66 IN | OXYGEN SATURATION: 97 % | DIASTOLIC BLOOD PRESSURE: 66 MMHG | HEART RATE: 63 BPM | BODY MASS INDEX: 40.34 KG/M2 | SYSTOLIC BLOOD PRESSURE: 130 MMHG | WEIGHT: 251 LBS

## 2024-05-10 DIAGNOSIS — I10 ESSENTIAL HYPERTENSION: ICD-10-CM

## 2024-05-10 DIAGNOSIS — Z09 HOSPITAL DISCHARGE FOLLOW-UP: ICD-10-CM

## 2024-05-10 DIAGNOSIS — S02.31XD: ICD-10-CM

## 2024-05-10 DIAGNOSIS — E11.42 DIABETIC POLYNEUROPATHY ASSOCIATED WITH TYPE 2 DIABETES MELLITUS (HCC): ICD-10-CM

## 2024-05-10 DIAGNOSIS — R33.9 URINARY RETENTION: ICD-10-CM

## 2024-05-10 DIAGNOSIS — E11.65 UNCONTROLLED TYPE 2 DIABETES MELLITUS WITH HYPERGLYCEMIA (HCC): ICD-10-CM

## 2024-05-10 DIAGNOSIS — N18.32 ACUTE RENAL FAILURE SUPERIMPOSED ON STAGE 3B CHRONIC KIDNEY DISEASE, UNSPECIFIED ACUTE RENAL FAILURE TYPE (HCC): ICD-10-CM

## 2024-05-10 DIAGNOSIS — S06.5X0D SUBDURAL HEMATOMA WITHOUT COMA, WITHOUT LOSS OF CONSCIOUSNESS, SUBSEQUENT ENCOUNTER: ICD-10-CM

## 2024-05-10 DIAGNOSIS — E78.5 HYPERLIPIDEMIA LDL GOAL <70: ICD-10-CM

## 2024-05-10 DIAGNOSIS — S52.035D: Primary | ICD-10-CM

## 2024-05-10 DIAGNOSIS — K59.03 DRUG-INDUCED CONSTIPATION: ICD-10-CM

## 2024-05-10 DIAGNOSIS — N17.9 ACUTE RENAL FAILURE SUPERIMPOSED ON STAGE 3B CHRONIC KIDNEY DISEASE, UNSPECIFIED ACUTE RENAL FAILURE TYPE (HCC): ICD-10-CM

## 2024-05-10 LAB
ANION GAP SERPL CALCULATED.3IONS-SCNC: 10 MMOL/L (ref 3–16)
BASOPHILS # BLD: 0 K/UL (ref 0–0.2)
BASOPHILS NFR BLD: 0.8 %
BUN SERPL-MCNC: 31 MG/DL (ref 7–20)
CALCIUM SERPL-MCNC: 9.3 MG/DL (ref 8.3–10.6)
CHLORIDE SERPL-SCNC: 100 MMOL/L (ref 99–110)
CO2 SERPL-SCNC: 27 MMOL/L (ref 21–32)
CREAT SERPL-MCNC: 1.8 MG/DL (ref 0.8–1.3)
DEPRECATED RDW RBC AUTO: 14.7 % (ref 12.4–15.4)
EOSINOPHIL # BLD: 0.1 K/UL (ref 0–0.6)
EOSINOPHIL NFR BLD: 2.6 %
GFR SERPLBLD CREATININE-BSD FMLA CKD-EPI: 37 ML/MIN/{1.73_M2}
GLUCOSE SERPL-MCNC: 206 MG/DL (ref 70–99)
HCT VFR BLD AUTO: 32.4 % (ref 40.5–52.5)
HGB BLD-MCNC: 10.7 G/DL (ref 13.5–17.5)
LYMPHOCYTES # BLD: 0.6 K/UL (ref 1–5.1)
LYMPHOCYTES NFR BLD: 11.3 %
MCH RBC QN AUTO: 31.4 PG (ref 26–34)
MCHC RBC AUTO-ENTMCNC: 33 G/DL (ref 31–36)
MCV RBC AUTO: 95.3 FL (ref 80–100)
MONOCYTES # BLD: 0.7 K/UL (ref 0–1.3)
MONOCYTES NFR BLD: 12.2 %
NEUTROPHILS # BLD: 4.1 K/UL (ref 1.7–7.7)
NEUTROPHILS NFR BLD: 73.1 %
PLATELET # BLD AUTO: 248 K/UL (ref 135–450)
PMV BLD AUTO: 8.8 FL (ref 5–10.5)
POTASSIUM SERPL-SCNC: 4.5 MMOL/L (ref 3.5–5.1)
RBC # BLD AUTO: 3.4 M/UL (ref 4.2–5.9)
SODIUM SERPL-SCNC: 137 MMOL/L (ref 136–145)
WBC # BLD AUTO: 5.6 K/UL (ref 4–11)

## 2024-05-10 RX ORDER — OXYCODONE HYDROCHLORIDE 5 MG/1
5 TABLET ORAL EVERY 6 HOURS PRN
Qty: 45 TABLET | Refills: 0 | Status: SHIPPED | OUTPATIENT
Start: 2024-05-10 | End: 2024-06-09

## 2024-05-10 NOTE — PROGRESS NOTES
Post-Discharge Transitional Care Management Services  SUBJECTIVE  Gareth Giang YOB: 1940  Date of Visit:  5/10/2024  Date of admission: 4/25  Date of discharge: 4/30 to [facility]. Home for ECF 5/10.  Date of contact: 5/10  Chief Complaint   Patient presents with    Follow-Up from Hospital     4/25/24 [facility] ED, then transferred to  for 3-4 days. Then sent to [facility] for rehab.     4-25-24  .  Small focal subdural hematoma along the left anterior falx.  No midline   shift.       2.  Mildly displaced medial right orbital wall fracture with associated soft   tissue gas and right periorbital soft tissue swelling.     Inpatient course: Discharge summary reviewed- see chart.  Diagnosed with:  Testing done:  Treatment:  Current status:    Family will be rotating 24 h care.  No headaches or face pain now  Elbow aches all of the time at rest, worse to move.    CHART REVIEW  Health Maintenance   Topic Date Due    Shingles vaccine (2 of 3) 11/28/2012    Lipids  01/05/2024    Annual Wellness Visit (Medicare)  04/27/2024    Depression Screen  04/27/2024    DTaP/Tdap/Td vaccine (4 - Td or Tdap) 11/21/2033    Flu vaccine  Completed    Pneumococcal 65+ years Vaccine  Completed    COVID-19 Vaccine  Completed    Respiratory Syncytial Virus (RSV) Pregnant or age 60 yrs+  Completed    Hepatitis A vaccine  Aged Out    Hepatitis B vaccine  Aged Out    Hib vaccine  Aged Out    Polio vaccine  Aged Out    Meningococcal (ACWY) vaccine  Aged Out     The ASCVD Risk score (Edwin TORRES, et al., 2019) failed to calculate for the following reasons:    The 2019 ASCVD risk score is only valid for ages 40 to 79  Prior to Visit Medications    Medication Sig Taking? Authorizing Provider   oxyCODONE (ROXICODONE) 5 MG immediate release tablet Take 1 tablet by mouth every 6 hours as needed for Pain for up to 30 days. Intended supply: 3 days. Take lowest dose possible to manage pain Max Daily Amount: 20 mg Yes Bear Salcido MD

## 2024-05-10 NOTE — PATIENT INSTRUCTIONS
PLEASE TAKE THIS FORM TO CHECK-OUT WINDOW TO SCHEDULE NEXT VISIT.  Please schedule check-up in 1 month.    Return sooner if having any problems.    TO DO LIST  PLEASE GET BLOODWORK DRAWN TODAY ON FIRST FLOOR in 170.  Lab hours Monday to Friday 7:30 AM for 4 PM.  Take orders with you.  Take sugars and do sliding scale insulin. Watch for low sugars.  Resume previous home medications.  Continue senna and miralax for constipation.  Continue acetaminophen 975 mg  (3 tab) every 8 hours  Call ortho to ask about sling for left arm.

## 2024-05-11 LAB
EST. AVERAGE GLUCOSE BLD GHB EST-MCNC: 185.8 MG/DL
HBA1C MFR BLD: 8.1 %

## 2024-05-13 ENCOUNTER — COMMUNITY CARE MANAGEMENT (OUTPATIENT)
Facility: CLINIC | Age: 84
End: 2024-05-13

## 2024-05-14 ENCOUNTER — TELEPHONE (OUTPATIENT)
Dept: FAMILY MEDICINE CLINIC | Age: 84
End: 2024-05-14

## 2024-05-14 PROBLEM — S52.209A ULNA FRACTURE: Status: ACTIVE | Noted: 2024-04-26

## 2024-05-14 PROBLEM — S06.5XAA SUBDURAL HEMATOMA (HCC): Status: ACTIVE | Noted: 2024-04-26

## 2024-05-14 NOTE — TELEPHONE ENCOUNTER
DOP is calling in stating that her Dad was here on 5/10/24 due to his broken arm    His arm is still swollen and it was weeping    She wants to know what the next step would be? Wound care? Or come back to see Dr. Salcido?    Please Advise

## 2024-05-14 NOTE — TELEPHONE ENCOUNTER
Per last OV with Dr. Salcido the patient was to follow up with ortho and see Dr. Salcido in 1 month.     Should the patient come in tomorrow or this afternoon for a wound check?

## 2024-05-16 ENCOUNTER — OFFICE VISIT (OUTPATIENT)
Dept: FAMILY MEDICINE CLINIC | Age: 84
End: 2024-05-16

## 2024-05-16 VITALS
DIASTOLIC BLOOD PRESSURE: 60 MMHG | HEIGHT: 66 IN | BODY MASS INDEX: 41.78 KG/M2 | WEIGHT: 260 LBS | SYSTOLIC BLOOD PRESSURE: 100 MMHG | HEART RATE: 60 BPM | OXYGEN SATURATION: 94 %

## 2024-05-16 DIAGNOSIS — J44.1 COPD WITH ACUTE EXACERBATION (HCC): Primary | ICD-10-CM

## 2024-05-16 DIAGNOSIS — R60.0 EDEMA OF LEFT FOREARM: ICD-10-CM

## 2024-05-16 DIAGNOSIS — J06.9 VIRAL UPPER RESPIRATORY TRACT INFECTION: ICD-10-CM

## 2024-05-16 DIAGNOSIS — S52.035D: ICD-10-CM

## 2024-05-16 RX ORDER — PREDNISONE 20 MG/1
20 TABLET ORAL 2 TIMES DAILY
Qty: 10 TABLET | Refills: 0 | Status: SHIPPED | OUTPATIENT
Start: 2024-05-16 | End: 2024-05-21

## 2024-05-16 ASSESSMENT — PATIENT HEALTH QUESTIONNAIRE - PHQ9
SUM OF ALL RESPONSES TO PHQ QUESTIONS 1-9: 0
2. FEELING DOWN, DEPRESSED OR HOPELESS: NOT AT ALL
SUM OF ALL RESPONSES TO PHQ9 QUESTIONS 1 & 2: 0
SUM OF ALL RESPONSES TO PHQ QUESTIONS 1-9: 0
SUM OF ALL RESPONSES TO PHQ QUESTIONS 1-9: 0
1. LITTLE INTEREST OR PLEASURE IN DOING THINGS: NOT AT ALL
SUM OF ALL RESPONSES TO PHQ QUESTIONS 1-9: 0

## 2024-05-16 NOTE — PATIENT INSTRUCTIONS
INSTRUCTIONS  Ace wrap arm when more swollen.  Keep doing and and arm exercises  Elevate arm to heart level and hand above elbow.  May take Mucinex (guaifenisen) and Robitussin DM (guafenisen, dextromethorphan) for cough and congestion.  May also try Vicks Vaporub.    AVOID decongestants like phenylephrine and pseudoephedrine.  AVOID Afrin.  Take prednisone for 5 days.  Use albuterol as needed.  Let me know if fever or getting worse.

## 2024-05-16 NOTE — PROGRESS NOTES
PROBLEM VISIT NOTE     Subjective:     Chief Complaint   Patient presents with    Edema     Edema in left wrist and arm. It is leaking clear liquid.  Released from [facility] last week- Friday 5/10/24.    Congestion     Has congestion in head and chest. Coughs a lot.       Gareth Giang is a 84 y.o. male who presents was unable to do PT on arm due to edema  Cough congestion x few days, no fever, increasing shortness of breath and fatigue, wheezing  Balance better  Using albuterol 2-3X/d. Trouble sleeping. Can't use CPAP due to unable to use L arm.    CHART REVIEW   reports that he quit smoking about 39 years ago. His smoking use included cigarettes. He started smoking about 71 years ago. He has a 112.0 pack-year smoking history. He has been exposed to tobacco smoke. He has never used smokeless tobacco.  Health Maintenance Due   Topic Date Due    Shingles vaccine (2 of 3) 11/28/2012    Lipids  01/05/2024    Annual Wellness Visit (Medicare)  04/27/2024     Current Outpatient Medications   Medication Instructions    albuterol (PROVENTIL) 2.5 mg, Nebulization, EVERY 6 HOURS PRN    albuterol sulfate  (90 Base) MCG/ACT inhaler 2 puffs, Inhalation, EVERY 6 HOURS PRN    amiodarone (CORDARONE) 200 MG tablet TAKE 1 TABLET BY MOUTH DAILY    apixaban (ELIQUIS) 5 mg, Oral, 2 TIMES DAILY    aspirin 81 mg, Oral, DAILY    atorvastatin (LIPITOR) 40 MG tablet TAKE 1 TABLET BY MOUTH EVERY EVENING    blood glucose monitor kit and supplies Test 2 times a day & as needed for symptoms of irregular blood glucose.    blood glucose monitor strips Test 2 times a day & as needed for symptoms of irregular blood glucose.    blood glucose test strips (ASCENSIA AUTODISC VI;ONE TOUCH ULTRA TEST VI) strip four times daily    carvedilol (COREG) 6.25 mg, Oral, 2 TIMES DAILY    Continuous Glucose  (DEXCOM G6 ) PABLO As needed for diabetes    Continuous Glucose Sensor (DEXCOM G6 SENSOR) MISC Apply weekly    Continuous Glucose

## 2024-05-20 ENCOUNTER — TELEPHONE (OUTPATIENT)
Dept: FAMILY MEDICINE CLINIC | Age: 84
End: 2024-05-20

## 2024-05-20 DIAGNOSIS — J44.1 COPD WITH ACUTE EXACERBATION (HCC): Primary | ICD-10-CM

## 2024-05-20 RX ORDER — AZITHROMYCIN 250 MG/1
TABLET, FILM COATED ORAL
Qty: 6 TABLET | Refills: 0 | Status: SHIPPED | OUTPATIENT
Start: 2024-05-20 | End: 2024-05-30

## 2024-05-20 NOTE — TELEPHONE ENCOUNTER
Pt was seen a few days ago he wanted  to know he is coughing up green he was prescribed mucinex and he is now wondering if antibiotics will be sent in     Please advise

## 2024-05-28 DIAGNOSIS — E11.21 TYPE 2 DIABETES MELLITUS WITH DIABETIC NEPHROPATHY, WITH LONG-TERM CURRENT USE OF INSULIN (HCC): ICD-10-CM

## 2024-05-28 DIAGNOSIS — Z79.4 TYPE 2 DIABETES MELLITUS WITH DIABETIC NEPHROPATHY, WITH LONG-TERM CURRENT USE OF INSULIN (HCC): ICD-10-CM

## 2024-05-28 RX ORDER — OMEPRAZOLE 20 MG/1
CAPSULE, DELAYED RELEASE ORAL
Qty: 180 CAPSULE | Refills: 1 | Status: ON HOLD | OUTPATIENT
Start: 2024-05-28

## 2024-05-28 RX ORDER — IBUPROFEN 200 MG
TABLET ORAL
Qty: 400 EACH | Refills: 5 | Status: ON HOLD | OUTPATIENT
Start: 2024-05-28

## 2024-05-31 ENCOUNTER — TELEPHONE (OUTPATIENT)
Dept: VASCULAR SURGERY | Age: 84
End: 2024-05-31

## 2024-05-31 DIAGNOSIS — Z79.4 TYPE 2 DIABETES MELLITUS WITH DIABETIC NEPHROPATHY, WITH LONG-TERM CURRENT USE OF INSULIN (HCC): ICD-10-CM

## 2024-05-31 DIAGNOSIS — E11.21 TYPE 2 DIABETES MELLITUS WITH DIABETIC NEPHROPATHY, WITH LONG-TERM CURRENT USE OF INSULIN (HCC): ICD-10-CM

## 2024-05-31 DIAGNOSIS — R60.0 BILATERAL LOWER EXTREMITY EDEMA: Primary | ICD-10-CM

## 2024-05-31 RX ORDER — PROCHLORPERAZINE 25 MG/1
SUPPOSITORY RECTAL
Qty: 12 EACH | Refills: 3 | Status: ON HOLD | OUTPATIENT
Start: 2024-05-31

## 2024-05-31 NOTE — TELEPHONE ENCOUNTER
Please call patient's daughter (Ioana) to schedule appointment with Dr Roland. He was referred and advised to get in to see Dr Roland by Monday. Ph # [phone]. thanks

## 2024-06-02 ENCOUNTER — APPOINTMENT (OUTPATIENT)
Dept: GENERAL RADIOLOGY | Age: 84
DRG: 682 | End: 2024-06-02
Payer: MEDICARE

## 2024-06-02 ENCOUNTER — HOSPITAL ENCOUNTER (INPATIENT)
Age: 84
LOS: 7 days | Discharge: NURSING, REHAB OR LONG-TERM CARE | DRG: 682 | End: 2024-06-09
Attending: EMERGENCY MEDICINE | Admitting: FAMILY MEDICINE
Payer: MEDICARE

## 2024-06-02 DIAGNOSIS — R73.9 HYPERGLYCEMIA: ICD-10-CM

## 2024-06-02 DIAGNOSIS — L03.115 BILATERAL LOWER LEG CELLULITIS: ICD-10-CM

## 2024-06-02 DIAGNOSIS — L03.116 BILATERAL LOWER LEG CELLULITIS: ICD-10-CM

## 2024-06-02 DIAGNOSIS — M48.062 SPINAL STENOSIS OF LUMBAR REGION WITH NEUROGENIC CLAUDICATION: ICD-10-CM

## 2024-06-02 DIAGNOSIS — N17.9 AKI (ACUTE KIDNEY INJURY) (HCC): ICD-10-CM

## 2024-06-02 DIAGNOSIS — R06.02 SHORTNESS OF BREATH: ICD-10-CM

## 2024-06-02 DIAGNOSIS — E87.70 HYPERVOLEMIA, UNSPECIFIED HYPERVOLEMIA TYPE: Primary | ICD-10-CM

## 2024-06-02 PROBLEM — N19 RENAL FAILURE: Status: ACTIVE | Noted: 2024-06-02

## 2024-06-02 LAB
ALBUMIN SERPL-MCNC: 3.4 G/DL (ref 3.4–5)
ALBUMIN/GLOB SERPL: 1.1 {RATIO} (ref 1.1–2.2)
ALP SERPL-CCNC: 167 U/L (ref 40–129)
ALT SERPL-CCNC: 22 U/L (ref 10–40)
ANION GAP SERPL CALCULATED.3IONS-SCNC: 14 MMOL/L (ref 3–16)
AST SERPL-CCNC: 45 U/L (ref 15–37)
BASOPHILS # BLD: 0 K/UL (ref 0–0.2)
BASOPHILS NFR BLD: 0.7 %
BILIRUB SERPL-MCNC: 0.7 MG/DL (ref 0–1)
BUN SERPL-MCNC: 57 MG/DL (ref 7–20)
CALCIUM SERPL-MCNC: 8.9 MG/DL (ref 8.3–10.6)
CHLORIDE SERPL-SCNC: 86 MMOL/L (ref 99–110)
CO2 SERPL-SCNC: 23 MMOL/L (ref 21–32)
CREAT SERPL-MCNC: 3.2 MG/DL (ref 0.8–1.3)
CRP SERPL-MCNC: 18.2 MG/L (ref 0–5.1)
DEPRECATED RDW RBC AUTO: 16.2 % (ref 12.4–15.4)
EOSINOPHIL # BLD: 0.1 K/UL (ref 0–0.6)
EOSINOPHIL NFR BLD: 1 %
ERYTHROCYTE [SEDIMENTATION RATE] IN BLOOD BY WESTERGREN METHOD: 42 MM/HR (ref 0–20)
GFR SERPLBLD CREATININE-BSD FMLA CKD-EPI: 18 ML/MIN/{1.73_M2}
GLUCOSE BLD-MCNC: 286 MG/DL (ref 70–99)
GLUCOSE BLD-MCNC: 381 MG/DL (ref 70–99)
GLUCOSE SERPL-MCNC: 500 MG/DL (ref 70–99)
HCT VFR BLD AUTO: 39.2 % (ref 40.5–52.5)
HGB BLD-MCNC: 12.9 G/DL (ref 13.5–17.5)
INR PPP: 1.18 (ref 0.85–1.15)
LACTATE BLDV-SCNC: 2.9 MMOL/L (ref 0.4–1.9)
LACTATE BLDV-SCNC: 3 MMOL/L (ref 0.4–1.9)
LYMPHOCYTES # BLD: 0.6 K/UL (ref 1–5.1)
LYMPHOCYTES NFR BLD: 10 %
MCH RBC QN AUTO: 30.1 PG (ref 26–34)
MCHC RBC AUTO-ENTMCNC: 32.8 G/DL (ref 31–36)
MCV RBC AUTO: 91.9 FL (ref 80–100)
MONOCYTES # BLD: 0.7 K/UL (ref 0–1.3)
MONOCYTES NFR BLD: 11 %
NEUTROPHILS # BLD: 4.9 K/UL (ref 1.7–7.7)
NEUTROPHILS NFR BLD: 77.3 %
NT-PROBNP SERPL-MCNC: 3897 PG/ML (ref 0–449)
PERFORMED ON: ABNORMAL
PERFORMED ON: ABNORMAL
PLATELET # BLD AUTO: 121 K/UL (ref 135–450)
PMV BLD AUTO: 9.3 FL (ref 5–10.5)
POTASSIUM SERPL-SCNC: 5.1 MMOL/L (ref 3.5–5.1)
PROT SERPL-MCNC: 6.6 G/DL (ref 6.4–8.2)
PROTHROMBIN TIME: 15.2 SEC (ref 11.9–14.9)
RBC # BLD AUTO: 4.27 M/UL (ref 4.2–5.9)
SODIUM SERPL-SCNC: 123 MMOL/L (ref 136–145)
SODIUM SERPL-SCNC: 128 MMOL/L (ref 136–145)
TROPONIN, HIGH SENSITIVITY: 66 NG/L (ref 0–22)
TROPONIN, HIGH SENSITIVITY: 70 NG/L (ref 0–22)
WBC # BLD AUTO: 6.3 K/UL (ref 4–11)

## 2024-06-02 PROCEDURE — 85025 COMPLETE CBC W/AUTO DIFF WBC: CPT

## 2024-06-02 PROCEDURE — 87040 BLOOD CULTURE FOR BACTERIA: CPT

## 2024-06-02 PROCEDURE — 85730 THROMBOPLASTIN TIME PARTIAL: CPT

## 2024-06-02 PROCEDURE — 80053 COMPREHEN METABOLIC PANEL: CPT

## 2024-06-02 PROCEDURE — 83880 ASSAY OF NATRIURETIC PEPTIDE: CPT

## 2024-06-02 PROCEDURE — 93005 ELECTROCARDIOGRAM TRACING: CPT | Performed by: EMERGENCY MEDICINE

## 2024-06-02 PROCEDURE — 83605 ASSAY OF LACTIC ACID: CPT

## 2024-06-02 PROCEDURE — 71045 X-RAY EXAM CHEST 1 VIEW: CPT

## 2024-06-02 PROCEDURE — 84484 ASSAY OF TROPONIN QUANT: CPT

## 2024-06-02 PROCEDURE — 83036 HEMOGLOBIN GLYCOSYLATED A1C: CPT

## 2024-06-02 PROCEDURE — 6360000002 HC RX W HCPCS: Performed by: FAMILY MEDICINE

## 2024-06-02 PROCEDURE — 2580000003 HC RX 258: Performed by: PHYSICIAN ASSISTANT

## 2024-06-02 PROCEDURE — 86140 C-REACTIVE PROTEIN: CPT

## 2024-06-02 PROCEDURE — 85652 RBC SED RATE AUTOMATED: CPT

## 2024-06-02 PROCEDURE — 99285 EMERGENCY DEPT VISIT HI MDM: CPT

## 2024-06-02 PROCEDURE — 84295 ASSAY OF SERUM SODIUM: CPT

## 2024-06-02 PROCEDURE — 1200000000 HC SEMI PRIVATE

## 2024-06-02 PROCEDURE — 6370000000 HC RX 637 (ALT 250 FOR IP): Performed by: FAMILY MEDICINE

## 2024-06-02 PROCEDURE — 6370000000 HC RX 637 (ALT 250 FOR IP): Performed by: PHYSICIAN ASSISTANT

## 2024-06-02 PROCEDURE — 36415 COLL VENOUS BLD VENIPUNCTURE: CPT

## 2024-06-02 PROCEDURE — 85610 PROTHROMBIN TIME: CPT

## 2024-06-02 PROCEDURE — 6360000002 HC RX W HCPCS: Performed by: PHYSICIAN ASSISTANT

## 2024-06-02 PROCEDURE — 2580000003 HC RX 258: Performed by: FAMILY MEDICINE

## 2024-06-02 RX ORDER — ONDANSETRON 4 MG/1
4 TABLET, ORALLY DISINTEGRATING ORAL EVERY 8 HOURS PRN
Status: DISCONTINUED | OUTPATIENT
Start: 2024-06-02 | End: 2024-06-09 | Stop reason: HOSPADM

## 2024-06-02 RX ORDER — ACETAMINOPHEN 325 MG/1
650 TABLET ORAL EVERY 6 HOURS PRN
Status: DISCONTINUED | OUTPATIENT
Start: 2024-06-02 | End: 2024-06-09 | Stop reason: HOSPADM

## 2024-06-02 RX ORDER — OXYCODONE HYDROCHLORIDE AND ACETAMINOPHEN 5; 325 MG/1; MG/1
1 TABLET ORAL ONCE
Status: COMPLETED | OUTPATIENT
Start: 2024-06-02 | End: 2024-06-02

## 2024-06-02 RX ORDER — INSULIN LISPRO 100 [IU]/ML
0-4 INJECTION, SOLUTION INTRAVENOUS; SUBCUTANEOUS NIGHTLY
Status: DISCONTINUED | OUTPATIENT
Start: 2024-06-02 | End: 2024-06-03

## 2024-06-02 RX ORDER — NITROGLYCERIN 0.4 MG/1
0.4 TABLET SUBLINGUAL EVERY 5 MIN PRN
Status: DISCONTINUED | OUTPATIENT
Start: 2024-06-02 | End: 2024-06-09 | Stop reason: HOSPADM

## 2024-06-02 RX ORDER — 0.9 % SODIUM CHLORIDE 0.9 %
500 INTRAVENOUS SOLUTION INTRAVENOUS ONCE
Status: DISCONTINUED | OUTPATIENT
Start: 2024-06-02 | End: 2024-06-09 | Stop reason: HOSPADM

## 2024-06-02 RX ORDER — DEXTROSE MONOHYDRATE 100 MG/ML
INJECTION, SOLUTION INTRAVENOUS CONTINUOUS PRN
Status: DISCONTINUED | OUTPATIENT
Start: 2024-06-02 | End: 2024-06-09 | Stop reason: HOSPADM

## 2024-06-02 RX ORDER — ATORVASTATIN CALCIUM 40 MG/1
40 TABLET, FILM COATED ORAL NIGHTLY
Status: DISCONTINUED | OUTPATIENT
Start: 2024-06-02 | End: 2024-06-09 | Stop reason: HOSPADM

## 2024-06-02 RX ORDER — GABAPENTIN 400 MG/1
1200 CAPSULE ORAL NIGHTLY
Status: DISCONTINUED | OUTPATIENT
Start: 2024-06-02 | End: 2024-06-03

## 2024-06-02 RX ORDER — INSULIN LISPRO 100 [IU]/ML
0-8 INJECTION, SOLUTION INTRAVENOUS; SUBCUTANEOUS
Status: DISCONTINUED | OUTPATIENT
Start: 2024-06-03 | End: 2024-06-03

## 2024-06-02 RX ORDER — AMIODARONE HYDROCHLORIDE 200 MG/1
200 TABLET ORAL DAILY
Status: DISCONTINUED | OUTPATIENT
Start: 2024-06-03 | End: 2024-06-09 | Stop reason: HOSPADM

## 2024-06-02 RX ORDER — ONDANSETRON 2 MG/ML
4 INJECTION INTRAMUSCULAR; INTRAVENOUS EVERY 6 HOURS PRN
Status: DISCONTINUED | OUTPATIENT
Start: 2024-06-02 | End: 2024-06-02

## 2024-06-02 RX ORDER — ACETAMINOPHEN 650 MG/1
650 SUPPOSITORY RECTAL EVERY 6 HOURS PRN
Status: DISCONTINUED | OUTPATIENT
Start: 2024-06-02 | End: 2024-06-09 | Stop reason: HOSPADM

## 2024-06-02 RX ORDER — PANTOPRAZOLE SODIUM 40 MG/1
40 TABLET, DELAYED RELEASE ORAL
Status: DISCONTINUED | OUTPATIENT
Start: 2024-06-03 | End: 2024-06-09 | Stop reason: HOSPADM

## 2024-06-02 RX ORDER — SODIUM CHLORIDE 0.9 % (FLUSH) 0.9 %
5-40 SYRINGE (ML) INJECTION EVERY 12 HOURS SCHEDULED
Status: DISCONTINUED | OUTPATIENT
Start: 2024-06-02 | End: 2024-06-09 | Stop reason: HOSPADM

## 2024-06-02 RX ORDER — SODIUM CHLORIDE 0.9 % (FLUSH) 0.9 %
5-40 SYRINGE (ML) INJECTION PRN
Status: DISCONTINUED | OUTPATIENT
Start: 2024-06-02 | End: 2024-06-09 | Stop reason: HOSPADM

## 2024-06-02 RX ORDER — GLUCAGON 1 MG/ML
1 KIT INJECTION PRN
Status: DISCONTINUED | OUTPATIENT
Start: 2024-06-02 | End: 2024-06-09 | Stop reason: HOSPADM

## 2024-06-02 RX ORDER — OXYCODONE HYDROCHLORIDE 5 MG/1
5 TABLET ORAL EVERY 6 HOURS PRN
Status: DISCONTINUED | OUTPATIENT
Start: 2024-06-02 | End: 2024-06-02

## 2024-06-02 RX ORDER — ASPIRIN 81 MG/1
81 TABLET ORAL DAILY
Status: DISCONTINUED | OUTPATIENT
Start: 2024-06-03 | End: 2024-06-09 | Stop reason: HOSPADM

## 2024-06-02 RX ORDER — FUROSEMIDE 10 MG/ML
40 INJECTION INTRAMUSCULAR; INTRAVENOUS DAILY
Status: DISCONTINUED | OUTPATIENT
Start: 2024-06-02 | End: 2024-06-03

## 2024-06-02 RX ORDER — SODIUM CHLORIDE 9 MG/ML
INJECTION, SOLUTION INTRAVENOUS PRN
Status: DISCONTINUED | OUTPATIENT
Start: 2024-06-02 | End: 2024-06-09 | Stop reason: HOSPADM

## 2024-06-02 RX ORDER — ISOSORBIDE MONONITRATE 30 MG/1
30 TABLET, EXTENDED RELEASE ORAL EVERY MORNING
Status: DISCONTINUED | OUTPATIENT
Start: 2024-06-03 | End: 2024-06-09 | Stop reason: HOSPADM

## 2024-06-02 RX ORDER — ALBUTEROL SULFATE 90 UG/1
2 AEROSOL, METERED RESPIRATORY (INHALATION) EVERY 6 HOURS PRN
Status: DISCONTINUED | OUTPATIENT
Start: 2024-06-02 | End: 2024-06-09 | Stop reason: HOSPADM

## 2024-06-02 RX ORDER — POLYETHYLENE GLYCOL 3350 17 G/17G
17 POWDER, FOR SOLUTION ORAL DAILY PRN
Status: DISCONTINUED | OUTPATIENT
Start: 2024-06-02 | End: 2024-06-09 | Stop reason: HOSPADM

## 2024-06-02 RX ADMIN — OXYCODONE HYDROCHLORIDE AND ACETAMINOPHEN 1 TABLET: 5; 325 TABLET ORAL at 19:22

## 2024-06-02 RX ADMIN — INSULIN HUMAN 6 UNITS: 100 INJECTION, SOLUTION PARENTERAL at 19:33

## 2024-06-02 RX ADMIN — FUROSEMIDE 40 MG: 10 INJECTION, SOLUTION INTRAMUSCULAR; INTRAVENOUS at 21:29

## 2024-06-02 RX ADMIN — ATORVASTATIN CALCIUM 40 MG: 40 TABLET, FILM COATED ORAL at 21:29

## 2024-06-02 RX ADMIN — VANCOMYCIN HYDROCHLORIDE 1750 MG: 10 INJECTION, POWDER, LYOPHILIZED, FOR SOLUTION INTRAVENOUS at 19:26

## 2024-06-02 RX ADMIN — GABAPENTIN 1200 MG: 400 CAPSULE ORAL at 21:29

## 2024-06-02 RX ADMIN — SODIUM CHLORIDE, PRESERVATIVE FREE 10 ML: 5 INJECTION INTRAVENOUS at 21:29

## 2024-06-02 RX ADMIN — CEFEPIME 1000 MG: 1 INJECTION, POWDER, FOR SOLUTION INTRAMUSCULAR; INTRAVENOUS at 21:42

## 2024-06-02 ASSESSMENT — PAIN DESCRIPTION - ORIENTATION: ORIENTATION: RIGHT;LEFT

## 2024-06-02 ASSESSMENT — PAIN DESCRIPTION - DESCRIPTORS: DESCRIPTORS: CRAMPING

## 2024-06-02 ASSESSMENT — PAIN DESCRIPTION - LOCATION: LOCATION: LEG

## 2024-06-02 ASSESSMENT — PAIN - FUNCTIONAL ASSESSMENT: PAIN_FUNCTIONAL_ASSESSMENT: PREVENTS OR INTERFERES SOME ACTIVE ACTIVITIES AND ADLS

## 2024-06-02 ASSESSMENT — PAIN DESCRIPTION - PAIN TYPE: TYPE: ACUTE PAIN

## 2024-06-02 ASSESSMENT — PAIN SCALES - GENERAL: PAINLEVEL_OUTOF10: 7

## 2024-06-02 NOTE — ED NOTES
How does patient ambulate?   []Low Fall Risk (ambulates by themselves without support)  []Stand by assist   []Contact Guard   [x]Front wheel walker  []Wheelchair   []Steady  []Bed bound  []History of Lower Extremity Amputation  []Unknown, did not assess in the emergency department   How does patient take pills?  [x]Whole with Water  []Crushed in applesauce  []Crushed in pudding  []Other  []Unknown no oral medications were given in the ED  Is patient alert?   [x]Alert  []Drowsy but responds to voice  []Doesn't respond to voice but responds to painful stimuli  []Unresponsive  Is patient oriented?   [x]To person  [x]To place  [x]To time  [x]To situation  []Confused  []Agitated  []Follows commands  If patient is disoriented or from a Skill Nursing Facility has family been notified of admission?   []Yes   [x]No  Patient belongings?   [x]Cell phone  []Wallet   []Dentures  [x]Clothing  Any specific patient or family belongings/needs/dynamics?   Family at bedside  Miscellaneous comments/pending orders?  Urinalysis      If there are any additional questions please reach out to the Emergency Department.

## 2024-06-03 ENCOUNTER — APPOINTMENT (OUTPATIENT)
Age: 84
DRG: 682 | End: 2024-06-03
Attending: FAMILY MEDICINE
Payer: MEDICARE

## 2024-06-03 ENCOUNTER — TELEPHONE (OUTPATIENT)
Dept: FAMILY MEDICINE CLINIC | Age: 84
End: 2024-06-03

## 2024-06-03 PROBLEM — R73.9 HYPERGLYCEMIA: Status: ACTIVE | Noted: 2024-06-03

## 2024-06-03 PROBLEM — N18.9 ACUTE KIDNEY INJURY SUPERIMPOSED ON CKD (HCC): Status: ACTIVE | Noted: 2024-06-02

## 2024-06-03 PROBLEM — E66.09 CLASS 2 OBESITY DUE TO EXCESS CALORIES WITH BODY MASS INDEX (BMI) OF 37.0 TO 37.9 IN ADULT: Status: ACTIVE | Noted: 2024-06-03

## 2024-06-03 PROBLEM — R79.82 CRP ELEVATED: Status: ACTIVE | Noted: 2024-06-03

## 2024-06-03 PROBLEM — R79.89 ELEVATED BRAIN NATRIURETIC PEPTIDE (BNP) LEVEL: Status: ACTIVE | Noted: 2024-06-03

## 2024-06-03 PROBLEM — N17.9 ACUTE KIDNEY INJURY SUPERIMPOSED ON CKD (HCC): Status: ACTIVE | Noted: 2024-06-02

## 2024-06-03 PROBLEM — E87.20 LACTIC ACIDOSIS: Status: ACTIVE | Noted: 2024-06-03

## 2024-06-03 PROBLEM — D69.6 THROMBOCYTOPENIA (HCC): Status: ACTIVE | Noted: 2024-06-03

## 2024-06-03 PROBLEM — Z87.39 HISTORY OF GOUT: Status: ACTIVE | Noted: 2024-06-03

## 2024-06-03 PROBLEM — L03.115 BILATERAL LOWER LEG CELLULITIS: Status: ACTIVE | Noted: 2022-05-10

## 2024-06-03 PROBLEM — R06.02 SHORTNESS OF BREATH: Status: ACTIVE | Noted: 2024-06-03

## 2024-06-03 PROBLEM — R70.0 ELEVATED SED RATE: Status: ACTIVE | Noted: 2024-06-03

## 2024-06-03 PROBLEM — E87.1 HYPONATREMIA: Status: ACTIVE | Noted: 2024-06-03

## 2024-06-03 PROBLEM — E87.70 HYPERVOLEMIA: Status: ACTIVE | Noted: 2024-06-03

## 2024-06-03 PROBLEM — E66.812 CLASS 2 OBESITY DUE TO EXCESS CALORIES WITH BODY MASS INDEX (BMI) OF 37.0 TO 37.9 IN ADULT: Status: ACTIVE | Noted: 2024-06-03

## 2024-06-03 LAB
ANION GAP SERPL CALCULATED.3IONS-SCNC: 12 MMOL/L (ref 3–16)
BACTERIA URNS QL MICRO: ABNORMAL /HPF
BILIRUB UR QL STRIP.AUTO: NEGATIVE
BUN SERPL-MCNC: 60 MG/DL (ref 7–20)
CALCIUM SERPL-MCNC: 8.6 MG/DL (ref 8.3–10.6)
CHLORIDE SERPL-SCNC: 95 MMOL/L (ref 99–110)
CLARITY UR: CLEAR
CO2 SERPL-SCNC: 25 MMOL/L (ref 21–32)
COLOR UR: YELLOW
CREAT SERPL-MCNC: 3.1 MG/DL (ref 0.8–1.3)
CREAT UR-MCNC: 64.8 MG/DL (ref 39–259)
DEPRECATED RDW RBC AUTO: 16.2 % (ref 12.4–15.4)
ECHO AO ASC DIAM: 3.3 CM
ECHO AO ASCENDING AORTA INDEX: 1.55 CM/M2
ECHO AO ROOT DIAM: 3.3 CM
ECHO AO ROOT INDEX: 1.55 CM/M2
ECHO AV AREA PEAK VELOCITY: 1.3 CM2
ECHO AV AREA VTI: 1.2 CM2
ECHO AV AREA/BSA PEAK VELOCITY: 0.6 CM2/M2
ECHO AV AREA/BSA VTI: 0.6 CM2/M2
ECHO AV MEAN GRADIENT: 14 MMHG
ECHO AV MEAN VELOCITY: 1.7 M/S
ECHO AV PEAK GRADIENT: 27 MMHG
ECHO AV PEAK VELOCITY: 2.6 M/S
ECHO AV VELOCITY RATIO: 0.42
ECHO AV VTI: 49.9 CM
ECHO BSA: 2.22 M2
ECHO EST RA PRESSURE: 8 MMHG
ECHO IVC PROX: 2.1 CM
ECHO LA AREA 2C: 26.8 CM2
ECHO LA AREA 4C: 30.2 CM2
ECHO LA DIAMETER INDEX: 1.97 CM/M2
ECHO LA DIAMETER: 4.2 CM
ECHO LA MAJOR AXIS: 6.6 CM
ECHO LA MINOR AXIS: 6.1 CM
ECHO LA TO AORTIC ROOT RATIO: 1.27
ECHO LA VOL BP: 107 ML (ref 18–58)
ECHO LA VOL MOD A2C: 96 ML (ref 18–58)
ECHO LA VOL MOD A4C: 111 ML (ref 18–58)
ECHO LA VOL/BSA BIPLANE: 50 ML/M2 (ref 16–34)
ECHO LA VOLUME INDEX MOD A2C: 45 ML/M2 (ref 16–34)
ECHO LV E' LATERAL VELOCITY: 9 CM/S
ECHO LV E' SEPTAL VELOCITY: 5 CM/S
ECHO LV EDV A2C: 316 ML
ECHO LV EDV A4C: 306 ML
ECHO LV EDV INDEX A4C: 144 ML/M2
ECHO LV EDV NDEX A2C: 148 ML/M2
ECHO LV EJECTION FRACTION A2C: 19 %
ECHO LV EJECTION FRACTION A4C: 16 %
ECHO LV EJECTION FRACTION BIPLANE: 19 % (ref 55–100)
ECHO LV ESV A2C: 257 ML
ECHO LV ESV A4C: 256 ML
ECHO LV ESV INDEX A4C: 120 ML/M2
ECHO LV FRACTIONAL SHORTENING: 17 % (ref 28–44)
ECHO LV INTERNAL DIMENSION DIASTOLE INDEX: 3.1 CM/M2
ECHO LV INTERNAL DIMENSION DIASTOLIC: 6.6 CM (ref 4.2–5.9)
ECHO LV INTERNAL DIMENSION SYSTOLIC INDEX: 2.58 CM/M2
ECHO LV INTERNAL DIMENSION SYSTOLIC: 5.5 CM
ECHO LV IVSD: 1 CM (ref 0.6–1)
ECHO LV MASS 2D: 290.6 G (ref 88–224)
ECHO LV MASS INDEX 2D: 136.4 G/M2 (ref 49–115)
ECHO LV POSTERIOR WALL DIASTOLIC: 1 CM (ref 0.6–1)
ECHO LV RELATIVE WALL THICKNESS RATIO: 0.3
ECHO LVOT AREA: 3.1 CM2
ECHO LVOT AV VTI INDEX: 0.38
ECHO LVOT DIAM: 2 CM
ECHO LVOT MEAN GRADIENT: 2 MMHG
ECHO LVOT PEAK GRADIENT: 4 MMHG
ECHO LVOT PEAK VELOCITY: 1.1 M/S
ECHO LVOT STROKE VOLUME INDEX: 28 ML/M2
ECHO LVOT SV: 59.7 ML
ECHO LVOT VTI: 19 CM
ECHO MV A VELOCITY: 1.31 M/S
ECHO MV AREA VTI: 1.6 CM2
ECHO MV E DECELERATION TIME (DT): 79 MS
ECHO MV E VELOCITY: 1.09 M/S
ECHO MV E/A RATIO: 0.83
ECHO MV E/E' LATERAL: 12.11
ECHO MV E/E' RATIO (AVERAGED): 16.96
ECHO MV E/E' SEPTAL: 21.8
ECHO MV LVOT VTI INDEX: 1.92
ECHO MV MAX VELOCITY: 1.3 M/S
ECHO MV MEAN GRADIENT: 3 MMHG
ECHO MV MEAN VELOCITY: 0.8 M/S
ECHO MV PEAK GRADIENT: 7 MMHG
ECHO MV REGURGITANT PEAK GRADIENT: 61 MMHG
ECHO MV REGURGITANT PEAK VELOCITY: 3.9 M/S
ECHO MV REGURGITANT VTIA: 134 CM
ECHO MV VTI: 36.5 CM
ECHO PV MAX VELOCITY: 1.5 M/S
ECHO PV PEAK GRADIENT: 9 MMHG
ECHO RA AREA 4C: 21.8 CM2
ECHO RA END SYSTOLIC VOLUME APICAL 4 CHAMBER INDEX BSA: 30 ML/M2
ECHO RA VOLUME: 63 ML
ECHO RIGHT VENTRICULAR SYSTOLIC PRESSURE (RVSP): 38 MMHG
ECHO RV BASAL DIMENSION: 4.1 CM
ECHO RV FREE WALL PEAK S': 12 CM/S
ECHO RV LONGITUDINAL DIMENSION: 6.3 CM
ECHO RV MID DIMENSION: 2.8 CM
ECHO RV TAPSE: 1.5 CM (ref 1.7–?)
ECHO TV REGURGITANT MAX VELOCITY: 2.76 M/S
ECHO TV REGURGITANT PEAK GRADIENT: 30 MMHG
EKG ATRIAL RATE: 74 BPM
EKG DIAGNOSIS: NORMAL
EKG P AXIS: 42 DEGREES
EKG P-R INTERVAL: 192 MS
EKG Q-T INTERVAL: 552 MS
EKG QRS DURATION: 164 MS
EKG QTC CALCULATION (BAZETT): 612 MS
EKG R AXIS: -76 DEGREES
EKG T AXIS: 107 DEGREES
EKG VENTRICULAR RATE: 74 BPM
EPI CELLS #/AREA URNS AUTO: 0 /HPF (ref 0–5)
EST. AVERAGE GLUCOSE BLD GHB EST-MCNC: 243.2 MG/DL
GFR SERPLBLD CREATININE-BSD FMLA CKD-EPI: 19 ML/MIN/{1.73_M2}
GLUCOSE BLD-MCNC: 219 MG/DL (ref 70–99)
GLUCOSE BLD-MCNC: 237 MG/DL (ref 70–99)
GLUCOSE BLD-MCNC: 316 MG/DL (ref 70–99)
GLUCOSE BLD-MCNC: 382 MG/DL (ref 70–99)
GLUCOSE SERPL-MCNC: 255 MG/DL (ref 70–99)
GLUCOSE UR STRIP.AUTO-MCNC: >=1000 MG/DL
HBA1C MFR BLD: 10.1 %
HCT VFR BLD AUTO: 35 % (ref 40.5–52.5)
HGB BLD-MCNC: 11.5 G/DL (ref 13.5–17.5)
HGB UR QL STRIP.AUTO: ABNORMAL
HYALINE CASTS #/AREA URNS AUTO: 1 /LPF (ref 0–8)
KETONES UR STRIP.AUTO-MCNC: NEGATIVE MG/DL
LEUKOCYTE ESTERASE UR QL STRIP.AUTO: ABNORMAL
MCH RBC QN AUTO: 30.1 PG (ref 26–34)
MCHC RBC AUTO-ENTMCNC: 32.7 G/DL (ref 31–36)
MCV RBC AUTO: 91.9 FL (ref 80–100)
NITRITE UR QL STRIP.AUTO: NEGATIVE
PERFORMED ON: ABNORMAL
PH UR STRIP.AUTO: 5 [PH] (ref 5–8)
PLATELET # BLD AUTO: 104 K/UL (ref 135–450)
PMV BLD AUTO: 9.2 FL (ref 5–10.5)
POTASSIUM SERPL-SCNC: 4.1 MMOL/L (ref 3.5–5.1)
PROT UR STRIP.AUTO-MCNC: NEGATIVE MG/DL
RBC # BLD AUTO: 3.81 M/UL (ref 4.2–5.9)
RBC CLUMPS #/AREA URNS AUTO: 5 /HPF (ref 0–4)
SODIUM SERPL-SCNC: 132 MMOL/L (ref 136–145)
SODIUM UR-SCNC: <20 MMOL/L
SP GR UR STRIP.AUTO: 1.01 (ref 1–1.03)
TSH SERPL DL<=0.005 MIU/L-ACNC: 1.76 UIU/ML (ref 0.27–4.2)
UA COMPLETE W REFLEX CULTURE PNL UR: ABNORMAL
UA DIPSTICK W REFLEX MICRO PNL UR: YES
URN SPEC COLLECT METH UR: ABNORMAL
UROBILINOGEN UR STRIP-ACNC: 0.2 E.U./DL
WBC # BLD AUTO: 4.2 K/UL (ref 4–11)
WBC #/AREA URNS AUTO: 1 /HPF (ref 0–5)

## 2024-06-03 PROCEDURE — 85027 COMPLETE CBC AUTOMATED: CPT

## 2024-06-03 PROCEDURE — 93010 ELECTROCARDIOGRAM REPORT: CPT | Performed by: INTERNAL MEDICINE

## 2024-06-03 PROCEDURE — 81001 URINALYSIS AUTO W/SCOPE: CPT

## 2024-06-03 PROCEDURE — 99223 1ST HOSP IP/OBS HIGH 75: CPT | Performed by: INTERNAL MEDICINE

## 2024-06-03 PROCEDURE — 87077 CULTURE AEROBIC IDENTIFY: CPT

## 2024-06-03 PROCEDURE — 80048 BASIC METABOLIC PNL TOTAL CA: CPT

## 2024-06-03 PROCEDURE — 6360000002 HC RX W HCPCS: Performed by: INTERNAL MEDICINE

## 2024-06-03 PROCEDURE — 1200000000 HC SEMI PRIVATE

## 2024-06-03 PROCEDURE — 87070 CULTURE OTHR SPECIMN AEROBIC: CPT

## 2024-06-03 PROCEDURE — C8929 TTE W OR WO FOL WCON,DOPPLER: HCPCS

## 2024-06-03 PROCEDURE — 6360000002 HC RX W HCPCS: Performed by: NURSE PRACTITIONER

## 2024-06-03 PROCEDURE — 6360000004 HC RX CONTRAST MEDICATION: Performed by: INTERNAL MEDICINE

## 2024-06-03 PROCEDURE — 6370000000 HC RX 637 (ALT 250 FOR IP): Performed by: NURSE PRACTITIONER

## 2024-06-03 PROCEDURE — 2580000003 HC RX 258: Performed by: INTERNAL MEDICINE

## 2024-06-03 PROCEDURE — 84300 ASSAY OF URINE SODIUM: CPT

## 2024-06-03 PROCEDURE — 82570 ASSAY OF URINE CREATININE: CPT

## 2024-06-03 PROCEDURE — 6370000000 HC RX 637 (ALT 250 FOR IP): Performed by: FAMILY MEDICINE

## 2024-06-03 PROCEDURE — 2580000003 HC RX 258: Performed by: FAMILY MEDICINE

## 2024-06-03 PROCEDURE — 87205 SMEAR GRAM STAIN: CPT

## 2024-06-03 PROCEDURE — 84443 ASSAY THYROID STIM HORMONE: CPT

## 2024-06-03 PROCEDURE — 93306 TTE W/DOPPLER COMPLETE: CPT | Performed by: INTERNAL MEDICINE

## 2024-06-03 PROCEDURE — 87186 SC STD MICRODIL/AGAR DIL: CPT

## 2024-06-03 PROCEDURE — 36415 COLL VENOUS BLD VENIPUNCTURE: CPT

## 2024-06-03 RX ORDER — CARVEDILOL 6.25 MG/1
6.25 TABLET ORAL 2 TIMES DAILY WITH MEALS
Status: DISCONTINUED | OUTPATIENT
Start: 2024-06-03 | End: 2024-06-03

## 2024-06-03 RX ORDER — GABAPENTIN 300 MG/1
300 CAPSULE ORAL NIGHTLY
Status: DISCONTINUED | OUTPATIENT
Start: 2024-06-03 | End: 2024-06-09 | Stop reason: HOSPADM

## 2024-06-03 RX ORDER — CARVEDILOL 3.12 MG/1
3.12 TABLET ORAL 2 TIMES DAILY WITH MEALS
Status: DISCONTINUED | OUTPATIENT
Start: 2024-06-03 | End: 2024-06-09 | Stop reason: HOSPADM

## 2024-06-03 RX ORDER — INSULIN LISPRO 100 [IU]/ML
10 INJECTION, SOLUTION INTRAVENOUS; SUBCUTANEOUS
Status: DISCONTINUED | OUTPATIENT
Start: 2024-06-03 | End: 2024-06-07

## 2024-06-03 RX ORDER — ENOXAPARIN SODIUM 100 MG/ML
30 INJECTION SUBCUTANEOUS DAILY
Status: DISCONTINUED | OUTPATIENT
Start: 2024-06-03 | End: 2024-06-07

## 2024-06-03 RX ORDER — INSULIN GLARGINE 100 [IU]/ML
15 INJECTION, SOLUTION SUBCUTANEOUS NIGHTLY
Status: DISCONTINUED | OUTPATIENT
Start: 2024-06-03 | End: 2024-06-03

## 2024-06-03 RX ORDER — INSULIN LISPRO 100 [IU]/ML
4 INJECTION, SOLUTION INTRAVENOUS; SUBCUTANEOUS
Status: DISCONTINUED | OUTPATIENT
Start: 2024-06-03 | End: 2024-06-03

## 2024-06-03 RX ORDER — INSULIN GLARGINE 100 [IU]/ML
20 INJECTION, SOLUTION SUBCUTANEOUS DAILY
Status: DISCONTINUED | OUTPATIENT
Start: 2024-06-03 | End: 2024-06-03

## 2024-06-03 RX ORDER — FUROSEMIDE 10 MG/ML
40 INJECTION INTRAMUSCULAR; INTRAVENOUS 2 TIMES DAILY
Status: DISCONTINUED | OUTPATIENT
Start: 2024-06-03 | End: 2024-06-04

## 2024-06-03 RX ORDER — INSULIN GLARGINE 100 [IU]/ML
15 INJECTION, SOLUTION SUBCUTANEOUS DAILY
Status: DISCONTINUED | OUTPATIENT
Start: 2024-06-03 | End: 2024-06-03

## 2024-06-03 RX ORDER — SENNA AND DOCUSATE SODIUM 50; 8.6 MG/1; MG/1
2 TABLET, FILM COATED ORAL DAILY
Status: DISCONTINUED | OUTPATIENT
Start: 2024-06-03 | End: 2024-06-09 | Stop reason: HOSPADM

## 2024-06-03 RX ORDER — INSULIN LISPRO 100 [IU]/ML
0-4 INJECTION, SOLUTION INTRAVENOUS; SUBCUTANEOUS NIGHTLY
Status: DISCONTINUED | OUTPATIENT
Start: 2024-06-03 | End: 2024-06-09 | Stop reason: HOSPADM

## 2024-06-03 RX ORDER — LINEZOLID 2 MG/ML
600 INJECTION, SOLUTION INTRAVENOUS EVERY 12 HOURS
Status: DISCONTINUED | OUTPATIENT
Start: 2024-06-03 | End: 2024-06-03

## 2024-06-03 RX ORDER — INSULIN GLARGINE 100 [IU]/ML
20 INJECTION, SOLUTION SUBCUTANEOUS 2 TIMES DAILY
Status: DISCONTINUED | OUTPATIENT
Start: 2024-06-03 | End: 2024-06-04

## 2024-06-03 RX ORDER — INSULIN LISPRO 100 [IU]/ML
0-4 INJECTION, SOLUTION INTRAVENOUS; SUBCUTANEOUS
Status: DISCONTINUED | OUTPATIENT
Start: 2024-06-03 | End: 2024-06-09 | Stop reason: HOSPADM

## 2024-06-03 RX ADMIN — INSULIN LISPRO 10 UNITS: 100 INJECTION, SOLUTION INTRAVENOUS; SUBCUTANEOUS at 12:51

## 2024-06-03 RX ADMIN — ENOXAPARIN SODIUM 30 MG: 100 INJECTION SUBCUTANEOUS at 15:59

## 2024-06-03 RX ADMIN — ISOSORBIDE MONONITRATE 30 MG: 30 TABLET, EXTENDED RELEASE ORAL at 11:16

## 2024-06-03 RX ADMIN — ATORVASTATIN CALCIUM 40 MG: 40 TABLET, FILM COATED ORAL at 20:03

## 2024-06-03 RX ADMIN — FUROSEMIDE 40 MG: 10 INJECTION, SOLUTION INTRAMUSCULAR; INTRAVENOUS at 23:46

## 2024-06-03 RX ADMIN — ASPIRIN 81 MG: 81 TABLET, COATED ORAL at 11:16

## 2024-06-03 RX ADMIN — SODIUM CHLORIDE, PRESERVATIVE FREE 10 ML: 5 INJECTION INTRAVENOUS at 20:05

## 2024-06-03 RX ADMIN — INSULIN LISPRO 1 UNITS: 100 INJECTION, SOLUTION INTRAVENOUS; SUBCUTANEOUS at 17:53

## 2024-06-03 RX ADMIN — INSULIN LISPRO 4 UNITS: 100 INJECTION, SOLUTION INTRAVENOUS; SUBCUTANEOUS at 12:53

## 2024-06-03 RX ADMIN — INSULIN LISPRO 4 UNITS: 100 INJECTION, SOLUTION INTRAVENOUS; SUBCUTANEOUS at 08:27

## 2024-06-03 RX ADMIN — PANTOPRAZOLE SODIUM 40 MG: 40 TABLET, DELAYED RELEASE ORAL at 05:57

## 2024-06-03 RX ADMIN — FUROSEMIDE 40 MG: 10 INJECTION, SOLUTION INTRAMUSCULAR; INTRAVENOUS at 16:08

## 2024-06-03 RX ADMIN — INSULIN GLARGINE 20 UNITS: 100 INJECTION, SOLUTION SUBCUTANEOUS at 08:26

## 2024-06-03 RX ADMIN — GABAPENTIN 300 MG: 300 CAPSULE ORAL at 20:03

## 2024-06-03 RX ADMIN — INSULIN LISPRO 4 UNITS: 100 INJECTION, SOLUTION INTRAVENOUS; SUBCUTANEOUS at 12:50

## 2024-06-03 RX ADMIN — PERFLUTREN 1.5 ML: 6.52 INJECTION, SUSPENSION INTRAVENOUS at 14:55

## 2024-06-03 RX ADMIN — INSULIN LISPRO 3 UNITS: 100 INJECTION, SOLUTION INTRAVENOUS; SUBCUTANEOUS at 08:25

## 2024-06-03 RX ADMIN — SODIUM CHLORIDE: 9 INJECTION, SOLUTION INTRAVENOUS at 15:56

## 2024-06-03 RX ADMIN — AMIODARONE HYDROCHLORIDE 200 MG: 200 TABLET ORAL at 11:16

## 2024-06-03 RX ADMIN — SENNOSIDES AND DOCUSATE SODIUM 2 TABLET: 50; 8.6 TABLET ORAL at 11:16

## 2024-06-03 RX ADMIN — INSULIN GLARGINE 20 UNITS: 100 INJECTION, SOLUTION SUBCUTANEOUS at 20:05

## 2024-06-03 RX ADMIN — CEFTAROLINE FOSAMIL 300 MG: 400 POWDER, FOR SOLUTION INTRAVENOUS at 15:57

## 2024-06-03 RX ADMIN — INSULIN LISPRO 10 UNITS: 100 INJECTION, SOLUTION INTRAVENOUS; SUBCUTANEOUS at 17:53

## 2024-06-03 RX ADMIN — SODIUM CHLORIDE, PRESERVATIVE FREE 10 ML: 5 INJECTION INTRAVENOUS at 08:27

## 2024-06-03 ASSESSMENT — PAIN SCALES - GENERAL: PAINLEVEL_OUTOF10: 1

## 2024-06-03 NOTE — ED PROVIDER NOTES
In addition to the advanced practice provider, I personally saw Gareth Giang and performed a substantive portion of the visit including all aspects of the medical decision making. I made/approved the management plan and take responsibility for the patient management    Briefly, this is a 84 y.o. male here for lower extremity edema.  Patient reports symptoms ongoing for a long time, however worsened over the past few days.  Has had weeping of fluid from his legs.  He denies any fevers, chills, chest pain or shortness of breath..    On exam, patient afebrile, chronically ill-appearing however nontoxic. No distress. Heart RRR. Lungs diminished at bases, no rales or wheezes. Abdomen soft, nondistended, nontender to palpation in all quadrants.  4+ pitting edema symmetric lower extremities with venous stasis skin changes and numerous superficial ulcerations with oozing of serosanguineous fluid.  There is marked erythema with increased warmth to touch as well as malodor.  No crepitus.      EKG  EKG was reviewed by emergency department physician in the absence of a cardiologist    Wide complex paced rhythm, rate 74, left axis deviation, wide QRS intervals, normal Qtc, no specific ST elevations or depressions, impression paced rhythm        MDM    Patient afebrile, chronically ill-appearing however nontoxic.  No hypoxia or increased work of breathing while on room air.  He is in no painful or respiratory distress.  Lower extremities with marked pitting edema, also with malodor and warmth to touch concerning for cellulitis.  Patient does not meet criteria for sepsis.  Overall my suspicion for underlying necrotizing infection is very low as well.  Patient given Lasix, will also start broad-spectrum antibiotics for bacterial skin infection.  Will plan for admission for further evaluation and care.  Case discussed with internal medicine service who will admit.  Patient remained alert, hemodynamically stable and in no 
pain.   Gastrointestinal:  Negative for abdominal pain, diarrhea, nausea and vomiting.   Genitourinary:  Negative for difficulty urinating, dysuria and hematuria.   Musculoskeletal:  Negative for neck pain and neck stiffness.   Skin:  Positive for color change and wound. Negative for rash.   Neurological:  Positive for weakness. Negative for headaches.       Positives and Pertinent negatives as per HPI.     SURGICAL HISTORY     Past Surgical History:   Procedure Laterality Date    CATARACT REMOVAL  2010, 2011    bilat    COLONOSCOPY      CORONARY ARTERY BYPASS GRAFT  1993    x 4    EYE SURGERY Left 2019    cataract coming back    JOINT REPLACEMENT Right total knee replacement    JOINT REPLACEMENT Left 09/14/2016       CURRENTMEDICATIONS       Previous Medications    ALBUTEROL (PROVENTIL) (2.5 MG/3ML) 0.083% NEBULIZER SOLUTION    Take 3 mLs by nebulization every 6 hours as needed for Wheezing    ALBUTEROL SULFATE  (90 BASE) MCG/ACT INHALER    INHALE 2 PUFFS INTO THE LUNGS EVERY 6 HOURS AS NEEDED FOR WHEEZING    AMIODARONE (CORDARONE) 200 MG TABLET    TAKE 1 TABLET BY MOUTH DAILY    APIXABAN (ELIQUIS) 2.5 MG TABS TABLET    Take 1 tablet by mouth 2 times daily    APIXABAN (ELIQUIS) 5 MG TABS TABLET    Take 1 tablet by mouth 2 times daily    ASPIRIN 81 MG EC TABLET    Take 1 tablet by mouth daily    ATORVASTATIN (LIPITOR) 40 MG TABLET    TAKE 1 TABLET BY MOUTH EVERY EVENING    BLOOD GLUCOSE MONITOR KIT AND SUPPLIES    Test 2 times a day & as needed for symptoms of irregular blood glucose.    BLOOD GLUCOSE MONITOR STRIPS    Test 2 times a day & as needed for symptoms of irregular blood glucose.    BLOOD GLUCOSE TEST STRIPS (ASCENSIA AUTODISC VI;ONE TOUCH ULTRA TEST VI) STRIP    four times daily    CARVEDILOL (COREG) 6.25 MG TABLET    Take 1 tablet by mouth 2 times daily    CONTINUOUS GLUCOSE  (DEXCOM G6 ) PABLO    As needed for diabetes    CONTINUOUS GLUCOSE SENSOR (DEXCOM G6 SENSOR) MISC    Apply

## 2024-06-03 NOTE — H&P
V2.0  History and Physical      Name:  Gareth Giang /Age/Sex: 1940  (84 y.o. male)   MRN & CSN:  [id number] & [id number] Encounter Date/Time: 2024 10:55 PM EDT   Location:  5TN-5582/5582-01 PCP: Bear Salcido MD       Hospital Day: 1    Assessment and Plan:   Gareth Giang is a 84 y.o. male with a pmh of  who presents with Renal failure    Hospital Problems             Last Modified POA    * (Principal) Renal failure 2024 Yes       Plan:  Acute exacerbation of sCHF  On IV Lasix  Cont on Lipitor, Coreg and ASA  Last ECHO in  showed EF 35%  Repeat ECHO pending  Daily weights strict ins and outs    Acute on chronic renal failure  CKD stage III  Cr is elevated to 3.2  Baseline cr is around 1.8  Monitor while on Lasix  Nephrology consulted     Hyponatremia d/t volume overload  Na 123   Received a dose of Lasix   Repeat Sodium is trending up 128    Type 2 DM with hyperglycemia with out DKA  Blood glucose of 500 upon admission  On carb control, sliding scale insulin and Lantus    Cellulitis of lower extremity  IV antibiotics  Follow blood cultures    H/o aFib  Cont on Amiodarone and Eliquis    HTN BP is in low to normal range  Hydralazine held        Diet ADULT DIET; Regular; 4 carb choices (60 gm/meal)   DVT Prophylaxis [] Lovenox, []  Heparin, [] SCDs, [] Ambulation,  [] Eliquis, [] Xarelto, [] Coumadin   Code Status DNR-CC   Surrogate Decision Maker/ POA      Personally reviewed Lab Studies and Imaging         History of Present Illness:     Chief Complaint:   Gareth Giang is a 84 y.o. male with pmh of sCHF, CKD, AFib, Chronic anticoagulation, HTN , DM Obesity, TL on Bipap who presents with c/o peripheral edema, cough and shortness of breath. Also reports discoloration and drainage from legs. Uses CPAP at home . Not Oxygen dependent. He saw his PCP in  for shortness of breath , cough and congestion. He was diagnosed with COPD exacerbation and was treated with prednisone.   ED

## 2024-06-03 NOTE — TELEPHONE ENCOUNTER
Pt daughter called wanted to  let  MAURA Peters  know the pt is admitted to [facility] they drained 5 lbs of water off the pt   Pt appointment with the specialist will be cancelled today DAVIDA

## 2024-06-03 NOTE — DISCHARGE INSTR - COC
Continuity of Care Form    Patient Name: Gareth Giang   :  1940  MRN:  [id number]    Admit date:  2024  Discharge date:  24    Code Status Order: DNR-CC   Advance Directives:     Admitting Physician:  Saskia Kumar MD  PCP: Bear Salcido MD    Discharging Nurse: Angle Nguyen RN  Discharging Hospital Unit/Room#: 5TN-5582/5582-01  Discharging Unit Phone Number: [phone]    Emergency Contact:   Extended Emergency Contact Information  Primary Emergency Contact: chrissy joaquin  Home Phone: [phone]  Mobile Phone: [phone]  Relation: Child  Secondary Emergency Contact: Gareth Giang Jr.  Home Phone: [phone]  Mobile Phone: [phone]  Relation: Child  Preferred language: English   needed? No    Past Surgical History:  Past Surgical History:   Procedure Laterality Date    CATARACT REMOVAL  ,     bilat    COLONOSCOPY      CORONARY ARTERY BYPASS GRAFT  1993    x 4    EYE SURGERY Left 2019    cataract coming back    JOINT REPLACEMENT Right total knee replacement    JOINT REPLACEMENT Left 2016       Immunization History:   Immunization History   Administered Date(s) Administered    COVID-19, MODERNA, ( formula), (age 12y+), IM, 50mcg/0.5mL 2024    COVID-19, PFIZER Bivalent, DO NOT Dilute, (age 12y+), IM, 30 mcg/0.3 mL 10/25/2022    COVID-19, PFIZER GRAY top, DO NOT Dilute, (age 12 y+), IM, 30 mcg/0.3 mL 2022    COVID-19, PFIZER PURPLE top, DILUTE for use, (age 12 y+), 30mcg/0.3mL 2021, 2021, 10/11/2021    COVID-19, PFIZER, ( formula), (age 12y+), IM, 30mcg/0.3mL 10/24/2023    Influenza 10/04/2014    Influenza Virus Vaccine 10/09/2010, 2011, 10/10/2012    Influenza Whole 2013    Influenza, FLUAD, (age 65 y+), Adjuvanted, 0.5mL 10/29/2020, 2021, 2022, 2023    Influenza, High Dose (Fluzone 65 yrs and older) 2012, 2015, 10/12/2016, 2017, 10/08/2018    Influenza, Triv,

## 2024-06-03 NOTE — DISCHARGE INSTRUCTIONS
Wounds to bilateral legs cleanse skin with normal saline. Apply  polysporin  to open areas cover with a gauze, apply abd pads to lower legs if weeping fluid. Wrap with roll gauze from midfoot to below the knee. Wrap with double long ace bandages from midfoot to below the knee. Change dressings daily. Please consider following up with the wound center.  [facility]  [address]  [place], [place] [address]  Tel: [phone]  Hours:   Wednesday 8AM-4PM  Thursday 8AM-4PM  Friday 8AM-1PM  Saturday Closed  Sunday Closed  Monday 8AM-4PM  Tuesday 8AM-4PM

## 2024-06-04 PROBLEM — A49.8 INFECTION CAUSED BY ENTEROBACTER CLOACAE: Status: ACTIVE | Noted: 2024-06-04

## 2024-06-04 PROBLEM — A49.8 INFECTION DUE TO STENOTROPHOMONAS MALTOPHILIA: Status: ACTIVE | Noted: 2024-06-04

## 2024-06-04 PROBLEM — A49.8 KLEBSIELLA INFECTION: Status: ACTIVE | Noted: 2024-06-04

## 2024-06-04 LAB
ANION GAP SERPL CALCULATED.3IONS-SCNC: 11 MMOL/L (ref 3–16)
BUN SERPL-MCNC: 55 MG/DL (ref 7–20)
CALCIUM SERPL-MCNC: 8.6 MG/DL (ref 8.3–10.6)
CHLORIDE SERPL-SCNC: 96 MMOL/L (ref 99–110)
CO2 SERPL-SCNC: 25 MMOL/L (ref 21–32)
CREAT SERPL-MCNC: 2.8 MG/DL (ref 0.8–1.3)
DEPRECATED RDW RBC AUTO: 16.7 % (ref 12.4–15.4)
GFR SERPLBLD CREATININE-BSD FMLA CKD-EPI: 22 ML/MIN/{1.73_M2}
GLUCOSE BLD-MCNC: 192 MG/DL (ref 70–99)
GLUCOSE BLD-MCNC: 224 MG/DL (ref 70–99)
GLUCOSE BLD-MCNC: 268 MG/DL (ref 70–99)
GLUCOSE SERPL-MCNC: 217 MG/DL (ref 70–99)
HCT VFR BLD AUTO: 34.1 % (ref 40.5–52.5)
HGB BLD-MCNC: 10.9 G/DL (ref 13.5–17.5)
MCH RBC QN AUTO: 29.7 PG (ref 26–34)
MCHC RBC AUTO-ENTMCNC: 31.9 G/DL (ref 31–36)
MCV RBC AUTO: 93.1 FL (ref 80–100)
PERFORMED ON: ABNORMAL
PLATELET # BLD AUTO: 96 K/UL (ref 135–450)
PLATELET BLD QL SMEAR: ABNORMAL
PMV BLD AUTO: 9.2 FL (ref 5–10.5)
POTASSIUM SERPL-SCNC: 4 MMOL/L (ref 3.5–5.1)
RBC # BLD AUTO: 3.66 M/UL (ref 4.2–5.9)
SLIDE REVIEW: ABNORMAL
SODIUM SERPL-SCNC: 132 MMOL/L (ref 136–145)
WBC # BLD AUTO: 4.2 K/UL (ref 4–11)

## 2024-06-04 PROCEDURE — 6370000000 HC RX 637 (ALT 250 FOR IP): Performed by: FAMILY MEDICINE

## 2024-06-04 PROCEDURE — 6360000002 HC RX W HCPCS: Performed by: INTERNAL MEDICINE

## 2024-06-04 PROCEDURE — 99233 SBSQ HOSP IP/OBS HIGH 50: CPT | Performed by: INTERNAL MEDICINE

## 2024-06-04 PROCEDURE — 80048 BASIC METABOLIC PNL TOTAL CA: CPT

## 2024-06-04 PROCEDURE — 6370000000 HC RX 637 (ALT 250 FOR IP): Performed by: NURSE PRACTITIONER

## 2024-06-04 PROCEDURE — 1200000000 HC SEMI PRIVATE

## 2024-06-04 PROCEDURE — 2580000003 HC RX 258: Performed by: INTERNAL MEDICINE

## 2024-06-04 PROCEDURE — 85027 COMPLETE CBC AUTOMATED: CPT

## 2024-06-04 PROCEDURE — 36415 COLL VENOUS BLD VENIPUNCTURE: CPT

## 2024-06-04 PROCEDURE — 2580000003 HC RX 258: Performed by: FAMILY MEDICINE

## 2024-06-04 PROCEDURE — 6360000002 HC RX W HCPCS: Performed by: NURSE PRACTITIONER

## 2024-06-04 RX ORDER — FUROSEMIDE 10 MG/ML
40 INJECTION INTRAMUSCULAR; INTRAVENOUS 3 TIMES DAILY
Status: DISCONTINUED | OUTPATIENT
Start: 2024-06-04 | End: 2024-06-07

## 2024-06-04 RX ORDER — OXYCODONE HYDROCHLORIDE AND ACETAMINOPHEN 5; 325 MG/1; MG/1
1 TABLET ORAL EVERY 6 HOURS PRN
Status: COMPLETED | OUTPATIENT
Start: 2024-06-04 | End: 2024-06-07

## 2024-06-04 RX ORDER — INSULIN GLARGINE 100 [IU]/ML
30 INJECTION, SOLUTION SUBCUTANEOUS 2 TIMES DAILY
Status: DISCONTINUED | OUTPATIENT
Start: 2024-06-04 | End: 2024-06-07

## 2024-06-04 RX ADMIN — CARVEDILOL 3.12 MG: 3.12 TABLET, FILM COATED ORAL at 18:11

## 2024-06-04 RX ADMIN — GABAPENTIN 300 MG: 300 CAPSULE ORAL at 20:40

## 2024-06-04 RX ADMIN — INSULIN LISPRO 10 UNITS: 100 INJECTION, SOLUTION INTRAVENOUS; SUBCUTANEOUS at 18:12

## 2024-06-04 RX ADMIN — FUROSEMIDE 40 MG: 10 INJECTION, SOLUTION INTRAMUSCULAR; INTRAVENOUS at 20:40

## 2024-06-04 RX ADMIN — AMIODARONE HYDROCHLORIDE 200 MG: 200 TABLET ORAL at 09:36

## 2024-06-04 RX ADMIN — OXYCODONE HYDROCHLORIDE AND ACETAMINOPHEN 1 TABLET: 5; 325 TABLET ORAL at 03:33

## 2024-06-04 RX ADMIN — FUROSEMIDE 40 MG: 10 INJECTION, SOLUTION INTRAMUSCULAR; INTRAVENOUS at 09:37

## 2024-06-04 RX ADMIN — PANTOPRAZOLE SODIUM 40 MG: 40 TABLET, DELAYED RELEASE ORAL at 05:35

## 2024-06-04 RX ADMIN — SODIUM CHLORIDE, PRESERVATIVE FREE 10 ML: 5 INJECTION INTRAVENOUS at 20:40

## 2024-06-04 RX ADMIN — INSULIN LISPRO 1 UNITS: 100 INJECTION, SOLUTION INTRAVENOUS; SUBCUTANEOUS at 09:35

## 2024-06-04 RX ADMIN — INSULIN LISPRO 10 UNITS: 100 INJECTION, SOLUTION INTRAVENOUS; SUBCUTANEOUS at 13:49

## 2024-06-04 RX ADMIN — ASPIRIN 81 MG: 81 TABLET, COATED ORAL at 09:36

## 2024-06-04 RX ADMIN — INSULIN LISPRO 10 UNITS: 100 INJECTION, SOLUTION INTRAVENOUS; SUBCUTANEOUS at 09:43

## 2024-06-04 RX ADMIN — CEFTAROLINE FOSAMIL 300 MG: 400 POWDER, FOR SOLUTION INTRAVENOUS at 15:03

## 2024-06-04 RX ADMIN — FUROSEMIDE 40 MG: 10 INJECTION, SOLUTION INTRAMUSCULAR; INTRAVENOUS at 13:49

## 2024-06-04 RX ADMIN — SENNOSIDES AND DOCUSATE SODIUM 2 TABLET: 50; 8.6 TABLET ORAL at 09:36

## 2024-06-04 RX ADMIN — INSULIN LISPRO 2 UNITS: 100 INJECTION, SOLUTION INTRAVENOUS; SUBCUTANEOUS at 18:12

## 2024-06-04 RX ADMIN — ENOXAPARIN SODIUM 30 MG: 100 INJECTION SUBCUTANEOUS at 09:36

## 2024-06-04 RX ADMIN — CARVEDILOL 3.12 MG: 3.12 TABLET, FILM COATED ORAL at 09:44

## 2024-06-04 RX ADMIN — CEFTAROLINE FOSAMIL 300 MG: 400 POWDER, FOR SOLUTION INTRAVENOUS at 02:11

## 2024-06-04 RX ADMIN — ATORVASTATIN CALCIUM 40 MG: 40 TABLET, FILM COATED ORAL at 20:40

## 2024-06-04 RX ADMIN — INSULIN GLARGINE 30 UNITS: 100 INJECTION, SOLUTION SUBCUTANEOUS at 20:40

## 2024-06-04 RX ADMIN — ISOSORBIDE MONONITRATE 30 MG: 30 TABLET, EXTENDED RELEASE ORAL at 09:36

## 2024-06-04 RX ADMIN — INSULIN GLARGINE 30 UNITS: 100 INJECTION, SOLUTION SUBCUTANEOUS at 09:35

## 2024-06-04 RX ADMIN — SODIUM CHLORIDE, PRESERVATIVE FREE 10 ML: 5 INJECTION INTRAVENOUS at 09:37

## 2024-06-04 ASSESSMENT — PAIN SCALES - GENERAL
PAINLEVEL_OUTOF10: 2
PAINLEVEL_OUTOF10: 4
PAINLEVEL_OUTOF10: 0

## 2024-06-04 ASSESSMENT — PAIN DESCRIPTION - ORIENTATION
ORIENTATION: LEFT;RIGHT
ORIENTATION: RIGHT;LEFT

## 2024-06-04 ASSESSMENT — PAIN DESCRIPTION - DESCRIPTORS
DESCRIPTORS: BURNING
DESCRIPTORS: BURNING

## 2024-06-04 ASSESSMENT — PAIN DESCRIPTION - LOCATION
LOCATION: LEG
LOCATION: LEG

## 2024-06-04 ASSESSMENT — PAIN - FUNCTIONAL ASSESSMENT
PAIN_FUNCTIONAL_ASSESSMENT: PREVENTS OR INTERFERES SOME ACTIVE ACTIVITIES AND ADLS
PAIN_FUNCTIONAL_ASSESSMENT: PREVENTS OR INTERFERES SOME ACTIVE ACTIVITIES AND ADLS

## 2024-06-05 ENCOUNTER — APPOINTMENT (OUTPATIENT)
Dept: CT IMAGING | Age: 84
DRG: 682 | End: 2024-06-05
Payer: MEDICARE

## 2024-06-05 LAB
ANION GAP SERPL CALCULATED.3IONS-SCNC: 14 MMOL/L (ref 3–16)
BACTERIA SPEC AEROBE CULT: ABNORMAL
BUN SERPL-MCNC: 52 MG/DL (ref 7–20)
CALCIUM SERPL-MCNC: 8.8 MG/DL (ref 8.3–10.6)
CHLORIDE SERPL-SCNC: 99 MMOL/L (ref 99–110)
CO2 SERPL-SCNC: 25 MMOL/L (ref 21–32)
CREAT SERPL-MCNC: 2.4 MG/DL (ref 0.8–1.3)
DEPRECATED RDW RBC AUTO: 16.5 % (ref 12.4–15.4)
GFR SERPLBLD CREATININE-BSD FMLA CKD-EPI: 26 ML/MIN/{1.73_M2}
GLUCOSE BLD-MCNC: 132 MG/DL (ref 70–99)
GLUCOSE BLD-MCNC: 135 MG/DL (ref 70–99)
GLUCOSE BLD-MCNC: 165 MG/DL (ref 70–99)
GLUCOSE BLD-MCNC: 271 MG/DL (ref 70–99)
GLUCOSE SERPL-MCNC: 149 MG/DL (ref 70–99)
GRAM STN SPEC: ABNORMAL
GRAM STN SPEC: ABNORMAL
HCT VFR BLD AUTO: 33.2 % (ref 40.5–52.5)
HGB BLD-MCNC: 10.9 G/DL (ref 13.5–17.5)
IRON SATN MFR SERPL: 10 % (ref 20–50)
IRON SERPL-MCNC: 25 UG/DL (ref 59–158)
MCH RBC QN AUTO: 30.5 PG (ref 26–34)
MCHC RBC AUTO-ENTMCNC: 32.8 G/DL (ref 31–36)
MCV RBC AUTO: 93.1 FL (ref 80–100)
ORGANISM: ABNORMAL
PERFORMED ON: ABNORMAL
PLATELET # BLD AUTO: 95 K/UL (ref 135–450)
PMV BLD AUTO: 8.6 FL (ref 5–10.5)
POTASSIUM SERPL-SCNC: 3.5 MMOL/L (ref 3.5–5.1)
RBC # BLD AUTO: 3.57 M/UL (ref 4.2–5.9)
SODIUM SERPL-SCNC: 138 MMOL/L (ref 136–145)
TIBC SERPL-MCNC: 250 UG/DL (ref 260–445)
TRANSFERRIN SERPL-MCNC: 200 MG/DL (ref 200–360)
WBC # BLD AUTO: 3.7 K/UL (ref 4–11)

## 2024-06-05 PROCEDURE — 84466 ASSAY OF TRANSFERRIN: CPT

## 2024-06-05 PROCEDURE — 6360000002 HC RX W HCPCS: Performed by: NURSE PRACTITIONER

## 2024-06-05 PROCEDURE — 83540 ASSAY OF IRON: CPT

## 2024-06-05 PROCEDURE — 2580000003 HC RX 258: Performed by: INTERNAL MEDICINE

## 2024-06-05 PROCEDURE — 6360000002 HC RX W HCPCS: Performed by: INTERNAL MEDICINE

## 2024-06-05 PROCEDURE — 99233 SBSQ HOSP IP/OBS HIGH 50: CPT | Performed by: INTERNAL MEDICINE

## 2024-06-05 PROCEDURE — 97530 THERAPEUTIC ACTIVITIES: CPT

## 2024-06-05 PROCEDURE — 6370000000 HC RX 637 (ALT 250 FOR IP): Performed by: FAMILY MEDICINE

## 2024-06-05 PROCEDURE — 1200000000 HC SEMI PRIVATE

## 2024-06-05 PROCEDURE — 85027 COMPLETE CBC AUTOMATED: CPT

## 2024-06-05 PROCEDURE — 70450 CT HEAD/BRAIN W/O DYE: CPT

## 2024-06-05 PROCEDURE — 2580000003 HC RX 258: Performed by: FAMILY MEDICINE

## 2024-06-05 PROCEDURE — 97116 GAIT TRAINING THERAPY: CPT

## 2024-06-05 PROCEDURE — 97535 SELF CARE MNGMENT TRAINING: CPT

## 2024-06-05 PROCEDURE — 97162 PT EVAL MOD COMPLEX 30 MIN: CPT

## 2024-06-05 PROCEDURE — 36415 COLL VENOUS BLD VENIPUNCTURE: CPT

## 2024-06-05 PROCEDURE — 6370000000 HC RX 637 (ALT 250 FOR IP): Performed by: NURSE PRACTITIONER

## 2024-06-05 PROCEDURE — 80048 BASIC METABOLIC PNL TOTAL CA: CPT

## 2024-06-05 PROCEDURE — 97166 OT EVAL MOD COMPLEX 45 MIN: CPT

## 2024-06-05 RX ORDER — POTASSIUM CHLORIDE 20 MEQ/1
20 TABLET, EXTENDED RELEASE ORAL ONCE
Status: COMPLETED | OUTPATIENT
Start: 2024-06-05 | End: 2024-06-05

## 2024-06-05 RX ADMIN — INSULIN LISPRO 2 UNITS: 100 INJECTION, SOLUTION INTRAVENOUS; SUBCUTANEOUS at 18:38

## 2024-06-05 RX ADMIN — ASPIRIN 81 MG: 81 TABLET, COATED ORAL at 08:53

## 2024-06-05 RX ADMIN — CEFTAROLINE FOSAMIL 300 MG: 400 POWDER, FOR SOLUTION INTRAVENOUS at 02:18

## 2024-06-05 RX ADMIN — ENOXAPARIN SODIUM 30 MG: 100 INJECTION SUBCUTANEOUS at 09:38

## 2024-06-05 RX ADMIN — INSULIN LISPRO 10 UNITS: 100 INJECTION, SOLUTION INTRAVENOUS; SUBCUTANEOUS at 08:53

## 2024-06-05 RX ADMIN — SENNOSIDES AND DOCUSATE SODIUM 2 TABLET: 50; 8.6 TABLET ORAL at 08:53

## 2024-06-05 RX ADMIN — FUROSEMIDE 40 MG: 10 INJECTION, SOLUTION INTRAMUSCULAR; INTRAVENOUS at 21:20

## 2024-06-05 RX ADMIN — FUROSEMIDE 40 MG: 10 INJECTION, SOLUTION INTRAMUSCULAR; INTRAVENOUS at 08:53

## 2024-06-05 RX ADMIN — INSULIN GLARGINE 30 UNITS: 100 INJECTION, SOLUTION SUBCUTANEOUS at 21:16

## 2024-06-05 RX ADMIN — INSULIN LISPRO 10 UNITS: 100 INJECTION, SOLUTION INTRAVENOUS; SUBCUTANEOUS at 18:38

## 2024-06-05 RX ADMIN — FUROSEMIDE 40 MG: 10 INJECTION, SOLUTION INTRAMUSCULAR; INTRAVENOUS at 14:45

## 2024-06-05 RX ADMIN — SODIUM CHLORIDE, PRESERVATIVE FREE 10 ML: 5 INJECTION INTRAVENOUS at 21:16

## 2024-06-05 RX ADMIN — SODIUM CHLORIDE, PRESERVATIVE FREE 10 ML: 5 INJECTION INTRAVENOUS at 08:54

## 2024-06-05 RX ADMIN — INSULIN LISPRO 10 UNITS: 100 INJECTION, SOLUTION INTRAVENOUS; SUBCUTANEOUS at 13:19

## 2024-06-05 RX ADMIN — POTASSIUM CHLORIDE 20 MEQ: 1500 TABLET, EXTENDED RELEASE ORAL at 13:18

## 2024-06-05 RX ADMIN — INSULIN GLARGINE 30 UNITS: 100 INJECTION, SOLUTION SUBCUTANEOUS at 08:53

## 2024-06-05 RX ADMIN — CEFTAROLINE FOSAMIL 300 MG: 400 POWDER, FOR SOLUTION INTRAVENOUS at 15:19

## 2024-06-05 RX ADMIN — ACETAMINOPHEN 650 MG: 325 TABLET ORAL at 02:12

## 2024-06-05 RX ADMIN — CEFTAZIDIME 2000 MG: 1 INJECTION, POWDER, FOR SOLUTION INTRAMUSCULAR; INTRAVENOUS at 21:32

## 2024-06-05 RX ADMIN — ISOSORBIDE MONONITRATE 30 MG: 30 TABLET, EXTENDED RELEASE ORAL at 08:53

## 2024-06-05 RX ADMIN — PANTOPRAZOLE SODIUM 40 MG: 40 TABLET, DELAYED RELEASE ORAL at 07:17

## 2024-06-05 RX ADMIN — ATORVASTATIN CALCIUM 40 MG: 40 TABLET, FILM COATED ORAL at 21:15

## 2024-06-05 RX ADMIN — AMIODARONE HYDROCHLORIDE 200 MG: 200 TABLET ORAL at 08:59

## 2024-06-05 RX ADMIN — GABAPENTIN 300 MG: 300 CAPSULE ORAL at 21:15

## 2024-06-05 RX ADMIN — CARVEDILOL 3.12 MG: 3.12 TABLET, FILM COATED ORAL at 18:38

## 2024-06-05 RX ADMIN — SODIUM CHLORIDE 25 ML: 9 INJECTION, SOLUTION INTRAVENOUS at 15:16

## 2024-06-05 RX ADMIN — CARVEDILOL 3.12 MG: 3.12 TABLET, FILM COATED ORAL at 08:54

## 2024-06-05 ASSESSMENT — PAIN DESCRIPTION - LOCATION
LOCATION: HEAD
LOCATION: HEAD

## 2024-06-05 ASSESSMENT — PAIN SCALES - GENERAL
PAINLEVEL_OUTOF10: 3
PAINLEVEL_OUTOF10: 3

## 2024-06-05 ASSESSMENT — PAIN DESCRIPTION - DESCRIPTORS
DESCRIPTORS: ACHING
DESCRIPTORS: ACHING

## 2024-06-05 ASSESSMENT — PAIN - FUNCTIONAL ASSESSMENT: PAIN_FUNCTIONAL_ASSESSMENT: ACTIVITIES ARE NOT PREVENTED

## 2024-06-05 NOTE — SIGNIFICANT EVENT
Hospitalist    Patient fell getting out of bed unassisted.  He landed on his buttocks and then reports hit left side of his head on bed.  He has mild headache, other wise no new pain.    Has multiple old bruises on buttocks and arms.  Left arm swollen from previous fx.    He is alert and oriented.   On ASA and DVT proph dose lovenox    Will get stat CT of head     Nereyda Merida NP

## 2024-06-05 NOTE — SIGNIFICANT EVENT
Name:  Gareth Giang /Age/Sex: 1940  (84 y.o. male)   MRN & CSN:  [id number] & [id number] Encounter Date/Time: 2024 1:36 AM EDT    Location:  [id number] PCP: Bear Salcido MD       Call addressed around 2024 1:36 AM EDT     Vitals:   Vitals:    24 0130   BP: 136/75   Pulse: 65   Resp: 16   Temp: 97.6 °F (36.4 °C)   SpO2: 95%         APRN was called for patient fall    Per RN report, patient was ambulating to the bathroom unassisted.  He fell, landed on his buttocks and hit his head per RN report.  Patient now with complaint of headache 3 out of 10       ASSESSMENT/PLAN:    Headache  Mechanical fall  -Patient reports he hit his head when he fell landing on buttocks in patient room  -Patient currently on daily aspirin and Lovenox for DVT prophylaxis  -Tylenol for headache per as needed analgesic  -Will get CT head  -Every 4 hours neurochecks  -I have asked the onsite provider to assess patient for additional needs        Electronically signed by RAYMUNDO Samaniego - NP on 2024 at 1:36 AM

## 2024-06-05 NOTE — PLAN OF CARE
Problem: Pain  Goal: Verbalizes/displays adequate comfort level or baseline comfort level  Outcome: Progressing     Problem: Safety - Adult  Goal: Free from fall injury  Outcome: Progressing     Problem: ABCDS Injury Assessment  Goal: Absence of physical injury  Outcome: Progressing     Problem: Chronic Conditions and Co-morbidities  Goal: Patient's chronic conditions and co-morbidity symptoms are monitored and maintained or improved  Outcome: Progressing     Problem: Cardiovascular - Adult  Goal: Maintains optimal cardiac output and hemodynamic stability  Reactivated     Problem: Skin/Tissue Integrity - Adult  Goal: Incisions, wounds, or drain sites healing without S/S of infection  Reactivated     Problem: Musculoskeletal - Adult  Goal: Return mobility to safest level of function  Reactivated  Goal: Maintain proper alignment of affected body part  Reactivated  Goal: Return ADL status to a safe level of function  Reactivated     Problem: Infection - Adult  Goal: Absence of infection during hospitalization  Reactivated     Problem: Metabolic/Fluid and Electrolytes - Adult  Goal: Glucose maintained within prescribed range  Reactivated

## 2024-06-06 PROBLEM — Z79.2 RECEIVING INTRAVENOUS ANTIBIOTIC TREATMENT AS OUTPATIENT: Status: ACTIVE | Noted: 2024-06-06

## 2024-06-06 LAB
ALBUMIN SERPL-MCNC: 2.8 G/DL (ref 3.4–5)
ANION GAP SERPL CALCULATED.3IONS-SCNC: 9 MMOL/L (ref 3–16)
BACTERIA BLD CULT ORG #2: NORMAL
BACTERIA BLD CULT: NORMAL
BUN SERPL-MCNC: 47 MG/DL (ref 7–20)
CALCIUM SERPL-MCNC: 8.9 MG/DL (ref 8.3–10.6)
CHLORIDE SERPL-SCNC: 101 MMOL/L (ref 99–110)
CO2 SERPL-SCNC: 27 MMOL/L (ref 21–32)
CREAT SERPL-MCNC: 1.9 MG/DL (ref 0.8–1.3)
GFR SERPLBLD CREATININE-BSD FMLA CKD-EPI: 34 ML/MIN/{1.73_M2}
GLUCOSE BLD-MCNC: 115 MG/DL (ref 70–99)
GLUCOSE BLD-MCNC: 121 MG/DL (ref 70–99)
GLUCOSE BLD-MCNC: 141 MG/DL (ref 70–99)
GLUCOSE BLD-MCNC: 150 MG/DL (ref 70–99)
GLUCOSE BLD-MCNC: 153 MG/DL (ref 70–99)
GLUCOSE SERPL-MCNC: 146 MG/DL (ref 70–99)
PERFORMED ON: ABNORMAL
PHOSPHATE SERPL-MCNC: 3.1 MG/DL (ref 2.5–4.9)
POTASSIUM SERPL-SCNC: 3.7 MMOL/L (ref 3.5–5.1)
SODIUM SERPL-SCNC: 137 MMOL/L (ref 136–145)

## 2024-06-06 PROCEDURE — 2580000003 HC RX 258: Performed by: INTERNAL MEDICINE

## 2024-06-06 PROCEDURE — 99233 SBSQ HOSP IP/OBS HIGH 50: CPT | Performed by: INTERNAL MEDICINE

## 2024-06-06 PROCEDURE — 6370000000 HC RX 637 (ALT 250 FOR IP): Performed by: FAMILY MEDICINE

## 2024-06-06 PROCEDURE — 80069 RENAL FUNCTION PANEL: CPT

## 2024-06-06 PROCEDURE — 6360000002 HC RX W HCPCS: Performed by: INTERNAL MEDICINE

## 2024-06-06 PROCEDURE — 2580000003 HC RX 258: Performed by: FAMILY MEDICINE

## 2024-06-06 PROCEDURE — 6370000000 HC RX 637 (ALT 250 FOR IP): Performed by: NURSE PRACTITIONER

## 2024-06-06 PROCEDURE — 36415 COLL VENOUS BLD VENIPUNCTURE: CPT

## 2024-06-06 PROCEDURE — 1200000000 HC SEMI PRIVATE

## 2024-06-06 PROCEDURE — 6360000002 HC RX W HCPCS: Performed by: NURSE PRACTITIONER

## 2024-06-06 RX ADMIN — ATORVASTATIN CALCIUM 40 MG: 40 TABLET, FILM COATED ORAL at 21:13

## 2024-06-06 RX ADMIN — GABAPENTIN 300 MG: 300 CAPSULE ORAL at 21:13

## 2024-06-06 RX ADMIN — FUROSEMIDE 40 MG: 10 INJECTION, SOLUTION INTRAMUSCULAR; INTRAVENOUS at 21:13

## 2024-06-06 RX ADMIN — INSULIN GLARGINE 30 UNITS: 100 INJECTION, SOLUTION SUBCUTANEOUS at 21:12

## 2024-06-06 RX ADMIN — SODIUM CHLORIDE, PRESERVATIVE FREE 10 ML: 5 INJECTION INTRAVENOUS at 09:09

## 2024-06-06 RX ADMIN — CEFTAZIDIME 2000 MG: 1 INJECTION, POWDER, FOR SOLUTION INTRAMUSCULAR; INTRAVENOUS at 21:24

## 2024-06-06 RX ADMIN — ENOXAPARIN SODIUM 30 MG: 100 INJECTION SUBCUTANEOUS at 09:08

## 2024-06-06 RX ADMIN — ASPIRIN 81 MG: 81 TABLET, COATED ORAL at 09:08

## 2024-06-06 RX ADMIN — INSULIN LISPRO 10 UNITS: 100 INJECTION, SOLUTION INTRAVENOUS; SUBCUTANEOUS at 17:43

## 2024-06-06 RX ADMIN — INSULIN LISPRO 10 UNITS: 100 INJECTION, SOLUTION INTRAVENOUS; SUBCUTANEOUS at 09:09

## 2024-06-06 RX ADMIN — PANTOPRAZOLE SODIUM 40 MG: 40 TABLET, DELAYED RELEASE ORAL at 05:08

## 2024-06-06 RX ADMIN — FUROSEMIDE 40 MG: 10 INJECTION, SOLUTION INTRAMUSCULAR; INTRAVENOUS at 13:17

## 2024-06-06 RX ADMIN — ISOSORBIDE MONONITRATE 30 MG: 30 TABLET, EXTENDED RELEASE ORAL at 09:07

## 2024-06-06 RX ADMIN — AMIODARONE HYDROCHLORIDE 200 MG: 200 TABLET ORAL at 09:08

## 2024-06-06 RX ADMIN — INSULIN GLARGINE 30 UNITS: 100 INJECTION, SOLUTION SUBCUTANEOUS at 09:09

## 2024-06-06 RX ADMIN — CARVEDILOL 3.12 MG: 3.12 TABLET, FILM COATED ORAL at 09:08

## 2024-06-06 RX ADMIN — FUROSEMIDE 40 MG: 10 INJECTION, SOLUTION INTRAMUSCULAR; INTRAVENOUS at 09:08

## 2024-06-06 RX ADMIN — SODIUM CHLORIDE, PRESERVATIVE FREE 10 ML: 5 INJECTION INTRAVENOUS at 21:13

## 2024-06-06 RX ADMIN — SENNOSIDES AND DOCUSATE SODIUM 2 TABLET: 50; 8.6 TABLET ORAL at 09:07

## 2024-06-06 RX ADMIN — CARVEDILOL 3.12 MG: 3.12 TABLET, FILM COATED ORAL at 17:43

## 2024-06-06 RX ADMIN — INSULIN LISPRO 10 UNITS: 100 INJECTION, SOLUTION INTRAVENOUS; SUBCUTANEOUS at 13:17

## 2024-06-06 RX ADMIN — OXYCODONE HYDROCHLORIDE AND ACETAMINOPHEN 1 TABLET: 5; 325 TABLET ORAL at 18:22

## 2024-06-06 ASSESSMENT — PAIN SCALES - GENERAL
PAINLEVEL_OUTOF10: 0
PAINLEVEL_OUTOF10: 6
PAINLEVEL_OUTOF10: 0

## 2024-06-06 ASSESSMENT — PAIN DESCRIPTION - ORIENTATION: ORIENTATION: RIGHT

## 2024-06-06 ASSESSMENT — PAIN DESCRIPTION - DESCRIPTORS: DESCRIPTORS: ACHING

## 2024-06-06 ASSESSMENT — PAIN DESCRIPTION - LOCATION: LOCATION: ANKLE

## 2024-06-07 ENCOUNTER — CLINICAL DOCUMENTATION (OUTPATIENT)
Dept: INFECTIOUS DISEASES | Age: 84
End: 2024-06-07

## 2024-06-07 ENCOUNTER — TELEPHONE (OUTPATIENT)
Dept: INFECTIOUS DISEASES | Age: 84
End: 2024-06-07

## 2024-06-07 DIAGNOSIS — L03.115 BILATERAL LOWER LEG CELLULITIS: Primary | ICD-10-CM

## 2024-06-07 DIAGNOSIS — L03.116 BILATERAL LOWER LEG CELLULITIS: Primary | ICD-10-CM

## 2024-06-07 PROBLEM — R94.31 PROLONGED Q-T INTERVAL ON ECG: Status: ACTIVE | Noted: 2024-06-07

## 2024-06-07 PROBLEM — I50.20 HFREF (HEART FAILURE WITH REDUCED EJECTION FRACTION) (HCC): Status: ACTIVE | Noted: 2024-06-07

## 2024-06-07 LAB
ANION GAP SERPL CALCULATED.3IONS-SCNC: 8 MMOL/L (ref 3–16)
BUN SERPL-MCNC: 43 MG/DL (ref 7–20)
CALCIUM SERPL-MCNC: 9 MG/DL (ref 8.3–10.6)
CHLORIDE SERPL-SCNC: 103 MMOL/L (ref 99–110)
CO2 SERPL-SCNC: 31 MMOL/L (ref 21–32)
CREAT SERPL-MCNC: 1.8 MG/DL (ref 0.8–1.3)
EKG ATRIAL RATE: 63 BPM
EKG DIAGNOSIS: NORMAL
EKG P-R INTERVAL: 184 MS
EKG Q-T INTERVAL: 528 MS
EKG QRS DURATION: 178 MS
EKG QTC CALCULATION (BAZETT): 540 MS
EKG R AXIS: -78 DEGREES
EKG T AXIS: 74 DEGREES
EKG VENTRICULAR RATE: 63 BPM
GFR SERPLBLD CREATININE-BSD FMLA CKD-EPI: 37 ML/MIN/{1.73_M2}
GLUCOSE BLD-MCNC: 101 MG/DL (ref 70–99)
GLUCOSE BLD-MCNC: 103 MG/DL (ref 70–99)
GLUCOSE BLD-MCNC: 78 MG/DL (ref 70–99)
GLUCOSE BLD-MCNC: 83 MG/DL (ref 70–99)
GLUCOSE BLD-MCNC: 87 MG/DL (ref 70–99)
GLUCOSE SERPL-MCNC: 104 MG/DL (ref 70–99)
PERFORMED ON: ABNORMAL
PERFORMED ON: ABNORMAL
PERFORMED ON: NORMAL
POTASSIUM SERPL-SCNC: 3.5 MMOL/L (ref 3.5–5.1)
SODIUM SERPL-SCNC: 142 MMOL/L (ref 136–145)

## 2024-06-07 PROCEDURE — 6370000000 HC RX 637 (ALT 250 FOR IP): Performed by: NURSE PRACTITIONER

## 2024-06-07 PROCEDURE — 6370000000 HC RX 637 (ALT 250 FOR IP): Performed by: FAMILY MEDICINE

## 2024-06-07 PROCEDURE — 2580000003 HC RX 258: Performed by: FAMILY MEDICINE

## 2024-06-07 PROCEDURE — 99233 SBSQ HOSP IP/OBS HIGH 50: CPT | Performed by: INTERNAL MEDICINE

## 2024-06-07 PROCEDURE — 6370000000 HC RX 637 (ALT 250 FOR IP): Performed by: INTERNAL MEDICINE

## 2024-06-07 PROCEDURE — 6360000002 HC RX W HCPCS: Performed by: INTERNAL MEDICINE

## 2024-06-07 PROCEDURE — 93005 ELECTROCARDIOGRAM TRACING: CPT | Performed by: NURSE PRACTITIONER

## 2024-06-07 PROCEDURE — 2580000003 HC RX 258: Performed by: INTERNAL MEDICINE

## 2024-06-07 PROCEDURE — 1200000000 HC SEMI PRIVATE

## 2024-06-07 PROCEDURE — 80048 BASIC METABOLIC PNL TOTAL CA: CPT

## 2024-06-07 PROCEDURE — 93010 ELECTROCARDIOGRAM REPORT: CPT | Performed by: INTERNAL MEDICINE

## 2024-06-07 PROCEDURE — 36415 COLL VENOUS BLD VENIPUNCTURE: CPT

## 2024-06-07 PROCEDURE — 99222 1ST HOSP IP/OBS MODERATE 55: CPT | Performed by: INTERNAL MEDICINE

## 2024-06-07 RX ORDER — TORSEMIDE 20 MG/1
40 TABLET ORAL 2 TIMES DAILY
Status: DISCONTINUED | OUTPATIENT
Start: 2024-06-07 | End: 2024-06-09 | Stop reason: HOSPADM

## 2024-06-07 RX ORDER — INSULIN GLARGINE 100 [IU]/ML
25 INJECTION, SOLUTION SUBCUTANEOUS 2 TIMES DAILY
Status: DISCONTINUED | OUTPATIENT
Start: 2024-06-07 | End: 2024-06-08

## 2024-06-07 RX ORDER — INSULIN LISPRO 100 [IU]/ML
8 INJECTION, SOLUTION INTRAVENOUS; SUBCUTANEOUS
Status: DISCONTINUED | OUTPATIENT
Start: 2024-06-07 | End: 2024-06-07

## 2024-06-07 RX ORDER — METRONIDAZOLE 250 MG/1
500 TABLET ORAL EVERY 12 HOURS SCHEDULED
Status: DISCONTINUED | OUTPATIENT
Start: 2024-06-07 | End: 2024-06-09 | Stop reason: HOSPADM

## 2024-06-07 RX ORDER — ENOXAPARIN SODIUM 100 MG/ML
40 INJECTION SUBCUTANEOUS DAILY
Status: DISCONTINUED | OUTPATIENT
Start: 2024-06-07 | End: 2024-06-09 | Stop reason: HOSPADM

## 2024-06-07 RX ORDER — INSULIN LISPRO 100 [IU]/ML
8 INJECTION, SOLUTION INTRAVENOUS; SUBCUTANEOUS
Status: DISCONTINUED | OUTPATIENT
Start: 2024-06-07 | End: 2024-06-08

## 2024-06-07 RX ADMIN — SODIUM CHLORIDE 25 ML: 9 INJECTION, SOLUTION INTRAVENOUS at 20:52

## 2024-06-07 RX ADMIN — ASPIRIN 81 MG: 81 TABLET, COATED ORAL at 09:38

## 2024-06-07 RX ADMIN — AMIODARONE HYDROCHLORIDE 200 MG: 200 TABLET ORAL at 09:38

## 2024-06-07 RX ADMIN — SODIUM CHLORIDE, PRESERVATIVE FREE 10 ML: 5 INJECTION INTRAVENOUS at 20:56

## 2024-06-07 RX ADMIN — CEFTAZIDIME, AVIBACTAM 1.25 G: 2; .5 POWDER, FOR SOLUTION INTRAVENOUS at 20:53

## 2024-06-07 RX ADMIN — TORSEMIDE 40 MG: 20 TABLET ORAL at 12:25

## 2024-06-07 RX ADMIN — CARVEDILOL 3.12 MG: 3.12 TABLET, FILM COATED ORAL at 09:38

## 2024-06-07 RX ADMIN — ENOXAPARIN SODIUM 40 MG: 100 INJECTION SUBCUTANEOUS at 09:44

## 2024-06-07 RX ADMIN — ISOSORBIDE MONONITRATE 30 MG: 30 TABLET, EXTENDED RELEASE ORAL at 09:38

## 2024-06-07 RX ADMIN — OXYCODONE HYDROCHLORIDE AND ACETAMINOPHEN 1 TABLET: 5; 325 TABLET ORAL at 15:08

## 2024-06-07 RX ADMIN — PANTOPRAZOLE SODIUM 40 MG: 40 TABLET, DELAYED RELEASE ORAL at 05:04

## 2024-06-07 RX ADMIN — INSULIN GLARGINE 25 UNITS: 100 INJECTION, SOLUTION SUBCUTANEOUS at 09:55

## 2024-06-07 RX ADMIN — CEFTAZIDIME 2000 MG: 1 INJECTION, POWDER, FOR SOLUTION INTRAMUSCULAR; INTRAVENOUS at 10:02

## 2024-06-07 RX ADMIN — METRONIDAZOLE 500 MG: 250 TABLET ORAL at 20:55

## 2024-06-07 RX ADMIN — SODIUM CHLORIDE, PRESERVATIVE FREE 10 ML: 5 INJECTION INTRAVENOUS at 09:46

## 2024-06-07 RX ADMIN — TORSEMIDE 40 MG: 20 TABLET ORAL at 20:55

## 2024-06-07 RX ADMIN — CEFTAZIDIME, AVIBACTAM 1.25 G: 2; .5 POWDER, FOR SOLUTION INTRAVENOUS at 12:30

## 2024-06-07 RX ADMIN — CARVEDILOL 3.12 MG: 3.12 TABLET, FILM COATED ORAL at 17:34

## 2024-06-07 RX ADMIN — INSULIN LISPRO 8 UNITS: 100 INJECTION, SOLUTION INTRAVENOUS; SUBCUTANEOUS at 09:55

## 2024-06-07 RX ADMIN — SENNOSIDES AND DOCUSATE SODIUM 2 TABLET: 50; 8.6 TABLET ORAL at 09:47

## 2024-06-07 RX ADMIN — SODIUM CHLORIDE 25 ML: 9 INJECTION, SOLUTION INTRAVENOUS at 10:02

## 2024-06-07 RX ADMIN — ATORVASTATIN CALCIUM 40 MG: 40 TABLET, FILM COATED ORAL at 20:55

## 2024-06-07 RX ADMIN — GABAPENTIN 300 MG: 300 CAPSULE ORAL at 20:54

## 2024-06-07 RX ADMIN — METRONIDAZOLE 500 MG: 250 TABLET ORAL at 09:38

## 2024-06-07 ASSESSMENT — PAIN SCALES - GENERAL
PAINLEVEL_OUTOF10: 0
PAINLEVEL_OUTOF10: 8

## 2024-06-07 ASSESSMENT — PAIN DESCRIPTION - ORIENTATION: ORIENTATION: RIGHT

## 2024-06-07 ASSESSMENT — PAIN DESCRIPTION - DESCRIPTORS: DESCRIPTORS: THROBBING

## 2024-06-07 ASSESSMENT — PAIN DESCRIPTION - LOCATION: LOCATION: ANKLE

## 2024-06-07 NOTE — TELEPHONE ENCOUNTER
Received notification from Bryce THAPA that SNF will not pay for Ceftazidime as ordered on BRENNAN. Per Dr. Olivier/Sweetie Bond PharmD alternate would be:    IV avycaz 1.25g q8h plus po flagyl 500 mg BID    He will contact SNF and notify this nurse or MD if acceptable for correction of BRENNAN

## 2024-06-07 NOTE — PLAN OF CARE
Problem: Pain  Goal: Verbalizes/displays adequate comfort level or baseline comfort level  6/7/2024 1042 by Pedro Pablo Hill RN  Outcome: Progressing  6/7/2024 0349 by Jonna Osei RN  Outcome: Progressing  Flowsheets (Taken 6/7/2024 0349)  Verbalizes/displays adequate comfort level or baseline comfort level:   Encourage patient to monitor pain and request assistance   Assess pain using appropriate pain scale   Administer analgesics based on type and severity of pain and evaluate response   Implement non-pharmacological measures as appropriate and evaluate response   Consider cultural and social influences on pain and pain management   Notify Licensed Independent Practitioner if interventions unsuccessful or patient reports new pain     Problem: Safety - Adult  Goal: Free from fall injury  6/7/2024 1042 by Pedro Pablo Hill RN  Outcome: Progressing  6/7/2024 0349 by Jonna Osei RN  Outcome: Progressing  Flowsheets (Taken 6/7/2024 0349)  Free From Fall Injury: Instruct family/caregiver on patient safety     Problem: ABCDS Injury Assessment  Goal: Absence of physical injury  6/7/2024 1042 by Pedro Pablo Hill RN  Outcome: Progressing  6/7/2024 0349 by Jonna Osei RN  Outcome: Progressing  Flowsheets (Taken 6/7/2024 0349)  Absence of Physical Injury: Implement safety measures based on patient assessment     Problem: Chronic Conditions and Co-morbidities  Goal: Patient's chronic conditions and co-morbidity symptoms are monitored and maintained or improved  6/7/2024 1042 by Pedro Pablo Hill RN  Outcome: Progressing  6/7/2024 0349 by Jonna Osei RN  Outcome: Progressing  Flowsheets (Taken 6/7/2024 0349)  Care Plan - Patient's Chronic Conditions and Co-Morbidity Symptoms are Monitored and Maintained or Improved:   Monitor and assess patient's chronic conditions and comorbid symptoms for stability, deterioration, or improvement   Collaborate with multidisciplinary team to address chronic and comorbid conditions and

## 2024-06-07 NOTE — CONSULTS
Nephrology Consult Note  [facility]  [phone]   [place]        Reason for Consult: COLIN, hyponatremia    HISTORY OF PRESENT ILLNESS:      84-year-old male with history of CKD 3, chronic systolic and diastolic heart failure, A-fib, CAD s/p PCI is admitted with bilateral lower extremity swelling, weeping legs.  He does not weigh himself daily however is on torsemide 40 twice daily at home.  Denies eating salty food.  He denies any shortness of breath.      Patient seen and examined today.  No visitors present.  Labs on admission showed sodium 123, creatinine 3.2.  Received Lasix 40 IV in ER yesterday.  Blood pressure has been running low normal.  Nephrology consulted for COLIN and hyponatremia.    Past Medical History:        Diagnosis Date    Acid reflux     Arm wound, left, initial encounter 5/19/2022    Skin tears    Atherosclerosis of native arteries of right leg with ulceration of other part of foot ([facility]) 9/30/2021    Atrial flutter ([facility]) 2013    converted    Bleeding stomach ulcer oct 2015    BPH (benign prostatic hyperplasia)     CAD (coronary artery disease)     11/12 angio with 2 blocked bypass and 2 patent    Cellulitis of left thigh 5/10/2022    COPD (chronic obstructive pulmonary disease) ([facility])     Diabetes mellitus type II     Edema     chronic left leg    Elevated PSA- nl 8/12/11     Hyperlipidemia     Hypertension     Ischemic cardiomyopathy     Lipoma of skin-RIGHT CHEST 5/5/2011    Obesity     Osteoarthritis     Peripheral venous insufficiency 9/30/2021    Skin tear of left forearm without complication 3/10/2020    Significant amount of epidermal loss with bleeding.    Spinal stenosis of lumbar region with neurogenic claudication- clinically 7/6/2018       Past Surgical History:        Procedure Laterality Date    CATARACT REMOVAL  2010, 2011    bilat    COLONOSCOPY      CORONARY ARTERY BYPASS GRAFT  1993    x 4    EYE SURGERY Left 2019    cataract coming back    JOINT 
 [facility]   Electrophysiology   Date: 6/7/2024  Reason for Consultation: Prolonged QT   Consult Requesting Physician: Chelsea King MD     Chief Complaint   Patient presents with    Shortness of Breath     Pt via EMS from home, c/o of increased sob and bilateral leg swelling for a week       CC: SOB   HPI: Gareth Giang is a 84 y.o. male with h/o paroxysmal AF/atrial flutter, complete AVB, CHF, PAD, stomach ulcer, CAD, COPD, DM, HLD, HTN, ischemic cardiomyopathy, obesity, OA, peripheral venous insufficiency, and spinal stenosis. S/p [employer] BiV ICD for primary prevention (5/2022), s/p (DCCV 12/2015), s/p CABG (1992). Follows with cardiology at [facility]. On amiodarone and Coreg for maintenance of AF.    Pt presented to ED with SOB and BLE edema. Admitted for CHF exacerbation and cellulitis of BLE. Diuresed and given ceftazidime for cellulitis per ID. Was in acute renal failure on admission, renal function has improved since. Pt is pending discharge to SNF, antibiotic therapy must be adjusted prior, planned to continue IV Avycaz and oral Flagyl until June 21st.    Assessment/Plan     CHF, CAD  - s/p BiV ICD 5/2022  - EF 20-25% per echo 6/3, down from 35-40% 1/12/2024  - A pacing 86.7% and V pacing 76.2% of time per last device check 3/15/2024  - Continue Imdur, Coreg, torsemide      EKG during pacing shows left bundle branch block with positive lead I which is consistent with ineffective LV pacing.  Adjustment of pacing setting and also LV output needs to be done.  This can be done as an outpatient.      AF/AFL  - AF burden 0.3% per last device check  - High risk [id number] score, anticoagulation indicated, Continue Eliquis    Cellulitis of lower extremities  - 2/2 chronic BLE edema  - Currently on IV ceftazidime and PO flagyl  - ID following    Prolonged QT    His QT is prolonged bolus without pacing while he is in right bundle branch block and V pacing.  He QT interval exceeds the 
artery disease), Cellulitis of left thigh (5/10/2022), COPD (chronic obstructive pulmonary disease) ([facility]), Diabetes mellitus type II, Edema, Elevated PSA- nl 8/12/11, Hyperlipidemia, Hypertension, Ischemic cardiomyopathy, Lipoma of skin-RIGHT CHEST (5/5/2011), Obesity, Osteoarthritis, Peripheral venous insufficiency (9/30/2021), Skin tear of left forearm without complication (3/10/2020), and Spinal stenosis of lumbar region with neurogenic claudication- clinically (7/6/2018). with following problems:    Bilateral lower extremity cellulitis  Lactic acidosis with elevated lactate of 2.9 on admission  Acute kidney injury on chronic kidney disease  Poorly controlled type 2 diabetes mellitus  Hyponatremia with serum sodium of 132  Thrombocytopenia with platelet count of 104,000  Acute on chronic systolic and diastolic congestive heart failure  Elevated normochromic anemia  Glucosuria on urinalysis  Elevated CRP of 18.2  Elevated sed rate of 42  Elevated proBNP of 3897  Coronary artery disease  COPD  Essential hypertension  Mixed hyperlipidemia  History of gout  Obesity Class 2 due to excess calorie intake : Body mass index is 37.75 kg/m².        Discussion:      The patient has been admitted worsening bilateral lower extremity cellulitis and shortness of breath.    The patient has acute kidney injury on chronic kidney disease.    The patient had elevated lactate of 2.9 on admission.    He has longstanding type 2 diabetes mellitus with diabetic polyneuropathy.    The patient has a serum creatinine of 3.1 today.  His baseline serum creatinine runs around 1.8 to 1.9    The patient has a white cell count of 4200 today.  Hemoglobin is 11.5.  Platelet count is 104,000.    His sed rate and CRP are moderately elevated.    His last 2D echo was done on 1/12/2024.  Ejection fraction was 35 to 40% medical history diastolic dysfunction    The patient has chronic bilateral lower extremity stasis dermatitis changes and superimposed

## 2024-06-07 NOTE — PROGRESS NOTES
to have drug therapy managed by a pharmacist. The benefits and risks of complex antimicrobial therapy including drug-specific adverse reactions and necessary follow-up were discussed with patient and/or caregiver and they are amenable to OPAT and pharmacist management.

## 2024-06-07 NOTE — RT PROTOCOL NOTE
RT Inhaler-Nebulizer Bronchodilator Protocol Note    There is a bronchodilator order in the chart from a provider indicating to follow the RT Bronchodilator Protocol and there is an “Initiate RT Inhaler-Nebulizer Bronchodilator Protocol” order as well (see protocol at bottom of note).    CXR Findings:  No results found.    The findings from the last RT Protocol Assessment were as follows:   History Pulmonary Disease: Chronic pulmonary disease  Respiratory Pattern: Regular pattern and RR 12-20 bpm  Breath Sounds: Clear breath sounds  Cough: Strong, spontaneous, non-productive  Indication for Bronchodilator Therapy: On home bronchodilators  Bronchodilator Assessment Score: 2    Aerosolized bronchodilator medication orders have been revised according to the RT Inhaler-Nebulizer Bronchodilator Protocol below.    Respiratory Therapist to perform RT Therapy Protocol Assessment initially then follow the protocol.  Repeat RT Therapy Protocol Assessment PRN for score 0-3 or on second treatment, BID, and PRN for scores above 3.    No Indications - adjust the frequency to every 6 hours PRN wheezing or bronchospasm, if no treatments needed after 48 hours then discontinue using Per Protocol order mode.     If indication present, adjust the RT bronchodilator orders based on the Bronchodilator Assessment Score as indicated below.  Use Inhaler orders unless patient has one or more of the following: on home nebulizer, not able to hold breath for 10 seconds, is not alert and oriented, cannot activate and use MDI correctly, or respiratory rate 25 breaths per minute or more, then use the equivalent nebulizer order(s) with same Frequency and PRN reasons based on the score.  If a patient is on this medication at home then do not decrease Frequency below that used at home.    0-3 - enter or revise RT bronchodilator order(s) to equivalent RT Bronchodilator order with Frequency of every 4 hours PRN for wheezing or increased work of 
equivalent RT Bronchodilator order with Frequency of every 4 hours PRN for wheezing or increased work of breathing using Per Protocol order mode.        4-6 - enter or revise RT Bronchodilator order(s) to two equivalent RT bronchodilator orders with one order with BID Frequency and one order with Frequency of every 4 hours PRN wheezing or increased work of breathing using Per Protocol order mode.        7-10 - enter or revise RT Bronchodilator order(s) to two equivalent RT bronchodilator orders with one order with TID Frequency and one order with Frequency of every 4 hours PRN wheezing or increased work of breathing using Per Protocol order mode.       11-13 - enter or revise RT Bronchodilator order(s) to one equivalent RT bronchodilator order with QID Frequency and an Albuterol order with Frequency of every 4 hours PRN wheezing or increased work of breathing using Per Protocol order mode.      Greater than 13 - enter or revise RT Bronchodilator order(s) to one equivalent RT bronchodilator order with every 4 hours Frequency and an Albuterol order with Frequency of every 2 hours PRN wheezing or increased work of breathing using Per Protocol order mode.     RT to enter RT Home Evaluation for COPD & MDI Assessment order using Per Protocol order mode.    Electronically signed by Demetria Whyte RCP on 6/3/2024 at 12:35 AM

## 2024-06-07 NOTE — PLAN OF CARE
Problem: Pain  Goal: Verbalizes/displays adequate comfort level or baseline comfort level  Outcome: Progressing  Flowsheets (Taken 6/7/2024 0349)  Verbalizes/displays adequate comfort level or baseline comfort level:   Encourage patient to monitor pain and request assistance   Assess pain using appropriate pain scale   Administer analgesics based on type and severity of pain and evaluate response   Implement non-pharmacological measures as appropriate and evaluate response   Consider cultural and social influences on pain and pain management   Notify Licensed Independent Practitioner if interventions unsuccessful or patient reports new pain     Problem: Safety - Adult  Goal: Free from fall injury  Outcome: Progressing  Flowsheets (Taken 6/7/2024 0349)  Free From Fall Injury: Instruct family/caregiver on patient safety     Problem: ABCDS Injury Assessment  Goal: Absence of physical injury  Outcome: Progressing  Flowsheets (Taken 6/7/2024 0349)  Absence of Physical Injury: Implement safety measures based on patient assessment     Problem: Chronic Conditions and Co-morbidities  Goal: Patient's chronic conditions and co-morbidity symptoms are monitored and maintained or improved  Outcome: Progressing  Flowsheets (Taken 6/7/2024 0349)  Care Plan - Patient's Chronic Conditions and Co-Morbidity Symptoms are Monitored and Maintained or Improved:   Monitor and assess patient's chronic conditions and comorbid symptoms for stability, deterioration, or improvement   Collaborate with multidisciplinary team to address chronic and comorbid conditions and prevent exacerbation or deterioration   Update acute care plan with appropriate goals if chronic or comorbid symptoms are exacerbated and prevent overall improvement and discharge

## 2024-06-08 LAB
ALBUMIN SERPL-MCNC: 2.9 G/DL (ref 3.4–5)
ANION GAP SERPL CALCULATED.3IONS-SCNC: 12 MMOL/L (ref 3–16)
ANISOCYTOSIS BLD QL SMEAR: ABNORMAL
BASOPHILS # BLD: 0 K/UL (ref 0–0.2)
BASOPHILS NFR BLD: 0 %
BUN SERPL-MCNC: 39 MG/DL (ref 7–20)
CALCIUM SERPL-MCNC: 8.9 MG/DL (ref 8.3–10.6)
CHLORIDE SERPL-SCNC: 101 MMOL/L (ref 99–110)
CO2 SERPL-SCNC: 29 MMOL/L (ref 21–32)
CREAT SERPL-MCNC: 1.7 MG/DL (ref 0.8–1.3)
DEPRECATED RDW RBC AUTO: 16.7 % (ref 12.4–15.4)
EOSINOPHIL # BLD: 0 K/UL (ref 0–0.6)
EOSINOPHIL NFR BLD: 1 %
GLUCOSE BLD-MCNC: 103 MG/DL (ref 70–99)
GLUCOSE BLD-MCNC: 154 MG/DL (ref 70–99)
GLUCOSE BLD-MCNC: 80 MG/DL (ref 70–99)
GLUCOSE BLD-MCNC: 90 MG/DL (ref 70–99)
GLUCOSE BLD-MCNC: 92 MG/DL (ref 70–99)
GLUCOSE SERPL-MCNC: 96 MG/DL (ref 70–99)
HCT VFR BLD AUTO: 34.8 % (ref 40.5–52.5)
HGB BLD-MCNC: 11 G/DL (ref 13.5–17.5)
LYMPHOCYTES # BLD: 0.8 K/UL (ref 1–5.1)
LYMPHOCYTES NFR BLD: 25 %
MCH RBC QN AUTO: 29.3 PG (ref 26–34)
MCHC RBC AUTO-ENTMCNC: 31.7 G/DL (ref 31–36)
MCV RBC AUTO: 92.5 FL (ref 80–100)
MONOCYTES # BLD: 0.3 K/UL (ref 0–1.3)
MONOCYTES NFR BLD: 11 %
NEUTROPHILS # BLD: 1.9 K/UL (ref 1.7–7.7)
NEUTROPHILS NFR BLD: 63 %
PERFORMED ON: ABNORMAL
PERFORMED ON: ABNORMAL
PERFORMED ON: NORMAL
PHOSPHATE SERPL-MCNC: 3.3 MG/DL (ref 2.5–4.9)
PLATELET # BLD AUTO: 126 K/UL (ref 135–450)
PLATELET BLD QL SMEAR: ABNORMAL
PMV BLD AUTO: 7.8 FL (ref 5–10.5)
POTASSIUM SERPL-SCNC: 3.5 MMOL/L (ref 3.5–5.1)
RBC # BLD AUTO: 3.76 M/UL (ref 4.2–5.9)
SODIUM SERPL-SCNC: 142 MMOL/L (ref 136–145)
WBC # BLD AUTO: 3 K/UL (ref 4–11)

## 2024-06-08 PROCEDURE — 2580000003 HC RX 258: Performed by: FAMILY MEDICINE

## 2024-06-08 PROCEDURE — 6370000000 HC RX 637 (ALT 250 FOR IP): Performed by: INTERNAL MEDICINE

## 2024-06-08 PROCEDURE — 80069 RENAL FUNCTION PANEL: CPT

## 2024-06-08 PROCEDURE — 85025 COMPLETE CBC W/AUTO DIFF WBC: CPT

## 2024-06-08 PROCEDURE — 2580000003 HC RX 258: Performed by: INTERNAL MEDICINE

## 2024-06-08 PROCEDURE — 1200000000 HC SEMI PRIVATE

## 2024-06-08 PROCEDURE — 99232 SBSQ HOSP IP/OBS MODERATE 35: CPT | Performed by: NURSE PRACTITIONER

## 2024-06-08 PROCEDURE — 6370000000 HC RX 637 (ALT 250 FOR IP): Performed by: NURSE PRACTITIONER

## 2024-06-08 PROCEDURE — 6370000000 HC RX 637 (ALT 250 FOR IP): Performed by: FAMILY MEDICINE

## 2024-06-08 PROCEDURE — 6360000002 HC RX W HCPCS: Performed by: INTERNAL MEDICINE

## 2024-06-08 PROCEDURE — 36415 COLL VENOUS BLD VENIPUNCTURE: CPT

## 2024-06-08 RX ORDER — OXYCODONE HYDROCHLORIDE AND ACETAMINOPHEN 5; 325 MG/1; MG/1
1 TABLET ORAL EVERY 6 HOURS PRN
Status: DISCONTINUED | OUTPATIENT
Start: 2024-06-08 | End: 2024-06-09 | Stop reason: HOSPADM

## 2024-06-08 RX ORDER — INSULIN LISPRO 100 [IU]/ML
3 INJECTION, SOLUTION INTRAVENOUS; SUBCUTANEOUS
Status: DISCONTINUED | OUTPATIENT
Start: 2024-06-08 | End: 2024-06-09 | Stop reason: HOSPADM

## 2024-06-08 RX ORDER — INSULIN GLARGINE 100 [IU]/ML
15 INJECTION, SOLUTION SUBCUTANEOUS DAILY
Status: DISCONTINUED | OUTPATIENT
Start: 2024-06-08 | End: 2024-06-09 | Stop reason: HOSPADM

## 2024-06-08 RX ADMIN — CARVEDILOL 3.12 MG: 3.12 TABLET, FILM COATED ORAL at 18:37

## 2024-06-08 RX ADMIN — SODIUM CHLORIDE, PRESERVATIVE FREE 10 ML: 5 INJECTION INTRAVENOUS at 20:49

## 2024-06-08 RX ADMIN — PANTOPRAZOLE SODIUM 40 MG: 40 TABLET, DELAYED RELEASE ORAL at 05:46

## 2024-06-08 RX ADMIN — CARVEDILOL 3.12 MG: 3.12 TABLET, FILM COATED ORAL at 09:32

## 2024-06-08 RX ADMIN — SODIUM CHLORIDE, PRESERVATIVE FREE 10 ML: 5 INJECTION INTRAVENOUS at 09:33

## 2024-06-08 RX ADMIN — INSULIN LISPRO 3 UNITS: 100 INJECTION, SOLUTION INTRAVENOUS; SUBCUTANEOUS at 13:13

## 2024-06-08 RX ADMIN — TORSEMIDE 40 MG: 20 TABLET ORAL at 20:37

## 2024-06-08 RX ADMIN — METRONIDAZOLE 500 MG: 250 TABLET ORAL at 20:37

## 2024-06-08 RX ADMIN — ISOSORBIDE MONONITRATE 30 MG: 30 TABLET, EXTENDED RELEASE ORAL at 09:32

## 2024-06-08 RX ADMIN — SODIUM CHLORIDE 25 ML: 9 INJECTION, SOLUTION INTRAVENOUS at 03:50

## 2024-06-08 RX ADMIN — TORSEMIDE 40 MG: 20 TABLET ORAL at 09:32

## 2024-06-08 RX ADMIN — ENOXAPARIN SODIUM 40 MG: 100 INJECTION SUBCUTANEOUS at 09:32

## 2024-06-08 RX ADMIN — AMIODARONE HYDROCHLORIDE 200 MG: 200 TABLET ORAL at 09:32

## 2024-06-08 RX ADMIN — ASPIRIN 81 MG: 81 TABLET, COATED ORAL at 09:32

## 2024-06-08 RX ADMIN — METRONIDAZOLE 500 MG: 250 TABLET ORAL at 09:32

## 2024-06-08 RX ADMIN — CEFTAZIDIME, AVIBACTAM 1.25 G: 2; .5 POWDER, FOR SOLUTION INTRAVENOUS at 13:25

## 2024-06-08 RX ADMIN — GABAPENTIN 300 MG: 300 CAPSULE ORAL at 20:38

## 2024-06-08 RX ADMIN — OXYCODONE HYDROCHLORIDE AND ACETAMINOPHEN 1 TABLET: 5; 325 TABLET ORAL at 20:38

## 2024-06-08 RX ADMIN — SODIUM CHLORIDE 25 ML: 9 INJECTION, SOLUTION INTRAVENOUS at 04:43

## 2024-06-08 RX ADMIN — OXYCODONE HYDROCHLORIDE AND ACETAMINOPHEN 1 TABLET: 5; 325 TABLET ORAL at 13:13

## 2024-06-08 RX ADMIN — CEFTAZIDIME, AVIBACTAM 1.25 G: 2; .5 POWDER, FOR SOLUTION INTRAVENOUS at 03:50

## 2024-06-08 RX ADMIN — SENNOSIDES AND DOCUSATE SODIUM 2 TABLET: 50; 8.6 TABLET ORAL at 09:32

## 2024-06-08 RX ADMIN — CEFTAZIDIME, AVIBACTAM 1.25 G: 2; .5 POWDER, FOR SOLUTION INTRAVENOUS at 20:42

## 2024-06-08 RX ADMIN — ATORVASTATIN CALCIUM 40 MG: 40 TABLET, FILM COATED ORAL at 20:45

## 2024-06-08 ASSESSMENT — PAIN SCALES - GENERAL
PAINLEVEL_OUTOF10: 7
PAINLEVEL_OUTOF10: 0
PAINLEVEL_OUTOF10: 8
PAINLEVEL_OUTOF10: 8
PAINLEVEL_OUTOF10: 0
PAINLEVEL_OUTOF10: 6
PAINLEVEL_OUTOF10: 7
PAINLEVEL_OUTOF10: 0
PAINLEVEL_OUTOF10: 4

## 2024-06-08 ASSESSMENT — PAIN SCALES - WONG BAKER
WONGBAKER_NUMERICALRESPONSE: NO HURT

## 2024-06-08 ASSESSMENT — PAIN DESCRIPTION - ORIENTATION: ORIENTATION: LOWER

## 2024-06-08 ASSESSMENT — PAIN DESCRIPTION - DESCRIPTORS: DESCRIPTORS: DISCOMFORT

## 2024-06-08 ASSESSMENT — PAIN DESCRIPTION - LOCATION: LOCATION: ARM

## 2024-06-08 NOTE — PLAN OF CARE
Problem: Pain  Goal: Verbalizes/displays adequate comfort level or baseline comfort level  Outcome: Progressing     Problem: Safety - Adult  Goal: Free from fall injury  Outcome: Progressing     Problem: Cardiovascular - Adult  Goal: Maintains optimal cardiac output and hemodynamic stability  Outcome: Progressing     Problem: Skin/Tissue Integrity - Adult  Goal: Incisions, wounds, or drain sites healing without S/S of infection  Outcome: Progressing     Problem: Musculoskeletal - Adult  Goal: Return mobility to safest level of function  Outcome: Progressing  Goal: Maintain proper alignment of affected body part  Outcome: Progressing  Goal: Return ADL status to a safe level of function  Outcome: Progressing     Problem: Infection - Adult  Goal: Absence of infection during hospitalization  Outcome: Progressing     Problem: Metabolic/Fluid and Electrolytes - Adult  Goal: Glucose maintained within prescribed range  Outcome: Progressing

## 2024-06-08 NOTE — PLAN OF CARE
Problem: Pain  Goal: Verbalizes/displays adequate comfort level or baseline comfort level  6/8/2024 1144 by Amanda Jones RN  Outcome: Progressing  6/8/2024 0239 by Yaneth Beauchamp RN  Outcome: Progressing     Problem: Safety - Adult  Goal: Free from fall injury  6/8/2024 1144 by Amanda Jones RN  Outcome: Progressing  6/8/2024 0239 by Yaneth Beauchamp RN  Outcome: Progressing     Problem: ABCDS Injury Assessment  Goal: Absence of physical injury  Outcome: Progressing     Problem: Chronic Conditions and Co-morbidities  Goal: Patient's chronic conditions and co-morbidity symptoms are monitored and maintained or improved  Outcome: Progressing

## 2024-06-09 ENCOUNTER — APPOINTMENT (OUTPATIENT)
Dept: GENERAL RADIOLOGY | Age: 84
DRG: 682 | End: 2024-06-09
Payer: MEDICARE

## 2024-06-09 VITALS
HEART RATE: 75 BPM | HEIGHT: 66 IN | DIASTOLIC BLOOD PRESSURE: 57 MMHG | TEMPERATURE: 98.5 F | RESPIRATION RATE: 18 BRPM | OXYGEN SATURATION: 96 % | BODY MASS INDEX: 37.27 KG/M2 | SYSTOLIC BLOOD PRESSURE: 100 MMHG | WEIGHT: 231.92 LBS

## 2024-06-09 LAB
GLUCOSE BLD-MCNC: 124 MG/DL (ref 70–99)
GLUCOSE BLD-MCNC: 141 MG/DL (ref 70–99)
PERFORMED ON: ABNORMAL
PERFORMED ON: ABNORMAL

## 2024-06-09 PROCEDURE — 99231 SBSQ HOSP IP/OBS SF/LOW 25: CPT | Performed by: NURSE PRACTITIONER

## 2024-06-09 PROCEDURE — 2580000003 HC RX 258: Performed by: INTERNAL MEDICINE

## 2024-06-09 PROCEDURE — 6370000000 HC RX 637 (ALT 250 FOR IP): Performed by: FAMILY MEDICINE

## 2024-06-09 PROCEDURE — 2580000003 HC RX 258: Performed by: FAMILY MEDICINE

## 2024-06-09 PROCEDURE — 6360000002 HC RX W HCPCS: Performed by: INTERNAL MEDICINE

## 2024-06-09 PROCEDURE — 6370000000 HC RX 637 (ALT 250 FOR IP): Performed by: INTERNAL MEDICINE

## 2024-06-09 PROCEDURE — 71045 X-RAY EXAM CHEST 1 VIEW: CPT

## 2024-06-09 PROCEDURE — 6370000000 HC RX 637 (ALT 250 FOR IP): Performed by: NURSE PRACTITIONER

## 2024-06-09 RX ORDER — OXYCODONE HYDROCHLORIDE AND ACETAMINOPHEN 5; 325 MG/1; MG/1
1 TABLET ORAL EVERY 6 HOURS PRN
Qty: 12 TABLET | Refills: 0 | Status: SHIPPED | OUTPATIENT
Start: 2024-06-09 | End: 2024-06-12

## 2024-06-09 RX ORDER — METRONIDAZOLE 500 MG/1
500 TABLET ORAL EVERY 12 HOURS SCHEDULED
Qty: 24 TABLET | Refills: 0 | Status: SHIPPED | OUTPATIENT
Start: 2024-06-09 | End: 2024-06-21

## 2024-06-09 RX ORDER — INSULIN GLARGINE 100 [IU]/ML
15 INJECTION, SOLUTION SUBCUTANEOUS DAILY
Qty: 10 ML | Refills: 0 | Status: SHIPPED | OUTPATIENT
Start: 2024-06-09

## 2024-06-09 RX ORDER — GABAPENTIN 300 MG/1
300 CAPSULE ORAL 2 TIMES DAILY
Qty: 60 CAPSULE | Refills: 0 | Status: SHIPPED | OUTPATIENT
Start: 2024-06-09 | End: 2024-07-09

## 2024-06-09 RX ORDER — SODIUM CHLORIDE 0.9 % (FLUSH) 0.9 %
5-40 SYRINGE (ML) INJECTION PRN
Status: DISCONTINUED | OUTPATIENT
Start: 2024-06-09 | End: 2024-06-09 | Stop reason: HOSPADM

## 2024-06-09 RX ORDER — LIDOCAINE HYDROCHLORIDE 10 MG/ML
5 INJECTION, SOLUTION EPIDURAL; INFILTRATION; INTRACAUDAL; PERINEURAL ONCE
Status: DISCONTINUED | OUTPATIENT
Start: 2024-06-09 | End: 2024-06-09 | Stop reason: HOSPADM

## 2024-06-09 RX ORDER — SODIUM CHLORIDE 9 MG/ML
25 INJECTION, SOLUTION INTRAVENOUS PRN
Status: DISCONTINUED | OUTPATIENT
Start: 2024-06-09 | End: 2024-06-09 | Stop reason: HOSPADM

## 2024-06-09 RX ORDER — SODIUM CHLORIDE 0.9 % (FLUSH) 0.9 %
5-40 SYRINGE (ML) INJECTION EVERY 12 HOURS SCHEDULED
Status: DISCONTINUED | OUTPATIENT
Start: 2024-06-09 | End: 2024-06-09 | Stop reason: HOSPADM

## 2024-06-09 RX ORDER — INSULIN LISPRO 100 [IU]/ML
3 INJECTION, SOLUTION INTRAVENOUS; SUBCUTANEOUS
Qty: 1 EACH | Refills: 0 | Status: SHIPPED | OUTPATIENT
Start: 2024-06-09

## 2024-06-09 RX ADMIN — CEFTAZIDIME, AVIBACTAM 1.25 G: 2; .5 POWDER, FOR SOLUTION INTRAVENOUS at 13:56

## 2024-06-09 RX ADMIN — PANTOPRAZOLE SODIUM 40 MG: 40 TABLET, DELAYED RELEASE ORAL at 06:04

## 2024-06-09 RX ADMIN — METRONIDAZOLE 500 MG: 250 TABLET ORAL at 10:55

## 2024-06-09 RX ADMIN — AMIODARONE HYDROCHLORIDE 200 MG: 200 TABLET ORAL at 10:55

## 2024-06-09 RX ADMIN — INSULIN GLARGINE 15 UNITS: 100 INJECTION, SOLUTION SUBCUTANEOUS at 10:59

## 2024-06-09 RX ADMIN — CARVEDILOL 3.12 MG: 3.12 TABLET, FILM COATED ORAL at 10:55

## 2024-06-09 RX ADMIN — CEFTAZIDIME, AVIBACTAM 1.25 G: 2; .5 POWDER, FOR SOLUTION INTRAVENOUS at 04:26

## 2024-06-09 RX ADMIN — OXYCODONE HYDROCHLORIDE AND ACETAMINOPHEN 1 TABLET: 5; 325 TABLET ORAL at 16:50

## 2024-06-09 RX ADMIN — ENOXAPARIN SODIUM 40 MG: 100 INJECTION SUBCUTANEOUS at 10:59

## 2024-06-09 RX ADMIN — SENNOSIDES AND DOCUSATE SODIUM 2 TABLET: 50; 8.6 TABLET ORAL at 10:55

## 2024-06-09 RX ADMIN — ASPIRIN 81 MG: 81 TABLET, COATED ORAL at 10:55

## 2024-06-09 RX ADMIN — ISOSORBIDE MONONITRATE 30 MG: 30 TABLET, EXTENDED RELEASE ORAL at 10:56

## 2024-06-09 RX ADMIN — SODIUM CHLORIDE: 9 INJECTION, SOLUTION INTRAVENOUS at 13:04

## 2024-06-09 RX ADMIN — OXYCODONE HYDROCHLORIDE AND ACETAMINOPHEN 1 TABLET: 5; 325 TABLET ORAL at 10:56

## 2024-06-09 RX ADMIN — TORSEMIDE 40 MG: 20 TABLET ORAL at 10:55

## 2024-06-09 ASSESSMENT — PAIN DESCRIPTION - ORIENTATION
ORIENTATION: LEFT
ORIENTATION: LEFT;RIGHT
ORIENTATION: LEFT;RIGHT

## 2024-06-09 ASSESSMENT — PAIN SCALES - WONG BAKER
WONGBAKER_NUMERICALRESPONSE: NO HURT
WONGBAKER_NUMERICALRESPONSE: HURTS A LITTLE BIT
WONGBAKER_NUMERICALRESPONSE: NO HURT

## 2024-06-09 ASSESSMENT — PAIN SCALES - GENERAL
PAINLEVEL_OUTOF10: 6
PAINLEVEL_OUTOF10: 7
PAINLEVEL_OUTOF10: 3
PAINLEVEL_OUTOF10: 6

## 2024-06-09 ASSESSMENT — PAIN DESCRIPTION - LOCATION
LOCATION: LEG
LOCATION: ELBOW
LOCATION: LEG

## 2024-06-09 ASSESSMENT — PAIN DESCRIPTION - DESCRIPTORS: DESCRIPTORS: ACHING

## 2024-06-09 NOTE — PLAN OF CARE
Problem: Pain  Goal: Verbalizes/displays adequate comfort level or baseline comfort level  Outcome: Not Progressing     Problem: Safety - Adult  Goal: Free from fall injury  Outcome: Progressing     Problem: ABCDS Injury Assessment  Goal: Absence of physical injury  Outcome: Progressing     Problem: Chronic Conditions and Co-morbidities  Goal: Patient's chronic conditions and co-morbidity symptoms are monitored and maintained or improved  Outcome: Progressing     Problem: Cardiovascular - Adult  Goal: Maintains optimal cardiac output and hemodynamic stability  Outcome: Progressing     Problem: Skin/Tissue Integrity - Adult  Goal: Incisions, wounds, or drain sites healing without S/S of infection  Outcome: Progressing     Problem: Musculoskeletal - Adult  Goal: Return mobility to safest level of function  Outcome: Progressing  Goal: Maintain proper alignment of affected body part  Outcome: Progressing  Goal: Return ADL status to a safe level of function  Outcome: Progressing     Problem: Infection - Adult  Goal: Absence of infection during hospitalization  Outcome: Progressing     Problem: Metabolic/Fluid and Electrolytes - Adult  Goal: Glucose maintained within prescribed range  Outcome: Progressing     Problem: Skin/Tissue Integrity  Goal: Absence of new skin breakdown  Description: 1.  Monitor for areas of redness and/or skin breakdown  2.  Assess vascular access sites hourly  3.  Every 4-6 hours minimum:  Change oxygen saturation probe site  4.  Every 4-6 hours:  If on nasal continuous positive airway pressure, respiratory therapy assess nares and determine need for appliance change or resting period.  Outcome: Progressing     Problem: Pain  Goal: Verbalizes/displays adequate comfort level or baseline comfort level  Outcome: Not Progressing

## 2024-06-09 NOTE — DISCHARGE SUMMARY
same name was removed. Continue taking this medication, and follow the directions you see here.     nitroGLYCERIN 0.3 MG SL tablet  Commonly known as: NITROSTAT  Take one sublingual for chest pain.  May repeat twice at 5 min intervals. If not getting relief call 911.  What changed:   how much to take  how to take this  when to take this  reasons to take this     omeprazole 20 MG delayed release capsule  Commonly known as: PRILOSEC  TAKE 1 CAPSULE BY MOUTH TWICE DAILY  What changed:   how much to take  additional instructions     torsemide 20 MG tablet  Commonly known as: DEMADEX  TAKE 2 TABLETS(40 MG) BY MOUTH IN THE MORNING AND IN THE AFTERNOON  What changed: See the new instructions.            CONTINUE taking these medications      Airial Compact Mini Nebulizer Misc  1 each by Does not apply route every 6 hours as needed (wheezing or shortness of breath)     * albuterol (2.5 MG/3ML) 0.083% nebulizer solution  Commonly known as: PROVENTIL  Take 3 mLs by nebulization every 6 hours as needed for Wheezing     * albuterol sulfate  (90 Base) MCG/ACT inhaler  Commonly known as: PROVENTIL;VENTOLIN;PROAIR  INHALE 2 PUFFS INTO THE LUNGS EVERY 6 HOURS AS NEEDED FOR WHEEZING     aspirin 81 MG EC tablet     blood glucose monitor kit and supplies  Test 2 times a day & as needed for symptoms of irregular blood glucose.     * blood glucose test strips strip  Commonly known as: ASCENSIA AUTODISC VI;ONE TOUCH ULTRA TEST VI  four times daily     * blood glucose test strips  Test 2 times a day & as needed for symptoms of irregular blood glucose.     carvedilol 6.25 MG tablet  Commonly known as: COREG     Dexcom G6  Ana Luisa  As needed for diabetes     Dexcom G6 Sensor Misc  Apply weekly     Dexcom G6 Transmitter Misc  As needed for diabetes     INSULIN SYRINGE .5CC/29G 29G X 1/2\" 0.5 ML Misc  INJECT 4 TIMES DAILY AS NEEDED     isosorbide mononitrate 30 MG extended release tablet  Commonly known as: IMDUR     Lancets

## 2024-06-09 NOTE — DISCHARGE INSTR - DIET

## 2024-06-09 NOTE — PROGRESS NOTES
[facility]    6/8/2024 7:10 AM        Name: Gareth Giang .              Admitted: 6/2/2024  Primary Care Provider: Bear Salcido MD (Tel: [phone])      Subjective:  .    Pt seen this am  while and awakened from sleep.  No current reports of pain.   We reviewed labs which are much improved.  Creat now at 1.7    ( Baseline 1.8)     BG values on the low side. Hs lantus was held last evening     Feels better today but reports lower leg weakness with ambulation.    Therapy scores are very low 10/24 .  He is interested in [facility] and has been accepted.    .         Summary:   Sought care due to lower leg redness and weeping.     We discussed his BG control  ( AIC 8 in May , now 10)      Has chronic lower leg edema.  Left always bigger due to previous vein harvesting for 4 V CABG 1992     Reviewed interval ancillary notes    Current Medications  insulin glargine (LANTUS) injection vial 25 Units, BID  enoxaparin (LOVENOX) injection 40 mg, Daily  metroNIDAZOLE (FLAGYL) tablet 500 mg, 2 times per day  insulin lispro (HUMALOG,ADMELOG) injection vial 8 Units, TID WC  cefTAZidime-avibactam (AVYCAZ) 1.25 g in sodium chloride 0.9 % 100 mL IVPB, Q8H  torsemide (DEMADEX) tablet 40 mg, BID  gabapentin (NEURONTIN) capsule 300 mg, Nightly  insulin lispro (HUMALOG,ADMELOG) injection vial 0-4 Units, TID WC  insulin lispro (HUMALOG,ADMELOG) injection vial 0-4 Units, Nightly  sennosides-docusate sodium (SENOKOT-S) 8.6-50 MG tablet 2 tablet, Daily  carvedilol (COREG) tablet 3.125 mg, BID WC  sodium chloride 0.9 % bolus 500 mL, Once  amiodarone (CORDARONE) tablet 200 mg, Daily  albuterol sulfate HFA (PROVENTIL;VENTOLIN;PROAIR) 108 (90 Base) MCG/ACT inhaler 2 puff, Q6H PRN  aspirin EC tablet 81 mg, Daily  atorvastatin (LIPITOR) tablet 40 mg, Nightly  isosorbide mononitrate (IMDUR) extended release tablet 30 mg, QAM  nitroGLYCERIN (NITROSTAT) SL tablet 0.4 mg, Q5 Min PRN  pantoprazole 
        [facility]    6/7/2024 7:34 AM        Name: Gareth Giang .              Admitted: 6/2/2024  Primary Care Provider: Bear Salcido MD (Tel: [phone])      Subjective:  .    Pt seen this am and awakened from sleep.  No current reports of pain.   We reviewed labs which are much improved.  Creat now at baseline of 1.8.  he is eating well and BG control is in range.      Feels better today but reports lower leg weakness with ambulation.    Therapy scores are very low 10/24 .  He is interested in [facility] and has been accepted.  Need to clarify antibiotic needs   .     He fell out of bed on Tuesday  night trying to get out of bed without assistance   CT of head was negative       Summary:   Sought care due to lower leg redness and weeping.     We discussed his BG control  ( AIC 8 in May , now 10)      Has chronic lower leg edema.  Left always bigger due to previous vein harvesting for 4 V CABG 1992     Reviewed interval ancillary notes    Current Medications  cefTAZidime (FORTAZ) 2,000 mg in sodium chloride 0.9 % 50 mL IVPB, 2 times per day  oxyCODONE-acetaminophen (PERCOCET) 5-325 MG per tablet 1 tablet, Q6H PRN  insulin glargine (LANTUS) injection vial 30 Units, BID  furosemide (LASIX) injection 40 mg, TID  gabapentin (NEURONTIN) capsule 300 mg, Nightly  insulin lispro (HUMALOG,ADMELOG) injection vial 0-4 Units, TID WC  insulin lispro (HUMALOG,ADMELOG) injection vial 0-4 Units, Nightly  sennosides-docusate sodium (SENOKOT-S) 8.6-50 MG tablet 2 tablet, Daily  carvedilol (COREG) tablet 3.125 mg, BID WC  enoxaparin Sodium (LOVENOX) injection 30 mg, Daily  insulin lispro (HUMALOG,ADMELOG) injection vial 10 Units, TID WC  sodium chloride 0.9 % bolus 500 mL, Once  amiodarone (CORDARONE) tablet 200 mg, Daily  albuterol sulfate HFA (PROVENTIL;VENTOLIN;PROAIR) 108 (90 Base) MCG/ACT inhaler 2 puff, Q6H PRN  aspirin EC tablet 81 mg, Daily  atorvastatin (LIPITOR) tablet 40 mg, 
    Infectious Diseases   Progress Note      Admission Date: 6/2/2024  Hospital Day: Hospital Day: 4   Attending: Chelsea King MD  Date of service: 6/5/2024     Chief complaint/ Reason for consult:     Bilateral lower extremity cellulitis  Lactic acidosis with elevated lactate of 2.9 on admission  Acute kidney injury on chronic kidney disease  Poorly controlled type 2 diabetes mellitus  Hyponatremia with serum sodium of 132  Thrombocytopenia with platelet count of 104,000  Acute on chronic systolic and diastolic congestive heart failure  Elevated normochromic anemia    Microbiology:        I have reviewed allavailable micro lab data and cultures    Blood culture (2/2) - collected on 6/2/2024: Negative  Left leg wound culture - collectedon 6/3/2024: Klebsiella, Enterobacter  Right leg wound culture  - collected on 6/3/2024: Klebsiella, Enterobacter, stenotrophomonas  Susceptibility    Stenotrophomonas maltophilia (1)    Antibiotic Interpretation Microscan  Method Status    cefTAZidime Sensitive 4 mcg/mL BACTERIAL SUSCEPTIBILITY PANEL BY MAGDY     levofloxacin Sensitive <=0.5 mcg/mL BACTERIAL SUSCEPTIBILITY PANEL BY MAGDY     trimethoprim-sulfamethoxazole Sensitive <=0.5/9.5 mcg/mL BACTERIAL SUSCEPTIBILITY PANEL BY MAGDY                Susceptibility    Enterobacter cloacae complex (1)    Antibiotic Interpretation Microscan  Method Status    ampicillin Resistant <=8 mcg/mL BACTERIAL SUSCEPTIBILITY PANEL BY MAGDY     ampicillin-sulbactam Sensitive <=4/2 mcg/mL BACTERIAL SUSCEPTIBILITY PANEL BY MAGDY     ceFAZolin Resistant 8 mcg/mL BACTERIAL SUSCEPTIBILITY PANEL BY MAGDY     cefepime Sensitive <=2 mcg/mL BACTERIAL SUSCEPTIBILITY PANEL BY MAGDY     cefOXitin Resistant <=8 mcg/mL BACTERIAL SUSCEPTIBILITY PANEL BY MAGDY     cefTRIAXone Sensitive <=1 mcg/mL BACTERIAL SUSCEPTIBILITY PANEL BY MAGDY     cefuroxime Sensitive <=4 mcg/mL BACTERIAL SUSCEPTIBILITY PANEL BY MAGDY     ciprofloxacin Sensitive <=0.25 mcg/mL BACTERIAL SUSCEPTIBILITY 
   06/07/24 0448   RT Protocol   History Pulmonary Disease 2   Respiratory pattern 0   Breath sounds 0   Cough 0   Indications for Bronchodilator Therapy On home bronchodilators   Bronchodilator Assessment Score 2       
  [facility] Inpatient [facility] Department   Phone: [phone]    Physical Therapy    [x] Initial Evaluation            [] Daily Treatment Note         [] Discharge Summary      Patient: Gareth Giang   : 1940   MRN: [id number]   Date of Service:  2024  Admitting Diagnosis: Acute renal failure superimposed on chronic kidney disease (HCC)  Current Admission Summary: Gareth Giang is a 84 y.o. male who presents for evaluation of bilateral leg weakness.  Patient has history of CHF and states that his legs are normally swollen.  He uses CPAP at night but does not otherwise use oxygen.  He denies any increasing shortness of breath, despite arrival complaint.  States over the past week or so he has noticed discoloration, increasing swelling and drainage from his legs.  States this is never happened before.  He denies fevers.  He denies chest pain.  No abdominal pain nausea vomiting or diarrhea.  Patient states that today he was trying to get out of bed and his legs would not hold him, slid down to the ground.  No injuries.  States he called a neighbor to help lift him and put him back in the bed and he laid there all day.  States that he tried to get up to go to the bathroom and could not get out of bed and called EMS.  No other complaints or concerns at this time. *Of note, patient sustained a subdural hematoma, R orbital wall fx, and L ulna fracture at the end of April. Was admitted at [facility]. L ulnar fracture being treated non-operatively, NWB to (L) UE. Patient was discharged to [facility] for rehab but was discharged home.   Past Medical History:  has a past medical history of Acid reflux, Arm wound, left, initial encounter, Atherosclerosis of native arteries of right leg with ulceration of other part of foot (HCC), Atrial flutter (HCC), Bleeding stomach ulcer, BPH (benign prostatic hyperplasia), CAD (coronary artery disease), Cellulitis of left thigh, COPD (chronic obstructive 
  Nephrology Progress Note  [facility]  [phone]   [place]    Patient:  Gareth Giang   : 1940    CC:  COLIN on CKD     Subjective:  Doing okay. Seated in chair. UOP okay , wt stable but lower. On RA . Legs wrapped .     ROS:   No sob, no chest pain, + leg swelling     SHx:  No visitor     Meds:  Scheduled Meds:   potassium chloride  20 mEq Oral Once    insulin glargine  30 Units SubCUTAneous BID    furosemide  40 mg IntraVENous TID    gabapentin  300 mg Oral Nightly    insulin lispro  0-4 Units SubCUTAneous TID WC    insulin lispro  0-4 Units SubCUTAneous Nightly    sennosides-docusate sodium  2 tablet Oral Daily    carvedilol  3.125 mg Oral BID WC    enoxaparin  30 mg SubCUTAneous Daily    insulin lispro  10 Units SubCUTAneous TID WC    ceftaroline fosamil (TEFLARO) 300 mg in sodium chloride 0.9 % 50 mL IVPB  300 mg IntraVENous Q12H    sodium chloride  500 mL IntraVENous Once    amiodarone  200 mg Oral Daily    aspirin  81 mg Oral Daily    atorvastatin  40 mg Oral Nightly    isosorbide mononitrate  30 mg Oral QAM    pantoprazole  40 mg Oral QAM AC    sodium chloride flush  5-40 mL IntraVENous 2 times per day     Continuous Infusions:   dextrose      sodium chloride 10 mL/hr at 24 1556     PRN Meds:.oxyCODONE-acetaminophen, albuterol sulfate HFA, nitroGLYCERIN, dextrose bolus **OR** dextrose bolus, glucagon (rDNA), dextrose, sodium chloride flush, sodium chloride, ondansetron **OR** [DISCONTINUED] ondansetron, polyethylene glycol, acetaminophen **OR** acetaminophen    Vitals:  /66   Pulse 64   Temp 97.6 °F (36.4 °C) (Oral)   Resp 16   Ht 1.676 m (5' 6\")   Wt 106.3 kg (234 lb 5.6 oz)   SpO2 95%   BMI 37.82 kg/m²     Physical Exam:  Gen: Resting, NAD.    Head nc at   eyes non icteric   CV: RRR, s1 s2 +  Lungs: good respiratory effort and clear air entry   Abd: S/NT +BS  Ext: ++ edema, legs wrapped , venous stasis changes  Skin: Warm.  No rashes 
  Nephrology Progress Note  [facility]  [phone]   [employer]    Patient:  Gareth Giang   : 1940    CC:  COLIN on CKD     Subjective:  Doing okay. UOP okay , bed scale, no standing wt. On RA . Legs wrapped . BP stable.     ROS:   No sob, no chest pain, + leg swelling     SHx:  No visitor     Meds:  Scheduled Meds:   insulin glargine  25 Units SubCUTAneous BID    enoxaparin  40 mg SubCUTAneous Daily    metroNIDAZOLE  500 mg Oral 2 times per day    insulin lispro  8 Units SubCUTAneous TID WC    cefTAZidime (FORTAZ) IV  2,000 mg IntraVENous 2 times per day    gabapentin  300 mg Oral Nightly    insulin lispro  0-4 Units SubCUTAneous TID WC    insulin lispro  0-4 Units SubCUTAneous Nightly    sennosides-docusate sodium  2 tablet Oral Daily    carvedilol  3.125 mg Oral BID WC    sodium chloride  500 mL IntraVENous Once    amiodarone  200 mg Oral Daily    aspirin  81 mg Oral Daily    atorvastatin  40 mg Oral Nightly    isosorbide mononitrate  30 mg Oral QAM    pantoprazole  40 mg Oral QAM AC    sodium chloride flush  5-40 mL IntraVENous 2 times per day     Continuous Infusions:   dextrose      sodium chloride 25 mL (24 1002)     PRN Meds:.oxyCODONE-acetaminophen, albuterol sulfate HFA, nitroGLYCERIN, dextrose bolus **OR** dextrose bolus, glucagon (rDNA), dextrose, sodium chloride flush, sodium chloride, ondansetron **OR** [DISCONTINUED] ondansetron, polyethylene glycol, acetaminophen **OR** acetaminophen    Vitals:  /71   Pulse 60   Temp 98.1 °F (36.7 °C) (Oral)   Resp 16   Ht 1.676 m (5' 6\")   Wt 105.3 kg (232 lb 1.6 oz)   SpO2 93%   BMI 37.46 kg/m²     Physical Exam:  Gen: Resting, NAD.    Head nc at   eyes non icteric   CV: RRR, s1 s2 +  Lungs: good respiratory effort and clear air entry   Abd: S/NT +BS  Ext: + edema, legs wrapped , venous stasis changes  Skin: Warm.  No rashes appreciated.    Labs:  CBC with Differential:    Lab Results   Component Value 
  Nephrology Progress Note  [facility]  [phone]   [place]    Patient:  Gareth Giang   : 1940    CC:  COLIN on CKD     Subjective:  Doing estephania. Seated in chair. UOP okay , wt trending lower. On 1 lpm NC . Leg wrapped .     ROS:   No sob, no chest pain, + leg swelling     SHx:  No visitor     Meds:  Scheduled Meds:   insulin glargine  30 Units SubCUTAneous BID    gabapentin  300 mg Oral Nightly    insulin lispro  0-4 Units SubCUTAneous TID WC    insulin lispro  0-4 Units SubCUTAneous Nightly    sennosides-docusate sodium  2 tablet Oral Daily    carvedilol  3.125 mg Oral BID WC    enoxaparin  30 mg SubCUTAneous Daily    furosemide  40 mg IntraVENous BID    insulin lispro  10 Units SubCUTAneous TID WC    ceftaroline fosamil (TEFLARO) 300 mg in sodium chloride 0.9 % 50 mL IVPB  300 mg IntraVENous Q12H    sodium chloride  500 mL IntraVENous Once    amiodarone  200 mg Oral Daily    aspirin  81 mg Oral Daily    atorvastatin  40 mg Oral Nightly    isosorbide mononitrate  30 mg Oral QAM    pantoprazole  40 mg Oral QAM AC    sodium chloride flush  5-40 mL IntraVENous 2 times per day     Continuous Infusions:   dextrose      sodium chloride 10 mL/hr at 24 1556     PRN Meds:.oxyCODONE-acetaminophen, albuterol sulfate HFA, nitroGLYCERIN, dextrose bolus **OR** dextrose bolus, glucagon (rDNA), dextrose, sodium chloride flush, sodium chloride, ondansetron **OR** [DISCONTINUED] ondansetron, polyethylene glycol, acetaminophen **OR** acetaminophen    Vitals:  /61   Pulse 72   Temp 98.3 °F (36.8 °C) (Oral)   Resp 16   Ht 1.676 m (5' 6\")   Wt 106.3 kg (234 lb 5.6 oz)   SpO2 93%   BMI 37.82 kg/m²     Physical Exam:  Gen: Resting, NAD.    Head nc at   eyes non icteric   CV: RRR, s1 s2 +  Lungs: good respiratory effort and clear air entry   Abd: S/NT +BS  Ext: ++ edema, legs wrapped , venous stasis changes  Skin: Warm.  No rashes appreciated.    Labs:  CBC with Differential:  
  Nephrology Progress Note  [facility]  [phone]   [facility]    Patient:  Gareth Giang   : 1940    CC:  COLIN on CKD     Subjective:  Doing okay. UOP okay , bed scale, no standing wt. On RA . Legs wrapped .     ROS:   No sob, no chest pain, + leg swelling     SHx:  No visitor     Meds:  Scheduled Meds:   cefTAZidime (FORTAZ) IV  2,000 mg IntraVENous 2 times per day    insulin glargine  30 Units SubCUTAneous BID    furosemide  40 mg IntraVENous TID    gabapentin  300 mg Oral Nightly    insulin lispro  0-4 Units SubCUTAneous TID WC    insulin lispro  0-4 Units SubCUTAneous Nightly    sennosides-docusate sodium  2 tablet Oral Daily    carvedilol  3.125 mg Oral BID WC    enoxaparin  30 mg SubCUTAneous Daily    insulin lispro  10 Units SubCUTAneous TID WC    sodium chloride  500 mL IntraVENous Once    amiodarone  200 mg Oral Daily    aspirin  81 mg Oral Daily    atorvastatin  40 mg Oral Nightly    isosorbide mononitrate  30 mg Oral QAM    pantoprazole  40 mg Oral QAM AC    sodium chloride flush  5-40 mL IntraVENous 2 times per day     Continuous Infusions:   dextrose      sodium chloride 100 mL/hr at 24 1713     PRN Meds:.oxyCODONE-acetaminophen, albuterol sulfate HFA, nitroGLYCERIN, dextrose bolus **OR** dextrose bolus, glucagon (rDNA), dextrose, sodium chloride flush, sodium chloride, ondansetron **OR** [DISCONTINUED] ondansetron, polyethylene glycol, acetaminophen **OR** acetaminophen    Vitals:  /63   Pulse 60   Temp 97.9 °F (36.6 °C) (Oral)   Resp 17   Ht 1.676 m (5' 6\")   Wt 107.2 kg (236 lb 5.3 oz)   SpO2 95%   BMI 38.15 kg/m²     Physical Exam:  Gen: Resting, NAD.    Head nc at   eyes non icteric   CV: RRR, s1 s2 +  Lungs: good respiratory effort and clear air entry   Abd: S/NT +BS  Ext: ++ edema, legs wrapped , venous stasis changes  Skin: Warm.  No rashes appreciated.    Labs:  CBC with Differential:    Lab Results   Component Value Date/Time    WBC 
  Nephrology Progress Note  [facility]  [phone]   [email or web address]    Patient:  Gareth Giang   : 1940    CC:  COLIN on CKD     Subjective:  Doing okay. UOP okay , bed scale wt up.   Standing wt y day 103.6  On RA . Legs wrapped . BP elevated.     ROS:   No sob, no chest pain, + leg swelling     SHx:  No visitor     Meds:  Scheduled Meds:   insulin glargine  15 Units SubCUTAneous Daily    insulin lispro  3 Units SubCUTAneous TID WC    enoxaparin  40 mg SubCUTAneous Daily    metroNIDAZOLE  500 mg Oral 2 times per day    cefTAZidime-avibactam (AVYCAZ) 1.25 g in sodium chloride 0.9 % 100 mL IVPB  1.25 g IntraVENous Q8H    torsemide  40 mg Oral BID    gabapentin  300 mg Oral Nightly    insulin lispro  0-4 Units SubCUTAneous TID WC    insulin lispro  0-4 Units SubCUTAneous Nightly    sennosides-docusate sodium  2 tablet Oral Daily    carvedilol  3.125 mg Oral BID WC    sodium chloride  500 mL IntraVENous Once    amiodarone  200 mg Oral Daily    aspirin  81 mg Oral Daily    atorvastatin  40 mg Oral Nightly    isosorbide mononitrate  30 mg Oral QAM    pantoprazole  40 mg Oral QAM AC    sodium chloride flush  5-40 mL IntraVENous 2 times per day     Continuous Infusions:   dextrose      sodium chloride 25 mL (24 0443)     PRN Meds:.albuterol sulfate HFA, nitroGLYCERIN, dextrose bolus **OR** dextrose bolus, glucagon (rDNA), dextrose, sodium chloride flush, sodium chloride, ondansetron **OR** [DISCONTINUED] ondansetron, polyethylene glycol, acetaminophen **OR** acetaminophen    Vitals:  BP (!) 164/75   Pulse 61   Temp 98.4 °F (36.9 °C) (Oral)   Resp 18   Ht 1.676 m (5' 6\")   Wt 105.2 kg (232 lb)   SpO2 93%   BMI 37.45 kg/m²     Physical Exam:  Gen: Resting, NAD.    Head nc at   eyes non icteric   CV: RRR, s1 s2 +  Lungs: good respiratory effort and clear air entry   Abd: S/NT +BS  Ext: + edema, legs wrapped , venous stasis changes  Skin: Warm.  No rashes appreciated.    Labs:  CBC 
1000- Morning assessments and vital signs complete. No morning labs to review. Patient is alert and oriented x4. He denies pain at this time. Morning medications given as prescribed. Dressings noted to BLE, both clean, dry, and intact. Patient set up for breakfast. External catheter noted. Plan of care discussed.  
4 Eyes Skin Assessment     NAME:  Gareth Giang  YOB: 1940  MEDICAL RECORD NUMBER:  [id number]    The patient is being assessed for  Admission    I agree that at least one RN has performed a thorough Head to Toe Skin Assessment on the patient. ALL assessment sites listed below have been assessed.      Areas assessed by both nurses:    Head, Face, Ears, Shoulders, Back, Chest, Arms, Elbows, Hands, Sacrum. Buttock, Coccyx, Ischium, Legs. Feet and Heels, and Under Medical Devices         Does the Patient have a Wound? Yes wound(s) were present on assessment. LDA wound assessment was Initiated and completed by RN       Ovi Prevention initiated by RN: Yes  Wound Care Orders initiated by RN: Yes    Pressure Injury (Stage 3,4, Unstageable, DTI, NWPT, and Complex wounds) if present, place Wound referral order by RN under : No    New Ostomies, if present place, Ostomy referral order under : No     Nurse 1 eSignature: Electronically signed by Raina Rabago RN on 6/3/24 at 5:16 AM EDT    **SHARE this note so that the co-signing nurse can place an eSignature**    Nurse 2 eSignature: Electronically signed by Rasheed Keyes RN on 6/3/24 at 5:18 AM EDT   
Assessment complete. VSS. Patient resting in bed. Respirations even and easy. Call light in reach. Fall precautions in place. No needs expressed at this time. Will continue to monitor.    
Assessment complete. VSS. Patient resting in bed. Respirations even and unlabored. Call light within reach. Fall precautions in place. Bed in low, locked position. No additional needs noted at this time.       
I called the daughter Linda NOLASCO at [phone] and gave update   
Morning assessment and medications complete, pt cooperated and tolerated well. Medications given per MAR. VS stable. A&O X4. Pt states bilateral leg pain 6/10. Patient clean and dry. External cath patent and draining well. Clean, dry and intact. Breakfast tray set up. Bed side table, call light and belongings within reach. Bed locked and in lowest position, bed alarm active and audible. All questions and concerns addressed, no further needs at this time.    
Nutrition Education    Provided heart failure diet education. Education included low sodium diet guidelines (2-3 gm Na+/day) and fluid restriction (64 oz/day).  Reviewed foods to choose and foods to avoid, along with label reading and ways to add flavor to food. Pt currently tries to follow a low sodium diet at home and does not add salt to food but was asking about salt substitutes. Pt states understanding of education. Time spent with patient: 10 minutes.      Educated on 6/4  Learners: Patient  Readiness: Acceptance  Method: Explanation and Handout  Response: Verbalizes Understanding  Contact name and number provided.    Donna Guzman MS, RD, LD  Contact Number: [phone]    
Occupational Therapy  Pt refused therapy despite encouragement this date reporting: \"It's the end of the day for me\". Therapy will follow up tomorrow as schedule allows.    Thank you,  Beth Brennan, BENNY OTR/L [id number]    
Percocet given for 9/10 pain level in left shoulder.  
Pharmacy Home Medication Reconciliation Note    A medication reconciliation has been completed for Gareth Giang 1940    Pharmacy: [facility] [id number] [place], [place]  Information provided by: patient and daughter    The patient's home medication list is as follows:  No current facility-administered medications on file prior to encounter.     Current Outpatient Medications on File Prior to Encounter   Medication Sig Dispense Refill    apixaban (ELIQUIS) 2.5 MG TABS tablet Take 1 tablet by mouth 2 times daily      Continuous Glucose Sensor (DEXCOM G6 SENSOR) MISC Apply weekly 12 each 3    INSULIN SYRINGE .5CC/29G 29G X 1/2\" 0.5 ML MISC INJECT 4 TIMES DAILY AS NEEDED 400 each 5    omeprazole (PRILOSEC) 20 MG delayed release capsule TAKE 1 CAPSULE BY MOUTH TWICE DAILY (Patient taking differently: Take 2 capsules by mouth 2 times daily TAKE 1 CAPSULE BY MOUTH TWICE DAILY) 180 capsule 1    oxyCODONE (ROXICODONE) 5 MG immediate release tablet Take 1 tablet by mouth every 6 hours as needed for Pain for up to 30 days. Intended supply: 3 days. Take lowest dose possible to manage pain Max Daily Amount: 20 mg (Patient not taking: Reported on 6/2/2024) 45 tablet 0    Continuous Glucose  (DEXCOM G6 ) PABLO As needed for diabetes 1 each 0    Continuous Glucose Transmitter (DEXCOM G6 TRANSMITTER) MISC As needed for diabetes 1 each 0    amiodarone (CORDARONE) 200 MG tablet TAKE 1 TABLET BY MOUTH DAILY (Patient taking differently: Take 1 tablet by mouth daily TAKE 1 TABLET BY MOUTH DAILY) 30 tablet 5    potassium chloride (KLOR-CON M) 20 MEQ extended release tablet TAKE 1 TABLET BY MOUTH DAILY 90 tablet 1    gabapentin (NEURONTIN) 600 MG tablet Take 2 tablets by mouth nightly for 180 days. 180 tablet 1    insulin NPH (NOVOLIN N) 100 UNIT/ML injection vial Take 25 units bedtime (Patient not taking: Reported on 6/2/2024) 30 mL 3    atorvastatin (LIPITOR) 40 MG tablet TAKE 1 TABLET BY MOUTH EVERY 
Pt transported to CT, CT shows no acute intracranial abnormalities. Pt given tylenol for pain of 3/10. Denies further needs. Call light within reach. Bed in lowest position. Bed alarm engaged. Fall precautions in place.   
RN discharge summary from  [facility] to a facility.     This patient has had a discharge order placed. They are being discharged to [facility] and being picked up by [facility]. Discharge paperwork has been printed and faxed by BRENNAN THAPA completed by this RN. no further needs at this time. IV has been removed with no complications. Telemetry has been removed. Pt has all belongings present.    
Renal consult received  Follows with [facility]  Will send consult to their group    Thank you for calling  
Shift assessment complete. VSS. Patient resting in bed in semi fowlers position. Respirations even and unlabored on room air .Call light within reach. Fall precautions in place. Bed in low, locked position. No additional needs noted at this time.    
Shift assessment completed. Routine vitals obtained. Scheduled medications given. Patient is awake, alert and oriented. Respirations are easy and unlabored. Patient does not appear to be in distress, resting comfortably at this time. Call light within reach. Fall precautions are in place. No further needs expressed at this time  
Shift assessment completed. Routine vitals obtained. Scheduled medications given. Patient is awake, alert and oriented. Respirations are easy and unlabored. Patient does not appear to be in distress. Call light within reach.     
Shift assessment, VSS. Patient awake in bed in semi-fowlers position. Respiration even and unlabored on nasal cannula. Blood pressure is low, provider consulted, hold pills and IV furosemide for now.   
This RN agrees with all charting by NATIVIDAD Gautam  
Wound dressing done.  
Date/Time    WBC 3.0 06/08/2024 07:46 AM    HGB 11.0 06/08/2024 07:46 AM     06/08/2024 07:46 AM     BMP:  Lab Results   Component Value Date/Time     06/08/2024 07:46 AM    K 3.5 06/08/2024 07:46 AM    K 4.7 04/25/2024 05:01 PM     06/08/2024 07:46 AM    CO2 29 06/08/2024 07:46 AM    BUN 39 06/08/2024 07:46 AM    CREATININE 1.7 06/08/2024 07:46 AM    GLUCOSE 96 06/08/2024 07:46 AM    GLUCOSE 151 05/09/2011 08:30 AM     INR:   Lab Results   Component Value Date/Time    INR 1.18 06/02/2024 05:08 PM    INR 1.25 09/02/2022 10:16 AM    INR 1.06 11/10/2021 01:33 PM        CARDIAC LABS  ENZYMES:No results for input(s): \"CKMB\", \"CKMBINDEX\", \"TROPONINI\" in the last 72 hours.    Invalid input(s): \"CKTOTAL;3\"  FASTING LIPID PANEL:  Lab Results   Component Value Date/Time    HDL 47 01/05/2023 11:18 AM    HDL 37 03/08/2012 08:30 AM    TRIG 141 01/05/2023 11:18 AM    TSH 1.76 06/03/2024 05:45 AM     LIVER PROFILE:  Lab Results   Component Value Date/Time    AST 45 06/02/2024 05:08 PM    AST 42 04/25/2024 05:01 PM    ALT 22 06/02/2024 05:08 PM    ALT 24 04/25/2024 05:01 PM     BNP:   Lab Results   Component Value Date/Time    PROBNP 3,897 06/02/2024 05:08 PM    PROBNP 2,530 04/25/2024 05:01 PM    PROBNP 2,734 01/01/2023 01:14 PM     Iron Studies:    Lab Results   Component Value Date/Time    TIBC 250 06/05/2024 05:23 AM    TIBC 354 02/19/2024 11:32 AM    TIBC 318 11/30/2023 10:54 AM    FERRITIN 150.2 02/19/2024 11:32 AM    FERRITIN 198.9 11/30/2023 10:54 AM    FERRITIN 353.9 03/15/2023 08:18 AM       1. WEIGHT: Admit Weight - Scale: 120.2 kg (265 lb)      Today  Weight - Scale: 105.2 kg (231 lb 14.8 oz)   2. I/O   Intake/Output Summary (Last 24 hours) at 6/9/2024 0946  Last data filed at 6/9/2024 0606  Gross per 24 hour   Intake 240 ml   Output 2100 ml   Net -1860 ml       Cardiac Testing:   Echo 6/5/2024    Left Ventricle: Severely reduced left ventricular systolic function with a visually estimated EF of 20 - 
Units, Nightly        Objective:  /65   Pulse 65   Temp 98.4 °F (36.9 °C) (Axillary)   Resp 16   Ht 1.676 m (5' 6\")   Wt 106.1 kg (233 lb 14.5 oz)   SpO2 97%   BMI 37.75 kg/m²     Intake/Output Summary (Last 24 hours) at 6/3/2024 0751  Last data filed at 6/3/2024 0555  Gross per 24 hour   Intake --   Output 1200 ml   Net -1200 ml      Wt Readings from Last 3 Encounters:   06/03/24 106.1 kg (233 lb 14.5 oz)   05/16/24 117.9 kg (260 lb)   05/10/24 113.9 kg (251 lb)       General appearance:  Appears chronically ill,  alert and pleasant   Eyes: Sclera clear. Pupils equal.  ENT: Moist oral mucosa. Trachea midline, no adenopathy.  Cardiovascular: Regular rhythm, normal S1, S2. No murmur. + 2 edema in left lower leg,  + 1 edema right  lower leg   Respiratory: Not using accessory muscles. Good inspiratory effort. Clear to auscultation bilaterally, no wheeze or crackles.   GI: Abdomen soft and obese, no tenderness, not distended, normal bowel sounds  Musculoskeletal: No cyanosis in digits, neck supple  Neurology: CN 2-12 grossly intact. No speech or motor deficits  Psych: Normal affect. Alert and oriented in time, place and person  Skin: Warm, dry, lower legs with bright redness noted bilat from the knees to the ankle region.  Blistering of the skin is present. No current weeping.  Fungal nails present.  Skin is very dry over the feet/ toes     Labs and Tests:  CBC:   Recent Labs     06/02/24 1708 06/03/24  0454   WBC 6.3 4.2   HGB 12.9* 11.5*   * 104*     BMP:    Recent Labs     06/02/24  1708 06/02/24  2111 06/03/24  0454   * 128* 132*   K 5.1  --  4.1   CL 86*  --  95*   CO2 23  --  25   BUN 57*  --  60*   CREATININE 3.2*  --  3.1*   GLUCOSE 500*  --  255*     Hepatic:   Recent Labs     06/02/24 1708   AST 45*   ALT 22   BILITOT 0.7   ALKPHOS 167*     AIC 8.1 May 10, 2024      Latest Reference Range & Units 06/02/24 19:24 06/02/24 21:24 06/03/24 07:32   POC Glucose 70 - 99 mg/dl 381 (H) 286 
cate.    Labs:  CBC with Differential:    Lab Results   Component Value Date/Time    WBC 3.0 06/08/2024 07:46 AM    RBC 3.76 06/08/2024 07:46 AM    HGB 11.0 06/08/2024 07:46 AM    HCT 34.8 06/08/2024 07:46 AM     06/08/2024 07:46 AM    MCV 92.5 06/08/2024 07:46 AM    MCH 29.3 06/08/2024 07:46 AM    MCHC 31.7 06/08/2024 07:46 AM    RDW 16.7 06/08/2024 07:46 AM    BANDSPCT 3 05/05/2022 01:44 PM    LYMPHOPCT 25.0 06/08/2024 07:46 AM    MONOPCT 11.0 06/08/2024 07:46 AM    EOSPCT 1.0 06/08/2024 07:46 AM    BASOPCT 0.0 06/08/2024 07:46 AM    MONOSABS 0.3 06/08/2024 07:46 AM    EOSABS 0.0 06/08/2024 07:46 AM    BASOSABS 0.0 06/08/2024 07:46 AM     CMP:    Lab Results   Component Value Date/Time     06/08/2024 07:46 AM    K 3.5 06/08/2024 07:46 AM    K 4.7 04/25/2024 05:01 PM     06/08/2024 07:46 AM    CO2 29 06/08/2024 07:46 AM    BUN 39 06/08/2024 07:46 AM    CREATININE 1.7 06/08/2024 07:46 AM    GFRAA 39 09/19/2022 11:39 AM    GFRAA >60 03/12/2013 07:55 AM    AGRATIO 1.1 06/02/2024 05:08 PM    LABGLOM 39 06/08/2024 07:46 AM    LABGLOM 37 04/25/2024 05:01 PM    GLUCOSE 96 06/08/2024 07:46 AM    GLUCOSE 151 05/09/2011 08:30 AM    PROT 6.0 03/12/2013 07:55 AM    CALCIUM 8.9 06/08/2024 07:46 AM    BILITOT 0.7 06/02/2024 05:08 PM    ALKPHOS 167 06/02/2024 05:08 PM    AST 45 06/02/2024 05:08 PM    ALT 22 06/02/2024 05:08 PM       Assessment/Plan:  COLIN on CKD 3  Ckd 3 with baseline scr 1.8 - 2, Follows with Dr De La Paz, ckd 2/2 DM, HTN , CRS   Scr on admission 3.2 > 3.1 > 2.8>>1.9> 1.8> 1.7, with diuresis  COLIN likely from diminished renal perfusion in setting of decompensated CHF /CRS   UA trace leukocyte esterase positive, large blood however no mention of RBC  Urine na < 20 suggestive of cardiorenal syndrome  Recd lasix iv tid 40.   - transitioned to home dose torsemide 40 bid on 6/7.   Daily standing wts.   - hold farxiga home dose   - dispo- okay for d/c from renal stand pt on torsemide 40 bid. Asked 
95*     BMP:    Recent Labs     06/04/24  0501 06/05/24  0523 06/06/24  0953   * 138 137   K 4.0 3.5 3.7   CL 96* 99 101   CO2 25 25 27   BUN 55* 52* 47*   CREATININE 2.8* 2.4* 1.9*   GLUCOSE 217* 149* 146*     Hepatic:   No results for input(s): \"AST\", \"ALT\", \"BILITOT\", \"ALKPHOS\" in the last 72 hours.    Invalid input(s): \"ALB\"    AIC 10.1     Latest Reference Range & Units 06/05/24 08:01 06/05/24 12:23 06/05/24 17:48 06/05/24 21:02   POC Glucose 70 - 99 mg/dl 165 (H) 132 (H) 271 (H) 135 (H)           BC  no growth     Chest xray:  Stable cardiac enlargement and vascular prominence may reflect failure in the  correct clinical setting.    CT head: 6/5/24   IMPRESSION:  No acute intracranial abnormality identified.           Problem List    Assessment & Plan:   Bilateral lower leg cellulitis:  currently on Ceftazidime per ID, He is currently tolerating the wraps and redness is  markedly improved   Acute renal failure : Creat trending down to 1.9 , baseline from early May 1.8 , discussed with nephrology. 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  Continue with lasix and  BB , has ICD and follows with Dr Garrison    Uncontrolled T2DM:  BG values reviewed.  Continue with lantus to 30 units BID and  prandial at 10 units with correction as needed.  Will follow closely   CAD:  previous 4V CABG 1992: on asa, statin, BB , amiodarone.  ( TSH in range at 1.76)  HTN:  BP on the soft side. Dose of BB was decreased on 6/3/24  Will continue with daily imdur for now and follow closely   Neuropathy:  I have decreased his hs gabapentin to 300 mg due to COLIN/ CKD    Likely stable for d/c to ECF when ID gives recs.  Can follow renal function/ labs at ECF         Diet: ADULT DIET; Regular; 4 carb choices (60 gm/meal); No Added Salt (3-4 gm)  Code:DNR-CC  DVT PPX lovenox       Madonna Dechristopher, RAYMUNDO - CNP   6/6/2024 8:13 AM   
95* 96*   CO2 23  --  25 25   BUN 57*  --  60* 55*   CREATININE 3.2*  --  3.1* 2.8*   GLUCOSE 500*  --  255* 217*       Hepatic:   Recent Labs     06/02/24  1708   AST 45*   ALT 22   BILITOT 0.7   ALKPHOS 167*       AIC 10.1   Latest Reference Range & Units 06/03/24 07:32 06/03/24 12:11 06/03/24 17:41 06/03/24 20:01   POC Glucose 70 - 99 mg/dl 316 (H) 382 (H) 237 (H) 219 (H)       BC pending    Chest xray:  Stable cardiac enlargement and vascular prominence may reflect failure in the  correct clinical setting.        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  Continue with IV diuresis of lasix 40 mg BID.  Also on BB , has ICD and follows with Dr Garrison    Uncontrolled T2DM:  BG values reviewed.  I have increased lantus to 30 units BID and increased prandial to 10 units with correction as needed.  Will follow closely   CAD:  previous 4V CABG 1992: on asa, statin, BB , amiodarone.  ( TSH in range at 1.76)  HTN:  BP on the soft side. Dose of BB was decreased on 6/3/24  Will continue with daily imdur for now and follow closely   Neuropathy:  I have decreased his hs gabapentin to 300 mg due to COLIN/ CKD    Pt needs encouragement to get out of bed and walk etc.  Assisted to chair at bedside with RN.  Discussed the need to remove pure wick and use the urinal etc..         Diet: ADULT DIET; Regular; 4 carb choices (60 gm/meal)  Code:DNR-CC  DVT PPX RAYMUNDO Gonzales CNP   6/4/2024 7:53 AM   
ventricle is moderately dilated. Normal wall thickness. Moderate global hypokinesis present. Abnormal septal wall motion secondary to pacing. Grade II diastolic dysfunction with increased LAP. Average E/e' ratio is 16.96. No evidence of apical thrombus by contrast administration.    Right Ventricle: Right ventricle is mildly dilated. Lead present in the right ventricle. Mildly reduced systolic function. TAPSE is 1.5 cm. RV Peak S' is 12 cm/s.    Aortic Valve: Mild stenosis of the aortic valve. AV mean gradient is 14 mmHg. AV area by continuity VTI is 1.2 cm2.    Mitral Valve: Mildly thickened leaflet. Moderate annular calcification of the mitral valve. Moderate regurgitation.    Tricuspid Valve: The estimated RVSP is 38 mmHg.    Left Atrium: Left atrium is severely dilated.    Right Atrium: Lead present in the right atrium. Right atrium is moderately dilated.    Image quality is adequate. Contrast used: Definity.     Echo 1/12/2024  Overall left ventricular ejection fraction is estimated to be 35-40%.   Left ventricular function is moderately reduced.   Moderate global hypokinesis of the left ventricle.   Apical wall motion abnormality may reflect pacemaker activation.   Left ventricle is mildly dilated.   Diastolic function is impaired and classified as Grade 3 (restrictive).   Left atrium is moderately dilated.   Right atrium is mildly dilated.   Right ventricle is mild to moderately dilated.   Right ventricular systolic function is borderline reduced.   Mild mitral annular calcification present.   Moderate to severe mitral regurgitation.   Aortic valve is mildly calcified.   Trace aortic regurgitation.   Mild valvular aortic stenosis.        Objective:   Vitals: BP (!) 164/75   Pulse 61   Temp 98.4 °F (36.9 °C) (Oral)   Resp 18   Ht 1.676 m (5' 6\")   Wt 105.2 kg (232 lb)   SpO2 93%   BMI 37.45 kg/m²     Physical Exam:  General Appearance:  Non-obese/Well Nourished  Respiratory:  Resp Auscultation: Normal 
        Diet: ADULT DIET; Regular; 4 carb choices (60 gm/meal); No Added Salt (3-4 gm)  Code:DNR-CC  DVT PPX RAYMUNDO Gonzales CNP   6/5/2024 8:26 AM   
  OT/R provided direct supervision and guidance during this evaluation. I have read and agree with the above documentation supporting this OT  student's interventions. Russ Marin OTR/L  [id number].      Second Session: Russ Camarena OT          
(gastroesophageal reflux disease) K21.9    Normocytic normochromic anemia D64.9    B12 deficiency E53.8    Peptic ulcer disease K27.9    Class 3 severe obesity due to excess calories without serious comorbidity with body mass index (BMI) of 40.0 to 44.9 in adult ([facility]) E66.01, Z68.41    History of total left knee replacement Z96.652    Vitamin D deficiency E55.9    Spinal stenosis of lumbar region with neurogenic claudication- clinically M48.062    Paroxysmal atrial fibrillation ([facility]) I48.0    Acute on chronic combined systolic and diastolic CHF (congestive heart failure) ([facility]) I50.43    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    Dilated cardiomyopathy ([facility]) I42.0    Bilateral lower leg cellulitis L03.116, L03.115    Presence of combination internal cardiac defibrillator (ICD) and pacemaker Z95.810    Obstructive sleep apnea syndrome- CPAP with O2 G47.33    Chronic renal disease, stage 4, severely decreased glomerular filtration rate between 15-29 mL/min/1.73 square meter ([facility]) N18.4    Chronic systolic congestive heart failure ([facility]) I50.22    Secondary hypercoagulable state ([facility]) D68.69    Open wound of right upper arm S41.101A    S/P total knee arthroplasty, right Z96.651    Chronic respiratory failure with hypercapnia ([facility]) J96.12    Subdural hematoma ([facility]) S06.5XAA    Ulna fracture S52.209A    Acute renal failure superimposed on chronic kidney disease ([facility]) N17.9, N18.9    Hypervolemia E87.70    Hyperglycemia R73.9    Shortness of breath R06.02    Thrombocytopenia ([facility]) D69.6    Hyponatremia E87.1    Lactic acidosis E87.20    Class 2 obesity due to excess calories with body mass index (BMI) of 37.0 to 37.9 in adult E66.09, Z68.37    History of gout Z87.39    CRP elevated R79.82    Elevated sed rate R70.0    Elevated brain natriuretic peptide (BNP) level R79.89       Please note that this chart was generated using Dragon dictation software. Although every effort was made to ensure the accuracy of this automated 
44.9 in adult ([facility]) E66.01, Z68.41    Complex care coordination Z71.89    History of total left knee replacement Z96.652    Vitamin D deficiency E55.9    Spinal stenosis of lumbar region with neurogenic claudication- clinically M48.062    Paroxysmal atrial fibrillation ([facility]) I48.0    Acute on chronic combined systolic and diastolic CHF (congestive heart failure) ([facility]) I50.43    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    Dilated cardiomyopathy ([facility]) I42.0    Bilateral lower leg cellulitis L03.116, L03.115    Presence of combination internal cardiac defibrillator (ICD) and pacemaker Z95.810    Obstructive sleep apnea syndrome- CPAP with O2 G47.33    Chronic renal disease, stage 4, severely decreased glomerular filtration rate between 15-29 mL/min/1.73 square meter ([facility]) N18.4    Chronic systolic congestive heart failure ([facility]) I50.22    Secondary hypercoagulable state ([facility]) D68.69    Open wound of right upper arm S41.101A    S/P total knee arthroplasty, right Z96.651    Chronic respiratory failure with hypercapnia ([facility]) J96.12    Subdural hematoma ([facility]) S06.5XAA    Ulna fracture S52.209A    Acute kidney injury superimposed on CKD ([facility]) N17.9, N18.9    Hypervolemia E87.70    Hyperglycemia R73.9    Shortness of breath R06.02    Thrombocytopenia ([facility]) D69.6    Hyponatremia E87.1    Lactic acidosis E87.20    Class 2 obesity due to excess calories with body mass index (BMI) of 37.0 to 37.9 in adult E66.09, Z68.37    History of gout Z87.39    CRP elevated R79.82    Elevated sed rate R70.0    Elevated brain natriuretic peptide (BNP) level R79.89    Klebsiella infection A49.8    Infection due to Stenotrophomonas maltophilia A49.8    Infection caused by Enterobacter cloacae A49.8    Receiving intravenous antibiotic treatment as outpatient Z79.2       Please note that this chart was generated using Dragon dictation software. Although every effort was made to ensure the accuracy of this automated transcription, some errors in 
syndrome- CPAP with O2 G47.33    Chronic renal disease, stage 4, severely decreased glomerular filtration rate between 15-29 mL/min/1.73 square meter ([facility]) N18.4    Chronic systolic congestive heart failure ([facility]) I50.22    Secondary hypercoagulable state ([facility]) D68.69    Open wound of right upper arm S41.101A    S/P total knee arthroplasty, right Z96.651    Chronic respiratory failure with hypercapnia ([facility]) J96.12    Subdural hematoma ([facility]) S06.5XAA    Ulna fracture S52.209A    Acute kidney injury superimposed on CKD ([facility]) N17.9, N18.9    Hypervolemia E87.70    Hyperglycemia R73.9    Shortness of breath R06.02    Thrombocytopenia ([facility]) D69.6    Hyponatremia E87.1    Lactic acidosis E87.20    Class 2 obesity due to excess calories with body mass index (BMI) of 37.0 to 37.9 in adult E66.09, Z68.37    History of gout Z87.39    CRP elevated R79.82    Elevated sed rate R70.0    Elevated brain natriuretic peptide (BNP) level R79.89    Klebsiella infection A49.8    Infection due to Stenotrophomonas maltophilia A49.8    Infection caused by Enterobacter cloacae A49.8       Please note that this chart was generated using Dragon dictation software. Although every effort was made to ensure the accuracy of this automated transcription, some errors in transcription may have occurred inadvertently. If you may need any clarification, please do not hesitate to contact me through EPIC or at the phone number provided below with my electronic signature.  Any pictures or media included in this note were obtained after taking informed verbal consent from the patient and with their approval to include those in the patient's medical record.        Anthony Olivier MD, MPH, FACP, FID  6/6/2024, 2:13 PM  [facility]   [address] ([place] [address]  Office: [phone]  Fax: [phone]  In-person Clinic days:  Tuesday & Thursday a.m.  Virtual clinic days: Monday, Wednesday & Friday

## 2024-06-09 NOTE — CARE COORDINATION
[facility] Nurse  Consult Note       NAME:  Gareth Giang  MEDICAL RECORD NUMBER:  [id number]  AGE: 84 y.o.   GENDER: male  : 1940  TODAY'S DATE:  6/3/2024    Subjective   Reason for WOCN Evaluation and Assessment: cellulitis, blisters , weeping BLE      Gareth Giang is a 84 y.o. male referred by:   [x] Physician  [x] Nursing  [] Other:     Wound Identification:  Wound Type:  cellulitis  Contributing Factors: edema, venous stasis, and obesity    Wound History: present on admission. Patient reports having weeping and  blistering at home.   Current Wound Care Treatment:  open to air    Patient Goal of Care:  [x] Wound Healing  [] Odor Control  [] Palliative Care  [] Pain Control   [] Other:         PAST MEDICAL HISTORY        Diagnosis Date    Acid reflux     Arm wound, left, initial encounter 2022    Skin tears    Atherosclerosis of native arteries of right leg with ulceration of other part of foot ([facility]) 2021    Atrial flutter ([facility])     converted    Bleeding stomach ulcer oct 2015    BPH (benign prostatic hyperplasia)     CAD (coronary artery disease)      angio with 2 blocked bypass and 2 patent    Cellulitis of left thigh 5/10/2022    COPD (chronic obstructive pulmonary disease) ([facility])     Diabetes mellitus type II     Edema     chronic left leg    Elevated PSA- nl 11     Hyperlipidemia     Hypertension     Ischemic cardiomyopathy     Lipoma of skin-RIGHT CHEST 2011    Obesity     Osteoarthritis     Peripheral venous insufficiency 2021    Skin tear of left forearm without complication 3/10/2020    Significant amount of epidermal loss with bleeding.    Spinal stenosis of lumbar region with neurogenic claudication- clinically 2018       PAST SURGICAL HISTORY    Past Surgical History:   Procedure Laterality Date    CATARACT REMOVAL  ,     bilat    COLONOSCOPY      CORONARY ARTERY BYPASS GRAFT  1993    x 4    EYE SURGERY Left     
   06/08/24 1257   IMM Letter   IMM Letter given to Patient/Family/Significant other/Guardian/POA/by: Second IMM given to patient by CM   IMM Letter date given: 06/08/24   IMM Letter time given: 1200       
Case Management Assessment  Initial Evaluation    Date/Time of Evaluation: 6/5/2024 12:33 PM  Assessment Completed by: DOMONIQUE Conteh, ANAISW    If patient is discharged prior to next notation, then this note serves as note for discharge by case management.    Patient Name: Gareth Giang                   YOB: 1940  Diagnosis: Shortness of breath [R06.02]  Renal failure [N19]  Hyperglycemia [R73.9]  COLIN (acute kidney injury) (HCC) [N17.9]  Bilateral lower leg cellulitis [L03.116, L03.115]  Hypervolemia, unspecified hypervolemia type [E87.70]                   Date / Time: 6/2/2024  4:27 PM    Patient Admission Status: Inpatient   Readmission Risk (Low < 19, Mod (19-27), High > 27): Readmission Risk Score: 22.9    Current PCP: Bear Salcido MD  PCP verified by CM? Yes    Chart Reviewed: Yes      History Provided by: Patient  Patient Orientation: Alert and Oriented    Patient Cognition: Alert    Hospitalization in the last 30 days (Readmission):  No    If yes, Readmission Assessment in CM Navigator will be completed.    Advance Directives:      Code Status: DNR-CC   Patient's Primary Decision Maker is: Legal Next of Kin    Primary Decision Maker (Active): Nelsy Giang - Spouse - [phone]    Secondary Decision Maker: Gareth Giang  - Child - [phone]    Discharge Planning:    Patient lives with: Spouse/Significant Other Type of Home: House (1 level - 1 step)  Primary Care Giver: Self  Patient Support Systems include: Spouse/Significant Other, Family Members   Current Financial resources: Medicare  Current community resources: None  Current services prior to admission: Durable Medical Equipment            Current DME: Other (Comment) (walker, 4WW, cane, grab bars, shower chair)            Type of Home Care services:  None    ADLS  Prior functional level: Independent in ADLs/IADLs (uses a cane mostly)  Current functional level: Mobility (needs SNF)    PT AM-PAC: 10 /24  OT 
Discharge Planning  CM received a call from Krystin with  [facility] stating that able to accept patient  with the IV ABX  Avycaz( Fortaz) 1.5 mg every 8 hrs as per the order on discharge.  
Discharge Planning  Discharge order noted for SNF placement. ALANIS spoke with Krystin with [facility] who stated can take patient today. Transport was scheduled with pickup time of 1700 via [facility]. Patient and his sister- Linda  were informed and in agreement. Krystin was also updated of the pickup time.   
ESTELLE was informed by Dr Olivier RN that patient was switched to IV Fortaz.  Talked to Krystin in admissions at [facility] who stated that this drug is also too expensive for them at SNF and would not be able to take him if this med is recommended at discharge.  ESTELLE again sent another message to Dr Olivier's RN regarding this.    Electronically signed by DOMONIQUE Conteh, ANAISW on 6/6/2024 at 4:40 PM    
PHARMACY - [place], [place] - [address] - P [phone] - F [phone]  [address]  [facility] [address]  Phone: [phone] Fax: [phone]    [facility] - [place], [place] - [address] [facility] -  [phone] - F [phone]  [address] [facility] [address]  Phone: [phone] Fax: [phone]    [facility] - [place], [place] - [address] [facility] - P [phone] - F [phone]  [address] [facility] [address]  Phone: [phone] Fax: [phone]    [id number] [address] [phone] - F [phone]  [facility] [facility] [address]  Phone: [phone] Fax: [phone]      Notes:    Additional Case Management Notes:    Krystin with admissions was  updated of the pickup time.     Electronically signed by JAZ FRANKLIN RN BSN/CCM on 6/9/2024 at 3:00 PM

## 2024-06-09 NOTE — PLAN OF CARE
Problem: Safety - Adult  Goal: Free from fall injury  6/9/2024 0743 by Angle Nguyen RN  Outcome: Progressing  6/9/2024 0544 by Mark Chavarria RN  Outcome: Progressing     Problem: ABCDS Injury Assessment  Goal: Absence of physical injury  6/9/2024 0743 by Angle Nguyen RN  Outcome: Progressing  Flowsheets (Taken 6/9/2024 0740)  Absence of Physical Injury: Implement safety measures based on patient assessment  6/9/2024 0544 by Mark Chavarria RN  Outcome: Progressing     Problem: Pain  Goal: Verbalizes/displays adequate comfort level or baseline comfort level  6/9/2024 0743 by Angle Nguyen RN  Outcome: Progressing  6/9/2024 0544 by Mark Chavarria RN  Outcome: Not Progressing

## 2024-06-09 NOTE — PLAN OF CARE
Problem: Pain  Goal: Verbalizes/displays adequate comfort level or baseline comfort level  6/9/2024 1045 by Angle Nguyen RN  Outcome: Adequate for Discharge  Flowsheets (Taken 6/9/2024 0945)  Verbalizes/displays adequate comfort level or baseline comfort level: Encourage patient to monitor pain and request assistance  6/9/2024 0743 by Angle Nguyen RN  Outcome: Progressing  6/9/2024 0544 by Mark Chavarria RN  Outcome: Not Progressing     Problem: Safety - Adult  Goal: Free from fall injury  6/9/2024 1045 by Angle Nguyen RN  Outcome: Adequate for Discharge  6/9/2024 0743 by Angle Nguyen RN  Outcome: Progressing  6/9/2024 0544 by Mark Chavarria RN  Outcome: Progressing     Problem: ABCDS Injury Assessment  Goal: Absence of physical injury  6/9/2024 1045 by Angle Nguyen RN  Outcome: Adequate for Discharge  6/9/2024 0743 by Angle Nguyen RN  Outcome: Progressing  Flowsheets (Taken 6/9/2024 0740)  Absence of Physical Injury: Implement safety measures based on patient assessment  6/9/2024 0544 by Mark Chavarria RN  Outcome: Progressing     Problem: Chronic Conditions and Co-morbidities  Goal: Patient's chronic conditions and co-morbidity symptoms are monitored and maintained or improved  6/9/2024 1045 by Angle Nguyen RN  Outcome: Adequate for Discharge  Flowsheets (Taken 6/9/2024 0945)  Care Plan - Patient's Chronic Conditions and Co-Morbidity Symptoms are Monitored and Maintained or Improved: Monitor and assess patient's chronic conditions and comorbid symptoms for stability, deterioration, or improvement  6/9/2024 0743 by Angle Nguyen RN  Outcome: Progressing  6/9/2024 0544 by Mark Chavarria RN  Outcome: Progressing     Problem: Cardiovascular - Adult  Goal: Maintains optimal cardiac output and hemodynamic stability  6/9/2024 1045 by Angle Nguyen RN  Outcome: Adequate for Discharge  Flowsheets (Taken 6/9/2024 0945)  Maintains optimal cardiac output and hemodynamic

## 2024-06-10 ENCOUNTER — TELEPHONE (OUTPATIENT)
Dept: OTHER | Age: 84
End: 2024-06-10

## 2024-06-10 DIAGNOSIS — A49.8 INFECTION CAUSED BY ENTEROBACTER CLOACAE: Primary | ICD-10-CM

## 2024-06-10 NOTE — TELEPHONE ENCOUNTER
Attempted to call the nurse at [facility]. No answer. Patient was discharged to facility with HF orders in place along with a follow up on 6/11 with cardiology

## 2024-06-11 ENCOUNTER — COMMUNITY CARE MANAGEMENT (OUTPATIENT)
Facility: CLINIC | Age: 84
End: 2024-06-11

## 2024-06-11 ENCOUNTER — TELEPHONE (OUTPATIENT)
Dept: INFECTIOUS DISEASES | Age: 84
End: 2024-06-11

## 2024-06-11 NOTE — TELEPHONE ENCOUNTER
Called [facility] to try to verify IV abx and lab orders. Was placed on hold and no one picked up.      Will attempt to verify orders again later

## 2024-06-13 ENCOUNTER — TELEPHONE (OUTPATIENT)
Dept: INFECTIOUS DISEASES | Age: 84
End: 2024-06-13

## 2024-06-13 NOTE — TELEPHONE ENCOUNTER
Call placed to [facility] to verify IV abx and lab orders. I spoke with nurse Tea and verified orders as stated below.      Name and dose of Antimicrobial: IV Avycaz 1.25 g every 8 hours, oral metronidazole 500 mg every 12 hours      Antimicrobial start date: calculated from June 6, 2024      Antimicrobial completion date planned: June 21, 2024   Lab monitoring: CBC, Chem 12, ESR, CRP once a week     Tea verbalized understanding.

## 2024-06-18 LAB
ALBUMIN SERPL-MCNC: 2.9 G/DL
ALP BLD-CCNC: 131 U/L
ALT SERPL-CCNC: 17 U/L
ANION GAP SERPL CALCULATED.3IONS-SCNC: ABNORMAL MMOL/L
AST SERPL-CCNC: 32 U/L
BASOPHILS ABSOLUTE: 0.05 /ΜL
BASOPHILS RELATIVE PERCENT: 1.5 %
BILIRUB SERPL-MCNC: 0.37 MG/DL (ref 0.1–1.4)
BUN BLDV-MCNC: 32 MG/DL
CALCIUM SERPL-MCNC: 8.8 MG/DL
CHLORIDE BLD-SCNC: 95 MMOL/L
CO2: 32 MMOL/L
CREAT SERPL-MCNC: 2.1 MG/DL
EGFR: 31
EOSINOPHILS ABSOLUTE: 0.21 /ΜL
EOSINOPHILS RELATIVE PERCENT: 6.1 %
GLUCOSE BLD-MCNC: 417 MG/DL
HCT VFR BLD CALC: 36.9 % (ref 41–53)
HEMOGLOBIN: 11.2 G/DL (ref 13.5–17.5)
LYMPHOCYTES ABSOLUTE: 0.77 /ΜL
LYMPHOCYTES RELATIVE PERCENT: 22.4 %
MCH RBC QN AUTO: 28.5 PG
MCHC RBC AUTO-ENTMCNC: 30.4 G/DL
MCV RBC AUTO: 93.9 FL
MONOCYTES ABSOLUTE: 0.47 /ΜL
MONOCYTES RELATIVE PERCENT: 13.7 %
NEUTROPHILS ABSOLUTE: 1.93 /ΜL
NEUTROPHILS RELATIVE PERCENT: 56 %
PDW BLD-RTO: 15.2 %
PLATELET # BLD: 108 K/ΜL
PMV BLD AUTO: 12.2 FL
POTASSIUM SERPL-SCNC: 4.4 MMOL/L
RBC # BLD: 3.93 10^6/ΜL
SEDIMENTATION RATE, ERYTHROCYTE: 13
SODIUM BLD-SCNC: 137 MMOL/L
TOTAL PROTEIN: 5
WBC # BLD: 3.44 10^3/ML

## 2024-06-19 ENCOUNTER — TELEPHONE (OUTPATIENT)
Dept: INFECTIOUS DISEASES | Age: 84
End: 2024-06-19

## 2024-06-19 NOTE — TELEPHONE ENCOUNTER
Spoke with  Gerri at [facility] who states nurse is unavailable, LM requesting call back for weekly labs and update

## 2024-06-20 ENCOUNTER — TELEPHONE (OUTPATIENT)
Dept: INFECTIOUS DISEASES | Age: 84
End: 2024-06-20

## 2024-06-20 DIAGNOSIS — A49.8 INFECTION CAUSED BY ENTEROBACTER CLOACAE: ICD-10-CM

## 2024-06-20 NOTE — TELEPHONE ENCOUNTER
OPAT Nurse Coordinator Weekly Update Note    Current OPAT plan:   IV Avycaz 1.25 g every 8 hours, oral metronidazole 500 mg every 12 hours   Tentative end date: 6/21/24    Diagnosis:  Bilateral leg wounds     Assessment:  Spoke with nurse Garcia who states BLE wounds are almost healed, no s/s of infection and patient afebrile. Weekly labs received minus CRP which she states was not drawn this week    Follow up appts:  none

## 2024-06-20 NOTE — TELEPHONE ENCOUNTER
Patient with chronic venous stasis and dermatitis as well as uncontrolled diabetes.     Complete his 2 week course for acute cellulitis as planned.

## 2024-06-20 NOTE — TELEPHONE ENCOUNTER
Spoke with nurse Jackson at [facility] requested weekly labs, states she will fax now. Requested weekly update on BLE wounds. She states she is not the nurse for this patient, she is unable to reach her but will have her call back to this nurse with update on status

## 2024-06-21 NOTE — TELEPHONE ENCOUNTER
"Know the Signs and Symptoms of Depression  Everyone feels down at times. The blues are a natural part of life. But an unhappy period thats intense or lasts for more than a couple of weeks can be a sign of depression.  Depression is a serious illness. It is not a sign of weakness or a "character flaw," and it is not something you can "snap out of." In fact, most people with depression need treatment to get better. Depression can disrupt the lives of family and friends. If you know someone you think may be depressed, find out what you can do to help.    Recognizing signs of depression  People who are depressed may:  · Feel unhappy, sad, blue, down, or miserable nearly every day  · Feel helpless, hopeless, or worthless  · Lose interest in hobbies, friends, and activities that used to give pleasure  · Not sleep well or sleep too much  · Gain or lose weight  · Feel low on energy or constantly tired  · Have a hard time concentrating or making decisions  · Lose interest in sex  · Have physical symptoms, such as stomachaches, headaches, or backaches  Know the serious signals  Never ignore a person's comments about suicide or behaviors that can lead to self-harm. Warning signals for suicide include:  · Threats or talk of suicide  · Statements such as I wont be a problem much longer or Nothing matters  · Giving away possessions or making a will or  arrangements  · Buying a gun or other weapon  · Sudden, unexplained cheerfulness or calm after a period of depression  If you notice any of these signs, get help right away. Call a healthcare professional, mental health clinic, or suicide hotline and ask what action to take. In an emergency, dont hesitate to call the police.  Resources:  · National Institutes of Mental Vxgkod522-579-8145sqx.[facility].nih.gov  · National [place] on Mental Rpbafrz560-781-1717kbt.bradley.org   · Mental Health Gclabnh383-552-9611sgk.nmha.org  · National Suicide Yvziori676-926-0330 " OPAT End    Call made to SNF: Spoke with nurse Garcia and advised     Will f/u in 1-2 days to verify line removal     (800-SUICIDE)  · National Suicide Prevention Ouldwekm778-058-9184 (855-553-GXAI)www.suicidepreventionlifeline.org   Date Last Reviewed: 1/1/2017  © 9843-0310 [employer]. [address] [place] [address], [place] [address]. All rights reserved. This information is not intended as a substitute for professional medical care. Always follow your healthcare professional's instructions.

## 2024-07-05 ENCOUNTER — TELEPHONE (OUTPATIENT)
Dept: PULMONOLOGY | Age: 84
End: 2024-07-05

## 2024-07-05 NOTE — TELEPHONE ENCOUNTER
LM with wife to have pt call me regarding the PFT he will need done prior to his 7/24/24 appt. This hasn't been made yet. She stated he has been in the hospital and she will have him call me when he gets home. He will need to have PFT prior to seeing Dr Muñoz.

## 2024-07-08 ENCOUNTER — COMMUNITY CARE MANAGEMENT (OUTPATIENT)
Facility: CLINIC | Age: 84
End: 2024-07-08

## 2024-07-08 NOTE — PROGRESS NOTES
the bone doctor on 7/15, states his daughter is coming to take him.      Patient states he does not check his BP at home. Reports weight is 230lbs.      Denies swelling in feet/legs/ankles.      Offered video visit through [employer], patient declined.      Patient confirms receipt and review of discharge instructions from hospital stay. Denies any questions or concerns currently. Patient declines TCM, states his daughter is a registered nurse and is keeping a close eye on him.     MEDICATIONS: Patient reports they have received all medications from pharmacy and no further needs. Medication reconciliation not completed today due to: patient declines medication review, states he does not have his list with him.          EDUCATION:    -[facility] number given to patient for any questions or needs, advised it is a 24/7 on call line.   -Educated patient to call 911 or go to ED with any emergent symptoms including sudden or severe  SOB, chest pain, weakness on one side of the body.   -  Educated patient on the s/s of fluid overload including sob, swelling, elevated blood pressure  - Educated patient on monitoring salt intake, fluid intake/output   - Educated patient on daily weight checks and to report to doctor a gain of 2 lbs overnight or 5 lbs in one week.  - Educated patient on monitoring feet/legs for swelling and elevating to reduce fluid   - Educated patient on when to seek medical attention   · Reviewed s/s of congestive heart failure with patient: SOB with activity/lying down, fatigue/weakness, swelling legs/feet/ankles, rapid/irregular heartbeat, reduced ability to exercise/move, persistent cough/wheezing with pink/white blood-tinged mucus, swelling of abdomen   Reviewed S&S of infection such as fever, chills, redness, warmth, purulent drainage, increased pain and when to seek medical attention.       UPCOMING APPOINTMENTS:    TCM offered to schedule PCP follow up appointment, patient declined. Encouraged patient to

## 2024-08-06 ENCOUNTER — TELEPHONE (OUTPATIENT)
Dept: PULMONOLOGY | Age: 84
End: 2024-08-06

## 2024-08-06 NOTE — TELEPHONE ENCOUNTER
Spoke to Gareth regarding if he is able to get his PFT done prior to his appt with Dr Muñoz on 8/27/24 due to his recent illnesses and hospitalizations. He stated he was unsure and asked if I could call him back this evening.

## 2024-08-08 NOTE — TELEPHONE ENCOUNTER
Spoke to patient and he stated he is having trouble with his wife's health and caring for her so he hasn't been able to get the PFT or schedule it. I offered him help scheduling and he agreed and told me to schedule 2 weeks out before the appt.     PFT scheduled for Wed 8/21/24 at 10 am with arrival 9:30 am. I went over prep in detail with patient and what to avoid and timing to avoid prior to PFT. He understood. He stated he hasn't been using inhalers currently.

## 2024-08-30 ENCOUNTER — HOSPITAL ENCOUNTER (OUTPATIENT)
Age: 84
Discharge: HOME | End: 2024-08-30
Payer: MEDICARE

## 2024-08-30 DIAGNOSIS — D63.1 ANEMIA IN STAGE 3B CHRONIC KIDNEY DISEASE (HCC): ICD-10-CM

## 2024-08-30 DIAGNOSIS — N18.32 STAGE 3B CHRONIC KIDNEY DISEASE (HCC): ICD-10-CM

## 2024-08-30 DIAGNOSIS — N18.32 ANEMIA IN STAGE 3B CHRONIC KIDNEY DISEASE (HCC): ICD-10-CM

## 2024-08-30 DIAGNOSIS — E83.9 CHRONIC KIDNEY DISEASE-MINERAL AND BONE DISORDER: ICD-10-CM

## 2024-08-30 DIAGNOSIS — M89.9 CHRONIC KIDNEY DISEASE-MINERAL AND BONE DISORDER: ICD-10-CM

## 2024-08-30 DIAGNOSIS — N18.9 CHRONIC KIDNEY DISEASE-MINERAL AND BONE DISORDER: ICD-10-CM

## 2024-08-30 LAB
25(OH)D3 SERPL-MCNC: 54.2 NG/ML
ALBUMIN SERPL-MCNC: 3.7 G/DL (ref 3.4–5)
ANION GAP SERPL CALCULATED.3IONS-SCNC: 15 MMOL/L (ref 3–16)
BUN SERPL-MCNC: 25 MG/DL (ref 7–20)
CALCIUM SERPL-MCNC: 9.9 MG/DL (ref 8.3–10.6)
CHLORIDE SERPL-SCNC: 95 MMOL/L (ref 99–110)
CO2 SERPL-SCNC: 28 MMOL/L (ref 21–32)
CREAT SERPL-MCNC: 1.7 MG/DL (ref 0.8–1.3)
CREAT UR-MCNC: 37.2 MG/DL (ref 39–259)
DEPRECATED RDW RBC AUTO: 18 % (ref 12.4–15.4)
FERRITIN SERPL IA-MCNC: 187 NG/ML (ref 30–400)
FOLATE SERPL-MCNC: 6.29 NG/ML (ref 4.78–24.2)
GFR SERPLBLD CREATININE-BSD FMLA CKD-EPI: 39 ML/MIN/{1.73_M2}
GLUCOSE SERPL-MCNC: 345 MG/DL (ref 70–99)
HCT VFR BLD AUTO: 42.9 % (ref 40.5–52.5)
HGB BLD-MCNC: 14.1 G/DL (ref 13.5–17.5)
IRON SATN MFR SERPL: 23 % (ref 20–50)
IRON SERPL-MCNC: 68 UG/DL (ref 59–158)
MAGNESIUM SERPL-MCNC: 2.18 MG/DL (ref 1.8–2.4)
MCH RBC QN AUTO: 28.6 PG (ref 26–34)
MCHC RBC AUTO-ENTMCNC: 32.9 G/DL (ref 31–36)
MCV RBC AUTO: 87 FL (ref 80–100)
MICROALBUMIN UR DL<=1MG/L-MCNC: <1.2 MG/DL
MICROALBUMIN/CREAT UR: ABNORMAL MG/G (ref 0–30)
PHOSPHATE SERPL-MCNC: 2.8 MG/DL (ref 2.5–4.9)
PLATELET # BLD AUTO: 118 K/UL (ref 135–450)
PMV BLD AUTO: 10.2 FL (ref 5–10.5)
POTASSIUM SERPL-SCNC: 3.7 MMOL/L (ref 3.5–5.1)
PTH-INTACT SERPL-MCNC: 136 PG/ML (ref 14–72)
RBC # BLD AUTO: 4.93 M/UL (ref 4.2–5.9)
SODIUM SERPL-SCNC: 138 MMOL/L (ref 136–145)
TIBC SERPL-MCNC: 296 UG/DL (ref 260–445)
TRANSFERRIN SERPL-MCNC: 253 MG/DL (ref 200–360)
URATE SERPL-MCNC: 9.2 MG/DL (ref 3.5–7.2)
VIT B12 SERPL-MCNC: 312 PG/ML (ref 211–911)
WBC # BLD AUTO: 5.9 K/UL (ref 4–11)

## 2024-08-30 PROCEDURE — 82306 VITAMIN D 25 HYDROXY: CPT

## 2024-08-30 PROCEDURE — 84466 ASSAY OF TRANSFERRIN: CPT

## 2024-08-30 PROCEDURE — 82043 UR ALBUMIN QUANTITATIVE: CPT

## 2024-08-30 PROCEDURE — 83540 ASSAY OF IRON: CPT

## 2024-08-30 PROCEDURE — 83970 ASSAY OF PARATHORMONE: CPT

## 2024-08-30 PROCEDURE — 85027 COMPLETE CBC AUTOMATED: CPT

## 2024-08-30 PROCEDURE — 36415 COLL VENOUS BLD VENIPUNCTURE: CPT

## 2024-08-30 PROCEDURE — 84550 ASSAY OF BLOOD/URIC ACID: CPT

## 2024-08-30 PROCEDURE — 80069 RENAL FUNCTION PANEL: CPT

## 2024-08-30 PROCEDURE — 82607 VITAMIN B-12: CPT

## 2024-08-30 PROCEDURE — 82570 ASSAY OF URINE CREATININE: CPT

## 2024-08-30 PROCEDURE — 82728 ASSAY OF FERRITIN: CPT

## 2024-08-30 PROCEDURE — 82746 ASSAY OF FOLIC ACID SERUM: CPT

## 2024-08-30 PROCEDURE — 83735 ASSAY OF MAGNESIUM: CPT

## 2024-09-04 ENCOUNTER — OFFICE VISIT (OUTPATIENT)
Dept: FAMILY MEDICINE CLINIC | Age: 84
End: 2024-09-04

## 2024-09-04 VITALS
BODY MASS INDEX: 32.47 KG/M2 | OXYGEN SATURATION: 96 % | HEIGHT: 66 IN | WEIGHT: 202 LBS | HEART RATE: 88 BPM | SYSTOLIC BLOOD PRESSURE: 93 MMHG | DIASTOLIC BLOOD PRESSURE: 56 MMHG

## 2024-09-04 DIAGNOSIS — R53.1 WEAKNESS GENERALIZED: ICD-10-CM

## 2024-09-04 DIAGNOSIS — E53.8 B12 DEFICIENCY: ICD-10-CM

## 2024-09-04 DIAGNOSIS — R19.7 DIARRHEA, UNSPECIFIED TYPE: ICD-10-CM

## 2024-09-04 DIAGNOSIS — I10 ESSENTIAL HYPERTENSION: ICD-10-CM

## 2024-09-04 DIAGNOSIS — R41.0 CONFUSION: ICD-10-CM

## 2024-09-04 DIAGNOSIS — G47.33 OBSTRUCTIVE SLEEP APNEA SYNDROME: ICD-10-CM

## 2024-09-04 DIAGNOSIS — I42.0 DILATED CARDIOMYOPATHY (HCC): ICD-10-CM

## 2024-09-04 DIAGNOSIS — J96.12 CHRONIC RESPIRATORY FAILURE WITH HYPERCAPNIA (HCC): ICD-10-CM

## 2024-09-04 DIAGNOSIS — R09.02 HYPOXIC EPISODE: ICD-10-CM

## 2024-09-04 DIAGNOSIS — Z79.4 TYPE 2 DIABETES MELLITUS WITH DIABETIC NEPHROPATHY, WITH LONG-TERM CURRENT USE OF INSULIN (HCC): ICD-10-CM

## 2024-09-04 DIAGNOSIS — I95.9 HYPOTENSION, UNSPECIFIED HYPOTENSION TYPE: ICD-10-CM

## 2024-09-04 DIAGNOSIS — I50.22 CHRONIC SYSTOLIC CONGESTIVE HEART FAILURE (HCC): ICD-10-CM

## 2024-09-04 DIAGNOSIS — J43.9 PULMONARY EMPHYSEMA, UNSPECIFIED EMPHYSEMA TYPE (HCC): ICD-10-CM

## 2024-09-04 DIAGNOSIS — I48.0 PAROXYSMAL ATRIAL FIBRILLATION (HCC): ICD-10-CM

## 2024-09-04 DIAGNOSIS — E86.0 DEHYDRATION: ICD-10-CM

## 2024-09-04 DIAGNOSIS — R63.4 WEIGHT LOSS, UNINTENTIONAL: Primary | ICD-10-CM

## 2024-09-04 DIAGNOSIS — I11.0 HYPERTENSIVE HEART DISEASE WITH HEART FAILURE (HCC): ICD-10-CM

## 2024-09-04 DIAGNOSIS — I50.20 HFREF (HEART FAILURE WITH REDUCED EJECTION FRACTION) (HCC): ICD-10-CM

## 2024-09-04 DIAGNOSIS — N18.4 CHRONIC RENAL DISEASE, STAGE 4, SEVERELY DECREASED GLOMERULAR FILTRATION RATE BETWEEN 15-29 ML/MIN/1.73 SQUARE METER (HCC): ICD-10-CM

## 2024-09-04 DIAGNOSIS — E11.21 TYPE 2 DIABETES MELLITUS WITH DIABETIC NEPHROPATHY, WITH LONG-TERM CURRENT USE OF INSULIN (HCC): ICD-10-CM

## 2024-09-04 DIAGNOSIS — R35.0 URINARY FREQUENCY: ICD-10-CM

## 2024-09-04 PROBLEM — A49.8 INFECTION DUE TO STENOTROPHOMONAS MALTOPHILIA: Status: RESOLVED | Noted: 2024-06-04 | Resolved: 2024-09-04

## 2024-09-04 PROBLEM — R79.82 CRP ELEVATED: Status: RESOLVED | Noted: 2024-06-03 | Resolved: 2024-09-04

## 2024-09-04 PROBLEM — A49.8 KLEBSIELLA INFECTION: Status: RESOLVED | Noted: 2024-06-04 | Resolved: 2024-09-04

## 2024-09-04 PROBLEM — N17.9 ACUTE KIDNEY INJURY SUPERIMPOSED ON CKD (HCC): Status: RESOLVED | Noted: 2024-06-02 | Resolved: 2024-09-04

## 2024-09-04 PROBLEM — R70.0 ELEVATED SED RATE: Status: RESOLVED | Noted: 2024-06-03 | Resolved: 2024-09-04

## 2024-09-04 PROBLEM — Z79.2 RECEIVING INTRAVENOUS ANTIBIOTIC TREATMENT AS OUTPATIENT: Status: RESOLVED | Noted: 2024-06-06 | Resolved: 2024-09-04

## 2024-09-04 PROBLEM — E87.20 LACTIC ACIDOSIS: Status: RESOLVED | Noted: 2024-06-03 | Resolved: 2024-09-04

## 2024-09-04 PROBLEM — R79.89 ELEVATED BRAIN NATRIURETIC PEPTIDE (BNP) LEVEL: Status: RESOLVED | Noted: 2024-06-03 | Resolved: 2024-09-04

## 2024-09-04 PROBLEM — N18.9 ACUTE KIDNEY INJURY SUPERIMPOSED ON CKD (HCC): Status: RESOLVED | Noted: 2024-06-02 | Resolved: 2024-09-04

## 2024-09-04 PROBLEM — I50.43 ACUTE ON CHRONIC COMBINED SYSTOLIC AND DIASTOLIC CHF (CONGESTIVE HEART FAILURE) (HCC): Status: RESOLVED | Noted: 2021-06-29 | Resolved: 2024-09-04

## 2024-09-04 PROBLEM — E87.1 HYPONATREMIA: Status: RESOLVED | Noted: 2024-06-03 | Resolved: 2024-09-04

## 2024-09-04 PROBLEM — A49.8 INFECTION CAUSED BY ENTEROBACTER CLOACAE: Status: RESOLVED | Noted: 2024-06-04 | Resolved: 2024-09-04

## 2024-09-04 LAB
BILIRUBIN, POC: NORMAL
BLOOD URINE, POC: NORMAL
CLARITY, POC: CLEAR
COLOR, POC: YELLOW
GLUCOSE URINE, POC: NORMAL MG/DL
KETONES, POC: NORMAL MG/DL
LEUKOCYTE EST, POC: NORMAL
Lab: NORMAL
NITRITE, POC: NORMAL
PERFORMING INSTRUMENT: NORMAL
PH, POC: 5.5
PROTEIN, POC: NORMAL MG/DL
QC PASS/FAIL: NORMAL
SARS-COV-2, POC: NORMAL
SPECIFIC GRAVITY, POC: <=1.005
UROBILINOGEN, POC: NORMAL MG/DL

## 2024-09-04 SDOH — ECONOMIC STABILITY: FOOD INSECURITY: WITHIN THE PAST 12 MONTHS, THE FOOD YOU BOUGHT JUST DIDN'T LAST AND YOU DIDN'T HAVE MONEY TO GET MORE.: NEVER TRUE

## 2024-09-04 SDOH — ECONOMIC STABILITY: FOOD INSECURITY: WITHIN THE PAST 12 MONTHS, YOU WORRIED THAT YOUR FOOD WOULD RUN OUT BEFORE YOU GOT MONEY TO BUY MORE.: NEVER TRUE

## 2024-09-04 SDOH — ECONOMIC STABILITY: INCOME INSECURITY: HOW HARD IS IT FOR YOU TO PAY FOR THE VERY BASICS LIKE FOOD, HOUSING, MEDICAL CARE, AND HEATING?: NOT HARD AT ALL

## 2024-09-04 NOTE — PROGRESS NOTES
PROBLEM VISIT NOTE     Subjective:     Chief Complaint   Patient presents with    Check-Up     Pt has been getting low o2 levels at home, pt has been having a lack of energy as of late.     Memory Loss     DOP says pt has been experiencing some memory loss, and tends to nod off mid conversation.      Gareth Giang is a 84 y.o. male who presents with hypoxia at 85% after sleeping. DOPs not seen any apnea. Oxygen fine since. Her with octavia- in-law AND DOP with POA on speaker phone.  Getting more forgetful the past 2 weeks.  Up all night watching TV and then sleep all day.  Little diarrhea 3-4 days.  Polyuria the past few days  More irritable.  Getting weaker.  All leg edema gone.    Live with wife. Family tries to spend time there, get meals, check they are taking meds and getting to appointments. However, pt has missed cardiology and nephrology appts.    Denies fever, shortness of breath, abd pain, headache, chest pain, blood in stool.    CHART REVIEW   reports that he quit smoking about 39 years ago. His smoking use included cigarettes. He started smoking about 71 years ago. He has a 112 pack-year smoking history. He has been exposed to tobacco smoke. He has never used smokeless tobacco.  Health Maintenance Due   Topic Date Due    Shingles vaccine (2 of 3) 11/28/2012    Lipids  01/05/2024    Annual Wellness Visit (Medicare)  04/27/2024    Flu vaccine (1) 08/01/2024     Current Outpatient Medications   Medication Instructions    albuterol (PROVENTIL) 2.5 mg, Nebulization, EVERY 6 HOURS PRN    albuterol sulfate  (90 Base) MCG/ACT inhaler 2 puffs, Inhalation, EVERY 6 HOURS PRN    amiodarone (CORDARONE) 200 MG tablet TAKE 1 TABLET BY MOUTH DAILY    apixaban (ELIQUIS) 2.5 mg, Oral, 2 TIMES DAILY    aspirin 81 mg, Oral, DAILY    atorvastatin (LIPITOR) 40 MG tablet TAKE 1 TABLET BY MOUTH EVERY EVENING    blood glucose monitor kit and supplies Test 2 times a day & as needed for symptoms of irregular blood glucose.  Patient had her appointment 1/6/2023.

## 2024-09-04 NOTE — PATIENT INSTRUCTIONS
INSTRUCTIONS  NEXT APPOINTMENT: Please schedule check-up in 1 weeks   Negative COVID  Urine looks dehydrated.  PLEASE GET BLOODWORK DRAWN tomorrow ON FIRST FLOOR in 170.  Take orders with you.  RESULTS- most blood tests back in couple days.  We will call you if any problems.  If bloodwork good, you will get letter in mail or notified thru [facility] (if signed up) within 2 weeks.  If you do not, please call office.   Get Boost low calorie twice a day.   If lab results are really off, will be sending you to ER.  Decrease Demedex to 1 tab twice a day for now.  Please get daily weights if possible.  Palliative care will be contacting you about providing extra care at home in hopes of preventing hospitalizations.   Patient Education

## 2024-09-06 LAB — BACTERIA UR CULT: NORMAL

## 2024-09-07 ENCOUNTER — HOSPITAL ENCOUNTER (OUTPATIENT)
Age: 84
Discharge: HOME | End: 2024-09-07

## 2024-09-07 DIAGNOSIS — Z79.4 TYPE 2 DIABETES MELLITUS WITH DIABETIC NEPHROPATHY, WITH LONG-TERM CURRENT USE OF INSULIN (HCC): ICD-10-CM

## 2024-09-07 DIAGNOSIS — R63.4 WEIGHT LOSS, UNINTENTIONAL: ICD-10-CM

## 2024-09-07 DIAGNOSIS — E11.21 TYPE 2 DIABETES MELLITUS WITH DIABETIC NEPHROPATHY, WITH LONG-TERM CURRENT USE OF INSULIN (HCC): ICD-10-CM

## 2024-09-07 LAB
ALBUMIN SERPL-MCNC: 3.5 G/DL (ref 3.4–5)
ALBUMIN/GLOB SERPL: 1.3 {RATIO} (ref 1.1–2.2)
ALP SERPL-CCNC: 123 U/L (ref 40–129)
ALT SERPL-CCNC: 12 U/L (ref 10–40)
ANION GAP SERPL CALCULATED.3IONS-SCNC: 12 MMOL/L (ref 3–16)
AST SERPL-CCNC: 25 U/L (ref 15–37)
BASOPHILS # BLD: 0 K/UL (ref 0–0.2)
BASOPHILS NFR BLD: 0.6 %
BILIRUB SERPL-MCNC: 0.9 MG/DL (ref 0–1)
BUN SERPL-MCNC: 37 MG/DL (ref 7–20)
CALCIUM SERPL-MCNC: 9.1 MG/DL (ref 8.3–10.6)
CHLORIDE SERPL-SCNC: 96 MMOL/L (ref 99–110)
CO2 SERPL-SCNC: 28 MMOL/L (ref 21–32)
CREAT SERPL-MCNC: 1.9 MG/DL (ref 0.8–1.3)
DEPRECATED RDW RBC AUTO: 17.5 % (ref 12.4–15.4)
EOSINOPHIL # BLD: 0.2 K/UL (ref 0–0.6)
EOSINOPHIL NFR BLD: 3 %
GFR SERPLBLD CREATININE-BSD FMLA CKD-EPI: 34 ML/MIN/{1.73_M2}
GLUCOSE SERPL-MCNC: 214 MG/DL (ref 70–99)
HCT VFR BLD AUTO: 42.3 % (ref 40.5–52.5)
HGB BLD-MCNC: 13.8 G/DL (ref 13.5–17.5)
LIPASE SERPL-CCNC: 37 U/L (ref 13–60)
LYMPHOCYTES # BLD: 1.4 K/UL (ref 1–5.1)
LYMPHOCYTES NFR BLD: 24.5 %
MCH RBC QN AUTO: 28.4 PG (ref 26–34)
MCHC RBC AUTO-ENTMCNC: 32.7 G/DL (ref 31–36)
MCV RBC AUTO: 86.9 FL (ref 80–100)
MONOCYTES # BLD: 0.6 K/UL (ref 0–1.3)
MONOCYTES NFR BLD: 10.5 %
NEUTROPHILS # BLD: 3.6 K/UL (ref 1.7–7.7)
NEUTROPHILS NFR BLD: 61.4 %
NT-PROBNP SERPL-MCNC: 2351 PG/ML (ref 0–449)
PLATELET # BLD AUTO: 114 K/UL (ref 135–450)
PMV BLD AUTO: 10.4 FL (ref 5–10.5)
POTASSIUM SERPL-SCNC: 5.1 MMOL/L (ref 3.5–5.1)
PROT SERPL-MCNC: 6.1 G/DL (ref 6.4–8.2)
RBC # BLD AUTO: 4.87 M/UL (ref 4.2–5.9)
SODIUM SERPL-SCNC: 136 MMOL/L (ref 136–145)
WBC # BLD AUTO: 5.9 K/UL (ref 4–11)

## 2024-09-08 LAB
EST. AVERAGE GLUCOSE BLD GHB EST-MCNC: 292 MG/DL
HBA1C MFR BLD: 11.8 %

## 2024-09-09 DIAGNOSIS — I50.33 ACUTE ON CHRONIC DIASTOLIC CHF (CONGESTIVE HEART FAILURE) (HCC): ICD-10-CM

## 2024-09-09 RX ORDER — TORSEMIDE 20 MG/1
TABLET ORAL
COMMUNITY
Start: 2024-09-09

## 2024-09-13 ENCOUNTER — OFFICE VISIT (OUTPATIENT)
Dept: FAMILY MEDICINE CLINIC | Age: 84
End: 2024-09-13

## 2024-09-13 VITALS
HEIGHT: 66 IN | HEART RATE: 65 BPM | SYSTOLIC BLOOD PRESSURE: 101 MMHG | DIASTOLIC BLOOD PRESSURE: 54 MMHG | WEIGHT: 200.4 LBS | OXYGEN SATURATION: 93 % | BODY MASS INDEX: 32.21 KG/M2 | RESPIRATION RATE: 20 BRPM

## 2024-09-13 DIAGNOSIS — I95.9 HYPOTENSION, UNSPECIFIED HYPOTENSION TYPE: ICD-10-CM

## 2024-09-13 DIAGNOSIS — I50.20 HFREF (HEART FAILURE WITH REDUCED EJECTION FRACTION) (HCC): ICD-10-CM

## 2024-09-13 DIAGNOSIS — I10 ESSENTIAL HYPERTENSION: ICD-10-CM

## 2024-09-13 DIAGNOSIS — E11.42 DIABETIC POLYNEUROPATHY ASSOCIATED WITH TYPE 2 DIABETES MELLITUS (HCC): Primary | ICD-10-CM

## 2024-09-13 DIAGNOSIS — I42.0 DILATED CARDIOMYOPATHY (HCC): ICD-10-CM

## 2024-09-13 DIAGNOSIS — J43.9 PULMONARY EMPHYSEMA, UNSPECIFIED EMPHYSEMA TYPE (HCC): ICD-10-CM

## 2024-09-13 DIAGNOSIS — N18.4 CHRONIC RENAL DISEASE, STAGE 4, SEVERELY DECREASED GLOMERULAR FILTRATION RATE BETWEEN 15-29 ML/MIN/1.73 SQUARE METER (HCC): ICD-10-CM

## 2024-09-13 RX ORDER — GABAPENTIN 300 MG/1
600 CAPSULE ORAL NIGHTLY
COMMUNITY
Start: 2024-09-13

## 2024-09-19 ENCOUNTER — TELEPHONE (OUTPATIENT)
Dept: FAMILY MEDICINE CLINIC | Age: 84
End: 2024-09-19

## 2024-09-19 DIAGNOSIS — I50.20 HFREF (HEART FAILURE WITH REDUCED EJECTION FRACTION) (HCC): Primary | ICD-10-CM

## 2024-09-19 RX ORDER — DAPAGLIFLOZIN 10 MG/1
10 TABLET, FILM COATED ORAL EVERY MORNING
Qty: 30 TABLET | Refills: 5 | Status: SHIPPED | OUTPATIENT
Start: 2024-09-19

## 2024-09-19 RX ORDER — DAPAGLIFLOZIN 10 MG/1
10 TABLET, FILM COATED ORAL EVERY MORNING
COMMUNITY
End: 2024-09-19 | Stop reason: SDUPTHER

## 2024-09-19 NOTE — TELEPHONE ENCOUNTER
Has he been taking it? If yes, then continue and add to our med list.  His A1c is really high and that would be helpful to be on if he already has some.  We have never prescribed it but from cardiology for CHF diagnosis.

## 2024-09-19 NOTE — TELEPHONE ENCOUNTER
DOP says she believes pt has been taking med.  He has a bottle of the 10 MG dose.  Med added to list   Is this something you can prescribe moving forward?

## 2024-09-19 NOTE — TELEPHONE ENCOUNTER
Looks like he may have been getting samples?  Doesn't look like it was prescribed recently  Please advise

## 2024-09-19 NOTE — TELEPHONE ENCOUNTER
Print out of meds and when she went through his meds she saw that farxiga was missing from the list.  Is he still supposed to be taking this medication    Please give Linda a call back.

## 2024-09-20 ENCOUNTER — TELEPHONE (OUTPATIENT)
Dept: FAMILY MEDICINE CLINIC | Age: 84
End: 2024-09-20

## 2024-09-20 NOTE — TELEPHONE ENCOUNTER
OK sign orders.    Lantus 15 is on our med list from a [facility] entry when he was in hospital.    I would love to know what he is actually doing so I can make adjustments.    Amiodarone 200 is correct.

## 2024-09-20 NOTE — TELEPHONE ENCOUNTER
Jazzmine from [facility]    Wanting to see if she will sign home care orders for Nursing       Also needs 2 medication clarification   Lantus - clarify that he take 15 units a day  Amiodarone- clarify that he takes 200mg a day     Please advise

## 2024-09-23 ENCOUNTER — TELEPHONE (OUTPATIENT)
Dept: FAMILY MEDICINE CLINIC | Age: 84
End: 2024-09-23

## 2024-09-23 DIAGNOSIS — E11.42 DIABETIC POLYNEUROPATHY ASSOCIATED WITH TYPE 2 DIABETES MELLITUS (HCC): Primary | ICD-10-CM

## 2024-09-23 DIAGNOSIS — I48.0 PAROXYSMAL ATRIAL FIBRILLATION (HCC): ICD-10-CM

## 2024-09-23 RX ORDER — INSULIN GLARGINE 100 [IU]/ML
15 INJECTION, SOLUTION SUBCUTANEOUS NIGHTLY
Qty: 5 ADJUSTABLE DOSE PRE-FILLED PEN SYRINGE | Refills: 3 | Status: SHIPPED | OUTPATIENT
Start: 2024-09-23

## 2024-09-23 RX ORDER — AMIODARONE HYDROCHLORIDE 200 MG/1
TABLET ORAL
Qty: 30 TABLET | Refills: 5 | Status: SHIPPED | OUTPATIENT
Start: 2024-09-23

## 2024-09-25 ENCOUNTER — TELEPHONE (OUTPATIENT)
Dept: FAMILY MEDICINE CLINIC | Age: 84
End: 2024-09-25

## 2024-10-03 ENCOUNTER — HOSPITAL ENCOUNTER (OUTPATIENT)
Age: 84
Discharge: HOME | End: 2024-10-03
Payer: MEDICARE

## 2024-10-03 LAB
25(OH)D3 SERPL-MCNC: 57.5 NG/ML
ALBUMIN SERPL-MCNC: 3.3 G/DL (ref 3.4–5)
ANION GAP SERPL CALCULATED.3IONS-SCNC: 13 MMOL/L (ref 3–16)
BUN SERPL-MCNC: 26 MG/DL (ref 7–20)
CALCIUM SERPL-MCNC: 9.8 MG/DL (ref 8.3–10.6)
CHLORIDE SERPL-SCNC: 100 MMOL/L (ref 99–110)
CO2 SERPL-SCNC: 28 MMOL/L (ref 21–32)
CREAT SERPL-MCNC: 1.8 MG/DL (ref 0.8–1.3)
CREAT UR-MCNC: 74.4 MG/DL (ref 39–259)
DEPRECATED RDW RBC AUTO: 16.5 % (ref 12.4–15.4)
FERRITIN SERPL IA-MCNC: 215 NG/ML (ref 30–400)
FOLATE SERPL-MCNC: 5.11 NG/ML (ref 4.78–24.2)
GFR SERPLBLD CREATININE-BSD FMLA CKD-EPI: 37 ML/MIN/{1.73_M2}
GLUCOSE SERPL-MCNC: 205 MG/DL (ref 70–99)
HCT VFR BLD AUTO: 41 % (ref 40.5–52.5)
HGB BLD-MCNC: 13.7 G/DL (ref 13.5–17.5)
IRON SATN MFR SERPL: 20 % (ref 20–50)
IRON SERPL-MCNC: 53 UG/DL (ref 59–158)
MAGNESIUM SERPL-MCNC: 2.39 MG/DL (ref 1.8–2.4)
MCH RBC QN AUTO: 29.6 PG (ref 26–34)
MCHC RBC AUTO-ENTMCNC: 33.3 G/DL (ref 31–36)
MCV RBC AUTO: 89 FL (ref 80–100)
MICROALBUMIN UR DL<=1MG/L-MCNC: <1.2 MG/DL
MICROALBUMIN/CREAT UR: NORMAL MG/G (ref 0–30)
PHOSPHATE SERPL-MCNC: 2.9 MG/DL (ref 2.5–4.9)
PLATELET # BLD AUTO: 162 K/UL (ref 135–450)
PMV BLD AUTO: 9.7 FL (ref 5–10.5)
POTASSIUM SERPL-SCNC: 4.3 MMOL/L (ref 3.5–5.1)
PTH-INTACT SERPL-MCNC: 68 PG/ML (ref 14–72)
RBC # BLD AUTO: 4.61 M/UL (ref 4.2–5.9)
SODIUM SERPL-SCNC: 141 MMOL/L (ref 136–145)
TIBC SERPL-MCNC: 265 UG/DL (ref 260–445)
TRANSFERRIN SERPL-MCNC: 227 MG/DL (ref 200–360)
URATE SERPL-MCNC: 9.1 MG/DL (ref 3.5–7.2)
VIT B12 SERPL-MCNC: 221 PG/ML (ref 211–911)
WBC # BLD AUTO: 5.3 K/UL (ref 4–11)

## 2024-10-03 PROCEDURE — 82570 ASSAY OF URINE CREATININE: CPT

## 2024-10-03 PROCEDURE — 83970 ASSAY OF PARATHORMONE: CPT

## 2024-10-03 PROCEDURE — 82746 ASSAY OF FOLIC ACID SERUM: CPT

## 2024-10-03 PROCEDURE — 82728 ASSAY OF FERRITIN: CPT

## 2024-10-03 PROCEDURE — 82043 UR ALBUMIN QUANTITATIVE: CPT

## 2024-10-03 PROCEDURE — 80069 RENAL FUNCTION PANEL: CPT

## 2024-10-03 PROCEDURE — 82306 VITAMIN D 25 HYDROXY: CPT

## 2024-10-03 PROCEDURE — 84550 ASSAY OF BLOOD/URIC ACID: CPT

## 2024-10-03 PROCEDURE — 85027 COMPLETE CBC AUTOMATED: CPT

## 2024-10-03 PROCEDURE — 36415 COLL VENOUS BLD VENIPUNCTURE: CPT

## 2024-10-03 PROCEDURE — 82607 VITAMIN B-12: CPT

## 2024-10-03 PROCEDURE — 84466 ASSAY OF TRANSFERRIN: CPT

## 2024-10-03 PROCEDURE — 83735 ASSAY OF MAGNESIUM: CPT

## 2024-10-03 PROCEDURE — 83540 ASSAY OF IRON: CPT

## 2024-10-10 ENCOUNTER — TELEPHONE (OUTPATIENT)
Dept: ORTHOPEDIC SURGERY | Age: 84
End: 2024-10-10

## 2024-10-10 ENCOUNTER — OFFICE VISIT (OUTPATIENT)
Dept: ORTHOPEDIC SURGERY | Age: 84
End: 2024-10-10
Payer: MEDICARE

## 2024-10-10 VITALS — HEIGHT: 66 IN | BODY MASS INDEX: 32.35 KG/M2
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
## 2024-10-10 DIAGNOSIS — Z96.652 S/P TOTAL KNEE ARTHROPLASTY, LEFT: Primary | ICD-10-CM

## 2024-10-10 PROCEDURE — G8417 CALC BMI ABV UP PARAM F/U: HCPCS | Performed by: PHYSICIAN ASSISTANT

## 2024-10-10 PROCEDURE — 99213 OFFICE O/P EST LOW 20 MIN: CPT | Performed by: PHYSICIAN ASSISTANT

## 2024-10-10 PROCEDURE — G8427 DOCREV CUR MEDS BY ELIG CLIN: HCPCS | Performed by: PHYSICIAN ASSISTANT

## 2024-10-10 PROCEDURE — 1123F ACP DISCUSS/DSCN MKR DOCD: CPT | Performed by: PHYSICIAN ASSISTANT

## 2024-10-10 PROCEDURE — G8484 FLU IMMUNIZE NO ADMIN: HCPCS | Performed by: PHYSICIAN ASSISTANT

## 2024-10-10 PROCEDURE — 1036F TOBACCO NON-USER: CPT | Performed by: PHYSICIAN ASSISTANT

## 2024-10-10 NOTE — PROGRESS NOTES
TRANSMITTER) MISC As needed for diabetes 1 each 0    potassium chloride (KLOR-CON M) 20 MEQ extended release tablet TAKE 1 TABLET BY MOUTH DAILY 90 tablet 1    atorvastatin (LIPITOR) 40 MG tablet TAKE 1 TABLET BY MOUTH EVERY EVENING (Patient taking differently: Take 1 tablet by mouth every evening TAKE 1 TABLET BY MOUTH EVERY EVENING) 90 tablet 3    nitroGLYCERIN (NITROSTAT) 0.3 MG SL tablet Take one sublingual for chest pain.  May repeat twice at 5 min intervals. If not getting relief call 911. (Patient taking differently: Place 1 tablet under the tongue every 5 minutes as needed for Chest pain Take one sublingual for chest pain.  May repeat twice at 5 min intervals. If not getting relief call 911.) 25 tablet 3    carvedilol (COREG) 6.25 MG tablet Take 1 tablet by mouth 2 times daily      albuterol sulfate  (90 Base) MCG/ACT inhaler INHALE 2 PUFFS INTO THE LUNGS EVERY 6 HOURS AS NEEDED FOR WHEEZING 18 g 5    Lancets MISC 1 each by Does not apply route daily Use to check glucose twice a day. One Touch brand.  DX E11.9 100 each 3    blood glucose monitor kit and supplies Test 2 times a day & as needed for symptoms of irregular blood glucose. 1 kit 0    blood glucose monitor strips Test 2 times a day & as needed for symptoms of irregular blood glucose. 200 strip 3    blood glucose test strips (ASCENSIA AUTODISC VI;ONE TOUCH ULTRA TEST VI) strip four times daily 400 strip 3    Nebulizers (AIRIAL COMPACT MINI NEBULIZER) MISC 1 each by Does not apply route every 6 hours as needed (wheezing or shortness of breath) 1 each 0    Respiratory Therapy Supplies (NEBULIZER/TUBING/MOUTHPIECE) KIT 1 kit by Does not apply route every 6 hours as needed (for wheezing or shortness of breath) 1 kit 1    albuterol (PROVENTIL) (2.5 MG/3ML) 0.083% nebulizer solution Take 3 mLs by nebulization every 6 hours as needed for Wheezing 50 vial 3    vitamin D (ERGOCALCIFEROL) 01639 units CAPS capsule Take 1 capsule by mouth once a week

## 2024-10-14 ENCOUNTER — TELEPHONE (OUTPATIENT)
Dept: FAMILY MEDICINE CLINIC | Age: 84
End: 2024-10-14

## 2024-10-14 DIAGNOSIS — R11.2 NAUSEA AND VOMITING, UNSPECIFIED VOMITING TYPE: Primary | ICD-10-CM

## 2024-10-14 RX ORDER — ONDANSETRON 4 MG/1
4 TABLET, ORALLY DISINTEGRATING ORAL EVERY 8 HOURS PRN
Qty: 12 TABLET | Refills: 0 | Status: SHIPPED | OUTPATIENT
Start: 2024-10-14

## 2024-10-14 NOTE — TELEPHONE ENCOUNTER
DOP calling in, stated that pt lost his spouse and is not doing well. Pt has not been eating a lot and vomiting what he does eat x3 days. Pt is not dehydrated.   DOP would like to know if pt would be prescribed zofran to help with nausea until pt is better.    [facility] [address]    Please advise  DOP can be reached at [phone]

## 2024-10-18 ENCOUNTER — TELEPHONE (OUTPATIENT)
Dept: FAMILY MEDICINE CLINIC | Age: 84
End: 2024-10-18

## 2024-10-18 NOTE — TELEPHONE ENCOUNTER
Jazzmine from [facility] calling  Pt had a Fall on the 8th   Has bruising on left eye, back of head and skin tear on l forearm   Treating for  xeroform weekly and as needed- almost healed   Asking MSW and OT rossy   Increasing nursing visit for the next few weeks.

## 2024-11-06 DIAGNOSIS — E11.42 DIABETIC POLYNEUROPATHY ASSOCIATED WITH TYPE 2 DIABETES MELLITUS (HCC): ICD-10-CM

## 2024-11-06 DIAGNOSIS — I50.33 ACUTE ON CHRONIC DIASTOLIC CHF (CONGESTIVE HEART FAILURE) (HCC): ICD-10-CM

## 2024-11-07 RX ORDER — GABAPENTIN 600 MG/1
1200 TABLET ORAL NIGHTLY PRN
Qty: 180 TABLET | Refills: 1 | Status: SHIPPED | OUTPATIENT
Start: 2024-11-07 | End: 2025-05-06

## 2024-11-07 RX ORDER — TORSEMIDE 20 MG/1
TABLET ORAL
Qty: 360 TABLET | Refills: 1 | Status: SHIPPED | OUTPATIENT
Start: 2024-11-07

## 2024-11-11 ENCOUNTER — HOSPITAL ENCOUNTER (OUTPATIENT)
Age: 84
Discharge: HOME | End: 2024-11-11
Payer: MEDICARE

## 2024-11-11 DIAGNOSIS — N18.32 ANEMIA IN STAGE 3B CHRONIC KIDNEY DISEASE (HCC): ICD-10-CM

## 2024-11-11 DIAGNOSIS — E83.9 CHRONIC KIDNEY DISEASE-MINERAL AND BONE DISORDER: ICD-10-CM

## 2024-11-11 DIAGNOSIS — M89.9 CHRONIC KIDNEY DISEASE-MINERAL AND BONE DISORDER: ICD-10-CM

## 2024-11-11 DIAGNOSIS — D63.1 ANEMIA IN STAGE 3B CHRONIC KIDNEY DISEASE (HCC): ICD-10-CM

## 2024-11-11 DIAGNOSIS — N18.32 STAGE 3B CHRONIC KIDNEY DISEASE (HCC): ICD-10-CM

## 2024-11-11 DIAGNOSIS — N18.9 CHRONIC KIDNEY DISEASE-MINERAL AND BONE DISORDER: ICD-10-CM

## 2024-11-11 LAB
25(OH)D3 SERPL-MCNC: 59.7 NG/ML
ALBUMIN SERPL-MCNC: 3.5 G/DL (ref 3.4–5)
ANION GAP SERPL CALCULATED.3IONS-SCNC: 24 MMOL/L (ref 3–16)
BUN SERPL-MCNC: 32 MG/DL (ref 7–20)
CALCIUM SERPL-MCNC: 9.3 MG/DL (ref 8.3–10.6)
CHLORIDE SERPL-SCNC: 99 MMOL/L (ref 99–110)
CO2 SERPL-SCNC: 17 MMOL/L (ref 21–32)
CREAT SERPL-MCNC: 1.9 MG/DL (ref 0.8–1.3)
CREAT UR-MCNC: 18.8 MG/DL (ref 39–259)
DEPRECATED RDW RBC AUTO: 16.1 % (ref 12.4–15.4)
FERRITIN SERPL IA-MCNC: 146 NG/ML (ref 30–400)
FOLATE SERPL-MCNC: 6.18 NG/ML (ref 4.78–24.2)
GFR SERPLBLD CREATININE-BSD FMLA CKD-EPI: 34 ML/MIN/{1.73_M2}
GLUCOSE SERPL-MCNC: 391 MG/DL (ref 70–99)
HCT VFR BLD AUTO: 41.8 % (ref 40.5–52.5)
HGB BLD-MCNC: 13.6 G/DL (ref 13.5–17.5)
IRON SATN MFR SERPL: 16 % (ref 20–50)
IRON SERPL-MCNC: 45 UG/DL (ref 59–158)
MAGNESIUM SERPL-MCNC: 2.14 MG/DL (ref 1.8–2.4)
MCH RBC QN AUTO: 29.6 PG (ref 26–34)
MCHC RBC AUTO-ENTMCNC: 32.4 G/DL (ref 31–36)
MCV RBC AUTO: 91.4 FL (ref 80–100)
MICROALBUMIN UR DL<=1MG/L-MCNC: <1.2 MG/DL
MICROALBUMIN/CREAT UR: ABNORMAL MG/G (ref 0–30)
PHOSPHATE SERPL-MCNC: 3.4 MG/DL (ref 2.5–4.9)
PLATELET # BLD AUTO: 126 K/UL (ref 135–450)
PMV BLD AUTO: 10.6 FL (ref 5–10.5)
POTASSIUM SERPL-SCNC: 4.3 MMOL/L (ref 3.5–5.1)
PTH-INTACT SERPL-MCNC: 154 PG/ML (ref 14–72)
RBC # BLD AUTO: 4.57 M/UL (ref 4.2–5.9)
SODIUM SERPL-SCNC: 140 MMOL/L (ref 136–145)
TIBC SERPL-MCNC: 274 UG/DL (ref 260–445)
TRANSFERRIN SERPL-MCNC: 235 MG/DL (ref 200–360)
URATE SERPL-MCNC: 9.7 MG/DL (ref 3.5–7.2)
VIT B12 SERPL-MCNC: 275 PG/ML (ref 211–911)
WBC # BLD AUTO: 5.9 K/UL (ref 4–11)

## 2024-11-11 PROCEDURE — 83970 ASSAY OF PARATHORMONE: CPT

## 2024-11-11 PROCEDURE — 82607 VITAMIN B-12: CPT

## 2024-11-11 PROCEDURE — 84466 ASSAY OF TRANSFERRIN: CPT

## 2024-11-11 PROCEDURE — 85027 COMPLETE CBC AUTOMATED: CPT

## 2024-11-11 PROCEDURE — 83735 ASSAY OF MAGNESIUM: CPT

## 2024-11-11 PROCEDURE — 84550 ASSAY OF BLOOD/URIC ACID: CPT

## 2024-11-11 PROCEDURE — 82043 UR ALBUMIN QUANTITATIVE: CPT

## 2024-11-11 PROCEDURE — 82306 VITAMIN D 25 HYDROXY: CPT

## 2024-11-11 PROCEDURE — 82728 ASSAY OF FERRITIN: CPT

## 2024-11-11 PROCEDURE — 82746 ASSAY OF FOLIC ACID SERUM: CPT

## 2024-11-11 PROCEDURE — 82570 ASSAY OF URINE CREATININE: CPT

## 2024-11-11 PROCEDURE — 80069 RENAL FUNCTION PANEL: CPT

## 2024-11-11 PROCEDURE — 83540 ASSAY OF IRON: CPT

## 2024-11-11 PROCEDURE — 36415 COLL VENOUS BLD VENIPUNCTURE: CPT

## 2024-11-13 ENCOUNTER — OFFICE VISIT (OUTPATIENT)
Dept: FAMILY MEDICINE CLINIC | Age: 84
End: 2024-11-13

## 2024-11-13 VITALS
OXYGEN SATURATION: 90 % | BODY MASS INDEX: 31.8 KG/M2 | WEIGHT: 197 LBS | SYSTOLIC BLOOD PRESSURE: 116 MMHG | DIASTOLIC BLOOD PRESSURE: 68 MMHG | HEART RATE: 70 BPM | RESPIRATION RATE: 16 BRPM

## 2024-11-13 DIAGNOSIS — M25.552 PAIN OF LEFT HIP: ICD-10-CM

## 2024-11-13 DIAGNOSIS — M16.12 PRIMARY OSTEOARTHRITIS OF LEFT HIP: Primary | ICD-10-CM

## 2024-11-13 NOTE — PROGRESS NOTES
CHRONIC CONDITION FOLLOW-UP     Assessment and Plan:      Diagnosis Orders   1. Primary osteoarthritis of left hip  Ambulatory referral to Home Health      2. Pain of left hip  Derrek Mike MD, Orthopedic Surgery (Hip; Knee; Shoulder), [facility]    Ambulatory referral to Home Health        Plan as above and below.     Continue current Tx plan. Any changes marked below.    INSTRUCTIONS  Follow-up 5 weeks for diabetes  Start home physical therapy.  See ortho if not improving.      Subjective:      Chief Complaint   Patient presents with    Diabetes     Patient is here for a 2 month DM follow up     Medication Check     Gareth Giang is an 84 y.o. male who presents for follow up    Complaints:   Left groin pain 7-10 day.    CHART REVIEW  Health Maintenance Due   Topic Date Due    Shingles vaccine (2 of 3) 2012    Lipids  2024    Annual Wellness Visit (Medicare)  2024     Social History     Tobacco Use    Smoking status: Former     Current packs/day: 0.00     Average packs/day: 3.5 packs/day for 32.0 years (112.0 ttl pk-yrs)     Types: Cigarettes     Start date: 1953     Quit date: 1985     Years since quittin.8     Passive exposure: Past    Smokeless tobacco: Never    Tobacco comments:     advised not to resume   Substance Use Topics    Alcohol use: Yes     Comment: OCC    Drug use: No     MEDS  Current Outpatient Medications   Medication Instructions    albuterol (PROVENTIL) 2.5 mg, Nebulization, EVERY 6 HOURS PRN    albuterol sulfate  (90 Base) MCG/ACT inhaler 2 puffs, Inhalation, EVERY 6 HOURS PRN    amiodarone (CORDARONE) 200 MG tablet TAKE 1 TABLET BY MOUTH DAILY    apixaban (ELIQUIS) 2.5 mg, Oral, 2 TIMES DAILY    aspirin 81 mg, Oral, DAILY    atorvastatin (LIPITOR) 40 MG tablet TAKE 1 TABLET BY MOUTH EVERY EVENING    Basaglar KwikPen 15 Units, SubCUTAneous, NIGHTLY    blood glucose monitor kit and supplies Test 2 times a day & as needed for symptoms

## 2024-11-13 NOTE — PATIENT INSTRUCTIONS
INSTRUCTIONS  Follow-up 5 weeks for diabetes  Start home physical therapy.  See ortho if not improving.

## 2024-11-14 ENCOUNTER — TELEPHONE (OUTPATIENT)
Dept: FAMILY MEDICINE CLINIC | Age: 84
End: 2024-11-14

## 2024-11-14 NOTE — TELEPHONE ENCOUNTER
Jazzmine from [facility] calling in, would like to know if PCP would be okay if PT evaluated pt next week on Wednesday next week.    Please advise  [facility] can be reached at [phone]

## 2024-11-14 NOTE — TELEPHONE ENCOUNTER
Jazzmine calling in, she needs a verbal for recert for PT and SN    Please reach out to Jazzmine at [phone]

## 2024-12-16 ENCOUNTER — OFFICE VISIT (OUTPATIENT)
Dept: ORTHOPEDIC SURGERY | Age: 84
End: 2024-12-16
Payer: MEDICARE

## 2024-12-16 DIAGNOSIS — Z96.652 S/P TOTAL KNEE ARTHROPLASTY, LEFT: Primary | ICD-10-CM

## 2024-12-16 DIAGNOSIS — Z96.651 S/P TOTAL KNEE ARTHROPLASTY, RIGHT: ICD-10-CM

## 2024-12-16 PROCEDURE — 1159F MED LIST DOCD IN RCRD: CPT | Performed by: PHYSICIAN ASSISTANT

## 2024-12-16 PROCEDURE — 99213 OFFICE O/P EST LOW 20 MIN: CPT | Performed by: PHYSICIAN ASSISTANT

## 2024-12-16 PROCEDURE — 1123F ACP DISCUSS/DSCN MKR DOCD: CPT | Performed by: PHYSICIAN ASSISTANT

## 2024-12-16 PROCEDURE — G8427 DOCREV CUR MEDS BY ELIG CLIN: HCPCS | Performed by: PHYSICIAN ASSISTANT

## 2024-12-16 PROCEDURE — 1036F TOBACCO NON-USER: CPT | Performed by: PHYSICIAN ASSISTANT

## 2024-12-16 PROCEDURE — G8417 CALC BMI ABV UP PARAM F/U: HCPCS | Performed by: PHYSICIAN ASSISTANT

## 2024-12-16 PROCEDURE — G8484 FLU IMMUNIZE NO ADMIN: HCPCS | Performed by: PHYSICIAN ASSISTANT

## 2024-12-16 NOTE — PROGRESS NOTES
Subjective:      Patient ID: Gareth Giang is a 84 y.o.  male.  Here for annual follow up visit.   S/P right and left knee arthroplasty.   The date of procedure-right total knee 3/21/2016, left total knee 9/14/2016..    Surgeon: Amena   Issues or complaints: no issues.      Review of Systems:  Constitutional: denies fever, chills, weight loss.  MSK: denies pain in other joints, muscle aches.  Neurological: denies numbness, tingling, weakness.     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) 9/30/2021    Atrial flutter ([facility]) 2013    converted    Bleeding stomach ulcer oct 2015    BPH (benign prostatic hyperplasia)     CAD (coronary artery disease)     11/12 angio with 2 blocked bypass and 2 patent    Cellulitis of left thigh 5/10/2022    COPD (chronic obstructive pulmonary disease) ([facility])     Diabetes mellitus type II     Edema     chronic left leg    Elevated PSA- nl 8/12/11     Hyperlipidemia     Hypertension     Ischemic cardiomyopathy     Lipoma of skin-RIGHT CHEST 5/5/2011    Obesity     Osteoarthritis     Peripheral venous insufficiency 9/30/2021    Skin tear of left forearm without complication 3/10/2020    Significant amount of epidermal loss with bleeding.    Spinal stenosis of lumbar region with neurogenic claudication- clinically 7/6/2018       Family History   Problem Relation Age of Onset    Cancer Mother         breast    Cancer Father         throat       Past Surgical History:   Procedure Laterality Date    CATARACT REMOVAL  2010, 2011    bilat    COLONOSCOPY      CORONARY ARTERY BYPASS GRAFT  1993    x 4    EYE SURGERY Left 2019    cataract coming back    JOINT REPLACEMENT Right total knee replacement    JOINT REPLACEMENT Left 09/14/2016       Social History     Occupational History    Not on file   Tobacco Use    Smoking status:

## 2024-12-18 ENCOUNTER — OFFICE VISIT (OUTPATIENT)
Dept: FAMILY MEDICINE CLINIC | Age: 84
End: 2024-12-18

## 2024-12-18 VITALS
OXYGEN SATURATION: 95 % | HEIGHT: 66 IN | DIASTOLIC BLOOD PRESSURE: 68 MMHG | BODY MASS INDEX: 31.82 KG/M2 | HEART RATE: 77 BPM | SYSTOLIC BLOOD PRESSURE: 114 MMHG | WEIGHT: 198 LBS | RESPIRATION RATE: 16 BRPM

## 2024-12-18 DIAGNOSIS — Z71.89 ACP (ADVANCE CARE PLANNING): ICD-10-CM

## 2024-12-18 DIAGNOSIS — J43.9 PULMONARY EMPHYSEMA, UNSPECIFIED EMPHYSEMA TYPE (HCC): ICD-10-CM

## 2024-12-18 DIAGNOSIS — J96.12 CHRONIC RESPIRATORY FAILURE WITH HYPERCAPNIA: ICD-10-CM

## 2024-12-18 DIAGNOSIS — Z66 DNR (DO NOT RESUSCITATE): ICD-10-CM

## 2024-12-18 DIAGNOSIS — Z79.4 TYPE 2 DIABETES MELLITUS WITH DIABETIC NEPHROPATHY, WITH LONG-TERM CURRENT USE OF INSULIN (HCC): Primary | ICD-10-CM

## 2024-12-18 DIAGNOSIS — I50.20 HFREF (HEART FAILURE WITH REDUCED EJECTION FRACTION) (HCC): ICD-10-CM

## 2024-12-18 DIAGNOSIS — Z79.4 TYPE 2 DIABETES MELLITUS WITH DIABETIC NEPHROPATHY, WITH LONG-TERM CURRENT USE OF INSULIN (HCC): ICD-10-CM

## 2024-12-18 DIAGNOSIS — E11.21 TYPE 2 DIABETES MELLITUS WITH DIABETIC NEPHROPATHY, WITH LONG-TERM CURRENT USE OF INSULIN (HCC): ICD-10-CM

## 2024-12-18 DIAGNOSIS — E11.21 TYPE 2 DIABETES MELLITUS WITH DIABETIC NEPHROPATHY, WITH LONG-TERM CURRENT USE OF INSULIN (HCC): Primary | ICD-10-CM

## 2024-12-18 LAB
CHOLEST SERPL-MCNC: 140 MG/DL (ref 0–199)
HBA1C MFR BLD: 8.6 %
HDLC SERPL-MCNC: 36 MG/DL (ref 40–60)
LDLC SERPL CALC-MCNC: 64 MG/DL
TRIGL SERPL-MCNC: 199 MG/DL (ref 0–150)
VLDLC SERPL CALC-MCNC: 40 MG/DL

## 2024-12-23 RX ORDER — POTASSIUM CHLORIDE 1500 MG/1
20 TABLET, EXTENDED RELEASE ORAL DAILY
Qty: 90 TABLET | Refills: 1 | Status: SHIPPED | OUTPATIENT
Start: 2024-12-23

## 2025-01-13 ENCOUNTER — TELEPHONE (OUTPATIENT)
Dept: FAMILY MEDICINE CLINIC | Age: 85
End: 2025-01-13

## 2025-01-13 NOTE — TELEPHONE ENCOUNTER
Verbal continuing nursing every  other week      [facility]   Jazzmine [phone]     Okay to give verbal for continuing SN?

## 2025-01-13 NOTE — TELEPHONE ENCOUNTER
Spoke with xu she was confirming how much basaglar pt was suppose to be on.  Dop was giving him 10 units she has 15 units.  I advised we have it down as 15 units

## 2025-01-13 NOTE — TELEPHONE ENCOUNTER
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  S/he was admitted and found to have new subacute infarct in R parietal lobe (R MCA distribution) with petechial hemorrhage and severe expressive and receptive aphasia, failed swallow eval. CTA was unremarkable. The hospital stay was complicated with enterococcus UTI and was treated.  EEG showed intermittent slowing in the L anterior head, a TTE on 11/25/20 showed grade 3 diastolic dysfunction with enlarged bilateral atria but intact septum.  He had a short run of afib/flutter on 11/30 and started on metoprolol.  Warfarin started on day 10 post bleed.  Continued to fail swallowing evaluate so a PEG tube was placed. He also has persistent urinary retention-failed several voiding trials in the hospital and continues to have indwelling elena cath-started on doxazosin. His extended stay at the hospital was in part caused by the need for a court appointed guardian due to his new persistent severe aphasia, limited family who can help him.      Recent changes:    12/31 - guardian approves treatment in place without follow up with cardiology or Urology unless absolutely needed    1/1/21 - to ED for urgent replacement of PEG tube due to traumatic inadvertent removal     1/10/21 - covid + per antigen testing, minimal sympoms     2/8/21 - warfarin changed to apixaban    3/26 - to ED for replacement of elena    3/30 -holding tube  Nancy from [facility] called in wanting to verify medications     Transfer to [facility]    "feeding as his po intake is adequate    4/5 - start sertraline for anxiety     4/13 - chart review by Board Certified Geriatric Pharmacist, Tess Riggins-appreciate recommendations    HPI:    Simone Daniels  is a 79 year old  (5/26/1941), who is being seen today for an annual comprehensive visit. HPI information obtained from: facility chart records, facility staff, patient report, [facility] chart review.  Today's concerns are:     Right hemiparesis (H)  Late effects of cerebral ischemic stroke  Aphasia as late effect of cerebrovascular accident  Depression, unspecified depression type  Longstanding persistent atrial fibrillation (H)  Long term current use of anticoagulant therapy  Urinary retention with incomplete bladder emptying  Elena catheter in place  PEG (percutaneous endoscopic gastrostomy) status (H)  Essential hypertension  Herpes simplex labialis    Simone appears to understand most spoken comments and follows instructions.  He continues to verbalize in grunts/vocalizations that do not make sense.  Nursing has been working with him, and he can, with help/prompting, say \"Simone\", \"Angry\" and is very pleased with this.  Ongoing communication has been difficult, and nursing has been trying to develop a communication technique that is effective for him.  He is not currently enrolled in formal speech therapy, though this should be considered in the future as his condition warrants.  It is also noted that his TF has been on hold, he is eating adequately and his weight is stable.  Nursing reports he required a trip to the ED for re-placement of elena due to traumatic removal.       ALLERGIES: Patient has no known allergies.  PAST MEDICAL HISTORY:  has a past medical history of Cerebral infarction (H) and LOW BACK PAIN.  PAST SURGICAL HISTORY:  has a past surgical history that includes surgical history of -  (1979); surgical history of - ; surgical history of -  (1981); and IR Gastrostomy Tube Percutaneous " Plcmnt (12/3/2020).  IMMUNIZATIONS:  Immunization History   Administered Date(s) Administered     COVID-19,PF,Moderna 01/28/2021, 02/24/2021     FLU 6-35 months 09/26/2011     Historical DTP/aP 02/06/1997     Influenza (IIV3) PF 09/26/2011     Influenza, Quad, High Dose, Pf, 65yr + 12/23/2020     Mantoux Tuberculin Skin Test 12/12/2018, 12/21/2018     Pneumo Conj 13-V (2010&after) 12/19/2018     Pneumococcal 23 valent 09/26/2011     TD (ADULT, 7+) 09/26/2011     Td (Adult), Adsorbed 02/06/1997, 09/26/2011     Above immunizations pulled from [employer]. [facility] and facility records also reconciled. Outstanding information sent to  to update [employer].  Future immunizations are not needed at this point as all recommended immunizations are up to date.     Current Outpatient Medications   Medication Sig Dispense Refill     acetaminophen (TYLENOL) 325 MG tablet Take 650 mg by mouth every 4 hours as needed       apixaban ANTICOAGULANT (ELIQUIS) 5 MG tablet Take 5 mg by mouth 2 times daily       melatonin 3 MG tablet 1 tablet (3 mg) by Oral or Feeding Tube route daily (Patient taking differently: Take 3 mg by mouth daily )       metoprolol tartrate (LOPRESSOR) 25 MG tablet Take 6.25 mg by mouth 2 times daily       multivitamin w/minerals (THERA-VIT-M) tablet Take 1 tablet by mouth daily       polyethylene glycol (MIRALAX) 17 g packet Take 1 packet by mouth daily       saline nasal (AYR SALINE) GEL topical gel Apply into each nare 2 times daily       sertraline (ZOLOFT) 25 MG tablet Take 25 mg by mouth daily       STATIN NOT PRESCRIBED (INTENTIONAL) Please choose reason not prescribed from choices below.       Nutritional Supplements (JEVITY 1.5 SANDRA PO) HOLD starting 3/30/21 - 25 ml/hr from 9 pm to 7 am       Case Management:  I have reviewed the facility/SNF care plan/MDS, including the falls risk, nutrition and pain screening. I also reviewed the current immunizations, and preventive care. .Future  "cancer screening is not clinically indicated secondary to age/goals of care Patient's desire to return to the community is not assessible due to cognitive impairment. Current Level of Care is appropriate.    Advance Directive Discussion:    I reviewed the current advanced directives as reflected in EPIC, the POLST and the facility chart, and verified the congruency of orders. I contacted the first party and discussed the plan of Care.  I did not due to cognitive impairment review the advance directives with the resident.     Team Discussion:  I communicated with the appropriate disciplines involved with the Plan of Care:   Nursing    Patient's goal is pain control and comfort.  Information reviewed:  Medications, vital signs, orders, and nursing notes.    [facility]:  The health plan new enrollment has happened. I have reviewed the MDS, the preventative needs, and facility care plan. The level of care is appropriate. I have reviewed the code status/advanced directives.    ROS:  Unobtainable secondary to aphasia.     Vitals:  /71   Pulse 77   Temp 97.5  F (36.4  C)   Resp 16   Ht 1.676 m (5' 6\")   Wt 70.2 kg (154 lb 12.8 oz)   SpO2 98%   BMI 24.99 kg/m   Body mass index is 24.99 kg/m .  Exam:  GENERAL APPEARANCE:  Alert, in no distress HEAD:  Normal, normocephalic, atraumatic  EYE EXAM: normal external eye, conjunctiva, lids, CHRISTI  NECK EXAM: supple, no JVD  CHEST/RESP:  respiratory effort normal, lung sounds CTA  , no respiratory distress  CV:  Rate reg, rhythm reg, no murmur, no peripheral edema   GI/ABDOMEN:  normal bowel sounds, soft, nontender, no palpable masses  M/S:   extremities normal, gait normal-without device, noting R valgus knee at baseline , normal muscle tone, and range of motion normal   SKIN EXAM: PEG tube site with hypergranulation tissue surrounding the tube, mild crusted blood on PEG tube  NEUROLOGIC EXAM: Cranial nerves 2-12 are normal tested and grossly at patient's " baseline.  No tremor, gross motor movement at baseline.   PSYCH:  Alert and unable to determine orientation due to aphasia, but is pleasant, cooperative with cares    Lab/Diagnostic data:   Recent labs in [facility] reviewed by me today.      ASSESSMENT/PLAN  Right hemiparesis (H)  Late effects of cerebral ischemic stroke  Aphasia as late effect of cerebrovascular accident  Patient with significant residual effects of CVA, no new symptoms.  He remains aphasic but is starting to be able, with prompting, to verbalize some words.  He appears to understand most simple commands.    Depression, unspecified depression type  Started on low dose sertraline, for presumed anxiety/depression symptoms as he would become easily angered/anxious at times.  -monitor depression symptoms     Longstanding persistent atrial fibrillation (H)  Long term current use of anticoagulant therapy  On apixaban without new symptoms of bleeding, no new infarct symptoms     Urinary retention with incomplete bladder emptying  Elena catheter in place  Patient with chronic indwelling elena, has failed voiding trial many times and will likely require life long catheterization.  Noting pharmacy recommendations that doxazosin could be discontinued if no voiding trial is planned.   -discontinue doxazosin     PEG (percutaneous endoscopic gastrostomy) status (H)  Stable, with noted hypergranulation tissue surrounding PEG.  Currently still holding tube feedings as he is able to eat well.  Noting some dark colored urine, so will push fluids orally prior to increasing tube flushes for extra fluid.    -consider removal of PEG if oral intake remains adequate    Essential hypertension  BP goals are ~130 -165/60 -90 mmHg.This is higher than ACC and AHA recommendations due to risk for hypotension, risk of dizziness and falls, risk of tissue/cerebral hypoperfusion and frailty. Patient is stable with current plan of care and routine assessment.     Herpes simplex  labialis  Resolved, no further lesions on skin around mouth     Orders written by provider at facility    When current supply of apixaban is gone, ok to discontinue.  THEN, begin    rivaroxaban 20 mg po daily for afib    Extra fluids 240 ml po TID for dehydration    discontinue the following medications-no further need:  o Cranberry, folic acid, guaifenesin, mylanta, doxazosin    Electronically signed by:  CAROLANN Cha CNP           Sincerely,        CAROLANN Cha CNP

## 2025-02-06 ENCOUNTER — TELEPHONE (OUTPATIENT)
Dept: FAMILY MEDICINE CLINIC | Age: 85
End: 2025-02-06

## 2025-02-06 NOTE — TELEPHONE ENCOUNTER
Jazzmine from [facility] called wanting to let Dr. Salcido know that this patient fell yesterday. He has a bruise over his left eye, but otherwise he is ok.    Jazzmine can be reached at [phone]    [facility]

## 2025-02-16 NOTE — PROGRESS NOTES
CHRONIC CONDITION FOLLOW-UP     Assessment and Plan:      Diagnosis Orders   1. Type 2 diabetes mellitus with diabetic nephropathy, with long-term current use of insulin ([facility])  POCT glycosylated hemoglobin (Hb A1C)    Lipid Panel      2. Chronic respiratory failure with hypercapnia        3. Pulmonary emphysema, unspecified emphysema type ([facility])        4. HFrEF (heart failure with reduced ejection fraction) ([facility])        A1c OK at 8.6 given age.    Plan as above and below.     Continue current Tx plan. Any changes marked below.    INSTRUCTIONS  NEXT APPOINTMENT: Please schedule annual complete physical (30 minutes) in 4 months    PLEASE TAKE THIS FORM TO CHECK-OUT WINDOW TO SCHEDULE NEXT VISIT.   PLEASE GET FASTING BLOODWORK DRAWN next time you have labs drawn..  Lab is on first floor in [address]. Hours Monday to Friday 6:30 AM to 4 PM AND Saturday 8-12.     Subjective:      Chief Complaint   Patient presents with    Diabetes     Pt is here for a f/u      Gareth Giang is an 84 y.o. male who presents for follow up    Complaints:   None  Breathing OK    CHART REVIEW  Health Maintenance Due   Topic Date Due    Shingles vaccine (2 of 3) 2012    Lipids  2024    Annual Wellness Visit (Medicare)  2024     Social History     Tobacco Use    Smoking status: Former     Current packs/day: 0.00     Average packs/day: 3.5 packs/day for 32.0 years (112.0 ttl pk-yrs)     Types: Cigarettes     Start date: 1953     Quit date: 1985     Years since quittin.9     Passive exposure: Past    Smokeless tobacco: Never    Tobacco comments:     advised not to resume   Substance Use Topics    Alcohol use: Yes     Comment: OCC    Drug use: No     MEDS  Current Outpatient Medications   Medication Instructions    albuterol (PROVENTIL) 2.5 mg, Nebulization, EVERY 6 HOURS PRN    albuterol sulfate  (90 Base) MCG/ACT inhaler 2 puffs, Inhalation, EVERY 6 HOURS PRN    amiodarone (CORDARONE) 200 MG tablet TAKE 1  76-yo Female with history of COPD, CVA (right occipital lobe, on aspirin), HTN, HLD, known history of cavernous left ICA aneurysm, vertigo, 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 who presents with inability to see since waking up morning of 2/14/25 at 10 AM, along with dizziness (room spinning) with imbalance, bilateral blurred vision, and headache        Acute vestibular syndrome  - no evidence for acute CVA on MRI head or acute occlusion on MRA head  - telemetry  - routine neuro checks  - orthostatics (-)  - TTE --> nl LV systolic fxn although endocardium poorly visualized  - PT/OT consults pending  - appreciate neurology    Severe rt carotid artery stenosis  - cont baby ASA  - cont home-dose statin  - carotid duplex pending  - appreciate vascular consult    Visual field deficit; blurry vision  - no evidence for acute CVA  - no acute findings on MRI orbits  - orbital exam already performed by ophthalmology service without any acute discrete findings  - ESR = 54 --> 22  - artificial tears OU 4x a day  - appreciate ophthalmology    Hx of rt occipital lobe CVA  - cont baby ASA  - no need for plavix  - cont home-dose statin    HTN  - BP self-controlled so far    HLD  - cont home-dose statin    Anxiety, unspecified  - alprazolam 0,25 mg q6h prn    Need for prophylaxis  - OOB as tolerated    Disposition  - PT --> TBD  - OT --> home OT

## 2025-02-22 DIAGNOSIS — E78.5 HYPERLIPIDEMIA: ICD-10-CM

## 2025-02-24 ENCOUNTER — TELEPHONE (OUTPATIENT)
Dept: FAMILY MEDICINE CLINIC | Age: 85
End: 2025-02-24

## 2025-02-24 DIAGNOSIS — E11.21 TYPE 2 DIABETES MELLITUS WITH DIABETIC NEPHROPATHY, WITH LONG-TERM CURRENT USE OF INSULIN (HCC): Primary | ICD-10-CM

## 2025-02-24 DIAGNOSIS — Z79.4 TYPE 2 DIABETES MELLITUS WITH DIABETIC NEPHROPATHY, WITH LONG-TERM CURRENT USE OF INSULIN (HCC): Primary | ICD-10-CM

## 2025-02-24 RX ORDER — FLURBIPROFEN SODIUM 0.3 MG/ML
1 SOLUTION/ DROPS OPHTHALMIC DAILY
Qty: 100 EACH | Refills: 6 | Status: SHIPPED | OUTPATIENT
Start: 2025-02-24 | End: 2025-02-26 | Stop reason: DRUGHIGH

## 2025-02-24 RX ORDER — ATORVASTATIN CALCIUM 40 MG/1
TABLET, FILM COATED ORAL
Qty: 90 TABLET | Refills: 3 | Status: SHIPPED | OUTPATIENT
Start: 2025-02-24

## 2025-02-25 ENCOUNTER — TELEPHONE (OUTPATIENT)
Dept: FAMILY MEDICINE CLINIC | Age: 85
End: 2025-02-25

## 2025-02-25 DIAGNOSIS — Z79.4 TYPE 2 DIABETES MELLITUS WITH DIABETIC NEPHROPATHY, WITH LONG-TERM CURRENT USE OF INSULIN (HCC): ICD-10-CM

## 2025-02-25 DIAGNOSIS — E11.21 TYPE 2 DIABETES MELLITUS WITH DIABETIC NEPHROPATHY, WITH LONG-TERM CURRENT USE OF INSULIN (HCC): ICD-10-CM

## 2025-02-25 NOTE — TELEPHONE ENCOUNTER
DOP calling in, they received the needles that were sent in, however they are very small. DOP would like to know if the larger size can be sent in.    Pharm Confirmed- [facility] [address]    Please advise  DOP can be reached at [phone]

## 2025-03-06 ENCOUNTER — TELEPHONE (OUTPATIENT)
Dept: FAMILY MEDICINE CLINIC | Age: 85
End: 2025-03-06

## 2025-03-06 DIAGNOSIS — E11.42 DIABETIC POLYNEUROPATHY ASSOCIATED WITH TYPE 2 DIABETES MELLITUS (HCC): ICD-10-CM

## 2025-03-06 RX ORDER — INSULIN LISPRO 100 [IU]/ML
3 INJECTION, SOLUTION INTRAVENOUS; SUBCUTANEOUS
Qty: 1 EACH | Refills: 0 | OUTPATIENT
Start: 2025-03-06

## 2025-03-06 RX ORDER — INSULIN GLARGINE 100 [IU]/ML
15 INJECTION, SOLUTION SUBCUTANEOUS NIGHTLY
Qty: 5 ADJUSTABLE DOSE PRE-FILLED PEN SYRINGE | Refills: 3 | Status: SHIPPED | OUTPATIENT
Start: 2025-03-06

## 2025-03-06 NOTE — TELEPHONE ENCOUNTER
Patient's family has been buying novolog R otc at [facility].  That is being picked up but not the basaglar.      Patient's daughter-in-law is out of town anf left patient without meds.    See other telephone message.

## 2025-03-06 NOTE — TELEPHONE ENCOUNTER
DOP calling, stated that her and the pt are interested in getting more help. They are looking for help with getting groceries, picking up pt's medication and making sure he has his medications that are needing refilled. DOP [place] and is not able to care for pt this way.    DOP would like to know what Dr. Salcido would recommend since pt would like to stay in his home.  DOP will be in [place] with pt next month and will bring him in for appt.    DOP can be reached at [phone]

## 2025-03-06 NOTE — TELEPHONE ENCOUNTER
Linda CARLOS, called asking which Novolog her Dad has been taking.  I checked chart and Novolog has not been prescribed for 4-5 years.  Humalog was last short acting insulin that was prescribed and that was last sent June 9, 2024.  Patient has been buying Novolog OTC.  Per Dr Salcido if he is still taking the Basaglar long acting insulin at night, then the otc he needs would be the novolog short acting, which is the R.

## 2025-03-10 ENCOUNTER — TELEPHONE (OUTPATIENT)
Dept: FAMILY MEDICINE CLINIC | Age: 85
End: 2025-03-10

## 2025-03-10 DIAGNOSIS — E11.21 TYPE 2 DIABETES MELLITUS WITH DIABETIC NEPHROPATHY, WITH LONG-TERM CURRENT USE OF INSULIN (HCC): ICD-10-CM

## 2025-03-10 DIAGNOSIS — Z79.4 TYPE 2 DIABETES MELLITUS WITH DIABETIC NEPHROPATHY, WITH LONG-TERM CURRENT USE OF INSULIN (HCC): ICD-10-CM

## 2025-03-10 RX ORDER — PROCHLORPERAZINE 25 MG/1
SUPPOSITORY RECTAL
Qty: 12 EACH | Refills: 3 | Status: SHIPPED | OUTPATIENT
Start: 2025-03-10 | End: 2025-03-12 | Stop reason: SDUPTHER

## 2025-03-10 NOTE — TELEPHONE ENCOUNTER
Spoke to Nancy and confirmed that patient takes Basaglar at night and is the long acting.  Patient buys humalin R OTC at [facility] and that is the short acting sliding scale with meals.

## 2025-03-12 DIAGNOSIS — E11.21 TYPE 2 DIABETES MELLITUS WITH DIABETIC NEPHROPATHY, WITH LONG-TERM CURRENT USE OF INSULIN (HCC): ICD-10-CM

## 2025-03-12 DIAGNOSIS — Z79.4 TYPE 2 DIABETES MELLITUS WITH DIABETIC NEPHROPATHY, WITH LONG-TERM CURRENT USE OF INSULIN (HCC): ICD-10-CM

## 2025-03-12 RX ORDER — PROCHLORPERAZINE 25 MG/1
SUPPOSITORY RECTAL
Qty: 12 EACH | Refills: 3 | Status: CANCELLED | OUTPATIENT
Start: 2025-03-12

## 2025-03-12 RX ORDER — PROCHLORPERAZINE 25 MG/1
SUPPOSITORY RECTAL
Qty: 4 EACH | Refills: 5 | Status: SHIPPED | OUTPATIENT
Start: 2025-03-12

## 2025-03-12 NOTE — TELEPHONE ENCOUNTER
Sensors sent in locally.    Please find out what is going on with [employer].    I had to addend notes from last fall and December to state \"Pt is using Continuous Glucose Monitor and benefiting from hypoglycemia alerts and better information for controlling sugars.\"     This has been done.

## 2025-03-12 NOTE — TELEPHONE ENCOUNTER
DOP called in upset she called [employer] in regards to getting sensors she was told by them we dropped the ball and did not give the information that was needed   DOP wants to know can we send one sensor to [facility] [address]  DOP is concerned she does not want her father walking around not being able to check numbers       Please advise   Care dionte   [phone]

## 2025-03-12 NOTE — TELEPHONE ENCOUNTER
DOP called in upset she called [facility] in regards to getting sensors she was told by them we dropped the ball and did not give the information that was needed   DOP wants to know can we send one sensor to [facility] [address]  DOP is concerned she does not want her father walking around not being able to check numbers         Please advise   Care dionte     Rx was sent to mail order pharmacy on 3/10/25  and receipt was confirmed. I will contact pharmacy to see why order has not been processed.

## 2025-03-12 NOTE — TELEPHONE ENCOUNTER
[facility] called in wants to know if they can fill the sensors they come in a box of 3 which can be used every 10 days     Please call pharmacy to advise

## 2025-03-13 NOTE — TELEPHONE ENCOUNTER
Dop called and sensors arrived at patient's home yesterday afternoon. Also called and lvm for Amol at [employer] to try to get get the sensors set up for automatic shipments.

## 2025-03-13 NOTE — TELEPHONE ENCOUNTER
I spoke to the Rep from [employer] and they received the addendum and the order is being processed.  The sensors should be delivered hopefully on Saturday.  I also filled out an order for SN, OT, PT and home Health with a request for a home safety evaluation and faxed it to [facility] for continuing home care after speaking to DOP.  Patient is currently home by himself and needs help at home.

## 2025-03-13 NOTE — TELEPHONE ENCOUNTER
Amol with [employer] will have the DOP called 10 days prior to patient being due for next shipment.  DOP has been notified.

## 2025-03-14 ENCOUNTER — TELEPHONE (OUTPATIENT)
Dept: FAMILY MEDICINE CLINIC | Age: 85
End: 2025-03-14

## 2025-03-14 NOTE — TELEPHONE ENCOUNTER
[facility] called in Jazzmine wanting to let  know they will start PT and OT next week for the evaluation   DAVIDA [place] [phone]

## 2025-03-17 ENCOUNTER — TELEPHONE (OUTPATIENT)
Dept: FAMILY MEDICINE CLINIC | Age: 85
End: 2025-03-17

## 2025-03-17 DIAGNOSIS — E11.649 UNCONTROLLED TYPE 2 DIABETES MELLITUS WITH HYPOGLYCEMIA, UNSPECIFIED HYPOGLYCEMIA COMA STATUS (HCC): ICD-10-CM

## 2025-03-17 DIAGNOSIS — Z79.4 TYPE 2 DIABETES MELLITUS WITH DIABETIC NEPHROPATHY, WITH LONG-TERM CURRENT USE OF INSULIN (HCC): ICD-10-CM

## 2025-03-17 DIAGNOSIS — E11.21 TYPE 2 DIABETES MELLITUS WITH DIABETIC NEPHROPATHY, WITH LONG-TERM CURRENT USE OF INSULIN (HCC): ICD-10-CM

## 2025-03-17 RX ORDER — PROCHLORPERAZINE 25 MG/1
SUPPOSITORY RECTAL
Qty: 1 EACH | Refills: 0 | Status: SHIPPED | OUTPATIENT
Start: 2025-03-17

## 2025-03-17 NOTE — TELEPHONE ENCOUNTER
[facility] called and confirmed they going out Thursday or Friday for the Home Safety evaluation.  She asked about concerns and was advised the biggest issue was memory loss and not taking meds or checking bs.  Also that he is a fall risk.  DOP has been notified and also told that the transmitters had been sent to the DME.

## 2025-03-19 ENCOUNTER — CARE COORDINATION (OUTPATIENT)
Dept: CARE COORDINATION | Age: 85
End: 2025-03-19

## 2025-03-21 ENCOUNTER — CARE COORDINATION (OUTPATIENT)
Dept: CARE COORDINATION | Age: 85
End: 2025-03-21

## 2025-03-21 NOTE — CARE COORDINATION
Ambulatory Care Coordination Note     3/21/2025 3:43 PM     Patient Current Location:  [place]     This patient was received as a referral from Provider.    ACM contacted the patient by telephone. Verified name and  with patient as identifiers. Provided introduction to self, and explanation of the ACM role.   Patient accepted care management services at this time.          ACM: Ольга Ribeiro RN     Challenges to be reviewed by the provider   Additional needs identified to be addressed with provider Yes  I spoke with the daughter today who believes that pt cognition is in decline and believes it may be r/t his series of falls he had over the past year.  She is wondering if a work up needs to be completed since his short term memory is being affected.  She reports that he forgets to eat sometimes and is noncompliant w/medications due to forgetfullness.  , but I see that head CT's were done r/t falls.   She is coming to his upcoming visit on , but is open to getting imaging or referrals prior to visit should you be in agreement.               Method of communication with provider: chart routing.    Utilization: Initial Call - N/A    Care Summary Note: ACM spoke to daughter, Linda, who is in agreement to ongoing outreaches.  Education for CHF, HTN, COPD, DM and falls sent via [employer].  Per Linda, [employer] and wants pt to have HHC, but is worried that HHC is declining visits since pt yelled at OT when they were evaluating him.  She is waiting for a call back from them to confirm.  ACM will assist should it be needed.  Linda reports that pt experienced a series of falls over the past year and has noticed a decline in short term memory.  ACM  made PCP aware.  Linda lives several hours away, but plans on coming to the appointment and is open to imaging/referrals before visit on  w/PCP should PCP warrant the need.      SW referral made to assist w/issues that they are having w/Dexcom G6 sensors and

## 2025-03-24 ENCOUNTER — TELEPHONE (OUTPATIENT)
Dept: FAMILY MEDICINE CLINIC | Age: 85
End: 2025-03-24

## 2025-03-24 NOTE — TELEPHONE ENCOUNTER
Please try to get him in sooner to rule out the easy things that can affect memory.    Refer to [place] neuro for evaluation. Order in EMR.

## 2025-03-24 NOTE — TELEPHONE ENCOUNTER
DOP called and is not sure if Dexcom Sensors and Transmitters had been delivered.  I spoke to [employer] and they were delivered  3/12/25.  Lvm for DOP to call back.

## 2025-03-25 ENCOUNTER — CARE COORDINATION (OUTPATIENT)
Dept: CARE COORDINATION | Age: 85
End: 2025-03-25

## 2025-03-25 NOTE — CARE COORDINATION
Call to Linda to follow up on AC referral to [facility]. Linda believed that SW was scheduled to come to patient's home. Informed her that services are only provided telephonically. SW inquired if [facility] or [facility] contacted her and she was unsure. SW contacted both agencies and neither had a SW that was scheduled to see patient.     SW discussed in home assistance. SW educated her on [facility] programs and services. Offered to make referral for resources. Referral made on this day.     Discussed options for home delivered meals, prepackaged meds and medical alert systems. [facility] will send resources.     Informed her that [facility] completed eval for recertification that has to be approved by Medicare which might be why she has not heard from [facility].     [facility] will follow up.

## 2025-03-26 ENCOUNTER — TELEPHONE (OUTPATIENT)
Dept: FAMILY MEDICINE CLINIC | Age: 85
End: 2025-03-26

## 2025-03-26 NOTE — TELEPHONE ENCOUNTER
I was able to get patient scheduled with neurology on 5/13/25 at [facility].  DOP has been notified.

## 2025-03-26 NOTE — TELEPHONE ENCOUNTER
DOP called in stating that she is having a hard time getting her father scheduled for Neurology.    She wants to come here to [facility] not [facility]    She feels like they are giving her the run around.    I faxed over the referral along with his insurance information to the Neurologist    Please Advise

## 2025-04-01 ENCOUNTER — CARE COORDINATION (OUTPATIENT)
Dept: CARE COORDINATION | Age: 85
End: 2025-04-01

## 2025-04-01 NOTE — CARE COORDINATION
Ambulatory Care Coordination Note     2025 3:31 PM     Patient Current Location:  Home: [address] [place] [address] [place] [address]     ACM contacted the family by telephone. Verified name and  with family as identifiers.         ACM: Ольга Ribeiro RN     Challenges to be reviewed by the provider   Additional needs identified to be addressed with provider No  none               Method of communication with provider: none.    Utilization: Patient has not had any utilization since our last call.     Care Summary Note: ACM spoke to daughter who will be with pt next week and plans to attend appt with him on 04/10.  Pt has a neuro appt on  w/[facility].  Daughter is going to  all scans and send over w/paperwork to [facility].  Pt is active w/[facility] and continues to work with them.  Pt CGM is working and daughter is able to see them.  Pt does not qualify for [facility]A.  ACM discussed ongoing options for meals, including services such as [employer] and [employer].  Chelsey stated that if needed, she would move in with patient when the time came.      Offered patient enrollment in the Remote Patient Monitoring (RPM) program for in-home monitoring: Yes, but did not enroll at this time: limited patient ability to navigate RPM/equipment.     Assessments Completed:   Diabetes Assessment    Medic Alert ID: No  Meal Planning: None   How often do you test your blood sugar?: Other (Comment: cgm)   Do you have barriers with adherence to non-pharmacologic self-management interventions? (Nutrition/Exercise/Self-Monitoring): Yes   Have you ever had to go to the ED for symptoms of low blood sugar?: No       No patient-reported symptoms   Do you have hyperglycemia symptoms?: No   Do you have hypoglycemia symptoms?: No   Last Blood Sugar Value: 90         ,   Congestive Heart Failure Assessment    Are you currently restricting fluids?: No Restriction  Do you understand a low sodium diet?: No  Do you understand how to read food

## 2025-04-10 ENCOUNTER — OFFICE VISIT (OUTPATIENT)
Dept: FAMILY MEDICINE CLINIC | Age: 85
End: 2025-04-10
Payer: MEDICARE

## 2025-04-10 VITALS
BODY MASS INDEX: 32.33 KG/M2 | RESPIRATION RATE: 22 BRPM | HEART RATE: 62 BPM | SYSTOLIC BLOOD PRESSURE: 112 MMHG | WEIGHT: 201.2 LBS | TEMPERATURE: 97.6 F | HEIGHT: 66 IN | OXYGEN SATURATION: 98 % | DIASTOLIC BLOOD PRESSURE: 56 MMHG

## 2025-04-10 DIAGNOSIS — H10.32 ACUTE BACTERIAL CONJUNCTIVITIS OF LEFT EYE: ICD-10-CM

## 2025-04-10 DIAGNOSIS — E11.42 DIABETIC POLYNEUROPATHY ASSOCIATED WITH TYPE 2 DIABETES MELLITUS: ICD-10-CM

## 2025-04-10 DIAGNOSIS — I48.0 PAROXYSMAL ATRIAL FIBRILLATION (HCC): ICD-10-CM

## 2025-04-10 DIAGNOSIS — I50.20 HFREF (HEART FAILURE WITH REDUCED EJECTION FRACTION) (HCC): ICD-10-CM

## 2025-04-10 DIAGNOSIS — H10.12 ALLERGIC CONJUNCTIVITIS OF LEFT EYE: ICD-10-CM

## 2025-04-10 DIAGNOSIS — J96.12 CHRONIC RESPIRATORY FAILURE WITH HYPERCAPNIA: ICD-10-CM

## 2025-04-10 DIAGNOSIS — N18.4 CHRONIC RENAL DISEASE, STAGE 4, SEVERELY DECREASED GLOMERULAR FILTRATION RATE BETWEEN 15-29 ML/MIN/1.73 SQUARE METER (HCC): ICD-10-CM

## 2025-04-10 DIAGNOSIS — J43.9 PULMONARY EMPHYSEMA, UNSPECIFIED EMPHYSEMA TYPE (HCC): ICD-10-CM

## 2025-04-10 DIAGNOSIS — E11.65 POORLY CONTROLLED TYPE 2 DIABETES MELLITUS (HCC): Primary | ICD-10-CM

## 2025-04-10 LAB — HBA1C MFR BLD: 8.5 %

## 2025-04-10 PROCEDURE — 1159F MED LIST DOCD IN RCRD: CPT | Performed by: FAMILY MEDICINE

## 2025-04-10 PROCEDURE — 3074F SYST BP LT 130 MM HG: CPT | Performed by: FAMILY MEDICINE

## 2025-04-10 PROCEDURE — 3078F DIAST BP <80 MM HG: CPT | Performed by: FAMILY MEDICINE

## 2025-04-10 PROCEDURE — G8417 CALC BMI ABV UP PARAM F/U: HCPCS | Performed by: FAMILY MEDICINE

## 2025-04-10 PROCEDURE — 83036 HEMOGLOBIN GLYCOSYLATED A1C: CPT | Performed by: FAMILY MEDICINE

## 2025-04-10 PROCEDURE — 1123F ACP DISCUSS/DSCN MKR DOCD: CPT | Performed by: FAMILY MEDICINE

## 2025-04-10 PROCEDURE — 3052F HG A1C>EQUAL 8.0%<EQUAL 9.0%: CPT | Performed by: FAMILY MEDICINE

## 2025-04-10 PROCEDURE — 1036F TOBACCO NON-USER: CPT | Performed by: FAMILY MEDICINE

## 2025-04-10 PROCEDURE — 99213 OFFICE O/P EST LOW 20 MIN: CPT | Performed by: FAMILY MEDICINE

## 2025-04-10 PROCEDURE — G8427 DOCREV CUR MEDS BY ELIG CLIN: HCPCS | Performed by: FAMILY MEDICINE

## 2025-04-10 PROCEDURE — G2211 COMPLEX E/M VISIT ADD ON: HCPCS | Performed by: FAMILY MEDICINE

## 2025-04-10 PROCEDURE — 3023F SPIROM DOC REV: CPT | Performed by: FAMILY MEDICINE

## 2025-04-10 RX ORDER — ERYTHROMYCIN 5 MG/G
OINTMENT OPHTHALMIC
Qty: 3.5 G | Refills: 0 | Status: SHIPPED | OUTPATIENT
Start: 2025-04-10 | End: 2025-04-17

## 2025-04-10 RX ORDER — OLOPATADINE HYDROCHLORIDE 2 MG/ML
1 SOLUTION/ DROPS OPHTHALMIC DAILY
Qty: 2.5 ML | Refills: 5 | Status: SHIPPED | OUTPATIENT
Start: 2025-04-10 | End: 2026-04-10

## 2025-04-10 NOTE — PROGRESS NOTES
12MM MISC 1 each, Does not apply, DAILY    insulin regular (HUMULIN R) 100 UNIT/ML injection Inject 3 Units into the skin 3 times daily (with meals) ADD correction if pre meal BG   150-200 add 1 unit   201-250 add 3 units   251-300 add 5 units   > 301 add 6 units    INSULIN SYRINGE .5CC/29G 29G X 1/2\" 0.5 ML MISC INJECT 4 TIMES DAILY AS NEEDED    isosorbide mononitrate (IMDUR) 30 mg, EVERY MORNING    Lancets MISC 1 each, Does not apply, DAILY, Use to check glucose twice a day. One Touch brand.  DX E11.9    Nebulizers (AIRIAL COMPACT MINI NEBULIZER) MISC 1 each, Does not apply, EVERY 6 HOURS PRN    nitroGLYCERIN (NITROSTAT) 0.3 MG SL tablet Take one sublingual for chest pain.  May repeat twice at 5 min intervals. If not getting relief call 911.    olopatadine (PATADAY) 0.2 % SOLN ophthalmic solution 1 drop, Ophthalmic, DAILY    omeprazole (PRILOSEC) 20 MG delayed release capsule TAKE 1 CAPSULE BY MOUTH TWICE DAILY    ondansetron (ZOFRAN-ODT) 4 mg, Oral, EVERY 8 HOURS PRN    potassium chloride (KLOR-CON M) 20 MEQ extended release tablet 20 mEq, Oral, DAILY    Respiratory Therapy Supplies (NEBULIZER/TUBING/MOUTHPIECE) KIT 1 kit, Does not apply, EVERY 6 HOURS PRN    torsemide (DEMADEX) 20 MG tablet TAKE 2 TABLETS(40 MG) BY MOUTH IN THE MORNING AND IN THE AFTERNOON    vitamin D (ERGOCALCIFEROL) 64164 units CAPS capsule Take 1 capsule by mouth once a week Saturday.     LAST LABS  Lab Results   Component Value Date    CHOL 140 12/18/2024    TRIG 199 (H) 12/18/2024    HDL 36 (L) 12/18/2024    LDL 64 12/18/2024     Lab Results   Component Value Date    ALT 12 09/07/2024    AST 25 09/07/2024    ALKPHOS 123 09/07/2024    BILITOT 0.9 09/07/2024     Lab Results   Component Value Date     11/11/2024    K 4.3 11/11/2024    CREATININE 1.9 (H) 11/11/2024     Lab Results   Component Value Date    LABGLOM 34 (A) 11/11/2024    LABGLOM 37 (A) 10/03/2024    LABGLOM 34 (A) 09/07/2024     Estimated Creatinine Clearance: 31 mL/min (A)

## 2025-04-11 ENCOUNTER — CARE COORDINATION (OUTPATIENT)
Dept: CARE COORDINATION | Age: 85
End: 2025-04-11

## 2025-04-11 NOTE — CARE COORDINATION
Ambulatory Care Coordination Note     2025 3:04 PM     Patient Current Location:  [place]     ACM contacted the family by telephone. Verified name and  with family as identifiers.         ACM: Ольга Ribeiro RN     Challenges to be reviewed by the provider   Additional needs identified to be addressed with provider No  none               Method of communication with provider: none.    Utilization: Patient has not had any utilization since our last call.     Care Summary Note: ACM spoke to daughter, HIPAA verified, who states pt is doing well.  She has prepared meals for him and is monitoring his BG levels.  She mentioned that there were a couple of times where his BG levels were low w/nightly insulin and she was able to teach back the importance of giving the patient something to increase BG levels.  When she goes home, she will not be able to monitor this and ACM provided education on CGM's w/phone apps that would allow her to monitor BG levels from her home.  She will inquire and if needed, ACM will assist w/getting a new CGM if possible.  She feels pt is able to manage giving himself his insulin and ACM will continue to assist and assess.  Pt A1C is down to 8.4.  Pt does not weigh himself daily, check BP or O2 levels.  ACM will continue to work w/pt and family and encourage.  Due to cognition concerns, they are holding off on RPM.     Offered patient enrollment in the Remote Patient Monitoring (RPM) program for in-home monitoring: Yes, but did not enroll at this time: limited patient ability to navigate RPM/equipment.     Assessments Completed:   Diabetes Assessment    Medic Alert ID: No  Meal Planning: None   How often do you test your blood sugar?: Other (Comment: cgm)   Do you have barriers with adherence to non-pharmacologic self-management interventions? (Nutrition/Exercise/Self-Monitoring): Yes   Have you ever had to go to the ED for symptoms of low blood sugar?: No       No patient-reported symptoms

## 2025-04-11 NOTE — CARE COORDINATION
Follow up call with Linda. She received info sent by mail however has not reviewed them. She was currently running errands for patient. ESTELLE will follow up with Linda to discuss information sent and possible options for patient.

## 2025-04-16 ENCOUNTER — HOSPITAL ENCOUNTER (OUTPATIENT)
Age: 85
Discharge: HOME | End: 2025-04-16
Payer: MEDICARE

## 2025-04-16 DIAGNOSIS — N18.32 CHRONIC KIDNEY DISEASE, STAGE 3B (HCC): ICD-10-CM

## 2025-04-16 DIAGNOSIS — M89.9 CHRONIC KIDNEY DISEASE-MINERAL AND BONE DISORDER: ICD-10-CM

## 2025-04-16 DIAGNOSIS — E83.9 CHRONIC KIDNEY DISEASE-MINERAL AND BONE DISORDER: ICD-10-CM

## 2025-04-16 DIAGNOSIS — D63.1 ANEMIA IN STAGE 3B CHRONIC KIDNEY DISEASE: ICD-10-CM

## 2025-04-16 DIAGNOSIS — N18.9 CHRONIC KIDNEY DISEASE-MINERAL AND BONE DISORDER: ICD-10-CM

## 2025-04-16 DIAGNOSIS — N18.32 ANEMIA IN STAGE 3B CHRONIC KIDNEY DISEASE: ICD-10-CM

## 2025-04-16 LAB
25(OH)D3 SERPL-MCNC: 71.5 NG/ML
ALBUMIN SERPL-MCNC: 3.8 G/DL (ref 3.4–5)
ANION GAP SERPL CALCULATED.3IONS-SCNC: 12 MMOL/L (ref 3–16)
BUN SERPL-MCNC: 36 MG/DL (ref 7–20)
CALCIUM SERPL-MCNC: 9.4 MG/DL (ref 8.3–10.6)
CHLORIDE SERPL-SCNC: 93 MMOL/L (ref 99–110)
CO2 SERPL-SCNC: 31 MMOL/L (ref 21–32)
CREAT SERPL-MCNC: 2 MG/DL (ref 0.8–1.3)
CREAT UR-MCNC: 16.6 MG/DL (ref 39–259)
DEPRECATED RDW RBC AUTO: 16 % (ref 12.4–15.4)
FERRITIN SERPL IA-MCNC: 187 NG/ML (ref 30–400)
FOLATE SERPL-MCNC: 7.83 NG/ML (ref 4.78–24.2)
GFR SERPLBLD CREATININE-BSD FMLA CKD-EPI: 32 ML/MIN/{1.73_M2}
GLUCOSE SERPL-MCNC: 148 MG/DL (ref 70–99)
HCT VFR BLD AUTO: 39.6 % (ref 40.5–52.5)
HGB BLD-MCNC: 13.2 G/DL (ref 13.5–17.5)
IRON SATN MFR SERPL: 29 % (ref 20–50)
IRON SERPL-MCNC: 88 UG/DL (ref 59–158)
MAGNESIUM SERPL-MCNC: 2.54 MG/DL (ref 1.8–2.4)
MCH RBC QN AUTO: 30.7 PG (ref 26–34)
MCHC RBC AUTO-ENTMCNC: 33.3 G/DL (ref 31–36)
MCV RBC AUTO: 92.2 FL (ref 80–100)
MICROALBUMIN UR DL<=1MG/L-MCNC: <1.2 MG/DL
MICROALBUMIN/CREAT UR: ABNORMAL MG/G (ref 0–30)
PHOSPHATE SERPL-MCNC: 4.1 MG/DL (ref 2.5–4.9)
PLATELET # BLD AUTO: 123 K/UL (ref 135–450)
PMV BLD AUTO: 9.8 FL (ref 5–10.5)
POTASSIUM SERPL-SCNC: 4.7 MMOL/L (ref 3.5–5.1)
PTH-INTACT SERPL-MCNC: 130 PG/ML (ref 14–72)
RBC # BLD AUTO: 4.29 M/UL (ref 4.2–5.9)
SODIUM SERPL-SCNC: 136 MMOL/L (ref 136–145)
TIBC SERPL-MCNC: 305 UG/DL (ref 260–445)
TRANSFERRIN SERPL-MCNC: 258 MG/DL (ref 200–360)
URATE SERPL-MCNC: 9.8 MG/DL (ref 3.5–7.2)
VIT B12 SERPL-MCNC: 207 PG/ML (ref 211–911)
WBC # BLD AUTO: 4.1 K/UL (ref 4–11)

## 2025-04-16 PROCEDURE — 84550 ASSAY OF BLOOD/URIC ACID: CPT

## 2025-04-16 PROCEDURE — 83540 ASSAY OF IRON: CPT

## 2025-04-16 PROCEDURE — 82306 VITAMIN D 25 HYDROXY: CPT

## 2025-04-16 PROCEDURE — 85027 COMPLETE CBC AUTOMATED: CPT

## 2025-04-16 PROCEDURE — 80069 RENAL FUNCTION PANEL: CPT

## 2025-04-16 PROCEDURE — 82607 VITAMIN B-12: CPT

## 2025-04-16 PROCEDURE — 82746 ASSAY OF FOLIC ACID SERUM: CPT

## 2025-04-16 PROCEDURE — 82043 UR ALBUMIN QUANTITATIVE: CPT

## 2025-04-16 PROCEDURE — 36415 COLL VENOUS BLD VENIPUNCTURE: CPT

## 2025-04-16 PROCEDURE — 84466 ASSAY OF TRANSFERRIN: CPT

## 2025-04-16 PROCEDURE — 83970 ASSAY OF PARATHORMONE: CPT

## 2025-04-16 PROCEDURE — 82728 ASSAY OF FERRITIN: CPT

## 2025-04-16 PROCEDURE — 83735 ASSAY OF MAGNESIUM: CPT

## 2025-04-16 PROCEDURE — 82570 ASSAY OF URINE CREATININE: CPT

## 2025-04-22 ENCOUNTER — CARE COORDINATION (OUTPATIENT)
Dept: CARE COORDINATION | Age: 85
End: 2025-04-22

## 2025-04-22 NOTE — CARE COORDINATION
Follow up call with Linda. She reported that she reviewed information and there no questions or concerns to be addressed. She stated that things were going well with her father and he had no needs at this time. Confirmed she had SW contact info to call if needs arise in the future.    Final outreach. SW signing off.

## 2025-04-23 ENCOUNTER — CARE COORDINATION (OUTPATIENT)
Dept: CARE COORDINATION | Age: 85
End: 2025-04-23

## 2025-04-23 NOTE — CARE COORDINATION
Ambulatory Care Coordination Note     2025 1:59 PM     Patient Current Location:  Home: [address] [place] [address] [place] [address]     ACM contacted the patient by telephone. Verified name and  with patient as identifiers.         ACM: Ольга Ribeiro RN     Challenges to be reviewed by the provider   Additional needs identified to be addressed with provider Yes  medications-Pt has an AWV with you tomorrow.  Pt has experienced 4 low BG levels in the past two weeks.  BG got as low as 44.  They are holding insuline glargine at times because of this.               Method of communication with provider: chart routing.    Utilization: Patient has not had any utilization since our last call.     Care Summary Note: ACM spoke to pt, states he is doing well, taking meds as prescribed, except for insurlin glargine. Pt has experienced 4 low BG levels in the past two weeks.  BG got as low as 44.  Pt and family are holding insuline glargine at times because of this.  Pt has AWV w/PCP tomorrow and plans on discussing w/pcp.  Hypoglycemia education provided.   ACM will continue to assist as needed.     Offered patient enrollment in the Remote Patient Monitoring (RPM) program for in-home monitoring: Yes, but did not enroll at this time: limited patient ability to navigate RPM/equipment.     Assessments Completed:   Diabetes Assessment    Medic Alert ID: No  Meal Planning: None   How often do you test your blood sugar?: Other (Comment: cgm)   Do you have barriers with adherence to non-pharmacologic self-management interventions? (Nutrition/Exercise/Self-Monitoring): Yes   Have you ever had to go to the ED for symptoms of low blood sugar?: No       No patient-reported symptoms   Do you have hyperglycemia symptoms?: No   Do you have hypoglycemia symptoms?: No   Last Blood Sugar Value: 244   Blood Sugar Trends: Fluctuating         ,   COPD Assessment    Does the patient understand envrionmental exposure?: No  Is the patient able to

## 2025-04-24 ENCOUNTER — OFFICE VISIT (OUTPATIENT)
Dept: FAMILY MEDICINE CLINIC | Age: 85
End: 2025-04-24

## 2025-04-24 VITALS
SYSTOLIC BLOOD PRESSURE: 124 MMHG | HEART RATE: 91 BPM | HEIGHT: 66 IN | BODY MASS INDEX: 31.5 KG/M2 | DIASTOLIC BLOOD PRESSURE: 72 MMHG | WEIGHT: 196 LBS | RESPIRATION RATE: 16 BRPM | OXYGEN SATURATION: 84 %

## 2025-04-24 DIAGNOSIS — E11.42 DIABETIC POLYNEUROPATHY ASSOCIATED WITH TYPE 2 DIABETES MELLITUS (HCC): ICD-10-CM

## 2025-04-24 DIAGNOSIS — E11.649 HYPOGLYCEMIA DUE TO TYPE 2 DIABETES MELLITUS (HCC): Primary | ICD-10-CM

## 2025-04-24 RX ORDER — INSULIN GLARGINE 100 [IU]/ML
13 INJECTION, SOLUTION SUBCUTANEOUS NIGHTLY
Qty: 5 ADJUSTABLE DOSE PRE-FILLED PEN SYRINGE | Refills: 3 | Status: SHIPPED | OUTPATIENT
Start: 2025-04-24

## 2025-04-24 SDOH — ECONOMIC STABILITY: FOOD INSECURITY: WITHIN THE PAST 12 MONTHS, YOU WORRIED THAT YOUR FOOD WOULD RUN OUT BEFORE YOU GOT MONEY TO BUY MORE.: NEVER TRUE

## 2025-04-24 SDOH — ECONOMIC STABILITY: FOOD INSECURITY: WITHIN THE PAST 12 MONTHS, THE FOOD YOU BOUGHT JUST DIDN'T LAST AND YOU DIDN'T HAVE MONEY TO GET MORE.: NEVER TRUE

## 2025-04-24 ASSESSMENT — PATIENT HEALTH QUESTIONNAIRE - PHQ9
SUM OF ALL RESPONSES TO PHQ QUESTIONS 1-9: 2
2. FEELING DOWN, DEPRESSED OR HOPELESS: SEVERAL DAYS
SUM OF ALL RESPONSES TO PHQ QUESTIONS 1-9: 2
1. LITTLE INTEREST OR PLEASURE IN DOING THINGS: SEVERAL DAYS

## 2025-04-24 NOTE — PROGRESS NOTES
CHRONIC CONDITION FOLLOW-UP     Assessment and Plan:      Diagnosis Orders   1. Hypoglycemia due to type 2 diabetes mellitus (HCC)        2. Diabetic polyneuropathy associated with type 2 diabetes mellitus (HCC)  insulin glargine (BASAGLAR KWIKPEN) 100 UNIT/ML injection pen        Plan as above and below.     Continue current Tx plan. Any changes marked below.    INSTRUCTIONS  NEXT APPOINTMENT: Please schedule annual complete physical (30 minutes) in 3 months    PLEASE TAKE THIS FORM TO CHECK-OUT WINDOW TO SCHEDULE NEXT VISIT.   Pt is using Continuous Glucose Monitor and benefiting from hypoglycemia alerts and better information for controlling sugars.   DECREASE basaglar to 13 units and decrease dinner short acting insulin by 2 units.  May need to just stop long acting.  Check by fingerstick when CGM reads low but he feels OK.    Subjective:      Chief Complaint   Patient presents with    Diabetes     Pts sugars are bottoming out.  It got down to 44 and 66      Gareth Giang is an 85 y.o. male who presents for follow up    Complaints:   Getting low sugars overnight. In past 2 weeks at least 4x.  Has lost weight.  DOP did awaken to eat when alarm went off for the 44. He felt fine at the time.    CHART REVIEW  Health Maintenance Due   Topic Date Due    Shingles vaccine (2 of 3) 2012    Annual Wellness Visit (Medicare)  2024    COVID-19 Vaccine ( season) 2024     Social History     Tobacco Use    Smoking status: Former     Current packs/day: 0.00     Average packs/day: 3.5 packs/day for 32.0 years (112.0 ttl pk-yrs)     Types: Cigarettes     Start date: 1953     Quit date: 1985     Years since quittin.3     Passive exposure: Past    Smokeless tobacco: Never    Tobacco comments:     advised not to resume   Substance Use Topics    Alcohol use: Yes     Comment: OCC    Drug use: No     MEDS  Current Outpatient Medications   Medication Instructions    albuterol (PROVENTIL) 2.5

## 2025-04-24 NOTE — PATIENT INSTRUCTIONS
INSTRUCTIONS  NEXT APPOINTMENT: Please schedule annual complete physical (30 minutes) in 3 months    PLEASE TAKE THIS FORM TO CHECK-OUT WINDOW TO SCHEDULE NEXT VISIT.   Pt is using Continuous Glucose Monitor and benefiting from hypoglycemia alerts and better information for controlling sugars.   DECREASE basaglar to 13 units and decrease dinner short acting insulin by 2 units.

## 2025-04-29 ENCOUNTER — CARE COORDINATION (OUTPATIENT)
Dept: CARE COORDINATION | Age: 85
End: 2025-04-29

## 2025-05-07 ENCOUNTER — CARE COORDINATION (OUTPATIENT)
Dept: CARE COORDINATION | Age: 85
End: 2025-05-07

## 2025-05-07 NOTE — CARE COORDINATION
understand how to read food labels?: No  How many restaurant meals do you eat per week?: 5 or more  Do you salt your food before tasting it?: No     No patient-reported symptoms      Symptoms:  None: Yes      Symptom course: stable  Patient-reported weight (lb): 170  Weight trend: stable  Salt intake watch compared to last visit: stable      ,   COPD Assessment    Does the patient understand envrionmental exposure?: No  Is the patient able to verbalize Rescue vs. Long Acting medications?: No  Does the patient have a nebulizer?: Yes  Does the patient use a space with inhaled medications?: No     No patient-reported symptoms         Symptoms:  None: Yes      Change in chronic cough?: No/At Baseline  Change in sputum?: No/At Baseline  Self Monitoring - SaO2: No      ,   Hypertension - Encounter Level              Medications Reviewed:   Completed during a previous call     Advance Care Planning:   Not reviewed during this call     Care Planning:   Education Documentation  No documentation found.  Education Comments  No comments found.     ,    Goals Addressed                   This Visit's Progress     Medication Management   On track     I will take my medication as directed.  I will notify my provider of any problems with medications, like adverse effects or side effects.  I will notify my provider/Care Coordinator if I am unable to afford my medications.  I will notify my provider for advice before I stop taking any of my medication.    Barriers: lack of motivation, lack of support, and lack of education  Plan for overcoming my barriers: support and resources  Confidence: 8/10  Anticipated Goal Completion Date: 06/21/2025               PCP/Specialist follow up:   Future Appointments         Provider Specialty Dept Phone    7/31/2025 10:40 AM Bear Salcido MD Family Medicine [phone]            Follow Up:   Plan for next AC outreach in approximately 2 weeks to complete:  - advance care planning   - goal

## 2025-05-21 ENCOUNTER — CARE COORDINATION (OUTPATIENT)
Dept: CARE COORDINATION | Age: 85
End: 2025-05-21

## 2025-05-21 SDOH — HEALTH STABILITY: MENTAL HEALTH: HOW OFTEN DO YOU HAVE A DRINK CONTAINING ALCOHOL?: MONTHLY OR LESS

## 2025-05-21 SDOH — HEALTH STABILITY: PHYSICAL HEALTH: ON AVERAGE, HOW MANY DAYS PER WEEK DO YOU ENGAGE IN MODERATE TO STRENUOUS EXERCISE (LIKE A BRISK WALK)?: 0 DAYS

## 2025-05-21 SDOH — ECONOMIC STABILITY: FOOD INSECURITY: WITHIN THE PAST 12 MONTHS, YOU WORRIED THAT YOUR FOOD WOULD RUN OUT BEFORE YOU GOT THE MONEY TO BUY MORE.: NEVER TRUE

## 2025-05-21 SDOH — ECONOMIC STABILITY: FOOD INSECURITY: HOW HARD IS IT FOR YOU TO PAY FOR THE VERY BASICS LIKE FOOD, HOUSING, MEDICAL CARE, AND HEATING?: NOT HARD AT ALL

## 2025-05-21 SDOH — HEALTH STABILITY: MENTAL HEALTH: HOW MANY DRINKS CONTAINING ALCOHOL DO YOU HAVE ON A TYPICAL DAY WHEN YOU ARE DRINKING?: 1 OR 2

## 2025-05-21 SDOH — SOCIAL STABILITY: SOCIAL NETWORK
DO YOU BELONG TO ANY CLUBS OR ORGANIZATIONS SUCH AS CHURCH GROUPS, UNIONS, FRATERNAL OR ATHLETIC GROUPS, OR SCHOOL GROUPS?: NO

## 2025-05-21 SDOH — ECONOMIC STABILITY: HOUSING INSECURITY: IN THE LAST 12 MONTHS, WAS THERE A TIME WHEN YOU WERE NOT ABLE TO PAY THE MORTGAGE OR RENT ON TIME?: NO

## 2025-05-21 SDOH — SOCIAL STABILITY: SOCIAL NETWORK: HOW OFTEN DO YOU ATTEND MEETINGS OF THE CLUBS OR ORGANIZATIONS YOU BELONG TO?: NEVER

## 2025-05-21 SDOH — HEALTH STABILITY: PHYSICAL HEALTH: ON AVERAGE, HOW MANY MINUTES DO YOU ENGAGE IN EXERCISE AT THIS LEVEL?: 0 MIN

## 2025-05-21 SDOH — SOCIAL STABILITY: SOCIAL INSECURITY: ARE YOU MARRIED, WIDOWED, DIVORCED, SEPARATED, NEVER MARRIED, OR LIVING WITH A PARTNER?: WIDOWED

## 2025-05-21 SDOH — SOCIAL STABILITY: SOCIAL NETWORK
IN A TYPICAL WEEK, HOW MANY TIMES DO YOU TALK ON THE PHONE WITH FAMILY, FRIENDS, OR NEIGHBORS?: MORE THAN THREE TIMES A WEEK

## 2025-05-21 SDOH — SOCIAL STABILITY: SOCIAL NETWORK: HOW OFTEN DO YOU ATTEND CHURCH OR RELIGIOUS SERVICES?: NEVER

## 2025-05-21 SDOH — ECONOMIC STABILITY: FOOD INSECURITY: WITHIN THE PAST 12 MONTHS, THE FOOD YOU BOUGHT JUST DIDN'T LAST AND YOU DIDN'T HAVE MONEY TO GET MORE.: NEVER TRUE

## 2025-05-21 SDOH — SOCIAL STABILITY: SOCIAL NETWORK: HOW OFTEN DO YOU GET TOGETHER WITH FRIENDS OR RELATIVES?: ONCE A WEEK

## 2025-05-21 SDOH — ECONOMIC STABILITY: TRANSPORTATION INSECURITY: IN THE PAST 12 MONTHS, HAS LACK OF TRANSPORTATION KEPT YOU FROM MEDICAL APPOINTMENTS OR FROM GETTING MEDICATIONS?: NO

## 2025-05-21 ASSESSMENT — ACTIVITIES OF DAILY LIVING (ADL): LACK_OF_TRANSPORTATION: NO

## 2025-05-21 NOTE — CARE COORDINATION
patient-reported symptoms      Symptoms:  None: Yes      Symptom course: stable  Salt intake watch compared to last visit: stable      ,   COPD Assessment    Does the patient understand envrionmental exposure?: No  Is the patient able to verbalize Rescue vs. Long Acting medications?: No  Does the patient have a nebulizer?: Yes  Does the patient use a space with inhaled medications?: No            Symptoms:  None: Yes      Change in chronic cough?: No/At Baseline  Change in sputum?: No/At Baseline  Self Monitoring - SaO2: No      ,   Hypertension - Encounter Level          ,   Social Drivers of Health     Tobacco Use: Medium Risk (5/21/2025)    Patient History     Smoking Tobacco Use: Former     Smokeless Tobacco Use: Never     Passive Exposure: Past   Alcohol Use: Not At Risk (5/21/2025)    AUDIT-C     Frequency of Alcohol Consumption: Monthly or less     Average Number of Drinks: 1 or 2     Frequency of Binge Drinking: Less than monthly   Financial Resource Strain: Low Risk  (5/21/2025)    Overall Financial Resource Strain (CARDIA)     Difficulty of Paying Living Expenses: Not hard at all   Food Insecurity: No Food Insecurity (5/21/2025)    Hunger Vital Sign     Worried About Running Out of Food in the Last Year: Never true     Ran Out of Food in the Last Year: Never true   Transportation Needs: No Transportation Needs (5/21/2025)    PRAPARE - Transportation     Lack of Transportation (Medical): No     Lack of Transportation (Non-Medical): No   Physical Activity: Inactive (5/21/2025)    Exercise Vital Sign     Days of Exercise per Week: 0 days     Minutes of Exercise per Session: 0 min   Stress: Not on file   Social Connections: Socially Isolated (5/21/2025)    Social Connection and Isolation Panel [NHANES]     Frequency of Communication with Friends and Family: More than three times a week     Frequency of Social Gatherings with Friends and Family: Once a week     Attends [employer] Services: Never     Active

## 2025-05-21 NOTE — ACP (ADVANCE CARE PLANNING)
Advance Care Planning   General Advance Care Planning (ACP) Conversation    Date of Conversation: 5/21/2025  Conducted with: Patient with Decision Making Capacity  Other persons present: None    Healthcare Decision Maker:    Primary Decision Maker: Linda Salazar - Child - [phone]    Primary Decision Maker (Active): Linda Salazar    Secondary Decision Maker: Gareth Giang Jr. - Child - [phone]      Content/Action Overview:  Has ACP document(s) on file - reflects the patient's care preferences  Reviewed DNR/DNI and patient elects Full Code (Attempt Resuscitation)        Length of Voluntary ACP Conversation in minutes:  <16 minutes (Non-Billable)    Ольга Ribeiro, RN

## 2025-05-29 ENCOUNTER — CARE COORDINATION (OUTPATIENT)
Dept: CARE COORDINATION | Age: 85
End: 2025-05-29

## 2025-05-29 DIAGNOSIS — E11.42 DIABETIC POLYNEUROPATHY ASSOCIATED WITH TYPE 2 DIABETES MELLITUS (HCC): ICD-10-CM

## 2025-05-30 RX ORDER — GABAPENTIN 600 MG/1
TABLET ORAL
Qty: 180 TABLET | Refills: 1 | Status: SHIPPED | OUTPATIENT
Start: 2025-05-30 | End: 2025-11-26

## 2025-06-02 DIAGNOSIS — Z79.4 TYPE 2 DIABETES MELLITUS WITH DIABETIC NEPHROPATHY, WITH LONG-TERM CURRENT USE OF INSULIN (HCC): ICD-10-CM

## 2025-06-02 DIAGNOSIS — E11.21 TYPE 2 DIABETES MELLITUS WITH DIABETIC NEPHROPATHY, WITH LONG-TERM CURRENT USE OF INSULIN (HCC): ICD-10-CM

## 2025-06-02 RX ORDER — PROCHLORPERAZINE 25 MG/1
SUPPOSITORY RECTAL
Qty: 4 EACH | Refills: 5 | Status: SHIPPED | OUTPATIENT
Start: 2025-06-02

## 2025-06-11 ENCOUNTER — CARE COORDINATION (OUTPATIENT)
Dept: CARE COORDINATION | Age: 85
End: 2025-06-11

## 2025-06-11 NOTE — CARE COORDINATION
complete:  - disease specific assessments  - medication review   - advance care planning   - goal progression  - graduation .   Caregiver is agreeable to this plan.

## 2025-06-13 RX ORDER — OMEPRAZOLE 20 MG/1
CAPSULE, DELAYED RELEASE ORAL
Qty: 180 CAPSULE | Refills: 1 | Status: SHIPPED | OUTPATIENT
Start: 2025-06-13

## 2025-06-13 NOTE — TELEPHONE ENCOUNTER
calling needing medication refilled for heart burn called [facility].  Only has 2 days left of the medication.  Please advise

## 2025-06-18 ENCOUNTER — CARE COORDINATION (OUTPATIENT)
Dept: CARE COORDINATION | Age: 85
End: 2025-06-18

## 2025-06-18 NOTE — CARE COORDINATION
Ambulatory Care Coordination Note     2025 1:44 PM     Patient Current Location:  Home: [address] [place], [place] [address] [place] [address]     AC contacted the caregiver by telephone. Verified name and  with caregiver as identifiers.     Patient graduated from the High Risk Care Management program on 2025.  Patient verbalizes confidence in the ability to self-manage at this time. has the ability to self-manage at this time. progressing towards self-management. .  Care management goals have been completed. No further Ambulatory Care Manager follow up scheduled.      ACM: Estephania Herrmann RN     Challenges to be reviewed by the provider   Additional needs identified to be addressed with provider No                  Method of communication with provider: none.    Utilization: Patient has not had any utilization since our last call.     Care Summary Note:  [facility] outreach for graduation call. Daughter and GAURAV Mckeon reports no new or worsening red flag symptoms. Pt compliant with mediations and has no CP,SOB. AC graduated pt from  at this time. Linda VU on how to contact [facility] should new needs arise.     Offered patient enrollment in the Remote Patient Monitoring (RPM) program for in-home monitoring: Yes, but did not enroll at this time: limited patient ability to navigate RPM/equipment.     Assessments Completed:   Congestive Heart Failure Assessment    Are you currently restricting fluids?: No Restriction  Do you understand a low sodium diet?: No  Do you understand how to read food labels?: No  How many restaurant meals do you eat per week?: 5 or more  Do you salt your food before tasting it?: No     No patient-reported symptoms      Symptoms:  None: Yes      Symptom course: stable  Weight trend: stable  Salt intake watch compared to last visit: stable      ,   COPD Assessment    Does the patient understand envrionmental exposure?: No  Is the patient able to verbalize Rescue vs. Long Acting medications?: No  Does the

## 2025-06-27 ENCOUNTER — TELEPHONE (OUTPATIENT)
Dept: FAMILY MEDICINE CLINIC | Age: 85
End: 2025-06-27

## 2025-06-27 DIAGNOSIS — I48.0 PAROXYSMAL ATRIAL FIBRILLATION (HCC): ICD-10-CM

## 2025-06-27 RX ORDER — AMIODARONE HYDROCHLORIDE 200 MG/1
TABLET ORAL
Qty: 30 TABLET | Refills: 5 | Status: SHIPPED | OUTPATIENT
Start: 2025-06-27

## 2025-06-27 RX ORDER — ERGOCALCIFEROL 1.25 MG/1
50000 CAPSULE, LIQUID FILLED ORAL WEEKLY
Qty: 5 CAPSULE | Refills: 2 | Status: SHIPPED | OUTPATIENT
Start: 2025-06-27

## 2025-07-22 DIAGNOSIS — I50.33 ACUTE ON CHRONIC DIASTOLIC CHF (CONGESTIVE HEART FAILURE) (HCC): ICD-10-CM

## 2025-07-23 RX ORDER — TORSEMIDE 20 MG/1
TABLET ORAL
Qty: 360 TABLET | Refills: 1 | Status: SHIPPED | OUTPATIENT
Start: 2025-07-23

## 2025-07-28 ENCOUNTER — TELEPHONE (OUTPATIENT)
Dept: FAMILY MEDICINE CLINIC | Age: 85
End: 2025-07-28

## 2025-07-28 DIAGNOSIS — R29.6 FREQUENT FALLS: Primary | ICD-10-CM

## 2025-07-28 RX ORDER — POTASSIUM CHLORIDE 1500 MG/1
20 TABLET, EXTENDED RELEASE ORAL DAILY
Qty: 90 TABLET | Refills: 1 | Status: SHIPPED | OUTPATIENT
Start: 2025-07-28

## 2025-07-28 NOTE — TELEPHONE ENCOUNTER
Dop called Pt had a fall June 21st.  He keeps having issues with knee.  He has an appt July 31st and she said she wanted to let you know because he wont remember to tell you at is appt.  Also needs refills on his potassium so I pended that.

## 2025-07-31 ENCOUNTER — OFFICE VISIT (OUTPATIENT)
Dept: FAMILY MEDICINE CLINIC | Age: 85
End: 2025-07-31

## 2025-07-31 VITALS
BODY MASS INDEX: 32.62 KG/M2 | SYSTOLIC BLOOD PRESSURE: 138 MMHG | OXYGEN SATURATION: 100 % | HEART RATE: 63 BPM | DIASTOLIC BLOOD PRESSURE: 72 MMHG | WEIGHT: 203 LBS | RESPIRATION RATE: 18 BRPM | HEIGHT: 66 IN

## 2025-07-31 DIAGNOSIS — N18.4 CHRONIC RENAL DISEASE, STAGE 4, SEVERELY DECREASED GLOMERULAR FILTRATION RATE BETWEEN 15-29 ML/MIN/1.73 SQUARE METER (HCC): ICD-10-CM

## 2025-07-31 DIAGNOSIS — E55.9 VITAMIN D DEFICIENCY: ICD-10-CM

## 2025-07-31 DIAGNOSIS — G47.33 OBSTRUCTIVE SLEEP APNEA SYNDROME: ICD-10-CM

## 2025-07-31 DIAGNOSIS — Z79.899 ENCOUNTER FOR MONITORING AMIODARONE THERAPY: ICD-10-CM

## 2025-07-31 DIAGNOSIS — I50.20 HFREF (HEART FAILURE WITH REDUCED EJECTION FRACTION) (HCC): ICD-10-CM

## 2025-07-31 DIAGNOSIS — H91.93 BILATERAL HEARING LOSS, UNSPECIFIED HEARING LOSS TYPE: ICD-10-CM

## 2025-07-31 DIAGNOSIS — R29.6 FREQUENT FALLS: ICD-10-CM

## 2025-07-31 DIAGNOSIS — I10 ESSENTIAL HYPERTENSION: ICD-10-CM

## 2025-07-31 DIAGNOSIS — Z95.810 PRESENCE OF COMBINATION INTERNAL CARDIAC DEFIBRILLATOR (ICD) AND PACEMAKER: ICD-10-CM

## 2025-07-31 DIAGNOSIS — E53.8 B12 DEFICIENCY: ICD-10-CM

## 2025-07-31 DIAGNOSIS — Z51.81 ENCOUNTER FOR MONITORING AMIODARONE THERAPY: ICD-10-CM

## 2025-07-31 DIAGNOSIS — I48.0 PAROXYSMAL ATRIAL FIBRILLATION (HCC): ICD-10-CM

## 2025-07-31 DIAGNOSIS — Z79.4 TYPE 2 DIABETES MELLITUS WITH DIABETIC NEPHROPATHY, WITH LONG-TERM CURRENT USE OF INSULIN (HCC): ICD-10-CM

## 2025-07-31 DIAGNOSIS — Z00.00 MEDICARE ANNUAL WELLNESS VISIT, SUBSEQUENT: Primary | ICD-10-CM

## 2025-07-31 DIAGNOSIS — E11.21 TYPE 2 DIABETES MELLITUS WITH DIABETIC NEPHROPATHY, WITH LONG-TERM CURRENT USE OF INSULIN (HCC): ICD-10-CM

## 2025-07-31 DIAGNOSIS — E78.2 MIXED HYPERLIPIDEMIA: ICD-10-CM

## 2025-07-31 PROBLEM — H91.90 HEARING LOSS: Status: ACTIVE | Noted: 2025-07-31

## 2025-07-31 PROBLEM — L03.116 BILATERAL LOWER LEG CELLULITIS: Status: RESOLVED | Noted: 2022-05-10 | Resolved: 2025-07-31

## 2025-07-31 PROBLEM — S41.101A OPEN WOUND OF RIGHT UPPER ARM: Status: RESOLVED | Noted: 2023-11-29 | Resolved: 2025-07-31

## 2025-07-31 PROBLEM — S06.5XAA SUBDURAL HEMATOMA (HCC): Status: RESOLVED | Noted: 2024-04-26 | Resolved: 2025-07-31

## 2025-07-31 PROBLEM — E87.70 HYPERVOLEMIA: Status: RESOLVED | Noted: 2024-06-03 | Resolved: 2025-07-31

## 2025-07-31 PROBLEM — R73.9 HYPERGLYCEMIA: Status: RESOLVED | Noted: 2024-06-03 | Resolved: 2025-07-31

## 2025-07-31 PROBLEM — L03.115 BILATERAL LOWER LEG CELLULITIS: Status: RESOLVED | Noted: 2022-05-10 | Resolved: 2025-07-31

## 2025-07-31 LAB — HBA1C MFR BLD: 8 %

## 2025-07-31 ASSESSMENT — PATIENT HEALTH QUESTIONNAIRE - PHQ9
1. LITTLE INTEREST OR PLEASURE IN DOING THINGS: NOT AT ALL
SUM OF ALL RESPONSES TO PHQ QUESTIONS 1-9: 0
SUM OF ALL RESPONSES TO PHQ QUESTIONS 1-9: 0
2. FEELING DOWN, DEPRESSED OR HOPELESS: NOT AT ALL
SUM OF ALL RESPONSES TO PHQ QUESTIONS 1-9: 0
SUM OF ALL RESPONSES TO PHQ QUESTIONS 1-9: 0

## 2025-07-31 ASSESSMENT — LIFESTYLE VARIABLES
HOW MANY STANDARD DRINKS CONTAINING ALCOHOL DO YOU HAVE ON A TYPICAL DAY: 1 OR 2
HOW OFTEN DURING THE LAST YEAR HAVE YOU FOUND THAT YOU WERE NOT ABLE TO STOP DRINKING ONCE YOU HAD STARTED: NEVER
HOW OFTEN DURING THE LAST YEAR HAVE YOU BEEN UNABLE TO REMEMBER WHAT HAPPENED THE NIGHT BEFORE BECAUSE YOU HAD BEEN DRINKING: NEVER
HOW OFTEN DURING THE LAST YEAR HAVE YOU NEEDED AN ALCOHOLIC DRINK FIRST THING IN THE MORNING TO GET YOURSELF GOING AFTER A NIGHT OF HEAVY DRINKING: NEVER
HAVE YOU OR SOMEONE ELSE BEEN INJURED AS A RESULT OF YOUR DRINKING: NO
HOW OFTEN DURING THE LAST YEAR HAVE YOU FAILED TO DO WHAT WAS NORMALLY EXPECTED FROM YOU BECAUSE OF DRINKING: NEVER
HOW OFTEN DO YOU HAVE A DRINK CONTAINING ALCOHOL: 4 OR MORE TIMES A WEEK
HAS A RELATIVE, FRIEND, DOCTOR, OR ANOTHER HEALTH PROFESSIONAL EXPRESSED CONCERN ABOUT YOUR DRINKING OR SUGGESTED YOU CUT DOWN: NO
HOW OFTEN DURING THE LAST YEAR HAVE YOU HAD A FEELING OF GUILT OR REMORSE AFTER DRINKING: NEVER

## 2025-07-31 NOTE — PATIENT INSTRUCTIONS
INSTRUCTIONS  NEXT APPOINTMENT: Please schedule check-up in 4 months     PLEASE TAKE THIS FORM TO CHECK-OUT WINDOW TO SCHEDULE NEXT VISIT.  PLEASE GET BLOODWORK DRAWN TODAY ON FIRST FLOOR in 170.  Take orders with you.  RESULTS- most blood tests back in couple days.  We will call you if any problems.  If bloodwork good, you will get letter in mail or notified thru MyChart (if signed up) within 2 weeks.  If you do not, please call office.   Please get annual flu and covid vaccine when available in fall.  Can get at stores such as [employer].     Patient Education        Preventing Falls: Care Instructions  Injuries and health problems such as trouble walking or poor eyesight can increase your risk of falling. So can some medicines. But there are things you can do to help prevent falls. You can exercise to get stronger. You can also arrange your home to make it safer.    Talk to your doctor about the medicines you take. Ask if any of them increase the risk of falls and whether they can be changed or stopped.   Try to exercise regularly. It can help improve your strength and balance. This can help lower your risk of falling.         Practice fall safety and prevention.   Wear low-heeled shoes that fit well and give your feet good support. Talk to your doctor if you have foot problems that make this hard.  Carry a cellphone or wear a medical alert device that you can use to call for help.  Use stepladders instead of chairs to reach high objects. Don't climb if you're at risk for falls. Ask for help, if needed.  Wear the correct eyeglasses, if you need them.        Make your home safer.   Remove rugs, cords, clutter, and furniture from walkways.  Keep your house well lit. Use night-lights in hallways and bathrooms.  Install and use sturdy handrails on stairways.  Wear nonskid footwear, even inside. Don't walk barefoot or in socks without shoes.        Be safe outside.   Use handrails, curb cuts, and ramps

## 2025-07-31 NOTE — PROGRESS NOTES
Influenza Virus Vaccine 10/09/2010, 09/17/2011, 10/10/2012    Influenza Whole 09/18/2013    Influenza, FLUAD, (age 65 y+), IM, Quadv, 0.5mL 10/29/2020, 09/17/2021, 09/14/2022, 11/22/2023    Influenza, FLUAD, (age 65 y+), IM, Trivalent PF, 0.5mL 10/16/2019    Influenza, FLUZONE High Dose, (age 65 y+), IM, Trivalent PF, 0.5mL 09/26/2012, 09/29/2015, 10/12/2016, 09/20/2017, 10/08/2018    Pneumococcal, PCV-13, PREVNAR 13, (age 6w+), IM, 0.5mL 03/11/2016    Pneumococcal, PPSV23, PNEUMOVAX 23, (age 2y+), SC/IM, 0.5mL 06/15/2005, 03/30/2010    TD 5LF, TENIVAC, (age 7y+), IM, 0.5mL 11/21/2023    TDaP, ADACEL (age 10y-64y), BOOSTRIX (age 10y+), IM, 0.5mL 01/01/2001    Tetanus 11/28/2011    Zoster Live (Zostavax) 10/03/2012     LAST LABS  Lab Results   Component Value Date    CHOL 140 12/18/2024    TRIG 199 (H) 12/18/2024    HDL 36 (L) 12/18/2024    LDL 64 12/18/2024     Lab Results   Component Value Date     04/16/2025    K 4.7 04/16/2025    CREATININE 2.0 (H) 04/16/2025     Lab Results   Component Value Date    LABGLOM 32 (A) 04/16/2025    LABGLOM 34 (A) 11/11/2024    LABGLOM 37 (A) 10/03/2024    LABGLOM 34 (A) 09/07/2024    LABGLOM 39 (A) 08/30/2024     Lab Results   Component Value Date    WBC 4.1 04/16/2025    HGB 13.2 (L) 04/16/2025    HCT 39.6 (L) 04/16/2025    MCV 92.2 04/16/2025     (L) 04/16/2025     Lab Results   Component Value Date    ALT 12 09/07/2024    AST 25 09/07/2024    ALKPHOS 123 09/07/2024    BILITOT 0.9 09/07/2024     TSH (uIU/mL)   Date Value   06/03/2024 1.76     Lab Results   Component Value Date    GLUCOSE 148 (H) 04/16/2025     Lab Results   Component Value Date    LABA1C 8.0 07/31/2025     Lab Results   Component Value Date    PSA 3.11 09/24/2013    PSA 3.12 09/26/2012    PSA 3.27 08/12/2011      CareTeam (Including outside providers/suppliers regularly involved in providing care):   Patient Care Team:  Bear Salcido MD as PCP - General (Family Medicine)  Bear Salcido MD as PCP -

## 2025-08-01 LAB
ALBUMIN SERPL-MCNC: 4.1 G/DL (ref 3.4–5)
ALBUMIN/GLOB SERPL: 1.8 {RATIO} (ref 1.1–2.2)
ALP SERPL-CCNC: 140 U/L (ref 40–129)
ALT SERPL-CCNC: 17 U/L (ref 10–40)
ANION GAP SERPL CALCULATED.3IONS-SCNC: 12 MMOL/L (ref 3–16)
AST SERPL-CCNC: 24 U/L (ref 15–37)
BILIRUB SERPL-MCNC: 0.5 MG/DL (ref 0–1)
BUN SERPL-MCNC: 46 MG/DL (ref 7–20)
CALCIUM SERPL-MCNC: 9.6 MG/DL (ref 8.3–10.6)
CHLORIDE SERPL-SCNC: 96 MMOL/L (ref 99–110)
CO2 SERPL-SCNC: 29 MMOL/L (ref 21–32)
CREAT SERPL-MCNC: 2.2 MG/DL (ref 0.8–1.3)
GFR SERPLBLD CREATININE-BSD FMLA CKD-EPI: 29 ML/MIN/{1.73_M2}
GLUCOSE SERPL-MCNC: 181 MG/DL (ref 70–99)
MAGNESIUM SERPL-MCNC: 2.33 MG/DL (ref 1.8–2.4)
POTASSIUM SERPL-SCNC: 4.4 MMOL/L (ref 3.5–5.1)
PROT SERPL-MCNC: 6.4 G/DL (ref 6.4–8.2)
SODIUM SERPL-SCNC: 137 MMOL/L (ref 136–145)
TSH SERPL DL<=0.005 MIU/L-ACNC: 2.82 UIU/ML (ref 0.27–4.2)

## 2025-08-22 DIAGNOSIS — I50.20 HFREF (HEART FAILURE WITH REDUCED EJECTION FRACTION) (HCC): ICD-10-CM

## 2025-08-22 RX ORDER — DAPAGLIFLOZIN 10 MG/1
10 TABLET, FILM COATED ORAL EVERY MORNING
Qty: 30 TABLET | Refills: 0 | Status: SHIPPED | OUTPATIENT
Start: 2025-08-22